# Patient Record
Sex: MALE | Race: WHITE | NOT HISPANIC OR LATINO | URBAN - METROPOLITAN AREA
[De-identification: names, ages, dates, MRNs, and addresses within clinical notes are randomized per-mention and may not be internally consistent; named-entity substitution may affect disease eponyms.]

---

## 2017-03-15 ENCOUNTER — OUTPATIENT (OUTPATIENT)
Dept: OUTPATIENT SERVICES | Facility: HOSPITAL | Age: 57
LOS: 1 days | End: 2017-03-15
Payer: COMMERCIAL

## 2017-03-15 VITALS
OXYGEN SATURATION: 99 % | WEIGHT: 210.1 LBS | SYSTOLIC BLOOD PRESSURE: 186 MMHG | DIASTOLIC BLOOD PRESSURE: 99 MMHG | HEART RATE: 98 BPM | RESPIRATION RATE: 16 BRPM | HEIGHT: 74 IN | TEMPERATURE: 98 F

## 2017-03-15 VITALS
DIASTOLIC BLOOD PRESSURE: 99 MMHG | WEIGHT: 209.44 LBS | RESPIRATION RATE: 18 BRPM | HEIGHT: 74 IN | SYSTOLIC BLOOD PRESSURE: 186 MMHG | TEMPERATURE: 97 F | OXYGEN SATURATION: 99 % | HEART RATE: 98 BPM

## 2017-03-15 DIAGNOSIS — I51.9 HEART DISEASE, UNSPECIFIED: Chronic | ICD-10-CM

## 2017-03-15 DIAGNOSIS — Z01.818 ENCOUNTER FOR OTHER PREPROCEDURAL EXAMINATION: ICD-10-CM

## 2017-03-15 DIAGNOSIS — Z98.890 OTHER SPECIFIED POSTPROCEDURAL STATES: Chronic | ICD-10-CM

## 2017-03-15 LAB
ANION GAP SERPL CALC-SCNC: 14 MMOL/L — SIGNIFICANT CHANGE UP (ref 5–17)
APTT BLD: 30.9 SEC — SIGNIFICANT CHANGE UP (ref 27.5–37.4)
BASOPHILS # BLD AUTO: 0 K/UL — SIGNIFICANT CHANGE UP (ref 0–0.2)
BASOPHILS NFR BLD AUTO: 0.2 % — SIGNIFICANT CHANGE UP (ref 0–2)
BLD GP AB SCN SERPL QL: SIGNIFICANT CHANGE UP
BUN SERPL-MCNC: 12 MG/DL — SIGNIFICANT CHANGE UP (ref 8–20)
CALCIUM SERPL-MCNC: 9.6 MG/DL — SIGNIFICANT CHANGE UP (ref 8.6–10.2)
CHLORIDE SERPL-SCNC: 97 MMOL/L — LOW (ref 98–107)
CHOLEST SERPL-MCNC: 185 MG/DL — SIGNIFICANT CHANGE UP (ref 110–199)
CO2 SERPL-SCNC: 25 MMOL/L — SIGNIFICANT CHANGE UP (ref 22–29)
CREAT SERPL-MCNC: 0.8 MG/DL — SIGNIFICANT CHANGE UP (ref 0.5–1.3)
EOSINOPHIL # BLD AUTO: 0 K/UL — SIGNIFICANT CHANGE UP (ref 0–0.5)
EOSINOPHIL NFR BLD AUTO: 0.6 % — SIGNIFICANT CHANGE UP (ref 0–5)
GLUCOSE SERPL-MCNC: 113 MG/DL — SIGNIFICANT CHANGE UP (ref 70–115)
HCT VFR BLD CALC: 39.7 % — LOW (ref 42–52)
HDLC SERPL-MCNC: 80 MG/DL — SIGNIFICANT CHANGE UP
HGB BLD-MCNC: 13.6 G/DL — LOW (ref 14–18)
INR BLD: 0.99 RATIO — SIGNIFICANT CHANGE UP (ref 0.88–1.16)
LIPID PNL WITH DIRECT LDL SERPL: 86 MG/DL — SIGNIFICANT CHANGE UP
LYMPHOCYTES # BLD AUTO: 1.9 K/UL — SIGNIFICANT CHANGE UP (ref 1–4.8)
LYMPHOCYTES # BLD AUTO: 31.1 % — SIGNIFICANT CHANGE UP (ref 20–55)
MAGNESIUM SERPL-MCNC: 1.8 MG/DL — SIGNIFICANT CHANGE UP (ref 1.8–2.5)
MCHC RBC-ENTMCNC: 33 PG — HIGH (ref 27–31)
MCHC RBC-ENTMCNC: 34.3 G/DL — SIGNIFICANT CHANGE UP (ref 32–36)
MCV RBC AUTO: 96.4 FL — HIGH (ref 80–94)
MONOCYTES # BLD AUTO: 0.4 K/UL — SIGNIFICANT CHANGE UP (ref 0–0.8)
MONOCYTES NFR BLD AUTO: 6.8 % — SIGNIFICANT CHANGE UP (ref 3–10)
NEUTROPHILS # BLD AUTO: 3.8 K/UL — SIGNIFICANT CHANGE UP (ref 1.8–8)
NEUTROPHILS NFR BLD AUTO: 61.1 % — SIGNIFICANT CHANGE UP (ref 37–73)
PLATELET # BLD AUTO: 195 K/UL — SIGNIFICANT CHANGE UP (ref 150–400)
POTASSIUM SERPL-MCNC: 4.2 MMOL/L — SIGNIFICANT CHANGE UP (ref 3.5–5.3)
POTASSIUM SERPL-SCNC: 4.2 MMOL/L — SIGNIFICANT CHANGE UP (ref 3.5–5.3)
PROTHROM AB SERPL-ACNC: 10.9 SEC — SIGNIFICANT CHANGE UP (ref 10–13.1)
RBC # BLD: 4.12 M/UL — LOW (ref 4.6–6.2)
RBC # FLD: 12.4 % — SIGNIFICANT CHANGE UP (ref 11–15.6)
SODIUM SERPL-SCNC: 136 MMOL/L — SIGNIFICANT CHANGE UP (ref 135–145)
TOTAL CHOLESTEROL/HDL RATIO MEASUREMENT: 2 RATIO — LOW (ref 3.4–9.6)
TRIGL SERPL-MCNC: 96 MG/DL — SIGNIFICANT CHANGE UP (ref 10–200)
TYPE + AB SCN PNL BLD: SIGNIFICANT CHANGE UP
WBC # BLD: 6.2 K/UL — SIGNIFICANT CHANGE UP (ref 4.8–10.8)
WBC # FLD AUTO: 6.2 K/UL — SIGNIFICANT CHANGE UP (ref 4.8–10.8)

## 2017-03-15 PROCEDURE — 93010 ELECTROCARDIOGRAM REPORT: CPT

## 2017-03-15 NOTE — H&P PST ADULT - HISTORY OF PRESENT ILLNESS
55 yo male with h/o CAD with 1 DEstent in proximal to mid LCX, HTN, HLD and c/o chest pain with activity. 55 yo male with h/o CAD with 1 DE stent in proximal to mid LCX, HTN, HLD and c/o chest pain with activity, equivalent to CCS 3. Patient had exercise stress test which was stopped secondary to chest pain.  Test results paperwork are still pending.  Patient presents today for PST for cardiac cath to r/o further CAD.

## 2017-03-15 NOTE — ASU PATIENT PROFILE, ADULT - PMH
CAD (coronary artery disease)    Chest pain    HLD (hyperlipidemia)    HTN (hypertension)    Mitral regurgitation

## 2017-03-16 DIAGNOSIS — Z01.818 ENCOUNTER FOR OTHER PREPROCEDURAL EXAMINATION: ICD-10-CM

## 2017-03-16 DIAGNOSIS — I25.10 ATHEROSCLEROTIC HEART DISEASE OF NATIVE CORONARY ARTERY WITHOUT ANGINA PECTORIS: ICD-10-CM

## 2017-03-17 ENCOUNTER — OUTPATIENT (OUTPATIENT)
Dept: OUTPATIENT SERVICES | Facility: HOSPITAL | Age: 57
LOS: 1 days | Discharge: HOME | End: 2017-03-17
Payer: COMMERCIAL

## 2017-03-17 ENCOUNTER — TRANSCRIPTION ENCOUNTER (OUTPATIENT)
Age: 57
End: 2017-03-17

## 2017-03-17 VITALS — TEMPERATURE: 98 F | OXYGEN SATURATION: 100 % | HEART RATE: 75 BPM | RESPIRATION RATE: 20 BRPM

## 2017-03-17 DIAGNOSIS — R07.89 OTHER CHEST PAIN: ICD-10-CM

## 2017-03-17 DIAGNOSIS — Z98.890 OTHER SPECIFIED POSTPROCEDURAL STATES: Chronic | ICD-10-CM

## 2017-03-17 DIAGNOSIS — I51.9 HEART DISEASE, UNSPECIFIED: Chronic | ICD-10-CM

## 2017-03-17 PROCEDURE — 93010 ELECTROCARDIOGRAM REPORT: CPT

## 2017-03-17 RX ORDER — ALPRAZOLAM 0.25 MG
0.5 TABLET ORAL EVERY 6 HOURS
Refills: 0 | Status: DISCONTINUED | OUTPATIENT
Start: 2017-03-17 | End: 2017-03-17

## 2017-03-17 RX ORDER — ASPIRIN/CALCIUM CARB/MAGNESIUM 324 MG
325 TABLET ORAL ONCE
Refills: 0 | Status: COMPLETED | OUTPATIENT
Start: 2017-03-17 | End: 2017-03-17

## 2017-03-17 RX ORDER — METOPROLOL TARTRATE 50 MG
100 TABLET ORAL DAILY
Refills: 0 | Status: DISCONTINUED | OUTPATIENT
Start: 2017-03-17 | End: 2017-04-01

## 2017-03-17 RX ORDER — ALPRAZOLAM 0.25 MG
0.25 TABLET ORAL EVERY 6 HOURS
Refills: 0 | Status: DISCONTINUED | OUTPATIENT
Start: 2017-03-17 | End: 2017-03-17

## 2017-03-17 RX ORDER — CLOPIDOGREL BISULFATE 75 MG/1
1 TABLET, FILM COATED ORAL
Qty: 30 | Refills: 11
Start: 2017-03-17 | End: 2018-03-11

## 2017-03-17 RX ORDER — ASPIRIN/CALCIUM CARB/MAGNESIUM 324 MG
81 TABLET ORAL DAILY
Refills: 0 | Status: DISCONTINUED | OUTPATIENT
Start: 2017-03-17 | End: 2017-04-01

## 2017-03-17 RX ORDER — AMLODIPINE BESYLATE 2.5 MG/1
5 TABLET ORAL DAILY
Refills: 0 | Status: DISCONTINUED | OUTPATIENT
Start: 2017-03-17 | End: 2017-04-01

## 2017-03-17 RX ORDER — SIMVASTATIN 20 MG/1
40 TABLET, FILM COATED ORAL AT BEDTIME
Refills: 0 | Status: DISCONTINUED | OUTPATIENT
Start: 2017-03-17 | End: 2017-04-01

## 2017-03-17 RX ORDER — FENTANYL CITRATE 50 UG/ML
25 INJECTION INTRAVENOUS ONCE
Refills: 0 | Status: DISCONTINUED | OUTPATIENT
Start: 2017-03-17 | End: 2017-03-17

## 2017-03-17 RX ORDER — VALSARTAN 80 MG/1
320 TABLET ORAL DAILY
Refills: 0 | Status: DISCONTINUED | OUTPATIENT
Start: 2017-03-17 | End: 2017-04-01

## 2017-03-17 RX ORDER — CLOPIDOGREL BISULFATE 75 MG/1
75 TABLET, FILM COATED ORAL DAILY
Refills: 0 | Status: DISCONTINUED | OUTPATIENT
Start: 2017-03-18 | End: 2017-04-01

## 2017-03-17 RX ORDER — AMLODIPINE BESYLATE 2.5 MG/1
1 TABLET ORAL
Qty: 30 | Refills: 5
Start: 2017-03-17 | End: 2017-09-12

## 2017-03-17 RX ADMIN — SIMVASTATIN 40 MILLIGRAM(S): 20 TABLET, FILM COATED ORAL at 21:12

## 2017-03-17 RX ADMIN — FENTANYL CITRATE 25 MICROGRAM(S): 50 INJECTION INTRAVENOUS at 13:01

## 2017-03-17 RX ADMIN — FENTANYL CITRATE 25 MICROGRAM(S): 50 INJECTION INTRAVENOUS at 13:15

## 2017-03-17 RX ADMIN — Medication 325 MILLIGRAM(S): at 10:59

## 2017-03-17 RX ADMIN — AMLODIPINE BESYLATE 5 MILLIGRAM(S): 2.5 TABLET ORAL at 13:25

## 2017-03-17 RX ADMIN — Medication 0.5 MILLIGRAM(S): at 15:11

## 2017-03-17 RX ADMIN — Medication 0.5 MILLIGRAM(S): at 21:12

## 2017-03-17 NOTE — DISCHARGE NOTE ADULT - CARE PROVIDER_API CALL
Leif Monzon), Cardiovascular Disease; Internal Medicine  [address] [place] [address]  Phone: [phone]  Fax: [phone]

## 2017-03-17 NOTE — DISCHARGE NOTE ADULT - PATIENT PORTAL LINK FT
“You can access the [facility] Patient Portal, offered by [facility], by registering with the following website: [email or web address]”

## 2017-03-17 NOTE — DISCHARGE NOTE ADULT - MEDICATION SUMMARY - MEDICATIONS TO STOP TAKING
I will STOP taking the medications listed below when I get home from the hospital:    valsartan 320 mg oral tablet  -- 1 tab(s) by mouth once a day

## 2017-03-17 NOTE — DISCHARGE NOTE ADULT - HOSPITAL COURSE
56 year old male with history of CAD and prior LCx stent.  Now s/p PCI with AQUILINO X4 LAD and prior stents patent.   Add:  -plavix  -norvasc  -saqib/hctz

## 2017-03-17 NOTE — DISCHARGE NOTE ADULT - CARE PLAN
Principal Discharge DX:	CAD (coronary artery disease)  Goal:	optimal cardiac function  Instructions for follow-up, activity and diet:	Restricted use with no heavy lifting of affected arm for 48 hours.  No submerging the arm in water for 48 hours.  You may start showering today.  Call your doctor for any bleeding, swelling, loss of sensation in the hand or fingers, or fingers turning blue.  If heavy bleeding or large lumps form, hold pressure at the spot and come to the Emergency Room. Principal Discharge DX:	CAD (coronary artery disease)  Goal:	optimal cardiac function  Instructions for follow-up, activity and diet:	Restricted use with no heavy lifting of affected arm for 48 hours.  No submerging the arm in water for 48 hours.  You may start showering today.  Call your doctor for any bleeding, swelling, loss of sensation in the hand or fingers, or fingers turning blue.  If heavy bleeding or large lumps form, hold pressure at the spot and come to the Emergency Room.    1. follow up with cardiologist 2 weeks .      Restricted use with no heavy lifting of affected arm for 48 hours.  No submerging the arm in water for 48 hours.  You may start showering today.  Call your doctor for any bleeding, swelling, loss of sensation in the hand or fingers, or fingers turning blue.  If heavy bleeding or large lumps form, hold pressure at the spot and come to the Emergency Room.    5. increase aspirin to 325mg daily ---stop taking the 81mg aspirin  till you follow up with Dr Joaquin.   take plavix for for stent to remain open ---do not discontinue unless your cardiologist says its OK!!   post intervention site precautions reviewed with patient.

## 2017-03-17 NOTE — DISCHARGE NOTE ADULT - PLAN OF CARE
optimal cardiac function Restricted use with no heavy lifting of affected arm for 48 hours.  No submerging the arm in water for 48 hours.  You may start showering today.  Call your doctor for any bleeding, swelling, loss of sensation in the hand or fingers, or fingers turning blue.  If heavy bleeding or large lumps form, hold pressure at the spot and come to the Emergency Room. Restricted use with no heavy lifting of affected arm for 48 hours.  No submerging the arm in water for 48 hours.  You may start showering today.  Call your doctor for any bleeding, swelling, loss of sensation in the hand or fingers, or fingers turning blue.  If heavy bleeding or large lumps form, hold pressure at the spot and come to the Emergency Room.    1. follow up with cardiologist 2 weeks .      Restricted use with no heavy lifting of affected arm for 48 hours.  No submerging the arm in water for 48 hours.  You may start showering today.  Call your doctor for any bleeding, swelling, loss of sensation in the hand or fingers, or fingers turning blue.  If heavy bleeding or large lumps form, hold pressure at the spot and come to the Emergency Room.    5. increase aspirin to 325mg daily ---stop taking the 81mg aspirin  till you follow up with Dr Joaquin.   take plavix for for stent to remain open ---do not discontinue unless your cardiologist says its OK!!   post intervention site precautions reviewed with patient.

## 2017-03-17 NOTE — DISCHARGE NOTE ADULT - MEDICATION SUMMARY - MEDICATIONS TO TAKE
I will START or STAY ON the medications listed below when I get home from the hospital:    aspirin 81 mg oral tablet  -- 1 tab(s) by mouth once a day  -- Indication: For stent     simvastatin 40 mg oral tablet  -- 1 tab(s) by mouth once a day (at bedtime)  -- Indication: For Cholesterol    Diovan HCT 25 mg-320 mg oral tablet  -- 1 tab(s) by mouth once a day  -- Avoid prolonged or excessive exposure to direct and/or artificial sunlight while taking this medication.  Do not take this drug if you are pregnant.  It is very important that you take or use this exactly as directed.  Do not skip doses or discontinue unless directed by your doctor.  Some non-prescription drugs may aggravate your condition.  Read all labels carefully.  If a warning appears, check with your doctor before taking.    -- Indication: For bp    Plavix 75 mg oral tablet  -- 1 tab(s) by mouth once a day  -- Do not take aspirin or aspirin containing products without knowledge and consent of your physician.    -- Indication: For stent     ALPRAZolam 0.5 mg oral tablet  -- 1 tab(s) by mouth every 6 hours, As needed, anxiety  -- Indication: For anxiet    metoprolol succinate 100 mg oral tablet, extended release  -- 1 tab(s) by mouth once a day  -- Indication: For bp    amLODIPine 5 mg oral tablet  -- 1 tab(s) by mouth once a day  -- It is very important that you take or use this exactly as directed.  Do not skip doses or discontinue unless directed by your doctor.  Some non-prescription drugs may aggravate your condition.  Read all labels carefully.  If a warning appears, check with your doctor before taking.    -- Indication: For bp    Omega-3 oral capsule  -- 1 cap(s) by mouth once a day  -- Indication: For Cholesterol    multivitamin  -- 1 cap(s) by mouth once a day  -- Indication: For vit    Vitamin D3 400 intl units oral capsule  -- 1 cap(s) by mouth once a day  -- Indication: For vit

## 2017-03-18 VITALS
OXYGEN SATURATION: 97 % | TEMPERATURE: 98 F | DIASTOLIC BLOOD PRESSURE: 97 MMHG | SYSTOLIC BLOOD PRESSURE: 144 MMHG | RESPIRATION RATE: 16 BRPM | HEART RATE: 78 BPM

## 2017-03-18 LAB
ANION GAP SERPL CALC-SCNC: 11 MMOL/L — SIGNIFICANT CHANGE UP (ref 5–17)
BUN SERPL-MCNC: 14 MG/DL — SIGNIFICANT CHANGE UP (ref 8–20)
CALCIUM SERPL-MCNC: 9.2 MG/DL — SIGNIFICANT CHANGE UP (ref 8.6–10.2)
CHLORIDE SERPL-SCNC: 101 MMOL/L — SIGNIFICANT CHANGE UP (ref 98–107)
CO2 SERPL-SCNC: 28 MMOL/L — SIGNIFICANT CHANGE UP (ref 22–29)
CREAT SERPL-MCNC: 0.83 MG/DL — SIGNIFICANT CHANGE UP (ref 0.5–1.3)
GLUCOSE SERPL-MCNC: 98 MG/DL — SIGNIFICANT CHANGE UP (ref 70–115)
HCT VFR BLD CALC: 39 % — LOW (ref 42–52)
HGB BLD-MCNC: 13.1 G/DL — LOW (ref 14–18)
MAGNESIUM SERPL-MCNC: 1.9 MG/DL — SIGNIFICANT CHANGE UP (ref 1.8–2.5)
MCHC RBC-ENTMCNC: 32.8 PG — HIGH (ref 27–31)
MCHC RBC-ENTMCNC: 33.6 G/DL — SIGNIFICANT CHANGE UP (ref 32–36)
MCV RBC AUTO: 97.5 FL — HIGH (ref 80–94)
PLATELET # BLD AUTO: 177 K/UL — SIGNIFICANT CHANGE UP (ref 150–400)
POTASSIUM SERPL-MCNC: 4.4 MMOL/L — SIGNIFICANT CHANGE UP (ref 3.5–5.3)
POTASSIUM SERPL-SCNC: 4.4 MMOL/L — SIGNIFICANT CHANGE UP (ref 3.5–5.3)
RBC # BLD: 4 M/UL — LOW (ref 4.6–6.2)
RBC # FLD: 12.4 % — SIGNIFICANT CHANGE UP (ref 11–15.6)
SODIUM SERPL-SCNC: 140 MMOL/L — SIGNIFICANT CHANGE UP (ref 135–145)
WBC # BLD: 5.1 K/UL — SIGNIFICANT CHANGE UP (ref 4.8–10.8)
WBC # FLD AUTO: 5.1 K/UL — SIGNIFICANT CHANGE UP (ref 4.8–10.8)

## 2017-03-18 PROCEDURE — 93010 ELECTROCARDIOGRAM REPORT: CPT

## 2017-03-18 RX ORDER — ALPRAZOLAM 0.25 MG
1 TABLET ORAL
Qty: 0 | Refills: 0 | DISCHARGE
Start: 2017-03-18

## 2017-03-18 RX ADMIN — Medication 100 MILLIGRAM(S): at 05:10

## 2017-03-18 RX ADMIN — AMLODIPINE BESYLATE 5 MILLIGRAM(S): 2.5 TABLET ORAL at 05:10

## 2017-03-18 RX ADMIN — CLOPIDOGREL BISULFATE 75 MILLIGRAM(S): 75 TABLET, FILM COATED ORAL at 09:23

## 2017-03-18 RX ADMIN — VALSARTAN 320 MILLIGRAM(S): 80 TABLET ORAL at 05:10

## 2017-03-18 RX ADMIN — Medication 81 MILLIGRAM(S): at 09:23

## 2017-03-18 NOTE — PROGRESS NOTE ADULT - SUBJECTIVE AND OBJECTIVE BOX
I have seen and examined this patient. Consent obtained. Agree with cardiac cath. Risks and benefits fully explained. All questions answered.    Tim Joaquin MD
Cardiology NP    No change in medical history since PST.
INTERVAL HISTORY:   55 yo male with prior PCI to the LCX.  recent abnormal NST.  Now Post PCI of the LAD.  Prior LCX stent patent.  No complaints this am of chest pain GARCIA or palpitations.  Ambulatiing  no dizziness or lightheadedness.     	  MEDICATIONS:  valsartan 320milliGRAM(s) Oral daily  metoprolol succinate ER 100milliGRAM(s) Oral daily  hydrochlorothiazide   Tablet 25milliGRAM(s) Oral daily  amLODIPine   Tablet 5milliGRAM(s) Oral daily  ALPRAZolam 0.5milliGRAM(s) Oral every 6 hours PRN  simvastatin 40milliGRAM(s) Oral at bedtime  aspirin enteric coated 81milliGRAM(s) Oral daily  clopidogrel Tablet 75milliGRAM(s) Oral daily  Nephro-giovanna 1Tablet(s) Oral daily        PHYSICAL EXAM:    T(C): 36.7, Max: 36.8 (03-17 @ 19:59)  HR: 85 (72 - 85)  BP: 128/70 (128/70 - 192/96)  RR: 17 (15 - 20)  SpO2: 99% (95% - 100%)  Wt(kg): --    I&O's Summary    I & Os for current day (as of 18 Mar 2017 08:19)  =============================================  IN: 120 ml / OUT: 0 ml / NET: 120 ml      Daily     Daily Weight in k.6 (18 Mar 2017 06:07)    Appearance: Normal	  HEENT:   Normal oral mucosa, PERRL, EOMI	  Lymphatic: No lymphadenopathy  Cardiovascular: Normal S1 S2, No JVD, No murmurs, No edema  Respiratory: Lungs clear to auscultation	  Psychiatry: A & O x 3, Mood & affect appropriate  Gastrointestinal:  Soft, Non-tender, + BS	  Skin: No rashes, No ecchymoses, No cyanosis  Neurologic: Non-focal  Extremities: Normal range of motion, No clubbing, cyanosis or edema  Vascular: Peripheral pulses palpable 2+ bilaterally  Procedure Site: right radial site benign, no hematoma no ecchymosis.       TELEMETRY: 	SR 60's    ECG:  	NSR 68 Q's in III V1 no change .                           13.1   5.1   )-----------( 177      ( 18 Mar 2017 05:30 )             39.0     18 Mar 2017 05:30    140    |  101    |  14.0   ----------------------------<  98     4.4     |  28.0   |  0.83     Ca    9.2        18 Mar 2017 05:30  Mg     1.9       18 Mar 2017 05:30        ASSESSMENT/PLAN: 	    1. follow up with cardiologist 2 weeks .   2  Restricted use with no heavy lifting of affected arm for 48 hours.  No submerging the arm in water for 48 hours.  You may start showering today.  Call your doctor for any bleeding, swelling, loss of sensation in the hand or fingers, or fingers turning blue.  If heavy bleeding or large lumps form, hold pressure at the spot and come to the Emergency Room.  3. increase aspirin to 325mg daily ---stop taking the 81mg aspirin  till you follow up with Dr Joaquin.  4. take plavix for for stent to remain open ---do not discontinue unless your cardiologist says its OK!!  5. post intervention site precautions reviewed with patient.  6 ok to dc home
Nurse Practitioner Progress note:     INTERVAL HISTORY: 56 year old male with history of prior LCX stent with c/o chest pain.    MEDICATIONS:  valsartan 320milliGRAM(s) Oral daily  metoprolol succinate ER 100milliGRAM(s) Oral daily  fentaNYL    Injectable 25MICROGram(s) IV Push once  simvastatin 40milliGRAM(s) Oral at bedtime  aspirin enteric coated 81milliGRAM(s) Oral daily  Nephrocaps 1Capsule(s) Oral daily      TELEMETRY: NSR    T(C): 36.4, Max: 36.4 (03-17 @ 10:37)  HR: 81 (75 - 81)  BP: 180/97 (180/97 - 180/97)  RR: 18 (18 - 20)  SpO2: 97% (97% - 100%)      PHYSICAL EXAM:  Appearance: Normal	  HEENT:   Normal oral mucosa, PERRL  Cardiovascular: Normal S1 S2, No JVD, No murmurs, No edema  Respiratory: Lungs clear to auscultation	  Neurologic: Non-focal, A&O X3.  No neuro deficits  Procedure Site: R radial band in place.  Site benign.  No bleeding/hematoma/ecchymosis.  + palp radial pusle    12 lead EKG:  NSR 75 bpm.  No acute changes	        PROCEDURE RESULTS: S/P LHC-->PCI with AQUILINO X 4 LAD and prior LCx stent patent.    ASSESSMENT/PLAN: 	  -Radial precautions  -d/c radial band in 1 hour  -Resume home meds  -Initiate plavix daily, norvasc, HCTZ, diovan  -B/P control   -Follow up with Dr. Monzon  -Check labs/EKG/site check in AM  -Probable discharge in AM
Nurse Practitioner Progress note:     INTERVAL HISTORY: 56 year old male with prior LCx stent with c/o chest pain.    MEDICATIONS:  valsartan 320milliGRAM(s) Oral daily  metoprolol succinate ER 100milliGRAM(s) Oral daily  hydrochlorothiazide   Tablet 25milliGRAM(s) Oral daily  amLODIPine   Tablet 5milliGRAM(s) Oral daily  simvastatin 40milliGRAM(s) Oral at bedtime  aspirin enteric coated 81milliGRAM(s) Oral daily  Nephrocaps 1Capsule(s) Oral daily      TELEMETRY: NSR    T(C): 36.4, Max: 36.4 (03-17 @ 10:37)  HR: 81 (75 - 81)  BP: 180/97 (180/97 - 180/97)  RR: 18 (18 - 20)  SpO2: 97% (97% - 100%)      PHYSICAL EXAM:  Appearance: Normal	  Cardiovascular: Normal S1 S2, No JVD, No murmurs, No edema  Respiratory: Lungs clear to auscultation	  Neurologic: Non-focal, A&O X3.  No neuro deficits  Procedure Site: R radial band in place.  Site benign.  No bleeding/hematoma/ecchymosis.  + palp radial pulse    12 lead EKG:  	NSR 75 bpm.  No acute changes        PROCEDURE RESULTS: S/P LHC-->PCI with AQUILINO X4 LAD prior LCx patent    ASSESSMENT/PLAN: 	  -Radial precautions  -D/C radial band in 1 hour  -Resume home meds  -B/P control  -Initiate plavix, norvasc and hctz  -Follow up with Dr. Monzon  -Check labs/EKG/site check in AM  -Probable discharge in AM

## 2017-05-24 ENCOUNTER — APPOINTMENT (OUTPATIENT)
Dept: INTERNAL MEDICINE | Facility: CLINIC | Age: 57
End: 2017-05-24

## 2017-05-24 VITALS
SYSTOLIC BLOOD PRESSURE: 140 MMHG | BODY MASS INDEX: 27.72 KG/M2 | RESPIRATION RATE: 10 BRPM | WEIGHT: 216 LBS | DIASTOLIC BLOOD PRESSURE: 86 MMHG | HEIGHT: 74 IN | HEART RATE: 68 BPM

## 2017-05-25 LAB
ALBUMIN SERPL ELPH-MCNC: 4.8 G/DL
ALP BLD-CCNC: 51 U/L
ALT SERPL-CCNC: 27 U/L
ANION GAP SERPL CALC-SCNC: 18 MMOL/L
APPEARANCE: CLEAR
AST SERPL-CCNC: 33 U/L
BACTERIA: NEGATIVE
BASOPHILS # BLD AUTO: 0.02 K/UL
BASOPHILS NFR BLD AUTO: 0.3 %
BILIRUB SERPL-MCNC: 0.7 MG/DL
BILIRUBIN URINE: NEGATIVE
BLOOD URINE: NEGATIVE
BUN SERPL-MCNC: 15 MG/DL
CALCIUM SERPL-MCNC: 9.9 MG/DL
CHLORIDE SERPL-SCNC: 98 MMOL/L
CHOLEST SERPL-MCNC: 172 MG/DL
CHOLEST/HDLC SERPL: 2.2 RATIO
CO2 SERPL-SCNC: 22 MMOL/L
COLOR: YELLOW
CREAT SERPL-MCNC: 0.97 MG/DL
EOSINOPHIL # BLD AUTO: 0.08 K/UL
EOSINOPHIL NFR BLD AUTO: 1.2 %
GLUCOSE QUALITATIVE U: NORMAL MG/DL
GLUCOSE SERPL-MCNC: 116 MG/DL
HCT VFR BLD CALC: 41 %
HDLC SERPL-MCNC: 79 MG/DL
HGB BLD-MCNC: 13.1 G/DL
HYALINE CASTS: 1 /LPF
IMM GRANULOCYTES NFR BLD AUTO: 0.2 %
KETONES URINE: ABNORMAL
LDLC SERPL CALC-MCNC: 77 MG/DL
LEUKOCYTE ESTERASE URINE: NEGATIVE
LYMPHOCYTES # BLD AUTO: 1.48 K/UL
LYMPHOCYTES NFR BLD AUTO: 22.8 %
MAGNESIUM SERPL-MCNC: 1.9 MG/DL
MAN DIFF?: NORMAL
MCHC RBC-ENTMCNC: 32 GM/DL
MCHC RBC-ENTMCNC: 32.1 PG
MCV RBC AUTO: 100.5 FL
MICROSCOPIC-UA: NORMAL
MONOCYTES # BLD AUTO: 0.45 K/UL
MONOCYTES NFR BLD AUTO: 6.9 %
NEUTROPHILS # BLD AUTO: 4.45 K/UL
NEUTROPHILS NFR BLD AUTO: 68.6 %
NITRITE URINE: NEGATIVE
PH URINE: 5.5
PLATELET # BLD AUTO: 248 K/UL
POTASSIUM SERPL-SCNC: 4.4 MMOL/L
PROT SERPL-MCNC: 7.4 G/DL
PROTEIN URINE: NEGATIVE MG/DL
PSA SERPL-MCNC: 1.14 NG/ML
RBC # BLD: 4.08 M/UL
RBC # FLD: 12.8 %
RED BLOOD CELLS URINE: 1 /HPF
SODIUM SERPL-SCNC: 138 MMOL/L
SPECIFIC GRAVITY URINE: 1.02
SQUAMOUS EPITHELIAL CELLS: 0 /HPF
TRIGL SERPL-MCNC: 78 MG/DL
UROBILINOGEN URINE: NORMAL MG/DL
WBC # FLD AUTO: 6.49 K/UL
WHITE BLOOD CELLS URINE: 0 /HPF

## 2017-05-26 ENCOUNTER — RESULT REVIEW (OUTPATIENT)
Age: 57
End: 2017-05-26

## 2017-11-14 ENCOUNTER — APPOINTMENT (OUTPATIENT)
Dept: INTERNAL MEDICINE | Facility: CLINIC | Age: 57
End: 2017-11-14

## 2018-04-24 ENCOUNTER — APPOINTMENT (OUTPATIENT)
Dept: VASCULAR SURGERY | Facility: CLINIC | Age: 58
End: 2018-04-24
Payer: COMMERCIAL

## 2018-04-24 VITALS
SYSTOLIC BLOOD PRESSURE: 144 MMHG | DIASTOLIC BLOOD PRESSURE: 88 MMHG | HEIGHT: 74 IN | WEIGHT: 216 LBS | RESPIRATION RATE: 16 BRPM | OXYGEN SATURATION: 100 % | HEART RATE: 84 BPM | BODY MASS INDEX: 27.72 KG/M2 | TEMPERATURE: 98 F

## 2018-04-24 PROCEDURE — 99204 OFFICE O/P NEW MOD 45 MIN: CPT

## 2018-07-20 ENCOUNTER — APPOINTMENT (OUTPATIENT)
Dept: INTERNAL MEDICINE | Facility: CLINIC | Age: 58
End: 2018-07-20
Payer: COMMERCIAL

## 2018-07-20 VITALS
WEIGHT: 213 LBS | SYSTOLIC BLOOD PRESSURE: 140 MMHG | OXYGEN SATURATION: 95 % | DIASTOLIC BLOOD PRESSURE: 80 MMHG | HEART RATE: 95 BPM | HEIGHT: 74 IN | BODY MASS INDEX: 27.34 KG/M2

## 2018-07-20 DIAGNOSIS — Z11.59 ENCOUNTER FOR SCREENING FOR OTHER VIRAL DISEASES: ICD-10-CM

## 2018-07-20 PROCEDURE — 99214 OFFICE O/P EST MOD 30 MIN: CPT

## 2018-07-20 RX ORDER — VALSARTAN AND HYDROCHLOROTHIAZIDE 320; 25 MG/1; MG/1
320-25 TABLET, FILM COATED ORAL DAILY
Qty: 90 | Refills: 1 | Status: DISCONTINUED | COMMUNITY
Start: 2017-05-24 | End: 2018-07-20

## 2018-07-20 RX ORDER — AMLODIPINE BESYLATE 5 MG/1
5 TABLET ORAL
Refills: 0 | Status: DISCONTINUED | COMMUNITY
End: 2018-07-20

## 2018-07-20 NOTE — PHYSICAL EXAM
[No Acute Distress] : no acute distress [No Respiratory Distress] : no respiratory distress  [Clear to Auscultation] : lungs were clear to auscultation bilaterally [Normal Rate] : normal rate  [Regular Rhythm] : with a regular rhythm [Normal Affect] : the affect was normal [Normal Mood] : the mood was normal

## 2018-07-20 NOTE — ASSESSMENT
[FreeTextEntry1] : RX given to have labs checked at lab per pt req. Patient advised to continue present medications with diet/exercise and specialists followup. Patient  will return to the office in 3-4 months for CPE\par \par **Diovan /25 changed to Benicar HCT 40/25 secondary to medication recall\par \par \par Carotid sonogram was 3/18 with MRA done 4/2018\par Colonoscopy was 7/12 with followup in 10 years\par Shingles vaccine discussed\par Specialists include\par 1. Cardiology-Dr. Monzon\par 2. Vascular-Dr. Stephenson\par Hepatitis C screening to be done today\par Abdominal sonogram regarding increased bilirubin "to do eventually"\par

## 2018-07-20 NOTE — HEALTH RISK ASSESSMENT
[Fully functional (bathing, dressing, toileting, transferring, walking, feeding)] : Fully functional (bathing, dressing, toileting, transferring, walking, feeding) [Fully functional (using the telephone, shopping, preparing meals, housekeeping, doing laundry, using] : Fully functional and needs no help or supervision to perform IADLs (using the telephone, shopping, preparing meals, housekeeping, doing laundry, using transportation, managing medications and managing finances) [No falls in past year] : Patient reported no falls in the past year

## 2018-07-20 NOTE — HISTORY OF PRESENT ILLNESS
[FreeTextEntry1] : On CAD/hypertension/hyperlipidemia [de-identified] : Patient presents for followup on CAD/hypertension/hyperlipidemia. Patient is NOT currently fasting,(wants rx to have labs done at lab)and offers no complaints. Patient is currently on aspirin/Plavix/Toprol for CAD, on Diovan HCT for hypertension and is on simvastatin for hyperlipidemia. \par -Patient denies chest pain/palpitations and dyspnea

## 2018-07-24 ENCOUNTER — APPOINTMENT (OUTPATIENT)
Dept: VASCULAR SURGERY | Facility: CLINIC | Age: 58
End: 2018-07-24
Payer: COMMERCIAL

## 2018-07-24 VITALS
TEMPERATURE: 98.6 F | OXYGEN SATURATION: 99 % | HEART RATE: 78 BPM | SYSTOLIC BLOOD PRESSURE: 152 MMHG | DIASTOLIC BLOOD PRESSURE: 84 MMHG | BODY MASS INDEX: 27.48 KG/M2 | WEIGHT: 214 LBS

## 2018-07-24 PROCEDURE — 99214 OFFICE O/P EST MOD 30 MIN: CPT

## 2018-09-18 ENCOUNTER — APPOINTMENT (OUTPATIENT)
Dept: VASCULAR SURGERY | Facility: CLINIC | Age: 58
End: 2018-09-18
Payer: COMMERCIAL

## 2018-09-18 VITALS
OXYGEN SATURATION: 100 % | HEART RATE: 96 BPM | DIASTOLIC BLOOD PRESSURE: 93 MMHG | BODY MASS INDEX: 27.48 KG/M2 | WEIGHT: 214 LBS | TEMPERATURE: 98.6 F | SYSTOLIC BLOOD PRESSURE: 180 MMHG

## 2018-09-18 DIAGNOSIS — Z82.49 FAMILY HISTORY OF ISCHEMIC HEART DISEASE AND OTHER DISEASES OF THE CIRCULATORY SYSTEM: ICD-10-CM

## 2018-09-18 DIAGNOSIS — Z83.42 FAMILY HISTORY OF FAMILIAL HYPERCHOLESTEROLEMIA: ICD-10-CM

## 2018-09-18 PROCEDURE — 99213 OFFICE O/P EST LOW 20 MIN: CPT

## 2018-10-01 ENCOUNTER — RESULT REVIEW (OUTPATIENT)
Age: 58
End: 2018-10-01

## 2019-01-30 NOTE — H&P PST ADULT - PMH
Other:/No pain .
CAD (coronary artery disease)    Chest pain    HLD (hyperlipidemia)    HTN (hypertension)    Mitral regurgitation

## 2019-02-05 ENCOUNTER — RX RENEWAL (OUTPATIENT)
Age: 59
End: 2019-02-05

## 2019-02-19 ENCOUNTER — APPOINTMENT (OUTPATIENT)
Dept: VASCULAR SURGERY | Facility: CLINIC | Age: 59
End: 2019-02-19
Payer: COMMERCIAL

## 2019-02-19 VITALS
WEIGHT: 218 LBS | HEART RATE: 76 BPM | DIASTOLIC BLOOD PRESSURE: 87 MMHG | HEIGHT: 74 IN | TEMPERATURE: 98.1 F | OXYGEN SATURATION: 99 % | SYSTOLIC BLOOD PRESSURE: 145 MMHG | BODY MASS INDEX: 27.98 KG/M2

## 2019-02-19 PROCEDURE — 99213 OFFICE O/P EST LOW 20 MIN: CPT

## 2019-02-19 NOTE — HISTORY OF PRESENT ILLNESS
[FreeTextEntry1] : 57 y/o male with moderate right carotid stenosis and left carotid occlusion. He denies lightheadedness, dizziness, headaches, visual, neurological or memory deficits. He had a U/S Carotid arteries completed prior to coming here today. The study demonstrates no change in stenosis. He takes ASA 81 mg, Clopidogrel 75 mg QD, Simvastatin 40 mg, Toprol XL 50 mg QD and Valsartan -HCTZ 320-25 mg QD. He reports white coat syndrome with BP at home in the 126/66 range. He has some anxiety for which he takes Xanax PRN.

## 2019-02-19 NOTE — PHYSICAL EXAM
[0] : left 0 [2+] : left 2+ [Alert] : alert [Oriented to Person] : oriented to person [Oriented to Place] : oriented to place [Oriented to Time] : oriented to time [Anxious] : anxious [Right Carotid Bruit] : no bruit heard over the right carotid [de-identified] : WD, WN, NAD. Awake, alert, interactive. Ambulates without difficulty [de-identified] : ADITHYA, PERRENZOL [de-identified] : supple, midline. No change with rotation of head. [de-identified] : no cyanosis or deformity. full ROM, MS 5/5\par  [de-identified] : TIFFANY

## 2019-02-19 NOTE — PROCEDURE
[FreeTextEntry1] : U/S carotid duplex of the Neck, August 3, 2018 from [facility] demonstrates moderate, right internal carotid artery and  occlusion of the left carotid artery. There is mild atherosclerosis of the right proximal internal carotid artery.\par \par U/S carotid duplex of the Neck, February 8, 2019 from [facility] demonstrates no change from previous study.

## 2019-02-19 NOTE — ASSESSMENT
[FreeTextEntry1] : 56 y/o male with moderate R PICA stenosis and Left sided occlusion. Discussed test results. he remains asymptomatic. Discussed that currently he is not a surgical candidate and that we will complete U/S at regular intervals to assess for further stenosis. Should he see any symptoms occur sooner he will contact us immediately. RTC in 6 months.

## 2019-02-19 NOTE — REASON FOR VISIT
[Follow-Up: _____] : a [unfilled] follow-up visit [FreeTextEntry1] : Moderate R ICA stenosis and L sided occlusion

## 2019-02-22 DIAGNOSIS — K42.9 UMBILICAL HERNIA W/OUT OBSTRUCTION OR GANGRENE: ICD-10-CM

## 2019-02-25 PROBLEM — K42.9 UMBILICAL HERNIA: Status: ACTIVE | Noted: 2019-02-25

## 2019-03-06 ENCOUNTER — APPOINTMENT (OUTPATIENT)
Dept: COLORECTAL SURGERY | Facility: CLINIC | Age: 59
End: 2019-03-06
Payer: COMMERCIAL

## 2019-03-06 VITALS
DIASTOLIC BLOOD PRESSURE: 80 MMHG | BODY MASS INDEX: 26.95 KG/M2 | SYSTOLIC BLOOD PRESSURE: 140 MMHG | WEIGHT: 210 LBS | HEART RATE: 68 BPM | HEIGHT: 74 IN | RESPIRATION RATE: 12 BRPM

## 2019-03-06 DIAGNOSIS — Z78.9 OTHER SPECIFIED HEALTH STATUS: ICD-10-CM

## 2019-03-06 PROCEDURE — 45300 PROCTOSIGMOIDOSCOPY DX: CPT

## 2019-03-06 PROCEDURE — 99244 OFF/OP CNSLTJ NEW/EST MOD 40: CPT | Mod: 25

## 2019-03-06 RX ORDER — OLMESARTAN MEDOXOMIL AND HYDROCHLOROTHIAZIDE 40; 25 MG/1; MG/1
40-25 TABLET ORAL DAILY
Qty: 90 | Refills: 1 | Status: DISCONTINUED | COMMUNITY
Start: 2018-07-20 | End: 2019-03-06

## 2019-03-11 ENCOUNTER — OUTPATIENT (OUTPATIENT)
Dept: OUTPATIENT SERVICES | Facility: HOSPITAL | Age: 59
LOS: 1 days | Discharge: HOME | End: 2019-03-11

## 2019-03-11 DIAGNOSIS — I51.9 HEART DISEASE, UNSPECIFIED: Chronic | ICD-10-CM

## 2019-03-11 DIAGNOSIS — C20 MALIGNANT NEOPLASM OF RECTUM: ICD-10-CM

## 2019-03-11 DIAGNOSIS — Z98.890 OTHER SPECIFIED POSTPROCEDURAL STATES: Chronic | ICD-10-CM

## 2019-03-11 NOTE — PHYSICAL EXAM
[Normal Breath Sounds] : Normal breath sounds [Normal Heart Sounds] : normal heart sounds [Normal Rate and Rhythm] : normal rate and rhythm [No Edema] : No edema [Alert] : alert [Oriented to Person] : oriented to person [Oriented to Place] : oriented to place [Oriented to Time] : oriented to time [Calm] : calm [de-identified] : round soft +BS NT/ND [de-identified] : NC/AT [de-identified] : +ROM [de-identified] : intact

## 2019-03-11 NOTE — REVIEW OF SYSTEMS
[As Noted in HPI] : as noted in HPI [Negative] : Endocrine [Chest Pain] : no chest pain [Shortness Of Breath] : no shortness of breath [Easy Bleeding] : no tendency for easy bleeding [Easy Bruising] : no tendency for easy bruising [FreeTextEntry5] : 2019 echo performed [FreeTextEntry7] : daily BM

## 2019-03-11 NOTE — HISTORY OF PRESENT ILLNESS
[FreeTextEntry1] : 57yo WM with BRBPR over the past six months, which increased in frequency over the past 2 months. Colonoscopy performed 2.20.19 (initial screening colonoscopy 8yrs ago revealed hemorrhoids, otherwise nl study).  Colonoscopy revealed a rectal lesion, which was biopsied.  Path consistent with adenoCA.  CT scan, pelvic MRI, TRUS performed.  Findings consistent with T2NO lesion; no metastatic disease noted.

## 2019-03-11 NOTE — ASSESSMENT
[FreeTextEntry1] : Very pleasant 58-year-old white gentleman presents today for a second opinion regarding management of his rectal carcinoma.\par \par Patient has been experiencing rectal bleeding and eventually was referred to gastroenterology. His last colonoscopy was 8 years ago. 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 This was initially reported as approximately 5 cm in diameter. He subsequently had a CAT scan of the chest, abdomen and pelvis revealing no gross evidence of metastatic disease. He had a transrectal ultrasound confirming the presence of a T2 N0 lesion in the distal rectum. He also had an MRI of the pelvis confirming a T2 N0 distal rectal lesion. The lesion is approximately 3 cm in diameter but the description is that of a "deep" T2. \par \par Patient's medical comorbidities include coronary artery disease as well as carotid artery disease. He's never had any prior abdominal surgery.\par \par Physical examination reveals a well-developed, well-nourished white male. On abdominal exam no mass is palpable but the abdomen is protuberant. There is no inguinal adenopathy. Inspection of the anorectal area is unremarkable. On digital exam a mass is palpable in the posterior midline. Rigid sigmoidoscopy confirms the presence of an obvious rather friable carcinoma in the posterior midline. The bottom edge of the lesion is approximately 6-7 cm from the anal verge and the bottom edge of the lesion seems to abut the top of the anal sphincter complex and may even extend into the upper anal canal.\par \par Thankfully, the patient has no evidence of distant disease. I do not believe this lesion is amenable to transanal excision since it is a deep T2. The anatomic location places this in the distal rectum and therefore resectional therapy from above is technically challenging and the distal margin is a potential problem. I would advocate neoadjuvant chemoradiotherapy with a complete six-month course of chemotherapy. If the patient has a complete response there is certainly data to suggest that a watch and wait approach could be undertaken. If the patient develops recurrence he would certainly need resectional therapy. If he does not have a complete response he would require surgery at the completion of the neoadjuvant therapy. Whether the patient would require an abdominoperineal resection or a coloanal with a temporary diverting loop ileostomy would be determined at the operative table.\par \par I had a long discussion with the patient and his wife regarding my impression. He is awaiting the final discussion from [facility] regarding their suggestion. I have given him the name of a medical oncologist on [place] to see for consultation.

## 2019-03-12 ENCOUNTER — OUTPATIENT (OUTPATIENT)
Dept: OUTPATIENT SERVICES | Facility: HOSPITAL | Age: 59
LOS: 1 days | Discharge: HOME | End: 2019-03-12
Payer: COMMERCIAL

## 2019-03-12 DIAGNOSIS — I51.9 HEART DISEASE, UNSPECIFIED: Chronic | ICD-10-CM

## 2019-03-12 DIAGNOSIS — Z98.890 OTHER SPECIFIED POSTPROCEDURAL STATES: Chronic | ICD-10-CM

## 2019-03-13 ENCOUNTER — APPOINTMENT (OUTPATIENT)
Dept: HEMATOLOGY ONCOLOGY | Facility: CLINIC | Age: 59
End: 2019-03-13
Payer: COMMERCIAL

## 2019-03-13 VITALS
HEART RATE: 99 BPM | DIASTOLIC BLOOD PRESSURE: 82 MMHG | BODY MASS INDEX: 27.59 KG/M2 | WEIGHT: 212.72 LBS | RESPIRATION RATE: 16 BRPM | SYSTOLIC BLOOD PRESSURE: 155 MMHG | OXYGEN SATURATION: 100 % | TEMPERATURE: 98 F | HEIGHT: 73.58 IN

## 2019-03-13 PROCEDURE — 99245 OFF/OP CONSLTJ NEW/EST HI 55: CPT

## 2019-03-14 ENCOUNTER — APPOINTMENT (OUTPATIENT)
Dept: HEMATOLOGY ONCOLOGY | Facility: CLINIC | Age: 59
End: 2019-03-14

## 2019-03-14 NOTE — REASON FOR VISIT
[Initial Consultation] : an initial consultation [Spouse] : spouse [FreeTextEntry2] : Rectal carcinoma

## 2019-03-14 NOTE — ASSESSMENT
[FreeTextEntry1] : A discussion took place with the patient and his wife regarding further management. The recommendation is for neoadjuvant chemotherapy  and radiation therapy with initial combination chemotherapy, FOLFOX which will require MediPort placement. He currently is on Plavix and aspirin. The patient will receive 8 cycles of FOLFOX treatment and be reevaluated for radiation along with Xeloda.If there is residual disease he will require low anterior resection if possible. Side effects of chemotherapy were discussed in detail including nausea, vomiting, janett blood counts in life-threatening infection, tearing of the eyes, memory changes, mouth sores, fatigue, diarrhea, change in taste, allergic reactions, neuropathy, cold intolerance and other side effects. [place] his chemotherapy and radiation therapy in the [facility]. Dr. Linda has already been contacted. [Curative] : Goals of care discussed with patient: Curative [Palliative Care Plan] : not applicable at this time

## 2019-03-14 NOTE — CONSULT LETTER
[Dear  ___] : Dear  [unfilled], [Consult Letter:] : I had the pleasure of evaluating your patient, [unfilled]. [Please see my note below.] : Please see my note below. [Consult Closing:] : Thank you very much for allowing me to participate in the care of this patient.  If you have any questions, please do not hesitate to contact me. [Sincerely,] : Sincerely, [FreeTextEntry2] : Dr. Adi Garcia [FreeTextEntry3] : Javed Mckeon M.D. \par [facility]\par [facility] \par Professor of Medicine\par [facility] [DrKenney  ___] : Dr. EMANUEL [DrKenney ___] : Dr. EMANUEL

## 2019-03-14 NOTE — REVIEW OF SYSTEMS
[Negative] : Allergic/Immunologic [FreeTextEntry9] : History of prior rib fractures and left shoulder pain.

## 2019-03-15 ENCOUNTER — RX RENEWAL (OUTPATIENT)
Age: 59
End: 2019-03-15

## 2019-03-15 ENCOUNTER — APPOINTMENT (OUTPATIENT)
Dept: RADIATION ONCOLOGY | Facility: CLINIC | Age: 59
End: 2019-03-15
Payer: COMMERCIAL

## 2019-03-15 VITALS
OXYGEN SATURATION: 100 % | BODY MASS INDEX: 28.36 KG/M2 | RESPIRATION RATE: 16 BRPM | HEART RATE: 73 BPM | TEMPERATURE: 98 F | HEIGHT: 73 IN | DIASTOLIC BLOOD PRESSURE: 82 MMHG | SYSTOLIC BLOOD PRESSURE: 136 MMHG | WEIGHT: 214 LBS

## 2019-03-15 PROCEDURE — 99205 OFFICE O/P NEW HI 60 MIN: CPT | Mod: 25

## 2019-03-15 RX ORDER — POLYETHYLENE GLYCOL-3350, SODIUM CHLORIDE, POTASSIUM CHLORIDE AND SODIUM BICARBONATE 420; 11.2; 5.72; 1.48 G/438.4G; G/438.4G; G/438.4G; G/438.4G
420 POWDER, FOR SOLUTION ORAL
Qty: 4000 | Refills: 0 | Status: COMPLETED | COMMUNITY
Start: 2019-01-16

## 2019-03-15 RX ORDER — AMOXICILLIN AND CLAVULANATE POTASSIUM 875; 125 MG/1; MG/1
875-125 TABLET, COATED ORAL
Qty: 14 | Refills: 0 | Status: COMPLETED | COMMUNITY
Start: 2019-03-09

## 2019-03-18 ENCOUNTER — OUTPATIENT (OUTPATIENT)
Dept: OUTPATIENT SERVICES | Facility: HOSPITAL | Age: 59
LOS: 1 days | Discharge: HOME | End: 2019-03-18
Payer: COMMERCIAL

## 2019-03-18 DIAGNOSIS — I51.9 HEART DISEASE, UNSPECIFIED: Chronic | ICD-10-CM

## 2019-03-18 DIAGNOSIS — C20 MALIGNANT NEOPLASM OF RECTUM: ICD-10-CM

## 2019-03-18 DIAGNOSIS — Z98.890 OTHER SPECIFIED POSTPROCEDURAL STATES: Chronic | ICD-10-CM

## 2019-03-19 ENCOUNTER — RESULT REVIEW (OUTPATIENT)
Age: 59
End: 2019-03-19

## 2019-03-19 ENCOUNTER — APPOINTMENT (OUTPATIENT)
Age: 59
End: 2019-03-19
Payer: COMMERCIAL

## 2019-03-19 VITALS
SYSTOLIC BLOOD PRESSURE: 146 MMHG | HEIGHT: 73 IN | WEIGHT: 212.01 LBS | HEART RATE: 71 BPM | BODY MASS INDEX: 28.1 KG/M2 | TEMPERATURE: 97.8 F | OXYGEN SATURATION: 98 % | DIASTOLIC BLOOD PRESSURE: 83 MMHG

## 2019-03-19 LAB
BASOPHILS # BLD AUTO: 0 K/UL — SIGNIFICANT CHANGE UP (ref 0–0.2)
BASOPHILS NFR BLD AUTO: 0.6 % — SIGNIFICANT CHANGE UP (ref 0–2)
EOSINOPHIL # BLD AUTO: 0.1 K/UL — SIGNIFICANT CHANGE UP (ref 0–0.5)
EOSINOPHIL NFR BLD AUTO: 0.9 % — SIGNIFICANT CHANGE UP (ref 0–6)
HCT VFR BLD CALC: 40.4 % — SIGNIFICANT CHANGE UP (ref 39–50)
HGB BLD-MCNC: 13.4 G/DL — SIGNIFICANT CHANGE UP (ref 13–17)
LYMPHOCYTES # BLD AUTO: 1.5 K/UL — SIGNIFICANT CHANGE UP (ref 1–3.3)
LYMPHOCYTES # BLD AUTO: 19.4 % — SIGNIFICANT CHANGE UP (ref 13–44)
MCHC RBC-ENTMCNC: 32 PG — SIGNIFICANT CHANGE UP (ref 27–34)
MCHC RBC-ENTMCNC: 33 G/DL — SIGNIFICANT CHANGE UP (ref 32–36)
MCV RBC AUTO: 96.7 FL — SIGNIFICANT CHANGE UP (ref 80–100)
MONOCYTES # BLD AUTO: 0.6 K/UL — SIGNIFICANT CHANGE UP (ref 0–0.9)
MONOCYTES NFR BLD AUTO: 7.5 % — SIGNIFICANT CHANGE UP (ref 2–14)
NEUTROPHILS # BLD AUTO: 5.6 K/UL — SIGNIFICANT CHANGE UP (ref 1.8–7.4)
NEUTROPHILS NFR BLD AUTO: 71.6 % — SIGNIFICANT CHANGE UP (ref 43–77)
PLATELET # BLD AUTO: 295 K/UL — SIGNIFICANT CHANGE UP (ref 150–400)
RBC # BLD: 4.18 M/UL — LOW (ref 4.2–5.8)
RBC # FLD: 11.4 % — SIGNIFICANT CHANGE UP (ref 10.3–14.5)
WBC # BLD: 7.8 K/UL — SIGNIFICANT CHANGE UP (ref 3.8–10.5)
WBC # FLD AUTO: 7.8 K/UL — SIGNIFICANT CHANGE UP (ref 3.8–10.5)

## 2019-03-19 PROCEDURE — 99205 OFFICE O/P NEW HI 60 MIN: CPT

## 2019-03-19 NOTE — RESULTS/DATA
[FreeTextEntry1] : 3/7/19 MRI rectum:\par 2.4 cm mass, with the inferior margin contacting the internal anal sphincter, 10:00 to 7:00. There were subcentimeter lymph nodes, too small to characterize. Overall impression was a polypoid low rectal tumor with possible punctate extramural extension, possible T2 or T3a Nx Mx, with inferior margin of tumor contacting the internal anal sphincter. 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 No significant lymphadenopathy in abdomen is identified.  \par \par 2/21/19 EUS:\par malignant appearing posterior wall mass in the rectum, 4 cm above the dentate line. 27.8 mm x 18.4 mm with subtle suggestion of muscularis propria involvement, suspect T2N0. \par \par 2/20/19 Pathology:\par Rectum polyp, colonoscopy:  superficially invasive moderately differentiated adenocarcinoma

## 2019-03-19 NOTE — CONSULT LETTER
[Consult Letter:] : I had the pleasure of evaluating your patient, [unfilled]. [Dear  ___] : Dear  [unfilled], [Consult Closing:] : Thank you very much for allowing me to participate in the care of this patient.  If you have any questions, please do not hesitate to contact me. [Please see my note below.] : Please see my note below. [Sincerely,] : Sincerely, [DrKenney  ___] : Dr. EMANUEL [DrKenney ___] : Dr. EMANUEL [FreeTextEntry3] : Dr. Disha Linda

## 2019-03-19 NOTE — HISTORY OF PRESENT ILLNESS
[de-identified] : The patient was diagnosed with adenocarcinoma of the rectum in February 2019 at the age of 58.  He began having BRBPR approximately 6 months ago, which he attributed to  hemorrhoids seen on his last colonoscopy about 8 years ago. Recently, he noted increased rectal bleeding and was referred for GI evaluation with Dr. Michele Reyes.  Colonoscopy revealed a mass 6-7 cm from the anal verge on rigid sigmoidoscopy. The biopsy was consistent with moderately differentiated adenocarcinoma. Endoscopic ultrasound confirmed a 2.8  x 1.8 cm mass, consistent with T2N0.  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 No significant lymphadenopathy in the abdomen.  He next saw colorectal surgery, Dr. Adi Garcia, who recommended a transanal resection preceded by neoadjuvant chemoradiation.  A CT C/A/P discovered no metastatic disease. He was seen by Dr. Javed Mckeon to discuss FOLFOX and Dr. Faustino Parson to discuss RT.  [place] has also been [place] [facility] [place]   [de-identified] : Patient presents for initial [facility] appointment. He continues to have intermittent hematochezia, but denies any other symptoms including rectal pain, diarrhea, constipation, changes in urination, fatigue or unintentional weight loss.  No BRBPR x 1 week.\par  [de-identified] : Smokes cigars weekly x 10 years.  \par ! glass wine per day; 3 glasses per night for 40 years - beer and wine.

## 2019-03-19 NOTE — ASSESSMENT
[Curative] : Goals of care discussed with patient: Curative [FreeTextEntry1] : I had a detailed discussion today with the patient regarding the natural history, staging, prognosis and treatment recommendations for rectal cancer.  I discussed the standard approach to locally-advanced rectal cancer involves the use of all modalities of care. This includes neoadjuvant chemotherapy and radiation therapy concurrently to shrink the tumor and improve local control. This is typically followed by surgery and adjuvant systemic chemotherapy.  I then explained to the patient that we often move the adjuvant chemotherapy to the up-front setting prior to concurrent chemoradiotherapy. I described the rationale for this.  Primary rectal tumors tend to be extremely chemosensitive to induction chemotherapy. Induction chemotherapy has the advantage of improving local disease before surgery, being more tolerable in a presurgical setting, and improve the pathologic complete response rate. I specifically described treatment with two months of chemotherapy or four cycles of systemic FOLFOX, to be followed by an assessment by colorectal surgery, Dr. Garcia, in order to ensure response to treatment.  FOLFOX has been shown to improve both disease-free survival and overall survival in the adjuvant setting. Given his MRI findings that include T2 / T3a Nx M0, 6-7cm from anal verge, extending to internal anal sphincter), I recommend upfront systemic chemotherapy to decrease the risk of distant metastatic disease in addition to local regional recurrence.  If the patient is responding after 4 cycles of systemic chemotherapy, I will continue systemic chemotherapy for a total of four months up-front. If he is not responding, I would go straight to concurrent chemoradiotherapy.\par  \par After a detailed discussion, the patient is electing to proceed with upfront FOLFOX.  I described to the patient the way that FOLFOX is administered, given over a 46-hour continuous infusion of 5- fluorouracil.  He will have a Mediport placed and wishes to begin with chemotherapy in one week following mediport placement.  I also discussed with the patient common side effects seen with this regimen including, but not limited to, fatigue, myelosuppression, nausea, vomiting, diarrhea, cold sensitivity, and peripheral neuropathy.  I have scheduled him to start FOLFOX chemotherapy x4 cycles in one week, followed by examination by Dr. Garcia.  Assuming no progression, we will continue with chemotherapy for four further cycles. A repeat rectal MRI will be ordered at the end of chemotherapy, preceding standard combined modality therapy with capecitabine or 5-FU concurrent with radiation. Following chemoradiation, the patient will receive a chest, abdomen and pelvis CT as well as a rectal MRI and followup with Dr. Garcia about four to six weeks after completion of radiation.  He has met with radiation oncology for initial consultation. labs drawn today include CBC, CMP, magnesium, PT, PTT, CEA, hepatitis profile and EKG.  The patient stated that all of his questions were answered to his satisfaction. He will return to initiate systemic chemotherapy in approximately one week. He is aware that he can call if he has any further concerns or questions.

## 2019-03-20 LAB
ALBUMIN SERPL ELPH-MCNC: 5 G/DL
ALP BLD-CCNC: 53 U/L
ALT SERPL-CCNC: 25 U/L
ANION GAP SERPL CALC-SCNC: 12 MMOL/L
AST SERPL-CCNC: 22 U/L
BILIRUB SERPL-MCNC: 1.1 MG/DL
BUN SERPL-MCNC: 19 MG/DL
CALCIUM SERPL-MCNC: 10.2 MG/DL
CEA SERPL-MCNC: 1.2 NG/ML
CHLORIDE SERPL-SCNC: 97 MMOL/L
CO2 SERPL-SCNC: 27 MMOL/L
CREAT SERPL-MCNC: 0.94 MG/DL
GLUCOSE SERPL-MCNC: 119 MG/DL
HBV CORE IGG+IGM SER QL: NONREACTIVE
HBV SURFACE AB SER QL: NONREACTIVE
HBV SURFACE AG SER QL: NONREACTIVE
HCV AB SER QL: NONREACTIVE
HCV S/CO RATIO: 0.06 S/CO
INR PPP: 0.91 RATIO
MAGNESIUM SERPL-MCNC: 2.1 MG/DL
POTASSIUM SERPL-SCNC: 4.8 MMOL/L
PROT SERPL-MCNC: 7.2 G/DL
PT BLD: 10.3 SEC
SODIUM SERPL-SCNC: 136 MMOL/L

## 2019-03-26 ENCOUNTER — FORM ENCOUNTER (OUTPATIENT)
Age: 59
End: 2019-03-26

## 2019-03-27 ENCOUNTER — APPOINTMENT (OUTPATIENT)
Dept: INTERVENTIONAL RADIOLOGY/VASCULAR | Facility: CLINIC | Age: 59
End: 2019-03-27
Payer: COMMERCIAL

## 2019-03-27 ENCOUNTER — OUTPATIENT (OUTPATIENT)
Dept: OUTPATIENT SERVICES | Facility: HOSPITAL | Age: 59
LOS: 1 days | End: 2019-03-27

## 2019-03-27 DIAGNOSIS — C20 MALIGNANT NEOPLASM OF RECTUM: ICD-10-CM

## 2019-03-27 DIAGNOSIS — Z98.890 OTHER SPECIFIED POSTPROCEDURAL STATES: Chronic | ICD-10-CM

## 2019-03-27 DIAGNOSIS — I51.9 HEART DISEASE, UNSPECIFIED: Chronic | ICD-10-CM

## 2019-03-27 PROCEDURE — 77001 FLUOROGUIDE FOR VEIN DEVICE: CPT | Mod: 26

## 2019-03-27 PROCEDURE — 76937 US GUIDE VASCULAR ACCESS: CPT | Mod: 26

## 2019-03-27 PROCEDURE — 36561 INSERT TUNNELED CV CATH: CPT

## 2019-04-02 ENCOUNTER — RESULT REVIEW (OUTPATIENT)
Age: 59
End: 2019-04-02

## 2019-04-02 PROCEDURE — 88321 CONSLTJ&REPRT SLD PREP ELSWR: CPT

## 2019-04-03 ENCOUNTER — APPOINTMENT (OUTPATIENT)
Age: 59
End: 2019-04-03

## 2019-04-03 LAB — SURGICAL PATHOLOGY STUDY: SIGNIFICANT CHANGE UP

## 2019-04-04 ENCOUNTER — APPOINTMENT (OUTPATIENT)
Age: 59
End: 2019-04-04

## 2019-04-05 ENCOUNTER — APPOINTMENT (OUTPATIENT)
Age: 59
End: 2019-04-05

## 2019-04-09 ENCOUNTER — LABORATORY RESULT (OUTPATIENT)
Age: 59
End: 2019-04-09

## 2019-04-09 ENCOUNTER — APPOINTMENT (OUTPATIENT)
Age: 59
End: 2019-04-09

## 2019-04-09 ENCOUNTER — RESULT REVIEW (OUTPATIENT)
Age: 59
End: 2019-04-09

## 2019-04-09 LAB
BASOPHILS # BLD AUTO: 0.1 K/UL — SIGNIFICANT CHANGE UP (ref 0–0.2)
BASOPHILS NFR BLD AUTO: 1.2 % — SIGNIFICANT CHANGE UP (ref 0–2)
EOSINOPHIL # BLD AUTO: 0.2 K/UL — SIGNIFICANT CHANGE UP (ref 0–0.5)
EOSINOPHIL NFR BLD AUTO: 2.9 % — SIGNIFICANT CHANGE UP (ref 0–6)
HCT VFR BLD CALC: 40.9 % — SIGNIFICANT CHANGE UP (ref 39–50)
HGB BLD-MCNC: 13.7 G/DL — SIGNIFICANT CHANGE UP (ref 13–17)
LYMPHOCYTES # BLD AUTO: 2 K/UL — SIGNIFICANT CHANGE UP (ref 1–3.3)
LYMPHOCYTES # BLD AUTO: 28 % — SIGNIFICANT CHANGE UP (ref 13–44)
MCHC RBC-ENTMCNC: 32.3 PG — SIGNIFICANT CHANGE UP (ref 27–34)
MCHC RBC-ENTMCNC: 33.5 G/DL — SIGNIFICANT CHANGE UP (ref 32–36)
MCV RBC AUTO: 96.4 FL — SIGNIFICANT CHANGE UP (ref 80–100)
MONOCYTES # BLD AUTO: 0.5 K/UL — SIGNIFICANT CHANGE UP (ref 0–0.9)
MONOCYTES NFR BLD AUTO: 7.6 % — SIGNIFICANT CHANGE UP (ref 2–14)
NEUTROPHILS # BLD AUTO: 4.2 K/UL — SIGNIFICANT CHANGE UP (ref 1.8–7.4)
NEUTROPHILS NFR BLD AUTO: 60.3 % — SIGNIFICANT CHANGE UP (ref 43–77)
PLATELET # BLD AUTO: 233 K/UL — SIGNIFICANT CHANGE UP (ref 150–400)
RBC # BLD: 4.24 M/UL — SIGNIFICANT CHANGE UP (ref 4.2–5.8)
RBC # FLD: 11.3 % — SIGNIFICANT CHANGE UP (ref 10.3–14.5)
WBC # BLD: 7 K/UL — SIGNIFICANT CHANGE UP (ref 3.8–10.5)
WBC # FLD AUTO: 7 K/UL — SIGNIFICANT CHANGE UP (ref 3.8–10.5)

## 2019-04-10 DIAGNOSIS — R11.2 NAUSEA WITH VOMITING, UNSPECIFIED: ICD-10-CM

## 2019-04-10 DIAGNOSIS — Z51.11 ENCOUNTER FOR ANTINEOPLASTIC CHEMOTHERAPY: ICD-10-CM

## 2019-04-11 ENCOUNTER — APPOINTMENT (OUTPATIENT)
Age: 59
End: 2019-04-11

## 2019-04-18 ENCOUNTER — OUTPATIENT (OUTPATIENT)
Dept: OUTPATIENT SERVICES | Facility: HOSPITAL | Age: 59
LOS: 1 days | Discharge: HOME | End: 2019-04-18

## 2019-04-18 DIAGNOSIS — I51.9 HEART DISEASE, UNSPECIFIED: Chronic | ICD-10-CM

## 2019-04-18 DIAGNOSIS — C20 MALIGNANT NEOPLASM OF RECTUM: ICD-10-CM

## 2019-04-18 DIAGNOSIS — Z98.890 OTHER SPECIFIED POSTPROCEDURAL STATES: Chronic | ICD-10-CM

## 2019-04-23 ENCOUNTER — RESULT REVIEW (OUTPATIENT)
Age: 59
End: 2019-04-23

## 2019-04-23 ENCOUNTER — LABORATORY RESULT (OUTPATIENT)
Age: 59
End: 2019-04-23

## 2019-04-23 ENCOUNTER — APPOINTMENT (OUTPATIENT)
Age: 59
End: 2019-04-23

## 2019-04-23 ENCOUNTER — APPOINTMENT (OUTPATIENT)
Age: 59
End: 2019-04-23
Payer: COMMERCIAL

## 2019-04-23 LAB
BASOPHILS # BLD AUTO: 0.1 K/UL — SIGNIFICANT CHANGE UP (ref 0–0.2)
BASOPHILS NFR BLD AUTO: 1.6 % — SIGNIFICANT CHANGE UP (ref 0–2)
EOSINOPHIL # BLD AUTO: 0.2 K/UL — SIGNIFICANT CHANGE UP (ref 0–0.5)
EOSINOPHIL NFR BLD AUTO: 4.1 % — SIGNIFICANT CHANGE UP (ref 0–6)
HCT VFR BLD CALC: 37.8 % — LOW (ref 39–50)
HGB BLD-MCNC: 13 G/DL — SIGNIFICANT CHANGE UP (ref 13–17)
LYMPHOCYTES # BLD AUTO: 1.4 K/UL — SIGNIFICANT CHANGE UP (ref 1–3.3)
LYMPHOCYTES # BLD AUTO: 34 % — SIGNIFICANT CHANGE UP (ref 13–44)
MCHC RBC-ENTMCNC: 32.6 PG — SIGNIFICANT CHANGE UP (ref 27–34)
MCHC RBC-ENTMCNC: 34.5 G/DL — SIGNIFICANT CHANGE UP (ref 32–36)
MCV RBC AUTO: 94.6 FL — SIGNIFICANT CHANGE UP (ref 80–100)
MONOCYTES # BLD AUTO: 0.4 K/UL — SIGNIFICANT CHANGE UP (ref 0–0.9)
MONOCYTES NFR BLD AUTO: 9.9 % — SIGNIFICANT CHANGE UP (ref 2–14)
NEUTROPHILS # BLD AUTO: 2.1 K/UL — SIGNIFICANT CHANGE UP (ref 1.8–7.4)
NEUTROPHILS NFR BLD AUTO: 50.5 % — SIGNIFICANT CHANGE UP (ref 43–77)
PLATELET # BLD AUTO: 280 K/UL — SIGNIFICANT CHANGE UP (ref 150–400)
RBC # BLD: 3.99 M/UL — LOW (ref 4.2–5.8)
RBC # FLD: 11.5 % — SIGNIFICANT CHANGE UP (ref 10.3–14.5)
WBC # BLD: 4.1 K/UL — SIGNIFICANT CHANGE UP (ref 3.8–10.5)
WBC # FLD AUTO: 4.1 K/UL — SIGNIFICANT CHANGE UP (ref 3.8–10.5)

## 2019-04-23 PROCEDURE — 99214 OFFICE O/P EST MOD 30 MIN: CPT

## 2019-04-23 NOTE — HISTORY OF PRESENT ILLNESS
[de-identified] : The patient was diagnosed with adenocarcinoma of the rectum in February 2019 at the age of 58.  He began having BRBPR approximately 6 months ago, which he attributed to  hemorrhoids seen on his last colonoscopy about 8 years ago. Recently, he noted increased rectal bleeding and was referred for GI evaluation with Dr. Michele Reyes.  Colonoscopy revealed a mass 6-7 cm from the anal verge on rigid sigmoidoscopy. The biopsy was consistent with moderately differentiated adenocarcinoma. Endoscopic ultrasound confirmed a 2.8  x 1.8 cm mass, consistent with T2N0.  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 No significant lymphadenopathy in the abdomen.  He next saw colorectal surgery, Dr. Adi Garcia, who recommended a transanal resection preceded by neoadjuvant chemoradiation.  A CT C/A/P discovered no metastatic disease. He was seen by Dr. Javed Mckeon to discuss FOLFOX and Dr. Faustino Parson to discuss RT.  He has also been seen at [facility] [place].   [de-identified] : Smokes cigars weekly x 10 years.  \par ! glass wine per day; 3 glasses per night for 40 years - beer and wine. [de-identified] : Patient presents for C2D1 FOLFOX for adenocarcinoma of the rectum.  + Fatigue/weakness.  + Cold intolerance x 2-3 days.  + Decreased appetite.  + Constipation x 1 week, now resolved with Miralax. + BRBPR on the toilet paper only x 1 episode about 1 week ago, none since.  + Mouth sores, lower lip x 2-3 days.  No rectal pain.  No abdominal pain. No N/V/D.  No fevers or chills.  No neuropathy.  \par

## 2019-04-23 NOTE — CONSULT LETTER
[Dear  ___] : Dear  [unfilled], [Please see my note below.] : Please see my note below. [Consult Letter:] : I had the pleasure of evaluating your patient, [unfilled]. [Consult Closing:] : Thank you very much for allowing me to participate in the care of this patient.  If you have any questions, please do not hesitate to contact me. [Sincerely,] : Sincerely, [DrKenney  ___] : Dr. EMANUEL [DrKenney ___] : Dr. EMANUEL [FreeTextEntry3] : Dr. Disha Linda

## 2019-04-24 DIAGNOSIS — Z51.11 ENCOUNTER FOR ANTINEOPLASTIC CHEMOTHERAPY: ICD-10-CM

## 2019-04-24 DIAGNOSIS — R11.2 NAUSEA WITH VOMITING, UNSPECIFIED: ICD-10-CM

## 2019-04-25 ENCOUNTER — APPOINTMENT (OUTPATIENT)
Age: 59
End: 2019-04-25

## 2019-05-07 ENCOUNTER — LABORATORY RESULT (OUTPATIENT)
Age: 59
End: 2019-05-07

## 2019-05-07 ENCOUNTER — APPOINTMENT (OUTPATIENT)
Age: 59
End: 2019-05-07
Payer: COMMERCIAL

## 2019-05-07 ENCOUNTER — APPOINTMENT (OUTPATIENT)
Age: 59
End: 2019-05-07

## 2019-05-07 ENCOUNTER — RESULT REVIEW (OUTPATIENT)
Age: 59
End: 2019-05-07

## 2019-05-07 VITALS
OXYGEN SATURATION: 98 % | TEMPERATURE: 98.1 F | SYSTOLIC BLOOD PRESSURE: 140 MMHG | BODY MASS INDEX: 27.61 KG/M2 | HEIGHT: 73 IN | DIASTOLIC BLOOD PRESSURE: 86 MMHG | WEIGHT: 208.31 LBS | HEART RATE: 76 BPM

## 2019-05-07 LAB
BASOPHILS # BLD AUTO: 0.1 K/UL — SIGNIFICANT CHANGE UP (ref 0–0.2)
BASOPHILS NFR BLD AUTO: 1.4 % — SIGNIFICANT CHANGE UP (ref 0–2)
EOSINOPHIL # BLD AUTO: 0.1 K/UL — SIGNIFICANT CHANGE UP (ref 0–0.5)
EOSINOPHIL NFR BLD AUTO: 1.3 % — SIGNIFICANT CHANGE UP (ref 0–6)
HCT VFR BLD CALC: 38.5 % — LOW (ref 39–50)
HGB BLD-MCNC: 13.1 G/DL — SIGNIFICANT CHANGE UP (ref 13–17)
LYMPHOCYTES # BLD AUTO: 1.7 K/UL — SIGNIFICANT CHANGE UP (ref 1–3.3)
LYMPHOCYTES # BLD AUTO: 26 % — SIGNIFICANT CHANGE UP (ref 13–44)
MCHC RBC-ENTMCNC: 32.4 PG — SIGNIFICANT CHANGE UP (ref 27–34)
MCHC RBC-ENTMCNC: 34.1 G/DL — SIGNIFICANT CHANGE UP (ref 32–36)
MCV RBC AUTO: 95 FL — SIGNIFICANT CHANGE UP (ref 80–100)
MONOCYTES # BLD AUTO: 0.5 K/UL — SIGNIFICANT CHANGE UP (ref 0–0.9)
MONOCYTES NFR BLD AUTO: 7.8 % — SIGNIFICANT CHANGE UP (ref 2–14)
NEUTROPHILS # BLD AUTO: 4.2 K/UL — SIGNIFICANT CHANGE UP (ref 1.8–7.4)
NEUTROPHILS NFR BLD AUTO: 63.5 % — SIGNIFICANT CHANGE UP (ref 43–77)
PLATELET # BLD AUTO: 209 K/UL — SIGNIFICANT CHANGE UP (ref 150–400)
RBC # BLD: 4.05 M/UL — LOW (ref 4.2–5.8)
RBC # FLD: 12.1 % — SIGNIFICANT CHANGE UP (ref 10.3–14.5)
WBC # BLD: 6.6 K/UL — SIGNIFICANT CHANGE UP (ref 3.8–10.5)
WBC # FLD AUTO: 6.6 K/UL — SIGNIFICANT CHANGE UP (ref 3.8–10.5)

## 2019-05-07 PROCEDURE — 99214 OFFICE O/P EST MOD 30 MIN: CPT

## 2019-05-09 ENCOUNTER — APPOINTMENT (OUTPATIENT)
Age: 59
End: 2019-05-09

## 2019-05-17 ENCOUNTER — OUTPATIENT (OUTPATIENT)
Dept: OUTPATIENT SERVICES | Facility: HOSPITAL | Age: 59
LOS: 1 days | Discharge: HOME | End: 2019-05-17

## 2019-05-17 DIAGNOSIS — Z98.890 OTHER SPECIFIED POSTPROCEDURAL STATES: Chronic | ICD-10-CM

## 2019-05-17 DIAGNOSIS — I51.9 HEART DISEASE, UNSPECIFIED: Chronic | ICD-10-CM

## 2019-05-17 DIAGNOSIS — C20 MALIGNANT NEOPLASM OF RECTUM: ICD-10-CM

## 2019-05-21 ENCOUNTER — RX RENEWAL (OUTPATIENT)
Age: 59
End: 2019-05-21

## 2019-05-21 ENCOUNTER — LABORATORY RESULT (OUTPATIENT)
Age: 59
End: 2019-05-21

## 2019-05-21 ENCOUNTER — RESULT REVIEW (OUTPATIENT)
Age: 59
End: 2019-05-21

## 2019-05-21 ENCOUNTER — APPOINTMENT (OUTPATIENT)
Age: 59
End: 2019-05-21

## 2019-05-21 LAB
BASOPHILS # BLD AUTO: 0.1 K/UL — SIGNIFICANT CHANGE UP (ref 0–0.2)
BASOPHILS NFR BLD AUTO: 1.3 % — SIGNIFICANT CHANGE UP (ref 0–2)
EOSINOPHIL # BLD AUTO: 0.1 K/UL — SIGNIFICANT CHANGE UP (ref 0–0.5)
EOSINOPHIL NFR BLD AUTO: 1.6 % — SIGNIFICANT CHANGE UP (ref 0–6)
HCT VFR BLD CALC: 34.4 % — LOW (ref 39–50)
HGB BLD-MCNC: 11.7 G/DL — LOW (ref 13–17)
LYMPHOCYTES # BLD AUTO: 1.2 K/UL — SIGNIFICANT CHANGE UP (ref 1–3.3)
LYMPHOCYTES # BLD AUTO: 27 % — SIGNIFICANT CHANGE UP (ref 13–44)
MCHC RBC-ENTMCNC: 32.8 PG — SIGNIFICANT CHANGE UP (ref 27–34)
MCHC RBC-ENTMCNC: 33.9 G/DL — SIGNIFICANT CHANGE UP (ref 32–36)
MCV RBC AUTO: 96.8 FL — SIGNIFICANT CHANGE UP (ref 80–100)
MONOCYTES # BLD AUTO: 0.6 K/UL — SIGNIFICANT CHANGE UP (ref 0–0.9)
MONOCYTES NFR BLD AUTO: 13.2 % — SIGNIFICANT CHANGE UP (ref 2–14)
NEUTROPHILS # BLD AUTO: 2.5 K/UL — SIGNIFICANT CHANGE UP (ref 1.8–7.4)
NEUTROPHILS NFR BLD AUTO: 56.9 % — SIGNIFICANT CHANGE UP (ref 43–77)
PLATELET # BLD AUTO: 156 K/UL — SIGNIFICANT CHANGE UP (ref 150–400)
RBC # BLD: 3.56 M/UL — LOW (ref 4.2–5.8)
RBC # FLD: 14 % — SIGNIFICANT CHANGE UP (ref 10.3–14.5)
WBC # BLD: 4.4 K/UL — SIGNIFICANT CHANGE UP (ref 3.8–10.5)
WBC # FLD AUTO: 4.4 K/UL — SIGNIFICANT CHANGE UP (ref 3.8–10.5)

## 2019-05-22 DIAGNOSIS — Z51.11 ENCOUNTER FOR ANTINEOPLASTIC CHEMOTHERAPY: ICD-10-CM

## 2019-05-22 DIAGNOSIS — R11.2 NAUSEA WITH VOMITING, UNSPECIFIED: ICD-10-CM

## 2019-05-23 ENCOUNTER — APPOINTMENT (OUTPATIENT)
Age: 59
End: 2019-05-23
Payer: COMMERCIAL

## 2019-05-23 ENCOUNTER — APPOINTMENT (OUTPATIENT)
Age: 59
End: 2019-05-23

## 2019-05-23 VITALS
SYSTOLIC BLOOD PRESSURE: 139 MMHG | TEMPERATURE: 97.9 F | HEART RATE: 63 BPM | DIASTOLIC BLOOD PRESSURE: 83 MMHG | OXYGEN SATURATION: 98 % | BODY MASS INDEX: 27.3 KG/M2 | WEIGHT: 206.03 LBS | HEIGHT: 73 IN

## 2019-05-23 PROCEDURE — 99215 OFFICE O/P EST HI 40 MIN: CPT

## 2019-05-23 NOTE — HISTORY OF PRESENT ILLNESS
[de-identified] : The patient was diagnosed with adenocarcinoma of the rectum in February 2019 at the age of 58.  He began having BRBPR approximately 6 months ago, which he attributed to  hemorrhoids seen on his last colonoscopy about 8 years ago. Recently, he noted increased rectal bleeding and was referred for GI evaluation with Dr. Michele Reyes.  Colonoscopy revealed a mass 6-7 cm from the anal verge on rigid sigmoidoscopy. The biopsy was consistent with moderately differentiated adenocarcinoma. Endoscopic ultrasound confirmed a 2.8  x 1.8 cm mass, consistent with T2N0.  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 No significant lymphadenopathy in the abdomen.  He next saw colorectal surgery, Dr. Adi Garcia, who recommended a transanal resection preceded by neoadjuvant chemoradiation.  A CT C/A/P discovered no metastatic disease. He was seen by Dr. Javed Mckeon to discuss FOLFOX and Dr. Faustino Parson to discuss RT.  [place] has also been seen at [facility] [place].   [de-identified] : Smokes cigars weekly x 10 years.  \par ! glass wine per day; 3 glasses per night for 40 years - beer and wine. [de-identified] : Patient presents for C3D1 FOLFOX for adenocarcinoma of the rectum.  + Epistaxis.  + Fatigue/weakness, unchanged, worse D3-5.  + Cold intolerance, very mild, only lasts one day.  + Decreased appetite, starting to lose weight. + Fecal urgency with occasional loose stools. No further BRBPR.  + Mouth sores, lasts only 2-3 days. + Mild neuropathy, feet only.  No rectal pain.  No abdominal pain. No N/V.  No fevers or chills. \par

## 2019-05-28 ENCOUNTER — APPOINTMENT (OUTPATIENT)
Age: 59
End: 2019-05-28

## 2019-05-30 ENCOUNTER — APPOINTMENT (OUTPATIENT)
Dept: HEMATOLOGY ONCOLOGY | Facility: CLINIC | Age: 59
End: 2019-05-30

## 2019-05-30 ENCOUNTER — APPOINTMENT (OUTPATIENT)
Age: 59
End: 2019-05-30

## 2019-05-31 ENCOUNTER — APPOINTMENT (OUTPATIENT)
Dept: COLORECTAL SURGERY | Facility: CLINIC | Age: 59
End: 2019-05-31
Payer: COMMERCIAL

## 2019-05-31 PROCEDURE — 45300 PROCTOSIGMOIDOSCOPY DX: CPT

## 2019-05-31 PROCEDURE — 99213 OFFICE O/P EST LOW 20 MIN: CPT | Mod: 25

## 2019-06-04 ENCOUNTER — RESULT REVIEW (OUTPATIENT)
Age: 59
End: 2019-06-04

## 2019-06-04 ENCOUNTER — LABORATORY RESULT (OUTPATIENT)
Age: 59
End: 2019-06-04

## 2019-06-04 ENCOUNTER — APPOINTMENT (OUTPATIENT)
Age: 59
End: 2019-06-04

## 2019-06-04 LAB
BASOPHILS # BLD AUTO: 0.1 K/UL — SIGNIFICANT CHANGE UP (ref 0–0.2)
BASOPHILS NFR BLD AUTO: 1.7 % — SIGNIFICANT CHANGE UP (ref 0–2)
EOSINOPHIL # BLD AUTO: 0.2 K/UL — SIGNIFICANT CHANGE UP (ref 0–0.5)
EOSINOPHIL NFR BLD AUTO: 5.6 % — SIGNIFICANT CHANGE UP (ref 0–6)
HCT VFR BLD CALC: 33.2 % — LOW (ref 39–50)
HGB BLD-MCNC: 12 G/DL — LOW (ref 13–17)
LYMPHOCYTES # BLD AUTO: 1.2 K/UL — SIGNIFICANT CHANGE UP (ref 1–3.3)
LYMPHOCYTES # BLD AUTO: 38.7 % — SIGNIFICANT CHANGE UP (ref 13–44)
MCHC RBC-ENTMCNC: 34.2 PG — HIGH (ref 27–34)
MCHC RBC-ENTMCNC: 36.1 G/DL — HIGH (ref 32–36)
MCV RBC AUTO: 94.6 FL — SIGNIFICANT CHANGE UP (ref 80–100)
MONOCYTES # BLD AUTO: 0.3 K/UL — SIGNIFICANT CHANGE UP (ref 0–0.9)
MONOCYTES NFR BLD AUTO: 10.9 % — SIGNIFICANT CHANGE UP (ref 2–14)
NEUTROPHILS # BLD AUTO: 1.3 K/UL — LOW (ref 1.8–7.4)
NEUTROPHILS NFR BLD AUTO: 43 % — SIGNIFICANT CHANGE UP (ref 43–77)
PLATELET # BLD AUTO: 167 K/UL — SIGNIFICANT CHANGE UP (ref 150–400)
RBC # BLD: 3.51 M/UL — LOW (ref 4.2–5.8)
RBC # FLD: 13.8 % — SIGNIFICANT CHANGE UP (ref 10.3–14.5)
WBC # BLD: 3 K/UL — LOW (ref 3.8–10.5)
WBC # FLD AUTO: 3 K/UL — LOW (ref 3.8–10.5)

## 2019-06-06 ENCOUNTER — APPOINTMENT (OUTPATIENT)
Age: 59
End: 2019-06-06

## 2019-06-10 ENCOUNTER — LABORATORY RESULT (OUTPATIENT)
Age: 59
End: 2019-06-10

## 2019-06-10 ENCOUNTER — RESULT REVIEW (OUTPATIENT)
Age: 59
End: 2019-06-10

## 2019-06-10 ENCOUNTER — APPOINTMENT (OUTPATIENT)
Age: 59
End: 2019-06-10

## 2019-06-10 LAB
BASOPHILS # BLD AUTO: 0 K/UL — SIGNIFICANT CHANGE UP (ref 0–0.2)
BASOPHILS NFR BLD AUTO: 0.9 % — SIGNIFICANT CHANGE UP (ref 0–2)
EOSINOPHIL # BLD AUTO: 0.1 K/UL — SIGNIFICANT CHANGE UP (ref 0–0.5)
EOSINOPHIL NFR BLD AUTO: 2.1 % — SIGNIFICANT CHANGE UP (ref 0–6)
HCT VFR BLD CALC: 34.1 % — LOW (ref 39–50)
HGB BLD-MCNC: 11.6 G/DL — LOW (ref 13–17)
LYMPHOCYTES # BLD AUTO: 1.5 K/UL — SIGNIFICANT CHANGE UP (ref 1–3.3)
LYMPHOCYTES # BLD AUTO: 38 % — SIGNIFICANT CHANGE UP (ref 13–44)
MCHC RBC-ENTMCNC: 33.7 PG — SIGNIFICANT CHANGE UP (ref 27–34)
MCHC RBC-ENTMCNC: 34 G/DL — SIGNIFICANT CHANGE UP (ref 32–36)
MCV RBC AUTO: 98.9 FL — SIGNIFICANT CHANGE UP (ref 80–100)
MONOCYTES # BLD AUTO: 0.5 K/UL — SIGNIFICANT CHANGE UP (ref 0–0.9)
MONOCYTES NFR BLD AUTO: 13.4 % — SIGNIFICANT CHANGE UP (ref 2–14)
NEUTROPHILS # BLD AUTO: 1.8 K/UL — SIGNIFICANT CHANGE UP (ref 1.8–7.4)
NEUTROPHILS NFR BLD AUTO: 45.6 % — SIGNIFICANT CHANGE UP (ref 43–77)
PLATELET # BLD AUTO: 252 K/UL — SIGNIFICANT CHANGE UP (ref 150–400)
RBC # BLD: 3.45 M/UL — LOW (ref 4.2–5.8)
RBC # FLD: 13.7 % — SIGNIFICANT CHANGE UP (ref 10.3–14.5)
WBC # BLD: 4 K/UL — SIGNIFICANT CHANGE UP (ref 3.8–10.5)
WBC # FLD AUTO: 4 K/UL — SIGNIFICANT CHANGE UP (ref 3.8–10.5)

## 2019-06-12 ENCOUNTER — APPOINTMENT (OUTPATIENT)
Age: 59
End: 2019-06-12

## 2019-06-19 ENCOUNTER — OUTPATIENT (OUTPATIENT)
Dept: OUTPATIENT SERVICES | Facility: HOSPITAL | Age: 59
LOS: 1 days | Discharge: HOME | End: 2019-06-19

## 2019-06-19 ENCOUNTER — RX RENEWAL (OUTPATIENT)
Age: 59
End: 2019-06-19

## 2019-06-19 DIAGNOSIS — Z98.890 OTHER SPECIFIED POSTPROCEDURAL STATES: Chronic | ICD-10-CM

## 2019-06-19 DIAGNOSIS — C20 MALIGNANT NEOPLASM OF RECTUM: ICD-10-CM

## 2019-06-19 DIAGNOSIS — I51.9 HEART DISEASE, UNSPECIFIED: Chronic | ICD-10-CM

## 2019-06-24 ENCOUNTER — LABORATORY RESULT (OUTPATIENT)
Age: 59
End: 2019-06-24

## 2019-06-24 ENCOUNTER — APPOINTMENT (OUTPATIENT)
Age: 59
End: 2019-06-24

## 2019-06-24 ENCOUNTER — APPOINTMENT (OUTPATIENT)
Dept: HEMATOLOGY ONCOLOGY | Facility: CLINIC | Age: 59
End: 2019-06-24
Payer: COMMERCIAL

## 2019-06-24 ENCOUNTER — RESULT REVIEW (OUTPATIENT)
Age: 59
End: 2019-06-24

## 2019-06-24 VITALS
SYSTOLIC BLOOD PRESSURE: 107 MMHG | BODY MASS INDEX: 26.58 KG/M2 | HEIGHT: 73 IN | WEIGHT: 200.56 LBS | TEMPERATURE: 97.7 F | OXYGEN SATURATION: 100 % | HEART RATE: 60 BPM | DIASTOLIC BLOOD PRESSURE: 63 MMHG

## 2019-06-24 LAB
BASOPHILS # BLD AUTO: 0.1 K/UL — SIGNIFICANT CHANGE UP (ref 0–0.2)
BASOPHILS NFR BLD AUTO: 1 % — SIGNIFICANT CHANGE UP (ref 0–2)
EOSINOPHIL # BLD AUTO: 0.1 K/UL — SIGNIFICANT CHANGE UP (ref 0–0.5)
EOSINOPHIL NFR BLD AUTO: 1 % — SIGNIFICANT CHANGE UP (ref 0–6)
HCT VFR BLD CALC: 35 % — LOW (ref 39–50)
HGB BLD-MCNC: 11.9 G/DL — LOW (ref 13–17)
LYMPHOCYTES # BLD AUTO: 1.6 K/UL — SIGNIFICANT CHANGE UP (ref 1–3.3)
LYMPHOCYTES # BLD AUTO: 21.6 % — SIGNIFICANT CHANGE UP (ref 13–44)
MCHC RBC-ENTMCNC: 33.8 G/DL — SIGNIFICANT CHANGE UP (ref 32–36)
MCHC RBC-ENTMCNC: 33.8 PG — SIGNIFICANT CHANGE UP (ref 27–34)
MCV RBC AUTO: 99.9 FL — SIGNIFICANT CHANGE UP (ref 80–100)
MONOCYTES # BLD AUTO: 0.6 K/UL — SIGNIFICANT CHANGE UP (ref 0–0.9)
MONOCYTES NFR BLD AUTO: 9 % — SIGNIFICANT CHANGE UP (ref 2–14)
NEUTROPHILS # BLD AUTO: 4.8 K/UL — SIGNIFICANT CHANGE UP (ref 1.8–7.4)
NEUTROPHILS NFR BLD AUTO: 67.4 % — SIGNIFICANT CHANGE UP (ref 43–77)
PLATELET # BLD AUTO: 230 K/UL — SIGNIFICANT CHANGE UP (ref 150–400)
RBC # BLD: 3.51 M/UL — LOW (ref 4.2–5.8)
RBC # FLD: 14.6 % — HIGH (ref 10.3–14.5)
WBC # BLD: 7.2 K/UL — SIGNIFICANT CHANGE UP (ref 3.8–10.5)
WBC # FLD AUTO: 7.2 K/UL — SIGNIFICANT CHANGE UP (ref 3.8–10.5)

## 2019-06-24 PROCEDURE — 99214 OFFICE O/P EST MOD 30 MIN: CPT

## 2019-06-25 DIAGNOSIS — R11.2 NAUSEA WITH VOMITING, UNSPECIFIED: ICD-10-CM

## 2019-06-25 DIAGNOSIS — Z51.11 ENCOUNTER FOR ANTINEOPLASTIC CHEMOTHERAPY: ICD-10-CM

## 2019-06-26 ENCOUNTER — APPOINTMENT (OUTPATIENT)
Age: 59
End: 2019-06-26

## 2019-07-01 ENCOUNTER — APPOINTMENT (OUTPATIENT)
Dept: HEMATOLOGY ONCOLOGY | Facility: CLINIC | Age: 59
End: 2019-07-01

## 2019-07-02 ENCOUNTER — APPOINTMENT (OUTPATIENT)
Dept: RADIATION ONCOLOGY | Facility: CLINIC | Age: 59
End: 2019-07-02
Payer: COMMERCIAL

## 2019-07-02 VITALS
HEART RATE: 65 BPM | SYSTOLIC BLOOD PRESSURE: 129 MMHG | HEIGHT: 73 IN | OXYGEN SATURATION: 97 % | WEIGHT: 205 LBS | TEMPERATURE: 98.2 F | DIASTOLIC BLOOD PRESSURE: 76 MMHG | RESPIRATION RATE: 16 BRPM | BODY MASS INDEX: 27.17 KG/M2

## 2019-07-02 PROCEDURE — 77263 THER RADIOLOGY TX PLNG CPLX: CPT

## 2019-07-02 PROCEDURE — 99215 OFFICE O/P EST HI 40 MIN: CPT | Mod: 25

## 2019-07-02 RX ORDER — DIPHENHYDRAMINE HYDROCHLORIDE AND LIDOCAINE HYDROCHLORIDE AND ALUMINUM HYDROXIDE AND MAGNESIUM HYDRO
KIT
Qty: 1 | Refills: 1 | Status: DISCONTINUED | COMMUNITY
Start: 2019-03-19 | End: 2019-07-02

## 2019-07-02 NOTE — PHYSICAL EXAM
[Cervical Lymph Nodes Enlarged Posterior Bilaterally] : posterior cervical [Cervical Lymph Nodes Enlarged Anterior Bilaterally] : anterior cervical [Supraclavicular Lymph Nodes Enlarged Bilaterally] : supraclavicular [Normal] : no focal deficits [Blood on examination glove] : no blood on examination glove [FreeTextEntry1] : induration noted above anal sphincter, nontender

## 2019-07-02 NOTE — PHYSICAL EXAM
[Normal] : normal skin color and pigmentation and no rash [de-identified] : mild xerostomia; no thrush

## 2019-07-02 NOTE — DISEASE MANAGEMENT
[Clinical] : TNM Stage: c [I] : I [FreeTextEntry4] : rectal adenocarcinoma [TTNM] : 2/3a [NTNM] : 0 [MTNM] : 0

## 2019-07-02 NOTE — HISTORY OF PRESENT ILLNESS
[FreeTextEntry1] : This 58 year-old man presents for re-evaluation to discuss radiation planning.  \par He is undergoing neoadjuvant FOLFOX with Dr. Linda, and anticipates cycle 8 to be on 7/22/2019.  \par Reports formed daily bowel movements without lakisha blood.  Denies pelvic pain, or dysuria.\par Reports poor appetite with dysgeusia from chemotherapy.  Moderate fatigue with treatments.\par Recent follow-up with Dr. Garcia, with rigid sigmoidoscopy showed bottom edge of lesion is ~7-8cm from anal verge, with interval decrease in size of lesion.

## 2019-07-02 NOTE — HISTORY OF PRESENT ILLNESS
[de-identified] : Smokes cigars weekly x 10 years.  \par ! glass wine per day; 3 glasses per night for 40 years - beer and wine. [de-identified] : The patient was diagnosed with adenocarcinoma of the rectum in February 2019 at the age of 58.  He began having BRBPR approximately 6 months ago, which he attributed to  hemorrhoids seen on his last colonoscopy about 8 years ago. Recently, he noted increased rectal bleeding and was referred for GI evaluation with Dr. Michele Reyes.  Colonoscopy revealed a mass 6-7 cm from the anal verge on rigid sigmoidoscopy. The biopsy was consistent with moderately differentiated adenocarcinoma. Endoscopic ultrasound confirmed a 2.8  x 1.8 cm mass, consistent with T2N0.  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 No significant lymphadenopathy in the abdomen.  He next saw colorectal surgery, Dr. Adi Garcia, who recommended a transanal resection preceded by neoadjuvant chemoradiation.  A CT C/A/P discovered no metastatic disease. He was seen by Dr. Javed Mckeon to discuss FOLFOX and Dr. Faustino Parson to discuss RT.  [place] [facility] [place]   [de-identified] : Patient presents for C3D1 FOLFOX for adenocarcinoma of the rectum.  + Epistaxis.  + Fatigue/weakness, unchanged, worse D3-5.  + Cold intolerance, very mild, only lasts one day.  + Decreased appetite, starting to lose weight. + Fecal urgency with occasional loose stools. No further BRBPR.  + Mouth sores, lasts only 2-3 days. + Mild neuropathy, feet only.  No rectal pain.  No abdominal pain. No N/V.  No fevers or chills. \par

## 2019-07-02 NOTE — DISEASE MANAGEMENT
[Clinical] : TNM Stage: c [I] : I [TTNM] : 2/3 [MTNM] : 0 [FreeTextEntry4] : rectal adenocarcinoma [NTNM] : 0

## 2019-07-02 NOTE — HISTORY OF PRESENT ILLNESS
[de-identified] : The patient was diagnosed with adenocarcinoma of the rectum in February 2019 at the age of 58.  He began having BRBPR approximately 6 months ago, which he attributed to  hemorrhoids seen on his last colonoscopy about 8 years ago. Recently, he noted increased rectal bleeding and was referred for GI evaluation with Dr. Michele Reyes.  Colonoscopy revealed a mass 6-7 cm from the anal verge on rigid sigmoidoscopy. The biopsy was consistent with moderately differentiated adenocarcinoma. Endoscopic ultrasound confirmed a 2.8  x 1.8 cm mass, consistent with T2N0.  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 No significant lymphadenopathy in the abdomen.  He next saw colorectal surgery, Dr. Adi Garcia, who recommended a transanal resection preceded by neoadjuvant chemoradiation.  A CT C/A/P discovered no metastatic disease. He was seen by Dr. Javed Mckeon to discuss FOLFOX and Dr. Faustino Parson to discuss RT.  [facility] has also [facility].   [de-identified] : Patient presents for C6D1 FOLFOX for adenocarcinoma of the rectum.  + Epistaxis, every morning.  + Fatigue/weakness, unchanged, worse recently.  + Cold intolerance, remains very mild, only lasts 1-2 day.  + Decreased appetite, starting to lose weight. + Fecal urgency with occasional loose stools, although sometimes solid. No further BRBPR.  + Xerostomia.  + Very occasional mouth sores. + Mild nausea.  + Decreased appetite, weight loss.  No neuropathy.  No rectal pain.  No abdominal pain.  No fevers or chills. \par  [de-identified] : Smokes cigars weekly x 10 years.  \par ! glass wine per day; 3 glasses per night for 40 years - beer and wine.

## 2019-07-02 NOTE — RESULTS/DATA
[FreeTextEntry1] : 3/7/19 MRI rectum:\par 2.4 cm mass, with the inferior margin contacting the internal anal sphincter, 10:00 to 7:00. There were subcentimeter lymph nodes, too small to characterize. Overall impression was a polypoid low rectal tumor with possible punctate extramural extension, possible T2 or T3a Nx Mx, with inferior margin of tumor contacting the internal anal sphincter. 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 No significant lymphadenopathy in abdomen is identified.  \par \par 2/21/19 EUS:\par malignant appearing posterior wall mass in the rectum, 4 cm above the dentate line. 27.8 mm x 18.4 mm with subtle suggestion of muscularis propria involvement, suspect T2N0. \par \par 2/20/19 Pathology:\par Rectum polyp, colonoscopy:  superficially invasive moderately differentiated adenocarcinoma Yes

## 2019-07-02 NOTE — REVIEW OF SYSTEMS
[Fatigue] : fatigue [Negative] : Endocrine [Vomiting] : no vomiting [Constipation] : no constipation [Abdominal Pain] : no abdominal pain [FreeTextEntry4] : dysgeusia [de-identified] : undergoing chemotherapy [Diarrhea] : no diarrhea

## 2019-07-02 NOTE — LETTER CLOSING
[Consult Closing:] : Thank you for allowing me to participate in the care of this patient.  If you have any questions, please do not hesitate to contact me. [Sincerely yours,] : Sincerely yours, [FreeTextEntry3] : Faustino Parson MD\par Attending Physician\par Department of Radiation Medicine\par [facility]\par \par \par Kwadwo Nava \par [employer] [facility]\par

## 2019-07-02 NOTE — CONSULT LETTER
[Dear  ___] : Dear  [unfilled], [Consult Letter:] : I had the pleasure of evaluating your patient, [unfilled]. [Please see my note below.] : Please see my note below. [Consult Closing:] : Thank you very much for allowing me to participate in the care of this patient.  If you have any questions, please do not hesitate to contact me. [Sincerely,] : Sincerely, [DrKenney  ___] : Dr. EMANUEL [DrKenney ___] : Dr. EMANUEL [FreeTextEntry3] : Dr. Disha Linda

## 2019-07-08 ENCOUNTER — LABORATORY RESULT (OUTPATIENT)
Age: 59
End: 2019-07-08

## 2019-07-08 ENCOUNTER — RESULT REVIEW (OUTPATIENT)
Age: 59
End: 2019-07-08

## 2019-07-08 ENCOUNTER — APPOINTMENT (OUTPATIENT)
Age: 59
End: 2019-07-08

## 2019-07-08 LAB
BASOPHILS # BLD AUTO: 0.1 K/UL — SIGNIFICANT CHANGE UP (ref 0–0.2)
BASOPHILS NFR BLD AUTO: 0.8 % — SIGNIFICANT CHANGE UP (ref 0–2)
EOSINOPHIL # BLD AUTO: 0.1 K/UL — SIGNIFICANT CHANGE UP (ref 0–0.5)
EOSINOPHIL NFR BLD AUTO: 1.6 % — SIGNIFICANT CHANGE UP (ref 0–6)
HCT VFR BLD CALC: 33.9 % — LOW (ref 39–50)
HGB BLD-MCNC: 11.5 G/DL — LOW (ref 13–17)
LYMPHOCYTES # BLD AUTO: 1.9 K/UL — SIGNIFICANT CHANGE UP (ref 1–3.3)
LYMPHOCYTES # BLD AUTO: 23.5 % — SIGNIFICANT CHANGE UP (ref 13–44)
MCHC RBC-ENTMCNC: 33.9 G/DL — SIGNIFICANT CHANGE UP (ref 32–36)
MCHC RBC-ENTMCNC: 34.4 PG — HIGH (ref 27–34)
MCV RBC AUTO: 101.3 FL — HIGH (ref 80–100)
MONOCYTES # BLD AUTO: 0.8 K/UL — SIGNIFICANT CHANGE UP (ref 0–0.9)
MONOCYTES NFR BLD AUTO: 10.2 % — SIGNIFICANT CHANGE UP (ref 2–14)
NEUTROPHILS # BLD AUTO: 5.1 K/UL — SIGNIFICANT CHANGE UP (ref 1.8–7.4)
NEUTROPHILS NFR BLD AUTO: 64 % — SIGNIFICANT CHANGE UP (ref 43–77)
PLATELET # BLD AUTO: 160 K/UL — SIGNIFICANT CHANGE UP (ref 150–400)
RBC # BLD: 3.34 M/UL — LOW (ref 4.2–5.8)
RBC # FLD: 15.9 % — HIGH (ref 10.3–14.5)
WBC # BLD: 7.9 K/UL — SIGNIFICANT CHANGE UP (ref 3.8–10.5)
WBC # FLD AUTO: 7.9 K/UL — SIGNIFICANT CHANGE UP (ref 3.8–10.5)

## 2019-07-10 ENCOUNTER — APPOINTMENT (OUTPATIENT)
Age: 59
End: 2019-07-10

## 2019-07-17 ENCOUNTER — RX RENEWAL (OUTPATIENT)
Age: 59
End: 2019-07-17

## 2019-07-18 ENCOUNTER — OUTPATIENT (OUTPATIENT)
Dept: OUTPATIENT SERVICES | Facility: HOSPITAL | Age: 59
LOS: 1 days | Discharge: HOME | End: 2019-07-18

## 2019-07-18 DIAGNOSIS — I51.9 HEART DISEASE, UNSPECIFIED: Chronic | ICD-10-CM

## 2019-07-18 DIAGNOSIS — C20 MALIGNANT NEOPLASM OF RECTUM: ICD-10-CM

## 2019-07-18 DIAGNOSIS — Z98.890 OTHER SPECIFIED POSTPROCEDURAL STATES: Chronic | ICD-10-CM

## 2019-07-22 ENCOUNTER — LABORATORY RESULT (OUTPATIENT)
Age: 59
End: 2019-07-22

## 2019-07-22 ENCOUNTER — RESULT REVIEW (OUTPATIENT)
Age: 59
End: 2019-07-22

## 2019-07-22 ENCOUNTER — APPOINTMENT (OUTPATIENT)
Age: 59
End: 2019-07-22

## 2019-07-22 ENCOUNTER — APPOINTMENT (OUTPATIENT)
Dept: HEMATOLOGY ONCOLOGY | Facility: CLINIC | Age: 59
End: 2019-07-22
Payer: COMMERCIAL

## 2019-07-22 LAB
BASOPHILS # BLD AUTO: 0.1 K/UL — SIGNIFICANT CHANGE UP (ref 0–0.2)
BASOPHILS NFR BLD AUTO: 1 % — SIGNIFICANT CHANGE UP (ref 0–2)
EOSINOPHIL # BLD AUTO: 0.1 K/UL — SIGNIFICANT CHANGE UP (ref 0–0.5)
EOSINOPHIL NFR BLD AUTO: 0.8 % — SIGNIFICANT CHANGE UP (ref 0–6)
HCT VFR BLD CALC: 34 % — LOW (ref 39–50)
HGB BLD-MCNC: 11.9 G/DL — LOW (ref 13–17)
LYMPHOCYTES # BLD AUTO: 1.2 K/UL — SIGNIFICANT CHANGE UP (ref 1–3.3)
LYMPHOCYTES # BLD AUTO: 16.9 % — SIGNIFICANT CHANGE UP (ref 13–44)
MCHC RBC-ENTMCNC: 34.9 G/DL — SIGNIFICANT CHANGE UP (ref 32–36)
MCHC RBC-ENTMCNC: 35.8 PG — HIGH (ref 27–34)
MCV RBC AUTO: 102.3 FL — HIGH (ref 80–100)
MONOCYTES # BLD AUTO: 0.6 K/UL — SIGNIFICANT CHANGE UP (ref 0–0.9)
MONOCYTES NFR BLD AUTO: 8.6 % — SIGNIFICANT CHANGE UP (ref 2–14)
NEUTROPHILS # BLD AUTO: 5.3 K/UL — SIGNIFICANT CHANGE UP (ref 1.8–7.4)
NEUTROPHILS NFR BLD AUTO: 72.8 % — SIGNIFICANT CHANGE UP (ref 43–77)
PLATELET # BLD AUTO: 175 K/UL — SIGNIFICANT CHANGE UP (ref 150–400)
RBC # BLD: 3.32 M/UL — LOW (ref 4.2–5.8)
RBC # FLD: 15.2 % — HIGH (ref 10.3–14.5)
WBC # BLD: 7.3 K/UL — SIGNIFICANT CHANGE UP (ref 3.8–10.5)
WBC # FLD AUTO: 7.3 K/UL — SIGNIFICANT CHANGE UP (ref 3.8–10.5)

## 2019-07-22 PROCEDURE — 99214 OFFICE O/P EST MOD 30 MIN: CPT

## 2019-07-22 NOTE — RESULTS/DATA
[FreeTextEntry1] : 3/9/19 CT: Small pleural-based calcification in the right apex. Coronary artery stents. Degenerative changes in the thoracic spine. Old healed fracture involving the right sixth rib. Edematous changes seen the mesenteric previously described. No evidence of metastatic disease. \par \par 3/7/19 MRI rectum:\par 2.4 cm mass, with the inferior margin contacting the internal anal sphincter, 10:00 to 7:00. There were subcentimeter lymph nodes, too small to characterize. Overall impression was a polypoid low rectal tumor with possible punctate extramural extension, possible T2 or T3a Nx Mx, with inferior margin of tumor contacting the internal anal sphincter. 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 No significant lymphadenopathy in abdomen is identified.  \par \par 2/21/19 EUS:\par malignant appearing posterior wall mass in the rectum, 4 cm above the dentate line. 27.8 mm x 18.4 mm with subtle suggestion of muscularis propria involvement, suspect T2N0. \par \par 2/20/19 Pathology:\par Rectum polyp, colonoscopy:  superficially invasive moderately differentiated adenocarcinoma

## 2019-07-22 NOTE — HISTORY OF PRESENT ILLNESS
[de-identified] : The patient was diagnosed with adenocarcinoma of the rectum in February 2019 at the age of 58.  He began having BRBPR approximately 6 months ago, which he attributed to  hemorrhoids seen on his last colonoscopy about 8 years ago. Recently, he noted increased rectal bleeding and was referred for GI evaluation with Dr. Michele Reyes.  Colonoscopy revealed a mass 6-7 cm from the anal verge on rigid sigmoidoscopy. The biopsy was consistent with moderately differentiated adenocarcinoma. Endoscopic ultrasound confirmed a 2.8  x 1.8 cm mass, consistent with T2N0.  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 No significant lymphadenopathy in the abdomen.  He next saw colorectal surgery, Dr. Adi Garcia, who recommended a transanal resection preceded by neoadjuvant chemoradiation.  A CT C/A/P discovered no metastatic disease. He was seen by Dr. Javed Mckeon to discuss FOLFOX and Dr. Faustino Parson to discuss RT.  [place] has also been [place] [facility] [place]   [de-identified] : Smokes cigars weekly x 10 years.  \par ! glass wine per day; 3 glasses per night for 40 years - beer and wine. [de-identified] : Patient presents for C8D1 FOLFOX for adenocarcinoma of the rectum.  + Epistaxis, every morning.  + Fatigue/weakness, unchanged, worse recently.  + Cold intolerance, remains very mild, only lasts 1-2 day.  + Decreased appetite, starting to lose weight. + Fecal urgency with occasional loose stools, although sometimes solid. No further BRBPR.  + Xerostomia.  + Very occasional mouth sores. + Mild nausea.  + Decreased appetite, weight loss.  No neuropathy.  No rectal pain.  No abdominal pain.  No fevers or chills. \par

## 2019-07-22 NOTE — ADDENDUM
[FreeTextEntry1] : I, Angus Yadav, acted solely as scribe for Dr. Disha Linda MD on this date 07/22/2019 10:20AM .\par \par All medical record entries made by the Scribe were at my, Dr. Disha Linda MD direction and personally dictated by me on 07/22/2019 10:20AM. I have reviewed the chart and agree that the record accurately reflects my personal performance of the history, physical exam, assessment and plan. I have also personally directed, reviewed and agreed with the chart.\par

## 2019-07-22 NOTE — ASSESSMENT
[Curative] : Goals of care discussed with patient: Curative [FreeTextEntry1] : Previously had a detailed discussion with the patient regarding the natural history, staging, prognosis and treatment recommendations for rectal cancer.  I discussed the standard approach to locally-advanced rectal cancer involves the use of all modalities of care. This includes neoadjuvant chemotherapy and radiation therapy concurrently to shrink the tumor and improve local control. This is typically followed by surgery and adjuvant systemic chemotherapy.  I then explained to the patient that we often move the adjuvant chemotherapy to the up-front setting prior to concurrent chemoradiotherapy. I described the rationale for this.  Primary rectal tumors tend to be extremely chemosensitive to induction chemotherapy. Induction chemotherapy has the advantage of improving local disease before surgery, being more tolerable in a presurgical setting, and improve the pathologic complete response rate. I specifically described treatment with two months of chemotherapy or four cycles of systemic FOLFOX, to be followed by an assessment by colorectal surgery, Dr. Garcia, in order to ensure response to treatment.  FOLFOX has been shown to improve both disease-free survival and overall survival in the adjuvant setting. Given his MRI findings that include T2 / T3a Nx M0, 6-7cm from anal verge, extending to internal anal sphincter), I recommend upfront systemic chemotherapy to decrease the risk of distant metastatic disease in addition to local regional recurrence.  If the patient is responding after 4 cycles of systemic chemotherapy, I will continue systemic chemotherapy for a total of four months up-front. If he is not responding, I would go straight to concurrent chemoradiotherapy.\par  \par After a detailed discussion, the patient is electing to proceed with upfront FOLFOX.  I described to the patient the way that FOLFOX is administered, given over a 46-hour continuous infusion of 5- fluorouracil.  He will have a Mediport placed and wishes to begin with chemotherapy in one week following mediport placement.  I also discussed with the patient common side effects seen with this regimen including, but not limited to, fatigue, myelosuppression, nausea, vomiting, diarrhea, cold sensitivity, and peripheral neuropathy.  I have scheduled him to start FOLFOX chemotherapy x4 cycles in one week, followed by examination by Dr. Garcia.  Assuming no progression, we will continue with chemotherapy for four further cycles. A repeat rectal MRI will be ordered at the end of chemotherapy, preceding standard combined modality therapy with capecitabine or 5-FU concurrent with radiation. Following chemoradiation, the patient will receive a chest, abdomen and pelvis CT as well as a rectal MRI and followup with Dr. Garcia about four to six weeks after completion of radiation.  He has met with radiation oncology for initial consultation. labs drawn today include CBC, CMP, magnesium, PT, PTT, CEA, hepatitis profile and EKG.  The patient stated that all of his questions were answered to his satisfaction. He will return to initiate systemic chemotherapy in approximately one week. He is aware that he can call if he has any further concerns or questions.

## 2019-07-24 ENCOUNTER — APPOINTMENT (OUTPATIENT)
Age: 59
End: 2019-07-24

## 2019-07-25 DIAGNOSIS — Z51.11 ENCOUNTER FOR ANTINEOPLASTIC CHEMOTHERAPY: ICD-10-CM

## 2019-07-25 DIAGNOSIS — R11.2 NAUSEA WITH VOMITING, UNSPECIFIED: ICD-10-CM

## 2019-07-29 ENCOUNTER — FORM ENCOUNTER (OUTPATIENT)
Age: 59
End: 2019-07-29

## 2019-07-29 ENCOUNTER — APPOINTMENT (OUTPATIENT)
Dept: HEMATOLOGY ONCOLOGY | Facility: CLINIC | Age: 59
End: 2019-07-29

## 2019-07-29 ENCOUNTER — APPOINTMENT (OUTPATIENT)
Age: 59
End: 2019-07-29

## 2019-07-29 PROCEDURE — 77290 THER RAD SIMULAJ FIELD CPLX: CPT | Mod: 26

## 2019-07-29 PROCEDURE — 77334 RADIATION TREATMENT AID(S): CPT | Mod: 26

## 2019-07-30 ENCOUNTER — OUTPATIENT (OUTPATIENT)
Dept: OUTPATIENT SERVICES | Facility: HOSPITAL | Age: 59
LOS: 1 days | End: 2019-07-30
Payer: COMMERCIAL

## 2019-07-30 ENCOUNTER — APPOINTMENT (OUTPATIENT)
Dept: MRI IMAGING | Facility: CLINIC | Age: 59
End: 2019-07-30
Payer: COMMERCIAL

## 2019-07-30 DIAGNOSIS — C20 MALIGNANT NEOPLASM OF RECTUM: ICD-10-CM

## 2019-07-30 DIAGNOSIS — I51.9 HEART DISEASE, UNSPECIFIED: Chronic | ICD-10-CM

## 2019-07-30 DIAGNOSIS — Z98.890 OTHER SPECIFIED POSTPROCEDURAL STATES: Chronic | ICD-10-CM

## 2019-07-30 PROCEDURE — 72197 MRI PELVIS W/O & W/DYE: CPT | Mod: 26

## 2019-07-30 PROCEDURE — 72197 MRI PELVIS W/O & W/DYE: CPT

## 2019-07-30 PROCEDURE — A9585: CPT

## 2019-07-31 ENCOUNTER — APPOINTMENT (OUTPATIENT)
Age: 59
End: 2019-07-31

## 2019-08-05 ENCOUNTER — APPOINTMENT (OUTPATIENT)
Age: 59
End: 2019-08-05

## 2019-08-07 ENCOUNTER — APPOINTMENT (OUTPATIENT)
Age: 59
End: 2019-08-07

## 2019-08-07 PROCEDURE — 77334 RADIATION TREATMENT AID(S): CPT | Mod: 26

## 2019-08-07 PROCEDURE — 77295 3-D RADIOTHERAPY PLAN: CPT | Mod: 26

## 2019-08-07 PROCEDURE — 77300 RADIATION THERAPY DOSE PLAN: CPT | Mod: 26

## 2019-08-12 ENCOUNTER — APPOINTMENT (OUTPATIENT)
Age: 59
End: 2019-08-12

## 2019-08-13 PROCEDURE — 77280 THER RAD SIMULAJ FIELD SMPL: CPT | Mod: 26

## 2019-08-14 ENCOUNTER — APPOINTMENT (OUTPATIENT)
Age: 59
End: 2019-08-14

## 2019-08-15 NOTE — DISEASE MANAGEMENT
[Clinical] : TNM Stage: c [FreeTextEntry4] : adenocarcinoma [TTNM] : 2 [NTNM] : 1 [MTNM] : 0 [IIIA] : IIIA [de-identified] : 567 [de-identified] : 8782 [de-identified] : pelvis

## 2019-08-15 NOTE — REVIEW OF SYSTEMS
[Constipation: Grade 0] : Constipation: Grade 0 [Diarrhea: Grade 0] : Diarrhea: Grade 0 [Proctitis: Grade 0] : Proctitis: Grade 0 [Nausea: Grade 0] : Nausea: Grade 0 [Vomiting: Grade 0] : Vomiting: Grade 0 [Edema Limbs: Grade 0] : Edema Limbs: Grade 0  [Fatigue: Grade 1 - Fatigue relieved by rest] : Fatigue: Grade 1 - Fatigue relieved by rest [Neck Edema: Grade 0] : Neck Edema: Grade 0 [Localized Edema: Grade 0] : Localized Edema: Grade 0  [Urinary Incontinence: Grade 0] : Urinary Incontinence: Grade 0  [Urinary Tract Pain: Grade 0] : Urinary Tract Pain: Grade 0 [Mucositis Oral: Grade 0] : Mucositis Oral: Grade 0  [Skin Hyperpigmentation: Grade 0] : Skin Hyperpigmentation: Grade 0

## 2019-08-19 ENCOUNTER — APPOINTMENT (OUTPATIENT)
Age: 59
End: 2019-08-19

## 2019-08-20 ENCOUNTER — RX CHANGE (OUTPATIENT)
Age: 59
End: 2019-08-20

## 2019-08-20 PROCEDURE — 77427 RADIATION TX MANAGEMENT X5: CPT

## 2019-08-21 ENCOUNTER — APPOINTMENT (OUTPATIENT)
Age: 59
End: 2019-08-21

## 2019-08-22 VITALS
BODY MASS INDEX: 26.77 KG/M2 | TEMPERATURE: 98.2 F | WEIGHT: 202 LBS | RESPIRATION RATE: 16 BRPM | OXYGEN SATURATION: 99 % | HEIGHT: 73 IN | HEART RATE: 64 BPM | SYSTOLIC BLOOD PRESSURE: 119 MMHG | DIASTOLIC BLOOD PRESSURE: 63 MMHG

## 2019-08-22 NOTE — REVIEW OF SYSTEMS
[Constipation: Grade 0] : Constipation: Grade 0 [Diarrhea: Grade 0] : Diarrhea: Grade 0 [Nausea: Grade 0] : Nausea: Grade 0 [Proctitis: Grade 0] : Proctitis: Grade 0 [Vomiting: Grade 0] : Vomiting: Grade 0 [Edema Limbs: Grade 0] : Edema Limbs: Grade 0  [Fatigue: Grade 1 - Fatigue relieved by rest] : Fatigue: Grade 1 - Fatigue relieved by rest [Localized Edema: Grade 0] : Localized Edema: Grade 0  [Neck Edema: Grade 0] : Neck Edema: Grade 0 [Urinary Incontinence: Grade 0] : Urinary Incontinence: Grade 0  [Urinary Tract Pain: Grade 0] : Urinary Tract Pain: Grade 0 [Mucositis Oral: Grade 0] : Mucositis Oral: Grade 0  [Skin Hyperpigmentation: Grade 0] : Skin Hyperpigmentation: Grade 0

## 2019-08-22 NOTE — DISEASE MANAGEMENT
[Clinical] : TNM Stage: c [IIIA] : IIIA [FreeTextEntry4] : adenocarcinoma [TTNM] : 2 [NTNM] : 1 [MTNM] : 0 [de-identified] : pelvis [de-identified] : 0332 [de-identified] : 0165

## 2019-08-27 VITALS
WEIGHT: 204 LBS | OXYGEN SATURATION: 98 % | BODY MASS INDEX: 27.04 KG/M2 | HEART RATE: 64 BPM | TEMPERATURE: 98.2 F | HEIGHT: 73 IN | RESPIRATION RATE: 16 BRPM | SYSTOLIC BLOOD PRESSURE: 147 MMHG | DIASTOLIC BLOOD PRESSURE: 75 MMHG

## 2019-08-27 PROCEDURE — 77427 RADIATION TX MANAGEMENT X5: CPT

## 2019-08-27 NOTE — REVIEW OF SYSTEMS
[Constipation: Grade 0] : Constipation: Grade 0 [Diarrhea: Grade 0] : Diarrhea: Grade 0 [Nausea: Grade 0] : Nausea: Grade 0 [Proctitis: Grade 0] : Proctitis: Grade 0 [Edema Limbs: Grade 0] : Edema Limbs: Grade 0  [Vomiting: Grade 0] : Vomiting: Grade 0 [Fatigue: Grade 1 - Fatigue relieved by rest] : Fatigue: Grade 1 - Fatigue relieved by rest [Localized Edema: Grade 0] : Localized Edema: Grade 0  [Neck Edema: Grade 0] : Neck Edema: Grade 0 [Urinary Incontinence: Grade 0] : Urinary Incontinence: Grade 0  [Urinary Tract Pain: Grade 0] : Urinary Tract Pain: Grade 0 [Mucositis Oral: Grade 0] : Mucositis Oral: Grade 0  [Skin Hyperpigmentation: Grade 1 - Hyperpigmentation covering <10% BSA; no psychosocial impact] : Skin Hyperpigmentation: Grade 1 - Hyperpigmentation covering <10% BSA; no psychosocial impact

## 2019-08-27 NOTE — PHYSICAL EXAM
[Normal] : normoactive bowel sounds, soft and nontender, no hepatosplenomegaly or masses appreciated [FreeTextEntry1] : anal irritation

## 2019-08-27 NOTE — VITALS
[80: Normal activity with effort; some signs or symptoms of disease.] : 80: Normal activity with effort; some signs or symptoms of disease.  [Maximal Pain Intensity: 2/10] : 2/10 [Pain Description/Quality: ___] : Pain description/quality: [unfilled] [Least Pain Intensity: 2/10] : 2/10 [Pain Duration: ___] : Pain duration: [unfilled] [Pain Interferes with ADLs] : Pain interferes with activities of daily living. [Pain Location: ___] : Pain Location: [unfilled] [OTC] : OTC

## 2019-08-27 NOTE — DISEASE MANAGEMENT
[Clinical] : TNM Stage: c [IIIA] : IIIA [FreeTextEntry4] : adenocarcinoma [TTNM] : 2 [NTNM] : 1 [MTNM] : 0 [de-identified] : 8327 [de-identified] : 7681 [de-identified] : pelvis

## 2019-08-29 ENCOUNTER — OUTPATIENT (OUTPATIENT)
Dept: OUTPATIENT SERVICES | Facility: HOSPITAL | Age: 59
LOS: 1 days | Discharge: HOME | End: 2019-08-29

## 2019-08-29 DIAGNOSIS — C20 MALIGNANT NEOPLASM OF RECTUM: ICD-10-CM

## 2019-08-29 DIAGNOSIS — Z98.890 OTHER SPECIFIED POSTPROCEDURAL STATES: Chronic | ICD-10-CM

## 2019-08-29 DIAGNOSIS — I51.9 HEART DISEASE, UNSPECIFIED: Chronic | ICD-10-CM

## 2019-09-03 ENCOUNTER — APPOINTMENT (OUTPATIENT)
Dept: VASCULAR SURGERY | Facility: CLINIC | Age: 59
End: 2019-09-03
Payer: COMMERCIAL

## 2019-09-03 VITALS
RESPIRATION RATE: 16 BRPM | HEIGHT: 73 IN | SYSTOLIC BLOOD PRESSURE: 107 MMHG | OXYGEN SATURATION: 99 % | DIASTOLIC BLOOD PRESSURE: 70 MMHG | HEART RATE: 83 BPM | TEMPERATURE: 98.7 F | BODY MASS INDEX: 27.04 KG/M2 | WEIGHT: 204 LBS

## 2019-09-03 PROCEDURE — 99213 OFFICE O/P EST LOW 20 MIN: CPT

## 2019-09-04 ENCOUNTER — APPOINTMENT (OUTPATIENT)
Age: 59
End: 2019-09-04

## 2019-09-05 ENCOUNTER — OTHER (OUTPATIENT)
Age: 59
End: 2019-09-05

## 2019-09-05 VITALS
HEART RATE: 72 BPM | OXYGEN SATURATION: 99 % | RESPIRATION RATE: 16 BRPM | HEIGHT: 73 IN | WEIGHT: 206 LBS | SYSTOLIC BLOOD PRESSURE: 124 MMHG | BODY MASS INDEX: 27.3 KG/M2 | TEMPERATURE: 98 F | DIASTOLIC BLOOD PRESSURE: 73 MMHG

## 2019-09-05 PROCEDURE — 77427 RADIATION TX MANAGEMENT X5: CPT

## 2019-09-05 NOTE — VITALS
[Maximal Pain Intensity: 2/10] : 2/10 [Least Pain Intensity: 2/10] : 2/10 [Pain Description/Quality: ___] : Pain description/quality: [unfilled] [Pain Location: ___] : Pain Location: [unfilled] [Pain Duration: ___] : Pain duration: [unfilled] [OTC] : OTC [Pain Interferes with ADLs] : Pain interferes with activities of daily living. [80: Normal activity with effort; some signs or symptoms of disease.] : 80: Normal activity with effort; some signs or symptoms of disease.

## 2019-09-05 NOTE — DISEASE MANAGEMENT
[Clinical] : TNM Stage: c [IIIA] : IIIA [FreeTextEntry4] : adenocarcinoma [NTNM] : 1 [TTNM] : 2/3 [MTNM] : 0 [de-identified] : 9752 [de-identified] : 1644 [de-identified] : pelvis

## 2019-09-05 NOTE — PHYSICAL EXAM
[Normal] : oriented to person, place and time, the affect was normal, the mood was normal and not anxious [FreeTextEntry1] : anal irritation

## 2019-09-05 NOTE — REVIEW OF SYSTEMS
[Constipation: Grade 0] : Constipation: Grade 0 [Nausea: Grade 0] : Nausea: Grade 0 [Vomiting: Grade 0] : Vomiting: Grade 0 [Edema Limbs: Grade 0] : Edema Limbs: Grade 0  [Fatigue: Grade 1 - Fatigue relieved by rest] : Fatigue: Grade 1 - Fatigue relieved by rest [Localized Edema: Grade 0] : Localized Edema: Grade 0  [Urinary Incontinence: Grade 0] : Urinary Incontinence: Grade 0  [Neck Edema: Grade 0] : Neck Edema: Grade 0 [Mucositis Oral: Grade 0] : Mucositis Oral: Grade 0  [Skin Hyperpigmentation: Grade 1 - Hyperpigmentation covering <10% BSA; no psychosocial impact] : Skin Hyperpigmentation: Grade 1 - Hyperpigmentation covering <10% BSA; no psychosocial impact [Fecal Incontinence: Grade 0] : Fecal Incontinence: Grade 0 [Proctitis: Grade 1 - Rectal discomfort, intervention not indicated] : Proctitis: Grade 1 - Rectal discomfort, intervention not indicated [Rectal Pain: Grade 1 - Mild pain] : Rectal Pain: Grade 1 - Mild pain [Urinary Tract Pain: Grade 1 - Mild pain] : Urinary Tract Pain: Grade 1 - Mild pain [Diarrhea: Grade 1 - Increase of <4 stools per day over baseline; mild increase in ostomy output compared to baseline] : Diarrhea: Grade 1 - Increase of <4 stools per day over baseline; mild increase in ostomy output compared to baseline [FreeTextEntry3] : increaed frequency

## 2019-09-12 PROCEDURE — 77427 RADIATION TX MANAGEMENT X5: CPT

## 2019-09-12 NOTE — DISEASE MANAGEMENT
[Clinical] : TNM Stage: c [TTNM] : 2/3 [FreeTextEntry4] : adenocarcinoma [NTNM] : 1 [IIIA] : IIIA [MTNM] : 0 [de-identified] : 5,700 [de-identified] : 7,989 [de-identified] : pelvis

## 2019-09-12 NOTE — VITALS
[Maximal Pain Intensity: 2/10] : 2/10 [Least Pain Intensity: 2/10] : 2/10 [80: Normal activity with effort; some signs or symptoms of disease.] : 80: Normal activity with effort; some signs or symptoms of disease.  [OTC] : OTC

## 2019-09-12 NOTE — REVIEW OF SYSTEMS
[Constipation: Grade 0] : Constipation: Grade 0 [Diarrhea: Grade 1 - Increase of <4 stools per day over baseline; mild increase in ostomy output compared to baseline] : Diarrhea: Grade 1 - Increase of <4 stools per day over baseline; mild increase in ostomy output compared to baseline [Nausea: Grade 0] : Nausea: Grade 0 [Fecal Incontinence: Grade 0] : Fecal Incontinence: Grade 0 [Proctitis: Grade 2 - Symptoms (e.g., rectal discomfort, passing blood or mucus); medical intervention indicated; limiting instrumental ADL] : Proctitis: Grade 2 - Symptoms (e.g., rectal discomfort, passing blood or mucus); medical intervention indicated; limiting instrumental ADL [Rectal Pain: Grade 1 - Mild pain] : Rectal Pain: Grade 1 - Mild pain [Vomiting: Grade 0] : Vomiting: Grade 0 [FreeTextEntry3] : stable [Edema Limbs: Grade 0] : Edema Limbs: Grade 0  [Localized Edema: Grade 0] : Localized Edema: Grade 0  [Fatigue: Grade 1 - Fatigue relieved by rest] : Fatigue: Grade 1 - Fatigue relieved by rest [Urinary Incontinence: Grade 0] : Urinary Incontinence: Grade 0  [Neck Edema: Grade 0] : Neck Edema: Grade 0 [Mucositis Oral: Grade 0] : Mucositis Oral: Grade 0  [Urinary Tract Pain: Grade 1 - Mild pain] : Urinary Tract Pain: Grade 1 - Mild pain [Skin Hyperpigmentation: Grade 1 - Hyperpigmentation covering <10% BSA; no psychosocial impact] : Skin Hyperpigmentation: Grade 1 - Hyperpigmentation covering <10% BSA; no psychosocial impact

## 2019-09-13 ENCOUNTER — RESULT REVIEW (OUTPATIENT)
Age: 59
End: 2019-09-13

## 2019-09-13 ENCOUNTER — APPOINTMENT (OUTPATIENT)
Dept: HEMATOLOGY ONCOLOGY | Facility: CLINIC | Age: 59
End: 2019-09-13
Payer: COMMERCIAL

## 2019-09-13 VITALS
WEIGHT: 205.56 LBS | TEMPERATURE: 97.9 F | SYSTOLIC BLOOD PRESSURE: 133 MMHG | HEART RATE: 64 BPM | DIASTOLIC BLOOD PRESSURE: 74 MMHG | HEIGHT: 73 IN | BODY MASS INDEX: 27.24 KG/M2

## 2019-09-13 DIAGNOSIS — R15.2 FULL INCONTINENCE OF FECES: ICD-10-CM

## 2019-09-13 DIAGNOSIS — K59.00 CONSTIPATION, UNSPECIFIED: ICD-10-CM

## 2019-09-13 DIAGNOSIS — R15.9 FULL INCONTINENCE OF FECES: ICD-10-CM

## 2019-09-13 LAB
BASOPHILS # BLD AUTO: 0 K/UL — SIGNIFICANT CHANGE UP (ref 0–0.2)
BASOPHILS NFR BLD AUTO: 0.6 % — SIGNIFICANT CHANGE UP (ref 0–2)
EOSINOPHIL # BLD AUTO: 0.3 K/UL — SIGNIFICANT CHANGE UP (ref 0–0.5)
EOSINOPHIL NFR BLD AUTO: 4.5 % — SIGNIFICANT CHANGE UP (ref 0–6)
HCT VFR BLD CALC: 36.6 % — LOW (ref 39–50)
HGB BLD-MCNC: 12.4 G/DL — LOW (ref 13–17)
LYMPHOCYTES # BLD AUTO: 0.8 K/UL — LOW (ref 1–3.3)
LYMPHOCYTES # BLD AUTO: 14.4 % — SIGNIFICANT CHANGE UP (ref 13–44)
MCHC RBC-ENTMCNC: 33.7 G/DL — SIGNIFICANT CHANGE UP (ref 32–36)
MCHC RBC-ENTMCNC: 35.5 PG — HIGH (ref 27–34)
MCV RBC AUTO: 105.2 FL — HIGH (ref 80–100)
MONOCYTES # BLD AUTO: 0.8 K/UL — SIGNIFICANT CHANGE UP (ref 0–0.9)
MONOCYTES NFR BLD AUTO: 13 % — SIGNIFICANT CHANGE UP (ref 2–14)
NEUTROPHILS # BLD AUTO: 3.9 K/UL — SIGNIFICANT CHANGE UP (ref 1.8–7.4)
NEUTROPHILS NFR BLD AUTO: 67.4 % — SIGNIFICANT CHANGE UP (ref 43–77)
PLATELET # BLD AUTO: 206 K/UL — SIGNIFICANT CHANGE UP (ref 150–400)
RBC # BLD: 3.48 M/UL — LOW (ref 4.2–5.8)
RBC # FLD: 13.9 % — SIGNIFICANT CHANGE UP (ref 10.3–14.5)
WBC # BLD: 5.8 K/UL — SIGNIFICANT CHANGE UP (ref 3.8–10.5)
WBC # FLD AUTO: 5.8 K/UL — SIGNIFICANT CHANGE UP (ref 3.8–10.5)

## 2019-09-13 PROCEDURE — 99215 OFFICE O/P EST HI 40 MIN: CPT

## 2019-09-13 NOTE — HISTORY OF PRESENT ILLNESS
[de-identified] : The patient was diagnosed with adenocarcinoma of the rectum in February 2019 at the age of 58.  He began having BRBPR approximately 6 months ago, which he attributed to  hemorrhoids seen on his last colonoscopy about 8 years ago. Recently, he noted increased rectal bleeding and was referred for GI evaluation with Dr. Michele Reyes.  Colonoscopy revealed a mass 6-7 cm from the anal verge on rigid sigmoidoscopy. The biopsy was consistent with moderately differentiated adenocarcinoma. Endoscopic ultrasound confirmed a 2.8  x 1.8 cm mass, consistent with T2N0.  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 No significant lymphadenopathy in the abdomen.  He next saw colorectal surgery, Dr. Adi Garcia, who recommended a transanal resection preceded by neoadjuvant chemoradiation.  A CT C/A/P discovered no metastatic disease. He was seen by Dr. Javed Mckeon to discuss FOLFOX and Dr. Faustino Parson to discuss RT.  He has also been seen at [facility] [place].   [de-identified] : Smokes cigars weekly x 10 years.  \par ! glass wine per day; 3 glasses per night for 40 years - beer and wine. [de-identified] : Patient completed s/p 8 cycles of FOLFOX and is currently on chemoRT with Xeloda for adenocarcinoma of the rectum.  + Epistaxis, every morning.  + Fatigue/weakness, unchanged, worse recently.  + Cold intolerance, remains very mild, only lasts 1-2 day.  + Decreased appetite, starting to lose weight. + Fecal urgency with occasional loose stools, although sometimes solid. No further BRBPR.  + Xerostomia.  + Very occasional mouth sores. + Mild nausea.  + Decreased appetite, weight loss.  No neuropathy.  No rectal pain.  No abdominal pain.  No fevers or chills. + Burning urination. \par

## 2019-09-16 LAB — MAGNESIUM SERPL-MCNC: 2.1 MG/DL

## 2019-09-17 PROCEDURE — 77280 THER RAD SIMULAJ FIELD SMPL: CPT | Mod: 26

## 2019-09-19 PROCEDURE — 77427 RADIATION TX MANAGEMENT X5: CPT

## 2019-09-19 NOTE — DISEASE MANAGEMENT
[Clinical] : TNM Stage: c [IIIA] : IIIA [FreeTextEntry4] : adenocarcinoma [TTNM] : 2/3 [NTNM] : 1 [de-identified] : 0088 [MTNM] : 0 [de-identified] : 3118 [de-identified] : pelvis

## 2019-09-19 NOTE — REVIEW OF SYSTEMS
[Constipation: Grade 0] : Constipation: Grade 0 [Fecal Incontinence: Grade 0] : Fecal Incontinence: Grade 0 [Nausea: Grade 0] : Nausea: Grade 0 [Proctitis: Grade 1 - Rectal discomfort, intervention not indicated] : Proctitis: Grade 1 - Rectal discomfort, intervention not indicated [Rectal Pain: Grade 1 - Mild pain] : Rectal Pain: Grade 1 - Mild pain [Vomiting: Grade 0] : Vomiting: Grade 0 [Edema Limbs: Grade 0] : Edema Limbs: Grade 0  [Fatigue: Grade 1 - Fatigue relieved by rest] : Fatigue: Grade 1 - Fatigue relieved by rest [Neck Edema: Grade 0] : Neck Edema: Grade 0 [Urinary Tract Pain: Grade 1 - Mild pain] : Urinary Tract Pain: Grade 1 - Mild pain [Urinary Incontinence: Grade 0] : Urinary Incontinence: Grade 0  [Mucositis Oral: Grade 0] : Mucositis Oral: Grade 0  [Localized Edema: Grade 1 - Localized to dependent areas, no disability or functional impairment] : Localized Edema: Grade 1 - Localized to dependent areas, no disability or functional impairment [Skin Hyperpigmentation: Grade 2 - Hyperpigmentation covering >10% BSA; associated psychosocial impact] : Skin Hyperpigmentation: Grade 2 - Hyperpigmentation covering >10% BSA; associated psychosocial impact [Diarrhea: Grade 2 - Increase of 4 - 6 stools per day over baseline; moderate increase in ostomy output compared to baseline] : Diarrhea: Grade 2 - Increase of 4 - 6 stools per day over baseline; moderate increase in ostomy output compared to baseline [FreeTextEntry3] : increased frequency [Pruritus: Grade 1 - Mild or localized; topical intervention indicated] : Pruritus: Grade 1 - Mild or localized; topical intervention indicated [Skin Atrophy: Grade 0] : Skin Atrophy: Grade 0 [Skin Induration: Grade 0] : Skin Induration: Grade 0 [Dermatitis Radiation: Grade 1 - Faint erythema or dry desquamation] : Dermatitis Radiation: Grade 1 - Faint erythema or dry desquamation

## 2019-09-19 NOTE — VITALS
[Pain Description/Quality: ___] : Pain description/quality: [unfilled] [Least Pain Intensity: 2/10] : 2/10 [Pain Duration: ___] : Pain duration: [unfilled] [Pain Interferes with ADLs] : Pain interferes with activities of daily living. [Pain Location: ___] : Pain Location: [unfilled] [80: Normal activity with effort; some signs or symptoms of disease.] : 80: Normal activity with effort; some signs or symptoms of disease.  [OTC] : OTC [Maximal Pain Intensity: 3/10] : 3/10

## 2019-09-20 ENCOUNTER — RESULT REVIEW (OUTPATIENT)
Age: 59
End: 2019-09-20

## 2019-09-20 LAB
ALBUMIN SERPL ELPH-MCNC: 5 G/DL
ALP BLD-CCNC: 63 U/L
ALT SERPL-CCNC: 26 U/L
ANION GAP SERPL CALC-SCNC: 12 MMOL/L
AST SERPL-CCNC: 30 U/L
BILIRUB SERPL-MCNC: 1.5 MG/DL
BUN SERPL-MCNC: 12 MG/DL
CALCIUM SERPL-MCNC: 9.9 MG/DL
CHLORIDE SERPL-SCNC: 101 MMOL/L
CO2 SERPL-SCNC: 25 MMOL/L
CREAT SERPL-MCNC: 0.86 MG/DL
GLUCOSE SERPL-MCNC: 85 MG/DL
POTASSIUM SERPL-SCNC: 4.1 MMOL/L
PROT SERPL-MCNC: 7 G/DL
SODIUM SERPL-SCNC: 138 MMOL/L

## 2019-09-26 ENCOUNTER — RESULT REVIEW (OUTPATIENT)
Age: 59
End: 2019-09-26

## 2019-09-26 ENCOUNTER — LABORATORY RESULT (OUTPATIENT)
Age: 59
End: 2019-09-26

## 2019-09-26 ENCOUNTER — APPOINTMENT (OUTPATIENT)
Dept: HEMATOLOGY ONCOLOGY | Facility: CLINIC | Age: 59
End: 2019-09-26
Payer: COMMERCIAL

## 2019-09-26 VITALS
DIASTOLIC BLOOD PRESSURE: 82 MMHG | HEART RATE: 68 BPM | BODY MASS INDEX: 27.09 KG/M2 | HEIGHT: 73 IN | TEMPERATURE: 97.9 F | SYSTOLIC BLOOD PRESSURE: 170 MMHG | OXYGEN SATURATION: 100 % | WEIGHT: 204.38 LBS

## 2019-09-26 PROCEDURE — 99215 OFFICE O/P EST HI 40 MIN: CPT

## 2019-09-26 NOTE — HISTORY OF PRESENT ILLNESS
[de-identified] : The patient was diagnosed with adenocarcinoma of the rectum in February 2019 at the age of 58.  He began having BRBPR approximately 6 months ago, which he attributed to  hemorrhoids seen on his last colonoscopy about 8 years ago. Recently, he noted increased rectal bleeding and was referred for GI evaluation with Dr. Michele Reyes.  Colonoscopy revealed a mass 6-7 cm from the anal verge on rigid sigmoidoscopy. The biopsy was consistent with moderately differentiated adenocarcinoma. Endoscopic ultrasound confirmed a 2.8  x 1.8 cm mass, consistent with T2N0.  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 No significant lymphadenopathy in the abdomen.  He next saw colorectal surgery, Dr. Adi Garcia, who recommended a transanal resection preceded by neoadjuvant chemoradiation.  A CT C/A/P discovered no metastatic disease. He was seen by Dr. Javed Mckeon to discuss FOLFOX and Dr. Faustino Parson to discuss RT.  He has also been seen at [facility] [place].   [de-identified] : Smokes cigars weekly x 10 years.  \par ! glass wine per day; 3 glasses per night for 40 years - beer and wine. [de-identified] : Patient completed s/p 8 cycles of FOLFOX and is currently on chemoRT with Xeloda for adenocarcinoma of the rectum 8/13/19 - 9/23/19.  + Epistaxis, every morning.  + Fatigue/weakness, unchanged, worse recently.  + Cold intolerance, remains very mild, only lasts 1-2 day.  + Decreased appetite, starting to lose weight. + Fecal urgency with occasional loose stools, although sometimes solid. No further BRBPR.  + Xerostomia.  + Very occasional mouth sores. + Mild nausea.  + Decreased appetite, weight loss.  No neuropathy.  No rectal pain.  No abdominal pain.  No fevers or chills. + Burning urination. \par

## 2019-10-01 ENCOUNTER — APPOINTMENT (OUTPATIENT)
Dept: VASCULAR SURGERY | Facility: CLINIC | Age: 59
End: 2019-10-01
Payer: COMMERCIAL

## 2019-10-01 ENCOUNTER — APPOINTMENT (OUTPATIENT)
Dept: INTERNAL MEDICINE | Facility: CLINIC | Age: 59
End: 2019-10-01
Payer: COMMERCIAL

## 2019-10-01 VITALS
WEIGHT: 204 LBS | SYSTOLIC BLOOD PRESSURE: 137 MMHG | TEMPERATURE: 98 F | HEIGHT: 73 IN | DIASTOLIC BLOOD PRESSURE: 84 MMHG | BODY MASS INDEX: 27.04 KG/M2 | HEART RATE: 78 BPM | OXYGEN SATURATION: 100 %

## 2019-10-01 VITALS
HEIGHT: 73 IN | DIASTOLIC BLOOD PRESSURE: 80 MMHG | BODY MASS INDEX: 26.9 KG/M2 | WEIGHT: 203 LBS | SYSTOLIC BLOOD PRESSURE: 130 MMHG | HEART RATE: 79 BPM | RESPIRATION RATE: 12 BRPM

## 2019-10-01 PROCEDURE — G0444 DEPRESSION SCREEN ANNUAL: CPT | Mod: NC

## 2019-10-01 PROCEDURE — 99396 PREV VISIT EST AGE 40-64: CPT | Mod: 25

## 2019-10-01 PROCEDURE — G0442 ANNUAL ALCOHOL SCREEN 15 MIN: CPT | Mod: NC

## 2019-10-01 PROCEDURE — 99213 OFFICE O/P EST LOW 20 MIN: CPT

## 2019-10-01 RX ORDER — SILVER SULFADIAZINE 10 MG/G
1 CREAM TOPICAL
Qty: 1 | Refills: 5 | Status: DISCONTINUED | COMMUNITY
Start: 2019-09-19 | End: 2019-10-01

## 2019-10-01 RX ORDER — ONDANSETRON 8 MG/1
8 TABLET, ORALLY DISINTEGRATING ORAL EVERY 8 HOURS
Qty: 90 | Refills: 3 | Status: DISCONTINUED | COMMUNITY
Start: 2019-03-19 | End: 2019-10-01

## 2019-10-01 RX ORDER — SERTRALINE HYDROCHLORIDE 50 MG/1
50 TABLET, FILM COATED ORAL DAILY
Qty: 30 | Refills: 5 | Status: DISCONTINUED | COMMUNITY
Start: 2019-05-23 | End: 2019-10-01

## 2019-10-01 RX ORDER — HYDROCHLOROTHIAZIDE 25 MG/1
25 TABLET ORAL
Qty: 30 | Refills: 0 | Status: DISCONTINUED | COMMUNITY
Start: 2019-04-11 | End: 2019-10-01

## 2019-10-01 RX ORDER — VALSARTAN 320 MG/1
320 TABLET, COATED ORAL
Refills: 0 | Status: DISCONTINUED | COMMUNITY
End: 2019-10-01

## 2019-10-01 RX ORDER — CAPECITABINE 500 MG/1
500 TABLET ORAL
Qty: 210 | Refills: 0 | Status: DISCONTINUED | COMMUNITY
Start: 2019-07-23 | End: 2019-10-01

## 2019-10-01 RX ORDER — PROCHLORPERAZINE MALEATE 10 MG/1
10 TABLET ORAL EVERY 6 HOURS
Qty: 120 | Refills: 5 | Status: DISCONTINUED | COMMUNITY
Start: 2019-03-19 | End: 2019-10-01

## 2019-10-07 ENCOUNTER — RX RENEWAL (OUTPATIENT)
Age: 59
End: 2019-10-07

## 2019-10-08 ENCOUNTER — OUTPATIENT (OUTPATIENT)
Dept: OUTPATIENT SERVICES | Facility: HOSPITAL | Age: 59
LOS: 1 days | End: 2019-10-08
Payer: COMMERCIAL

## 2019-10-08 VITALS
SYSTOLIC BLOOD PRESSURE: 167 MMHG | WEIGHT: 203.27 LBS | DIASTOLIC BLOOD PRESSURE: 83 MMHG | HEART RATE: 62 BPM | HEIGHT: 74 IN | TEMPERATURE: 98 F

## 2019-10-08 DIAGNOSIS — Z29.9 ENCOUNTER FOR PROPHYLACTIC MEASURES, UNSPECIFIED: ICD-10-CM

## 2019-10-08 DIAGNOSIS — I51.9 HEART DISEASE, UNSPECIFIED: Chronic | ICD-10-CM

## 2019-10-08 DIAGNOSIS — Z01.818 ENCOUNTER FOR OTHER PREPROCEDURAL EXAMINATION: ICD-10-CM

## 2019-10-08 DIAGNOSIS — Z98.890 OTHER SPECIFIED POSTPROCEDURAL STATES: Chronic | ICD-10-CM

## 2019-10-08 DIAGNOSIS — I25.10 ATHEROSCLEROTIC HEART DISEASE OF NATIVE CORONARY ARTERY WITHOUT ANGINA PECTORIS: ICD-10-CM

## 2019-10-08 DIAGNOSIS — Z13.89 ENCOUNTER FOR SCREENING FOR OTHER DISORDER: ICD-10-CM

## 2019-10-08 DIAGNOSIS — I10 ESSENTIAL (PRIMARY) HYPERTENSION: ICD-10-CM

## 2019-10-08 DIAGNOSIS — I65.29 OCCLUSION AND STENOSIS OF UNSPECIFIED CAROTID ARTERY: ICD-10-CM

## 2019-10-08 LAB
ALBUMIN SERPL ELPH-MCNC: 4.7 G/DL
ALBUMIN SERPL ELPH-MCNC: 5 G/DL — SIGNIFICANT CHANGE UP (ref 3.3–5.2)
ALP BLD-CCNC: 55 U/L
ALP SERPL-CCNC: 61 U/L — SIGNIFICANT CHANGE UP (ref 40–120)
ALT FLD-CCNC: 29 U/L — SIGNIFICANT CHANGE UP
ALT SERPL-CCNC: 16 U/L
ANION GAP SERPL CALC-SCNC: 13 MMOL/L — SIGNIFICANT CHANGE UP (ref 5–17)
ANION GAP SERPL CALC-SCNC: 17 MMOL/L
APTT BLD: 30.5 SEC — SIGNIFICANT CHANGE UP (ref 27.5–36.3)
AST SERPL-CCNC: 31 U/L
AST SERPL-CCNC: 40 U/L — HIGH
BASOPHILS # BLD AUTO: 0.03 K/UL — SIGNIFICANT CHANGE UP (ref 0–0.2)
BASOPHILS NFR BLD AUTO: 0.7 % — SIGNIFICANT CHANGE UP (ref 0–2)
BILIRUB SERPL-MCNC: 1.2 MG/DL — SIGNIFICANT CHANGE UP (ref 0.4–2)
BILIRUB SERPL-MCNC: 1.5 MG/DL
BLD GP AB SCN SERPL QL: SIGNIFICANT CHANGE UP
BUN SERPL-MCNC: 18 MG/DL
BUN SERPL-MCNC: 19 MG/DL — SIGNIFICANT CHANGE UP (ref 8–20)
CALCIUM SERPL-MCNC: 9.6 MG/DL
CALCIUM SERPL-MCNC: 9.9 MG/DL — SIGNIFICANT CHANGE UP (ref 8.6–10.2)
CHLORIDE SERPL-SCNC: 103 MMOL/L — SIGNIFICANT CHANGE UP (ref 98–107)
CHLORIDE SERPL-SCNC: 104 MMOL/L
CO2 SERPL-SCNC: 22 MMOL/L
CO2 SERPL-SCNC: 24 MMOL/L — SIGNIFICANT CHANGE UP (ref 22–29)
CREAT SERPL-MCNC: 0.73 MG/DL — SIGNIFICANT CHANGE UP (ref 0.5–1.3)
CREAT SERPL-MCNC: 0.84 MG/DL
EOSINOPHIL # BLD AUTO: 0.14 K/UL — SIGNIFICANT CHANGE UP (ref 0–0.5)
EOSINOPHIL NFR BLD AUTO: 3.2 % — SIGNIFICANT CHANGE UP (ref 0–6)
GLUCOSE SERPL-MCNC: 83 MG/DL
GLUCOSE SERPL-MCNC: 98 MG/DL — SIGNIFICANT CHANGE UP (ref 70–115)
HBA1C BLD-MCNC: 5.2 % — SIGNIFICANT CHANGE UP (ref 4–5.6)
HCT VFR BLD CALC: 37.8 % — LOW (ref 39–50)
HGB BLD-MCNC: 12.5 G/DL — LOW (ref 13–17)
IMM GRANULOCYTES NFR BLD AUTO: 0.2 % — SIGNIFICANT CHANGE UP (ref 0–1.5)
INR BLD: 0.87 RATIO — LOW (ref 0.88–1.16)
LYMPHOCYTES # BLD AUTO: 0.57 K/UL — LOW (ref 1–3.3)
LYMPHOCYTES # BLD AUTO: 13 % — SIGNIFICANT CHANGE UP (ref 13–44)
MACROCYTES BLD QL: SIGNIFICANT CHANGE UP
MAGNESIUM SERPL-MCNC: 1.9 MG/DL
MANUAL SMEAR VERIFICATION: SIGNIFICANT CHANGE UP
MCHC RBC-ENTMCNC: 33.1 GM/DL — SIGNIFICANT CHANGE UP (ref 32–36)
MCHC RBC-ENTMCNC: 35.9 PG — HIGH (ref 27–34)
MCV RBC AUTO: 108.6 FL — HIGH (ref 80–100)
MONOCYTES # BLD AUTO: 0.48 K/UL — SIGNIFICANT CHANGE UP (ref 0–0.9)
MONOCYTES NFR BLD AUTO: 10.9 % — SIGNIFICANT CHANGE UP (ref 2–14)
NEUTROPHILS # BLD AUTO: 3.17 K/UL — SIGNIFICANT CHANGE UP (ref 1.8–7.4)
NEUTROPHILS NFR BLD AUTO: 72 % — SIGNIFICANT CHANGE UP (ref 43–77)
PLAT MORPH BLD: NORMAL — SIGNIFICANT CHANGE UP
PLATELET # BLD AUTO: 201 K/UL — SIGNIFICANT CHANGE UP (ref 150–400)
POLYCHROMASIA BLD QL SMEAR: SLIGHT — SIGNIFICANT CHANGE UP
POTASSIUM SERPL-MCNC: 4.2 MMOL/L — SIGNIFICANT CHANGE UP (ref 3.5–5.3)
POTASSIUM SERPL-SCNC: 4 MMOL/L
POTASSIUM SERPL-SCNC: 4.2 MMOL/L — SIGNIFICANT CHANGE UP (ref 3.5–5.3)
PROT SERPL-MCNC: 6.5 G/DL
PROT SERPL-MCNC: 7.8 G/DL — SIGNIFICANT CHANGE UP (ref 6.6–8.7)
PROTHROM AB SERPL-ACNC: 10 SEC — SIGNIFICANT CHANGE UP (ref 10–12.9)
RBC # BLD: 3.48 M/UL — LOW (ref 4.2–5.8)
RBC # FLD: 13.8 % — SIGNIFICANT CHANGE UP (ref 10.3–14.5)
RBC BLD AUTO: ABNORMAL
SODIUM SERPL-SCNC: 140 MMOL/L — SIGNIFICANT CHANGE UP (ref 135–145)
SODIUM SERPL-SCNC: 143 MMOL/L
WBC # BLD: 4.4 K/UL — SIGNIFICANT CHANGE UP (ref 3.8–10.5)
WBC # FLD AUTO: 4.4 K/UL — SIGNIFICANT CHANGE UP (ref 3.8–10.5)

## 2019-10-08 PROCEDURE — G0463: CPT

## 2019-10-08 RX ORDER — CHOLECALCIFEROL (VITAMIN D3) 125 MCG
1 CAPSULE ORAL
Qty: 0 | Refills: 0 | DISCHARGE

## 2019-10-08 RX ORDER — OMEGA-3 ACID ETHYL ESTERS 1 G
1 CAPSULE ORAL
Qty: 0 | Refills: 0 | DISCHARGE

## 2019-10-08 RX ORDER — ASPIRIN/CALCIUM CARB/MAGNESIUM 324 MG
1 TABLET ORAL
Qty: 0 | Refills: 0 | DISCHARGE

## 2019-10-08 NOTE — H&P PST ADULT - HISTORY OF PRESENT ILLNESS
59 yr old male presents to [facility] with wife at side. Pt has right carotid blockage , has followed with cardiology has sono  every 6 months and now pt states surgery was recommended. Denies dizziness . Pt was diagnosed with rectal cancer 2/2019 and was treated with radiation  ( finished 9/23/19 ) and chemo  ( July 2019 )  oncologist is Dr. Linda . right chemo port in place and flushed evry 6 weeks last sept 2019.

## 2019-10-08 NOTE — H&P PST ADULT - NSICDXFAMILYHX_GEN_ALL_CORE_FT
FAMILY HISTORY:  No pertinent family history in first degree relatives FAMILY HISTORY:  FH: heart disease, father

## 2019-10-08 NOTE — PATIENT PROFILE ADULT - VISION (WITH CORRECTIVE LENSES IF THE PATIENT USUALLY WEARS THEM):
Partially impaired: cannot see medication labels or newsprint, but can see obstacles in path, and the surrounding layout; can count fingers at arm's length/tv glasses

## 2019-10-08 NOTE — H&P PST ADULT - NSICDXPASTSURGICALHX_GEN_ALL_CORE_FT
PAST SURGICAL HISTORY:  Cardiac disorder coronary stent placement november 2005    H/O cardiac catheterization 2017  4 STENTS

## 2019-10-08 NOTE — H&P PST ADULT - NSICDXPASTMEDICALHX_GEN_ALL_CORE_FT
PAST MEDICAL HISTORY:  CAD (coronary artery disease)     Chest pain     HLD (hyperlipidemia)     HTN (hypertension)     Mitral regurgitation     Rectal cancer PAST MEDICAL HISTORY:  CAD (coronary artery disease)     Chest pain     Depression with rectal cancer    HLD (hyperlipidemia)     HTN (hypertension)     Mitral regurgitation     Neuropathy feet s/p chemo    Rectal cancer 2/2019

## 2019-10-08 NOTE — H&P PST ADULT - NSANTHOSAYNRD_GEN_A_CORE
No. NAMRATA screening performed.  STOP BANG Legend: 0-2 = LOW Risk; 3-4 = INTERMEDIATE Risk; 5-8 = HIGH Risk

## 2019-10-08 NOTE — H&P PST ADULT - ASSESSMENT
59 yr old male with history of rectal cancer and recent chemo and radiation presents with right carotid stenosis . Pt scheduled for right carotid endarterectomy  with DR. Fabiano huston.  OPIOID RISK TOOL    CÉSAR EACH BOX THAT APPLIES AND ADD TOTALS AT THE END    FAMILY HISTORY OF SUBSTANCE ABUSE                 FEMALE         MALE                                                Alcohol                             [  ]1 pt          [  ]3pts                                               Illegal Durgs                     [  ]2 pts        [  ]3pts                                               Rx Drugs                           [  ]4 pts        [  ]4 pts    PERSONAL HISTORY OF SUBSTANCE ABUSE                                                                                          Alcohol                             [  ]3 pts       [ X ]3 pts                                               Illegal Durgs                     [  ]4 pts        [  ]4 pts                                               Rx Drugs                           [  ]5 pts        [  ]5 pts    AGE BETWEEN 16-45 YEARS                                      [  ]1 pt         [  ]1 pt    HISTORY OF PREADOLESCENT   SEXUAL ABUSE                                                             [  ]3 pts        [  ]0pts    PSYCHOLOGICAL DISEASE                     ADD, OCD, Bipolar, Schizophrenia        [  ]2 pts         [  ]2 pts                      Depression                                               [  ]1 pt           [  X]1 pt           SCORING TOTAL   (add numbers and type here)              (**4*)                                     A score of 3 or lower indicated LOW risk for future opiod abuse  A score of 4 to 7 indicated moderate risk for future opiod abuse  OPIOID RISK TOOL  SYDNIEI VTE 2.0 SCORE [CLOT updated 2019]    AGE RELATED RISK FACTORS                                                       MOBILITY RELATED FACTORS  [ ] Age 41-60 years                                            (1 Point)                    [ ] Bed rest                                                        (1 Point)  [X ] Age: 61-74 years                                           (2 Points)                  [ ] Plaster cast                                                   (2 Points)  [ ] Age= 75 years                                              (3 Points)                    [ ] Bed bound for more than 72 hours                 (2 Points)    DISEASE RELATED RISK FACTORS                                               GENDER SPECIFIC FACTORS  X[ ] Edema in the lower extremities                       (1 Point)              [ ] Pregnancy                                                     (1 Point)  [ ] Varicose veins                                               (1 Point)                     [ ] Post-partum < 6 weeks                                   (1 Point)             [ ] BMI > 25 Kg/m2                                            (1 Point)                     [ ] Hormonal therapy  or oral contraception          (1 Point)                 [ ] Sepsis (in the previous month)                        (1 Point)               [ ] History of pregnancy complications                 (1 point)  [ ] Pneumonia or serious lung disease                                               [ ] Unexplained or recurrent                     (1 Point)           (in the previous month)                               (1 Point)  [ ] Abnormal pulmonary function test                     (1 Point)                 SURGERY RELATED RISK FACTORS  [ ] Acute myocardial infarction                              (1 Point)               [ ]  Section                                             (1 Point)  [ ] Congestive heart failure (in the previous month)  (1 Point)      [ ] Minor surgery                                                  (1 Point)   [ ] Inflammatory bowel disease                             (1 Point)               [ ] Arthroscopic surgery                                        (2 Points)  [ ] Central venous access                                      (2 Points)                [ X] General surgery lasting more than 45 minutes (2 points)  X ]X Malignancy- Present or previous                   (2 Points)                [ ] Elective arthroplasty                                         (5 points)    [ ] Stroke (in the previous month)                          (5 Points)                                                                                                                                                           HEMATOLOGY RELATED FACTORS                                                 TRAUMA RELATED RISK FACTORS  [ ] Prior episodes of VTE                                     (3 Points)                [ ] Fracture of the hip, pelvis, or leg                       (5 Points)  [ ] Positive family history for VTE                         (3 Points)             [ ] Acute spinal cord injury (in the previous month)  (5 Points)  [ ] Prothrombin 68748 A                                     (3 Points)               [ ] Paralysis  (less than 1 month)                             (5 Points)  [ ] Factor V Leiden                                             (3 Points)                  [ ] Multiple Trauma within 1 month                        (5 Points)  [ ] Lupus anticoagulants                                     (3 Points)                                                           [ ] Anticardiolipin antibodies                               (3 Points)                                                       [ ] High homocysteine in the blood                      (3 Points)                                             [ ] Other congenital or acquired thrombophilia      (3 Points)                                                [ ] Heparin induced thrombocytopenia                  (3 Points)                                     Total Score [7          ]  CÉSAR EACH BOX THAT APPLIES AND ADD TOTALS AT THE END    FAMILY HISTORY OF SUBSTANCE ABUSE                 FEMALE         MALE                                                Alcohol                             [  ]1 pt          [  ]3pts                                               Illegal Durgs                     [  ]2 pts        [  ]3pts                                               Rx Drugs                           [  ]4 pts        [  ]4 pts    PERSONAL HISTORY OF SUBSTANCE ABUSE                                                                                          Alcohol                             [  ]3 pts       [  ]3 pts                                               Illegal Durgs                     [  ]4 pts        [  ]4 pts                                               Rx Drugs                           [  ]5 pts        [  ]5 pts    AGE BETWEEN 16-45 YEARS                                      [  ]1 pt         [  ]1 pt    HISTORY OF PREADOLESCENT   SEXUAL ABUSE                                                             [  ]3 pts        [  ]0pts    PSYCHOLOGICAL DISEASE                     ADD, OCD, Bipolar, Schizophrenia        [  ]2 pts         [  ]2 pts                      Depression                                               [  ]1 pt           [  ]1 pt           SCORING TOTAL   (add numbers and type here)              (***)                                     A score of 3 or lower indicated LOW risk for future opiod abuse  A score of 4 to 7 indicated moderate risk for future opiod abuse  A score of 8 or higher indicates a high risk for opiod abuse  A score of 8 or higher indicates a high risk for opiod abuse

## 2019-10-08 NOTE — H&P PST ADULT - NSICDXPROBLEM_GEN_ALL_CORE_FT
PROBLEM DIAGNOSES  Problem: Carotid stenosis  Assessment and Plan: possible right  carotid endarterectomy  , transcarotid artery  revascularization     Problem: Hypertension  Assessment and Plan: medical clearance     Problem: CAD (coronary artery disease)  Assessment and Plan: cardiac clearance    Problem: Screening for substance abuse  Assessment and Plan: ort 4     Problem: Need for prophylactic measure  Assessment and Plan: caprini score 7

## 2019-10-08 NOTE — PATIENT PROFILE ADULT - NSPROEDALEARNPREF_GEN_A_NUR
verbal instruction/individual instruction/Pre op teaching surgical scrub pain management instructions given/written material

## 2019-10-09 PROBLEM — I25.10 ATHEROSCLEROTIC HEART DISEASE OF NATIVE CORONARY ARTERY WITHOUT ANGINA PECTORIS: Chronic | Status: ACTIVE | Noted: 2017-03-15

## 2019-10-09 PROBLEM — I10 ESSENTIAL (PRIMARY) HYPERTENSION: Chronic | Status: ACTIVE | Noted: 2017-03-15

## 2019-10-09 PROBLEM — I34.0 NONRHEUMATIC MITRAL (VALVE) INSUFFICIENCY: Chronic | Status: ACTIVE | Noted: 2017-03-15

## 2019-10-09 PROBLEM — E78.5 HYPERLIPIDEMIA, UNSPECIFIED: Chronic | Status: ACTIVE | Noted: 2017-03-15

## 2019-10-09 NOTE — ADDENDUM
[FreeTextEntry1] : October 9, 2019:\par Preop blood testing shows CBC stable with hemoglobin improved to 12.5\par CMP, PT, PTT = WNL.\par \par Patient was to get preoperative cardiac evaluation.\par He has followed up with oncology.\par \par Therefore patient currently medically stable with no contraindication to carotid endarterectomy as long as cleared by cardiology and oncology

## 2019-10-09 NOTE — PHYSICAL EXAM
[General Appearance - Alert] : alert [General Appearance - In No Acute Distress] : in no acute distress [Sclera] : the sclera and conjunctiva were normal [PERRL With Normal Accommodation] : pupils were equal in size, round, and reactive to light [Extraocular Movements] : extraocular movements were intact [Outer Ear] : the ears and nose were normal in appearance [Oropharynx] : the oropharynx was normal [Neck Appearance] : the appearance of the neck was normal [Neck Cervical Mass (___cm)] : no neck mass was observed [Thyroid Diffuse Enlargement] : the thyroid was not enlarged [Jugular Venous Distention Increased] : there was no jugular-venous distention [Thyroid Nodule] : there were no palpable thyroid nodules [Heart Rate And Rhythm] : heart rate was normal and rhythm regular [Auscultation Breath Sounds / Voice Sounds] : lungs were clear to auscultation bilaterally [Heart Sounds] : normal S1 and S2 [Heart Sounds Gallop] : no gallops [Murmurs] : no murmurs [Heart Sounds Pericardial Friction Rub] : no pericardial rub [Full Pulse] : the pedal pulses are present [Edema] : there was no peripheral edema [Bowel Sounds] : normal bowel sounds [Abdomen Soft] : soft [Abdomen Tenderness] : non-tender [Abdomen Mass (___ Cm)] : no abdominal mass palpated [Prostate Enlargement] : the prostate was not enlarged [Prostate Tenderness] : the prostate was not tender [Cervical Lymph Nodes Enlarged Posterior Bilaterally] : posterior cervical [Cervical Lymph Nodes Enlarged Anterior Bilaterally] : anterior cervical [Supraclavicular Lymph Nodes Enlarged Bilaterally] : supraclavicular [Axillary Lymph Nodes Enlarged Bilaterally] : axillary [No Spinal Tenderness] : no spinal tenderness [Abnormal Walk] : normal gait [Nail Clubbing] : no clubbing  or cyanosis of the fingernails [Musculoskeletal - Swelling] : no joint swelling seen [Skin Color & Pigmentation] : normal skin color and pigmentation [Motor Tone] : muscle strength and tone were normal [] : no rash [Skin Turgor] : normal skin turgor [Cranial Nerves] : cranial nerves 2-12 were intact [Oriented To Time, Place, And Person] : oriented to person, place, and time [No Focal Deficits] : no focal deficits [Affect] : the affect was normal [Impaired Insight] : insight and judgment were intact [External Hemorrhoid] : no external hemorrhoids [FreeTextEntry1] : no nodules [Over the Past 2 Weeks, Have You Felt Down, Depressed, or Hopeless?] : 1.) Over the past 2 weeks, have you felt down, depressed, or hopeless? No [Over the Past 2 Weeks, Have You Felt Little Interest or Pleasure Doing Things?] : 2.) Over the past 2 weeks, have you felt little interest or pleasure doing things? No

## 2019-10-09 NOTE — ASSESSMENT
[FreeTextEntry1] : 59-year-old male with CAD with stenting to circumflex in 2005 and LAD February 2017.\par Hypertension controlled on present medications.\par \par \par Significant history now with diagnosis of rectal adenocarcinomaFebruary 2019 now having completed radiation and chemotherapy..\par \par Patient also now with totally occluded left carotid artery which has been known but now with greater than 90% stenosis of the right carotid artery.\par Surgery is scheduled on October 17, 2019.\par Preoperative blood work will be checked\par Cardiac preop evaluation will be done\par \par Followup yearly and as needed

## 2019-10-09 NOTE — HISTORY OF PRESENT ILLNESS
[FreeTextEntry1] : 59-year-old male with history of coronary artery disease who has not been here for 2 years presents for his yearly physical.\par \par Patient's history includes having had a circumflex stent in 2005. 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 He has completed chemotherapy and just completed radiation therapy.\par He follows with oncology and radiation oncology often. He has followup with rectal surgery in the near future.\par Feeling better after dealing with the side effects of his chemotherapy and radiation therapy.\par Still somewhat weakened.\par \par Patient also has significant carotid disease with a known totally occluded left carotid with CTA done 2 weeks ago now showing greater than 90% stenosis of the right carotid artery.\par He is scheduled for surgery in about 2 weeks.\par \par History also includes family history of prostate cancer in his father.\par \par Patient is generally feeling well and currently without complaints including no chest pain, palpitations, shortness of breath or edema.

## 2019-10-09 NOTE — HEALTH RISK ASSESSMENT
[Fair] :  ~his/her~ mood as fair [Yes] : Yes [2 - 3 times a week (3 pts)] : 2 - 3  times a week (3 points) [1 or 2 (0 pts)] : 1 or 2 (0 points) [Never (0 pts)] : Never (0 points) [No falls in past year] : Patient reported no falls in the past year [No] : In the past 12 months have you used drugs other than those required for medical reasons? No [None] : None [With Significant Other] : lives with significant other [Employed] : employed [Carbon Monoxide Detector] : carbon monoxide detector [Smoke Detector] : smoke detector [Sunscreen] : uses sunscreen [Seat Belt] :  uses seat belt [Reviewed no changes] : Reviewed no changes [Designated Healthcare Proxy] : Designated healthcare proxy [Name: ___] : Health Care Proxy's Name: [unfilled]  [] : No [Audit-CScore] : 3 [de-identified] : good [Change in mental status noted] : No change in mental status noted [Reports changes in hearing] : Reports no changes in hearing [Reports changes in vision] : Reports no changes in vision [Reports changes in dental health] : Reports no changes in dental health [AdvancecareDate] : 09/19

## 2019-10-12 ENCOUNTER — OUTPATIENT (OUTPATIENT)
Dept: OUTPATIENT SERVICES | Facility: HOSPITAL | Age: 59
LOS: 1 days | Discharge: HOME | End: 2019-10-12

## 2019-10-12 DIAGNOSIS — C20 MALIGNANT NEOPLASM OF RECTUM: ICD-10-CM

## 2019-10-12 DIAGNOSIS — I51.9 HEART DISEASE, UNSPECIFIED: Chronic | ICD-10-CM

## 2019-10-12 DIAGNOSIS — Z98.890 OTHER SPECIFIED POSTPROCEDURAL STATES: Chronic | ICD-10-CM

## 2019-10-12 PROBLEM — G62.9 POLYNEUROPATHY, UNSPECIFIED: Chronic | Status: ACTIVE | Noted: 2019-10-08

## 2019-10-12 PROBLEM — F32.9 MAJOR DEPRESSIVE DISORDER, SINGLE EPISODE, UNSPECIFIED: Chronic | Status: ACTIVE | Noted: 2019-10-08

## 2019-10-14 ENCOUNTER — APPOINTMENT (OUTPATIENT)
Age: 59
End: 2019-10-14

## 2019-10-16 ENCOUNTER — TRANSCRIPTION ENCOUNTER (OUTPATIENT)
Age: 59
End: 2019-10-16

## 2019-10-16 LAB
BASOPHILS # BLD AUTO: 0 K/UL — SIGNIFICANT CHANGE UP (ref 0–0.2)
BASOPHILS NFR BLD AUTO: 0.9 % — SIGNIFICANT CHANGE UP (ref 0–2)
EOSINOPHIL # BLD AUTO: 0.1 K/UL — SIGNIFICANT CHANGE UP (ref 0–0.5)
EOSINOPHIL NFR BLD AUTO: 1.9 % — SIGNIFICANT CHANGE UP (ref 0–6)
HCT VFR BLD CALC: 33.1 % — LOW (ref 39–50)
HGB BLD-MCNC: 11.5 G/DL — LOW (ref 13–17)
LYMPHOCYTES # BLD AUTO: 0.5 K/UL — LOW (ref 1–3.3)
LYMPHOCYTES # BLD AUTO: 11.8 % — LOW (ref 13–44)
MCHC RBC-ENTMCNC: 34.8 G/DL — SIGNIFICANT CHANGE UP (ref 32–36)
MCHC RBC-ENTMCNC: 36.4 PG — HIGH (ref 27–34)
MCV RBC AUTO: 104.8 FL — HIGH (ref 80–100)
MONOCYTES # BLD AUTO: 0.5 K/UL — SIGNIFICANT CHANGE UP (ref 0–0.9)
MONOCYTES NFR BLD AUTO: 10.5 % — SIGNIFICANT CHANGE UP (ref 2–14)
NEUTROPHILS # BLD AUTO: 3.4 K/UL — SIGNIFICANT CHANGE UP (ref 1.8–7.4)
NEUTROPHILS NFR BLD AUTO: 74.9 % — SIGNIFICANT CHANGE UP (ref 43–77)
PLATELET # BLD AUTO: 172 K/UL — SIGNIFICANT CHANGE UP (ref 150–400)
RBC # BLD: 3.16 M/UL — LOW (ref 4.2–5.8)
RBC # FLD: 14.2 % — SIGNIFICANT CHANGE UP (ref 10.3–14.5)
WBC # BLD: 4.6 K/UL — SIGNIFICANT CHANGE UP (ref 3.8–10.5)
WBC # FLD AUTO: 4.6 K/UL — SIGNIFICANT CHANGE UP (ref 3.8–10.5)

## 2019-10-17 ENCOUNTER — INPATIENT (INPATIENT)
Facility: HOSPITAL | Age: 59
LOS: 0 days | Discharge: HOME | DRG: 35 | End: 2019-10-18
Attending: SURGERY | Admitting: SURGERY
Payer: COMMERCIAL

## 2019-10-17 VITALS
OXYGEN SATURATION: 99 % | SYSTOLIC BLOOD PRESSURE: 101 MMHG | HEART RATE: 62 BPM | DIASTOLIC BLOOD PRESSURE: 56 MMHG | HEIGHT: 74 IN | RESPIRATION RATE: 16 BRPM | TEMPERATURE: 98 F | WEIGHT: 203.27 LBS

## 2019-10-17 DIAGNOSIS — I65.29 OCCLUSION AND STENOSIS OF UNSPECIFIED CAROTID ARTERY: ICD-10-CM

## 2019-10-17 DIAGNOSIS — I51.9 HEART DISEASE, UNSPECIFIED: Chronic | ICD-10-CM

## 2019-10-17 DIAGNOSIS — Z98.890 OTHER SPECIFIED POSTPROCEDURAL STATES: Chronic | ICD-10-CM

## 2019-10-17 LAB — ABO RH CONFIRMATION: SIGNIFICANT CHANGE UP

## 2019-10-17 PROCEDURE — 99232 SBSQ HOSP IP/OBS MODERATE 35: CPT

## 2019-10-17 PROCEDURE — 37215 TRANSCATH STENT CCA W/EPS: CPT

## 2019-10-17 PROCEDURE — XXXXX: CPT

## 2019-10-17 RX ORDER — HEPARIN SODIUM 5000 [USP'U]/ML
5000 INJECTION INTRAVENOUS; SUBCUTANEOUS EVERY 8 HOURS
Refills: 0 | Status: DISCONTINUED | OUTPATIENT
Start: 2019-10-17 | End: 2019-10-18

## 2019-10-17 RX ORDER — SODIUM CHLORIDE 9 MG/ML
500 INJECTION, SOLUTION INTRAVENOUS ONCE
Refills: 0 | Status: COMPLETED | OUTPATIENT
Start: 2019-10-17 | End: 2019-10-18

## 2019-10-17 RX ORDER — CLOPIDOGREL BISULFATE 75 MG/1
300 TABLET, FILM COATED ORAL ONCE
Refills: 0 | Status: COMPLETED | OUTPATIENT
Start: 2019-10-17 | End: 2019-10-17

## 2019-10-17 RX ORDER — HEPARIN SODIUM 5000 [USP'U]/ML
5000 INJECTION INTRAVENOUS; SUBCUTANEOUS EVERY 12 HOURS
Refills: 0 | Status: DISCONTINUED | OUTPATIENT
Start: 2019-10-17 | End: 2019-10-17

## 2019-10-17 RX ORDER — SODIUM CHLORIDE 9 MG/ML
1000 INJECTION, SOLUTION INTRAVENOUS
Refills: 0 | Status: DISCONTINUED | OUTPATIENT
Start: 2019-10-17 | End: 2019-10-17

## 2019-10-17 RX ORDER — FENTANYL CITRATE 50 UG/ML
50 INJECTION INTRAVENOUS
Refills: 0 | Status: DISCONTINUED | OUTPATIENT
Start: 2019-10-17 | End: 2019-10-17

## 2019-10-17 RX ORDER — SIMVASTATIN 20 MG/1
40 TABLET, FILM COATED ORAL AT BEDTIME
Refills: 0 | Status: DISCONTINUED | OUTPATIENT
Start: 2019-10-17 | End: 2019-10-18

## 2019-10-17 RX ORDER — CLOPIDOGREL BISULFATE 75 MG/1
75 TABLET, FILM COATED ORAL DAILY
Refills: 0 | Status: DISCONTINUED | OUTPATIENT
Start: 2019-10-18 | End: 2019-10-18

## 2019-10-17 RX ORDER — CHLORHEXIDINE GLUCONATE 213 G/1000ML
1 SOLUTION TOPICAL DAILY
Refills: 0 | Status: DISCONTINUED | OUTPATIENT
Start: 2019-10-17 | End: 2019-10-18

## 2019-10-17 RX ORDER — CEFAZOLIN SODIUM 1 G
2000 VIAL (EA) INJECTION ONCE
Refills: 0 | Status: COMPLETED | OUTPATIENT
Start: 2019-10-17 | End: 2019-10-17

## 2019-10-17 RX ORDER — PHENYLEPHRINE HYDROCHLORIDE 10 MG/ML
0.1 INJECTION INTRAVENOUS
Qty: 40 | Refills: 0 | Status: DISCONTINUED | OUTPATIENT
Start: 2019-10-17 | End: 2019-10-18

## 2019-10-17 RX ORDER — ONDANSETRON 8 MG/1
4 TABLET, FILM COATED ORAL ONCE
Refills: 0 | Status: DISCONTINUED | OUTPATIENT
Start: 2019-10-17 | End: 2019-10-17

## 2019-10-17 RX ORDER — AMLODIPINE BESYLATE 2.5 MG/1
5 TABLET ORAL DAILY
Refills: 0 | Status: DISCONTINUED | OUTPATIENT
Start: 2019-10-17 | End: 2019-10-17

## 2019-10-17 RX ORDER — LANOLIN ALCOHOL/MO/W.PET/CERES
3 CREAM (GRAM) TOPICAL AT BEDTIME
Refills: 0 | Status: DISCONTINUED | OUTPATIENT
Start: 2019-10-17 | End: 2019-10-18

## 2019-10-17 RX ORDER — ACETAMINOPHEN 500 MG
650 TABLET ORAL EVERY 6 HOURS
Refills: 0 | Status: DISCONTINUED | OUTPATIENT
Start: 2019-10-17 | End: 2019-10-18

## 2019-10-17 RX ORDER — SODIUM CHLORIDE 9 MG/ML
3 INJECTION INTRAMUSCULAR; INTRAVENOUS; SUBCUTANEOUS EVERY 8 HOURS
Refills: 0 | Status: DISCONTINUED | OUTPATIENT
Start: 2019-10-17 | End: 2019-10-17

## 2019-10-17 RX ORDER — ALPRAZOLAM 0.25 MG
0.5 TABLET ORAL EVERY 6 HOURS
Refills: 0 | Status: DISCONTINUED | OUTPATIENT
Start: 2019-10-17 | End: 2019-10-18

## 2019-10-17 RX ORDER — PHENYLEPHRINE HYDROCHLORIDE 10 MG/ML
0.1 INJECTION INTRAVENOUS
Qty: 40 | Refills: 0 | Status: DISCONTINUED | OUTPATIENT
Start: 2019-10-17 | End: 2019-10-17

## 2019-10-17 RX ORDER — METOPROLOL TARTRATE 50 MG
100 TABLET ORAL DAILY
Refills: 0 | Status: DISCONTINUED | OUTPATIENT
Start: 2019-10-17 | End: 2019-10-17

## 2019-10-17 RX ADMIN — Medication 650 MILLIGRAM(S): at 19:40

## 2019-10-17 RX ADMIN — Medication 100 MILLIGRAM(S): at 08:30

## 2019-10-17 RX ADMIN — HEPARIN SODIUM 5000 UNIT(S): 5000 INJECTION INTRAVENOUS; SUBCUTANEOUS at 18:45

## 2019-10-17 RX ADMIN — CLOPIDOGREL BISULFATE 300 MILLIGRAM(S): 75 TABLET, FILM COATED ORAL at 11:12

## 2019-10-17 RX ADMIN — PHENYLEPHRINE HYDROCHLORIDE 3.46 MICROGRAM(S)/KG/MIN: 10 INJECTION INTRAVENOUS at 11:14

## 2019-10-17 RX ADMIN — Medication 650 MILLIGRAM(S): at 18:45

## 2019-10-17 NOTE — CONSULT NOTE ADULT - ASSESSMENT
Mr. RAFIA Bay is a 59 YOM w/ a PMHx s/f CAD (s/p PCI x5 - 2005 & 2017 on Plavix, never MI, recent echocardiogram w/ mild mitral regurgitation, Cardiologist Jovita Amos) including Carotid stenosis (L 100%, R 70-90%), HTN, HLD, MDD, and recently diagnosed rectal cancer s/p pre-op RT (9/23/2019) & neoadjuvant chemotherapy (6/23/2019) who underwent R TCAR today in preparation for definitive surgery for rectal cancer. Patient tolerated the procedure well and was transferred to the PACU requiring a olga gtt; attempts to wean were unsuccessful and during interview patient was requiring 0.5 olga w/ /60 (MAP 75).     Vascular surgery consulted SICU given gtt requirement.    Subjectively, patient without complaints - normal mentation; denies lightheadedness, HA, dizziness CP, SOB, post-op N/V, and is requesting to go home. Denies weakness and any concerning neurological sx. On exam he is neurologically intact.     Patient to be transferred to the SICU from PACU for weaning of olga gtt and maintenance of SBP goals.    -Q1H neuro checks  -DVT ppx as per Vascular Surgery team - will restart home Plavix, ASA  -Wean olga gtt as able   -Will maintain appropriate peripheral access  -Q1H vital signs   -Will restart home medications excepting anti-hypertensives   -Heart-healthy diet, d/c IVF  -Strict Is & Os  -Post op lab studies   -Bowel regimen       Patient meets criteria and attending surgeon Dr. Roland aware of patient and plan.

## 2019-10-17 NOTE — PATIENT PROFILE ADULT - VISION (WITH CORRECTIVE LENSES IF THE PATIENT USUALLY WEARS THEM):
tv glasses/Partially impaired: cannot see medication labels or newsprint, but can see obstacles in path, and the surrounding layout; can count fingers at arm's length

## 2019-10-17 NOTE — CONSULT NOTE ADULT - ATTENDING COMMENTS
admit to sicu for hemodynamic monitoring, while needing Tay drip for post op sbp goal maintenance.   q1 neuro checks  vascular following .

## 2019-10-17 NOTE — PROGRESS NOTE ADULT - ASSESSMENT
s/p TCAR , POD0, seen in PACU with attending. Hypotensive requiring olga in pacu. To be transferred to ICU for management   -transfer to SICU  -review am labs   -pain control prn  -continue plavix  -hep sq dvt ppx  -goal systolic bp 120-160  -wean olga per SICU management  -reg diet  -nv checks  -d/w attending

## 2019-10-17 NOTE — BRIEF OPERATIVE NOTE - OPERATION/FINDINGS
Right Carotid artery stenosis that was ballooned with adequate perfusion post angioplasty. Patient remained neurologically intact during the case. No complications.

## 2019-10-17 NOTE — CONSULT NOTE ADULT - SUBJECTIVE AND OBJECTIVE BOX
SICU Consultation Note    HPI: Mr. RAFIA Bay is a 59 YOM w/ a PMHx s/f CAD (s/p PCI x5 - 2005 & 2017 on Plavix, never MI, recent echocardiogram w/ mild mitral regurgitation, Cardiologist Jovita Amos) including Carotid stenosis (L 100%, R 70-90%), HTN, HLD, MDD, and recently diagnosed rectal cancer s/p pre-op RT (9/23/2019) & neoadjuvant chemotherapy (6/23/2019) who underwent R TCAR today in preparation for definitive surgery for rectal cancer. Patient tolerated the procedure well and was transferred to the PACU requiring a olga gtt; attempts to wean were unsuccessful and during interview patient was requiring 0.5 olga w/ /60 (MAP 75).   Vascular surgery consulted SICU given gtt requirement. Patient meets criteria and attending surgeon Dr. Roland aware of patient and plan.     Subjectively, patient without complaints - normal mentation; denies lightheadedness, HA, dizziness CP, SOB, post-op N/V, and is requesting to go home. Denies weakness and any concerning neurological sx.     On exam he is neurologically intact.      PMH:   Neuropathy [Active]: feet s/p chemo  Depression [Active]: with rectal cancer  Rectal cancer [Active]: 2/2019  Mitral regurgitation [Active]  CAD (coronary artery disease) [Active]  HLD (hyperlipidemia) [Active]  HTN (hypertension) [Active]      PSH:   Carotid stenosis, right (I65.21) [Active]  Transcarotid artery revascularization (31717) [Active]  H/O cardiac catheterization (Z98.890) [Active]: 2017  4 STENTS    Home Medications:  ALPRAZolam 0.5 mg oral tablet: 1 tab(s) orally every 6 hours, As needed, anxiety  amLODIPine 5 mg oral tablet: 1 tab(s) orally once a day  metoprolol succinate 100 mg oral tablet, extended release: 1 tab(s) orally once a day  Plavix 75 mg oral tablet: 1 tab(s) orally once a day  simvastatin 40 mg oral tablet: 1 tab(s) orally once a day (at bedtime)    Family Hx  Father - CAD & carotid stenosis    Soc Hx   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   Independent in all ADLs  Denies tobacco use, drinks 2 glasses of wine nightly, denies illegal drugs      ICU Vital Signs Last 24 Hrs  T(C): 37.2 (17 Oct 2019 10:11), Max: 37.2 (17 Oct 2019 10:11)  T(F): 99 (17 Oct 2019 10:11), Max: 99 (17 Oct 2019 10:11)  HR: 51 (17 Oct 2019 14:30) (51 - 70)  BP: 122/60 MAP 75  RR: 12 (17 Oct 2019 14:30) (11 - 16)  SpO2: 100% (17 Oct 2019 14:30) (97% - 100%)      I&O's Detail    17 Oct 2019 07:01  -  17 Oct 2019 15:10  --------------------------------------------------------  IN:    lactated ringers.: 625 mL    Oral Fluid: 600 mL    phenylephrine   Infusion: 46.7 mL  Total IN: 1271.7 mL    OUT:  Total OUT: 0 mL    Total NET: 1271.7 mL    MEDICATIONS  (STANDING):  amLODIPine   Tablet 5 milliGRAM(s) Oral daily  heparin  Injectable 5000 Unit(s) SubCutaneous every 12 hours  lactated ringers. 1000 milliLiter(s) (125 mL/Hr) IV Continuous <Continuous>  metoprolol succinate  milliGRAM(s) Oral daily  phenylephrine    Infusion 0.1 MICROgram(s)/kG/Min (3.458 mL/Hr) IV Continuous <Continuous>  simvastatin 40 milliGRAM(s) Oral at bedtime    MEDICATIONS  (PRN):  acetaminophen   Tablet .. 650 milliGRAM(s) Oral every 6 hours PRN Mild Pain (1 - 3)  ALPRAZolam 0.5 milliGRAM(s) Oral every 6 hours PRN anxiety  fentaNYL    Injectable 50 MICROGram(s) IV Push every 5 minutes PRN Severe Pain (7 - 10)  ondansetron Injectable 4 milliGRAM(s) IV Push once PRN Nausea and/or Vomiting      PHYSICAL EXAM:    Gen: WN AA White male in NAD lying on gurney w/ HOB elevated, a/b daughter (employee of [employer]) and his wife    Eyes: EOMI, PERRL, sclera anicteric     Neurological: symmetrical smile and strong cheek puff, voice neither garbled nor hoarse, no facial droop, no deviation of tongue w/ protrusion, normal swallowing ability, able to raise b/l arms and hold for 5 seconds without deviation    Neck: Supple; right operative incision c/d/i, without hematoma     Pulmonary: No IWOB on RA; Lungs CTAB    Cardiovascular: Regular rate and rhythm     Gastrointestinal: Obese, soft, NTND, no hepatosplenomegaly appreciated     Genitourinary: Voiding freely; grossly normal-appearing male external genitalia    Extremities/Vascular: strength in b/l UEs & LEs 5/5; moves all 4 extremities spontaneously; 1+ b/l DP pulses, femoral, & radial pulses; no cyanosis, pallor nor poikilothermia of extremities     Skin: Intact        LABS:  Post -op labs pending

## 2019-10-17 NOTE — PATIENT PROFILE ADULT - NSPROEDALEARNPREF_GEN_A_NUR
verbal instruction/Pre op teaching surgical scrub pain management instructions given/individual instruction/written material

## 2019-10-18 ENCOUNTER — TRANSCRIPTION ENCOUNTER (OUTPATIENT)
Age: 59
End: 2019-10-18

## 2019-10-18 VITALS — HEART RATE: 62 BPM | OXYGEN SATURATION: 98 % | RESPIRATION RATE: 23 BRPM

## 2019-10-18 LAB
ANION GAP SERPL CALC-SCNC: 12 MMOL/L — SIGNIFICANT CHANGE UP (ref 5–17)
BASOPHILS # BLD AUTO: 0.02 K/UL — SIGNIFICANT CHANGE UP (ref 0–0.2)
BASOPHILS NFR BLD AUTO: 0.5 % — SIGNIFICANT CHANGE UP (ref 0–2)
BUN SERPL-MCNC: 11 MG/DL — SIGNIFICANT CHANGE UP (ref 8–20)
CALCIUM SERPL-MCNC: 8.8 MG/DL — SIGNIFICANT CHANGE UP (ref 8.6–10.2)
CHLORIDE SERPL-SCNC: 102 MMOL/L — SIGNIFICANT CHANGE UP (ref 98–107)
CO2 SERPL-SCNC: 22 MMOL/L — SIGNIFICANT CHANGE UP (ref 22–29)
CREAT SERPL-MCNC: 0.64 MG/DL — SIGNIFICANT CHANGE UP (ref 0.5–1.3)
EOSINOPHIL # BLD AUTO: 0.09 K/UL — SIGNIFICANT CHANGE UP (ref 0–0.5)
EOSINOPHIL NFR BLD AUTO: 2.1 % — SIGNIFICANT CHANGE UP (ref 0–6)
GLUCOSE SERPL-MCNC: 91 MG/DL — SIGNIFICANT CHANGE UP (ref 70–115)
HCT VFR BLD CALC: 30.5 % — LOW (ref 39–50)
HGB BLD-MCNC: 10.3 G/DL — LOW (ref 13–17)
IMM GRANULOCYTES NFR BLD AUTO: 0.5 % — SIGNIFICANT CHANGE UP (ref 0–1.5)
LYMPHOCYTES # BLD AUTO: 0.38 K/UL — LOW (ref 1–3.3)
LYMPHOCYTES # BLD AUTO: 8.8 % — LOW (ref 13–44)
MAGNESIUM SERPL-MCNC: 1.8 MG/DL — SIGNIFICANT CHANGE UP (ref 1.6–2.6)
MCHC RBC-ENTMCNC: 33.8 GM/DL — SIGNIFICANT CHANGE UP (ref 32–36)
MCHC RBC-ENTMCNC: 35 PG — HIGH (ref 27–34)
MCV RBC AUTO: 103.7 FL — HIGH (ref 80–100)
MONOCYTES # BLD AUTO: 0.47 K/UL — SIGNIFICANT CHANGE UP (ref 0–0.9)
MONOCYTES NFR BLD AUTO: 10.9 % — SIGNIFICANT CHANGE UP (ref 2–14)
NEUTROPHILS # BLD AUTO: 3.35 K/UL — SIGNIFICANT CHANGE UP (ref 1.8–7.4)
NEUTROPHILS NFR BLD AUTO: 77.2 % — HIGH (ref 43–77)
PHOSPHATE SERPL-MCNC: 4.2 MG/DL — SIGNIFICANT CHANGE UP (ref 2.4–4.7)
PLATELET # BLD AUTO: 172 K/UL — SIGNIFICANT CHANGE UP (ref 150–400)
POTASSIUM SERPL-MCNC: 3.9 MMOL/L — SIGNIFICANT CHANGE UP (ref 3.5–5.3)
POTASSIUM SERPL-SCNC: 3.9 MMOL/L — SIGNIFICANT CHANGE UP (ref 3.5–5.3)
RBC # BLD: 2.94 M/UL — LOW (ref 4.2–5.8)
RBC # FLD: 12.8 % — SIGNIFICANT CHANGE UP (ref 10.3–14.5)
SODIUM SERPL-SCNC: 136 MMOL/L — SIGNIFICANT CHANGE UP (ref 135–145)
WBC # BLD: 4.33 K/UL — SIGNIFICANT CHANGE UP (ref 3.8–10.5)
WBC # FLD AUTO: 4.33 K/UL — SIGNIFICANT CHANGE UP (ref 3.8–10.5)

## 2019-10-18 PROCEDURE — C1889: CPT

## 2019-10-18 PROCEDURE — C1894: CPT

## 2019-10-18 PROCEDURE — 99233 SBSQ HOSP IP/OBS HIGH 50: CPT

## 2019-10-18 PROCEDURE — 84100 ASSAY OF PHOSPHORUS: CPT

## 2019-10-18 PROCEDURE — C1769: CPT

## 2019-10-18 PROCEDURE — 85027 COMPLETE CBC AUTOMATED: CPT

## 2019-10-18 PROCEDURE — 36415 COLL VENOUS BLD VENIPUNCTURE: CPT

## 2019-10-18 PROCEDURE — C1725: CPT

## 2019-10-18 PROCEDURE — 83735 ASSAY OF MAGNESIUM: CPT

## 2019-10-18 PROCEDURE — C1884: CPT

## 2019-10-18 PROCEDURE — C1876: CPT

## 2019-10-18 PROCEDURE — 80048 BASIC METABOLIC PNL TOTAL CA: CPT

## 2019-10-18 PROCEDURE — 76000 FLUOROSCOPY <1 HR PHYS/QHP: CPT

## 2019-10-18 RX ORDER — ACETAMINOPHEN 500 MG
2 TABLET ORAL
Qty: 0 | Refills: 0 | DISCHARGE
Start: 2019-10-18

## 2019-10-18 RX ORDER — ACETAMINOPHEN 500 MG
650 TABLET ORAL EVERY 6 HOURS
Refills: 0 | Status: DISCONTINUED | OUTPATIENT
Start: 2019-10-18 | End: 2019-10-18

## 2019-10-18 RX ORDER — MAGNESIUM SULFATE 500 MG/ML
1 VIAL (ML) INJECTION ONCE
Refills: 0 | Status: COMPLETED | OUTPATIENT
Start: 2019-10-18 | End: 2019-10-18

## 2019-10-18 RX ORDER — PHENYLEPHRINE HYDROCHLORIDE 10 MG/ML
0.01 INJECTION INTRAVENOUS
Qty: 40 | Refills: 0 | Status: DISCONTINUED | OUTPATIENT
Start: 2019-10-18 | End: 2019-10-18

## 2019-10-18 RX ADMIN — SIMVASTATIN 40 MILLIGRAM(S): 20 TABLET, FILM COATED ORAL at 00:31

## 2019-10-18 RX ADMIN — Medication 100 GRAM(S): at 06:10

## 2019-10-18 RX ADMIN — HEPARIN SODIUM 5000 UNIT(S): 5000 INJECTION INTRAVENOUS; SUBCUTANEOUS at 06:10

## 2019-10-18 RX ADMIN — SODIUM CHLORIDE 500 MILLILITER(S): 9 INJECTION, SOLUTION INTRAVENOUS at 01:28

## 2019-10-18 RX ADMIN — CLOPIDOGREL BISULFATE 75 MILLIGRAM(S): 75 TABLET, FILM COATED ORAL at 11:57

## 2019-10-18 RX ADMIN — Medication 3 MILLIGRAM(S): at 00:31

## 2019-10-18 NOTE — DISCHARGE NOTE NURSING/CASE MANAGEMENT/SOCIAL WORK - PATIENT PORTAL LINK FT
You can access the FollowMyHealth Patient Portal offered by [facility] by registering at the following website: [email or web address]. By joining [employer]’s FollowMyHealth portal, you will also be able to view your health information using other applications (apps) compatible with our system.

## 2019-10-18 NOTE — PROGRESS NOTE ADULT - SUBJECTIVE AND OBJECTIVE BOX
58yo M examined at bedside and found in no acute distress. No overnight events. Patient was weaned from his levo drip but pressures went down to systolics of 100 and was restarted on the drip. No complaints. Denies fever, chills, nausea, vomiting, chest pain, and sob.     Vital Signs Last 24 Hrs  T(C): 36.9 (18 Oct 2019 04:36), Max: 37.2 (17 Oct 2019 10:11)  T(F): 98.4 (18 Oct 2019 04:36), Max: 99 (17 Oct 2019 10:11)  HR: 57 (18 Oct 2019 07:00) (48 - 70)  BP: 115/54 (18 Oct 2019 07:00) (84/51 - 148/72)  BP(mean): 78 (18 Oct 2019 07:00) (63 - 95)  RR: 19 (18 Oct 2019 07:00) (11 - 50)  SpO2: 98% (18 Oct 2019 07:00) (72% - 100%)    I&O's Detail    17 Oct 2019 07:01  -  18 Oct 2019 07:00  --------------------------------------------------------  IN:    lactated ringers.: 875 mL    multiple electrolytes Injection Type 1 Bolus: 500 mL    Oral Fluid: 600 mL    phenylephrine   Infusion: 114.7 mL    phenylephrine   Infusion: 127.4 mL    Solution: 100 mL  Total IN: 2317.1 mL    OUT:    Voided: 1750 mL  Total OUT: 1750 mL    Total NET: 567.1 mL      Constitutional: patient resting comfortably in bed, in no acute distress  HEENT: EOMI, PERRLA, MMM. Right neck incision is c/d/i covered with sterile strips    Respiratory: Non labored breathing  Cardiovascular: RRR  Gastrointestinal: Abdomen soft, non-tender, non-distended, no rebound tenderness / guarding. Left groin in c/d/i with no signs of hematoma.   Msk: B/L LE palpable DP and PT pulses      LABS:                        10.3   4.33  )-----------( 172      ( 18 Oct 2019 04:53 )             30.5     10-18    136  |  102  |  11.0  ----------------------------<  91  3.9   |  22.0  |  0.64    Ca    8.8      18 Oct 2019 04:53  Phos  4.2     10-18  Mg     1.8     10-18            MEDICATIONS  (STANDING):  chlorhexidine 2% Cloths 1 Application(s) Topical daily  clopidogrel Tablet 75 milliGRAM(s) Oral daily  heparin  Injectable 5000 Unit(s) SubCutaneous every 8 hours  melatonin 3 milliGRAM(s) Oral at bedtime  phenylephrine    Infusion 0.1 MICROgram(s)/kG/Min (3.458 mL/Hr) IV Continuous <Continuous>  simvastatin 40 milliGRAM(s) Oral at bedtime    MEDICATIONS  (PRN):  acetaminophen   Tablet .. 650 milliGRAM(s) Oral every 6 hours PRN Mild Pain (1 - 3), Moderate Pain (4 - 6), Severe Pain (7 - 10)  ALPRAZolam 0.5 milliGRAM(s) Oral every 6 hours PRN anxiety 60yo M examined at bedside and found in no acute distress. No overnight events. Patient was weaned from his olga drip but pressures went down to systolics of 100 and was restarted on the drip. No complaints. Denies fever, chills, nausea, vomiting, chest pain, and sob.     Vital Signs Last 24 Hrs  T(C): 36.9 (18 Oct 2019 04:36), Max: 37.2 (17 Oct 2019 10:11)  T(F): 98.4 (18 Oct 2019 04:36), Max: 99 (17 Oct 2019 10:11)  HR: 57 (18 Oct 2019 07:00) (48 - 70)  BP: 115/54 (18 Oct 2019 07:00) (84/51 - 148/72)  BP(mean): 78 (18 Oct 2019 07:00) (63 - 95)  RR: 19 (18 Oct 2019 07:00) (11 - 50)  SpO2: 98% (18 Oct 2019 07:00) (72% - 100%)    I&O's Detail    17 Oct 2019 07:01  -  18 Oct 2019 07:00  --------------------------------------------------------  IN:    lactated ringers.: 875 mL    multiple electrolytes Injection Type 1 Bolus: 500 mL    Oral Fluid: 600 mL    phenylephrine   Infusion: 114.7 mL    phenylephrine   Infusion: 127.4 mL    Solution: 100 mL  Total IN: 2317.1 mL    OUT:    Voided: 1750 mL  Total OUT: 1750 mL    Total NET: 567.1 mL      Constitutional: patient resting comfortably in bed, in no acute distress  Neuro: CN II-XII intact. Upper and lower extremity equal on strength, no weakness, sensation intact, neurovascularly intact.   HEENT: EOMI, PERRLA, MMM. Right neck incision is c/d/i covered with sterile strips. Tongue protrudes midline.   Respiratory: Non labored breathing  Cardiovascular: RRR  Gastrointestinal: Abdomen soft, non-tender, non-distended, no rebound tenderness / guarding. Left groin in c/d/i with no signs of hematoma.   Msk: B/L LE palpable DP and PT pulses.         LABS:                        10.3   4.33  )-----------( 172      ( 18 Oct 2019 04:53 )             30.5     10-18    136  |  102  |  11.0  ----------------------------<  91  3.9   |  22.0  |  0.64    Ca    8.8      18 Oct 2019 04:53  Phos  4.2     10-18  Mg     1.8     10-18            MEDICATIONS  (STANDING):  chlorhexidine 2% Cloths 1 Application(s) Topical daily  clopidogrel Tablet 75 milliGRAM(s) Oral daily  heparin  Injectable 5000 Unit(s) SubCutaneous every 8 hours  melatonin 3 milliGRAM(s) Oral at bedtime  phenylephrine    Infusion 0.1 MICROgram(s)/kG/Min (3.458 mL/Hr) IV Continuous <Continuous>  simvastatin 40 milliGRAM(s) Oral at bedtime    MEDICATIONS  (PRN):  acetaminophen   Tablet .. 650 milliGRAM(s) Oral every 6 hours PRN Mild Pain (1 - 3), Moderate Pain (4 - 6), Severe Pain (7 - 10)  ALPRAZolam 0.5 milliGRAM(s) Oral every 6 hours PRN anxiety

## 2019-10-18 NOTE — PROGRESS NOTE ADULT - ASSESSMENT
60 yo M w/ a PMHx s/f CAD (s/p PCI x5 - 2005 & 2017 on Plavix), recent echocardiogram w/ mild mitral regurgitation, Carotid stenosis (L 100%, R 70-90%), HTN, HLD, MDD, and recently diagnosed rectal cancer s/p pre-op RT (9/23/2019) & neoadjuvant chemotherapy (6/23/2019) who underwent R TCAR today in preparation for definitive surgery for rectal cancer.  Pt transferred to the PACU for pressor requirement.      Neuro: GCS 15.  Monitor for delirium.  Continue to optimize pain control. Serial Neurologic assessments in setting of R TCAR.      HEENT: no issues    CV: Remains on small dose of Tay.  MAP goals 70-80 and systolic goals of 120-160.  Continue to wean off pressors.  Continue hemodynamic monitoring    Pulm: Pulmonary toilet.  Continue incentive spirometer.  Chest PT.  Encourage OOB to chair and ambulation     GI/Nutrition: Bowel Regimen    /Renal: monitor UOP. Monitor BMP.  Replete Lytes as needed      HEME- DVT prophylaxis, SCDs    ID: Ancef Opal-op     Lines/Tubes: PIV    Endo: No issue    Skin: R TCAR incision    Dispo: Floor transfer once of pressors.

## 2019-10-18 NOTE — DISCHARGE NOTE PROVIDER - NSDCFUADDINST_GEN_ALL_CORE_FT
WOUND CARE: steri strips will fall off on their own, do not peel off. may remove groin dressing in 24 hrs.   BATHING: Please do not submerge wound underwater. You may shower and/or sponge bathe.  ACTIVITY: No heavy lifting or straining. Do not drive or operate machinery until cleared after your follow-up appointment.  DIET: Return to your usual diet.  NOTIFY YOUR SURGEON IF: You have any bleeding that does not stop, any pus draining from your wound(s), any fever (over 100.4 F) or chills, persistent nausea/vomiting, weakness, slurred speech, headache, worsening symptoms, uncontrolled pain, diziness.  FOLLOW-UP: Please follow up with Dr. Kevin Moss in 1 week. Call for appointment upon discharge.   Please do not take your blood pressure medications until after cleared at your follow-up appointment.   You may take tylenol or motrin for pain.

## 2019-10-18 NOTE — PROGRESS NOTE ADULT - ASSESSMENT
58yo M POD 1 from R TCAR on the SICU for post operative hypotension. Started on levo drip, weaned overnight but patient got hypotensive, requiring restart of drip today. Neurovascularly intact. Adequate post operative course.    -Possible downgrade to floor today/discharge home  -New goal systolic BP is 120-150 but systolics of 110 are permissible.   -Neurovascular checks.   -Ambulate as tolerated  -DVT ppx 58yo M POD 1 from R TCAR on the SICU for post operative hypotension. Started on olga drip, weaned overnight but patient got hypotensive, requiring restart of drip today. Neurovascularly intact. Adequate post operative course.    -Possible downgrade to floor today/discharge home  -New goal systolic BP is 120-150 but systolics of 110 are permissible.   -Neurovascular checks.   -Ambulate as tolerated  -DVT ppx

## 2019-10-18 NOTE — PROGRESS NOTE ADULT - SUBJECTIVE AND OBJECTIVE BOX
INTERVAL HPI/OVERNIGHT EVENTS:    Patient to be transferred to the SICU from PACU for weaning of olga gtt and maintenance of SBP goals.  Pt remains on low dose olga overnight but unable to wean off and meet MAP goals at this time.  Pt remains without focal neurologic deficit.  Pt with no significant medical c/o and with only c/o of soreness to surgical site.  Denies chest pain, SOB, N/V/D.      MEDICATIONS  (STANDING):  chlorhexidine 2% Cloths 1 Application(s) Topical daily  clopidogrel Tablet 75 milliGRAM(s) Oral daily  heparin  Injectable 5000 Unit(s) SubCutaneous every 8 hours  melatonin 3 milliGRAM(s) Oral at bedtime  phenylephrine    Infusion 0.1 MICROgram(s)/kG/Min (3.458 mL/Hr) IV Continuous <Continuous>  simvastatin 40 milliGRAM(s) Oral at bedtime    MEDICATIONS  (PRN):  acetaminophen   Tablet .. 650 milliGRAM(s) Oral every 6 hours PRN Mild Pain (1 - 3), Moderate Pain (4 - 6), Severe Pain (7 - 10)  ALPRAZolam 0.5 milliGRAM(s) Oral every 6 hours PRN anxiety      Drug Dosing Weight  Height (cm): 187.96 (17 Oct 2019 06:35)  Weight (kg): 92.2 (17 Oct 2019 06:35)  BMI (kg/m2): 26.1 (17 Oct 2019 06:35)  BSA (m2): 2.19 (17 Oct 2019 06:35)      PAST MEDICAL & SURGICAL HISTORY:  Neuropathy: feet s/p chemo  Depression: with rectal cancer  Rectal cancer: 2/2019  Mitral regurgitation  CAD (coronary artery disease)  HLD (hyperlipidemia)  HTN (hypertension)  Chest pain  Cardiac disorder: coronary stent placement november 2005  H/O cardiac catheterization: 2017  4 STENTS      ICU Vital Signs Last 24 Hrs  T(C): 36.6 (17 Oct 2019 23:58), Max: 37.2 (17 Oct 2019 10:11)  T(F): 97.9 (17 Oct 2019 23:58), Max: 99 (17 Oct 2019 10:11)  HR: 56 (18 Oct 2019 03:00) (48 - 70)  BP: 116/56 (18 Oct 2019 03:00) (84/51 - 148/72)  BP(mean): 81 (18 Oct 2019 03:00) (63 - 95)  ABP: 109/51 (18 Oct 2019 03:00) (99/42 - 149/68)  ABP(mean): 73 (18 Oct 2019 03:00) (59 - 93)  RR: 21 (18 Oct 2019 03:00) (11 - 50)  SpO2: 94% (18 Oct 2019 03:00) (74% - 100%)          I&O's Detail    17 Oct 2019 07:01  -  18 Oct 2019 03:19  --------------------------------------------------------  IN:    lactated ringers.: 875 mL    multiple electrolytes Injection Type 1 Bolus: 500 mL    Oral Fluid: 600 mL    phenylephrine   Infusion: 127.4 mL    phenylephrine   Infusion: 93 mL  Total IN: 2195.4 mL    OUT:    Voided: 750 mL  Total OUT: 750 mL    Total NET: 1445.4 mL              Physical Exam:    Neurological:  GCS 15.  CN intact.  No sensory/motor deficits    HEENT: PERRLA, EOMI, no drainage or redness    Neck: Surgical incision to No bruits; no thyromegaly or nodules,  No JVD    Back: Normal spine flexure, No CVA tenderness, No deformity or limitation of movement    Respiratory: Breath Sounds equal & clear to auscultation, no accessory muscle use    Cardiovascular: Regular rate & rhythm, normal S1, S2; no murmurs, gallops or rubs    Gastrointestinal: Soft, non-tender, normal bowel sounds    Extremities: No peripheral edema, No cyanosis, clubbing     Vascular: Equal and normal pulses: 2+ peripheral pulses throughout    Musculoskeletal: No joint pain, swelling or deformity; no limitation of movement    Skin: No rashes

## 2019-10-18 NOTE — DISCHARGE NOTE PROVIDER - CARE PROVIDER_API CALL
Kevin Cho)  Surgery; Vascular Surgery  [address], [address]st [place] [address]  Phone: [phone]  Fax: [phone]  Follow Up Time:

## 2019-10-18 NOTE — DISCHARGE NOTE PROVIDER - NSDCFUSCHEDAPPT_GEN_ALL_CORE_FT
[facility] ; 10/25/2019 ; NPP Rad Med [address] [facility] ; 10/29/2019 ; NPP Rad Cat [address] [facility] ; 10/29/2019 ; [facility] ; NPP Surg [place] [address] [facility] ; 11/07/2019 ; NPP [facility]

## 2019-10-18 NOTE — DISCHARGE NOTE PROVIDER - HOSPITAL COURSE
Patient presented to [facility] on 10/17 for elective TCAR procedure. Remained HD stable, tolerated procedure well. was transferred from pacu then SICU. In pacu, patient became hypotensive requiring pressor support. Patient unable to be weaned from olga-synephrine and was transferred to SICU for olga gtt.     POD 1 patient weaned from olga-synephrine and able to maintain adequate bp.     Per attending, patient is safe for d/c home with outpatient follow-up.     Appropriate meds sent to pharmacy.     Upon discharge, patient is tolerating diet without nausea/vomiting, neurovascularly intact, ambulating and voiding independently, denies any pain.

## 2019-10-18 NOTE — PROGRESS NOTE ADULT - ATTENDING COMMENTS
I have seen and examined the patient.  neuro intact  pressors have been wean off.  Ok to transfer to floor.

## 2019-10-23 ENCOUNTER — APPOINTMENT (OUTPATIENT)
Dept: VASCULAR SURGERY | Facility: CLINIC | Age: 59
End: 2019-10-23
Payer: COMMERCIAL

## 2019-10-23 VITALS
HEART RATE: 67 BPM | OXYGEN SATURATION: 100 % | BODY MASS INDEX: 26.9 KG/M2 | SYSTOLIC BLOOD PRESSURE: 127 MMHG | RESPIRATION RATE: 16 BRPM | DIASTOLIC BLOOD PRESSURE: 63 MMHG | WEIGHT: 203 LBS | TEMPERATURE: 98.5 F | HEIGHT: 73 IN

## 2019-10-23 PROCEDURE — 99024 POSTOP FOLLOW-UP VISIT: CPT

## 2019-10-24 ENCOUNTER — RESULT REVIEW (OUTPATIENT)
Age: 59
End: 2019-10-24

## 2019-10-25 ENCOUNTER — APPOINTMENT (OUTPATIENT)
Dept: RADIATION ONCOLOGY | Facility: CLINIC | Age: 59
End: 2019-10-25
Payer: COMMERCIAL

## 2019-10-25 VITALS
RESPIRATION RATE: 16 BRPM | HEART RATE: 63 BPM | WEIGHT: 203 LBS | BODY MASS INDEX: 26.78 KG/M2 | OXYGEN SATURATION: 98 % | SYSTOLIC BLOOD PRESSURE: 177 MMHG | DIASTOLIC BLOOD PRESSURE: 92 MMHG | TEMPERATURE: 98 F

## 2019-10-25 PROCEDURE — 99024 POSTOP FOLLOW-UP VISIT: CPT

## 2019-10-25 NOTE — PHYSICAL EXAM
[Normal] : oriented to person, place and time, the affect was normal, the mood was normal and not anxious [Extraocular Movements] : extraocular movements were intact [de-identified] : mild residual hyperpigmentation of buttock; no desquamation

## 2019-10-25 NOTE — DISEASE MANAGEMENT
[Clinical] : TNM Stage: c [FreeTextEntry4] : adenocarcinoma [TTNM] : 2/3 [MTNM] : 0 [NTNM] : 1 [IIIA] : IIIA

## 2019-10-25 NOTE — REVIEW OF SYSTEMS
[Negative] : Psychiatric [Anal Pain: Grade 0] : Anal Pain: Grade 0 [Constipation: Grade 0] : Constipation: Grade 0 [Diarrhea: Grade 0] : Diarrhea: Grade 0 [Rectal Pain: Grade 0] : Rectal Pain: Grade 0 [Vomiting: Grade 0] : Vomiting: Grade 0 [Fatigue: Grade 0] : Fatigue: Grade 0 [Pruritus: Grade 0] : Pruritus: Grade 0 [Skin Hyperpigmentation: Grade 1 - Hyperpigmentation covering <10% BSA; no psychosocial impact] : Skin Hyperpigmentation: Grade 1 - Hyperpigmentation covering <10% BSA; no psychosocial impact [Skin Atrophy: Grade 0] : Skin Atrophy: Grade 0 [Skin Induration: Grade 0] : Skin Induration: Grade 0 [Dermatitis Radiation: Grade 0] : Dermatitis Radiation: Grade 0

## 2019-10-25 NOTE — HISTORY OF PRESENT ILLNESS
[FreeTextEntry1] : Mr. Bay is a 59 years old male with yT2yN+M0 rectal adenocarcinoma . He was treated with chemotherapy with Folfox x 8 followed by chemoradiation with concurrent oral capecitabine. He received radiation therapy between 8/14/19 - 9/23/19 to a dose of 5040 cGy. He tolerated radiation treatment well with anticipated proctitis and dermatitis. He did not develop any grade 3 or higher acute toxicities as a result of his treatment.\par \par Patient presents here today for post treatment follow up. He denies rectal or abdominal pain, bloating, diarrhea or constipation. Endorsed bowel urgency. He had a carotid stent placement on 10/17/19 done by Dr. Valentino. He tolerated procedure well. His blood pressure is high because he was instructed not to take his BP medication until he follow up with his cardiologist .

## 2019-10-25 NOTE — DISCUSSION/SUMMARY
[Cancer Type / Location / Histology Subtype: ________] : Cancer Type / Location / Histology Subtype: [unfilled] [Diagnosis Date (year): ____] : Diagnosis Date (year): [unfilled] [Surgery] : Surgery: Yes [Surgery Date(s) (year): ____] : Surgery Date(s) (year): [unfilled] [Surgical Procedure / Location / Findings: _________] : Surgical Procedure / Location / Findings: [unfilled] [Radiation] : Radiation: Yes [Body Area Treated: _________] : Body Area Treated: [unfilled] [End Date (year): ____] : End Date (year): [unfilled] [Follow up with Radiation MD in _____] : Follow up with Radiation MD in [unfilled] [III] : III [Systemic Therapy (chemotherapy, hormonal therapy, other)] : Systemic Therapy (chemotherapy, hormonal therapy, other): Yes [Need for onging (adjuvant) treatment for cancer?] : Need for onging (adjuvant) treatment for cancer? Yes [Follow up with Oncologist in _____] : Follow up with Oncologist in [unfilled] [Follow up with Surgeon in _____] : Follow up with Surgeon in [unfilled] [Scans: ______] : Scans: [unfilled] [Primary care physician] : primary care physician [FreeTextEntry8] : Rebecca Rain [FreeTextEntry9] : Rebecca Rain

## 2019-10-28 ENCOUNTER — APPOINTMENT (OUTPATIENT)
Dept: HEMATOLOGY ONCOLOGY | Facility: CLINIC | Age: 59
End: 2019-10-28

## 2019-10-28 ENCOUNTER — FORM ENCOUNTER (OUTPATIENT)
Age: 59
End: 2019-10-28

## 2019-10-29 ENCOUNTER — APPOINTMENT (OUTPATIENT)
Dept: MRI IMAGING | Facility: CLINIC | Age: 59
End: 2019-10-29
Payer: COMMERCIAL

## 2019-10-29 ENCOUNTER — OUTPATIENT (OUTPATIENT)
Dept: OUTPATIENT SERVICES | Facility: HOSPITAL | Age: 59
LOS: 1 days | End: 2019-10-29
Payer: COMMERCIAL

## 2019-10-29 ENCOUNTER — APPOINTMENT (OUTPATIENT)
Dept: CT IMAGING | Facility: CLINIC | Age: 59
End: 2019-10-29
Payer: COMMERCIAL

## 2019-10-29 ENCOUNTER — APPOINTMENT (OUTPATIENT)
Dept: CT IMAGING | Facility: CLINIC | Age: 59
End: 2019-10-29

## 2019-10-29 DIAGNOSIS — I51.9 HEART DISEASE, UNSPECIFIED: Chronic | ICD-10-CM

## 2019-10-29 DIAGNOSIS — Z00.00 ENCOUNTER FOR GENERAL ADULT MEDICAL EXAMINATION WITHOUT ABNORMAL FINDINGS: ICD-10-CM

## 2019-10-29 DIAGNOSIS — Z98.890 OTHER SPECIFIED POSTPROCEDURAL STATES: Chronic | ICD-10-CM

## 2019-10-29 DIAGNOSIS — C20 MALIGNANT NEOPLASM OF RECTUM: ICD-10-CM

## 2019-10-29 PROCEDURE — 72197 MRI PELVIS W/O & W/DYE: CPT | Mod: 26

## 2019-10-29 PROCEDURE — A9585: CPT

## 2019-10-29 PROCEDURE — 72197 MRI PELVIS W/O & W/DYE: CPT

## 2019-10-29 PROCEDURE — 71260 CT THORAX DX C+: CPT | Mod: 26

## 2019-10-29 PROCEDURE — 71260 CT THORAX DX C+: CPT

## 2019-10-29 PROCEDURE — 74177 CT ABD & PELVIS W/CONTRAST: CPT

## 2019-10-29 PROCEDURE — 74177 CT ABD & PELVIS W/CONTRAST: CPT | Mod: 26

## 2019-11-04 ENCOUNTER — RX RENEWAL (OUTPATIENT)
Age: 59
End: 2019-11-04

## 2019-11-06 ENCOUNTER — APPOINTMENT (OUTPATIENT)
Dept: COLORECTAL SURGERY | Facility: CLINIC | Age: 59
End: 2019-11-06
Payer: COMMERCIAL

## 2019-11-06 PROCEDURE — 99214 OFFICE O/P EST MOD 30 MIN: CPT | Mod: 25

## 2019-11-06 PROCEDURE — 45300 PROCTOSIGMOIDOSCOPY DX: CPT

## 2019-11-12 NOTE — HISTORY OF PRESENT ILLNESS
[FreeTextEntry1] : Very pleasant 59-year-old white gentleman who presents for a follow-up visit. He was diagnosed with rectal cancer in approximately April of 2019. This was found to be a locally advanced very distal lesion and therefore was treated with JASON by oncology and radiation oncology. Patient completed his therapy over one month ago.\par \par He was re-imaged with a CAT scan which revealed no evidence of metastatic disease. He had a repeat MRI performed revealing residual tumor despite tumor regression. The lesion is extremely low and close to the sphincter.\par \par The patient's symptoms have significantly improved. Physical examination reveals no inguinal adenopathy. External anal examination is unremarkable. On digital exam the patient has a small lesion palpable in the posterior midline. Sigmoidoscopy confirms the presence of residual tumor in the posterior midline which is approximately 1.5 cm in diameter. The lesion is extremely low and almost down to the sphincter complex. I would place the tumor 1.5 cms above the anorectal ring.\par \par Regretfully, he has not had a complete response despite JASON. He is a young healthy individual and I believe that he requires resectional therapy. The lesion is extremely low and due to his physique I doubt that I can perform a coloanal.  He will likely require an abdominoperineal resection with permanent colostomy. I asked him to seek an additional opinion with Dr. Joshua Staley at [facility].\par \par If the doctor agrees that he requires an abdominoperineal resection, I would perform it. If he thinks he wants to attempt a coloanal and diverting ileostomy I would certainly advocate that he try.\par \par The patient will  let me know the result of this consultation.

## 2019-11-13 ENCOUNTER — OUTPATIENT (OUTPATIENT)
Dept: OUTPATIENT SERVICES | Facility: HOSPITAL | Age: 59
LOS: 1 days | Discharge: HOME | End: 2019-11-13

## 2019-11-13 DIAGNOSIS — Z98.890 OTHER SPECIFIED POSTPROCEDURAL STATES: Chronic | ICD-10-CM

## 2019-11-13 DIAGNOSIS — C20 MALIGNANT NEOPLASM OF RECTUM: ICD-10-CM

## 2019-11-13 DIAGNOSIS — I51.9 HEART DISEASE, UNSPECIFIED: Chronic | ICD-10-CM

## 2019-11-20 ENCOUNTER — RESULT REVIEW (OUTPATIENT)
Age: 59
End: 2019-11-20

## 2019-11-20 ENCOUNTER — APPOINTMENT (OUTPATIENT)
Dept: HEMATOLOGY ONCOLOGY | Facility: CLINIC | Age: 59
End: 2019-11-20
Payer: COMMERCIAL

## 2019-11-20 VITALS
HEART RATE: 70 BPM | WEIGHT: 203 LBS | BODY MASS INDEX: 26.9 KG/M2 | HEIGHT: 73 IN | DIASTOLIC BLOOD PRESSURE: 85 MMHG | OXYGEN SATURATION: 100 % | SYSTOLIC BLOOD PRESSURE: 174 MMHG | TEMPERATURE: 98.5 F

## 2019-11-20 LAB
BASOPHILS # BLD AUTO: 0 K/UL — SIGNIFICANT CHANGE UP (ref 0–0.2)
BASOPHILS NFR BLD AUTO: 0.8 % — SIGNIFICANT CHANGE UP (ref 0–2)
EOSINOPHIL # BLD AUTO: 0 K/UL — SIGNIFICANT CHANGE UP (ref 0–0.5)
EOSINOPHIL NFR BLD AUTO: 1 % — SIGNIFICANT CHANGE UP (ref 0–6)
HCT VFR BLD CALC: 39.9 % — SIGNIFICANT CHANGE UP (ref 39–50)
HGB BLD-MCNC: 13 G/DL — SIGNIFICANT CHANGE UP (ref 13–17)
LYMPHOCYTES # BLD AUTO: 0.8 K/UL — LOW (ref 1–3.3)
LYMPHOCYTES # BLD AUTO: 16.5 % — SIGNIFICANT CHANGE UP (ref 13–44)
MCHC RBC-ENTMCNC: 32.6 G/DL — SIGNIFICANT CHANGE UP (ref 32–36)
MCHC RBC-ENTMCNC: 33.5 PG — SIGNIFICANT CHANGE UP (ref 27–34)
MCV RBC AUTO: 102.7 FL — HIGH (ref 80–100)
MONOCYTES # BLD AUTO: 0.3 K/UL — SIGNIFICANT CHANGE UP (ref 0–0.9)
MONOCYTES NFR BLD AUTO: 5.6 % — SIGNIFICANT CHANGE UP (ref 2–14)
NEUTROPHILS # BLD AUTO: 3.7 K/UL — SIGNIFICANT CHANGE UP (ref 1.8–7.4)
NEUTROPHILS NFR BLD AUTO: 76.1 % — SIGNIFICANT CHANGE UP (ref 43–77)
PLATELET # BLD AUTO: 226 K/UL — SIGNIFICANT CHANGE UP (ref 150–400)
RBC # BLD: 3.88 M/UL — LOW (ref 4.2–5.8)
RBC # FLD: 11.7 % — SIGNIFICANT CHANGE UP (ref 10.3–14.5)
WBC # BLD: 4.8 K/UL — SIGNIFICANT CHANGE UP (ref 3.8–10.5)
WBC # FLD AUTO: 4.8 K/UL — SIGNIFICANT CHANGE UP (ref 3.8–10.5)

## 2019-11-20 PROCEDURE — 99215 OFFICE O/P EST HI 40 MIN: CPT

## 2019-11-20 NOTE — RESULTS/DATA
[FreeTextEntry1] : 10/29/19 CT C/A/P: Interval development of patchy opacities in both lungs compatible with infectious or inflammatory etiology. Rectal wall thickening may be related to posttreatment changes or the known rectal neoplasm. Diffuse bladder wall thickening. No evidence of metastatic disease in the thorax, abdomen or pelvis.\par \par 10/29/19 MRI Pelvis: Since the prior examination 7/30/2019, the primary tumor shows: partial response. Continued decrease in size of primary tumor with evidence of residual. \par Post Treatment Stage: yT2 / N0 \par CRM: Clear \par Sphincter involvement (low rectal tumors): Absent \par Suspicious extra mesorectal nodes: None\par \par 9/23/19 CT Neck ([facility]): Complete chronic occlusion of the left internal carotid artery. There is atherosclerotic disease of the right common carotid artery bifurcation extending into the proximal right internal carotid artery. There is near complete focal occlusion of the origin of the right internal carotid artery greater than 90% with mild poststenotic dilation just distal to the stenosis. Approximately 1.5 cm distal to the origin of the right ICA, there is focal approximately 50% stenosis of the right internal carotid artery. \par \par 3/9/19 CT: Small pleural-based calcification in the right apex. Coronary artery stents. Degenerative changes in the thoracic spine. Old healed fracture involving the right sixth rib. Edematous changes seen the mesenteric previously described. No evidence of metastatic disease. \par \par 3/7/19 MRI rectum:\par 2.4 cm mass, with the inferior margin contacting the internal anal sphincter, 10:00 to 7:00. There were subcentimeter lymph nodes, too small to characterize. Overall impression was a polypoid low rectal tumor with possible punctate extramural extension, possible T2 or T3a Nx Mx, with inferior margin of tumor contacting the internal anal sphincter. 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 No significant lymphadenopathy in abdomen is identified.  \par \par 2/21/19 EUS:\par malignant appearing posterior wall mass in the rectum, 4 cm above the dentate line. 27.8 mm x 18.4 mm with subtle suggestion of muscularis propria involvement, suspect T2N0. \par \par 2/20/19 Pathology:\par Rectum polyp, colonoscopy:  superficially invasive moderately differentiated adenocarcinoma

## 2019-11-20 NOTE — HISTORY OF PRESENT ILLNESS
[de-identified] : The patient was diagnosed with adenocarcinoma of the rectum in February 2019 at the age of 58.  He began having BRBPR approximately 6 months ago, which he attributed to  hemorrhoids seen on his last colonoscopy about 8 years ago. Recently, he noted increased rectal bleeding and was referred for GI evaluation with Dr. Michele Reyes.  Colonoscopy revealed a mass 6-7 cm from the anal verge on rigid sigmoidoscopy. The biopsy was consistent with moderately differentiated adenocarcinoma. Endoscopic ultrasound confirmed a 2.8  x 1.8 cm mass, consistent with T2N0.  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 No significant lymphadenopathy in the abdomen.  He next saw colorectal surgery, Dr. Adi Garcia, who recommended a transanal resection preceded by neoadjuvant chemoradiation.  A CT C/A/P discovered no metastatic disease. He was seen by Dr. Javed Mckeon to discuss FOLFOX and Dr. Faustino Parson to discuss RT.  He has also been seen at [facility].   [de-identified] : Patient completed 8 cycles of FOLFOX and chemoRT with Xeloda for adenocarcinoma of the rectum 8/13/19 - 9/23/19.  + Epistaxis, every morning.  + Fatigue/weakness, unchanged, worse recently.  + Cold intolerance, remains very mild, only lasts 1-2 day.  + Fecal urgency with occasional loose stools, although sometimes solid. No further BRBPR.  + Xerostomia.  + Very occasional mouth sores. + Mild nausea.  + Decreased appetite.  No neuropathy.  No rectal pain.  No abdominal pain.  No fevers or chills. + Burning urination. No other complaints today. \par  [de-identified] : SH: Smokes cigars weekly x 10 years. Glass wine per day; 3 glasses per night for 40 years - beer and wine.

## 2019-11-21 LAB
ALBUMIN SERPL ELPH-MCNC: 4.8 G/DL
ALP BLD-CCNC: 54 U/L
ALT SERPL-CCNC: 22 U/L
ANION GAP SERPL CALC-SCNC: 12 MMOL/L
AST SERPL-CCNC: 27 U/L
BILIRUB SERPL-MCNC: 0.9 MG/DL
BUN SERPL-MCNC: 23 MG/DL
CALCIUM SERPL-MCNC: 10.1 MG/DL
CEA SERPL-MCNC: 1 NG/ML
CHLORIDE SERPL-SCNC: 104 MMOL/L
CO2 SERPL-SCNC: 24 MMOL/L
CREAT SERPL-MCNC: 1.07 MG/DL
GLUCOSE SERPL-MCNC: 109 MG/DL
MAGNESIUM SERPL-MCNC: 2 MG/DL
POTASSIUM SERPL-SCNC: 5 MMOL/L
PROT SERPL-MCNC: 6.9 G/DL
SODIUM SERPL-SCNC: 140 MMOL/L

## 2019-11-26 ENCOUNTER — APPOINTMENT (OUTPATIENT)
Age: 59
End: 2019-11-26

## 2019-12-11 ENCOUNTER — RX RENEWAL (OUTPATIENT)
Age: 59
End: 2019-12-11

## 2019-12-18 ENCOUNTER — APPOINTMENT (OUTPATIENT)
Dept: VASCULAR SURGERY | Facility: CLINIC | Age: 59
End: 2019-12-18
Payer: COMMERCIAL

## 2019-12-18 VITALS
RESPIRATION RATE: 16 BRPM | HEART RATE: 77 BPM | BODY MASS INDEX: 26.9 KG/M2 | WEIGHT: 203 LBS | DIASTOLIC BLOOD PRESSURE: 91 MMHG | HEIGHT: 73 IN | TEMPERATURE: 98.5 F | SYSTOLIC BLOOD PRESSURE: 154 MMHG | OXYGEN SATURATION: 100 %

## 2019-12-18 PROCEDURE — 99024 POSTOP FOLLOW-UP VISIT: CPT

## 2019-12-24 ENCOUNTER — APPOINTMENT (OUTPATIENT)
Dept: RADIATION ONCOLOGY | Facility: CLINIC | Age: 59
End: 2019-12-24
Payer: COMMERCIAL

## 2019-12-24 VITALS
BODY MASS INDEX: 28.49 KG/M2 | OXYGEN SATURATION: 99 % | HEART RATE: 94 BPM | RESPIRATION RATE: 16 BRPM | SYSTOLIC BLOOD PRESSURE: 136 MMHG | WEIGHT: 215 LBS | HEIGHT: 73 IN | TEMPERATURE: 98.2 F | DIASTOLIC BLOOD PRESSURE: 85 MMHG

## 2019-12-24 PROCEDURE — 99214 OFFICE O/P EST MOD 30 MIN: CPT

## 2019-12-24 NOTE — PHYSICAL EXAM
[Extraocular Movements] : extraocular movements were intact [Normal] : normal spine exam without palpable tenderness, no kyphosis or scoliosis [Motor Tone] : muscle strength and tone were normal [No Spine Tenderness] : no tenderness to palpation of the vertebral spine [Motor Exam] : the motor exam was normal [de-identified] : neuropathy distal extremities

## 2019-12-24 NOTE — DISEASE MANAGEMENT
[Clinical] : TNM Stage: c [IIA] : IIA [FreeTextEntry4] : adenocarcinoma rectum [NTNM] : 0 [TTNM] : 3 [MTNM] : 0

## 2019-12-24 NOTE — REVIEW OF SYSTEMS
[Negative] : Allergic/Immunologic [Diarrhea: Grade 0] : Diarrhea: Grade 0 [Constipation: Grade 0] : Constipation: Grade 0 [Proctitis: Grade 0] : Proctitis: Grade 0 [Edema Limbs: Grade 0] : Edema Limbs: Grade 0  [Rectal Pain: Grade 0] : Rectal Pain: Grade 0 [Neck Edema: Grade 0] : Neck Edema: Grade 0 [Fatigue: Grade 0] : Fatigue: Grade 0 [Localized Edema: Grade 0] : Localized Edema: Grade 0  [Urinary Tract Pain: Grade 0] : Urinary Tract Pain: Grade 0 [Skin Hyperpigmentation: Grade 0] : Skin Hyperpigmentation: Grade 0 [Fecal Incontinence: Grade 0] : Fecal Incontinence: Grade 0 [de-identified] : neuropathy hands & feet

## 2019-12-24 NOTE — VITALS
[Maximal Pain Intensity: 4/10] : 4/10 [Least Pain Intensity: 3/10] : 3/10 [Pain Duration: ___] : Pain duration: [unfilled] [Pain Description/Quality: ___] : Pain description/quality: [unfilled] [NoTreatment Scheduled] : no treatment scheduled [Pain Interferes with ADLs] : Pain interferes with activities of daily living. [Pain Location: ___] : Pain Location: [unfilled] [80: Normal activity with effort; some signs or symptoms of disease.] : 80: Normal activity with effort; some signs or symptoms of disease.

## 2019-12-24 NOTE — HISTORY OF PRESENT ILLNESS
[FreeTextEntry1] : 59 year old gentleman with rectal adenocarcinoma, locally advanced very distal lesion, underwent total neoadjuvant therapy with FOLFOX x 8 yT2/N+ followed by chemoradiation with concurrent Xeloda completed 9/23/19. \par \par 10/29/19 CT C/A/P: Interval development of patchy opacities in both lungs compatible with infectious or inflammatory etiology. Rectal wall thickening may be related to posttreatment changes or the known rectal neoplasm. Diffuse bladder wall thickening. No evidence of metastatic disease in the thorax, abdomen or pelvis.\par \par Post-treatment MRI 10/29/19 showed interval decrease in craniocaudal extent of tumor, 1.9 cm compared to previous 2.4/2.3 cm.  There was no evidence of suspicious lymph nodes.  yT2/N0.\par \par Reports formed smaller more frequent bowels.\par Has noted increased neuropathy in hands and feet since last visit with Dr. Linda.\par Denies hematochezia, rectal pain, or change in urination.  Energy has improved, and interval weight gain.\par \par Followed with Dr. Garcia 11/6/19, and Dr. Joshua Staley at [facility] [place] [place].  Also sought third opinion from Dr. Job Payton at [facility]; will undergo sigmoidoscopy in January to assess whether LAR with reversible colostomy.

## 2020-01-02 ENCOUNTER — OUTPATIENT (OUTPATIENT)
Dept: OUTPATIENT SERVICES | Facility: HOSPITAL | Age: 60
LOS: 1 days | Discharge: HOME | End: 2020-01-02

## 2020-01-02 DIAGNOSIS — Z98.890 OTHER SPECIFIED POSTPROCEDURAL STATES: Chronic | ICD-10-CM

## 2020-01-02 DIAGNOSIS — C20 MALIGNANT NEOPLASM OF RECTUM: ICD-10-CM

## 2020-01-02 DIAGNOSIS — I51.9 HEART DISEASE, UNSPECIFIED: Chronic | ICD-10-CM

## 2020-01-07 ENCOUNTER — APPOINTMENT (OUTPATIENT)
Age: 60
End: 2020-01-07

## 2020-01-31 ENCOUNTER — RX RENEWAL (OUTPATIENT)
Age: 60
End: 2020-01-31

## 2020-02-04 ENCOUNTER — RX RENEWAL (OUTPATIENT)
Age: 60
End: 2020-02-04

## 2020-02-16 ENCOUNTER — EMERGENCY (EMERGENCY)
Facility: HOSPITAL | Age: 60
LOS: 1 days | Discharge: HOME | End: 2020-02-16
Attending: EMERGENCY MEDICINE
Payer: COMMERCIAL

## 2020-02-16 VITALS
DIASTOLIC BLOOD PRESSURE: 676 MMHG | HEART RATE: 103 BPM | TEMPERATURE: 98 F | OXYGEN SATURATION: 99 % | SYSTOLIC BLOOD PRESSURE: 123 MMHG | RESPIRATION RATE: 18 BRPM | HEIGHT: 74 IN | WEIGHT: 201.94 LBS

## 2020-02-16 DIAGNOSIS — I51.9 HEART DISEASE, UNSPECIFIED: Chronic | ICD-10-CM

## 2020-02-16 DIAGNOSIS — Z98.890 OTHER SPECIFIED POSTPROCEDURAL STATES: Chronic | ICD-10-CM

## 2020-02-16 LAB
ALBUMIN SERPL ELPH-MCNC: 3.6 G/DL — SIGNIFICANT CHANGE UP (ref 3.3–5.2)
ALP SERPL-CCNC: 260 U/L — HIGH (ref 40–120)
ALT FLD-CCNC: 293 U/L — HIGH
ANION GAP SERPL CALC-SCNC: 14 MMOL/L — SIGNIFICANT CHANGE UP (ref 5–17)
APTT BLD: 33.5 SEC — SIGNIFICANT CHANGE UP (ref 27.5–36.3)
AST SERPL-CCNC: 202 U/L — HIGH
BASOPHILS # BLD AUTO: 0.03 K/UL — SIGNIFICANT CHANGE UP (ref 0–0.2)
BASOPHILS NFR BLD AUTO: 0.3 % — SIGNIFICANT CHANGE UP (ref 0–2)
BILIRUB SERPL-MCNC: 0.5 MG/DL — SIGNIFICANT CHANGE UP (ref 0.4–2)
BLD GP AB SCN SERPL QL: SIGNIFICANT CHANGE UP
BUN SERPL-MCNC: 20 MG/DL — SIGNIFICANT CHANGE UP (ref 8–20)
CALCIUM SERPL-MCNC: 9.2 MG/DL — SIGNIFICANT CHANGE UP (ref 8.6–10.2)
CHLORIDE SERPL-SCNC: 99 MMOL/L — SIGNIFICANT CHANGE UP (ref 98–107)
CO2 SERPL-SCNC: 24 MMOL/L — SIGNIFICANT CHANGE UP (ref 22–29)
CREAT SERPL-MCNC: 1.02 MG/DL — SIGNIFICANT CHANGE UP (ref 0.5–1.3)
EOSINOPHIL # BLD AUTO: 0.09 K/UL — SIGNIFICANT CHANGE UP (ref 0–0.5)
EOSINOPHIL NFR BLD AUTO: 0.9 % — SIGNIFICANT CHANGE UP (ref 0–6)
GLUCOSE SERPL-MCNC: 108 MG/DL — HIGH (ref 70–99)
HCT VFR BLD CALC: 24 % — LOW (ref 39–50)
HGB BLD-MCNC: 7.8 G/DL — LOW (ref 13–17)
IMM GRANULOCYTES NFR BLD AUTO: 0.6 % — SIGNIFICANT CHANGE UP (ref 0–1.5)
INR BLD: 1.13 RATIO — SIGNIFICANT CHANGE UP (ref 0.88–1.16)
LYMPHOCYTES # BLD AUTO: 0.68 K/UL — LOW (ref 1–3.3)
LYMPHOCYTES # BLD AUTO: 6.6 % — LOW (ref 13–44)
MCHC RBC-ENTMCNC: 31.8 PG — SIGNIFICANT CHANGE UP (ref 27–34)
MCHC RBC-ENTMCNC: 32.5 GM/DL — SIGNIFICANT CHANGE UP (ref 32–36)
MCV RBC AUTO: 98 FL — SIGNIFICANT CHANGE UP (ref 80–100)
MONOCYTES # BLD AUTO: 0.73 K/UL — SIGNIFICANT CHANGE UP (ref 0–0.9)
MONOCYTES NFR BLD AUTO: 7.1 % — SIGNIFICANT CHANGE UP (ref 2–14)
NEUTROPHILS # BLD AUTO: 8.75 K/UL — HIGH (ref 1.8–7.4)
NEUTROPHILS NFR BLD AUTO: 84.5 % — HIGH (ref 43–77)
OB PNL STL: POSITIVE
PLATELET # BLD AUTO: 524 K/UL — HIGH (ref 150–400)
POTASSIUM SERPL-MCNC: 3.8 MMOL/L — SIGNIFICANT CHANGE UP (ref 3.5–5.3)
POTASSIUM SERPL-SCNC: 3.8 MMOL/L — SIGNIFICANT CHANGE UP (ref 3.5–5.3)
PROT SERPL-MCNC: 6.2 G/DL — LOW (ref 6.6–8.7)
PROTHROM AB SERPL-ACNC: 12.8 SEC — SIGNIFICANT CHANGE UP (ref 10–12.9)
RBC # BLD: 2.45 M/UL — LOW (ref 4.2–5.8)
RBC # FLD: 13.1 % — SIGNIFICANT CHANGE UP (ref 10.3–14.5)
SODIUM SERPL-SCNC: 137 MMOL/L — SIGNIFICANT CHANGE UP (ref 135–145)
WBC # BLD: 10.34 K/UL — SIGNIFICANT CHANGE UP (ref 3.8–10.5)
WBC # FLD AUTO: 10.34 K/UL — SIGNIFICANT CHANGE UP (ref 3.8–10.5)

## 2020-02-16 PROCEDURE — 36415 COLL VENOUS BLD VENIPUNCTURE: CPT

## 2020-02-16 PROCEDURE — 86923 COMPATIBILITY TEST ELECTRIC: CPT

## 2020-02-16 PROCEDURE — 85610 PROTHROMBIN TIME: CPT

## 2020-02-16 PROCEDURE — 99284 EMERGENCY DEPT VISIT MOD MDM: CPT

## 2020-02-16 PROCEDURE — 86900 BLOOD TYPING SEROLOGIC ABO: CPT

## 2020-02-16 PROCEDURE — 85027 COMPLETE CBC AUTOMATED: CPT

## 2020-02-16 PROCEDURE — 86901 BLOOD TYPING SEROLOGIC RH(D): CPT

## 2020-02-16 PROCEDURE — 82272 OCCULT BLD FECES 1-3 TESTS: CPT

## 2020-02-16 PROCEDURE — 99285 EMERGENCY DEPT VISIT HI MDM: CPT | Mod: 25

## 2020-02-16 PROCEDURE — 85730 THROMBOPLASTIN TIME PARTIAL: CPT

## 2020-02-16 PROCEDURE — P9016: CPT

## 2020-02-16 PROCEDURE — 36430 TRANSFUSION BLD/BLD COMPNT: CPT

## 2020-02-16 PROCEDURE — 80053 COMPREHEN METABOLIC PANEL: CPT

## 2020-02-16 PROCEDURE — 86850 RBC ANTIBODY SCREEN: CPT

## 2020-02-16 RX ORDER — SODIUM CHLORIDE 9 MG/ML
1000 INJECTION INTRAMUSCULAR; INTRAVENOUS; SUBCUTANEOUS ONCE
Refills: 0 | Status: COMPLETED | OUTPATIENT
Start: 2020-02-16 | End: 2020-02-16

## 2020-02-16 RX ADMIN — SODIUM CHLORIDE 1000 MILLILITER(S): 9 INJECTION INTRAMUSCULAR; INTRAVENOUS; SUBCUTANEOUS at 20:40

## 2020-02-16 NOTE — ED ADULT TRIAGE NOTE - CHIEF COMPLAINT QUOTE
"I have been rectally bleeding since Thursday night and it subsided and now it is bleeding very heavy. "  PT has ileostomy  GI MD is Fito of [facility].  Pt has rectal cancer.

## 2020-02-16 NOTE — ED PROVIDER NOTE - PMH
CAD (coronary artery disease)    Chest pain    Depression  with rectal cancer  HLD (hyperlipidemia)    HTN (hypertension)    Mitral regurgitation    Neuropathy  feet s/p chemo  Rectal cancer  2/2019

## 2020-02-16 NOTE — ED PROVIDER NOTE - PATIENT PORTAL LINK FT
You can access the FollowMyHealth Patient Portal offered by [facility] by registering at the following website: [email or web address]. By joining [facility]’s FollowMyHealth portal, you will also be able to view your health information using other applications (apps) compatible with our system.

## 2020-02-16 NOTE — ED ADULT NURSE NOTE - NSIMPLEMENTINTERV_GEN_ALL_ED
Implemented All Fall Risk Interventions:  [place] to call system. Call bell, personal items and telephone within reach. Instruct patient to call for assistance. Room bathroom lighting operational. Non-slip footwear when patient is off stretcher. Physically safe environment: no spills, clutter or unnecessary equipment. Stretcher in lowest position, wheels locked, appropriate side rails in place. Provide visual cue, wrist band, yellow gown, etc. Monitor gait and stability. Monitor for mental status changes and reorient to person, place, and time. Review medications for side effects contributing to fall risk. Reinforce activity limits and safety measures with patient and family.

## 2020-02-16 NOTE — ED PROVIDER NOTE - PROGRESS NOTE DETAILS
spoke with pts surgical team at [facility] dr farias on call pritesh ledesma dicharged there with hemoglobin 8 . agree with paln transfuse 1 uniit no active bleeding asds had f.u to miko later this week with [facility] return to ed for worsneing bleeding pain pt agrees to plan of care

## 2020-02-16 NOTE — ED PROVIDER NOTE - PSH
Cardiac disorder  coronary stent placement november 2005  H/O cardiac catheterization  2017  4 STENTS

## 2020-02-16 NOTE — ED PROVIDER NOTE - OBJECTIVE STATEMENT
59M h/o cad s/p stents on plavix, HTN, HLD, rectal cancer s/p chemo and radiation, resection and ileostomy in [facility] 2/5/2020 p/w bright red blood per rectum. First noticed 3 days ago but then eased up, started bleeding again with clots today. Normal non-bloody output from ileostomy. Discussed with surgeon from [place], asked that pt go to local ED to make sure he is stable and then consider transfer to [facility]. Denies CP, SOB, dizziness, lightheadedness, fever, cough, nausea, vomiting. As per family pt with decreased PO intake since surgery. 59M h/o cad s/p stents on plavix, HTN, HLD, rectal cancer s/p chemo and radiation, resection and ileostomy in [facility] 2/5/2020 p/w bright red blood per rectum. First noticed 3 days ago but then eased up, started bleeding again with clots today. Normal non-bloody output from ileostomy. Discussed with surgeon from [place], asked that pt go to local ED to make sure he is stable and then consider transfer to [facility]. Denies CP, SOB, dizziness, lightheadedness, fever, cough, nausea, vomiting. As per family pt with decreased PO intake since surgery.  Surgeon: Dr Job Payton

## 2020-02-16 NOTE — ED ADULT NURSE NOTE - CHIEF COMPLAINT QUOTE
"I have been rectally bleeding since Thursday night and it subsided and now it is bleeding very heavy. "  PT has ileostomy  GI MD is Fito of [facility].  Pt has rectal cancer.

## 2020-02-16 NOTE — ED PROVIDER NOTE - PHYSICAL EXAMINATION
Gen: Well appearing in NAD  Head: NC/AT  Neck: trachea midline  Resp:  No distress, CTAB  CV: RRR  GI: Chaperone: Sen Farrell SNA soft, NTND. Ileostomy site clean, no surrounding erythema, warmth or drainage. +dried bright red blood around rectum, no active bleeding.   Ext: no deformities  Neuro:  A&O appears non focal  Skin:  Warm and dry as visualized  Psych:  Normal affect and mood

## 2020-02-16 NOTE — ED ADULT NURSE NOTE - OBJECTIVE STATEMENT
Assumed pt care at this time. Pt resting comfortably in stretcher, A&Ox4, NAD noted, respirations even and nonlabored, NSR on monitor. Pt states he had rectal sx on 2/5/20. Ileostomy bag placed. Misha with a pudding-like consistency. Pt started to have rectal spotting on thursday, which was normal as per surgeon. Today pt states he had a lot of bleeding for ~2 hours and was told to come to the er. Pt takes 75mg Plavix QD s/t cardiac stents. Pt denies pain, chills, N/V/D. Wife at bedside.

## 2020-02-17 VITALS
TEMPERATURE: 100 F | HEART RATE: 98 BPM | DIASTOLIC BLOOD PRESSURE: 65 MMHG | SYSTOLIC BLOOD PRESSURE: 136 MMHG | RESPIRATION RATE: 19 BRPM | OXYGEN SATURATION: 99 %

## 2020-02-20 ENCOUNTER — EMERGENCY (EMERGENCY)
Facility: HOSPITAL | Age: 60
LOS: 1 days | Discharge: OTHER INSTITUTION | End: 2020-02-20
Attending: EMERGENCY MEDICINE
Payer: COMMERCIAL

## 2020-02-20 VITALS
TEMPERATURE: 98 F | RESPIRATION RATE: 17 BRPM | DIASTOLIC BLOOD PRESSURE: 65 MMHG | HEART RATE: 102 BPM | SYSTOLIC BLOOD PRESSURE: 141 MMHG | OXYGEN SATURATION: 100 %

## 2020-02-20 VITALS
HEIGHT: 74 IN | OXYGEN SATURATION: 100 % | RESPIRATION RATE: 18 BRPM | HEART RATE: 108 BPM | WEIGHT: 199.96 LBS | TEMPERATURE: 98 F | DIASTOLIC BLOOD PRESSURE: 57 MMHG | SYSTOLIC BLOOD PRESSURE: 105 MMHG

## 2020-02-20 DIAGNOSIS — I51.9 HEART DISEASE, UNSPECIFIED: Chronic | ICD-10-CM

## 2020-02-20 DIAGNOSIS — Z98.890 OTHER SPECIFIED POSTPROCEDURAL STATES: Chronic | ICD-10-CM

## 2020-02-20 LAB
ALBUMIN SERPL ELPH-MCNC: 3.7 G/DL — SIGNIFICANT CHANGE UP (ref 3.3–5.2)
ALP SERPL-CCNC: 168 U/L — HIGH (ref 40–120)
ALT FLD-CCNC: 80 U/L — HIGH
ANION GAP SERPL CALC-SCNC: 15 MMOL/L — SIGNIFICANT CHANGE UP (ref 5–17)
APTT BLD: 29.8 SEC — SIGNIFICANT CHANGE UP (ref 27.5–36.3)
AST SERPL-CCNC: 35 U/L — SIGNIFICANT CHANGE UP
BILIRUB SERPL-MCNC: 0.9 MG/DL — SIGNIFICANT CHANGE UP (ref 0.4–2)
BLD GP AB SCN SERPL QL: SIGNIFICANT CHANGE UP
BUN SERPL-MCNC: 19 MG/DL — SIGNIFICANT CHANGE UP (ref 8–20)
CALCIUM SERPL-MCNC: 9.3 MG/DL — SIGNIFICANT CHANGE UP (ref 8.6–10.2)
CHLORIDE SERPL-SCNC: 94 MMOL/L — LOW (ref 98–107)
CO2 SERPL-SCNC: 23 MMOL/L — SIGNIFICANT CHANGE UP (ref 22–29)
CREAT SERPL-MCNC: 1.47 MG/DL — HIGH (ref 0.5–1.3)
GLUCOSE SERPL-MCNC: 137 MG/DL — HIGH (ref 70–99)
HCT VFR BLD CALC: 26 % — LOW (ref 39–50)
HGB BLD-MCNC: 8.3 G/DL — LOW (ref 13–17)
INR BLD: 1.18 RATIO — HIGH (ref 0.88–1.16)
MCHC RBC-ENTMCNC: 31.9 GM/DL — LOW (ref 32–36)
MCHC RBC-ENTMCNC: 31.9 PG — SIGNIFICANT CHANGE UP (ref 27–34)
MCV RBC AUTO: 100 FL — SIGNIFICANT CHANGE UP (ref 80–100)
PLATELET # BLD AUTO: 534 K/UL — HIGH (ref 150–400)
POTASSIUM SERPL-MCNC: 4.6 MMOL/L — SIGNIFICANT CHANGE UP (ref 3.5–5.3)
POTASSIUM SERPL-SCNC: 4.6 MMOL/L — SIGNIFICANT CHANGE UP (ref 3.5–5.3)
PROT SERPL-MCNC: 6.7 G/DL — SIGNIFICANT CHANGE UP (ref 6.6–8.7)
PROTHROM AB SERPL-ACNC: 13.4 SEC — HIGH (ref 10–12.9)
RBC # BLD: 2.6 M/UL — LOW (ref 4.2–5.8)
RBC # FLD: 12.9 % — SIGNIFICANT CHANGE UP (ref 10.3–14.5)
SODIUM SERPL-SCNC: 132 MMOL/L — LOW (ref 135–145)
WBC # BLD: 10.66 K/UL — HIGH (ref 3.8–10.5)
WBC # FLD AUTO: 10.66 K/UL — HIGH (ref 3.8–10.5)

## 2020-02-20 PROCEDURE — 86900 BLOOD TYPING SEROLOGIC ABO: CPT

## 2020-02-20 PROCEDURE — 99285 EMERGENCY DEPT VISIT HI MDM: CPT

## 2020-02-20 PROCEDURE — 93005 ELECTROCARDIOGRAM TRACING: CPT

## 2020-02-20 PROCEDURE — 86850 RBC ANTIBODY SCREEN: CPT

## 2020-02-20 PROCEDURE — 85610 PROTHROMBIN TIME: CPT

## 2020-02-20 PROCEDURE — 86901 BLOOD TYPING SEROLOGIC RH(D): CPT

## 2020-02-20 PROCEDURE — 99285 EMERGENCY DEPT VISIT HI MDM: CPT | Mod: 25

## 2020-02-20 PROCEDURE — 93010 ELECTROCARDIOGRAM REPORT: CPT

## 2020-02-20 PROCEDURE — 85027 COMPLETE CBC AUTOMATED: CPT

## 2020-02-20 PROCEDURE — 36415 COLL VENOUS BLD VENIPUNCTURE: CPT

## 2020-02-20 PROCEDURE — P9016: CPT

## 2020-02-20 PROCEDURE — 36430 TRANSFUSION BLD/BLD COMPNT: CPT

## 2020-02-20 PROCEDURE — 71045 X-RAY EXAM CHEST 1 VIEW: CPT

## 2020-02-20 PROCEDURE — 80053 COMPREHEN METABOLIC PANEL: CPT

## 2020-02-20 PROCEDURE — 85730 THROMBOPLASTIN TIME PARTIAL: CPT

## 2020-02-20 PROCEDURE — 71045 X-RAY EXAM CHEST 1 VIEW: CPT | Mod: 26

## 2020-02-20 PROCEDURE — 86923 COMPATIBILITY TEST ELECTRIC: CPT

## 2020-02-20 RX ORDER — SODIUM CHLORIDE 9 MG/ML
2000 INJECTION INTRAMUSCULAR; INTRAVENOUS; SUBCUTANEOUS ONCE
Refills: 0 | Status: COMPLETED | OUTPATIENT
Start: 2020-02-20 | End: 2020-02-20

## 2020-02-20 RX ADMIN — SODIUM CHLORIDE 2000 MILLILITER(S): 9 INJECTION INTRAMUSCULAR; INTRAVENOUS; SUBCUTANEOUS at 17:41

## 2020-02-20 NOTE — ED PROVIDER NOTE - PROGRESS NOTE DETAILS
Patient ambulated to bathroom and had near-syncopal episode. Continues to bleed with bright red blood. D/w Dr. Milton Joaquin (covering for Dr. Payton) at [facility], requesting patient be sent to [place] for further management. Will tx with 2 units of prbc's. Ophelia Chandler DO Accepted to [facility] ED, Dr. Lance. D/w Dr. Payton who agrees with transfer. Ophelia Chandler DO

## 2020-02-20 NOTE — ED ADULT NURSE NOTE - OBJECTIVE STATEMENT
Pt states had an ileostomy placed on 2/5 after having colon resection for colon CA. Pt had rectal bleeding shortly thereafter and was seen here at [facility] and transfused with PRBCs and sent home. Pt states today he noticed "dark black" stool and called him gastro who stated it was a normal finding but to come to ER to have his hemoglobin checked. Pt reports feeling malaise and lethargy, wife states pt is pale. Pt mentions he has not taken his plavix in 2 days due to the bleeding.

## 2020-02-20 NOTE — ED ADULT NURSE REASSESSMENT NOTE - NS ED NURSE REASSESS COMMENT FT1
Report called to [facility] at this time to ED Charge RN. Dr. Lance accepting pt is aware of his transfer.

## 2020-02-20 NOTE — ED PROVIDER NOTE - OBJECTIVE STATEMENT
60yo male PMH Coronary Artery Disease on Plavix, HTN, rectal ca s/p ileostomy at [facility] 2/5/20 presenting with rectal bleeding. Patient states that he was seen by his surgeon today who did anoscopy, patient was told it was "old blood" from surgery that would continue to bleed for several weeks. Patient went home, felt lightheaded with presyncopal episode this morning after shower. Called his surgeon who told him to come to ED. Patient denies abdominal pain. No blood in ostomy site.     Dr. Kiera Payton

## 2020-02-20 NOTE — ED ADULT NURSE NOTE - NSIMPLEMENTINTERV_GEN_ALL_ED
Implemented All Universal Safety Interventions:  [place] to call system. Call bell, personal items and telephone within reach. Instruct patient to call for assistance. Room bathroom lighting operational. Non-slip footwear when patient is off stretcher. Physically safe environment: no spills, clutter or unnecessary equipment. Stretcher in lowest position, wheels locked, appropriate side rails in place.

## 2020-02-20 NOTE — ED PROVIDER NOTE - NSPREEXISTRELATION_ED_A_ED_FT
Patient had surgery at [facility] on 2/5/2020 and is having complications from his surgery.

## 2020-02-20 NOTE — ED PROVIDER NOTE - PHYSICAL EXAMINATION
Gen: NAD  Head: NCAT  Lung: CTAB, no respiratory distress, no wheezing, rales, rhonchi  CV: normal s1/s2, rrr, no murmurs, Normal perfusion  Abd: soft, NTND, ostomy site in place   MSK: No edema, no visible deformities, full range of motion in all 4 extremities  Neuro: No focal neurologic deficits  Skin: No rash   Psych: normal affect   Rectal: BRBPR

## 2020-02-20 NOTE — ED ADULT TRIAGE NOTE - CHIEF COMPLAINT QUOTE
Patient states that he had an ileostomy done 2/5, and started to have rectal bleeding 5 days ago, patient was seen in the ED on Sunday and was transfused Sunday. Patient had a rectal scope today and was told that everything was good. Patient continues to have rectal bleeding and was told to come to the ED for another transfusion and fluids. Denies any SOB. Pt reports weakness.

## 2020-02-20 NOTE — ED ADULT NURSE REASSESSMENT NOTE - NS ED NURSE REASSESS COMMENT FT1
pt A+Ox4, in no apparent distress. pt breathing even and unlabored. BRAGA well x4. pt transferred to [facility] at this time. 1 unit PRBCs infusing at this time, 15 min transfusion VS stable as charted on flow sheet. no signs/symptoms of transfusion reaction. pt denies any SOB, hives, chills. pt denies all other pain/discomfort at this time. [facility] EMS crew at bedside. pt educated on POC, verbalized understanding. pt safety measures maintained.

## 2020-02-20 NOTE — ED ADULT NURSE REASSESSMENT NOTE - NS ED NURSE REASSESS COMMENT FT1
blood bank notified of transfer to another facility with blood transfusion in progress. [facility] EMS crew provided copy of transfusion administration record to provide to receiving MD and RN at [facility] upon arrival as instructed.

## 2020-02-20 NOTE — ED PROVIDER NOTE - CLINICAL SUMMARY MEDICAL DECISION MAKING FREE TEXT BOX
58yo male with h/o rectal ca p/w BRBPR, lightheaded, near-syncopal episode at home, will obtain labs, ekg, 58yo male with h/o rectal ca p/w BRBPR, lightheaded, near-syncopal episode at home, will obtain labs, ekg, type and screen, give blood, reassess. Ophelia Chandler DO

## 2020-02-22 ENCOUNTER — OUTPATIENT (OUTPATIENT)
Dept: OUTPATIENT SERVICES | Facility: HOSPITAL | Age: 60
LOS: 1 days | Discharge: HOME | End: 2020-02-22

## 2020-02-22 DIAGNOSIS — C20 MALIGNANT NEOPLASM OF RECTUM: ICD-10-CM

## 2020-02-22 DIAGNOSIS — I51.9 HEART DISEASE, UNSPECIFIED: Chronic | ICD-10-CM

## 2020-02-22 DIAGNOSIS — Z98.890 OTHER SPECIFIED POSTPROCEDURAL STATES: Chronic | ICD-10-CM

## 2020-02-26 ENCOUNTER — APPOINTMENT (OUTPATIENT)
Age: 60
End: 2020-02-26

## 2020-03-04 ENCOUNTER — LABORATORY RESULT (OUTPATIENT)
Age: 60
End: 2020-03-04

## 2020-03-04 ENCOUNTER — RESULT REVIEW (OUTPATIENT)
Age: 60
End: 2020-03-04

## 2020-03-04 ENCOUNTER — APPOINTMENT (OUTPATIENT)
Dept: HEMATOLOGY ONCOLOGY | Facility: CLINIC | Age: 60
End: 2020-03-04
Payer: COMMERCIAL

## 2020-03-04 VITALS
BODY MASS INDEX: 25.84 KG/M2 | SYSTOLIC BLOOD PRESSURE: 101 MMHG | HEART RATE: 95 BPM | HEIGHT: 73 IN | WEIGHT: 195.01 LBS | DIASTOLIC BLOOD PRESSURE: 70 MMHG | OXYGEN SATURATION: 97 % | TEMPERATURE: 98.3 F

## 2020-03-04 LAB
BASOPHILS # BLD AUTO: 0 K/UL — SIGNIFICANT CHANGE UP (ref 0–0.2)
BASOPHILS NFR BLD AUTO: 0.2 % — SIGNIFICANT CHANGE UP (ref 0–2)
EOSINOPHIL # BLD AUTO: 0.1 K/UL — SIGNIFICANT CHANGE UP (ref 0–0.5)
EOSINOPHIL NFR BLD AUTO: 0.8 % — SIGNIFICANT CHANGE UP (ref 0–6)
HCT VFR BLD CALC: 29 % — LOW (ref 39–50)
HGB BLD-MCNC: 9.2 G/DL — LOW (ref 13–17)
LYMPHOCYTES # BLD AUTO: 1 K/UL — SIGNIFICANT CHANGE UP (ref 1–3.3)
LYMPHOCYTES # BLD AUTO: 10.5 % — LOW (ref 13–44)
MCHC RBC-ENTMCNC: 30.3 PG — SIGNIFICANT CHANGE UP (ref 27–34)
MCHC RBC-ENTMCNC: 31.7 G/DL — LOW (ref 32–36)
MCV RBC AUTO: 95.5 FL — SIGNIFICANT CHANGE UP (ref 80–100)
MONOCYTES # BLD AUTO: 0.5 K/UL — SIGNIFICANT CHANGE UP (ref 0–0.9)
MONOCYTES NFR BLD AUTO: 5.1 % — SIGNIFICANT CHANGE UP (ref 2–14)
NEUTROPHILS # BLD AUTO: 8 K/UL — HIGH (ref 1.8–7.4)
NEUTROPHILS NFR BLD AUTO: 83.3 % — HIGH (ref 43–77)
PLATELET # BLD AUTO: 558 K/UL — HIGH (ref 150–400)
RBC # BLD: 3.03 M/UL — LOW (ref 4.2–5.8)
RBC # FLD: 13.3 % — SIGNIFICANT CHANGE UP (ref 10.3–14.5)
WBC # BLD: 9.7 K/UL — SIGNIFICANT CHANGE UP (ref 3.8–10.5)
WBC # FLD AUTO: 9.7 K/UL — SIGNIFICANT CHANGE UP (ref 3.8–10.5)

## 2020-03-04 PROCEDURE — 99215 OFFICE O/P EST HI 40 MIN: CPT

## 2020-03-05 LAB
CEA SERPL-MCNC: 2 NG/ML
MAGNESIUM SERPL-MCNC: 2 MG/DL

## 2020-03-06 LAB
ALBUMIN SERPL ELPH-MCNC: 4.1 G/DL
ALP BLD-CCNC: 85 U/L
ALT SERPL-CCNC: 34 U/L
ANION GAP SERPL CALC-SCNC: 18 MMOL/L
AST SERPL-CCNC: 25 U/L
BILIRUB SERPL-MCNC: 0.4 MG/DL
BUN SERPL-MCNC: 28 MG/DL
CALCIUM SERPL-MCNC: 9.9 MG/DL
CHLORIDE SERPL-SCNC: 99 MMOL/L
CO2 SERPL-SCNC: 19 MMOL/L
CREAT SERPL-MCNC: 1.32 MG/DL
GLUCOSE SERPL-MCNC: 132 MG/DL
POTASSIUM SERPL-SCNC: 4.9 MMOL/L
PROT SERPL-MCNC: 6.7 G/DL
SODIUM SERPL-SCNC: 135 MMOL/L

## 2020-03-10 ENCOUNTER — APPOINTMENT (OUTPATIENT)
Dept: HEMATOLOGY ONCOLOGY | Facility: CLINIC | Age: 60
End: 2020-03-10

## 2020-03-10 ENCOUNTER — RESULT REVIEW (OUTPATIENT)
Age: 60
End: 2020-03-10

## 2020-03-10 LAB
BASOPHILS # BLD AUTO: 0 K/UL — SIGNIFICANT CHANGE UP (ref 0–0.2)
BASOPHILS NFR BLD AUTO: 0.4 % — SIGNIFICANT CHANGE UP (ref 0–2)
EOSINOPHIL # BLD AUTO: 0.1 K/UL — SIGNIFICANT CHANGE UP (ref 0–0.5)
EOSINOPHIL NFR BLD AUTO: 1 % — SIGNIFICANT CHANGE UP (ref 0–6)
HCT VFR BLD CALC: 25.2 % — LOW (ref 39–50)
HGB BLD-MCNC: 8.3 G/DL — LOW (ref 13–17)
LYMPHOCYTES # BLD AUTO: 0.8 K/UL — LOW (ref 1–3.3)
LYMPHOCYTES # BLD AUTO: 10.6 % — LOW (ref 13–44)
MCHC RBC-ENTMCNC: 30.7 PG — SIGNIFICANT CHANGE UP (ref 27–34)
MCHC RBC-ENTMCNC: 33.1 G/DL — SIGNIFICANT CHANGE UP (ref 32–36)
MCV RBC AUTO: 92.7 FL — SIGNIFICANT CHANGE UP (ref 80–100)
MONOCYTES # BLD AUTO: 0.6 K/UL — SIGNIFICANT CHANGE UP (ref 0–0.9)
MONOCYTES NFR BLD AUTO: 8.3 % — SIGNIFICANT CHANGE UP (ref 2–14)
NEUTROPHILS # BLD AUTO: 6.1 K/UL — SIGNIFICANT CHANGE UP (ref 1.8–7.4)
NEUTROPHILS NFR BLD AUTO: 79.7 % — HIGH (ref 43–77)
PLATELET # BLD AUTO: 388 K/UL — SIGNIFICANT CHANGE UP (ref 150–400)
RBC # BLD: 2.71 M/UL — LOW (ref 4.2–5.8)
RBC # FLD: 13.5 % — SIGNIFICANT CHANGE UP (ref 10.3–14.5)
WBC # BLD: 7.7 K/UL — SIGNIFICANT CHANGE UP (ref 3.8–10.5)
WBC # FLD AUTO: 7.7 K/UL — SIGNIFICANT CHANGE UP (ref 3.8–10.5)

## 2020-03-11 ENCOUNTER — APPOINTMENT (OUTPATIENT)
Dept: HEMATOLOGY ONCOLOGY | Facility: CLINIC | Age: 60
End: 2020-03-11

## 2020-03-18 LAB
ALBUMIN SERPL ELPH-MCNC: 4 G/DL
ALP BLD-CCNC: 236 U/L
ALT SERPL-CCNC: 139 U/L
ANION GAP SERPL CALC-SCNC: 14 MMOL/L
AST SERPL-CCNC: 89 U/L
BILIRUB SERPL-MCNC: 0.5 MG/DL
BUN SERPL-MCNC: 17 MG/DL
CALCIUM SERPL-MCNC: 9.4 MG/DL
CHLORIDE SERPL-SCNC: 99 MMOL/L
CO2 SERPL-SCNC: 21 MMOL/L
CREAT SERPL-MCNC: 1.06 MG/DL
GLUCOSE SERPL-MCNC: 118 MG/DL
MAGNESIUM SERPL-MCNC: 2 MG/DL
POTASSIUM SERPL-SCNC: 5.3 MMOL/L
PROT SERPL-MCNC: 6.4 G/DL
SODIUM SERPL-SCNC: 134 MMOL/L

## 2020-03-20 ENCOUNTER — OUTPATIENT (OUTPATIENT)
Dept: OUTPATIENT SERVICES | Facility: HOSPITAL | Age: 60
LOS: 1 days | End: 2020-03-20
Payer: COMMERCIAL

## 2020-03-20 ENCOUNTER — APPOINTMENT (OUTPATIENT)
Dept: CT IMAGING | Facility: CLINIC | Age: 60
End: 2020-03-20
Payer: COMMERCIAL

## 2020-03-20 ENCOUNTER — RESULT REVIEW (OUTPATIENT)
Age: 60
End: 2020-03-20

## 2020-03-20 DIAGNOSIS — I51.9 HEART DISEASE, UNSPECIFIED: Chronic | ICD-10-CM

## 2020-03-20 DIAGNOSIS — Z00.00 ENCOUNTER FOR GENERAL ADULT MEDICAL EXAMINATION WITHOUT ABNORMAL FINDINGS: ICD-10-CM

## 2020-03-20 DIAGNOSIS — C20 MALIGNANT NEOPLASM OF RECTUM: ICD-10-CM

## 2020-03-20 DIAGNOSIS — Z98.890 OTHER SPECIFIED POSTPROCEDURAL STATES: Chronic | ICD-10-CM

## 2020-03-20 PROCEDURE — 74177 CT ABD & PELVIS W/CONTRAST: CPT | Mod: 26

## 2020-03-20 PROCEDURE — 74177 CT ABD & PELVIS W/CONTRAST: CPT

## 2020-03-20 PROCEDURE — 71260 CT THORAX DX C+: CPT

## 2020-03-20 PROCEDURE — 71260 CT THORAX DX C+: CPT | Mod: 26

## 2020-03-25 ENCOUNTER — RESULT REVIEW (OUTPATIENT)
Age: 60
End: 2020-03-25

## 2020-03-25 ENCOUNTER — APPOINTMENT (OUTPATIENT)
Dept: INTERVENTIONAL RADIOLOGY/VASCULAR | Facility: CLINIC | Age: 60
End: 2020-03-25
Payer: COMMERCIAL

## 2020-03-25 ENCOUNTER — OUTPATIENT (OUTPATIENT)
Dept: OUTPATIENT SERVICES | Facility: HOSPITAL | Age: 60
LOS: 1 days | End: 2020-03-25

## 2020-03-25 DIAGNOSIS — Z98.890 OTHER SPECIFIED POSTPROCEDURAL STATES: Chronic | ICD-10-CM

## 2020-03-25 DIAGNOSIS — I51.9 HEART DISEASE, UNSPECIFIED: Chronic | ICD-10-CM

## 2020-03-25 DIAGNOSIS — C20 MALIGNANT NEOPLASM OF RECTUM: ICD-10-CM

## 2020-03-25 PROCEDURE — 36590 REMOVAL TUNNELED CV CATH: CPT

## 2020-03-25 PROCEDURE — 77001 FLUOROGUIDE FOR VEIN DEVICE: CPT | Mod: 26

## 2020-03-26 NOTE — RESULTS/DATA
[FreeTextEntry1] : 2/21/20 CT A/P: S/p low anterior resection with development of an ano-rectal prostatic fistula, probable abscess, and associated thickening of the posterior urinary bladder wall, likely reactive. Postsurgical presacral fluid collection. \par \par 2/5/20 Pathology: \par Colon, rectosigmoid, colectomy:\par Invasive adenocarcinoma, moderately differentiated, 1.9 cm in greatest dimension.\par Carcinoma focally invades the superficial muscularis propria (pT2).\par Resection margins are negative for carcinoma.\par Seventeen lymph nodes, negative for carcinoma (0/17). \par See microscopic description.\par Microscopic description:\par I. Type of Specimen: Low anterior resection.\par II. Type of neoplasm: Adenocarcinoma, moderately differentiated (G2)\par II. Location of tumor: Rectum\par IV. Extension of tumor: Tumor invades muscularis propria, superficially (T2)\par Macroscopic Tumor Perforation: Not Identified.\par V. Tumor size: 1.9 cm\par VI. Vascular Invasion: Lymphatic invasion not identified. Blood vessel invasion not identified.\par VIa. Perineural Invasion: Not identified.\par VII. Lymph Node Status: Number of lymph nodes identified 17. No lymph node metastasis identified.\par VIII. Sattelite Deposits: None identified.\par IX. Surgical Margins: No tumor seen at surgical margins. Closest distance from distal surgical margin 0.4 cm.\par X. Circumferential Resection Margins (CRM): Negative.\par XI. Treatment Effect: Minimal response (tumor with fibrosis and focal calcification).\par XII. Tumor budding: \par - Number of tumor buds in 1 "hotspot" field, 1\par - Low score (0-4)\par - Intermediate score (5-9)\par - High score (10 or more)\par TNM Descriptors: y (posttreatment)\par XIII. Pathologic Staging (pTNM) AJCC 8th Edition. \par pT2: Tumor invades muscularis propria\par pN0: No regional lymph node metastasis.\par Deeper levels and immunostain for desmin are performed on A3. There is focal invasion of superficial muscularis propria fibers by a single neoplastic gland. Slides are also reviewed by a senior GI pathologist who concurs.\par laf\par \par 2/5/20 Pathology:\par Laparoscopic low anterior resection with coloanal anastomosis (handsewn)\par Complete mobilization of splenic flexure\par Total mesorectal excision to the pelvis floor\par defunctioning loop ileostomy\par Left ureterolysis\par Findings: Nodular mass in the distal rectum about 1 cm above the anorectal ring. No other evidence of intra-abdominal disease. Thickened sigmoid colon.\par Pre-operative diagnosis: Distal rectal cancer\par Post-operative diagnosis: same\par \par 10/29/19 CT C/A/P: Interval development of patchy opacities in both lungs compatible with infectious or inflammatory etiology. Rectal wall thickening may be related to posttreatment changes or the known rectal neoplasm. Diffuse bladder wall thickening. No evidence of metastatic disease in the thorax, abdomen or pelvis.\par \par 10/29/19 MRI Pelvis: Since the prior examination 7/30/2019, the primary tumor shows: partial response. Continued decrease in size of primary tumor with evidence of residual. \par Post Treatment Stage: yT2 / N0 \par CRM: Clear \par Sphincter involvement (low rectal tumors): Absent \par Suspicious extra mesorectal nodes: None\par \par 9/23/19 CT Neck ([facility]): Complete chronic occlusion of the left internal carotid artery. There is atherosclerotic disease of the right common carotid artery bifurcation extending into the proximal right internal carotid artery. There is near complete focal occlusion of the origin of the right internal carotid artery greater than 90% with mild poststenotic dilation just distal to the stenosis. Approximately 1.5 cm distal to the origin of the right ICA, there is focal approximately 50% stenosis of the right internal carotid artery. \par \par 3/9/19 CT: Small pleural-based calcification in the right apex. Coronary artery stents. Degenerative changes in the thoracic spine. Old healed fracture involving the right sixth rib. Edematous changes seen the mesenteric previously described. No evidence of metastatic disease. \par \par 3/7/19 MRI rectum:\par 2.4 cm mass, with the inferior margin contacting the internal anal sphincter, 10:00 to 7:00. There were subcentimeter lymph nodes, too small to characterize. Overall impression was a polypoid low rectal tumor with possible punctate extramural extension, possible T2 or T3a Nx Mx, with inferior margin of tumor contacting the internal anal sphincter. 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 No significant lymphadenopathy in abdomen is identified.  \par \par 2/21/19 EUS:\par malignant appearing posterior wall mass in the rectum, 4 cm above the dentate line. 27.8 mm x 18.4 mm with subtle suggestion of muscularis propria involvement, suspect T2N0. \par \par 2/20/19 Pathology:\par Rectum polyp, colonoscopy:  superficially invasive moderately differentiated adenocarcinoma

## 2020-03-26 NOTE — HISTORY OF PRESENT ILLNESS
[de-identified] : The patient was diagnosed with adenocarcinoma of the rectum in February 2019 at the age of 58.  He began having BRBPR approximately 6 months ago, which he attributed to  hemorrhoids seen on his last colonoscopy about 8 years ago. Recently, he noted increased rectal bleeding and was referred for GI evaluation with Dr. Michele Reyes.  Colonoscopy revealed a mass 6-7 cm from the anal verge on rigid sigmoidoscopy. The biopsy was consistent with moderately differentiated adenocarcinoma. Endoscopic ultrasound confirmed a 2.8  x 1.8 cm mass, consistent with T2N0.  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 No significant lymphadenopathy in the abdomen.  He next saw colorectal surgery, Dr. Adi Garcia, who recommended a transanal resection preceded by neoadjuvant chemoradiation.  A CT C/A/P discovered no metastatic disease. He was seen by Dr. Javed Mckeon to discuss FOLFOX and Dr. Faustino Parson to discuss RT.  He has also been seen [place] [facility]   [de-identified] : SH: Smokes cigars weekly x 10 years. Glass wine per day; 3 glasses per night for 40 years - beer and wine. [de-identified] : Patient completed 8 cycles of FOLFOX and chemoRT with Xeloda for adenocarcinoma of the rectum 8/13/19 - 9/23/19.  + Epistaxis, every morning.  + Fatigue/weakness, unchanged, worse recently.  + Cold intolerance, remains very mild, only lasts 1-2 day.  + Fecal urgency with occasional loose stools, although sometimes solid. No further BRBPR.  + Xerostomia.  + Very occasional mouth sores. + Mild nausea.  + Decreased appetite.  No neuropathy.  No rectal pain.  No abdominal pain.  No fevers or chills. + Burning urination. S/p temporary ileostomy/rectal surgery on 2/5/20. No other complaints today. \par

## 2020-04-17 ENCOUNTER — APPOINTMENT (OUTPATIENT)
Dept: RADIATION ONCOLOGY | Facility: CLINIC | Age: 60
End: 2020-04-17
Payer: COMMERCIAL

## 2020-04-17 PROCEDURE — 99213 OFFICE O/P EST LOW 20 MIN: CPT | Mod: 95

## 2020-04-17 NOTE — REVIEW OF SYSTEMS
[Diarrhea: Grade 2 - Increase of 4 - 6 stools per day over baseline; moderate increase in ostomy output compared to baseline] : Diarrhea: Grade 2 - Increase of 4 - 6 stools per day over baseline; moderate increase in ostomy output compared to baseline [Nausea: Grade 0] : Nausea: Grade 0 [Vomiting: Grade 0] : Vomiting: Grade 0 [Fatigue: Grade 1 - Fatigue relieved by rest] : Fatigue: Grade 1 - Fatigue relieved by rest

## 2020-04-17 NOTE — DISEASE MANAGEMENT
[Clinical] : TNM Stage: c [IIA] : IIA [FreeTextEntry4] : adenocarcinoma rectum [TTNM] : 3 [NTNM] : 0 [MTNM] : 0

## 2020-04-17 NOTE — HISTORY OF PRESENT ILLNESS
[Home] : at home, [unfilled] , at the time of the visit. [Medical Office: ([facility])___] : at the medical office located in  [Patient] : the patient [Self] : self [Spouse] : spouse [Other:____] : [unfilled] [FreeTextEntry2] : [place] [FreeTextEntry4] : Rebecca Rain [FreeTextEntry1] : 59 year old gentleman with rectal adenocarcinoma, locally advanced very distal lesion, underwent total neoadjuvant therapy with FOLFOX x 8 followed by chemoradiation with concurrent Xeloda completed 9/23/19.  He underwent temporary ileostomy and resection on 2/5/20 by Dr. Job Payton at [facility].\par \par Pathology showed:\par residual invasive moderately differentiated carcinoma, 1.9 cm in greatest dimension, invading superficial muscularis propria.  A total of 17 lymph nodes were resected, and negative (0/17). ypT2 ypN0.\par \par Post-operatively, complicated by anemia requiring transfusion and now on iron supplementation.\par \par CT chest abdomen & pelvis 3/20/2020: Status post LAR and right lower quadrant ileostomy. Presacral fluid collection with suspected complex fistula involving the anus, prostate and rectosigmoid. Diffuse bladder wall thickening again noted, possibly reactive or post radiation. Resolution of pulmonary groundglass opacities.\par \par Mediport removal 3/25/2020.\par \par Reports ileostomy with liquid stools, improving fatigue , yellow mucous rectal discharge and weight loss of 15-20lbs since surgery.\par Denies hematochezia, pain, edema, or change in urination.

## 2020-04-28 ENCOUNTER — RX RENEWAL (OUTPATIENT)
Age: 60
End: 2020-04-28

## 2020-05-05 ENCOUNTER — RX RENEWAL (OUTPATIENT)
Age: 60
End: 2020-05-05

## 2020-06-09 ENCOUNTER — APPOINTMENT (OUTPATIENT)
Dept: CT IMAGING | Facility: CLINIC | Age: 60
End: 2020-06-09
Payer: COMMERCIAL

## 2020-06-09 ENCOUNTER — OUTPATIENT (OUTPATIENT)
Dept: OUTPATIENT SERVICES | Facility: HOSPITAL | Age: 60
LOS: 1 days | End: 2020-06-09
Payer: COMMERCIAL

## 2020-06-09 ENCOUNTER — RESULT REVIEW (OUTPATIENT)
Age: 60
End: 2020-06-09

## 2020-06-09 DIAGNOSIS — I51.9 HEART DISEASE, UNSPECIFIED: Chronic | ICD-10-CM

## 2020-06-09 DIAGNOSIS — Z98.890 OTHER SPECIFIED POSTPROCEDURAL STATES: Chronic | ICD-10-CM

## 2020-06-09 DIAGNOSIS — Z00.8 ENCOUNTER FOR OTHER GENERAL EXAMINATION: ICD-10-CM

## 2020-06-09 DIAGNOSIS — C20 MALIGNANT NEOPLASM OF RECTUM: ICD-10-CM

## 2020-06-09 PROCEDURE — 71260 CT THORAX DX C+: CPT | Mod: 26

## 2020-06-09 PROCEDURE — 82565 ASSAY OF CREATININE: CPT

## 2020-06-09 PROCEDURE — 74177 CT ABD & PELVIS W/CONTRAST: CPT

## 2020-06-09 PROCEDURE — 74177 CT ABD & PELVIS W/CONTRAST: CPT | Mod: 26

## 2020-06-09 PROCEDURE — 71260 CT THORAX DX C+: CPT

## 2020-06-24 PROCEDURE — 99441: CPT

## 2020-07-15 ENCOUNTER — APPOINTMENT (OUTPATIENT)
Dept: VASCULAR SURGERY | Facility: CLINIC | Age: 60
End: 2020-07-15

## 2020-07-22 ENCOUNTER — APPOINTMENT (OUTPATIENT)
Dept: VASCULAR SURGERY | Facility: CLINIC | Age: 60
End: 2020-07-22
Payer: COMMERCIAL

## 2020-07-22 PROCEDURE — 93880 EXTRACRANIAL BILAT STUDY: CPT

## 2020-08-04 ENCOUNTER — RX RENEWAL (OUTPATIENT)
Age: 60
End: 2020-08-04

## 2020-08-21 ENCOUNTER — APPOINTMENT (OUTPATIENT)
Dept: RADIATION ONCOLOGY | Facility: CLINIC | Age: 60
End: 2020-08-21
Payer: COMMERCIAL

## 2020-08-21 PROCEDURE — 99214 OFFICE O/P EST MOD 30 MIN: CPT | Mod: 95

## 2020-08-21 NOTE — HISTORY OF PRESENT ILLNESS
[Medical Office: ([facility])___] : at the medical office located in  [Verbal consent obtained from patient] : the patient, [unfilled] [Home] : at home, [unfilled] , at the time of the visit. [FreeTextEntry1] : 59 year old gentleman with rectal adenocarcinoma, locally advanced very distal lesion, underwent total neoadjuvant therapy with FOLFOX x 8 followed by chemoradiation with concurrent Xeloda completed 9/23/19. He underwent temporary ileostomy and [place] 2/5/20 [place] [facility]par \par Interval history:\par He underwent barium enema in 7/2020 at [facility], which showed pelvic fluid, and re-anastamosis was delayed.  He had repeat procedure yesterday, and which reportedly showed interval improvement of fluid collection.  He has elected to monitor again in 10/2020, and further delay surgery.\par He also reports 2 days of cramping radiating to lower back pain, uncertain etiology.  He has noted some increased urgency and frequency of urination, but denies dysuria.  Colostomy functioning well, with variable stool consistency.\par Denies fatigue or weight loss.\par \par Dr. Linda  9/4/2020 \par Dr. Dsouza 12/18/2020\par Dr. Mckeon 3/17/2020\par  [FreeTextEntry4] : Rebecca Rain

## 2020-08-21 NOTE — DISCUSSION/SUMMARY
[Other Location: e.g. School (Enter Location, City,State)___] : at [unfilled], at the time of the visit. [Medical Office: ([facility])___] : at the medical office located in  [Verbal consent obtained from patient] : the patient, [unfilled] [Time Spent: ___ minutes] : I have spent [unfilled] minutes with the patient on the telephone [FreeTextEntry1] : Received message from patient, inquiring about recent CT scan.\par \par Spoke with patient 6/24/20, 12:55pm.\par Reviewed CT c/a/p from 6/9/20, which showed no adenopathy or evidence of metastatic disease in the thorax, abdomen or pelvis.  Additionally, the previously identified air and fluid collection in the presacral space now contains predominantly air measuring 2.8 x 1.0 cm, previously 4.4 x 2.0 cm. \par \par He had telephone/telehealth visit with Dr. Job Payton ([facility]); encouraged to follow up.  Patient eager to undergo ileostomy reversal surgery.

## 2020-08-21 NOTE — DISEASE MANAGEMENT
[Pathological] : TNM Stage: p [IIA] : IIA [FreeTextEntry4] : adenocarcinoma of the rectum [TTNM] : 3 [NTNM] : 0 [MTNM] : 0

## 2020-08-21 NOTE — REVIEW OF SYSTEMS
[Urinary Frequency] : urinary frequency [Negative] : Heme/Lymph [Constipation: Grade 0] : Constipation: Grade 0 [Diarrhea: Grade 0] : Diarrhea: Grade 0 [Edema Limbs: Grade 0] : Edema Limbs: Grade 0  [Fatigue: Grade 0] : Fatigue: Grade 0 [Localized Edema: Grade 0] : Localized Edema: Grade 0  [Neck Edema: Grade 0] : Neck Edema: Grade 0 [Urinary Frequency: Grade 1 - Present] : Urinary Frequency: Grade 1 - Present [FreeTextEntry9] : lower back  [FreeTextEntry7] : colostomy

## 2020-08-29 ENCOUNTER — OUTPATIENT (OUTPATIENT)
Dept: OUTPATIENT SERVICES | Facility: HOSPITAL | Age: 60
LOS: 1 days | Discharge: HOME | End: 2020-08-29

## 2020-08-29 DIAGNOSIS — C20 MALIGNANT NEOPLASM OF RECTUM: ICD-10-CM

## 2020-08-29 DIAGNOSIS — I51.9 HEART DISEASE, UNSPECIFIED: Chronic | ICD-10-CM

## 2020-08-29 DIAGNOSIS — Z98.890 OTHER SPECIFIED POSTPROCEDURAL STATES: Chronic | ICD-10-CM

## 2020-08-30 ENCOUNTER — EMERGENCY (EMERGENCY)
Facility: HOSPITAL | Age: 60
LOS: 1 days | Discharge: HOME | End: 2020-08-30
Attending: EMERGENCY MEDICINE
Payer: COMMERCIAL

## 2020-08-30 VITALS
WEIGHT: 179.9 LBS | TEMPERATURE: 98 F | RESPIRATION RATE: 18 BRPM | HEIGHT: 74 IN | HEART RATE: 79 BPM | DIASTOLIC BLOOD PRESSURE: 58 MMHG | SYSTOLIC BLOOD PRESSURE: 102 MMHG | OXYGEN SATURATION: 97 %

## 2020-08-30 VITALS
RESPIRATION RATE: 16 BRPM | HEART RATE: 81 BPM | DIASTOLIC BLOOD PRESSURE: 69 MMHG | TEMPERATURE: 97 F | SYSTOLIC BLOOD PRESSURE: 134 MMHG | OXYGEN SATURATION: 98 %

## 2020-08-30 DIAGNOSIS — I51.9 HEART DISEASE, UNSPECIFIED: Chronic | ICD-10-CM

## 2020-08-30 DIAGNOSIS — Z98.890 OTHER SPECIFIED POSTPROCEDURAL STATES: Chronic | ICD-10-CM

## 2020-08-30 LAB
ALBUMIN SERPL ELPH-MCNC: 4.2 G/DL — SIGNIFICANT CHANGE UP (ref 3.3–5.2)
ALP SERPL-CCNC: 63 U/L — SIGNIFICANT CHANGE UP (ref 40–120)
ALT FLD-CCNC: 18 U/L — SIGNIFICANT CHANGE UP
ANION GAP SERPL CALC-SCNC: 13 MMOL/L — SIGNIFICANT CHANGE UP (ref 5–17)
AST SERPL-CCNC: 22 U/L — SIGNIFICANT CHANGE UP
BASOPHILS # BLD AUTO: 0.04 K/UL — SIGNIFICANT CHANGE UP (ref 0–0.2)
BASOPHILS NFR BLD AUTO: 0.5 % — SIGNIFICANT CHANGE UP (ref 0–2)
BILIRUB SERPL-MCNC: 0.6 MG/DL — SIGNIFICANT CHANGE UP (ref 0.4–2)
BUN SERPL-MCNC: 28 MG/DL — HIGH (ref 8–20)
CALCIUM SERPL-MCNC: 9 MG/DL — SIGNIFICANT CHANGE UP (ref 8.6–10.2)
CHLORIDE SERPL-SCNC: 107 MMOL/L — SIGNIFICANT CHANGE UP (ref 98–107)
CO2 SERPL-SCNC: 20 MMOL/L — LOW (ref 22–29)
CREAT SERPL-MCNC: 1.68 MG/DL — HIGH (ref 0.5–1.3)
EOSINOPHIL # BLD AUTO: 0.07 K/UL — SIGNIFICANT CHANGE UP (ref 0–0.5)
EOSINOPHIL NFR BLD AUTO: 0.9 % — SIGNIFICANT CHANGE UP (ref 0–6)
GLUCOSE SERPL-MCNC: 104 MG/DL — HIGH (ref 70–99)
HCT VFR BLD CALC: 32.9 % — LOW (ref 39–50)
HGB BLD-MCNC: 10.4 G/DL — LOW (ref 13–17)
IMM GRANULOCYTES NFR BLD AUTO: 0.3 % — SIGNIFICANT CHANGE UP (ref 0–1.5)
LYMPHOCYTES # BLD AUTO: 0.81 K/UL — LOW (ref 1–3.3)
LYMPHOCYTES # BLD AUTO: 10.6 % — LOW (ref 13–44)
MAGNESIUM SERPL-MCNC: 2 MG/DL — SIGNIFICANT CHANGE UP (ref 1.8–2.6)
MCHC RBC-ENTMCNC: 31.6 GM/DL — LOW (ref 32–36)
MCHC RBC-ENTMCNC: 31.9 PG — SIGNIFICANT CHANGE UP (ref 27–34)
MCV RBC AUTO: 100.9 FL — HIGH (ref 80–100)
MONOCYTES # BLD AUTO: 0.43 K/UL — SIGNIFICANT CHANGE UP (ref 0–0.9)
MONOCYTES NFR BLD AUTO: 5.6 % — SIGNIFICANT CHANGE UP (ref 2–14)
NEUTROPHILS # BLD AUTO: 6.25 K/UL — SIGNIFICANT CHANGE UP (ref 1.8–7.4)
NEUTROPHILS NFR BLD AUTO: 82.1 % — HIGH (ref 43–77)
PLATELET # BLD AUTO: 250 K/UL — SIGNIFICANT CHANGE UP (ref 150–400)
POTASSIUM SERPL-MCNC: 5.2 MMOL/L — SIGNIFICANT CHANGE UP (ref 3.5–5.3)
POTASSIUM SERPL-SCNC: 5.2 MMOL/L — SIGNIFICANT CHANGE UP (ref 3.5–5.3)
PROT SERPL-MCNC: 6.7 G/DL — SIGNIFICANT CHANGE UP (ref 6.6–8.7)
RBC # BLD: 3.26 M/UL — LOW (ref 4.2–5.8)
RBC # FLD: 13.5 % — SIGNIFICANT CHANGE UP (ref 10.3–14.5)
SODIUM SERPL-SCNC: 139 MMOL/L — SIGNIFICANT CHANGE UP (ref 135–145)
WBC # BLD: 7.62 K/UL — SIGNIFICANT CHANGE UP (ref 3.8–10.5)
WBC # FLD AUTO: 7.62 K/UL — SIGNIFICANT CHANGE UP (ref 3.8–10.5)

## 2020-08-30 PROCEDURE — 93005 ELECTROCARDIOGRAM TRACING: CPT

## 2020-08-30 PROCEDURE — 36415 COLL VENOUS BLD VENIPUNCTURE: CPT

## 2020-08-30 PROCEDURE — 80053 COMPREHEN METABOLIC PANEL: CPT

## 2020-08-30 PROCEDURE — 99284 EMERGENCY DEPT VISIT MOD MDM: CPT

## 2020-08-30 PROCEDURE — 85027 COMPLETE CBC AUTOMATED: CPT

## 2020-08-30 PROCEDURE — 82962 GLUCOSE BLOOD TEST: CPT

## 2020-08-30 PROCEDURE — 83735 ASSAY OF MAGNESIUM: CPT

## 2020-08-30 PROCEDURE — 93010 ELECTROCARDIOGRAM REPORT: CPT

## 2020-08-30 PROCEDURE — 99284 EMERGENCY DEPT VISIT MOD MDM: CPT | Mod: 25

## 2020-08-30 RX ORDER — SODIUM CHLORIDE 9 MG/ML
1000 INJECTION INTRAMUSCULAR; INTRAVENOUS; SUBCUTANEOUS ONCE
Refills: 0 | Status: COMPLETED | OUTPATIENT
Start: 2020-08-30 | End: 2020-08-30

## 2020-08-30 RX ADMIN — SODIUM CHLORIDE 1000 MILLILITER(S): 9 INJECTION INTRAMUSCULAR; INTRAVENOUS; SUBCUTANEOUS at 17:11

## 2020-08-30 NOTE — ED PROVIDER NOTE - PHYSICAL EXAMINATION
Gen: No acute distress, non toxic  HEENT: Mucous membranes dry, pink conjunctivae, EOMI  CV: RRR, nl s1/s2.  Resp: CTAB, normal rate and effort  GI: abd soft, ileostomy with air and small fluid in in bag.   : No CVAT  Neuro: A&O x 3, moving all 4 extremities. cn 2-12 wnl. nl motor/sensation. nl finger to nose.   MSK: No spine or joint tenderness to palpation  Skin: No rashes. intact and perfused.

## 2020-08-30 NOTE — ED ADULT NURSE NOTE - NSFALLRSKASSESASSIST_ED_ALL_ED
[FreeTextEntry1] : DOING  WELL NORMAL EXAM  MOST  LIKELY  COLD  NO  NEED FOR  MED CALL ME  I F ANY  CHANGES no

## 2020-08-30 NOTE — ED PROVIDER NOTE - CLINICAL SUMMARY MEDICAL DECISION MAKING FREE TEXT BOX
syncopal episode likely dehydration, no symptoms currently, no high output recently from ostomy. ekg wihtout significnat abnormality. will hydrate, check labs, reassess.

## 2020-08-30 NOTE — ED PROVIDER NOTE - PROGRESS NOTE DETAILS
Aryan: pt feels very well. cr noted and explained to pt, was >1.4 in february. given and explained all results. bp improved. return precautions. close f/u. stable for d/c.

## 2020-08-30 NOTE — ED ADULT TRIAGE NOTE - CHIEF COMPLAINT QUOTE
pt a+ox3, BIBA s/p syncopal episode. pt reports sudden onset of dizziness while at BBQ. denies hitting head or LOC. per EMS, pt rec'd 300mL NS en route to ED. ileostomy in place.

## 2020-08-30 NOTE — ED PROVIDER NOTE - OBJECTIVE STATEMENT
58 y/o male hx rectal cancer in remission with ileostomy, htn, hld, cad present s/p syncopal episode. Per pt and daughter (who is RN), pt walked 5 miles today, didn't drink/eat much and had a glass of wine. Was at bbq, sitting slowly started to "fade". Woke up shortly after, no confusion, no shaking or incontinence. Denies any cp/sob/abd discomfort, headache, neuro symptoms. Received 300ml NS via ems. States he now feels back to baseline.     ROS: No fever/chills. No eye pain/changes in vision, No ear pain/sore throat/dysphagia, No chest pain/palpitations. No SOB/cough/. No abdominal pain, N/V/D, no black/bloody bm. No dysuria/frequency/discharge, No headache. No Dizziness.    No rashes or breaks in skin. No numbness/tingling/weakness.

## 2020-08-30 NOTE — ED PROVIDER NOTE - PATIENT PORTAL LINK FT
You can access the FollowMyHealth Patient Portal offered by [facility] by registering at the following website: [email or web address]. By joining [employer]’s FollowMyHealth portal, you will also be able to view your health information using other applications (apps) compatible with our system.

## 2020-08-30 NOTE — ED ADULT NURSE NOTE - BREATH SOUNDS, RIGHT
Patient: Sinan Sanchez    COMBINED EXAM UNDER ANESTHESIA, RESTORATIONS, EXTRACTION(S) DENTAL COMPLEX (N/A Mouth)  Additional InformationProcedure(s):  Dental Exam Under Anesthesia, X-Rays, Perio - Dontal Therapy and Fluoride Treatment - Wound Class: II-Clean Contaminated    Diagnosis:Dental Caries and Periodontal Disease, Fragile X-Syndrome  Diagnosis Additional Information: No value filed.    Anesthesia Type:  General    Note:  Anesthesia Post Evaluation    Patient location during evaluation: PACU  Patient participation: Able to fully participate in evaluation  Level of consciousness: awake and alert  Pain management: adequate  Airway patency: patent  Cardiovascular status: acceptable  Respiratory status: acceptable  Hydration status: acceptable  PONV: none     Anesthetic complications: None          Last vitals:  Filed Vitals:    01/19/17 1013   BP: 128/88   Temp: 37.2  C (99  F)   Resp: 24   SpO2: 97%       Electronically Signed By: Reese Donovan MD  January 19, 2017  10:35 AM   clear

## 2020-09-04 ENCOUNTER — APPOINTMENT (OUTPATIENT)
Dept: HEMATOLOGY ONCOLOGY | Facility: CLINIC | Age: 60
End: 2020-09-04

## 2020-09-04 NOTE — RESULTS/DATA
[FreeTextEntry1] : 6/9/20 CT:  Decrease in size of the now predominantly air containing collection in the presacral region extending inferiorly between the prostate gland anorectal region.  No adenopathy or evidence of metastatic disease in the thorax, abdomen or pelvis.  Diffuse bladder wall thickening again noted.\par \par 2/21/20 CT A/P: S/p low anterior resection with development of an ano-rectal prostatic fistula, probable abscess, and associated thickening of the posterior urinary bladder wall, likely reactive. Postsurgical presacral fluid collection. \par \par 2/5/20 Pathology: \par Colon, rectosigmoid, colectomy:\par Invasive adenocarcinoma, moderately differentiated, 1.9 cm in greatest dimension.\par Carcinoma focally invades the superficial muscularis propria (pT2).\par Resection margins are negative for carcinoma.\par Seventeen lymph nodes, negative for carcinoma (0/17). \par See microscopic description.\par Microscopic description:\par I. Type of Specimen: Low anterior resection.\par II. Type of neoplasm: Adenocarcinoma, moderately differentiated (G2)\par II. Location of tumor: Rectum\par IV. Extension of tumor: Tumor invades muscularis propria, superficially (T2)\par Macroscopic Tumor Perforation: Not Identified.\par V. Tumor size: 1.9 cm\par VI. Vascular Invasion: Lymphatic invasion not identified. Blood vessel invasion not identified.\par VIa. Perineural Invasion: Not identified.\par VII. Lymph Node Status: Number of lymph nodes identified 17. No lymph node metastasis identified.\par VIII. Sattelite Deposits: None identified.\par IX. Surgical Margins: No tumor seen at surgical margins. Closest distance from distal surgical margin 0.4 cm.\par X. Circumferential Resection Margins (CRM): Negative.\par XI. Treatment Effect: Minimal response (tumor with fibrosis and focal calcification).\par XII. Tumor budding: \par - Number of tumor buds in 1 "hotspot" field, 1\par - Low score (0-4)\par - Intermediate score (5-9)\par - High score (10 or more)\par TNM Descriptors: y (posttreatment)\par XIII. Pathologic Staging (pTNM) AJCC 8th Edition. \par pT2: Tumor invades muscularis propria\par pN0: No regional lymph node metastasis.\par Deeper levels and immunostain for desmin are performed on A3. There is focal invasion of superficial muscularis propria fibers by a single neoplastic gland. Slides are also reviewed by a senior GI pathologist who concurs.\par laf\par \par 2/5/20 Pathology:\par Laparoscopic low anterior resection with coloanal anastomosis (handsewn)\par Complete mobilization of splenic flexure\par Total mesorectal excision to the pelvis floor\par defunctioning loop ileostomy\par Left ureterolysis\par Findings: Nodular mass in the distal rectum about 1 cm above the anorectal ring. No other evidence of intra-abdominal disease. Thickened sigmoid colon.\par Pre-operative diagnosis: Distal rectal cancer\par Post-operative diagnosis: same\par \par 10/29/19 CT C/A/P: Interval development of patchy opacities in both lungs compatible with infectious or inflammatory etiology. Rectal wall thickening may be related to posttreatment changes or the known rectal neoplasm. Diffuse bladder wall thickening. No evidence of metastatic disease in the thorax, abdomen or pelvis.\par \par 10/29/19 MRI Pelvis: Since the prior examination 7/30/2019, the primary tumor shows: partial response. Continued decrease in size of primary tumor with evidence of residual. \par Post Treatment Stage: yT2 / N0 \par CRM: Clear \par Sphincter involvement (low rectal tumors): Absent \par Suspicious extra mesorectal nodes: None\par \par 9/23/19 CT Neck ([facility]): Complete chronic occlusion of the left internal carotid artery. There is atherosclerotic disease of the right common carotid artery bifurcation extending into the proximal right internal carotid artery. There is near complete focal occlusion of the origin of the right internal carotid artery greater than 90% with mild poststenotic dilation just distal to the stenosis. Approximately 1.5 cm distal to the origin of the right ICA, there is focal approximately 50% stenosis of the right internal carotid artery. \par \par 3/9/19 CT: Small pleural-based calcification in the right apex. Coronary artery stents. Degenerative changes in the thoracic spine. Old healed fracture involving the right sixth rib. Edematous changes seen the mesenteric previously described. No evidence of metastatic disease. \par \par 3/7/19 MRI rectum:\par 2.4 cm mass, with the inferior margin contacting the internal anal sphincter, 10:00 to 7:00. There were subcentimeter lymph nodes, too small to characterize. Overall impression was a polypoid low rectal tumor with possible punctate extramural extension, possible T2 or T3a Nx Mx, with inferior margin of tumor contacting the internal anal sphincter. 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 No significant lymphadenopathy in abdomen is identified.  \par \par 2/21/19 EUS:\par malignant appearing posterior wall mass in the rectum, 4 cm above the dentate line. 27.8 mm x 18.4 mm with subtle suggestion of muscularis propria involvement, suspect T2N0. \par \par 2/20/19 Pathology:\par Rectum polyp, colonoscopy:  superficially invasive moderately differentiated adenocarcinoma

## 2020-09-04 NOTE — HISTORY OF PRESENT ILLNESS
[de-identified] : The patient was diagnosed with adenocarcinoma of the rectum in February 2019 at the age of 58.  He began having BRBPR approximately 6 months ago, which he attributed to  hemorrhoids seen on his last colonoscopy about 8 years ago. Recently, he noted increased rectal bleeding and was referred for GI evaluation with Dr. Michele Reyes.  Colonoscopy revealed a mass 6-7 cm from the anal verge on rigid sigmoidoscopy. The biopsy was consistent with moderately differentiated adenocarcinoma. Endoscopic ultrasound confirmed a 2.8  x 1.8 cm mass, consistent with T2N0.  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 No significant lymphadenopathy in the abdomen.  He next saw colorectal surgery, Dr. Adi Garcia, who recommended a transanal resection preceded by neoadjuvant chemoradiation.  A CT C/A/P discovered no metastatic disease. He was seen by Dr. Javed cMkeon to discuss FOLFOX and Dr. Faustino Parson to discuss RT.  He has also been seen at [facility] [place].   [de-identified] : SH: Smokes cigars weekly x 10 years. Glass wine per day; 3 glasses per night for 40 years - beer and wine. [de-identified] : Patient completed 8 cycles of FOLFOX and chemoRT with Xeloda for adenocarcinoma of the rectum 8/13/19 - 9/23/19.  + Epistaxis, every morning.  + Fatigue/weakness, unchanged, worse recently.  + Cold intolerance, remains very mild, only lasts 1-2 day.  + Fecal urgency with occasional loose stools, although sometimes solid. No further BRBPR.  + Xerostomia.  + Very occasional mouth sores. + Mild nausea.  + Decreased appetite.  No neuropathy.  No rectal pain.  No abdominal pain.  No fevers or chills. + Burning urination. S/p temporary ileostomy/rectal surgery on 2/5/20. No other complaints today. \par

## 2020-09-04 NOTE — ASSESSMENT
[FreeTextEntry1] : Previously had a detailed discussion with the patient regarding the natural history, staging, prognosis and treatment recommendations for rectal cancer.  I discussed the standard approach to locally-advanced rectal cancer involves the use of all modalities of care. This includes neoadjuvant chemotherapy and radiation therapy concurrently to shrink the tumor and improve local control. This is typically followed by surgery and adjuvant systemic chemotherapy.  I then explained to the patient that we often move the adjuvant chemotherapy to the up-front setting prior to concurrent chemoradiotherapy. I described the rationale for this.  Primary rectal tumors tend to be extremely chemosensitive to induction chemotherapy. Induction chemotherapy has the advantage of improving local disease before surgery, being more tolerable in a presurgical setting, and improve the pathologic complete response rate. I specifically described treatment with two months of chemotherapy or four cycles of systemic FOLFOX, to be followed by an assessment by colorectal surgery, Dr. Garcia, in order to ensure response to treatment.  FOLFOX has been shown to improve both disease-free survival and overall survival in the adjuvant setting. Given his MRI findings that include T2 / T3a Nx M0, 6-7cm from anal verge, extending to internal anal sphincter), I recommend upfront systemic chemotherapy to decrease the risk of distant metastatic disease in addition to local regional recurrence.  If the patient is responding after 4 cycles of systemic chemotherapy, I will continue systemic chemotherapy for a total of four months up-front. If he is not responding, I would go straight to concurrent chemoradiotherapy.\par  \par After a detailed discussion, the patient is electing to proceed with upfront FOLFOX.  I described to the patient the way that FOLFOX is administered, given over a 46-hour continuous infusion of 5- fluorouracil.  He will have a Mediport placed and wishes to begin with chemotherapy in one week following mediport placement.  I also discussed with the patient common side effects seen with this regimen including, but not limited to, fatigue, myelosuppression, nausea, vomiting, diarrhea, cold sensitivity, and peripheral neuropathy.  I have scheduled him to start FOLFOX chemotherapy x4 cycles in one week, followed by examination by Dr. Garcia.  Assuming no progression, we will continue with chemotherapy for four further cycles. A repeat rectal MRI will be ordered at the end of chemotherapy, preceding standard combined modality therapy with capecitabine or 5-FU concurrent with radiation. Following chemoradiation, the patient will receive a chest, abdomen and pelvis CT as well as a rectal MRI and followup with Dr. Garcia about four to six weeks after completion of radiation.  He has met with radiation oncology for initial consultation. labs drawn today include CBC, CMP, magnesium, PT, PTT, CEA, hepatitis profile and EKG.  The patient stated that all of his questions were answered to his satisfaction. He will return to initiate systemic chemotherapy in approximately one week. He is aware that he can call if he has any further concerns or questions. [Curative] : Goals of care discussed with patient: Curative

## 2020-11-24 ENCOUNTER — APPOINTMENT (OUTPATIENT)
Dept: RADIATION ONCOLOGY | Facility: CLINIC | Age: 60
End: 2020-11-24
Payer: COMMERCIAL

## 2020-11-24 PROCEDURE — 99214 OFFICE O/P EST MOD 30 MIN: CPT | Mod: 95

## 2020-11-24 NOTE — REVIEW OF SYSTEMS
[Rectal Pain: Grade 0] : Rectal Pain: Grade 0 [FreeTextEntry8] : ostomy [Fatigue: Grade 0] : Fatigue: Grade 0

## 2020-11-24 NOTE — HISTORY OF PRESENT ILLNESS
[Home] : at home, [unfilled] , at the time of the visit. [Medical Office: ([facility])___] : at the medical office located in  [Verbal consent obtained from patient] : the patient, [unfilled] [FreeTextEntry4] : Rebecca Rain [FreeTextEntry1] : 60 year old gentleman with very distal rectal adenocarcinoma, s/p total neoadjuvant therapy with FOLFOX x 8 followed by Xeloda/RT completed 9/2019.  Resection and ileostomy 2/5/20 by Dr. oJb Payton ([facility]).\par \par Interval history:\par Close follow up at [facility].\par Underwent sigmoidoscopy by Dr. Goodwin on 11/16/2020.  Per patient, a polyp was noted in the region of prior malignancy, and biopsied.  Will undergo follow-up MRI pelvis and CT scans.  Also, rectal discharge noted, and clamp placed.\par He will touch base with Dr. Payton regarding future plans for ileostomy reversal.\par

## 2020-11-24 NOTE — DISEASE MANAGEMENT
[IIA] : IIA [Clinical] : TNM Stage: c [FreeTextEntry4] : adenocarcinoma of the rectum [TTNM] : 2/3a [NTNM] : 0 [MTNM] : 0

## 2020-11-25 DIAGNOSIS — Z11.59 ENCOUNTER FOR SCREENING FOR OTHER VIRAL DISEASES: ICD-10-CM

## 2020-11-29 LAB — SARS-COV-2 N GENE NPH QL NAA+PROBE: NOT DETECTED

## 2020-11-30 ENCOUNTER — NON-APPOINTMENT (OUTPATIENT)
Age: 60
End: 2020-11-30

## 2020-12-10 ENCOUNTER — APPOINTMENT (OUTPATIENT)
Dept: MRI IMAGING | Facility: CLINIC | Age: 60
End: 2020-12-10

## 2020-12-18 ENCOUNTER — RESULT REVIEW (OUTPATIENT)
Age: 60
End: 2020-12-18

## 2020-12-18 ENCOUNTER — APPOINTMENT (OUTPATIENT)
Dept: DERMATOLOGY | Facility: CLINIC | Age: 60
End: 2020-12-18
Payer: COMMERCIAL

## 2020-12-18 PROCEDURE — 99072 ADDL SUPL MATRL&STAF TM PHE: CPT

## 2020-12-18 PROCEDURE — 99203 OFFICE O/P NEW LOW 30 MIN: CPT | Mod: 25

## 2020-12-18 PROCEDURE — 11102 TANGNTL BX SKIN SINGLE LES: CPT

## 2021-01-05 ENCOUNTER — APPOINTMENT (OUTPATIENT)
Dept: VASCULAR SURGERY | Facility: CLINIC | Age: 61
End: 2021-01-05
Payer: COMMERCIAL

## 2021-01-05 VITALS
DIASTOLIC BLOOD PRESSURE: 85 MMHG | WEIGHT: 200 LBS | HEIGHT: 73 IN | TEMPERATURE: 97.2 F | HEART RATE: 89 BPM | OXYGEN SATURATION: 100 % | BODY MASS INDEX: 26.51 KG/M2 | SYSTOLIC BLOOD PRESSURE: 147 MMHG

## 2021-01-05 DIAGNOSIS — I65.22 OCCLUSION AND STENOSIS OF LEFT CAROTID ARTERY: ICD-10-CM

## 2021-01-05 PROCEDURE — 99072 ADDL SUPL MATRL&STAF TM PHE: CPT

## 2021-01-05 PROCEDURE — 93880 EXTRACRANIAL BILAT STUDY: CPT

## 2021-01-05 PROCEDURE — 99213 OFFICE O/P EST LOW 20 MIN: CPT

## 2021-01-22 ENCOUNTER — APPOINTMENT (OUTPATIENT)
Dept: INTERNAL MEDICINE | Facility: CLINIC | Age: 61
End: 2021-01-22
Payer: COMMERCIAL

## 2021-01-22 ENCOUNTER — NON-APPOINTMENT (OUTPATIENT)
Age: 61
End: 2021-01-22

## 2021-01-22 VITALS
HEART RATE: 88 BPM | HEIGHT: 72 IN | DIASTOLIC BLOOD PRESSURE: 68 MMHG | TEMPERATURE: 97.3 F | WEIGHT: 200 LBS | RESPIRATION RATE: 20 BRPM | SYSTOLIC BLOOD PRESSURE: 112 MMHG | OXYGEN SATURATION: 97 % | BODY MASS INDEX: 27.09 KG/M2

## 2021-01-22 PROCEDURE — 99072 ADDL SUPL MATRL&STAF TM PHE: CPT

## 2021-01-22 PROCEDURE — 93000 ELECTROCARDIOGRAM COMPLETE: CPT

## 2021-01-22 PROCEDURE — 99214 OFFICE O/P EST MOD 30 MIN: CPT | Mod: 25

## 2021-01-28 NOTE — HISTORY OF PRESENT ILLNESS
[Coronary Artery Disease] : coronary artery disease [No Pertinent Pulmonary History] : no history of asthma, COPD, sleep apnea, or smoking [No Adverse Anesthesia Reaction] : no adverse anesthesia reaction in self or family member [(Patient denies any chest pain, claudication, dyspnea on exertion, orthopnea, palpitations or syncope)] : Patient denies any chest pain, claudication, dyspnea on exertion, orthopnea, palpitations or syncope [Good (7-10 METs)] : Good (7-10 METs) [Aortic Stenosis] : no aortic stenosis [Atrial Fibrillation] : no atrial fibrillation [Recent Myocardial Infarction] : no recent myocardial infarction [Implantable Device/Pacemaker] : no implantable device/pacemaker [Chronic Anticoagulation] : no chronic anticoagulation [Chronic Kidney Disease] : no chronic kidney disease [Diabetes] : no diabetes [FreeTextEntry1] : Sigmoidoscopy [FreeTextEntry2] : February 8, 2021 [FreeTextEntry3] : Dr Payton [FreeTextEntry4] : 60-year-old male diagnosed with rectal adenocarcinoma in 2019 status post chemotherapy and radiation therapy with resection and ileostomy February 2020 schedule for sigmoidoscopy. Patient presents for medical evaluation prior to procedure.\par \par Other significant history is CAD and carotid disease.\par The patient had a circumflex stent in 2005 and LAD stent in 2017 and has been stable since.\par Nuclear stress test October 2019 = negative.\par \par Also carotid disease with totally occluded left internal carotid artery and status post right carotid endarterectomy October 2019 with most recent vascular evaluation January 2021 showing right moderate stenosis without change.\par \par No pulmonary, hepatorenal, hematological or diabetes history.\par \par Overall the patient feeling well without chest pain, palpitations, shortness of breath or edema [FreeTextEntry7] : Nuclear stress test October 2019 = negative

## 2021-01-28 NOTE — ASSESSMENT
[Patient Optimized for Surgery] : Patient optimized for surgery [No Further Testing Recommended] : no further testing recommended [Continue anti-platelet treatment as is] : Continue current anti-platelet treatment [Modify medications prior to procedure] : Modify medications prior to procedure [As per surgery] : as per surgery [FreeTextEntry4] : 60-year-old male with cardiac history status post stenting most recently 2017 with negative nuclear stress test October 2019.\par \par Patient now showing renal issue with creatinine increased to 2.36 with previous being 0.9.\par I discussed this with the patient and he is made an appointment to see nephrology tomorrow.\par \par She nephrology evaluation patient currently medically stable for scheduled low risk procedure [FreeTextEntry6] : Hold 5 days prior to procedure

## 2021-01-28 NOTE — RESULTS/DATA
[] : results reviewed [de-identified] : WNL with hemoglobin stable at 11.4 [de-identified] : WNL [de-identified] : WNL other than creatinine increased to 2.36 with previous being 0.9 [de-identified] : NSR, CORAL

## 2021-01-29 ENCOUNTER — APPOINTMENT (OUTPATIENT)
Dept: NEPHROLOGY | Facility: CLINIC | Age: 61
End: 2021-01-29
Payer: COMMERCIAL

## 2021-01-29 VITALS
WEIGHT: 204 LBS | HEIGHT: 72 IN | HEART RATE: 118 BPM | BODY MASS INDEX: 27.63 KG/M2 | SYSTOLIC BLOOD PRESSURE: 182 MMHG | TEMPERATURE: 97.6 F | DIASTOLIC BLOOD PRESSURE: 92 MMHG

## 2021-01-29 VITALS — SYSTOLIC BLOOD PRESSURE: 174 MMHG | DIASTOLIC BLOOD PRESSURE: 94 MMHG | TEMPERATURE: 97.8 F | HEART RATE: 120 BPM

## 2021-01-29 VITALS — SYSTOLIC BLOOD PRESSURE: 180 MMHG | HEART RATE: 120 BPM | DIASTOLIC BLOOD PRESSURE: 90 MMHG

## 2021-01-29 DIAGNOSIS — Z95.828 PRESENCE OF OTHER VASCULAR IMPLANTS AND GRAFTS: ICD-10-CM

## 2021-01-29 DIAGNOSIS — R19.7 DIARRHEA, UNSPECIFIED: ICD-10-CM

## 2021-01-29 PROCEDURE — 99072 ADDL SUPL MATRL&STAF TM PHE: CPT

## 2021-01-29 PROCEDURE — 36415 COLL VENOUS BLD VENIPUNCTURE: CPT

## 2021-01-29 PROCEDURE — 99205 OFFICE O/P NEW HI 60 MIN: CPT | Mod: 25

## 2021-01-29 RX ORDER — IRON/IRON ASP GLY/FA/MV-MIN 38 125-25-1MG
TABLET ORAL
Refills: 0 | Status: ACTIVE | COMMUNITY

## 2021-01-29 RX ORDER — OLMESARTAN MEDOXOMIL 40 MG/1
40 TABLET, FILM COATED ORAL
Qty: 90 | Refills: 1 | Status: DISCONTINUED | COMMUNITY
Start: 2019-04-11 | End: 2021-01-29

## 2021-01-29 NOTE — ASSESSMENT
[FreeTextEntry1] : Rectal Carcinoma, S/P RT & Chemo therapy,\par \par + High - Output Ileostomy,\par \par Now w. JAS,\par \par PH : HTN, R - Carotid  Coronary Stents ( # 3 ) \par \par MR : No Recurrence, + Leak\par \par CT : No Obstructive uropathy, \par \par Plans for sigmoidoscopy, Clamping of Fistula  ( 2-8 - 21 )\par \par Future Ostomy Reversal,\par \par Rec : D.C ARB, Increase Metoprolol, \par \par Hydration,

## 2021-01-29 NOTE — PHYSICAL EXAM
[General Appearance - Alert] : alert [General Appearance - In No Acute Distress] : in no acute distress [Sclera] : the sclera and conjunctiva were normal [PERRL With Normal Accommodation] : pupils were equal in size, round, and reactive to light [Extraocular Movements] : extraocular movements were intact [Outer Ear] : the ears and nose were normal in appearance [Oropharynx] : the oropharynx was normal [Neck Appearance] : the appearance of the neck was normal [Neck Cervical Mass (___cm)] : no neck mass was observed [Jugular Venous Distention Increased] : there was no jugular-venous distention [Thyroid Diffuse Enlargement] : the thyroid was not enlarged [Thyroid Nodule] : there were no palpable thyroid nodules [Auscultation Breath Sounds / Voice Sounds] : lungs were clear to auscultation bilaterally [Heart Rate And Rhythm] : heart rate was normal and rhythm regular [Heart Sounds] : normal S1 and S2 [Heart Sounds Gallop] : no gallops [Murmurs] : no murmurs [Heart Sounds Pericardial Friction Rub] : no pericardial rub [Bowel Sounds] : normal bowel sounds [Abdomen Soft] : soft [Abdomen Tenderness] : non-tender [Abdomen Mass (___ Cm)] : no abdominal mass palpated [Cervical Lymph Nodes Enlarged Posterior Bilaterally] : posterior cervical [Cervical Lymph Nodes Enlarged Anterior Bilaterally] : anterior cervical [Supraclavicular Lymph Nodes Enlarged Bilaterally] : supraclavicular [Axillary Lymph Nodes Enlarged Bilaterally] : axillary [Femoral Lymph Nodes Enlarged Bilaterally] : femoral [Inguinal Lymph Nodes Enlarged Bilaterally] : inguinal [No CVA Tenderness] : no ~M costovertebral angle tenderness [No Spinal Tenderness] : no spinal tenderness [Abnormal Walk] : normal gait [Nail Clubbing] : no clubbing  or cyanosis of the fingernails [Musculoskeletal - Swelling] : no joint swelling seen [Motor Tone] : muscle strength and tone were normal [Skin Color & Pigmentation] : normal skin color and pigmentation [Skin Turgor] : normal skin turgor [] : no rash [Deep Tendon Reflexes (DTR)] : deep tendon reflexes were 2+ and symmetric [Sensation] : the sensory exam was normal to light touch and pinprick [No Focal Deficits] : no focal deficits [Oriented To Time, Place, And Person] : oriented to person, place, and time [Impaired Insight] : insight and judgment were intact [Affect] : the affect was normal [FreeTextEntry1] : + Ileostomy,

## 2021-01-29 NOTE — HISTORY OF PRESENT ILLNESS
[FreeTextEntry1] : JAS ( Scr., 2.4 mg., ) Anemic, Baseline Scr., 1.1 mg., \par \par + Ostomy,\par \par HTN on ARBs,

## 2021-02-02 ENCOUNTER — NON-APPOINTMENT (OUTPATIENT)
Age: 61
End: 2021-02-02

## 2021-02-02 LAB
25(OH)D3 SERPL-MCNC: 24.7 NG/ML
ALBUMIN SERPL ELPH-MCNC: 4.4 G/DL
ANION GAP SERPL CALC-SCNC: 15 MMOL/L
APPEARANCE: ABNORMAL
BACTERIA: NEGATIVE
BASOPHILS # BLD AUTO: 0.04 K/UL
BASOPHILS NFR BLD AUTO: 0.5 %
BILIRUBIN URINE: NEGATIVE
BLOOD URINE: NEGATIVE
BUN SERPL-MCNC: 36 MG/DL
CALCIUM SERPL-MCNC: 9.4 MG/DL
CALCIUM SERPL-MCNC: 9.4 MG/DL
CHLORIDE SERPL-SCNC: 102 MMOL/L
CO2 SERPL-SCNC: 17 MMOL/L
COLOR: NORMAL
CREAT SERPL-MCNC: 2.17 MG/DL
CREAT SPEC-SCNC: 157 MG/DL
CREAT/PROT UR: 0.1 RATIO
EOSINOPHIL # BLD AUTO: 0.12 K/UL
EOSINOPHIL NFR BLD AUTO: 1.6 %
GLUCOSE QUALITATIVE U: NEGATIVE
GLUCOSE SERPL-MCNC: 90 MG/DL
HCT VFR BLD CALC: 33.5 %
HGB BLD-MCNC: 10.8 G/DL
HYALINE CASTS: 0 /LPF
IMM GRANULOCYTES NFR BLD AUTO: 0.3 %
KETONES URINE: NORMAL
LEUKOCYTE ESTERASE URINE: NEGATIVE
LYMPHOCYTES # BLD AUTO: 0.87 K/UL
LYMPHOCYTES NFR BLD AUTO: 11.8 %
MAN DIFF?: NORMAL
MCHC RBC-ENTMCNC: 32.2 GM/DL
MCHC RBC-ENTMCNC: 32.9 PG
MCV RBC AUTO: 102.1 FL
MICROSCOPIC-UA: NORMAL
MONOCYTES # BLD AUTO: 0.54 K/UL
MONOCYTES NFR BLD AUTO: 7.3 %
NEUTROPHILS # BLD AUTO: 5.77 K/UL
NEUTROPHILS NFR BLD AUTO: 78.5 %
NITRITE URINE: NEGATIVE
PARATHYROID HORMONE INTACT: 50 PG/ML
PH URINE: 5.5
PHOSPHATE SERPL-MCNC: 3.5 MG/DL
PLATELET # BLD AUTO: 297 K/UL
POTASSIUM SERPL-SCNC: 4.8 MMOL/L
PROT UR-MCNC: 10 MG/DL
PROTEIN URINE: NORMAL
RBC # BLD: 3.28 M/UL
RBC # FLD: 12.5 %
RED BLOOD CELLS URINE: 2 /HPF
SODIUM SERPL-SCNC: 134 MMOL/L
SPECIFIC GRAVITY URINE: 1.02
SQUAMOUS EPITHELIAL CELLS: 1 /HPF
UROBILINOGEN URINE: NORMAL
WBC # FLD AUTO: 7.36 K/UL
WHITE BLOOD CELLS URINE: 1 /HPF

## 2021-02-24 ENCOUNTER — NON-APPOINTMENT (OUTPATIENT)
Age: 61
End: 2021-02-24

## 2021-02-24 ENCOUNTER — APPOINTMENT (OUTPATIENT)
Dept: RADIATION ONCOLOGY | Facility: CLINIC | Age: 61
End: 2021-02-24
Payer: COMMERCIAL

## 2021-02-24 VITALS
BODY MASS INDEX: 28.17 KG/M2 | TEMPERATURE: 98.1 F | SYSTOLIC BLOOD PRESSURE: 198 MMHG | HEART RATE: 105 BPM | DIASTOLIC BLOOD PRESSURE: 97 MMHG | OXYGEN SATURATION: 98 % | WEIGHT: 208 LBS | HEIGHT: 72 IN | RESPIRATION RATE: 16 BRPM

## 2021-02-24 PROCEDURE — 99072 ADDL SUPL MATRL&STAF TM PHE: CPT

## 2021-02-24 PROCEDURE — 99214 OFFICE O/P EST MOD 30 MIN: CPT

## 2021-02-24 NOTE — REVIEW OF SYSTEMS
[Abdominal Pain] : no abdominal pain [Vomiting] : no vomiting [Constipation] : no constipation [Diarrhea] : no diarrhea [FreeTextEntry7] : ileostomy  [FreeTextEntry2] : ileostomy

## 2021-02-24 NOTE — HISTORY OF PRESENT ILLNESS
[FreeTextEntry1] : 60 year old gentleman with very distal rectal adenocarcinoma, s/p total neoadjuvant therapy with FOLFOX x 8 followed by Xeloda/RT completed 9/2019. Resection and ileostomy 2/5/20 by Dr. Job Payton ([facility]).\par \par Reports intermittent mucous discharge from rectum.\par Denies abdominal or pelvic pain, pelvic edema, changes in urination, or recent weight loss.\par \par CT chest abdomen & pelvis 12/10/2020: small presacral collection with small focus of air; no evidence of lymphadenopathy or metastasis.\par \par MRI pelvis 12/15/2020: stable post operative changes; no evidence of pelvic recurrence, lymphadenopathy, or metastasis.\par \par 2/8/21 sigmoidoscopy by Dr. Payton; rectal polyp, biopsy with pathology showing fragments of adenocarcinoma, at least intramucosal.  Will follow up with Dr. Payton on 4/1/21; plan for in-office scope, and further discussion of possible reversal.\par \par 2/16/21 CEA 2\par \par

## 2021-02-24 NOTE — PHYSICAL EXAM
[Sclera] : the sclera and conjunctiva were normal [Extraocular Movements] : extraocular movements were intact [Outer Ear] : the ears and nose were normal in appearance [Normal] : no focal deficits [de-identified] : ileostomy

## 2021-03-03 ENCOUNTER — APPOINTMENT (OUTPATIENT)
Dept: NEPHROLOGY | Facility: CLINIC | Age: 61
End: 2021-03-03
Payer: COMMERCIAL

## 2021-03-03 VITALS
TEMPERATURE: 97.2 F | HEART RATE: 78 BPM | HEIGHT: 72 IN | SYSTOLIC BLOOD PRESSURE: 173 MMHG | BODY MASS INDEX: 27.9 KG/M2 | WEIGHT: 206 LBS | DIASTOLIC BLOOD PRESSURE: 94 MMHG

## 2021-03-03 PROCEDURE — 99215 OFFICE O/P EST HI 40 MIN: CPT

## 2021-03-03 PROCEDURE — 99072 ADDL SUPL MATRL&STAF TM PHE: CPT

## 2021-03-03 NOTE — ASSESSMENT
[FreeTextEntry1] : Rectal Carcinoma, S/P RT & Chemo therapy\par \par + High - Output Ileostomy,  plan for Future Ostomy Reversal,\par \par \par Now w. JAS\par CT : No Obstructive uropathy\par Likely dehydration\par ARB stopped\par Scr improved to 1.8 on po hydration\par Repeat labs\par UA, Tp/cr ratio (previously bland on ARB)\par Renal panel\par will decide whether we need to resume ARB or start diuretics

## 2021-03-03 NOTE — HISTORY OF PRESENT ILLNESS
[FreeTextEntry1] : 60 year old gentleman with very distal rectal adenocarcinoma, s/p total neoadjuvant therapy with FOLFOX x 8 followed by Xeloda/RT completed 9/2019. Resection and ileostomy 2/5/20 by Dr. Job Payton ([facility]).\par CT chest abdomen & pelvis 12/10/2020: small presacral collection with small focus of air; no evidence of lymphadenopathy or metastasis.\par MRI pelvis 12/15/2020: stable post operative changes; no evidence of pelvic recurrence, lymphadenopathy, or metastasis.\par 2/8/21 sigmoidoscopy by Dr. Payton; rectal polyp, biopsy with pathology showing fragments of adenocarcinoma, at least intramucosal. Will follow up with Dr. Payton on 4/1/21; plan for in-office scope, and further discussion of possible reversal.\par \par Pt seen by Dr. Rodriguez for Frank attributed to high ostomy out put.\par ARB was held, Metoprolol increased to 100 mg bid.\par \par Pt seen and examined\par feels well\par c/o ankle swelling- worse by the end of the day\par \par \par Checked Bp this morning- 152/82\par usually in 130/70's\par \par \par Works in [employer] real [employer]\par \par \par \par

## 2021-03-12 NOTE — HISTORY OF PRESENT ILLNESS
[FreeTextEntry1] : 58 year old male presents today for evaluation of his adenocarcinoma of the rectum.\par \par He began blood per rectum intermittently over the last few months, which was initially attributed to hemorrhoids that were seen on his last colonoscopy 8 years ago.  He was evaluated by Dr. Reyes.  Colonoscopy performed 2/20/19 showed a mass 6-7 cm from the anal verge on rigid sigmoidoscopy.  Biopsy showed invasive moderately differentiated adenocarcinoma of the rectum.  Endoscopic ultrasound confirmed a 2.7 x 1.8 cm mass, consistent with T2 N0 disesae.\par \par CT scan abdomen & pelvis 2/21/19 showed no evidence of hepatic lesion or lymphadenopathy.\par \par He was evaluated by Dr. Staley at [facility] on 2/26/19, followed by Dr. Garcia on 3/6/19, and Dr. Mckeon on 3/13/19.\par \par [place] [facility] [place] and showed no lymphadenopathy no metastatic disease.\par \par MRI pelvis 3/1/19 showed a 2.4 cm mass, with the inferior margin contacting the internal anal sphincter, 10:00 to 7:00.  There were subcentimeter lymph nodes, too small to characterize.  Overall impression was a polypoid low rectal tumor with possible punctate extramural extension, possible T2 or T3a Nx Mx, with inferior margin of tumor contacting the internal anal sphincter.  The mesorectal fascia was clear.\par \par He continues to have intermittent hematochezia, but denies any other symptoms and specifically denies rectal pain, diarrhea, constipation, changes in urination, fatigue or unintentional weight loss.\par  no

## 2021-04-08 NOTE — ED ADULT TRIAGE NOTE - NS ED NURSE BANDS TYPE
[address] [facility], [place]    Physical Therapy Treatment Note/ Progress Report:     Date:  2021    Patient Name:  Gabriela Hameed    :  1944  MRN: [id number]  Medical/Treatment Diagnosis Information:  · Diagnosis: S42.14 Fracture of glenoid cavity of scapula; M25.512 L shoulder pain  · Treatment Diagnosis: S42.14 Fracture of glenoid cavity of scapula; M25.512 L shoulder pain  Insurance/Certification information:  PT Insurance Information: [employer] (Medicare) - BMN  Physician Information:  Referring Practitioner: Lu Pascal of [facility] signed (Y/N):     Date of Patient follow up with Physician:      Progress Report: []  Yes  [x]  No     Functional Scale: 5% disability - Quick Dash; 6% disability - UEFI (75/80)   Date: 3/29/21    Date Range for reporting period:  Beginning:  3/29/21  Ending:  NA    Progress report due (10 Rx/or 30 days whichever is less):      Recertification due (POC duration/ or 90 days whichever is less): 21     Visit # Insurance Allowable Auth Needed   3 [employer] (Medicare) - BMN [x]Yes    []No     Pain level:  0/10    SUBJECTIVE:  No L shoulder pain. Patient is using her L arm with most of her normal daily activities now. OBJECTIVE:    Observation:    Test measurements:         RESTRICTIONS/PRECAUTIONS: High BP. 1 pack-a-day smoker. L closed mildly displaced glenoid fracture from mid-February.     Exercises/Interventions:   Therapeutic Exercise  Min:  20 Wt / Resistance Sets/sec Reps Notes   Pulleys flexion/scaption   4 min    Supine cane ER - towel roll under distal upper arm  10s 10    Supine cane flexion  10s 10    Wall slides flexion End range focus 10s 10    Cross body stretch  20s 5    Doorway stretch  20s 5    IR towel stretch behind back   nv    TB rows green 2 10    TB pulldowns green 2 10    UK deltoid  1#  3 min    Supine SA punch 3# 2 10    Sidelying ER to neutral 1# 2 10    Sidelying SA punch 1# 2 10    Cable column pulldowns lvl 2 + weight 2 10    Cable column rows lvl 2 + weight  2 10           Patient education. 5 min Updated HEP. Therapeutic Activities  Min:  0                                                                      Manual Intervention  Min:  15       L shoulder PROM       L GH joint glides - inferior, posterior    Gr. II   Scapular mobs   nv                                NMR Re-education  Min:  0                                                                   Therapeutic Exercise and NMR EXR  [x] (66485) Provided verbal/tactile cueing for activities related to strengthening, flexibility, endurance, ROM  for improvements in scapular, scapulothoracic and UE control with self care, reaching, carrying, lifting, house/yardwork, driving/computer work. [x] (51178) Provided verbal/tactile cueing for activities related to improving balance, coordination, kinesthetic sense, posture, motor skill, proprioception  to assist with  scapular, scapulothoracic and UE control with self care, reaching, carrying, lifting, house/yardwork, driving/computer work. Therapeutic Activities:    [x] (35945 or 90030) Provided verbal/tactile cueing for activities related to improving balance, coordination, kinesthetic sense, posture, motor skill, proprioception and motor activation to allow for proper function of scapular, scapulothoracic and UE control with self care, carrying, lifting, driving/computer work.      Home Exercise Program:    [x] (31464) Reviewed/Progressed HEP activities related to strengthening, flexibility, endurance, ROM of scapular, scapulothoracic and UE control with self care, reaching, carrying, lifting, house/yardwork, driving/computer work  [x] (90724) Reviewed/Progressed HEP activities related to improving balance, coordination, kinesthetic sense, posture, motor skill, proprioception of scapular, scapulothoracic and UE control with self care, reaching, carrying, lifting, house/yardwork, driving/computer work      Manual Treatments:  PROM / STM / Oscillations-Mobs:  G-I, II, III, IV (PA's, Inf., Post.)  [x] (64158) Provided manual therapy to mobilize soft tissue/joints of cervical/CT, scapular GHJ and UE for the purpose of modulating pain, promoting relaxation,  increasing ROM, reducing/eliminating soft tissue swelling/inflammation/restriction, improving soft tissue extensibility and allowing for proper ROM for normal function with self care, reaching, carrying, lifting, house/yardwork, driving/computer work    Modalities:      Charges:  Timed Code Treatment Minutes: 45   Total Treatment Minutes: 45       [] EVAL (LOW) 59529 (typically 20 minutes face-to-face)  [] EVAL (MOD) 96100 (typically 30 minutes face-to-face)  [] EVAL (HIGH) 77651 (typically 45 minutes face-to-face)  [] RE-EVAL     [x] CJ(97176) x 2    [] IONTO (20266)  [] NMR (19851) x     [] VASO (29879)  [x] Manual ([id number]) x 1    [] Other:  [] TA (89897)x     [] Mech Traction (34497)  [] ES(attended) (33926)     [] ES (un) (43671): If BW Please Indicate Time In/Out  CPT Code Time in Time out                                     GOALS:  Patient stated goal: Be able to lift my left arm over my head. [x] Progressing: [] Met: [] Not Met: [] Adjusted    Therapist goals for Patient:   Short Term Goals: To be achieved in: 2 weeks  1. Independent in HEP and progression per patient tolerance, in order to prevent re-injury. [x] Progressing: [] Met: [] Not Met: [] Adjusted  2. Patient will have a decrease in pain to facilitate improvement in movement, function, and ADLs as indicated by Functional Deficits. [x] Progressing: [] Met: [] Not Met: [] Adjusted    Long Term Goals: To be achieved in: 6 weeks  1. Patient will demonstrate increased pain free AROM to within 5 degrees of uninvolved dominant R UE to allow for proper joint functioning as indicated by patients Functional Deficits. [x] Progressing: [] Met: [] Not Met: [] Adjusted  2. Name band; Patient will demonstrate an increase in L UE strength to within 5# HHD compared to uninvolved dominant R UE to allow for proper functional mobility as indicated by patients Functional Deficits. [x] Progressing: [] Met: [] Not Met: [] Adjusted  3. Patient will be able to perform all ADLs, self care tasks, and household chores without increased symptoms or restriction. [x] Progressing: [] Met: [] Not Met: [] Adjusted  4. Patient will be able to put dishes away in a high cabinet without increased symptoms or restriction. [x] Progressing: [] Met: [] Not Met: [] Adjusted    Progression Towards Functional goals:  [x] Patient is progressing as expected towards functional goals listed. [] Progression is slowed due to complexities listed. [] Progression has been slowed due to co-morbidities. [] Plan just implemented, too soon to assess goals progression  [] Other:     ASSESSMENT:  Initiated cross body stretch and doorway stretch to address L posterior capsule mobility restrictions affecting end ranges of motion particularly with OH flexion/abduction and IR behind back. Provided as part of HEP to improve carryover in mobility between sessions. Plan to add IR stretch behind back at nv as amy. Progressed L shoulder strength tasks today by adding resistance.       Return to Play: (if applicable)   []  Stage 1: Intro to Strength   []  Stage 2: Dynamic Strength and Intro to Plyometrics   []  Stage 3: Advanced Plyometrics and Intro to Throwing   []  Stage 4: Sport specific Training/Return to Sport     []  Ready to Return to Play, [employer]   []  Not Ready for Return to Sports   Comments:      Treatment/Activity Tolerance:  [x] Patient tolerated treatment well [] Patient limited by fatique  [] Patient limited by pain  [] Patient limited by other medical complications  [] Other:     Overall Progression Towards Functional goals/ Treatment Progress Update:  [x] Patient is progressing as expected towards

## 2021-04-21 ENCOUNTER — APPOINTMENT (OUTPATIENT)
Dept: INTERNAL MEDICINE | Facility: CLINIC | Age: 61
End: 2021-04-21
Payer: COMMERCIAL

## 2021-04-21 VITALS
WEIGHT: 204 LBS | BODY MASS INDEX: 27.63 KG/M2 | TEMPERATURE: 97.7 F | HEART RATE: 74 BPM | HEIGHT: 72 IN | SYSTOLIC BLOOD PRESSURE: 130 MMHG | RESPIRATION RATE: 14 BRPM | DIASTOLIC BLOOD PRESSURE: 80 MMHG

## 2021-04-21 DIAGNOSIS — Z12.5 ENCOUNTER FOR SCREENING FOR MALIGNANT NEOPLASM OF PROSTATE: ICD-10-CM

## 2021-04-21 PROCEDURE — 99396 PREV VISIT EST AGE 40-64: CPT | Mod: 25

## 2021-04-21 PROCEDURE — G0442 ANNUAL ALCOHOL SCREEN 15 MIN: CPT | Mod: NC

## 2021-04-21 PROCEDURE — G0444 DEPRESSION SCREEN ANNUAL: CPT | Mod: NC,59

## 2021-04-21 PROCEDURE — 99072 ADDL SUPL MATRL&STAF TM PHE: CPT

## 2021-04-21 NOTE — HEALTH RISK ASSESSMENT
[Fair] : ~his/her~ current health as fair  [Yes] : Yes [1 or 2 (0 pts)] : 1 or 2 (0 points) [Never (0 pts)] : Never (0 points) [No] : In the past 12 months have you used drugs other than those required for medical reasons? No [No falls in past year] : Patient reported no falls in the past year [None] : None [With Significant Other] : lives with significant other [Employed] : employed [Smoke Detector] : smoke detector [Carbon Monoxide Detector] : carbon monoxide detector [Seat Belt] :  uses seat belt [Sunscreen] : uses sunscreen [Reviewed no changes] : Reviewed no changes [Designated Healthcare Proxy] : Designated healthcare proxy [Name: ___] : Health Care Proxy's Name: [unfilled]  [Good] : ~his/her~  mood as  good [4 or more  times a week (4 pts)] : 4 or more  times a week (4 points) [0] : 2) Feeling down, depressed, or hopeless: Not at all (0) [] : No [Audit-CScore] : 4 [de-identified] : walk [de-identified] : good [[id number]] : 0 [Change in mental status noted] : No change in mental status noted [Reports changes in hearing] : Reports no changes in hearing [Reports changes in vision] : Reports no changes in vision [Reports changes in dental health] : Reports no changes in dental health [AdvancecareDate] : 04/21

## 2021-04-21 NOTE — HISTORY OF PRESENT ILLNESS
[FreeTextEntry1] : 60-year-old male with history of coronary artery disease who has not been here for 2 years presents for his yearly physical.\par \par Patient's history includes having had a circumflex stent in 2005. 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 He has completed chemotherapy and just completed radiation therapy.\par He follows with oncology and radiation oncology often. He ultimately had surgery and now has an ostomy\par \par He was seen in January 2021 for preop evaluation prior to a sigmoidoscopy and was found to have JAS creatinine increased to 2.2.\par The patient was seen by nephrology and felt JAS secondary to high ostomy output therefore ARB was discontinued with increased fluids and sodium bicarbonate. They have been gradual improvement in his creatinine with the most recent on April 16 at 1.16.\par Metoprolol was increased to help blood pressure.\par \par Sigmoidoscopy was done with rectal polyp found with precancerous changes according to the patient.\par He is to have a followup sigmoidoscopy.\par \par Patient also has significant carotid disease with a known totally occluded left carotid with CTA done 201 showing greater than 90% stenosis of the right carotid artery.He had a right carotid endarterectomy October 2019\par \par History also includes family history of prostate cancer in his father.\par \par Patient is generally feeling well and currently without complaints including no chest pain, palpitations, shortness of breath or edema.

## 2021-04-21 NOTE — PHYSICAL EXAM
[General Appearance - Alert] : alert [General Appearance - In No Acute Distress] : in no acute distress [Sclera] : the sclera and conjunctiva were normal [PERRL With Normal Accommodation] : pupils were equal in size, round, and reactive to light [Extraocular Movements] : extraocular movements were intact [Outer Ear] : the ears and nose were normal in appearance [Oropharynx] : the oropharynx was normal [Neck Appearance] : the appearance of the neck was normal [Neck Cervical Mass (___cm)] : no neck mass was observed [Jugular Venous Distention Increased] : there was no jugular-venous distention [Thyroid Diffuse Enlargement] : the thyroid was not enlarged [Thyroid Nodule] : there were no palpable thyroid nodules [Auscultation Breath Sounds / Voice Sounds] : lungs were clear to auscultation bilaterally [Heart Rate And Rhythm] : heart rate was normal and rhythm regular [Heart Sounds] : normal S1 and S2 [Heart Sounds Gallop] : no gallops [Murmurs] : no murmurs [Heart Sounds Pericardial Friction Rub] : no pericardial rub [Full Pulse] : the pedal pulses are present [Edema] : there was no peripheral edema [Bowel Sounds] : normal bowel sounds [Abdomen Soft] : soft [Abdomen Tenderness] : non-tender [Abdomen Mass (___ Cm)] : no abdominal mass palpated [Cervical Lymph Nodes Enlarged Posterior Bilaterally] : posterior cervical [Cervical Lymph Nodes Enlarged Anterior Bilaterally] : anterior cervical [Supraclavicular Lymph Nodes Enlarged Bilaterally] : supraclavicular [Axillary Lymph Nodes Enlarged Bilaterally] : axillary [No Spinal Tenderness] : no spinal tenderness [Abnormal Walk] : normal gait [Nail Clubbing] : no clubbing  or cyanosis of the fingernails [Musculoskeletal - Swelling] : no joint swelling seen [Motor Tone] : muscle strength and tone were normal [Skin Color & Pigmentation] : normal skin color and pigmentation [Skin Turgor] : normal skin turgor [] : no rash [Cranial Nerves] : cranial nerves 2-12 were intact [No Focal Deficits] : no focal deficits [Oriented To Time, Place, And Person] : oriented to person, place, and time [Impaired Insight] : insight and judgment were intact [Affect] : the affect was normal [External Hemorrhoid] : no external hemorrhoids [FreeTextEntry1] : rectum is painful therefore BRIDGETTE not done [Over the Past 2 Weeks, Have You Felt Down, Depressed, or Hopeless?] : 1.) Over the past 2 weeks, have you felt down, depressed, or hopeless? No [Over the Past 2 Weeks, Have You Felt Little Interest or Pleasure Doing Things?] : 2.) Over the past 2 weeks, have you felt little interest or pleasure doing things? No

## 2021-04-21 NOTE — ASSESSMENT
[FreeTextEntry1] : 60-year-old male with CAD with stenting to circumflex in 2005 and LAD February 2017.\par \par Patient also with significant carotid disease with occluded left carotid artery and status post right carotid endarterectomy October 2019.\par Hypertension controlled on present medications.\par Hyperlipidemia on simvastatin\par \par \par Significant history with diagnosis of rectal adenocarcinoma February 2019 having completed radiation and chemotherapy..Status post colon surgery with ostomy.\par Most recent sigmoidoscopy showing rectal polyp with precancerous changes.\par Followup sigmoidoscopy is scheduled.\par \par Totally occluded left carotid artery which has been known with greater than 90% stenosis of the right carotid artery.therefore status post right carotid endarterectomy October 2019.\par \par Influenza and vaccines already received\par \par Rx given for blood work\par \par Followup in 6 months

## 2021-04-30 ENCOUNTER — APPOINTMENT (OUTPATIENT)
Dept: NEPHROLOGY | Facility: CLINIC | Age: 61
End: 2021-04-30
Payer: COMMERCIAL

## 2021-04-30 VITALS
WEIGHT: 209 LBS | DIASTOLIC BLOOD PRESSURE: 90 MMHG | BODY MASS INDEX: 28.31 KG/M2 | HEIGHT: 72 IN | TEMPERATURE: 97.8 F | SYSTOLIC BLOOD PRESSURE: 166 MMHG | HEART RATE: 72 BPM

## 2021-04-30 PROCEDURE — 99072 ADDL SUPL MATRL&STAF TM PHE: CPT

## 2021-04-30 PROCEDURE — 99214 OFFICE O/P EST MOD 30 MIN: CPT

## 2021-04-30 NOTE — ASSESSMENT
[FreeTextEntry1] : Rectal Carcinoma, S/P RT & Chemo therapy,\par \par + High - Output Ileostomy,\par \par Now w. JAS,\par \par PH : HTN, R - Carotid  Coronary Stents ( # 3 ) \par \par MR : No Recurrence, + Leak\par \par CT : No Obstructive uropathy, \par \par Plans for sigmoidoscopy, Clamping of Fistula  ( 2-8 - 21 )\par \par Future Ostomy Reversal,\par \par Rec : D.C ARB, Increase Metoprolol, \par \par Hydration, \par \par Home 133/77, \par \par Office BP Elevated,  ( ? Anxiety ) \par \par Colonoscopy - P ( F.U Polyp ) Eventual closure of colostomy,\par \par RTO X 4 mo.,

## 2021-04-30 NOTE — PHYSICAL EXAM
[General Appearance - Alert] : alert [General Appearance - In No Acute Distress] : in no acute distress [Sclera] : the sclera and conjunctiva were normal [PERRL With Normal Accommodation] : pupils were equal in size, round, and reactive to light [Extraocular Movements] : extraocular movements were intact [Outer Ear] : the ears and nose were normal in appearance [Oropharynx] : the oropharynx was normal [Neck Appearance] : the appearance of the neck was normal [Neck Cervical Mass (___cm)] : no neck mass was observed [Jugular Venous Distention Increased] : there was no jugular-venous distention [Thyroid Diffuse Enlargement] : the thyroid was not enlarged [Thyroid Nodule] : there were no palpable thyroid nodules [Auscultation Breath Sounds / Voice Sounds] : lungs were clear to auscultation bilaterally [Heart Rate And Rhythm] : heart rate was normal and rhythm regular [Heart Sounds] : normal S1 and S2 [Heart Sounds Gallop] : no gallops [Murmurs] : no murmurs [Heart Sounds Pericardial Friction Rub] : no pericardial rub [Bowel Sounds] : normal bowel sounds [Abdomen Soft] : soft [Abdomen Tenderness] : non-tender [Abdomen Mass (___ Cm)] : no abdominal mass palpated [Cervical Lymph Nodes Enlarged Posterior Bilaterally] : posterior cervical [Cervical Lymph Nodes Enlarged Anterior Bilaterally] : anterior cervical [Supraclavicular Lymph Nodes Enlarged Bilaterally] : supraclavicular [Axillary Lymph Nodes Enlarged Bilaterally] : axillary [Femoral Lymph Nodes Enlarged Bilaterally] : femoral [Inguinal Lymph Nodes Enlarged Bilaterally] : inguinal [No CVA Tenderness] : no ~M costovertebral angle tenderness [No Spinal Tenderness] : no spinal tenderness [Abnormal Walk] : normal gait [Nail Clubbing] : no clubbing  or cyanosis of the fingernails [Musculoskeletal - Swelling] : no joint swelling seen [Motor Tone] : muscle strength and tone were normal [Skin Color & Pigmentation] : normal skin color and pigmentation [Skin Turgor] : normal skin turgor [] : no rash [Deep Tendon Reflexes (DTR)] : deep tendon reflexes were 2+ and symmetric [Sensation] : the sensory exam was normal to light touch and pinprick [No Focal Deficits] : no focal deficits [Oriented To Time, Place, And Person] : oriented to person, place, and time [Impaired Insight] : insight and judgment were intact [Affect] : the affect was normal [Pitting Edema] : pitting edema present [___ +] : bilateral [unfilled]+ pitting edema to the ankles [FreeTextEntry1] : + Ileostomy,

## 2021-05-04 ENCOUNTER — RX RENEWAL (OUTPATIENT)
Age: 61
End: 2021-05-04

## 2021-05-07 ENCOUNTER — OUTPATIENT (OUTPATIENT)
Dept: OUTPATIENT SERVICES | Facility: HOSPITAL | Age: 61
LOS: 1 days | Discharge: HOME | End: 2021-05-07

## 2021-05-07 DIAGNOSIS — I51.9 HEART DISEASE, UNSPECIFIED: Chronic | ICD-10-CM

## 2021-05-07 DIAGNOSIS — Z98.890 OTHER SPECIFIED POSTPROCEDURAL STATES: Chronic | ICD-10-CM

## 2021-05-07 DIAGNOSIS — C20 MALIGNANT NEOPLASM OF RECTUM: ICD-10-CM

## 2021-05-10 ENCOUNTER — RESULT REVIEW (OUTPATIENT)
Age: 61
End: 2021-05-10

## 2021-05-10 ENCOUNTER — APPOINTMENT (OUTPATIENT)
Dept: HEMATOLOGY ONCOLOGY | Facility: CLINIC | Age: 61
End: 2021-05-10
Payer: COMMERCIAL

## 2021-05-10 VITALS
HEIGHT: 72 IN | BODY MASS INDEX: 28.44 KG/M2 | WEIGHT: 210 LBS | OXYGEN SATURATION: 100 % | HEART RATE: 94 BPM | SYSTOLIC BLOOD PRESSURE: 186 MMHG | DIASTOLIC BLOOD PRESSURE: 98 MMHG

## 2021-05-10 LAB
BASOPHILS # BLD AUTO: 0.1 K/UL — SIGNIFICANT CHANGE UP (ref 0–0.2)
BASOPHILS NFR BLD AUTO: 0.9 % — SIGNIFICANT CHANGE UP (ref 0–2)
EOSINOPHIL # BLD AUTO: 0.1 K/UL — SIGNIFICANT CHANGE UP (ref 0–0.5)
EOSINOPHIL NFR BLD AUTO: 1 % — SIGNIFICANT CHANGE UP (ref 0–6)
HCT VFR BLD CALC: 41.7 % — SIGNIFICANT CHANGE UP (ref 39–50)
HGB BLD-MCNC: 13.3 G/DL — SIGNIFICANT CHANGE UP (ref 13–17)
LYMPHOCYTES # BLD AUTO: 1.2 K/UL — SIGNIFICANT CHANGE UP (ref 1–3.3)
LYMPHOCYTES # BLD AUTO: 16.4 % — SIGNIFICANT CHANGE UP (ref 13–44)
MCHC RBC-ENTMCNC: 31.9 G/DL — LOW (ref 32–36)
MCHC RBC-ENTMCNC: 33.2 PG — SIGNIFICANT CHANGE UP (ref 27–34)
MCV RBC AUTO: 103.9 FL — HIGH (ref 80–100)
MONOCYTES # BLD AUTO: 0.6 K/UL — SIGNIFICANT CHANGE UP (ref 0–0.9)
MONOCYTES NFR BLD AUTO: 7.6 % — SIGNIFICANT CHANGE UP (ref 2–14)
NEUTROPHILS # BLD AUTO: 5.4 K/UL — SIGNIFICANT CHANGE UP (ref 1.8–7.4)
NEUTROPHILS NFR BLD AUTO: 74.1 % — SIGNIFICANT CHANGE UP (ref 43–77)
PLATELET # BLD AUTO: 243 K/UL — SIGNIFICANT CHANGE UP (ref 150–400)
RBC # BLD: 4.01 M/UL — LOW (ref 4.2–5.8)
RBC # FLD: 11.9 % — SIGNIFICANT CHANGE UP (ref 10.3–14.5)
WBC # BLD: 7.2 K/UL — SIGNIFICANT CHANGE UP (ref 3.8–10.5)
WBC # FLD AUTO: 7.2 K/UL — SIGNIFICANT CHANGE UP (ref 3.8–10.5)

## 2021-05-10 PROCEDURE — 99072 ADDL SUPL MATRL&STAF TM PHE: CPT

## 2021-05-10 PROCEDURE — 99214 OFFICE O/P EST MOD 30 MIN: CPT

## 2021-05-10 NOTE — HISTORY OF PRESENT ILLNESS
[de-identified] : \par The patient was diagnosed with adenocarcinoma of the rectum in February 2019 at the age of 58. He began having BRBPR approximately 6 months ago, which he attributed to hemorrhoids seen on his last colonoscopy about 8 years ago. Recently, he noted increased rectal bleeding and was referred for GI evaluation with Dr. Michele Reyes. Colonoscopy revealed a mass 6-7 cm from the anal verge on rigid sigmoidoscopy. The biopsy was consistent with moderately differentiated adenocarcinoma. Endoscopic ultrasound confirmed a 2.8 x 1.8 cm mass, consistent with T2N0. 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 No significant lymphadenopathy in the abdomen. He next saw colorectal surgery, Dr. Adi Garcia, who recommended a transanal resection preceded by neoadjuvant chemoradiation. A CT C/A/P discovered no metastatic disease. \par Patient completed 8 cycles of FOLFOX and chemoRT with Xeloda for adenocarcinoma of the rectum 8/13/19 - 9/23/19. \par . Resection and ileostomy 2/5/20 by Dr. Job Payton ([facility]par \par \par  [de-identified] : 2/8/21 sigmoidoscopy by Dr. Payton; rectal polyp, biopsy with pathology showing fragments of adenocarcinoma, at least intramucosal. \par 4/8/21 - identical procedure and results\par \par Patient is strongly desirous of ileostomy reversal, but recognizes that persistence of dysplasia and intramucosal adenocarcinoma on rectal biopsy  the way of that procedure.

## 2021-05-10 NOTE — ASSESSMENT
[FreeTextEntry1] : \par 60 year old gentleman with rectal adenocarcinoma (init T2/T3a, 6-7cm from anal verge extending into internal anal sphincter), status post total neoadjuvant therapy with FOLFOX x 8 (yT2/N+) followed by chemoradiation with concurrent capecitabine, completing 9/23/19. Underwent resection and ileostomy on 2/5/20, ypT2 ypN0. Most recently, noted to have rectal polyp, biopsy showed fragments of adenocarcinoma, most recently on sigmoidoscopy 4/8/21. Imaging negative.\par Repeat MRI scheduled.\par Today's CEA and CMP normal.\par Will speak with Dr. Payton re; next steps.\par

## 2021-05-10 NOTE — PHYSICAL EXAM
[Fully active, able to carry on all pre-disease performance without restriction] : Status 0 - Fully active, able to carry on all pre-disease performance without restriction [Normal] : affect appropriate [de-identified] : Ileostomy

## 2021-05-11 LAB
ALBUMIN SERPL ELPH-MCNC: 4.8 G/DL
ALP BLD-CCNC: 72 U/L
ALT SERPL-CCNC: 27 U/L
ANION GAP SERPL CALC-SCNC: 14 MMOL/L
AST SERPL-CCNC: 30 U/L
BILIRUB SERPL-MCNC: 1.1 MG/DL
BUN SERPL-MCNC: 19 MG/DL
CALCIUM SERPL-MCNC: 9.8 MG/DL
CEA SERPL-MCNC: 2.7 NG/ML
CHLORIDE SERPL-SCNC: 103 MMOL/L
CO2 SERPL-SCNC: 24 MMOL/L
CREAT SERPL-MCNC: 0.99 MG/DL
GLUCOSE SERPL-MCNC: 91 MG/DL
POTASSIUM SERPL-SCNC: 4 MMOL/L
PROT SERPL-MCNC: 7.3 G/DL
SODIUM SERPL-SCNC: 141 MMOL/L

## 2021-05-25 ENCOUNTER — APPOINTMENT (OUTPATIENT)
Dept: RADIATION ONCOLOGY | Facility: CLINIC | Age: 61
End: 2021-05-25

## 2021-05-25 ENCOUNTER — NON-APPOINTMENT (OUTPATIENT)
Age: 61
End: 2021-05-25

## 2021-05-26 ENCOUNTER — APPOINTMENT (OUTPATIENT)
Dept: NEPHROLOGY | Facility: CLINIC | Age: 61
End: 2021-05-26

## 2021-06-03 ENCOUNTER — NON-APPOINTMENT (OUTPATIENT)
Age: 61
End: 2021-06-03

## 2021-07-06 ENCOUNTER — APPOINTMENT (OUTPATIENT)
Dept: VASCULAR SURGERY | Facility: CLINIC | Age: 61
End: 2021-07-06
Payer: COMMERCIAL

## 2021-07-06 ENCOUNTER — RX RENEWAL (OUTPATIENT)
Age: 61
End: 2021-07-06

## 2021-07-06 VITALS
WEIGHT: 205 LBS | SYSTOLIC BLOOD PRESSURE: 174 MMHG | HEART RATE: 81 BPM | BODY MASS INDEX: 27.77 KG/M2 | TEMPERATURE: 97.8 F | DIASTOLIC BLOOD PRESSURE: 91 MMHG | OXYGEN SATURATION: 97 % | HEIGHT: 72 IN

## 2021-07-06 VITALS — SYSTOLIC BLOOD PRESSURE: 177 MMHG | DIASTOLIC BLOOD PRESSURE: 82 MMHG

## 2021-07-06 DIAGNOSIS — Z95.828 OTHER SPECIFIED POSTPROCEDURAL STATES: ICD-10-CM

## 2021-07-06 DIAGNOSIS — Z98.890 OTHER SPECIFIED POSTPROCEDURAL STATES: ICD-10-CM

## 2021-07-06 PROCEDURE — 99072 ADDL SUPL MATRL&STAF TM PHE: CPT

## 2021-07-06 PROCEDURE — 93880 EXTRACRANIAL BILAT STUDY: CPT

## 2021-07-06 PROCEDURE — 99213 OFFICE O/P EST LOW 20 MIN: CPT

## 2021-07-06 NOTE — H&P PST ADULT - PSYCHIATRIC
Chio Lewis lets hold the dose to give it some time to build up in his system  Will see him on 7/12   Any cahnges to call us details… detailed exam

## 2021-07-06 NOTE — PHYSICAL EXAM
[0] : left 0 [2+] : left 2+ [Alert] : alert [Oriented to Person] : oriented to person [Oriented to Place] : oriented to place [Oriented to Time] : oriented to time [Anxious] : anxious [Right Carotid Bruit] : no bruit heard over the right carotid [de-identified] : WD, WN, NAD. Awake, alert, interactive. Ambulates without difficulty [de-identified] : ADITHYA, PERRENZOL [de-identified] : supple. [de-identified] : no cyanosis or deformity. full ROM, MS 5/5\par  [de-identified] : TIFFANY

## 2021-07-06 NOTE — DATA REVIEWED
[FreeTextEntry1] : U/S Carotid duplex 1/5/21 demonstrates 50-69 % right internal carotid artery stenosis and L ICA occlusion. Vertebral flow antegrade.\par U/S Carotid duplex 7/6/21 demonstrates <50 % right internal carotid artery stenosis and L ICA occlusion. Vertebral flow antegrade.

## 2021-07-06 NOTE — ASSESSMENT
[FreeTextEntry1] : 61 y/o male s/p R TCAR 10/17/19 and L ICA occlusion. U/S carotid Duplex demonstrates moderate R ICA stenosis, unchanged. Continue to walk daily for exercise and take Plavix, simvastatin, and aspirin daily.\par \par \par Plan\par Continue ASA, Plavix and Simvastatin.\par Continue with medical management of HTN.\par Continue to walk for exercise.\par Maintain adequate hydration.\par U/S completed in office today. I discussed results fully with the patient. All questions and concerns have been discussed to patient satisfaction.\par RTC in 6 months.\par \par \par Patient seen Dr Kevin Moss. I personally discussed this patient with Dominga Villavicencio, Nurse Practitioner who was a scribe for the visit. I agree with the assessment and plan as written by the Nurse Practitioner and agree with the findings and plan as documented in the Nurse Practitioner's note, unless noted below.

## 2021-07-06 NOTE — HISTORY OF PRESENT ILLNESS
[FreeTextEntry1] : 61 y/o male s/p R TCAR 10/17/19 and left carotid occlusion. He is feeling well. No lightheadedness, dizziness, headaches, abnormal sensation to face, lips or tongue, neurological or memory deficit. BP is a little elevated, however, he has anxiety which is treated with Xanax by his PCP. He continues taking Amlodipine, Olmesartan or Metoprolol,  ASA 81 mg, Clopidogrel 75 mg QD and Simvastatin 40 mg.

## 2021-09-02 ENCOUNTER — RX RENEWAL (OUTPATIENT)
Age: 61
End: 2021-09-02

## 2021-09-10 ENCOUNTER — APPOINTMENT (OUTPATIENT)
Dept: NEPHROLOGY | Facility: CLINIC | Age: 61
End: 2021-09-10
Payer: COMMERCIAL

## 2021-09-10 VITALS
TEMPERATURE: 97 F | WEIGHT: 212 LBS | SYSTOLIC BLOOD PRESSURE: 172 MMHG | HEIGHT: 72 IN | DIASTOLIC BLOOD PRESSURE: 92 MMHG | HEART RATE: 66 BPM | BODY MASS INDEX: 28.71 KG/M2

## 2021-09-10 VITALS — SYSTOLIC BLOOD PRESSURE: 164 MMHG | DIASTOLIC BLOOD PRESSURE: 92 MMHG

## 2021-09-10 PROCEDURE — 99214 OFFICE O/P EST MOD 30 MIN: CPT

## 2021-09-10 NOTE — PHYSICAL EXAM
[General Appearance - Alert] : alert [General Appearance - In No Acute Distress] : in no acute distress [PERRL With Normal Accommodation] : pupils were equal in size, round, and reactive to light [Sclera] : the sclera and conjunctiva were normal [Extraocular Movements] : extraocular movements were intact [Outer Ear] : the ears and nose were normal in appearance [Oropharynx] : the oropharynx was normal [Neck Appearance] : the appearance of the neck was normal [Neck Cervical Mass (___cm)] : no neck mass was observed [Jugular Venous Distention Increased] : there was no jugular-venous distention [Thyroid Diffuse Enlargement] : the thyroid was not enlarged [Thyroid Nodule] : there were no palpable thyroid nodules [Auscultation Breath Sounds / Voice Sounds] : lungs were clear to auscultation bilaterally [Heart Rate And Rhythm] : heart rate was normal and rhythm regular [Heart Sounds] : normal S1 and S2 [Heart Sounds Gallop] : no gallops [Murmurs] : no murmurs [Heart Sounds Pericardial Friction Rub] : no pericardial rub [Pitting Edema] : pitting edema present [___ +] : bilateral [unfilled]+ pitting edema to the ankles [Bowel Sounds] : normal bowel sounds [Abdomen Soft] : soft [Abdomen Tenderness] : non-tender [Abdomen Mass (___ Cm)] : no abdominal mass palpated [Cervical Lymph Nodes Enlarged Posterior Bilaterally] : posterior cervical [Cervical Lymph Nodes Enlarged Anterior Bilaterally] : anterior cervical [Supraclavicular Lymph Nodes Enlarged Bilaterally] : supraclavicular [Axillary Lymph Nodes Enlarged Bilaterally] : axillary [Femoral Lymph Nodes Enlarged Bilaterally] : femoral [Inguinal Lymph Nodes Enlarged Bilaterally] : inguinal [No CVA Tenderness] : no ~M costovertebral angle tenderness [No Spinal Tenderness] : no spinal tenderness [Abnormal Walk] : normal gait [Nail Clubbing] : no clubbing  or cyanosis of the fingernails [Musculoskeletal - Swelling] : no joint swelling seen [Motor Tone] : muscle strength and tone were normal [Skin Color & Pigmentation] : normal skin color and pigmentation [Skin Turgor] : normal skin turgor [] : no rash [Deep Tendon Reflexes (DTR)] : deep tendon reflexes were 2+ and symmetric [Sensation] : the sensory exam was normal to light touch and pinprick [No Focal Deficits] : no focal deficits [Oriented To Time, Place, And Person] : oriented to person, place, and time [Impaired Insight] : insight and judgment were intact [Affect] : the affect was normal [FreeTextEntry1] : + Ileostomy,

## 2021-09-10 NOTE — ASSESSMENT
[FreeTextEntry1] : Rectal Carcinoma, S/P RT & Chemo therapy,\par \par + High - Output Ileostomy,\par \par Now w. JAS,\par \par PH : HTN, R - Carotid  Coronary Stents ( # 3 ) \par \par MR : No Recurrence, + Leak\par \par CT : No Obstructive uropathy, \par \par Plans for sigmoidoscopy, Clamping of Fistula  ( 2-8 - 21 )\par \par Future Ostomy Reversal,\par \par Rec : D.C ARB, Increase Metoprolol, \par \par Hydration, \par \par Colonoscopy -  ( F.U Polyp ) & Eventual closure of colostomy,\par \par Carotid stenosis, asymptomatic, right (433.10) (I65.21)\par History of right common carotid artery stent placement (V45.89) (Z98.890,Z95.828)\par \par 61 y/o male s/p R TCAR 10/17/19 and L ICA occlusion. U/S carotid Duplex demonstrates moderate R ICA stenosis, unchanged. Continue to walk daily for exercise and take Plavix, simvastatin, and aspirin daily.\par \par Plan:\par \par Continue ASA, Plavix and Simvastatin.\par Continue with medical management of HTN. Legs Edematous - Related To amlodipine, \par Continue to walk for exercise.\par Maintain adequate hydration.\par U/S  Carotids Reviewed,  US Kidneys & Bladder\par \par Recently Passed 2 Small stones, - Sent for analysis,  Monitor BP > PCP, in 1 week, \par \par RTO X 4 mo.,

## 2021-09-15 ENCOUNTER — NON-APPOINTMENT (OUTPATIENT)
Age: 61
End: 2021-09-15

## 2021-09-15 LAB — NIDUS STONE QN: NORMAL

## 2021-11-03 ENCOUNTER — RX RENEWAL (OUTPATIENT)
Age: 61
End: 2021-11-03

## 2021-11-03 RX ORDER — ERGOCALCIFEROL 1.25 MG/1
1.25 MG CAPSULE, LIQUID FILLED ORAL
Qty: 12 | Refills: 0 | Status: ACTIVE | COMMUNITY
Start: 2021-02-02 | End: 1900-01-01

## 2021-11-08 ENCOUNTER — APPOINTMENT (OUTPATIENT)
Dept: HEMATOLOGY ONCOLOGY | Facility: CLINIC | Age: 61
End: 2021-11-08

## 2021-11-18 ENCOUNTER — OUTPATIENT (OUTPATIENT)
Dept: OUTPATIENT SERVICES | Facility: HOSPITAL | Age: 61
LOS: 1 days | Discharge: HOME | End: 2021-11-18

## 2021-11-18 DIAGNOSIS — C20 MALIGNANT NEOPLASM OF RECTUM: ICD-10-CM

## 2021-11-18 DIAGNOSIS — Z98.890 OTHER SPECIFIED POSTPROCEDURAL STATES: Chronic | ICD-10-CM

## 2021-11-18 DIAGNOSIS — I51.9 HEART DISEASE, UNSPECIFIED: Chronic | ICD-10-CM

## 2021-12-02 NOTE — REVIEW OF SYSTEMS
[Negative] : Heme/Lymph Simple: Patient demonstrates quick and easy understanding/Verbalized Understanding

## 2021-12-03 ENCOUNTER — APPOINTMENT (OUTPATIENT)
Dept: INTERNAL MEDICINE | Facility: CLINIC | Age: 61
End: 2021-12-03
Payer: COMMERCIAL

## 2021-12-03 VITALS
HEIGHT: 72 IN | WEIGHT: 210 LBS | TEMPERATURE: 98.2 F | SYSTOLIC BLOOD PRESSURE: 142 MMHG | HEART RATE: 60 BPM | BODY MASS INDEX: 28.44 KG/M2 | RESPIRATION RATE: 12 BRPM | OXYGEN SATURATION: 98 % | DIASTOLIC BLOOD PRESSURE: 80 MMHG

## 2021-12-03 DIAGNOSIS — Z01.818 ENCOUNTER FOR OTHER PREPROCEDURAL EXAMINATION: ICD-10-CM

## 2021-12-03 DIAGNOSIS — K13.79 OTHER LESIONS OF ORAL MUCOSA: ICD-10-CM

## 2021-12-03 PROCEDURE — 99213 OFFICE O/P EST LOW 20 MIN: CPT

## 2021-12-03 NOTE — RESULTS/DATA
[] : results reviewed [de-identified] : WNL with hemoglobin stable at 12.7 [de-identified] : WNL [de-identified] : WNL

## 2021-12-03 NOTE — ASSESSMENT
[Patient Optimized for Surgery] : Patient optimized for surgery [No Further Testing Recommended] : no further testing recommended [Continue anti-platelet treatment as is] : Continue current anti-platelet treatment [Modify medications prior to procedure] : Modify medications prior to procedure [As per surgery] : as per surgery [FreeTextEntry4] : 61-year-old male with cardiac history status post stenting most recently 2017 with negative nuclear stress test October 2019.\par Currently medically stable for planned low risk procedure. [FreeTextEntry6] : Hold 5 days prior to procedure

## 2021-12-03 NOTE — HISTORY OF PRESENT ILLNESS
[Coronary Artery Disease] : coronary artery disease [No Pertinent Pulmonary History] : no history of asthma, COPD, sleep apnea, or smoking [No Adverse Anesthesia Reaction] : no adverse anesthesia reaction in self or family member [(Patient denies any chest pain, claudication, dyspnea on exertion, orthopnea, palpitations or syncope)] : Patient denies any chest pain, claudication, dyspnea on exertion, orthopnea, palpitations or syncope [Good (7-10 METs)] : Good (7-10 METs) [Aortic Stenosis] : no aortic stenosis [Atrial Fibrillation] : no atrial fibrillation [Recent Myocardial Infarction] : no recent myocardial infarction [Implantable Device/Pacemaker] : no implantable device/pacemaker [Chronic Anticoagulation] : no chronic anticoagulation [Chronic Kidney Disease] : no chronic kidney disease [Diabetes] : no diabetes [FreeTextEntry1] : Excision of rectal polyp [FreeTextEntry2] : December 13, 2021 [FreeTextEntry3] : Dr Payton [FreeTextEntry4] : 61-year-old male diagnosed with rectal adenocarcinoma in 2019 status post chemotherapy and radiation therapy with resection and ileostomy February 2020. he is ow scheduled for an excision of a rectal polyp under anesthesia\par \par Other significant history is CAD and carotid disease.\par The patient had a circumflex stent in 2005 and LAD stent in 2017 and has been stable since.\par Nuclear stress test October 2019 = negative.\par \par Also carotid disease with totally occluded left internal carotid artery and status post right carotid endarterectomy October 2019 with most recent vascular evaluation January 2021 showing right moderate stenosis without change.\par \par No pulmonary, hepatorenal, hematological or diabetes history.\par \par Overall the patient feeling well without chest pain, palpitations, shortness of breath or edema [FreeTextEntry7] : Nuclear stress test October 2019 = negative

## 2021-12-06 ENCOUNTER — RX RENEWAL (OUTPATIENT)
Age: 61
End: 2021-12-06

## 2021-12-09 ENCOUNTER — NON-APPOINTMENT (OUTPATIENT)
Age: 61
End: 2021-12-09

## 2021-12-16 ENCOUNTER — APPOINTMENT (OUTPATIENT)
Dept: DERMATOLOGY | Facility: CLINIC | Age: 61
End: 2021-12-16

## 2021-12-21 ENCOUNTER — APPOINTMENT (OUTPATIENT)
Dept: INTERNAL MEDICINE | Facility: CLINIC | Age: 61
End: 2021-12-21

## 2021-12-22 ENCOUNTER — APPOINTMENT (OUTPATIENT)
Dept: HEMATOLOGY ONCOLOGY | Facility: CLINIC | Age: 61
End: 2021-12-22

## 2022-01-05 ENCOUNTER — OUTPATIENT (OUTPATIENT)
Dept: OUTPATIENT SERVICES | Facility: HOSPITAL | Age: 62
LOS: 1 days | Discharge: HOME | End: 2022-01-05

## 2022-01-05 DIAGNOSIS — I51.9 HEART DISEASE, UNSPECIFIED: Chronic | ICD-10-CM

## 2022-01-05 DIAGNOSIS — C20 MALIGNANT NEOPLASM OF RECTUM: ICD-10-CM

## 2022-01-05 DIAGNOSIS — Z98.890 OTHER SPECIFIED POSTPROCEDURAL STATES: Chronic | ICD-10-CM

## 2022-01-07 ENCOUNTER — APPOINTMENT (OUTPATIENT)
Dept: HEMATOLOGY ONCOLOGY | Facility: CLINIC | Age: 62
End: 2022-01-07

## 2022-01-08 ENCOUNTER — NON-APPOINTMENT (OUTPATIENT)
Age: 62
End: 2022-01-08

## 2022-01-19 ENCOUNTER — RESULT REVIEW (OUTPATIENT)
Age: 62
End: 2022-01-19

## 2022-01-19 ENCOUNTER — APPOINTMENT (OUTPATIENT)
Dept: HEMATOLOGY ONCOLOGY | Facility: CLINIC | Age: 62
End: 2022-01-19
Payer: COMMERCIAL

## 2022-01-19 VITALS
SYSTOLIC BLOOD PRESSURE: 170 MMHG | OXYGEN SATURATION: 99 % | HEART RATE: 116 BPM | BODY MASS INDEX: 28.99 KG/M2 | HEIGHT: 72 IN | WEIGHT: 214 LBS | DIASTOLIC BLOOD PRESSURE: 95 MMHG

## 2022-01-19 LAB
BASOPHILS # BLD AUTO: 0.1 K/UL — SIGNIFICANT CHANGE UP (ref 0–0.2)
BASOPHILS NFR BLD AUTO: 0.8 % — SIGNIFICANT CHANGE UP (ref 0–2)
EOSINOPHIL # BLD AUTO: 0.1 K/UL — SIGNIFICANT CHANGE UP (ref 0–0.5)
EOSINOPHIL NFR BLD AUTO: 0.8 % — SIGNIFICANT CHANGE UP (ref 0–6)
HCT VFR BLD CALC: 42.2 % — SIGNIFICANT CHANGE UP (ref 39–50)
HGB BLD-MCNC: 13.8 G/DL — SIGNIFICANT CHANGE UP (ref 13–17)
LYMPHOCYTES # BLD AUTO: 1.2 K/UL — SIGNIFICANT CHANGE UP (ref 1–3.3)
LYMPHOCYTES # BLD AUTO: 14.8 % — SIGNIFICANT CHANGE UP (ref 13–44)
MCHC RBC-ENTMCNC: 32.8 G/DL — SIGNIFICANT CHANGE UP (ref 32–36)
MCHC RBC-ENTMCNC: 33.1 PG — SIGNIFICANT CHANGE UP (ref 27–34)
MCV RBC AUTO: 101 FL — HIGH (ref 80–100)
MONOCYTES # BLD AUTO: 0.6 K/UL — SIGNIFICANT CHANGE UP (ref 0–0.9)
MONOCYTES NFR BLD AUTO: 7.6 % — SIGNIFICANT CHANGE UP (ref 2–14)
NEUTROPHILS # BLD AUTO: 6.4 K/UL — SIGNIFICANT CHANGE UP (ref 1.8–7.4)
NEUTROPHILS NFR BLD AUTO: 76 % — SIGNIFICANT CHANGE UP (ref 43–77)
PLATELET # BLD AUTO: 280 K/UL — SIGNIFICANT CHANGE UP (ref 150–400)
RBC # BLD: 4.18 M/UL — LOW (ref 4.2–5.8)
RBC # FLD: 12.6 % — SIGNIFICANT CHANGE UP (ref 10.3–14.5)
WBC # BLD: 8.4 K/UL — SIGNIFICANT CHANGE UP (ref 3.8–10.5)
WBC # FLD AUTO: 8.4 K/UL — SIGNIFICANT CHANGE UP (ref 3.8–10.5)

## 2022-01-19 PROCEDURE — 99214 OFFICE O/P EST MOD 30 MIN: CPT

## 2022-01-19 NOTE — ASSESSMENT
[FreeTextEntry1] : \par 60 year old gentleman with rectal adenocarcinoma (init T2/T3a, 6-7cm from anal verge extending into internal anal sphincter), status post total neoadjuvant therapy with FOLFOX x 8 (yT2/N+) followed by chemoradiation with concurrent capecitabine, completed 9/23/19. Underwent resection and ileostomy on 2/5/20, ypT2 ypN0. \par \par Followup sigmoidoscopy x 3 by Dr. Payton ([facility])noted rectal polyp with biopsy showed fragments of adenocarcinoma, most recently on sigmoidoscopy 12/13/21 with path showing at least HG dysplasia/ intramucosal carcinoma. \par -11/30/21 MRI pelvis reviewed showed 0.7x1.3 cm focal enhancement suspicious for recurrence \par -Dr. Payton recommended APR with permanent colostomy but pt is hesitant and would like a 2nd opinion as he desires reversal of ileostomy.\par -discussed with pt that given high suspicion of recurrence based on path and imaging, reversal of ileostomy without resection would be high risk..\par -will refer to Dr. Kerns for 2nd opinion\par -check CEA and CMP normal.\par \par

## 2022-01-19 NOTE — PHYSICAL EXAM
[Fully active, able to carry on all pre-disease performance without restriction] : Status 0 - Fully active, able to carry on all pre-disease performance without restriction [Normal] : affect appropriate [de-identified] : Ileostomy

## 2022-01-19 NOTE — HISTORY OF PRESENT ILLNESS
[de-identified] : \par The patient was diagnosed with adenocarcinoma of the rectum in February 2019 at the age of 58. He began having BRBPR approximately 6 months ago, which he attributed to hemorrhoids seen on his last colonoscopy about 8 years ago. Recently, he noted increased rectal bleeding and was referred for GI evaluation with Dr. Michele Reyes. Colonoscopy revealed a mass 6-7 cm from the anal verge on rigid sigmoidoscopy. The biopsy was consistent with moderately differentiated adenocarcinoma. Endoscopic ultrasound confirmed a 2.8 x 1.8 cm mass, consistent with T2N0. 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 No significant lymphadenopathy in the abdomen. He next saw colorectal surgery, Dr. Adi Garcia, who recommended a transanal resection preceded by neoadjuvant chemoradiation. A CT C/A/P discovered no metastatic disease. \par Patient completed 8 cycles of FOLFOX and chemoRT with Xeloda for adenocarcinoma of the rectum 8/13/19 - 9/23/19. \par . Resection and ileostomy 2/5/20 by Dr. Job Payton ([facility]par \par \par  [de-identified] : 2/8/21 sigmoidoscopy by Dr. Payton; rectal polyp, biopsy with pathology showing fragments of adenocarcinoma, at least intramucosal. \par 4/8/21 - identical procedure and results\par \par Patient is strongly desirous of ileostomy reversal, but recognizes that persistence of dysplasia and intramucosal adenocarcinoma on rectal biopsy  the way of that procedure.\par \par \par 12/13/21 sigmoidoscopy by Dr. Payton: rectal tissue showed portions of rectum containing dysplastic/neoplastic epithelium 9HG dysplasia/intramucosal carcinoma at least)\par \par 1/19/22: Thomas is here for follow up. He feels well. Ileostomy is functioning well. He reports that Dr. Payton ([facility]) is recommending APR with permanent colostomy given findings on Dec 2021 sigmoidoscopy findings. but he is hesitant. He would like a 2nd opinion.

## 2022-01-25 LAB
ALBUMIN SERPL ELPH-MCNC: 4.9 G/DL
ALP BLD-CCNC: 74 U/L
ALT SERPL-CCNC: 25 U/L
ANION GAP SERPL CALC-SCNC: 14 MMOL/L
AST SERPL-CCNC: 29 U/L
BILIRUB SERPL-MCNC: 0.7 MG/DL
BUN SERPL-MCNC: 20 MG/DL
CALCIUM SERPL-MCNC: 9.8 MG/DL
CEA SERPL-MCNC: 1.6 NG/ML
CHLORIDE SERPL-SCNC: 103 MMOL/L
CO2 SERPL-SCNC: 22 MMOL/L
CREAT SERPL-MCNC: 1.16 MG/DL
GLUCOSE SERPL-MCNC: 109 MG/DL
MAGNESIUM SERPL-MCNC: 1.9 MG/DL
POTASSIUM SERPL-SCNC: 4.3 MMOL/L
PROT SERPL-MCNC: 7.3 G/DL
SODIUM SERPL-SCNC: 139 MMOL/L

## 2022-02-04 ENCOUNTER — APPOINTMENT (OUTPATIENT)
Dept: COLORECTAL SURGERY | Facility: CLINIC | Age: 62
End: 2022-02-04
Payer: COMMERCIAL

## 2022-02-04 VITALS
HEART RATE: 116 BPM | RESPIRATION RATE: 14 BRPM | SYSTOLIC BLOOD PRESSURE: 199 MMHG | DIASTOLIC BLOOD PRESSURE: 93 MMHG | HEIGHT: 72 IN | BODY MASS INDEX: 28.99 KG/M2 | TEMPERATURE: 97.6 F | WEIGHT: 214 LBS

## 2022-02-04 PROCEDURE — 99204 OFFICE O/P NEW MOD 45 MIN: CPT

## 2022-02-04 NOTE — REVIEW OF SYSTEMS
[As Noted in HPI] : as noted in HPI [Abdominal Pain] : abdominal pain [Negative] : Heme/Lymph [FreeTextEntry7] : the ostomy in place

## 2022-02-04 NOTE — PHYSICAL EXAM
[Abdomen Masses] : No abdominal masses [Abdomen Tenderness] : ~T No ~M abdominal tenderness [Tender] : nontender [Normal rectal exam] : exam was normal [Excoriation] : excoriations [Tender, Swollen] : tender, swollen [Tight] : was tight [None] : there was no rectal abscess [___ Anterior] : a [unfilled] ~Ucm rectal mass was present anteriorly [de-identified] : Has a coloanal stricture, dehiscence of anastomosis with posterior cavity

## 2022-02-04 NOTE — ASSESSMENT
[FreeTextEntry1] : 61-year-old male with recurrent rectal cancer.The location of the cancer recurrence is in the low pelvis in around the anastomosis coloanal anastomosis area At this time he does not have any available distal anal area to anastomose and therefore will only require abdominal perineal resection there is no possibility of sphincter salvage at this time.Explained to patient he should have reversal of ileostomy and a end colostomy performed as this will usually have less complications also discussed with the patient and his wife he is a very high risk of recurrence both local and distal and may require further chemotherapy and radiation. He wishes to proceed with surgery at [facility] with his initial surgeon

## 2022-02-04 NOTE — CONSULT LETTER
[Dear  ___] : Dear  [unfilled], [Consult Letter:] : I had the pleasure of evaluating your patient, [unfilled]. [Please see my note below.] : Please see my note below. [Consult Closing:] : Thank you very much for allowing me to participate in the care of this patient.  If you have any questions, please do not hesitate to contact me. [Sincerely,] : Sincerely, [FreeTextEntry3] : Huan Rodriguez M.D. FACS, FASCRS

## 2022-02-04 NOTE — HISTORY OF PRESENT ILLNESS
[FreeTextEntry1] : Consultation requested by Dr. Longo for recurrent rectal cancer. 61-year-old male diagnosed 2 years ago with rectal cancer underwent chemotherapy and radiation treatment and subsequent coloanal anastomosis at [facility] complicated postoperatively by a wound leak anastomotic leak and dehiscence. Patient has had a diverting loop ileostomy for 2 years. Recent biopsies of the anastomotic area/cavity demonstrated recurrence of cancer.Patient has been advised to have an APR by his surgeon at [place]

## 2022-02-09 ENCOUNTER — APPOINTMENT (OUTPATIENT)
Dept: VASCULAR SURGERY | Facility: CLINIC | Age: 62
End: 2022-02-09
Payer: COMMERCIAL

## 2022-02-09 VITALS
SYSTOLIC BLOOD PRESSURE: 136 MMHG | BODY MASS INDEX: 28.99 KG/M2 | HEART RATE: 80 BPM | RESPIRATION RATE: 14 BRPM | WEIGHT: 214 LBS | OXYGEN SATURATION: 98 % | TEMPERATURE: 97.3 F | DIASTOLIC BLOOD PRESSURE: 84 MMHG | HEIGHT: 72 IN

## 2022-02-09 PROCEDURE — 99213 OFFICE O/P EST LOW 20 MIN: CPT

## 2022-02-09 PROCEDURE — 93880 EXTRACRANIAL BILAT STUDY: CPT

## 2022-02-09 NOTE — PROCEDURE
[FreeTextEntry1] : 2/9/22 carotid artery duplex: \par right: ICA stent widely patent. \par Left: ICA occluded

## 2022-02-09 NOTE — HISTORY OF PRESENT ILLNESS
[FreeTextEntry1] : 1/5/21: 59 y/o male s/p R TCAR 10/17/19 and left carotid occlusion. He is feeling well. No lightheadedness, dizziness, headaches, abnormal sensation to face, lips or tongue, neurological or memory deficit. BP is a little elevated, however, he has anxiety, treated with Xanax by his PCP. He continues taking Amlodipine, Olmesartan or Metoprolol, ASA 81 mg, Clopidogrel 75 mg QD and Simvastatin 40 mg. \par \par 2/9/22: Pt doing well since last visit. He denies any lightheadedness, dizziness, headaches, abnormal sensation to face, lips or tongue, neurological or memory deficit. He has been complaint with medications. \par \par PSHx:\par R TCAR 10/17/19

## 2022-02-09 NOTE — ASSESSMENT
[FreeTextEntry1] : 62 yo male s/p right TCAR. Stent patent on duplex today, no stenosis \par \par Plan \par Pt counseled on results of duplex \par Plan for repeat carotid artery duplex in 1 year [Arterial/Venous Disease] : arterial/venous disease

## 2022-02-14 NOTE — PHYSICAL EXAM
[Normal] : well developed, well nourished, in no acute distress [FreeTextEntry1] : anal irritation  [de-identified] : hyperpigmentation of the gluteal fold/perianal skin No

## 2022-03-11 ENCOUNTER — APPOINTMENT (OUTPATIENT)
Dept: INTERNAL MEDICINE | Facility: CLINIC | Age: 62
End: 2022-03-11

## 2022-03-11 ENCOUNTER — APPOINTMENT (OUTPATIENT)
Dept: INTERNAL MEDICINE | Facility: CLINIC | Age: 62
End: 2022-03-11
Payer: COMMERCIAL

## 2022-03-11 VITALS
WEIGHT: 222 LBS | DIASTOLIC BLOOD PRESSURE: 80 MMHG | SYSTOLIC BLOOD PRESSURE: 136 MMHG | RESPIRATION RATE: 12 BRPM | OXYGEN SATURATION: 98 % | HEART RATE: 97 BPM | TEMPERATURE: 97.4 F | BODY MASS INDEX: 30.07 KG/M2 | HEIGHT: 72 IN

## 2022-03-11 DIAGNOSIS — Z86.39 PERSONAL HISTORY OF OTHER ENDOCRINE, NUTRITIONAL AND METABOLIC DISEASE: ICD-10-CM

## 2022-03-11 DIAGNOSIS — I72.8 ANEURYSM OF OTHER SPECIFIED ARTERIES: ICD-10-CM

## 2022-03-11 DIAGNOSIS — R74.8 ABNORMAL LEVELS OF OTHER SERUM ENZYMES: ICD-10-CM

## 2022-03-11 PROCEDURE — 99214 OFFICE O/P EST MOD 30 MIN: CPT

## 2022-03-16 ENCOUNTER — RESULT REVIEW (OUTPATIENT)
Age: 62
End: 2022-03-16

## 2022-03-16 ENCOUNTER — OUTPATIENT (OUTPATIENT)
Dept: OUTPATIENT SERVICES | Facility: HOSPITAL | Age: 62
LOS: 1 days | End: 2022-03-16
Payer: COMMERCIAL

## 2022-03-16 VITALS
WEIGHT: 215.61 LBS | TEMPERATURE: 98 F | OXYGEN SATURATION: 100 % | DIASTOLIC BLOOD PRESSURE: 88 MMHG | HEIGHT: 74 IN | HEART RATE: 106 BPM | RESPIRATION RATE: 16 BRPM | SYSTOLIC BLOOD PRESSURE: 162 MMHG

## 2022-03-16 DIAGNOSIS — Z98.890 OTHER SPECIFIED POSTPROCEDURAL STATES: Chronic | ICD-10-CM

## 2022-03-16 DIAGNOSIS — Z01.818 ENCOUNTER FOR OTHER PREPROCEDURAL EXAMINATION: ICD-10-CM

## 2022-03-16 DIAGNOSIS — I51.9 HEART DISEASE, UNSPECIFIED: Chronic | ICD-10-CM

## 2022-03-16 DIAGNOSIS — C20 MALIGNANT NEOPLASM OF RECTUM: ICD-10-CM

## 2022-03-16 LAB
A1C WITH ESTIMATED AVERAGE GLUCOSE RESULT: 5.6 % — SIGNIFICANT CHANGE UP (ref 4–5.6)
ALBUMIN SERPL ELPH-MCNC: 4.1 G/DL — SIGNIFICANT CHANGE UP (ref 3.3–5)
ALP SERPL-CCNC: 65 U/L — SIGNIFICANT CHANGE UP (ref 40–120)
ALT FLD-CCNC: 51 U/L — SIGNIFICANT CHANGE UP (ref 12–78)
ANION GAP SERPL CALC-SCNC: 4 MMOL/L — LOW (ref 5–17)
APTT BLD: 29.9 SEC — SIGNIFICANT CHANGE UP (ref 27.5–35.5)
AST SERPL-CCNC: 40 U/L — HIGH (ref 15–37)
BASOPHILS # BLD AUTO: 0.04 K/UL — SIGNIFICANT CHANGE UP (ref 0–0.2)
BASOPHILS NFR BLD AUTO: 0.6 % — SIGNIFICANT CHANGE UP (ref 0–2)
BILIRUB DIRECT SERPL-MCNC: 0.3 MG/DL — SIGNIFICANT CHANGE UP (ref 0–0.3)
BILIRUB SERPL-MCNC: 1.5 MG/DL — HIGH (ref 0.2–1.2)
BUN SERPL-MCNC: 15 MG/DL — SIGNIFICANT CHANGE UP (ref 7–23)
CALCIUM SERPL-MCNC: 9.6 MG/DL — SIGNIFICANT CHANGE UP (ref 8.5–10.1)
CEA SERPL-MCNC: 1.8 NG/ML — SIGNIFICANT CHANGE UP (ref 0–3.8)
CHLORIDE SERPL-SCNC: 106 MMOL/L — SIGNIFICANT CHANGE UP (ref 96–108)
CO2 SERPL-SCNC: 27 MMOL/L — SIGNIFICANT CHANGE UP (ref 22–31)
CREAT SERPL-MCNC: 1.02 MG/DL — SIGNIFICANT CHANGE UP (ref 0.5–1.3)
EGFR: 83 ML/MIN/1.73M2 — SIGNIFICANT CHANGE UP
EOSINOPHIL # BLD AUTO: 0.1 K/UL — SIGNIFICANT CHANGE UP (ref 0–0.5)
EOSINOPHIL NFR BLD AUTO: 1.5 % — SIGNIFICANT CHANGE UP (ref 0–6)
ESTIMATED AVERAGE GLUCOSE: 114 MG/DL — SIGNIFICANT CHANGE UP (ref 68–114)
GLUCOSE SERPL-MCNC: 102 MG/DL — HIGH (ref 70–99)
HCT VFR BLD CALC: 42.5 % — SIGNIFICANT CHANGE UP (ref 39–50)
HGB BLD-MCNC: 14.3 G/DL — SIGNIFICANT CHANGE UP (ref 13–17)
IMM GRANULOCYTES NFR BLD AUTO: 0.3 % — SIGNIFICANT CHANGE UP (ref 0–1.5)
INR BLD: 0.93 RATIO — SIGNIFICANT CHANGE UP (ref 0.88–1.16)
LYMPHOCYTES # BLD AUTO: 1.09 K/UL — SIGNIFICANT CHANGE UP (ref 1–3.3)
LYMPHOCYTES # BLD AUTO: 16.5 % — SIGNIFICANT CHANGE UP (ref 13–44)
MCHC RBC-ENTMCNC: 33.2 PG — SIGNIFICANT CHANGE UP (ref 27–34)
MCHC RBC-ENTMCNC: 33.6 GM/DL — SIGNIFICANT CHANGE UP (ref 32–36)
MCV RBC AUTO: 98.6 FL — SIGNIFICANT CHANGE UP (ref 80–100)
MONOCYTES # BLD AUTO: 0.44 K/UL — SIGNIFICANT CHANGE UP (ref 0–0.9)
MONOCYTES NFR BLD AUTO: 6.7 % — SIGNIFICANT CHANGE UP (ref 2–14)
NEUTROPHILS # BLD AUTO: 4.92 K/UL — SIGNIFICANT CHANGE UP (ref 1.8–7.4)
NEUTROPHILS NFR BLD AUTO: 74.4 % — SIGNIFICANT CHANGE UP (ref 43–77)
PLATELET # BLD AUTO: 239 K/UL — SIGNIFICANT CHANGE UP (ref 150–400)
POTASSIUM SERPL-MCNC: 3.6 MMOL/L — SIGNIFICANT CHANGE UP (ref 3.5–5.3)
POTASSIUM SERPL-SCNC: 3.6 MMOL/L — SIGNIFICANT CHANGE UP (ref 3.5–5.3)
PROT SERPL-MCNC: 7.9 GM/DL — SIGNIFICANT CHANGE UP (ref 6–8.3)
PROTHROM AB SERPL-ACNC: 10.8 SEC — SIGNIFICANT CHANGE UP (ref 10.5–13.4)
RBC # BLD: 4.31 M/UL — SIGNIFICANT CHANGE UP (ref 4.2–5.8)
RBC # FLD: 12.8 % — SIGNIFICANT CHANGE UP (ref 10.3–14.5)
SODIUM SERPL-SCNC: 137 MMOL/L — SIGNIFICANT CHANGE UP (ref 135–145)
WBC # BLD: 6.61 K/UL — SIGNIFICANT CHANGE UP (ref 3.8–10.5)
WBC # FLD AUTO: 6.61 K/UL — SIGNIFICANT CHANGE UP (ref 3.8–10.5)

## 2022-03-16 PROCEDURE — 93005 ELECTROCARDIOGRAM TRACING: CPT

## 2022-03-16 PROCEDURE — 71046 X-RAY EXAM CHEST 2 VIEWS: CPT | Mod: 26

## 2022-03-16 PROCEDURE — 36415 COLL VENOUS BLD VENIPUNCTURE: CPT

## 2022-03-16 PROCEDURE — G0463: CPT | Mod: 25

## 2022-03-16 PROCEDURE — 85730 THROMBOPLASTIN TIME PARTIAL: CPT

## 2022-03-16 PROCEDURE — 86901 BLOOD TYPING SEROLOGIC RH(D): CPT

## 2022-03-16 PROCEDURE — 93010 ELECTROCARDIOGRAM REPORT: CPT

## 2022-03-16 PROCEDURE — 82248 BILIRUBIN DIRECT: CPT

## 2022-03-16 PROCEDURE — 85025 COMPLETE CBC W/AUTO DIFF WBC: CPT

## 2022-03-16 PROCEDURE — 86900 BLOOD TYPING SEROLOGIC ABO: CPT

## 2022-03-16 PROCEDURE — 80053 COMPREHEN METABOLIC PANEL: CPT

## 2022-03-16 PROCEDURE — 86850 RBC ANTIBODY SCREEN: CPT

## 2022-03-16 PROCEDURE — 83036 HEMOGLOBIN GLYCOSYLATED A1C: CPT

## 2022-03-16 PROCEDURE — 85610 PROTHROMBIN TIME: CPT

## 2022-03-16 PROCEDURE — 86923 COMPATIBILITY TEST ELECTRIC: CPT

## 2022-03-16 PROCEDURE — 82378 CARCINOEMBRYONIC ANTIGEN: CPT

## 2022-03-16 PROCEDURE — 71046 X-RAY EXAM CHEST 2 VIEWS: CPT

## 2022-03-16 NOTE — H&P PST ADULT - HISTORY OF PRESENT ILLNESS
61 year old male diagnosed with rectal adenocarcinoma 2019 s/p resection with creation of ileostomy 2/2020   s/p chemo and XRT; Found to have local recurrence during sigmoidoscopy in preparation for reversal of ileostomy ; presents to PST for planned open abdominal perianal resection colostomy reversal of ileostomy with small bowel resection and anastomosis

## 2022-03-16 NOTE — H&P PST ADULT - ASSESSMENT
Plan:  1. PST instructions given ; NPO status/  instructions to be given by ASU   2. Pt instructed to take following meds on day of surgery:   3. Pt instructed to take routine evening medications unless indicated   4. Stop NSAIDS ( Aspirin Alev Motrin Mobic Diclofenac), herbal supplements , MVI , Vitamin fish oil 7 days prior to surgery  unless   directed by surgeon or cardiologist;   5. Medical Optimization  with    6. EZ wash instructions given & mupirocin instructions given  7. Labs EKG CXR as per surgeon request   8. Pt instructed to self quarantine after Covid test   9. Covid Testing scheduled Pt notified and aware  10. Pt denies covid symptoms shortness of breath fever cough       CAPRINI SCORE [CLOT]    AGE RELATED RISK FACTORS                                                       MOBILITY RELATED FACTORS  [ ] Age 41-60 years                                            (1 Point)                  [ ] Bed rest                                                        (1 Point)  [ ] Age: 61-74 years                                           (2 Points)                 [ ] Plaster cast                                                   (2 Points)  [ ] Age= 75 years                                              (3 Points)                 [ ] Bed bound for more than 72 hours                 (2 Points)    DISEASE RELATED RISK FACTORS                                               GENDER SPECIFIC FACTORS  [ ] Edema in the lower extremities                       (1 Point)                  [ ] Pregnancy                                                     (1 Point)  [ ] Varicose veins                                               (1 Point)                  [ ] Post-partum < 6 weeks                                   (1 Point)             [ ] BMI > 25 Kg/m2                                            (1 Point)                  [ ] Hormonal therapy  or oral contraception          (1 Point)                 [ ] Sepsis (in the previous month)                        (1 Point)                  [ ] History of pregnancy complications                 (1 point)  [ ] Pneumonia or serious lung disease                                               [ ] Unexplained or recurrent                     (1 Point)           (in the previous month)                               (1 Point)  [ ] Abnormal pulmonary function test                     (1 Point)                 SURGERY RELATED RISK FACTORS  [ ] Acute myocardial infarction                              (1 Point)                 [ ]  Section                                             (1 Point)  [ ] Congestive heart failure (in the previous month)  (1 Point)               [ ] Minor surgery                                                  (1 Point)   [ ] Inflammatory bowel disease                             (1 Point)                 [ ] Arthroscopic surgery                                        (2 Points)  [ ] Central venous access                                      (2 Points)                [ ] General surgery lasting more than 45 minutes   (2 Points)       [ ] Stroke (in the previous month)                          (5 Points)               [ ] Elective arthroplasty                                         (5 Points)            ( ) past and present malignancy                             ( 2 points)                                                                                                                                    HEMATOLOGY RELATED FACTORS                                                 TRAUMA RELATED RISK FACTORS  [ ] Prior episodes of VTE                                     (3 Points)                 [ ] Fracture of the hip, pelvis, or leg                       (5 Points)  [ ] Positive family history for VTE                         (3 Points)                 [ ] Acute spinal cord injury (in the previous month)  (5 Points)  [ ] Prothrombin 81874 A                                     (3 Points)                 [ ] Paralysis  (less than 1 month)                             (5 Points)  [ ] Factor V Leiden                                             (3 Points)                  [ ] Multiple Trauma within 1 month                        (5 Points)  [ ] Lupus anticoagulants                                     (3 Points)                                                           [ ] Anticardiolipin antibodies                               (3 Points)                                                       [ ] High homocysteine in the blood                      (3 Points)                                             [ ] Other congenital or acquired thrombophilia      (3 Points)                                                [ ] Heparin induced thrombocytopenia                  (3 Points)                                          Total Score [          ]     61 year old male diagnosed with rectal adenocarcinoma 2019 s/p resection with creation of ileostomy 2020   s/p chemo and XRT; Found to have local recurrence during sigmoidoscopy in preparation for reversal of ileostomy ; presents to PST for planned open abdominal perianal resection colostomy reversal of ileostomy with small bowel resection and anastomosis          Plan:  1. PST instructions given ; NPO status/  instructions to be given by ASU   2. Pt instructed to take following meds on day of surgery: metoprolol amlodipine   3. Pt instructed to take routine evening medications unless indicated   4. Stop NSAIDS ( Aspirin Alev Motrin Mobic Diclofenac), herbal supplements , MVI , Vitamin fish oil 7 days prior to surgery  unless   directed by surgeon or cardiologist;   5. Medical Optimization  with Dr Mckeon   6. EZ wash instructions given   7. Labs EKG CXR as per surgeon request   8. Pt instructed to self quarantine after Covid test   9. Covid Testing scheduled Pt notified and aware  10. Pt denies covid symptoms shortness of breath fever cough       CAPRINI SCORE [CLOT]    AGE RELATED RISK FACTORS                                                       MOBILITY RELATED FACTORS  [ ] Age 41-60 years                                            (1 Point)                  [ ] Bed rest                                                        (1 Point)  [x ] Age: 61-74 years                                           (2 Points)                 [ ] Plaster cast                                                   (2 Points)  [ ] Age= 75 years                                              (3 Points)                 [ ] Bed bound for more than 72 hours                 (2 Points)    DISEASE RELATED RISK FACTORS                                               GENDER SPECIFIC FACTORS  [ ] Edema in the lower extremities                       (1 Point)                  [ ] Pregnancy                                                     (1 Point)  [ ] Varicose veins                                               (1 Point)                  [ ] Post-partum < 6 weeks                                   (1 Point)             [x ] BMI > 25 Kg/m2                                            (1 Point)                  [ ] Hormonal therapy  or oral contraception          (1 Point)                 [ ] Sepsis (in the previous month)                        (1 Point)                  [ ] History of pregnancy complications                 (1 point)  [ ] Pneumonia or serious lung disease                                               [ ] Unexplained or recurrent                     (1 Point)           (in the previous month)                               (1 Point)  [ ] Abnormal pulmonary function test                     (1 Point)                 SURGERY RELATED RISK FACTORS  [ ] Acute myocardial infarction                              (1 Point)                 [ ]  Section                                             (1 Point)  [ ] Congestive heart failure (in the previous month)  (1 Point)               [ ] Minor surgery                                                  (1 Point)   [ ] Inflammatory bowel disease                             (1 Point)                 [ ] Arthroscopic surgery                                        (2 Points)  [ ] Central venous access                                      (2 Points)                [ x] General surgery lasting more than 45 minutes   (2 Points)       [ ] Stroke (in the previous month)                          (5 Points)               [ ] Elective arthroplasty                                         (5 Points)            ( x) past and present malignancy                             ( 2 points)                                                                                                                                    HEMATOLOGY RELATED FACTORS                                                 TRAUMA RELATED RISK FACTORS  [ ] Prior episodes of VTE                                     (3 Points)                 [ ] Fracture of the hip, pelvis, or leg                       (5 Points)  [ ] Positive family history for VTE                         (3 Points)                 [ ] Acute spinal cord injury (in the previous month)  (5 Points)  [ ] Prothrombin 62740 A                                     (3 Points)                 [ ] Paralysis  (less than 1 month)                             (5 Points)  [ ] Factor V Leiden                                             (3 Points)                  [ ] Multiple Trauma within 1 month                        (5 Points)  [ ] Lupus anticoagulants                                     (3 Points)                                                           [ ] Anticardiolipin antibodies                               (3 Points)                                                       [ ] High homocysteine in the blood                      (3 Points)                                             [ ] Other congenital or acquired thrombophilia      (3 Points)                                                [ ] Heparin induced thrombocytopenia                  (3 Points)                                          Total Score [   7       ]      The Caprini score indicates that this patient is at high risk for a VTE event (score 6 or greater). Surgical patients in this group will benefit from both pharmacologic prophylaxis and intermittent compression devices.  The surgical team will determine the balance between VTE risk and bleeding risk, and other clinical considerations

## 2022-03-16 NOTE — H&P PST ADULT - NSICDXPASTSURGICALHX_GEN_ALL_CORE_FT
PAST SURGICAL HISTORY:  Cardiac disorder coronary stent placement november 2005    H/O cardiac catheterization 2017  4 STENTS     PAST SURGICAL HISTORY:  H/O cardiac catheterization 2005 2017  4 STENTS    H/O sigmoidoscopy x2    History of CEA (carotid endarterectomy) right 2019    History of rectal surgery with ileostomy 2/2020    History of removal of Port-a-Cath

## 2022-03-16 NOTE — H&P PST ADULT - NSICDXPASTMEDICALHX_GEN_ALL_CORE_FT
PAST MEDICAL HISTORY:  CAD (coronary artery disease)     Chest pain     Depression with rectal cancer    HLD (hyperlipidemia)     HTN (hypertension)     Mitral regurgitation     Neuropathy feet s/p chemo    Rectal cancer 2/2019     PAST MEDICAL HISTORY:  CAD (coronary artery disease)     Chest pain     Depression with rectal cancer    H/O carotid stenosis     H/O renal calculi     HLD (hyperlipidemia)     HTN (hypertension)     DANO (iron deficiency anemia)     Mitral regurgitation     Neuropathy feet s/p chemo    Rectal cancer 2/2019 s/p chemo and radiation; recurrence of cancer 2022    Stented coronary artery      PAST MEDICAL HISTORY:  CAD (coronary artery disease)     Chest pain     Depression with rectal cancer    H/O carotid stenosis     H/O renal calculi     HLD (hyperlipidemia)     HTN (hypertension)     DANO (iron deficiency anemia)     Mitral regurgitation     Neuropathy feet s/p chemo    Rectal cancer 2/2019 s/p chemo and radiation; recurrence of cancer 2022    Splenic artery aneurysm     Stented coronary artery circumflex 2005 LAD 2017 x4    Umbilical hernia

## 2022-03-16 NOTE — H&P PST ADULT - HEALTH CARE MAINTENANCE
Pt denies travel out of [facility] for the past 14 days Pt denies  travel internationally for the past 21 days

## 2022-03-17 ENCOUNTER — TRANSCRIPTION ENCOUNTER (OUTPATIENT)
Age: 62
End: 2022-03-17

## 2022-03-17 DIAGNOSIS — C20 MALIGNANT NEOPLASM OF RECTUM: ICD-10-CM

## 2022-03-17 DIAGNOSIS — Z01.818 ENCOUNTER FOR OTHER PREPROCEDURAL EXAMINATION: ICD-10-CM

## 2022-03-17 RX ORDER — SODIUM CHLORIDE 9 MG/ML
3 INJECTION INTRAMUSCULAR; INTRAVENOUS; SUBCUTANEOUS EVERY 8 HOURS
Refills: 0 | Status: DISCONTINUED | OUTPATIENT
Start: 2022-03-23 | End: 2022-03-27

## 2022-03-17 NOTE — HISTORY OF PRESENT ILLNESS
[Coronary Artery Disease] : coronary artery disease [No Pertinent Pulmonary History] : no history of asthma, COPD, sleep apnea, or smoking [No Adverse Anesthesia Reaction] : no adverse anesthesia reaction in self or family member [(Patient denies any chest pain, claudication, dyspnea on exertion, orthopnea, palpitations or syncope)] : Patient denies any chest pain, claudication, dyspnea on exertion, orthopnea, palpitations or syncope [Good (7-10 METs)] : Good (7-10 METs) [Aortic Stenosis] : no aortic stenosis [Atrial Fibrillation] : no atrial fibrillation [Recent Myocardial Infarction] : no recent myocardial infarction [Implantable Device/Pacemaker] : no implantable device/pacemaker [Chronic Anticoagulation] : no chronic anticoagulation [Chronic Kidney Disease] : no chronic kidney disease [Diabetes] : no diabetes [FreeTextEntry3] : Dr Payton [FreeTextEntry4] : 61-year-old male diagnosed with rectal adenocarcinoma in 2019 status post chemotherapy and radiation therapy with resection and ileostomy February 2020. He has local recurrence therefore is scheduled for resection with end colostomy\par \par Other significant history is CAD and carotid disease.\par The patient had a circumflex stent in 2005 and LAD stent in 2017 and has been stable since.\par Nuclear stress test October 2019 = negative.\par \par Also carotid disease with totally occluded left internal carotid artery and status post right carotid endarterectomy October 2019 with most recent vascular evaluation January 2022 showing right moderate stenosis without change.\par \par No pulmonary, hepatorenal, hematological or diabetes history.\par \par Overall the patient feeling well without chest pain, palpitations, shortness of breath or edema [FreeTextEntry7] : Nuclear stress test October 2019 = negative

## 2022-03-17 NOTE — ASSESSMENT
[Patient Optimized for Surgery] : Patient optimized for surgery [No Further Testing Recommended] : no further testing recommended [Continue anti-platelet treatment as is] : Continue current anti-platelet treatment [Modify medications prior to procedure] : Modify medications prior to procedure [As per surgery] : as per surgery [FreeTextEntry4] : 61-year-old male with cardiac history status post stenting most recently 2017 with negative nuclear stress test October 2019.\par Currently medically stable for planned procedure.\par \par patient had a preoperative cardiac evaluation December 2021 with clearance. [FreeTextEntry6] : Hold Plavix 5 days prior to procedure

## 2022-03-17 NOTE — RESULTS/DATA
[] : results reviewed [de-identified] : WNL  [de-identified] : WNL [de-identified] : WNL  [de-identified] : NACPD [de-identified] : NSR, CORAL

## 2022-03-23 ENCOUNTER — RESULT REVIEW (OUTPATIENT)
Age: 62
End: 2022-03-23

## 2022-03-23 ENCOUNTER — APPOINTMENT (OUTPATIENT)
Dept: COLORECTAL SURGERY | Facility: HOSPITAL | Age: 62
End: 2022-03-23
Payer: COMMERCIAL

## 2022-03-23 ENCOUNTER — INPATIENT (INPATIENT)
Facility: HOSPITAL | Age: 62
LOS: 3 days | Discharge: HOME | DRG: 330 | End: 2022-03-27
Attending: COLON & RECTAL SURGERY | Admitting: COLON & RECTAL SURGERY
Payer: COMMERCIAL

## 2022-03-23 ENCOUNTER — TRANSCRIPTION ENCOUNTER (OUTPATIENT)
Age: 62
End: 2022-03-23

## 2022-03-23 VITALS
TEMPERATURE: 98 F | RESPIRATION RATE: 16 BRPM | WEIGHT: 215.61 LBS | HEIGHT: 74 IN | HEART RATE: 68 BPM | OXYGEN SATURATION: 99 % | DIASTOLIC BLOOD PRESSURE: 78 MMHG | SYSTOLIC BLOOD PRESSURE: 140 MMHG

## 2022-03-23 DIAGNOSIS — Z93.2 ILEOSTOMY STATUS: ICD-10-CM

## 2022-03-23 DIAGNOSIS — C20 MALIGNANT NEOPLASM OF RECTUM: ICD-10-CM

## 2022-03-23 DIAGNOSIS — Z98.890 OTHER SPECIFIED POSTPROCEDURAL STATES: Chronic | ICD-10-CM

## 2022-03-23 PROCEDURE — 88342 IMHCHEM/IMCYTCHM 1ST ANTB: CPT

## 2022-03-23 PROCEDURE — 45110 REMOVAL OF RECTUM: CPT

## 2022-03-23 PROCEDURE — 97116 GAIT TRAINING THERAPY: CPT | Mod: GP

## 2022-03-23 PROCEDURE — 44625 REPAIR BOWEL OPENING: CPT | Mod: 80,59

## 2022-03-23 PROCEDURE — 83735 ASSAY OF MAGNESIUM: CPT

## 2022-03-23 PROCEDURE — C1889: CPT

## 2022-03-23 PROCEDURE — 74018 RADEX ABDOMEN 1 VIEW: CPT

## 2022-03-23 PROCEDURE — 88341 IMHCHEM/IMCYTCHM EA ADD ANTB: CPT

## 2022-03-23 PROCEDURE — 85027 COMPLETE CBC AUTOMATED: CPT

## 2022-03-23 PROCEDURE — 80048 BASIC METABOLIC PNL TOTAL CA: CPT

## 2022-03-23 PROCEDURE — 88341 IMHCHEM/IMCYTCHM EA ADD ANTB: CPT | Mod: 26

## 2022-03-23 PROCEDURE — 97530 THERAPEUTIC ACTIVITIES: CPT | Mod: GP

## 2022-03-23 PROCEDURE — 82962 GLUCOSE BLOOD TEST: CPT

## 2022-03-23 PROCEDURE — 88304 TISSUE EXAM BY PATHOLOGIST: CPT | Mod: 26

## 2022-03-23 PROCEDURE — 88304 TISSUE EXAM BY PATHOLOGIST: CPT

## 2022-03-23 PROCEDURE — 36415 COLL VENOUS BLD VENIPUNCTURE: CPT

## 2022-03-23 PROCEDURE — 85025 COMPLETE CBC W/AUTO DIFF WBC: CPT

## 2022-03-23 PROCEDURE — 88309 TISSUE EXAM BY PATHOLOGIST: CPT | Mod: 26

## 2022-03-23 PROCEDURE — 84100 ASSAY OF PHOSPHORUS: CPT

## 2022-03-23 PROCEDURE — 45110 REMOVAL OF RECTUM: CPT | Mod: 80

## 2022-03-23 PROCEDURE — 44625 REPAIR BOWEL OPENING: CPT | Mod: 59

## 2022-03-23 PROCEDURE — 88342 IMHCHEM/IMCYTCHM 1ST ANTB: CPT | Mod: 26

## 2022-03-23 PROCEDURE — 88309 TISSUE EXAM BY PATHOLOGIST: CPT

## 2022-03-23 PROCEDURE — 97162 PT EVAL MOD COMPLEX 30 MIN: CPT | Mod: GP

## 2022-03-23 RX ORDER — KETOROLAC TROMETHAMINE 30 MG/ML
15 SYRINGE (ML) INJECTION EVERY 6 HOURS
Refills: 0 | Status: DISCONTINUED | OUTPATIENT
Start: 2022-03-24 | End: 2022-03-24

## 2022-03-23 RX ORDER — HYDROMORPHONE HYDROCHLORIDE 2 MG/ML
30 INJECTION INTRAMUSCULAR; INTRAVENOUS; SUBCUTANEOUS
Refills: 0 | Status: DISCONTINUED | OUTPATIENT
Start: 2022-03-23 | End: 2022-03-26

## 2022-03-23 RX ORDER — SODIUM CHLORIDE 9 MG/ML
1000 INJECTION, SOLUTION INTRAVENOUS
Refills: 0 | Status: DISCONTINUED | OUTPATIENT
Start: 2022-03-23 | End: 2022-03-23

## 2022-03-23 RX ORDER — CEFOTETAN DISODIUM 1 G
2 VIAL (EA) INJECTION EVERY 12 HOURS
Refills: 0 | Status: COMPLETED | OUTPATIENT
Start: 2022-03-23 | End: 2022-03-24

## 2022-03-23 RX ORDER — ALVIMOPAN 12 MG/1
12 CAPSULE ORAL EVERY 12 HOURS
Refills: 0 | Status: DISCONTINUED | OUTPATIENT
Start: 2022-03-24 | End: 2022-03-26

## 2022-03-23 RX ORDER — ONDANSETRON 8 MG/1
4 TABLET, FILM COATED ORAL EVERY 6 HOURS
Refills: 0 | Status: DISCONTINUED | OUTPATIENT
Start: 2022-03-23 | End: 2022-03-27

## 2022-03-23 RX ORDER — ONDANSETRON 8 MG/1
4 TABLET, FILM COATED ORAL ONCE
Refills: 0 | Status: DISCONTINUED | OUTPATIENT
Start: 2022-03-23 | End: 2022-03-23

## 2022-03-23 RX ORDER — ACETAMINOPHEN 500 MG
1000 TABLET ORAL EVERY 6 HOURS
Refills: 0 | Status: COMPLETED | OUTPATIENT
Start: 2022-03-24 | End: 2022-03-24

## 2022-03-23 RX ORDER — NALOXONE HYDROCHLORIDE 4 MG/.1ML
0.1 SPRAY NASAL
Refills: 0 | Status: DISCONTINUED | OUTPATIENT
Start: 2022-03-23 | End: 2022-03-27

## 2022-03-23 RX ORDER — AMLODIPINE BESYLATE 2.5 MG/1
5 TABLET ORAL DAILY
Refills: 0 | Status: DISCONTINUED | OUTPATIENT
Start: 2022-03-23 | End: 2022-03-25

## 2022-03-23 RX ORDER — HEPARIN SODIUM 5000 [USP'U]/ML
5000 INJECTION INTRAVENOUS; SUBCUTANEOUS ONCE
Refills: 0 | Status: COMPLETED | OUTPATIENT
Start: 2022-03-23 | End: 2022-03-23

## 2022-03-23 RX ORDER — SIMVASTATIN 20 MG/1
40 TABLET, FILM COATED ORAL AT BEDTIME
Refills: 0 | Status: DISCONTINUED | OUTPATIENT
Start: 2022-03-23 | End: 2022-03-27

## 2022-03-23 RX ORDER — SODIUM CHLORIDE 9 MG/ML
1000 INJECTION, SOLUTION INTRAVENOUS
Refills: 0 | Status: DISCONTINUED | OUTPATIENT
Start: 2022-03-23 | End: 2022-03-25

## 2022-03-23 RX ORDER — ALPRAZOLAM 0.25 MG
0.5 TABLET ORAL EVERY 6 HOURS
Refills: 0 | Status: DISCONTINUED | OUTPATIENT
Start: 2022-03-23 | End: 2022-03-27

## 2022-03-23 RX ORDER — METOPROLOL TARTRATE 50 MG
100 TABLET ORAL DAILY
Refills: 0 | Status: DISCONTINUED | OUTPATIENT
Start: 2022-03-23 | End: 2022-03-25

## 2022-03-23 RX ORDER — FENTANYL CITRATE 50 UG/ML
50 INJECTION INTRAVENOUS
Refills: 0 | Status: DISCONTINUED | OUTPATIENT
Start: 2022-03-23 | End: 2022-03-23

## 2022-03-23 RX ORDER — ALVIMOPAN 12 MG/1
12 CAPSULE ORAL ONCE
Refills: 0 | Status: COMPLETED | OUTPATIENT
Start: 2022-03-23 | End: 2022-03-23

## 2022-03-23 RX ORDER — OXYCODONE HYDROCHLORIDE 5 MG/1
5 TABLET ORAL ONCE
Refills: 0 | Status: DISCONTINUED | OUTPATIENT
Start: 2022-03-23 | End: 2022-03-23

## 2022-03-23 RX ORDER — HYDROMORPHONE HYDROCHLORIDE 2 MG/ML
0.5 INJECTION INTRAMUSCULAR; INTRAVENOUS; SUBCUTANEOUS
Refills: 0 | Status: DISCONTINUED | OUTPATIENT
Start: 2022-03-23 | End: 2022-03-26

## 2022-03-23 RX ADMIN — SODIUM CHLORIDE 3 MILLILITER(S): 9 INJECTION INTRAMUSCULAR; INTRAVENOUS; SUBCUTANEOUS at 21:13

## 2022-03-23 RX ADMIN — ALVIMOPAN 12 MILLIGRAM(S): 12 CAPSULE ORAL at 10:32

## 2022-03-23 RX ADMIN — HYDROMORPHONE HYDROCHLORIDE 30 MILLILITER(S): 2 INJECTION INTRAMUSCULAR; INTRAVENOUS; SUBCUTANEOUS at 18:19

## 2022-03-23 RX ADMIN — Medication 400 MILLIGRAM(S): at 23:08

## 2022-03-23 RX ADMIN — SIMVASTATIN 40 MILLIGRAM(S): 20 TABLET, FILM COATED ORAL at 21:10

## 2022-03-23 RX ADMIN — HEPARIN SODIUM 5000 UNIT(S): 5000 INJECTION INTRAVENOUS; SUBCUTANEOUS at 10:32

## 2022-03-23 RX ADMIN — Medication 100 GRAM(S): at 23:08

## 2022-03-23 NOTE — BRIEF OPERATIVE NOTE - NSICDXBRIEFPROCEDURE_GEN_ALL_CORE_FT
PROCEDURES:  Reversal of colostomy or ileostomy 23-Mar-2022 18:08:21  Natalya Aldrich  Abdominoperineal resection of rectum and sigmoid colon 23-Mar-2022 18:09:24  Natalya Aldrich  Lysis of peritoneal adhesions 23-Mar-2022 18:09:33  Natalya Aldrich

## 2022-03-23 NOTE — PATIENT PROFILE ADULT - LIVING ENVIRONMENT
Department of Anesthesiology  Postprocedure Note    Patient: Michelle Childers  MRN: [id number]  YOB: 1959  Date of evaluation: 3/23/2022  Time:  11:06 AM     Procedure Summary     Date: 03/23/22 Room / Location: [address] [place] [address] 01 / [facility]    Anesthesia Start: 4845 Anesthesia Stop: 2435    Procedure: CHOLECYSTECTOMY LAPAROSCOPIC (N/A Abdomen) Diagnosis:       Biliary dyskinesia      (Biliary dyskinesia [K82.8])    Surgeons: Felipe Eddy MD Responsible Provider: ROSCOE Chester CRNA    Anesthesia Type: general ASA Status: 2          Anesthesia Type: general    Elsy Phase I: Elsy Score: 10    Elsy Phase II:      Last vitals: Reviewed and per EMR flowsheets.        Anesthesia Post Evaluation    Patient location during evaluation: PACU  Patient participation: complete - patient participated  Level of consciousness: awake and alert  Pain score: 2  Airway patency: patent  Nausea & Vomiting: no nausea and no vomiting  Complications: no  Cardiovascular status: blood pressure returned to baseline  Respiratory status: acceptable  Hydration status: euvolemic
no

## 2022-03-23 NOTE — PATIENT PROFILE ADULT - FALL HARM RISK - UNIVERSAL INTERVENTIONS
Bed in lowest position, wheels locked, appropriate side rails in place/Call bell, personal items and telephone in reach/Instruct patient to call for assistance before getting out of bed or chair/Non-slip footwear when patient is out of bed/[place] to call system/Physically safe environment - no spills, clutter or unnecessary equipment/Purposeful Proactive Rounding/Room/bathroom lighting operational, light cord in reach

## 2022-03-23 NOTE — PATIENT PROFILE ADULT - NSTOBACCOCESSATIONEDU2_GEN_A_NUR
"DAILY PROGRESS NOTE  [facility]    Patient Identification:  Name: Nicolasa Rojas  Age: 67 y.o.  Sex: female  :  1951  MRN: [id number]         Primary Care Physician: Henry Muniz MD    Subjective:  Interval History:She complains of chest pain.    Objective:    Scheduled Meds:    aspirin 81 mg Oral Daily   bumetanide 1 mg Oral Daily   carvedilol 3.125 mg Oral BID With Meals   clopidogrel 75 mg Oral Daily   epoetin kenyetta 6,000 Units Subcutaneous Once   ferrous sulfate 324 mg Oral BID With Meals   heparin (porcine) 5,000 Units Subcutaneous Q12H   hydrALAZINE 50 mg Oral BID   isosorbide mononitrate 60 mg Oral BID   pantoprazole 40 mg Oral Q AM   polyethylene glycol 17 g Oral Daily   ranolazine 500 mg Oral Q12H   rosuvastatin 20 mg Oral Daily   sertraline 50 mg Oral Daily   sodium chloride 10 mL Intravenous Q12H   spironolactone 25 mg Oral Daily     Continuous Infusions:    insulin        Vital signs in last 24 hours:  Temp:  [96.9 °F (36.1 °C)-98.3 °F (36.8 °C)] 96.9 °F (36.1 °C)  Heart Rate:  [58-86] 65  Resp:  [18] 18  BP: (130-159)/(52-74) 159/74    Intake/Output:    Intake/Output Summary (Last 24 hours) at 2019 1104  Last data filed at 2019 0900  Gross per 24 hour   Intake 840 ml   Output 2000 ml   Net -1160 ml       Exam:  /74 (BP Location: Left arm, Patient Position: Lying)   Pulse 65   Temp 96.9 °F (36.1 °C) (Temporal)   Resp 18   Ht 160 cm (63\")   Wt 99.4 kg (219 lb 1.6 oz)   SpO2 96%   BMI 38.81 kg/m²     General Appearance:    Alert, cooperative, no distress   Head:    Normocephalic, without obvious abnormality, atraumatic   Eyes:       Throat:   Lips, tongue, gums normal   Neck:   Supple, symmetrical, trachea midline, no JVD   Lungs:     Clear to auscultation bilaterally, respirations unlabored   Chest Wall:    No tenderness or deformity    Heart:    Regular rate and rhythm, S1 and S2 normal, no murmur,no  Rub or gallop   Abdomen:     Soft, non-tender, " bowel sounds active, no masses, no organomegaly    Extremities:   Extremities normal, atraumatic, no cyanosis or edema   Pulses:      Skin:   Skin is warm and dry,  no rashes or palpable lesions   Neurologic:   no focal deficits noted      [unfilled]  Data Review:  Results from last 7 days   Lab Units 02/11/19  0512 02/10/19  0708 02/09/19  0737   SODIUM mmol/L 134* 134* 129*   POTASSIUM mmol/L 3.7 4.1 4.2   CHLORIDE mmol/L 94* 91* 89*   CO2 mmol/L 33.0* 32.0* 32.0*   BUN mg/dL 80* 91* 84*   CREATININE mg/dL 1.50* 1.99* 1.98*   GLUCOSE mg/dL 62 91 127*   CALCIUM mg/dL 9.0 9.5 9.3     Results from last 7 days   Lab Units 02/11/19  0512 02/10/19  0708 02/09/19  0737   WBC 10*3/mm3 6.29 6.31 7.57   HEMOGLOBIN g/dL 8.2* 9.2* 8.6*   HEMATOCRIT % 24.7* 26.8* 23.7*   PLATELETS 10*3/mm3 210 201 175             No results found for: TROPONINT            Invalid input(s): PROT, LABALBU          Glucose   Date/Time Value Ref Range Status   02/11/2019 0628 63 (L) 70 - 130 mg/dL Final     Comment:     : [id number] ROSA ELENA SHAWNAMeter ID: [id number]   02/10/2019 2014 66 (L) 70 - 130 mg/dL Final     Comment:     : [id number] [place] SHAW[place]eter ID: [id number]   02/10/2019 1631 169 (H) 70 - 130 mg/dL Final     Comment:     RN NotifiedOperator: [id number] IDANIA BRANDYMeter ID: [id number]   02/10/2019 1025 270 (H) 70 - 130 mg/dL Final     Comment:     RN NotifiedOperator: [id number] GRACY NOAHMeter ID: [id number]   02/10/2019 0610 100 70 - 130 mg/dL Final     Comment:     : [id number] LEXII ELRITAMeter ID: [id number]   02/09/2019 2054 133 (H) 70 - 130 mg/dL Final     Comment:     RN NotifiedOperator: [id number] LEXII RICOTAMeter ID: [id number]   02/09/2019 1623 117 70 - 130 mg/dL Final     Comment:     RN NotifiedOperator: [id number] GRACY HERRONEquities.comMet ID: [id number]   02/09/2019 1042 249 (H) 70 - 130 mg/dL Final     Comment:     RN NotifiedOperator: [id number] GRACY VaxxasMet ID: [id number]            Patient Active Problem List   Diagnosis Code   • Hyponatremia E87.1   • Acute on chronic diastolic CHF (congestive heart failure) (CMS/HCC) I50.33   • Hypokalemia E87.6   • Acute on chronic heart failure (CMS/HCC) I50.9   • Dyspnea R06.00   • Chronic renal impairment, stage 3 (moderate) (CMS/HCC) N18.3   • CAD (coronary artery disease) I25.10   • Class 2 severe obesity due to excess calories with serious comorbidity and body mass index (BMI) of 39.0 to 39.9 in adult (CMS/HCC) E66.01, Z68.39   • NSTEMI (non-ST elevated myocardial infarction) (CMS/HCC) I21.4   • COPD (chronic obstructive pulmonary disease) (CMS/HCC) J44.9   • CIERRA (obstructive sleep apnea) G47.33   • Hyperlipidemia E78.5   • Essential hypertension I10   • Type 2 diabetes mellitus (CMS/HCC) E11.9       Assessment:  Active Hospital Problems    Diagnosis Date Noted   • **NSTEMI (non-ST elevated myocardial infarction) (CMS/HCC) [I21.4] 02/06/2019   • COPD (chronic obstructive pulmonary disease) (CMS/HCC) [J44.9] 02/09/2019   • CIERRA (obstructive sleep apnea) [G47.33] 02/09/2019   • Hyperlipidemia [E78.5] 02/09/2019   • Essential hypertension [I10] 02/09/2019   • Type 2 diabetes mellitus (CMS/HCC) [E11.9] 02/09/2019   • Class 2 severe obesity due to excess calories with serious comorbidity and body mass index (BMI) of 39.0 to 39.9 in adult (CMS/HCC) [E66.01, Z68.39] 01/21/2019   • Chronic renal impairment, stage 3 (moderate) (CMS/HCC) [N18.3] 01/07/2019   • CAD (coronary artery disease) [I25.10] 01/07/2019   • Hyponatremia [E87.1] 01/04/2019   • Acute on chronic diastolic CHF (congestive heart failure) (CMS/HCC) [I50.33] 01/04/2019      Resolved Hospital Problems   No resolved problems to display.       Plan:  As per cardiology and nephrology. Continue medical RX. Molilize with PT and OT. Follow up lab.  Check troponin and EKG.    Evangelista E. Lee, MD  2/11/2019  11:04 AM     Develop coping skills.  For example, strategies and lifestyle changes that reduce negative moods, stress, and exposure to smoking cues.

## 2022-03-23 NOTE — BRIEF OPERATIVE NOTE - NSICDXBRIEFPREOP_GEN_ALL_CORE_FT
PRE-OP DIAGNOSIS:  Local recurrence of rectal cancer 23-Mar-2022 18:07:33  Natalya Aldrich  Ileostomy status 23-Mar-2022 18:07:41  Natalya Aldrich

## 2022-03-24 LAB
ANION GAP SERPL CALC-SCNC: 9 MMOL/L — SIGNIFICANT CHANGE UP (ref 5–17)
BUN SERPL-MCNC: 20 MG/DL — SIGNIFICANT CHANGE UP (ref 7–23)
CALCIUM SERPL-MCNC: 8.2 MG/DL — LOW (ref 8.5–10.1)
CHLORIDE SERPL-SCNC: 104 MMOL/L — SIGNIFICANT CHANGE UP (ref 96–108)
CO2 SERPL-SCNC: 22 MMOL/L — SIGNIFICANT CHANGE UP (ref 22–31)
CREAT SERPL-MCNC: 1.54 MG/DL — HIGH (ref 0.5–1.3)
EGFR: 50 ML/MIN/1.73M2 — LOW
GLUCOSE SERPL-MCNC: 146 MG/DL — HIGH (ref 70–99)
HCT VFR BLD CALC: 33.5 % — LOW (ref 39–50)
HGB BLD-MCNC: 11.3 G/DL — LOW (ref 13–17)
MAGNESIUM SERPL-MCNC: 1.8 MG/DL — SIGNIFICANT CHANGE UP (ref 1.6–2.6)
MCHC RBC-ENTMCNC: 33.2 PG — SIGNIFICANT CHANGE UP (ref 27–34)
MCHC RBC-ENTMCNC: 33.7 GM/DL — SIGNIFICANT CHANGE UP (ref 32–36)
MCV RBC AUTO: 98.5 FL — SIGNIFICANT CHANGE UP (ref 80–100)
PHOSPHATE SERPL-MCNC: 3 MG/DL — SIGNIFICANT CHANGE UP (ref 2.5–4.5)
PLATELET # BLD AUTO: 225 K/UL — SIGNIFICANT CHANGE UP (ref 150–400)
POTASSIUM SERPL-MCNC: 4.4 MMOL/L — SIGNIFICANT CHANGE UP (ref 3.5–5.3)
POTASSIUM SERPL-SCNC: 4.4 MMOL/L — SIGNIFICANT CHANGE UP (ref 3.5–5.3)
RBC # BLD: 3.4 M/UL — LOW (ref 4.2–5.8)
RBC # FLD: 12.9 % — SIGNIFICANT CHANGE UP (ref 10.3–14.5)
SODIUM SERPL-SCNC: 135 MMOL/L — SIGNIFICANT CHANGE UP (ref 135–145)
WBC # BLD: 16.54 K/UL — HIGH (ref 3.8–10.5)
WBC # FLD AUTO: 16.54 K/UL — HIGH (ref 3.8–10.5)

## 2022-03-24 PROCEDURE — 99222 1ST HOSP IP/OBS MODERATE 55: CPT

## 2022-03-24 RX ORDER — HEPARIN SODIUM 5000 [USP'U]/ML
5000 INJECTION INTRAVENOUS; SUBCUTANEOUS EVERY 8 HOURS
Refills: 0 | Status: DISCONTINUED | OUTPATIENT
Start: 2022-03-24 | End: 2022-03-27

## 2022-03-24 RX ADMIN — Medication 400 MILLIGRAM(S): at 18:25

## 2022-03-24 RX ADMIN — Medication 100 GRAM(S): at 13:23

## 2022-03-24 RX ADMIN — ALVIMOPAN 12 MILLIGRAM(S): 12 CAPSULE ORAL at 05:11

## 2022-03-24 RX ADMIN — Medication 0.5 MILLIGRAM(S): at 22:16

## 2022-03-24 RX ADMIN — SIMVASTATIN 40 MILLIGRAM(S): 20 TABLET, FILM COATED ORAL at 22:11

## 2022-03-24 RX ADMIN — SODIUM CHLORIDE 3 MILLILITER(S): 9 INJECTION INTRAMUSCULAR; INTRAVENOUS; SUBCUTANEOUS at 06:04

## 2022-03-24 RX ADMIN — SODIUM CHLORIDE 3 MILLILITER(S): 9 INJECTION INTRAMUSCULAR; INTRAVENOUS; SUBCUTANEOUS at 21:58

## 2022-03-24 RX ADMIN — Medication 1000 MILLIGRAM(S): at 06:04

## 2022-03-24 RX ADMIN — ALVIMOPAN 12 MILLIGRAM(S): 12 CAPSULE ORAL at 18:22

## 2022-03-24 RX ADMIN — Medication 400 MILLIGRAM(S): at 11:39

## 2022-03-24 RX ADMIN — Medication 1000 MILLIGRAM(S): at 12:00

## 2022-03-24 RX ADMIN — AMLODIPINE BESYLATE 5 MILLIGRAM(S): 2.5 TABLET ORAL at 11:38

## 2022-03-24 RX ADMIN — SODIUM CHLORIDE 3 MILLILITER(S): 9 INJECTION INTRAMUSCULAR; INTRAVENOUS; SUBCUTANEOUS at 13:07

## 2022-03-24 RX ADMIN — Medication 1000 MILLIGRAM(S): at 01:00

## 2022-03-24 RX ADMIN — Medication 400 MILLIGRAM(S): at 05:11

## 2022-03-24 RX ADMIN — SODIUM CHLORIDE 100 MILLILITER(S): 9 INJECTION, SOLUTION INTRAVENOUS at 15:35

## 2022-03-24 RX ADMIN — Medication 100 MILLIGRAM(S): at 11:38

## 2022-03-24 RX ADMIN — HEPARIN SODIUM 5000 UNIT(S): 5000 INJECTION INTRAVENOUS; SUBCUTANEOUS at 22:11

## 2022-03-24 RX ADMIN — Medication 1000 MILLIGRAM(S): at 19:36

## 2022-03-24 RX ADMIN — HEPARIN SODIUM 5000 UNIT(S): 5000 INJECTION INTRAVENOUS; SUBCUTANEOUS at 13:24

## 2022-03-24 NOTE — PHYSICAL THERAPY INITIAL EVALUATION ADULT - PERTINENT HX OF CURRENT PROBLEM, REHAB EVAL
60yo male diagnosed with rectal adenocarcinoma 2019 s/p resection with creation of ileostomy 2/2020, s/p chemo and XRT; Found to have local recurrence during sigmoidoscopy presents to  for planned open abdominal perianal resection colostomy reversal of ileostomy with small bowel resection and anastomosis.

## 2022-03-24 NOTE — PHYSICAL THERAPY INITIAL EVALUATION ADULT - DID THE PATIENT HAVE SURGERY?
Reversal of colostomy or ileostomy; Abdominoperineal resection of rectum and sigmoid colon; Lysis of peritoneal adhesions/yes

## 2022-03-24 NOTE — PROGRESS NOTE ADULT - SUBJECTIVE AND OBJECTIVE BOX
Patient was seen and evaluated at bedside this morning. No acute events overnight. Pt tolerated sips of water. Pain well controlled. Denies f/c/cp/sob/n/v. VS reviewed.    Vital Signs Last 24 Hrs  T(C): 36.8 (24 Mar 2022 06:28), Max: 36.8 (23 Mar 2022 19:45)  T(F): 98.2 (24 Mar 2022 06:28), Max: 98.2 (23 Mar 2022 19:45)  HR: 88 (24 Mar 2022 06:00) (68 - 106)  BP: 120/68 (24 Mar 2022 06:00) (113/84 - 161/70)  BP(mean): 85 (24 Mar 2022 06:00) (83 - 99)  RR: 19 (24 Mar 2022 06:00) (12 - 19)  SpO2: 96% (24 Mar 2022 06:00) (93% - 100%)    Physical Exam:  General Appearance: Appears well, NAD  Neck: Supple  Chest: Equal expansion bilaterally, equal breath sounds  CV: S1S2, RRR  Abdomen: Soft, nontense, appropriate incisional tenderness, dressings clean and dry and intact, ostomy without output, stoma pink, prevena and NP seal in place, perineal wound c/d/i  Extremities: Grossly symmetric, SCD's in place   : Thalia in place    I&O's Summary    23 Mar 2022 07:01  -  24 Mar 2022 07:00  --------------------------------------------------------  IN: 4188 mL / OUT: 890 mL / NET: 3298 mL      I&O's Detail    23 Mar 2022 07:01  -  24 Mar 2022 07:00  --------------------------------------------------------  IN:    Lactated Ringers: 988 mL    Other (mL): 3200 mL  Total IN: 4188 mL    OUT:    Drain (mL): 140 mL    Drain (mL): 200 mL    Indwelling Catheter - Urethral (mL): 450 mL    Other (mL): 100 mL  Total OUT: 890 mL    Total NET: 3298 mL          MEDICATIONS  (STANDING):  acetaminophen   IVPB .. 1000 milliGRAM(s) IV Intermittent every 6 hours  alvimopan 12 milliGRAM(s) Oral every 12 hours  amLODIPine   Tablet 5 milliGRAM(s) Oral daily  cefoTEtan  IVPB 2 Gram(s) IV Intermittent every 12 hours  heparin   Injectable 5000 Unit(s) SubCutaneous every 8 hours  HYDROmorphone PCA (1 mG/mL) 30 milliLiter(s) PCA Continuous PCA Continuous  lactated ringers. 1000 milliLiter(s) (100 mL/Hr) IV Continuous <Continuous>  metoprolol succinate  milliGRAM(s) Oral daily  simvastatin 40 milliGRAM(s) Oral at bedtime  sodium chloride 0.9% lock flush 3 milliLiter(s) IV Push every 8 hours    MEDICATIONS  (PRN):  ALPRAZolam 0.5 milliGRAM(s) Oral every 6 hours PRN anxiety  HYDROmorphone PCA (1 mG/mL) Rescue Clinician Bolus 0.5 milliGRAM(s) IV Push every 15 minutes PRN for Pain Scale GREATER THAN 6  naloxone Injectable 0.1 milliGRAM(s) IV Push every 3 minutes PRN For ANY of the following changes in patient status:  A. RR LESS THAN 10 breaths per minute, B. Oxygen saturation LESS THAN 90%, C. Sedation score of 6  ondansetron Injectable 4 milliGRAM(s) IV Push every 6 hours PRN Nausea      LABS:                        11.3   16.54 )-----------( 225      ( 24 Mar 2022 06:14 )             33.5     03-24    135  |  104  |  20  ----------------------------<  146<H>  4.4   |  22  |  1.54<H>    Ca    8.2<L>      24 Mar 2022 06:14  Phos  3.0     03-24  Mg     1.8     03-24            RADIOLOGY & ADDITIONAL STUDIES:

## 2022-03-24 NOTE — PROGRESS NOTE ADULT - ASSESSMENT
62 yo M with recurrent rectal cancer is POD1 s/p APR with ileostomy reversal.    Plan:  -monitor VS  -pain and nausea control prn  -continue CLD, will advance to FLD in the pm  -continue IVF  -continue dilaudid PCA  -monitor ostomy output  -monitor DIDIER drain x2   -Vásquez in place, keep until Saturday, monitor UOP  -encourage IS use  -encourage OOB/ambulation  -PT for mobilization  -DVT ppx    Plan discussed with Dr. Gonzalez

## 2022-03-24 NOTE — PHYSICAL THERAPY INITIAL EVALUATION ADULT - PLANNED THERAPY INTERVENTIONS, PT EVAL
GOAL: Pt will perform 12 stairs with or without U HR as needed within 4weeks./balance training/bed mobility training/gait training/transfer training

## 2022-03-24 NOTE — CONSULT NOTE ADULT - ASSESSMENT
61 year old male with rectal adenocarcinoma since  2019 s/p reversal of ileostomy and colostomy placement   - pain control, diet, PT, JPs management Vásquez as per surgery    CAD/HTN/HLD/OLGA/Anemia - resume home meds, monitor labs    JAS due to all above - IVF hydration, monitor renal fx    VTE proph - UFH    Thank you for courtesy of consult.  Will follow along.

## 2022-03-24 NOTE — PHYSICAL THERAPY INITIAL EVALUATION ADULT - DIAGNOSIS, PT EVAL
Decreased Functional mobility s/p Reversal of colostomy or ileostomy; Abdominoperineal resection of rectum and sigmoid colon; Lysis of peritoneal adhesions

## 2022-03-24 NOTE — PHYSICAL THERAPY INITIAL EVALUATION ADULT - GENERAL OBSERVATIONS, REHAB EVAL
Pt seen for 45min PT Eval. Pt s/p reverse iliostomy POD#1. Pt rec'd semi supine in bed in NAD in SICU, +PCA, +Vásquez, +small VAC for incision. Pt trans supine>sit via log roll with verbal cues and S. Pt trans sit<>stand with S no AD. pt amb 200ft with use of IV pole and S. Pt with slight lateral sway 2/2 wide QUINTEN, cautious gait. Pt left sitting in chair in AND all needs met, RN Kiersten aware, all lines intact.

## 2022-03-24 NOTE — REASON FOR VISIT
Initial Anesthesia Post-op Note    Patient: Roseanna Rivas  Procedure(s) Performed: RECTAL EXAM UNDER ANESTHESIA, HEMORRHOIDECTOMY AND COLONOSCOPYXX  Anesthesia type: General    Vitals Value Taken Time   Temp 97.2 03/24/22 1335   Pulse 119 03/24/22 1333   Resp 16 03/24/22 1335   SpO2 100 % 03/24/22 1333   /61 03/24/22 1332   Vitals shown include unvalidated device data.      Patient Location: PACU Phase 1  Post-op Vital Signs:stable  Level of Consciousness: sedated and participates in exam  Respiratory Status: spontaneous ventilation and face mask  Cardiovascular stable  Hydration: euvolemic  Pain Management: well controlled  Handoff: Handoff to receiving clinician was performed and questions were answered  Vomiting: none  Nausea: None  Airway Patency:patent  Post-op Assessment: no complications, patient tolerated procedure well with no complications, no evidence of recall, dentition within defined limits and No Corneal Abrasion      No complications documented.  
[Annual Wellness Visit] : an annual wellness visit

## 2022-03-25 DIAGNOSIS — I25.10 ATHEROSCLEROTIC HEART DISEASE OF NATIVE CORONARY ARTERY WITHOUT ANGINA PECTORIS: ICD-10-CM

## 2022-03-25 DIAGNOSIS — I47.2 VENTRICULAR TACHYCARDIA: ICD-10-CM

## 2022-03-25 PROBLEM — D50.9 IRON DEFICIENCY ANEMIA, UNSPECIFIED: Chronic | Status: ACTIVE | Noted: 2022-03-16

## 2022-03-25 PROBLEM — Z86.79 PERSONAL HISTORY OF OTHER DISEASES OF THE CIRCULATORY SYSTEM: Chronic | Status: ACTIVE | Noted: 2022-03-16

## 2022-03-25 PROBLEM — K42.9 UMBILICAL HERNIA WITHOUT OBSTRUCTION OR GANGRENE: Chronic | Status: ACTIVE | Noted: 2022-03-16

## 2022-03-25 PROBLEM — Z87.442 PERSONAL HISTORY OF URINARY CALCULI: Chronic | Status: ACTIVE | Noted: 2022-03-16

## 2022-03-25 PROBLEM — I72.8 ANEURYSM OF OTHER SPECIFIED ARTERIES: Chronic | Status: ACTIVE | Noted: 2022-03-16

## 2022-03-25 PROBLEM — C20 MALIGNANT NEOPLASM OF RECTUM: Chronic | Status: ACTIVE | Noted: 2019-10-08

## 2022-03-25 PROBLEM — Z95.5 PRESENCE OF CORONARY ANGIOPLASTY IMPLANT AND GRAFT: Chronic | Status: ACTIVE | Noted: 2022-03-16

## 2022-03-25 LAB
ANION GAP SERPL CALC-SCNC: 5 MMOL/L — SIGNIFICANT CHANGE UP (ref 5–17)
BUN SERPL-MCNC: 15 MG/DL — SIGNIFICANT CHANGE UP (ref 7–23)
CALCIUM SERPL-MCNC: 8.3 MG/DL — LOW (ref 8.5–10.1)
CHLORIDE SERPL-SCNC: 103 MMOL/L — SIGNIFICANT CHANGE UP (ref 96–108)
CO2 SERPL-SCNC: 25 MMOL/L — SIGNIFICANT CHANGE UP (ref 22–31)
CREAT SERPL-MCNC: 1.07 MG/DL — SIGNIFICANT CHANGE UP (ref 0.5–1.3)
EGFR: 78 ML/MIN/1.73M2 — SIGNIFICANT CHANGE UP
GLUCOSE SERPL-MCNC: 148 MG/DL — HIGH (ref 70–99)
HCT VFR BLD CALC: 29.3 % — LOW (ref 39–50)
HGB BLD-MCNC: 9.7 G/DL — LOW (ref 13–17)
MAGNESIUM SERPL-MCNC: 2.4 MG/DL — SIGNIFICANT CHANGE UP (ref 1.6–2.6)
MCHC RBC-ENTMCNC: 33 PG — SIGNIFICANT CHANGE UP (ref 27–34)
MCHC RBC-ENTMCNC: 33.1 GM/DL — SIGNIFICANT CHANGE UP (ref 32–36)
MCV RBC AUTO: 99.7 FL — SIGNIFICANT CHANGE UP (ref 80–100)
PHOSPHATE SERPL-MCNC: 2 MG/DL — LOW (ref 2.5–4.5)
PLATELET # BLD AUTO: 178 K/UL — SIGNIFICANT CHANGE UP (ref 150–400)
POTASSIUM SERPL-MCNC: 4 MMOL/L — SIGNIFICANT CHANGE UP (ref 3.5–5.3)
POTASSIUM SERPL-SCNC: 4 MMOL/L — SIGNIFICANT CHANGE UP (ref 3.5–5.3)
RBC # BLD: 2.94 M/UL — LOW (ref 4.2–5.8)
RBC # FLD: 13.2 % — SIGNIFICANT CHANGE UP (ref 10.3–14.5)
SODIUM SERPL-SCNC: 133 MMOL/L — LOW (ref 135–145)
WBC # BLD: 11.64 K/UL — HIGH (ref 3.8–10.5)
WBC # FLD AUTO: 11.64 K/UL — HIGH (ref 3.8–10.5)

## 2022-03-25 PROCEDURE — 99222 1ST HOSP IP/OBS MODERATE 55: CPT

## 2022-03-25 PROCEDURE — 74018 RADEX ABDOMEN 1 VIEW: CPT | Mod: 26

## 2022-03-25 PROCEDURE — 99232 SBSQ HOSP IP/OBS MODERATE 35: CPT

## 2022-03-25 RX ORDER — MAGNESIUM SULFATE 500 MG/ML
2 VIAL (ML) INJECTION ONCE
Refills: 0 | Status: COMPLETED | OUTPATIENT
Start: 2022-03-25 | End: 2022-03-25

## 2022-03-25 RX ORDER — METOPROLOL TARTRATE 50 MG
100 TABLET ORAL EVERY 12 HOURS
Refills: 0 | Status: DISCONTINUED | OUTPATIENT
Start: 2022-03-25 | End: 2022-03-27

## 2022-03-25 RX ORDER — CLOPIDOGREL BISULFATE 75 MG/1
75 TABLET, FILM COATED ORAL DAILY
Refills: 0 | Status: DISCONTINUED | OUTPATIENT
Start: 2022-03-26 | End: 2022-03-27

## 2022-03-25 RX ORDER — SODIUM CHLORIDE 9 MG/ML
1000 INJECTION INTRAMUSCULAR; INTRAVENOUS; SUBCUTANEOUS
Refills: 0 | Status: DISCONTINUED | OUTPATIENT
Start: 2022-03-25 | End: 2022-03-26

## 2022-03-25 RX ADMIN — HEPARIN SODIUM 5000 UNIT(S): 5000 INJECTION INTRAVENOUS; SUBCUTANEOUS at 05:51

## 2022-03-25 RX ADMIN — HEPARIN SODIUM 5000 UNIT(S): 5000 INJECTION INTRAVENOUS; SUBCUTANEOUS at 21:36

## 2022-03-25 RX ADMIN — SODIUM CHLORIDE 3 MILLILITER(S): 9 INJECTION INTRAMUSCULAR; INTRAVENOUS; SUBCUTANEOUS at 21:40

## 2022-03-25 RX ADMIN — AMLODIPINE BESYLATE 5 MILLIGRAM(S): 2.5 TABLET ORAL at 09:10

## 2022-03-25 RX ADMIN — SODIUM CHLORIDE 100 MILLILITER(S): 9 INJECTION INTRAMUSCULAR; INTRAVENOUS; SUBCUTANEOUS at 14:23

## 2022-03-25 RX ADMIN — HEPARIN SODIUM 5000 UNIT(S): 5000 INJECTION INTRAVENOUS; SUBCUTANEOUS at 16:16

## 2022-03-25 RX ADMIN — Medication 100 MILLIGRAM(S): at 09:10

## 2022-03-25 RX ADMIN — Medication 100 MILLIGRAM(S): at 21:36

## 2022-03-25 RX ADMIN — SODIUM CHLORIDE 3 MILLILITER(S): 9 INJECTION INTRAMUSCULAR; INTRAVENOUS; SUBCUTANEOUS at 05:49

## 2022-03-25 RX ADMIN — ALVIMOPAN 12 MILLIGRAM(S): 12 CAPSULE ORAL at 05:51

## 2022-03-25 RX ADMIN — SODIUM CHLORIDE 3 MILLILITER(S): 9 INJECTION INTRAMUSCULAR; INTRAVENOUS; SUBCUTANEOUS at 13:41

## 2022-03-25 RX ADMIN — SIMVASTATIN 40 MILLIGRAM(S): 20 TABLET, FILM COATED ORAL at 21:36

## 2022-03-25 RX ADMIN — ALVIMOPAN 12 MILLIGRAM(S): 12 CAPSULE ORAL at 18:14

## 2022-03-25 RX ADMIN — Medication 0.5 MILLIGRAM(S): at 18:14

## 2022-03-25 RX ADMIN — Medication 50 GRAM(S): at 03:41

## 2022-03-25 RX ADMIN — Medication 62.5 MILLIMOLE(S): at 14:16

## 2022-03-25 NOTE — PROVIDER CONTACT NOTE (OTHER) - SITUATION
Doctor aware of consult.
Office is aware that patient is in [facility].  Please fax discharge papers to [phone]
Spoke with Mariam at service to inform  of consult.

## 2022-03-25 NOTE — CONSULT NOTE ADULT - PROBLEM SELECTOR RECOMMENDATION 2
Chronic, stable disease;  continue metoprolol (I will change to usual outpatient dose); resume Plavix when feasible from surgical standpoint.

## 2022-03-25 NOTE — PROVIDER CONTACT NOTE (CHANGE IN STATUS NOTIFICATION) - ACTION/TREATMENT ORDERED:
Magnesium IV x 1 to be given. labs to be drawn in am, including BMP with magnesium. will continue to monitor closely.

## 2022-03-25 NOTE — ADVANCED PRACTICE NURSE CONSULT - ASSESSMENT
This is a 61 year old male admitted to the hospital on 3/23/2022 for scheduled colostomy. On 3/23/2022 patient underwent an APR with End Colostomy and ileostomy reversal. PMHx: Splenic artery aneurysm umbilical hernia, anemia renal calculi, carotid stenosis, neuropathy, depression, rectal cancer, HLD, HTN, CAD, Mitral regurgitation, ileostomy.   Received patient sitting up in chair , awake, with recognition of WOCN from previous visits, made aware of purpose of WOCN, agreeable to pouch change & ostomy teaching. End Colostomy  w/ Richard drainable pouching system applied  in place, barrier intact, no leakage.   Type of ostomy: End Colostomy   Location: LLQ  Taiwo: n/a  Size: round  Mucosa: red, moist, remains edematous & slightly translucent   Peristomal skin: intact   Mucocutaneous junction: intact  Abdominal contours: round  Output: ~25cc serosanguinous effluent   Midline/lap sites/ drains: scattered abdominal lap sites -c/d/i; DIDIER x 2 to in place Midline Perveena wound vac     Impression:  End Colostomy to LLQ given pt's stomal characteristics and abdominal topography, pt will require flat pouching system     Patient more ready & willing to learn ostomy care this visit, observed entire pouch change, looking directly at stoma, asking  appropriate questions & able to verbalize key ostomy points. Patient again observed pouch opening/closing, again reviewed w/ patient that her stool consistency & frequency of End Colostomy will require her to empty pouch several times each day & again strongly encouraged patient start practicing pouch opening/closing & emptying as this skill is required prior to d/c home, patient agreeable to start practicing pouch emptying. Much emotional support & encouragement provided to patient.     Reviewed with patient dietary restrictions, s/s & prevention of food obstruction & constipation. Also reviewed w/ patient lifestyle modifications (clothing, bathing/showering, physical activity). Written information regarding all above topics provided to patient.     Patient has a history of an Ileostomy and is familiar with wafer and bag changes.  What we did go over is the difference in frequency of stooling.          ·

## 2022-03-25 NOTE — CONSULT NOTE ADULT - SUBJECTIVE AND OBJECTIVE BOX
CHIEF COMPLAINT: Incisional pain    HPI: 61 year old man with a history of CAD, LCx and LAD stents (2017), HTN, HLD, mitral regurgitation, and rectal cancer now admitted on 3/23/22 for elective colon surgery.  I was called for consultation s/p 19 beat run of NSVT at around 3am today.  He reports chronic, stable heart disease with no angina and excellent baseline exercise capacity.  He has no cardiac complaints -- specifically, no chest pain or dyspnea.  He only complains of incisional soreness and bloating sensation.    PAST MEDICAL & SURGICAL HISTORY:  HTN (hypertension)  HLD (hyperlipidemia)  CAD (coronary artery disease)  Mitral regurgitation  Rectal cancer 2/2019 s/p chemo and radiation; recurrence of cancer 2022  Depression  Neuropathy feet s/p chemo  Stented coronary artery circumflex 2005 LAD 2017 x4  H/O carotid stenosis  H/O renal calculi  DANO (iron deficiency anemia)  Umbilical hernia  Splenic artery aneurysm  H/O cardiac catheterization 2005 2017  4 STENTS  History of CEA (carotid endarterectomy) right 2019  History of rectal surgery with ileostomy 2/2020  H/O sigmoidoscopy x2  History of removal of Port-a-Cath    SOCIAL HISTORY:   Alcohol: Daily wine  Smoking: Occasional cigar    FAMILY HISTORY:  heart disease, father    MEDICATIONS  (STANDING):  alvimopan 12 milliGRAM(s) Oral every 12 hours  amLODIPine   Tablet 5 milliGRAM(s) Oral daily  heparin   Injectable 5000 Unit(s) SubCutaneous every 8 hours  HYDROmorphone PCA (1 mG/mL) 30 milliLiter(s) PCA Continuous PCA Continuous  lactated ringers. 1000 milliLiter(s) (100 mL/Hr) IV Continuous <Continuous>  metoprolol succinate  milliGRAM(s) Oral daily  simvastatin 40 milliGRAM(s) Oral at bedtime  sodium chloride 0.9% lock flush 3 milliLiter(s) IV Push every 8 hours    MEDICATIONS  (PRN):  ALPRAZolam 0.5 milliGRAM(s) Oral every 6 hours PRN anxiety  HYDROmorphone PCA (1 mG/mL) Rescue Clinician Bolus 0.5 milliGRAM(s) IV Push every 15 minutes PRN for Pain Scale GREATER THAN 6  naloxone Injectable 0.1 milliGRAM(s) IV Push every 3 minutes PRN For ANY of the following changes in patient status:  A. RR LESS THAN 10 breaths per minute, B. Oxygen saturation LESS THAN 90%, C. Sedation score of 6  ondansetron Injectable 4 milliGRAM(s) IV Push every 6 hours PRN Nausea    Allergies:  No Known Allergies    REVIEW OF SYSTEMS:  CONSTITUTIONAL: No weakness, fevers or chills  Eyes: No visual changes  NECK: No pain or stiffness  RESPIRATORY: No cough, wheezing, hemoptysis; No shortness of breath  CARDIOVASCULAR: No chest pain or palpitations  GASTROINTESTINAL: + abdominal pain.   GENITOURINARY: No dysuria, frequency or hematuria  NEUROLOGICAL: No numbness.  SKIN: No itching or rash  All other review of systems is negative unless indicated above    VITAL SIGNS:   Vital Signs Last 24 Hrs  T(C): 37.2 (25 Mar 2022 08:50), Max: 37.2 (25 Mar 2022 08:50)  T(F): 99 (25 Mar 2022 08:50), Max: 99 (25 Mar 2022 08:50)  HR: 103 (25 Mar 2022 10:00) (89 - 113)  BP: 130/77 (25 Mar 2022 10:00) (100/57 - 158/91)  BP(mean): 94 (25 Mar 2022 10:00) (66 - 112)  RR: 14 (25 Mar 2022 10:00) (13 - 19)  SpO2: 96% (25 Mar 2022 10:00) (90% - 99%)    PHYSICAL EXAM:  Constitutional: NAD, awake and alert  HEENT:  EOMI,  Pupils round, No oral cyanosis.  Pulmonary: Non-labored, breath sounds are clear bilaterally, No wheezing, rales or rhonchi  Cardiovascular: S1 and S2, regular rate and rhythm, no Murmurs, gallops or rubs  Gastrointestinal: Bowel Sounds present, soft, nontender.   Lymph: No peripheral edema. No cervical lymphadenopathy.  Neurological: Alert, no focal deficits  Skin: No rashes.  Psych:  Mood & affect appropriate    LABS:                      9.7    11.64 )-----------( 178      ( 25 Mar 2022 06:33 )             29.3             133    |  103    |  15     ----------------------------<  148    4.0     |  25     |  1.07     12 Lead ECG (03.16.22 @ 14:19):  Normal sinus rhythm. Normal ECG    Tele:  SR, 1 18 beat run NSVT            
61 year old male diagnosed with rectal adenocarcinoma 2019 s/p resection with creation of ileostomy 2/2020   s/p chemo and XRT; Found to have local recurrence during sigmoidoscopy in preparation for reversal of ileostomy ; presents to [facility] for planned open abdominal perianal resection colostomy reversal of ileostomy with small bowel resection and anastomosis     PAST MEDICAL HISTORY:  CAD (coronary artery disease)   Depression with rectal cancer  H/O carotid stenosis   H/O renal calculi   HLD (hyperlipidemia)   HTN (hypertension)   DANO (iron deficiency anemia)   Mitral regurgitation   Neuropathy feet s/p chemo  Rectal cancer 2/2019 s/p chemo and radiation; recurrence of cancer 2022  Splenic artery aneurysm   Stented coronary artery circumflex 2005 LAD 2017 x4  Umbilical hernia.  PAST SURGICAL HISTORY:  H/O cardiac catheterization 2005 2017  4 STENTS  H/O sigmoidoscopy x2  History of CEA (carotid endarterectomy) right 2019  History of rectal surgery with ileostomy 2/2020  History of removal of Port-a-Cath.     FAMILY HISTORY:  heart disease, father.  ALS - mother    SHx: ETOH wine daily, no IVDA, no tobacco    ALL: NKDA    MEDS: see MR    INTERVAL HPI/OVERNIGHT EVENTS: pt c/o pain in postop area, tolerates diet, weak, + belching  Other ROS reviewed and neg     Vital Signs Last 24 Hrs  T(C): 36.7 (24 Mar 2022 08:55), Max: 36.8 (23 Mar 2022 19:45)  T(F): 98 (24 Mar 2022 08:55), Max: 98.2 (23 Mar 2022 19:45)  HR: 94 (24 Mar 2022 14:00) (82 - 106)  BP: 146/82 (24 Mar 2022 14:00) (113/84 - 161/70)  BP(mean): 101 (24 Mar 2022 14:00) (83 - 112)  RR: 19 (24 Mar 2022 13:00) (12 - 20)  SpO2: 95% (24 Mar 2022 14:00) (93% - 100%)  I&O's Detail    23 Mar 2022 07:01  -  24 Mar 2022 07:00  --------------------------------------------------------  IN:    Lactated Ringers: 988 mL    Other (mL): 3200 mL  Total IN: 4188 mL    OUT:    Drain (mL): 200 mL    Drain (mL): 140 mL    Indwelling Catheter - Urethral (mL): 450 mL    Other (mL): 100 mL  Total OUT: 890 mL    Total NET: 3298 mL      24 Mar 2022 07:01  -  24 Mar 2022 14:38  --------------------------------------------------------  IN:    IV PiggyBack: 150 mL  Total IN: 150 mL    OUT:    Drain (mL): 30 mL    Drain (mL): 60 mL  Total OUT: 90 mL    Total NET: 60 mL                        11.3   16.54 )-----------( 225      ( 24 Mar 2022 06:14 )             33.5     24 Mar 2022 06:14    135    |  104    |  20     ----------------------------<  146    4.4     |  22     |  1.54     Ca    8.2        24 Mar 2022 06:14  Phos  3.0       24 Mar 2022 06:14  Mg     1.8       24 Mar 2022 06:14    CAPILLARY BLOOD GLUCOSE    POCT Blood Glucose.: 150 mg/dL (24 Mar 2022 12:47)  POCT Blood Glucose.: 152 mg/dL (24 Mar 2022 05:06)  POCT Blood Glucose.: 203 mg/dL (24 Mar 2022 01:04)  POCT Blood Glucose.: 163 mg/dL (23 Mar 2022 18:02)          MEDICATIONS  (STANDING):  acetaminophen   IVPB .. 1000 milliGRAM(s) IV Intermittent every 6 hours  alvimopan 12 milliGRAM(s) Oral every 12 hours  amLODIPine   Tablet 5 milliGRAM(s) Oral daily  heparin   Injectable 5000 Unit(s) SubCutaneous every 8 hours  HYDROmorphone PCA (1 mG/mL) 30 milliLiter(s) PCA Continuous PCA Continuous  lactated ringers. 1000 milliLiter(s) (100 mL/Hr) IV Continuous <Continuous>  metoprolol succinate  milliGRAM(s) Oral daily  simvastatin 40 milliGRAM(s) Oral at bedtime  sodium chloride 0.9% lock flush 3 milliLiter(s) IV Push every 8 hours    MEDICATIONS  (PRN):  ALPRAZolam 0.5 milliGRAM(s) Oral every 6 hours PRN anxiety  HYDROmorphone PCA (1 mG/mL) Rescue Clinician Bolus 0.5 milliGRAM(s) IV Push every 15 minutes PRN for Pain Scale GREATER THAN 6  naloxone Injectable 0.1 milliGRAM(s) IV Push every 3 minutes PRN For ANY of the following changes in patient status:  A. RR LESS THAN 10 breaths per minute, B. Oxygen saturation LESS THAN 90%, C. Sedation score of 6  ondansetron Injectable 4 milliGRAM(s) IV Push every 6 hours PRN Nausea    RADIOLOGY & ADDITIONAL TESTS: personally visualized    All available medical records personally reviewed    PHYSICAL EXAM:    General: male in no acute distress  Eyes: PERRLA, EOMI; conjunctiva and sclera clear  Head: Normocephalic; atraumatic  ENMT: No nasal discharge; airway clear  Neck: Supple; non tender; no masses  Respiratory: No wheezes, rales or rhonchi  Cardiovascular: S1, S2 reg  Gastrointestinal: Soft distended abdomen with diminished bowel sounds, tender in postop area, colostomy in place, DIDIER with serosanguinous drainage  Genitourinary: No costovertebral angle tenderness, FQ to gravity  Extremities: No clubbing, cyanosis, edema  Vascular: Peripheral pulses palpable 2+ bilaterally  Neurological: Alert and oriented x4  Skin: Warm and dry.   Musculoskeletal: Normal tone  Psychiatric: Cooperative and appropriate

## 2022-03-25 NOTE — CONSULT NOTE ADULT - PROBLEM SELECTOR RECOMMENDATION 9
1 episode of NSVT in the post-operative setting and while not receiving metoprolol; I recommend continued telemetry monitoring; change metoprolol to usual outpatient dose; daily electrolytes (need to keep potassium > 4).

## 2022-03-25 NOTE — PROGRESS NOTE ADULT - SUBJECTIVE AND OBJECTIVE BOX
SURGERY DAILY PROGRESS NOTE:     Subjective:  Patient seen and examined at bedside during am rounds doing well tolerating FLD. Pt was OOBC yesterday. Pt reports has some fluids in his ostomy. AVSS. Denies any fevers, chills, n/v/d, chest pain or shortness of breath    Objective:    MEDICATIONS  (STANDING):  alvimopan 12 milliGRAM(s) Oral every 12 hours  amLODIPine   Tablet 5 milliGRAM(s) Oral daily  heparin   Injectable 5000 Unit(s) SubCutaneous every 8 hours  HYDROmorphone PCA (1 mG/mL) 30 milliLiter(s) PCA Continuous PCA Continuous  lactated ringers. 1000 milliLiter(s) (100 mL/Hr) IV Continuous <Continuous>  metoprolol succinate  milliGRAM(s) Oral daily  simvastatin 40 milliGRAM(s) Oral at bedtime  sodium chloride 0.9% lock flush 3 milliLiter(s) IV Push every 8 hours    MEDICATIONS  (PRN):  ALPRAZolam 0.5 milliGRAM(s) Oral every 6 hours PRN anxiety  HYDROmorphone PCA (1 mG/mL) Rescue Clinician Bolus 0.5 milliGRAM(s) IV Push every 15 minutes PRN for Pain Scale GREATER THAN 6  naloxone Injectable 0.1 milliGRAM(s) IV Push every 3 minutes PRN For ANY of the following changes in patient status:  A. RR LESS THAN 10 breaths per minute, B. Oxygen saturation LESS THAN 90%, C. Sedation score of 6  ondansetron Injectable 4 milliGRAM(s) IV Push every 6 hours PRN Nausea      Vital Signs Last 24 Hrs  T(C): 36.7 (24 Mar 2022 16:26), Max: 36.8 (24 Mar 2022 06:28)  T(F): 98.1 (24 Mar 2022 16:26), Max: 98.2 (24 Mar 2022 06:28)  HR: 111 (25 Mar 2022 04:00) (82 - 111)  BP: 140/91 (25 Mar 2022 04:00) (100/57 - 158/91)  BP(mean): 105 (25 Mar 2022 04:00) (66 - 112)  RR: 15 (25 Mar 2022 04:00) (13 - 20)  SpO2: 95% (25 Mar 2022 04:00) (92% - 99%)      PHYSICAL EXAM   Gen: well-appearing, in no acute distress  Chest: Equal expansion bilaterally, equal breath sounds  CV: S1S2, RRR  Abdomen: Soft, minimal incisional tenderness, dressings clean and dry and intact, ostomy minimal output, stoma pink, prevena and NP seal in place, perineal wound c/d/i, no sign of infection, dry gauze applied  Extremities: Grossly symmetric, SCD's in place   : Vásquez in place      I&O's Detail    23 Mar 2022 07:01  -  24 Mar 2022 07:00  --------------------------------------------------------  IN:    Lactated Ringers: 988 mL    Other (mL): 3200 mL  Total IN: 4188 mL    OUT:    Drain (mL): 140 mL    Drain (mL): 200 mL    Indwelling Catheter - Urethral (mL): 450 mL    Other (mL): 100 mL  Total OUT: 890 mL    Total NET: 3298 mL      24 Mar 2022 07:01  -  25 Mar 2022 05:21  --------------------------------------------------------  IN:    IV PiggyBack: 250 mL    Lactated Ringers: 1273 mL  Total IN: 1523 mL    OUT:    Drain (mL): 125 mL    Drain (mL): 235 mL    Indwelling Catheter - Urethral (mL): 225 mL  Total OUT: 585 mL    Total NET: 938 mL          Daily     Daily     LABS:                        11.3   16.54 )-----------( 225      ( 24 Mar 2022 06:14 )             33.5     03-24    135  |  104  |  20  ----------------------------<  146<H>  4.4   |  22  |  1.54<H>    Ca    8.2<L>      24 Mar 2022 06:14  Phos  3.0     03-24  Mg     1.8     03-24         SURGERY DAILY PROGRESS NOTE:     Subjective:  Patient seen and examined at bedside during am rounds doing well tolerating FLD. Pt was OOBC yesterday. Pt reports has some fluids in his ostomy. AVSS. Denies any fevers, chills, n/v/d, chest pain or shortness of breath    Objective:    MEDICATIONS  (STANDING):  alvimopan 12 milliGRAM(s) Oral every 12 hours  amLODIPine   Tablet 5 milliGRAM(s) Oral daily  heparin   Injectable 5000 Unit(s) SubCutaneous every 8 hours  HYDROmorphone PCA (1 mG/mL) 30 milliLiter(s) PCA Continuous PCA Continuous  lactated ringers. 1000 milliLiter(s) (100 mL/Hr) IV Continuous <Continuous>  metoprolol succinate  milliGRAM(s) Oral daily  simvastatin 40 milliGRAM(s) Oral at bedtime  sodium chloride 0.9% lock flush 3 milliLiter(s) IV Push every 8 hours    MEDICATIONS  (PRN):  ALPRAZolam 0.5 milliGRAM(s) Oral every 6 hours PRN anxiety  HYDROmorphone PCA (1 mG/mL) Rescue Clinician Bolus 0.5 milliGRAM(s) IV Push every 15 minutes PRN for Pain Scale GREATER THAN 6  naloxone Injectable 0.1 milliGRAM(s) IV Push every 3 minutes PRN For ANY of the following changes in patient status:  A. RR LESS THAN 10 breaths per minute, B. Oxygen saturation LESS THAN 90%, C. Sedation score of 6  ondansetron Injectable 4 milliGRAM(s) IV Push every 6 hours PRN Nausea      Vital Signs Last 24 Hrs  T(C): 36.7 (24 Mar 2022 16:26), Max: 36.8 (24 Mar 2022 06:28)  T(F): 98.1 (24 Mar 2022 16:26), Max: 98.2 (24 Mar 2022 06:28)  HR: 111 (25 Mar 2022 04:00) (82 - 111)  BP: 140/91 (25 Mar 2022 04:00) (100/57 - 158/91)  BP(mean): 105 (25 Mar 2022 04:00) (66 - 112)  RR: 15 (25 Mar 2022 04:00) (13 - 20)  SpO2: 95% (25 Mar 2022 04:00) (92% - 99%)      PHYSICAL EXAM   Gen: well-appearing, in no acute distress  Chest: Equal expansion bilaterally, equal breath sounds  CV: S1S2, RRR  Abdomen: Soft, minimal incisional tenderness, distended, dressings clean and dry and intact, ostomy minimal output, stoma pink, prevena and NP seal in place, perineal wound c/d/i, no sign of infection, dry gauze applied  Extremities: Grossly symmetric, SCD's in place   : Vásquez in place      I&O's Detail    23 Mar 2022 07:01  -  24 Mar 2022 07:00  --------------------------------------------------------  IN:    Lactated Ringers: 988 mL    Other (mL): 3200 mL  Total IN: 4188 mL    OUT:    Drain (mL): 140 mL    Drain (mL): 200 mL    Indwelling Catheter - Urethral (mL): 450 mL    Other (mL): 100 mL  Total OUT: 890 mL    Total NET: 3298 mL      24 Mar 2022 07:01  -  25 Mar 2022 05:21  --------------------------------------------------------  IN:    IV PiggyBack: 250 mL    Lactated Ringers: 1273 mL  Total IN: 1523 mL    OUT:    Drain (mL): 125 mL    Drain (mL): 235 mL    Indwelling Catheter - Urethral (mL): 225 mL  Total OUT: 585 mL    Total NET: 938 mL          Daily     Daily     LABS:                        11.3   16.54 )-----------( 225      ( 24 Mar 2022 06:14 )             33.5     03-24    135  |  104  |  20  ----------------------------<  146<H>  4.4   |  22  |  1.54<H>    Ca    8.2<L>      24 Mar 2022 06:14  Phos  3.0     03-24  Mg     1.8     03-24

## 2022-03-25 NOTE — PROGRESS NOTE ADULT - ASSESSMENT
62 yo M with recurrent rectal cancer is s/p APR with ileostomy reversal POD 2    Plan:  -monitor VS  -pain and nausea control prn  -continue FLD, ADAT  -continue IVF  -continue dilaudid PCA  -monitor ostomy output  -monitor DIDIER drain x2   -Vásquez in place, keep until Saturday 3/26, monitor UOP  -encourage IS use  -encourage OOB/ambulation  -PT for mobilization  -DVT ppx  hospitalist reccs appreciated  -dispo: pending     Plan discussed with attending  62 yo M with recurrent rectal cancer is s/p APR with ileostomy reversal POD 2    Plan:  -monitor VS  -pain and nausea control prn  -continue FLD, ADAT  -continue IVF  -continue dilaudid PCA  -monitor ostomy output  -monitor DIDIER drain x2   -Vásquez in place, keep until Saturday 3/26, monitor UOP  -encourage IS use  -encourage OOB/ambulation  -PT for mobilization  -DVT ppx  -AXR  hospitalist reccs appreciated  -dispo: pending     Plan discussed with attending

## 2022-03-25 NOTE — PROGRESS NOTE ADULT - ATTENDING COMMENTS
Patient seen and examined at bedside. Patient reports mild nausea overnight which has subsided. Patient is tolerating clear liquids, no ostomy function at this time, pain is controlled. Abdomen is soft, non-distended, appropriately tender, drains with serosanguineous output, dressings clean, perineal wound non-erythematous.     Please ensure adequate pain control  Advance to full liquids  Continue IV fluids, strict I's and O's  Continue to monitor ostomy function  Maintain Vásquez - Low pelvic dissection, high risk of urinary retention  PT, OOB with ambulation  IS use 10/hr  While in bed turn every 2 hours to reduce pressure on perineum   Continue supportive care during recovery
Patient had asymptomatic episode of VTach overnight.  He notes that he had not taken his usual metoprolol dose.  Denies chest pain, dizziness, lightheadedness, dyspnea.  Pain is controlled.  Feels a little distended but denies N/V.  Ostomy with minimal gas output thus far.  On exam abdomen is soft, mildly distended, appropriately tender near incision, no rebound or guarding.  Wounds are C/D/I.  Labs with improving leukocytosis (likely reactive), hypophosphatemia (replacement ordered).    -- Cardiology consult appreciated - continue home metoprolol  -- Full liquid diet - can advance once bowel function and distention improving  -- Multimodal pain control  -- DVT prophylaxis  -- Encourage ambulation, IS  -- Continue monitoring in SICU through today, could consider possible downgrade tomorrow if stable.

## 2022-03-26 DIAGNOSIS — I10 ESSENTIAL (PRIMARY) HYPERTENSION: ICD-10-CM

## 2022-03-26 LAB
ANION GAP SERPL CALC-SCNC: 5 MMOL/L — SIGNIFICANT CHANGE UP (ref 5–17)
BASOPHILS # BLD AUTO: 0.02 K/UL — SIGNIFICANT CHANGE UP (ref 0–0.2)
BASOPHILS NFR BLD AUTO: 0.2 % — SIGNIFICANT CHANGE UP (ref 0–2)
BUN SERPL-MCNC: 8 MG/DL — SIGNIFICANT CHANGE UP (ref 7–23)
CALCIUM SERPL-MCNC: 8.5 MG/DL — SIGNIFICANT CHANGE UP (ref 8.5–10.1)
CHLORIDE SERPL-SCNC: 106 MMOL/L — SIGNIFICANT CHANGE UP (ref 96–108)
CO2 SERPL-SCNC: 26 MMOL/L — SIGNIFICANT CHANGE UP (ref 22–31)
CREAT SERPL-MCNC: 0.72 MG/DL — SIGNIFICANT CHANGE UP (ref 0.5–1.3)
EGFR: 103 ML/MIN/1.73M2 — SIGNIFICANT CHANGE UP
EOSINOPHIL # BLD AUTO: 0.04 K/UL — SIGNIFICANT CHANGE UP (ref 0–0.5)
EOSINOPHIL NFR BLD AUTO: 0.4 % — SIGNIFICANT CHANGE UP (ref 0–6)
GLUCOSE SERPL-MCNC: 114 MG/DL — HIGH (ref 70–99)
HCT VFR BLD CALC: 27.2 % — LOW (ref 39–50)
HGB BLD-MCNC: 8.8 G/DL — LOW (ref 13–17)
IMM GRANULOCYTES NFR BLD AUTO: 0.4 % — SIGNIFICANT CHANGE UP (ref 0–1.5)
LYMPHOCYTES # BLD AUTO: 0.76 K/UL — LOW (ref 1–3.3)
LYMPHOCYTES # BLD AUTO: 8.4 % — LOW (ref 13–44)
MAGNESIUM SERPL-MCNC: 2.4 MG/DL — SIGNIFICANT CHANGE UP (ref 1.6–2.6)
MCHC RBC-ENTMCNC: 32.4 GM/DL — SIGNIFICANT CHANGE UP (ref 32–36)
MCHC RBC-ENTMCNC: 32.7 PG — SIGNIFICANT CHANGE UP (ref 27–34)
MCV RBC AUTO: 101.1 FL — HIGH (ref 80–100)
MONOCYTES # BLD AUTO: 0.55 K/UL — SIGNIFICANT CHANGE UP (ref 0–0.9)
MONOCYTES NFR BLD AUTO: 6 % — SIGNIFICANT CHANGE UP (ref 2–14)
NEUTROPHILS # BLD AUTO: 7.69 K/UL — HIGH (ref 1.8–7.4)
NEUTROPHILS NFR BLD AUTO: 84.6 % — HIGH (ref 43–77)
PHOSPHATE SERPL-MCNC: 2.1 MG/DL — LOW (ref 2.5–4.5)
PLATELET # BLD AUTO: 175 K/UL — SIGNIFICANT CHANGE UP (ref 150–400)
POTASSIUM SERPL-MCNC: 3.5 MMOL/L — SIGNIFICANT CHANGE UP (ref 3.5–5.3)
POTASSIUM SERPL-SCNC: 3.5 MMOL/L — SIGNIFICANT CHANGE UP (ref 3.5–5.3)
RBC # BLD: 2.69 M/UL — LOW (ref 4.2–5.8)
RBC # FLD: 13.2 % — SIGNIFICANT CHANGE UP (ref 10.3–14.5)
SODIUM SERPL-SCNC: 137 MMOL/L — SIGNIFICANT CHANGE UP (ref 135–145)
WBC # BLD: 9.1 K/UL — SIGNIFICANT CHANGE UP (ref 3.8–10.5)
WBC # FLD AUTO: 9.1 K/UL — SIGNIFICANT CHANGE UP (ref 3.8–10.5)

## 2022-03-26 PROCEDURE — 99232 SBSQ HOSP IP/OBS MODERATE 35: CPT

## 2022-03-26 RX ORDER — AMLODIPINE BESYLATE 2.5 MG/1
5 TABLET ORAL DAILY
Refills: 0 | Status: DISCONTINUED | OUTPATIENT
Start: 2022-03-26 | End: 2022-03-27

## 2022-03-26 RX ORDER — POTASSIUM PHOSPHATE, MONOBASIC POTASSIUM PHOSPHATE, DIBASIC 236; 224 MG/ML; MG/ML
15 INJECTION, SOLUTION INTRAVENOUS ONCE
Refills: 0 | Status: COMPLETED | OUTPATIENT
Start: 2022-03-26 | End: 2022-03-26

## 2022-03-26 RX ORDER — OXYCODONE HYDROCHLORIDE 5 MG/1
10 TABLET ORAL EVERY 6 HOURS
Refills: 0 | Status: DISCONTINUED | OUTPATIENT
Start: 2022-03-26 | End: 2022-03-27

## 2022-03-26 RX ORDER — OXYCODONE HYDROCHLORIDE 5 MG/1
5 TABLET ORAL EVERY 6 HOURS
Refills: 0 | Status: DISCONTINUED | OUTPATIENT
Start: 2022-03-26 | End: 2022-03-27

## 2022-03-26 RX ORDER — OXYCODONE HYDROCHLORIDE 5 MG/1
5 TABLET ORAL EVERY 6 HOURS
Refills: 0 | Status: DISCONTINUED | OUTPATIENT
Start: 2022-03-26 | End: 2022-03-26

## 2022-03-26 RX ORDER — POTASSIUM CHLORIDE 20 MEQ
20 PACKET (EA) ORAL
Refills: 0 | Status: COMPLETED | OUTPATIENT
Start: 2022-03-26 | End: 2022-03-26

## 2022-03-26 RX ORDER — ACETAMINOPHEN 500 MG
650 TABLET ORAL EVERY 6 HOURS
Refills: 0 | Status: DISCONTINUED | OUTPATIENT
Start: 2022-03-26 | End: 2022-03-27

## 2022-03-26 RX ADMIN — SODIUM CHLORIDE 3 MILLILITER(S): 9 INJECTION INTRAMUSCULAR; INTRAVENOUS; SUBCUTANEOUS at 21:01

## 2022-03-26 RX ADMIN — HEPARIN SODIUM 5000 UNIT(S): 5000 INJECTION INTRAVENOUS; SUBCUTANEOUS at 05:20

## 2022-03-26 RX ADMIN — POTASSIUM PHOSPHATE, MONOBASIC POTASSIUM PHOSPHATE, DIBASIC 62.5 MILLIMOLE(S): 236; 224 INJECTION, SOLUTION INTRAVENOUS at 15:12

## 2022-03-26 RX ADMIN — Medication 0.5 MILLIGRAM(S): at 21:05

## 2022-03-26 RX ADMIN — AMLODIPINE BESYLATE 5 MILLIGRAM(S): 2.5 TABLET ORAL at 09:39

## 2022-03-26 RX ADMIN — SODIUM CHLORIDE 3 MILLILITER(S): 9 INJECTION INTRAMUSCULAR; INTRAVENOUS; SUBCUTANEOUS at 18:51

## 2022-03-26 RX ADMIN — Medication 100 MILLIGRAM(S): at 21:05

## 2022-03-26 RX ADMIN — ALVIMOPAN 12 MILLIGRAM(S): 12 CAPSULE ORAL at 05:20

## 2022-03-26 RX ADMIN — HEPARIN SODIUM 5000 UNIT(S): 5000 INJECTION INTRAVENOUS; SUBCUTANEOUS at 15:12

## 2022-03-26 RX ADMIN — HEPARIN SODIUM 5000 UNIT(S): 5000 INJECTION INTRAVENOUS; SUBCUTANEOUS at 21:04

## 2022-03-26 RX ADMIN — CLOPIDOGREL BISULFATE 75 MILLIGRAM(S): 75 TABLET, FILM COATED ORAL at 09:40

## 2022-03-26 RX ADMIN — SIMVASTATIN 40 MILLIGRAM(S): 20 TABLET, FILM COATED ORAL at 21:04

## 2022-03-26 RX ADMIN — Medication 20 MILLIEQUIVALENT(S): at 15:12

## 2022-03-26 RX ADMIN — SODIUM CHLORIDE 3 MILLILITER(S): 9 INJECTION INTRAMUSCULAR; INTRAVENOUS; SUBCUTANEOUS at 05:24

## 2022-03-26 RX ADMIN — OXYCODONE HYDROCHLORIDE 10 MILLIGRAM(S): 5 TABLET ORAL at 18:55

## 2022-03-26 RX ADMIN — Medication 20 MILLIEQUIVALENT(S): at 17:45

## 2022-03-26 RX ADMIN — Medication 100 MILLIGRAM(S): at 09:39

## 2022-03-26 NOTE — PROGRESS NOTE ADULT - ASSESSMENT
60 yo M with recurrent rectal cancer is s/p APR with ileostomy reversal POD 3. AFVSS. Leukocytosis trending down, hh down slightly. Hypophosphatemia to be repleted. AXR appreciated.     Plan:  -monitor VS  -Pain and nausea control prn  -LRD  - DC entereg  -DC fluids  -Monitor ostomy output  -Monitor DIDIER drain x2   -DC castillo, TOV  -Encourage IS use  -Encourage OOB/ambulation  -PT for mobilization  -DVT ppx  -Hospitalist reccs appreciated  -Possible home tomorrow with ostomy nurse    Plan discussed with attending.

## 2022-03-26 NOTE — PROGRESS NOTE ADULT - SUBJECTIVE AND OBJECTIVE BOX
Pt. seen and examined at bedside this morning. Patient's pain well-controlled. he is tolerating diet, ostomy functional, he is ambulating. He denies fever, chest pain, SOB, nausea, vomiting. No acute events overnight.    Vitals:  T(C): 36.7 ( @ 10:09), Max: 37.3 ( @ 20:28)  HR: 93 ( @ 06:00) (93 - 125)  BP: 143/74 ( @ 06:00) (127/72 - 151/72)  RR: 17 ( @ 06:00) (15 - 23)  SpO2: 93% ( @ 06:00) (92% - 100%)     @ 07:01  -   @ 07:00  --------------------------------------------------------  IN:    IV PiggyBack: 250 mL    sodium chloride 0.9%: 2158 mL  Total IN: 2408 mL    OUT:    Colostomy (mL): 440 mL    Drain (mL): 150 mL    Drain (mL): 135 mL    Indwelling Catheter - Urethral (mL): 2525 mL  Total OUT: 3250 mL    Total NET: -842 mL       @ 07:01  -   @ 10:50  --------------------------------------------------------  IN:  Total IN: 0 mL    OUT:    Colostomy (mL): 300 mL    Drain (mL): 30 mL  Total OUT: 330 mL    Total NET: -330 mL      PHYSICAL EXAM   Gen: well-appearing, in no acute distress  Chest: Equal expansion bilaterally, equal breath sounds  CV: S1S2, RRR  Abdomen: Soft, minimal incisional tenderness, distended, dressings clean and dry and intact, ostomy with liquid stool, stoma pink, prevena and NP seal in place, perineal wound c/d/i, no sign of infection, dry gauze applied  Extremities: Grossly symmetric, SCD's in place   : Thalia in place     @ 06:56                    8.8  CBC: 9.10>)-------(<175                     27.2                 137 | 106 | 8    CMP:  ----------------------< 114               3.5 | 26 | 0.72                      Ca:8.5  Phos:2.1  M.4               -|      |-        LFTs:  ------|-|-----             -|      |-   @ 06:33                    9.7  CBC: 11.64>)-------(<178                     29.3                 133 | 103 | 15    CMP:  ----------------------< 148               4.0 | 25 | 1.07                      Ca:8.3  Phos:2.0  M.4               -|      |-        LFTs:  ------|-|-----             -|      |-      Current Inpatient Medications:  ALPRAZolam 0.5 milliGRAM(s) Oral every 6 hours PRN  amLODIPine   Tablet 5 milliGRAM(s) Oral daily  clopidogrel Tablet 75 milliGRAM(s) Oral daily  heparin   Injectable 5000 Unit(s) SubCutaneous every 8 hours  HYDROmorphone PCA (1 mG/mL) 30 milliLiter(s) PCA Continuous PCA Continuous  HYDROmorphone PCA (1 mG/mL) Rescue Clinician Bolus 0.5 milliGRAM(s) IV Push every 15 minutes PRN  metoprolol succinate  milliGRAM(s) Oral every 12 hours  naloxone Injectable 0.1 milliGRAM(s) IV Push every 3 minutes PRN  ondansetron Injectable 4 milliGRAM(s) IV Push every 6 hours PRN  simvastatin 40 milliGRAM(s) Oral at bedtime  sodium chloride 0.9% lock flush 3 milliLiter(s) IV Push every 8 hours

## 2022-03-27 ENCOUNTER — TRANSCRIPTION ENCOUNTER (OUTPATIENT)
Age: 62
End: 2022-03-27

## 2022-03-27 VITALS — TEMPERATURE: 99 F

## 2022-03-27 LAB
ANION GAP SERPL CALC-SCNC: 5 MMOL/L — SIGNIFICANT CHANGE UP (ref 5–17)
BUN SERPL-MCNC: 7 MG/DL — SIGNIFICANT CHANGE UP (ref 7–23)
CALCIUM SERPL-MCNC: 8.8 MG/DL — SIGNIFICANT CHANGE UP (ref 8.5–10.1)
CHLORIDE SERPL-SCNC: 104 MMOL/L — SIGNIFICANT CHANGE UP (ref 96–108)
CO2 SERPL-SCNC: 27 MMOL/L — SIGNIFICANT CHANGE UP (ref 22–31)
CREAT SERPL-MCNC: 0.78 MG/DL — SIGNIFICANT CHANGE UP (ref 0.5–1.3)
EGFR: 100 ML/MIN/1.73M2 — SIGNIFICANT CHANGE UP
GLUCOSE SERPL-MCNC: 109 MG/DL — HIGH (ref 70–99)
HCT VFR BLD CALC: 24.2 % — LOW (ref 39–50)
HGB BLD-MCNC: 8 G/DL — LOW (ref 13–17)
MAGNESIUM SERPL-MCNC: 2.2 MG/DL — SIGNIFICANT CHANGE UP (ref 1.6–2.6)
MCHC RBC-ENTMCNC: 33.1 GM/DL — SIGNIFICANT CHANGE UP (ref 32–36)
MCHC RBC-ENTMCNC: 33.2 PG — SIGNIFICANT CHANGE UP (ref 27–34)
MCV RBC AUTO: 100.4 FL — HIGH (ref 80–100)
PHOSPHATE SERPL-MCNC: 2.7 MG/DL — SIGNIFICANT CHANGE UP (ref 2.5–4.5)
PLATELET # BLD AUTO: 189 K/UL — SIGNIFICANT CHANGE UP (ref 150–400)
POTASSIUM SERPL-MCNC: 3.7 MMOL/L — SIGNIFICANT CHANGE UP (ref 3.5–5.3)
POTASSIUM SERPL-SCNC: 3.7 MMOL/L — SIGNIFICANT CHANGE UP (ref 3.5–5.3)
RBC # BLD: 2.41 M/UL — LOW (ref 4.2–5.8)
RBC # FLD: 13 % — SIGNIFICANT CHANGE UP (ref 10.3–14.5)
SODIUM SERPL-SCNC: 136 MMOL/L — SIGNIFICANT CHANGE UP (ref 135–145)
WBC # BLD: 7 K/UL — SIGNIFICANT CHANGE UP (ref 3.8–10.5)
WBC # FLD AUTO: 7 K/UL — SIGNIFICANT CHANGE UP (ref 3.8–10.5)

## 2022-03-27 RX ORDER — IBUPROFEN 200 MG
1 TABLET ORAL
Qty: 20 | Refills: 0
Start: 2022-03-27 | End: 2022-03-31

## 2022-03-27 RX ORDER — ACETAMINOPHEN 500 MG
2 TABLET ORAL
Qty: 56 | Refills: 0
Start: 2022-03-27 | End: 2022-04-02

## 2022-03-27 RX ADMIN — OXYCODONE HYDROCHLORIDE 10 MILLIGRAM(S): 5 TABLET ORAL at 16:09

## 2022-03-27 RX ADMIN — Medication 100 MILLIGRAM(S): at 09:04

## 2022-03-27 RX ADMIN — CLOPIDOGREL BISULFATE 75 MILLIGRAM(S): 75 TABLET, FILM COATED ORAL at 09:04

## 2022-03-27 RX ADMIN — AMLODIPINE BESYLATE 5 MILLIGRAM(S): 2.5 TABLET ORAL at 09:04

## 2022-03-27 RX ADMIN — SODIUM CHLORIDE 3 MILLILITER(S): 9 INJECTION INTRAMUSCULAR; INTRAVENOUS; SUBCUTANEOUS at 13:56

## 2022-03-27 RX ADMIN — HEPARIN SODIUM 5000 UNIT(S): 5000 INJECTION INTRAVENOUS; SUBCUTANEOUS at 05:55

## 2022-03-27 RX ADMIN — SODIUM CHLORIDE 3 MILLILITER(S): 9 INJECTION INTRAMUSCULAR; INTRAVENOUS; SUBCUTANEOUS at 05:52

## 2022-03-27 RX ADMIN — Medication 650 MILLIGRAM(S): at 11:35

## 2022-03-27 RX ADMIN — OXYCODONE HYDROCHLORIDE 5 MILLIGRAM(S): 5 TABLET ORAL at 09:02

## 2022-03-27 RX ADMIN — HEPARIN SODIUM 5000 UNIT(S): 5000 INJECTION INTRAVENOUS; SUBCUTANEOUS at 14:15

## 2022-03-27 RX ADMIN — OXYCODONE HYDROCHLORIDE 5 MILLIGRAM(S): 5 TABLET ORAL at 10:00

## 2022-03-27 NOTE — DISCHARGE NOTE NURSING/CASE MANAGEMENT/SOCIAL WORK - PATIENT PORTAL LINK FT
You can access the FollowMyHealth Patient Portal offered by [facility] by registering at the following website: [email or web address]. By joining [employer]’s FollowMyHealth portal, you will also be able to view your health information using other applications (apps) compatible with our system.

## 2022-03-27 NOTE — DISCHARGE NOTE NURSING/CASE MANAGEMENT/SOCIAL WORK - NSDCPEEMAIL_GEN_ALL_CORE
[facility] for Tobacco Control email [email or web address]

## 2022-03-27 NOTE — DISCHARGE NOTE PROVIDER - NSDCMRMEDTOKEN_GEN_ALL_CORE_FT
ALPRAZolam 0.5 mg oral tablet: 1 tab(s) orally every 6 hours, As needed, anxiety  amLODIPine 5 mg oral tablet: 1 tab(s) orally once a day  ferrous sulfate 325 mg (65 mg elemental iron) oral delayed release tablet: 1 tab(s) orally once a day  ibuprofen 600 mg oral tablet: 1 tab(s) orally every 6 hours   Metoprolol Succinate  mg oral tablet, extended release: 1 tab(s) orally 2x a day  Plavix 75 mg oral tablet: 1 tab(s) orally once a day-stop 5 days prior to surgery as per PMD  simvastatin 40 mg oral tablet: 1 tab(s) orally once a day (at bedtime)  sodium bicarbonate 650 mg oral tablet: 1 tab(s) orally 2 times a day  Tylenol 325 mg oral tablet: 2 tab(s) orally every 6 hours   Vitamin D3 25 mcg (1000 intl units) oral tablet: 1 tab(s) orally once a day

## 2022-03-27 NOTE — PROGRESS NOTE ADULT - ASSESSMENT
60 yo M with recurrent rectal cancer is s/p APR with ileostomy reversal POD 4. AFVSS. Leukocytosis resolved hh down slightly.  Plan:  -monitor VS  -Pain and nausea control prn  -LRD  -Monitor ostomy output  Will remove both drains  -Encourage IS use  -Encourage OOB/ambulation  -PT for mobilization  -DVT ppx  -Hospitalist reccs appreciated  -Plan for discharge today with ostomy nurse    Plan discussed with attending.

## 2022-03-27 NOTE — DISCHARGE NOTE PROVIDER - NSDCFUSCHEDAPPT_GEN_ALL_CORE_FT
[facility] ; 04/01/2022 ; NPP Nephro [address] [place] [address] ; 04/12/2022 ; NPP Colosurg [address] [facility] ; 04/13/2022 ; NPLISANDRA Enriquez [facility]

## 2022-03-27 NOTE — DISCHARGE NOTE NURSING/CASE MANAGEMENT/SOCIAL WORK - NSDCPEWEB_GEN_ALL_CORE
[facility] for Tobacco Control website --- [email or web address] website --- [email or web address]

## 2022-03-27 NOTE — DISCHARGE NOTE NURSING/CASE MANAGEMENT/SOCIAL WORK - NSDCPEFALRISK_GEN_ALL_CORE
For information on Fall & Injury Prevention, visit: [email or web address] OR  [email or web address] OR  https://www.cdc.gov/steadi/patient.html

## 2022-03-27 NOTE — DISCHARGE NOTE PROVIDER - NSDCCPCAREPLAN_GEN_ALL_CORE_FT
PRINCIPAL DISCHARGE DIAGNOSIS  Diagnosis: Local recurrence of rectal cancer  Assessment and Plan of Treatment:

## 2022-03-27 NOTE — DISCHARGE NOTE PROVIDER - CARE PROVIDER_API CALL
Huan Rodriguez)  ColonRectal Surgery; Surgery  [address] [address]  Phone: [phone]  Fax: [phone]  Follow Up Time: 2 weeks

## 2022-03-27 NOTE — DISCHARGE NOTE PROVIDER - HOSPITAL COURSE
Patient presented for abdominoperineal resection for recurrence of rectal cancer. He underwent successful surgery. He is tolerating a low fiber diet and clear for discharge. Both DIDIER drains and pervena dressing were removed.

## 2022-03-27 NOTE — PROGRESS NOTE ADULT - SUBJECTIVE AND OBJECTIVE BOX
Pt. seen and examined at bedside this morning. Patient's pain well-controlled. he is tolerating diet, ostomy functional, he is ambulating. He denies fever, chest pain, SOB, nausea, vomiting. No acute events overnight.    Vitals:  T(C): 36.6 ( @ 10:10), Max: 36.6 ( @ 10:10)  HR: 79 ( @ 10:00) (71 - 98)  BP: 129/64 ( @ 10:00) (116/64 - 161/81)  RR: 21 ( @ 10:00) (12 - 21)  SpO2: 91% ( @ 10:00) (90% - 99%)     @ 07:01  -   @ 07:00  --------------------------------------------------------  IN:  Total IN: 0 mL    OUT:    Colostomy (mL): 450 mL    Drain (mL): 160 mL    Drain (mL): 60 mL    Indwelling Catheter - Urethral (mL): 300 mL    Voided (mL): 1500 mL  Total OUT: 2470 mL    Total NET: -2470 mL       @ 07:01  -   @ 11:03  --------------------------------------------------------  IN:  Total IN: 0 mL    OUT:    Voided (mL): 650 mL  Total OUT: 650 mL    Total NET: -650 mL      PHYSICAL EXAM   Gen: well-appearing, in no acute distress  Chest: Equal expansion bilaterally, equal breath sounds  CV: S1S2, RRR  Abdomen: Soft, minimal incisional tenderness, distended, dressings clean and dry and intact, ostomy with liquid stool, stoma pink, prevena and NP seal in place, perineal wound c/d/i, no sign of infection, dry gauze applied  Extremities: Grossly symmetric, SCD's in place   : Vásquez in place    03-27 @ 06:27                    8.0  CBC: 7.00>)-------(<189                     24.2                 136 | 104 | 7    CMP:  ----------------------< 109               3.7 | 27 | 0.78                      Ca:8.8  Phos:2.7  M.2               -|      |-        LFTs:  ------|-|-----             -|      |-  03-26 @ 06:56                    8.8  CBC: 9.10>)-------(<175                     27.2                 137 | 106 | 8    CMP:  ----------------------< 114               3.5 | 26 | 0.72                      Ca:8.5  Phos:2.1  M.4               -|      |-        LFTs:  ------|-|-----             -|      |-      Current Inpatient Medications:  acetaminophen     Tablet .. 650 milliGRAM(s) Oral every 6 hours PRN  ALPRAZolam 0.5 milliGRAM(s) Oral every 6 hours PRN  amLODIPine   Tablet 5 milliGRAM(s) Oral daily  clopidogrel Tablet 75 milliGRAM(s) Oral daily  heparin   Injectable 5000 Unit(s) SubCutaneous every 8 hours  metoprolol succinate  milliGRAM(s) Oral every 12 hours  naloxone Injectable 0.1 milliGRAM(s) IV Push every 3 minutes PRN  ondansetron Injectable 4 milliGRAM(s) IV Push every 6 hours PRN  oxyCODONE    IR 5 milliGRAM(s) Oral every 6 hours PRN  oxyCODONE    IR 10 milliGRAM(s) Oral every 6 hours PRN  simvastatin 40 milliGRAM(s) Oral at bedtime  sodium chloride 0.9% lock flush 3 milliLiter(s) IV Push every 8 hours

## 2022-03-28 ENCOUNTER — NON-APPOINTMENT (OUTPATIENT)
Age: 62
End: 2022-03-28

## 2022-03-29 ENCOUNTER — NON-APPOINTMENT (OUTPATIENT)
Age: 62
End: 2022-03-29

## 2022-04-01 ENCOUNTER — NON-APPOINTMENT (OUTPATIENT)
Age: 62
End: 2022-04-01

## 2022-04-04 ENCOUNTER — APPOINTMENT (OUTPATIENT)
Dept: COLORECTAL SURGERY | Facility: CLINIC | Age: 62
End: 2022-04-04
Payer: COMMERCIAL

## 2022-04-04 VITALS
TEMPERATURE: 98.8 F | RESPIRATION RATE: 14 BRPM | HEART RATE: 51 BPM | SYSTOLIC BLOOD PRESSURE: 151 MMHG | HEIGHT: 72 IN | BODY MASS INDEX: 30.07 KG/M2 | DIASTOLIC BLOOD PRESSURE: 80 MMHG | WEIGHT: 222 LBS

## 2022-04-04 DIAGNOSIS — Z90.49 ACQUIRED ABSENCE OF OTHER SPECIFIED PARTS OF DIGESTIVE TRACT: ICD-10-CM

## 2022-04-04 DIAGNOSIS — I47.2 VENTRICULAR TACHYCARDIA: ICD-10-CM

## 2022-04-04 DIAGNOSIS — F32.A DEPRESSION, UNSPECIFIED: ICD-10-CM

## 2022-04-04 DIAGNOSIS — I25.10 ATHEROSCLEROTIC HEART DISEASE OF NATIVE CORONARY ARTERY WITHOUT ANGINA PECTORIS: ICD-10-CM

## 2022-04-04 DIAGNOSIS — K66.0 PERITONEAL ADHESIONS (POSTPROCEDURAL) (POSTINFECTION): ICD-10-CM

## 2022-04-04 DIAGNOSIS — E83.39 OTHER DISORDERS OF PHOSPHORUS METABOLISM: ICD-10-CM

## 2022-04-04 DIAGNOSIS — I10 ESSENTIAL (PRIMARY) HYPERTENSION: ICD-10-CM

## 2022-04-04 DIAGNOSIS — Z92.21 PERSONAL HISTORY OF ANTINEOPLASTIC CHEMOTHERAPY: ICD-10-CM

## 2022-04-04 DIAGNOSIS — I34.0 NONRHEUMATIC MITRAL (VALVE) INSUFFICIENCY: ICD-10-CM

## 2022-04-04 DIAGNOSIS — Z95.5 PRESENCE OF CORONARY ANGIOPLASTY IMPLANT AND GRAFT: ICD-10-CM

## 2022-04-04 DIAGNOSIS — C20 MALIGNANT NEOPLASM OF RECTUM: ICD-10-CM

## 2022-04-04 DIAGNOSIS — E78.5 HYPERLIPIDEMIA, UNSPECIFIED: ICD-10-CM

## 2022-04-04 DIAGNOSIS — Z92.3 PERSONAL HISTORY OF IRRADIATION: ICD-10-CM

## 2022-04-04 DIAGNOSIS — D72.829 ELEVATED WHITE BLOOD CELL COUNT, UNSPECIFIED: ICD-10-CM

## 2022-04-04 DIAGNOSIS — F17.290 NICOTINE DEPENDENCE, OTHER TOBACCO PRODUCT, UNCOMPLICATED: ICD-10-CM

## 2022-04-04 DIAGNOSIS — N17.9 ACUTE KIDNEY FAILURE, UNSPECIFIED: ICD-10-CM

## 2022-04-04 DIAGNOSIS — Z43.2 ENCOUNTER FOR ATTENTION TO ILEOSTOMY: ICD-10-CM

## 2022-04-04 DIAGNOSIS — Z93.2 ILEOSTOMY STATUS: ICD-10-CM

## 2022-04-04 DIAGNOSIS — G62.9 POLYNEUROPATHY, UNSPECIFIED: ICD-10-CM

## 2022-04-04 PROCEDURE — 99024 POSTOP FOLLOW-UP VISIT: CPT

## 2022-04-04 NOTE — DATA REVIEWED
[FreeTextEntry1] : surgical pathology reviewed - recurrent adenocarcinoma, - LN, - margins\par \par \par  Pathology             Final\par \par No Documents Attached\par \par \par \par \par   José Accession Number : [id number]\par Patient:   PASTORA CHURCHILL\par \par \par Accession:                             [id number]\par \par Collected Date/Time:                   3/23/2022 11:56 EDT\par Received Date/Time:                    3/23/2022 15:43 EDT\par \par Surgical Pathology Report - Auth (Verified)\par \par Specimen(s) Submitted\par 1  Terminal ileum ostomy site\par 2  Sigmoid colon, rectum, pelvic floor muscle and anus\par \par Final Diagnosis\par \par 1. Tissue (terminal ileum ostomy site):\par -Squamous and glandular tissue with inflammation, reactive changes\par \par and adhesions.\par \par 2. Colon and rectum (sigmoid, rectum, pelvic floor muscle and anus):\par -RECURRENT ADENOCARCINOMA, moderately to poorly differentiated\par with necrosis, microscopically measuring 6.0 mm.\par -Tumor invades through muscularis propria into pericolorectal\par tissues.\par -Tumor measures within 1.0 mm of the inked and cauterized radial\par margin.\par -13 lymph nodes negative for carcinoma (0/13).\par -No definitive lymphovascular or perineural invasion.\par -No involvement of proximal margin, distal margin and striated\par muscle.\par -Adhesions present.\par Verified by: LILIANE GRIGGS M.D.\par (Electronic Signature)\par Reported on: 03/31/22 14:03 EDT, [facility]\par Phone: [phone]   Fax: [phone]\par _________________________________________________________________\par \par \par Comment\par 2.  MMR results will be reported separately.\par \par 2. All or portions of this case have undergone intradepartmental review 1,\par 2.\par \par Previous noted.\par \par This case represents a current or recent malignant diagnosis and as such\par we will have the case reported to Tumor Registry.     Please be reminded\par that all tumor registry cases must be accurately staged and tracked.\par \par - For inpatients, please complete the tumor staging form on the patient's\par chart based on the clinical data which you have compiled.\par \par - Please remind your office to respond promptly to the    Tumor\par Registrar's  request for patient follow-up.\par \par Clinical Information\par Recurrent rectal cancer, ileostomy, adhesions\par \par \par Gross Description\par 1.  Received: In formalin labeled  "terminal ileum ostomy site"\par Small bowel:  8.8 cm length    2 cm to 4 cm circumference\par Serosa:  Tan, shaggy with adhesions\par Mucosa:  Tan and smooth, focally hemorrhagic ostomy site\par Skin:  2 x 0.1 cm, tan and granular\par Submitted:  Representative sections in one cassette: 1A\par \par 2.  Received fresh labeled  "sigmoid colon, rectum, pelvic floor muscle\par and anus; Hx of recurrent rectal cancer"    is a 36 cm long by 1 cm to 5\par cm circumference segment of colon/rectum, and anus. The proximal margin\par is stapled (2.8 cm circumference) and the distal margin is open (5 cm\par circumference). At the distal margin there is attached 5.5 x 5 cm portion\par of tan skin.  There is a 10.5 cm length of intestine that is strictured\par (0.7 cm diameter). The strictured area is 4.7 cm from the distal margin\par and 18 cm from the proximal margin. The attached fibroadipose tissue is\par up to 4.5 cm in width. Three areas of defect are identified ranging from\par 1 cm to 3 cm in greatest dimension. The mesorectum is incomplete. The\par serosa is tan-pink and shaggy with adhesions. The radial margin is inked\par black, the proximal margin is inked blue, and the distal margin is inked\par green. The wall thickness ranges from 0.1 cm to 0.4 cm. The mucosa is\par tan and smooth to congested and hemorrhagic surrounding the defect areas.\par Within the anus there is a 1.5 x 1.5 cm circular area of hemorrhage that\par is 1.7 cm from the distal/closest skin margin. A discrete mass is not\par grossly identified. Multiple lymph nodes are identified ranging from\par 0.2 cm to 0.4 cm in greatest dimension and have tan-white rubbery cut\par surfaces. Received separate in the same container are two fragments of\par yellow-tan fibrofatty and fibromuscular tissue measuring 3 cm and 4 cm in\par greatest dimension. Specimen photographs are taken.\par Representative sections in 23 cassettes:\par 2A: Proximal and distal margins, perpendicular sections\par 2B-2D: Intestine proximal to stricture\par 2E: Stricture area\par 2F-2I: Intestine distal to stricture with radial margin\par 2J-2N: Intestine and anus distal to stricture with hemorrhagic area and\par radial margin\par 2O: Representative radial margin\par 2P: Four lymph nodes\par 2Q: Three lymph nodes\par 2R-2S: Two lymph nodes bisected in each, differential inked blue and\par black\par 2T-2W: Additional fibroadipose tissue for possible lymph nodes\par \par Birdie Farahon 03/24/2022 01:02 PM\par \par \par Disclaimer\par In addition to other data that may appear on the specimen containers, all\par labels have been inspected to confirm the presence of the patient's name\par and date of birth.\par [email or web address]\par YES\par [phone]\par \par  \par \par  Ordered by: YOEL CARRASCO       Collected/Examined: 23Mar2022 11:56AM       \par Verified by: JHONNY MCKEON 01Apr2022 12:13AM       \par  Result Communication: No patient communication needed at this time;\par Stage: Final       \par  Performed at: [facility]       Resulted: 31Mar2022 02:03PM       Last Updated: 01Apr2022 12:13AM       Accession: [id number]

## 2022-04-04 NOTE — ASSESSMENT
[FreeTextEntry1] : 61 year old male who is s/p APR with permanent colostomy for recurrent rectal cancer. Now with draining abdominal and perineal incisions.  Abdominal wound was probed without any obvious openings. Perineal wound was also probed without any obvious deep openings. The area in question in the perineal incision near scrotum was very shallow but bleeding was noted after it was probed. Bleeding stopped with pressure. The areas were re-dressed. Penrose drain was removed from ileostomy closure site. Surgical pathology reviewed with patient/wife. All questions/concerns addressed to patient/wife satisfaction.\par \par s/pr APR\par - low residue diet\par - keep incisions clean and dry\par - daily showers with antibacterial soap\par - ostomy care\par - no heavy lifting for 5 more weeks\par - will order home care as he needs support in the home for his new colostomy\par - skilled nursing care needed for wound assessment/ostomy education/teaching.\par \par Rectal cancer\par - followed by oncology \par \par He has a follow up appointment next week to have staples removed. \par

## 2022-04-04 NOTE — HISTORY OF PRESENT ILLNESS
[FreeTextEntry1] : 61 year old male who is s/p APR with permanent colostomy for recurrent rectal cancer on 3/23. Surgical pathology returned as recurrent adenocarcinoma, - LN, - margins. He presents to the office with complaints of drainage from abdominal incision as well as his perineal incision.  They called the service over the weekend and was advised to come to the office today for a wound check.  Otherwise, he's doing okay: tolerating PO, ambulating. Denies fever/chills, n/v.

## 2022-04-04 NOTE — PHYSICAL EXAM
[Abdomen Masses] : No abdominal masses [Abdomen Tenderness] : ~T No ~M abdominal tenderness [de-identified] : soft, NDNT, ileostomy closure site, with Penrose drain, no erythema, drainage or odor noted. staples intact. midline incision with some serosanguineous drainage noted. staples in place. LUQ colostomy, slightly protruding, mucocutaneous separation, 1-3 o'clock noted. stoma pink/viable/patent, parastomal skin intact.  [de-identified] : perineal incision with superficial area opened area noted. minimal bleeding noted. no erythema or odor noted.

## 2022-04-07 ENCOUNTER — RX RENEWAL (OUTPATIENT)
Age: 62
End: 2022-04-07

## 2022-04-11 PROBLEM — Z11.59 SCREENING FOR VIRAL DISEASE: Status: ACTIVE | Noted: 2020-11-25

## 2022-04-12 ENCOUNTER — APPOINTMENT (OUTPATIENT)
Dept: COLORECTAL SURGERY | Facility: CLINIC | Age: 62
End: 2022-04-12
Payer: COMMERCIAL

## 2022-04-12 ENCOUNTER — OUTPATIENT (OUTPATIENT)
Dept: OUTPATIENT SERVICES | Facility: HOSPITAL | Age: 62
LOS: 1 days | Discharge: HOME | End: 2022-04-12

## 2022-04-12 VITALS
RESPIRATION RATE: 15 BRPM | SYSTOLIC BLOOD PRESSURE: 168 MMHG | WEIGHT: 207 LBS | TEMPERATURE: 98 F | BODY MASS INDEX: 25.21 KG/M2 | DIASTOLIC BLOOD PRESSURE: 99 MMHG | HEIGHT: 76 IN | HEART RATE: 80 BPM

## 2022-04-12 DIAGNOSIS — C20 MALIGNANT NEOPLASM OF RECTUM: ICD-10-CM

## 2022-04-12 DIAGNOSIS — Z98.890 OTHER SPECIFIED POSTPROCEDURAL STATES: Chronic | ICD-10-CM

## 2022-04-12 PROCEDURE — 99024 POSTOP FOLLOW-UP VISIT: CPT

## 2022-04-12 NOTE — PHYSICAL EXAM
[Respiratory Effort] : normal respiratory effort [Femoral Arteries] : Normal femoral arteries [Alert] : alert [Oriented to Person] : oriented to person [Oriented to Place] : oriented to place [Oriented to Time] : oriented to time [Calm] : calm [JVD] : no jugular venous distention  [de-identified] : Soft, nondistended, nontender.  Staples to midline.  Central aspect of midline incision has punctate area draining serous fluid focally - probed with Q-tip and serous fluid released; fascia palpated, feels intact.  Staples removed except for the central ~5cm surrounding this aspect.  No reboudn tenderness or guarding.  Ostomy pink, perfused, stool in appliance. [de-identified] : No acute distress [de-identified] : Normocephalic [de-identified] : Moves all extremities [de-identified] : Abdominal wound as above.  Perineal wound - no erythema, anterior aspect with serosanguinous drainage, no sign of infection.

## 2022-04-12 NOTE — HISTORY OF PRESENT ILLNESS
[FreeTextEntry1] : 61yoM s/p APR for recurrent rectal CA 3/23/2022.  Seen in clinic by IBRAHIMA Hernandez last week due to complaints of drainage from wound - advised regarding dressing changes.  Today he describes that his appetite still is not back but he does not have nausea/vomiting.  Colostomy functioning.  Midline wound with serous drainage and still with serosanguinous drainage from perineum, but much decreased.  Denies significant pain.\par \par Pathology reviewed: recurrent moderate to poorly differentiated adenocarcinoma, invading through muscularis propria into pericolorectal tissues, <1mm from inked margin, no definitive lymphovascular or perineral invasion, 0/13 nodes.\par \par

## 2022-04-13 ENCOUNTER — RESULT REVIEW (OUTPATIENT)
Age: 62
End: 2022-04-13

## 2022-04-13 ENCOUNTER — LABORATORY RESULT (OUTPATIENT)
Age: 62
End: 2022-04-13

## 2022-04-13 ENCOUNTER — APPOINTMENT (OUTPATIENT)
Dept: HEMATOLOGY ONCOLOGY | Facility: CLINIC | Age: 62
End: 2022-04-13
Payer: COMMERCIAL

## 2022-04-13 VITALS
SYSTOLIC BLOOD PRESSURE: 156 MMHG | HEART RATE: 91 BPM | WEIGHT: 210 LBS | DIASTOLIC BLOOD PRESSURE: 87 MMHG | HEIGHT: 76 IN | BODY MASS INDEX: 25.57 KG/M2 | OXYGEN SATURATION: 100 %

## 2022-04-13 LAB
BASOPHILS # BLD AUTO: 0 K/UL — SIGNIFICANT CHANGE UP (ref 0–0.2)
BASOPHILS NFR BLD AUTO: 0.6 % — SIGNIFICANT CHANGE UP (ref 0–2)
EOSINOPHIL # BLD AUTO: 0.2 K/UL — SIGNIFICANT CHANGE UP (ref 0–0.5)
EOSINOPHIL NFR BLD AUTO: 2.8 % — SIGNIFICANT CHANGE UP (ref 0–6)
HCT VFR BLD CALC: 36.2 % — LOW (ref 39–50)
HGB BLD-MCNC: 11.1 G/DL — LOW (ref 13–17)
LYMPHOCYTES # BLD AUTO: 0.7 K/UL — LOW (ref 1–3.3)
LYMPHOCYTES # BLD AUTO: 9.3 % — LOW (ref 13–44)
MCHC RBC-ENTMCNC: 30.7 G/DL — LOW (ref 32–36)
MCHC RBC-ENTMCNC: 31.6 PG — SIGNIFICANT CHANGE UP (ref 27–34)
MCV RBC AUTO: 102.9 FL — HIGH (ref 80–100)
MONOCYTES # BLD AUTO: 0.5 K/UL — SIGNIFICANT CHANGE UP (ref 0–0.9)
MONOCYTES NFR BLD AUTO: 7.1 % — SIGNIFICANT CHANGE UP (ref 2–14)
NEUTROPHILS # BLD AUTO: 6.1 K/UL — SIGNIFICANT CHANGE UP (ref 1.8–7.4)
NEUTROPHILS NFR BLD AUTO: 80.1 % — HIGH (ref 43–77)
PLATELET # BLD AUTO: 465 K/UL — HIGH (ref 150–400)
RBC # BLD: 3.52 M/UL — LOW (ref 4.2–5.8)
RBC # FLD: 13.5 % — SIGNIFICANT CHANGE UP (ref 10.3–14.5)
WBC # BLD: 7.6 K/UL — SIGNIFICANT CHANGE UP (ref 3.8–10.5)
WBC # FLD AUTO: 7.6 K/UL — SIGNIFICANT CHANGE UP (ref 3.8–10.5)

## 2022-04-13 PROCEDURE — 99214 OFFICE O/P EST MOD 30 MIN: CPT

## 2022-04-13 NOTE — PHYSICAL EXAM
[Fully active, able to carry on all pre-disease performance without restriction] : Status 0 - Fully active, able to carry on all pre-disease performance without restriction [Normal] : affect appropriate [de-identified] : Ileostomy

## 2022-04-13 NOTE — ASSESSMENT
[FreeTextEntry1] : 60 year old gentleman with rectal adenocarcinoma (init T2/T3a, 6-7cm from anal verge extending into internal anal sphincter), status post total neoadjuvant therapy with FOLFOX x 8 (yT2/N+) followed by chemoradiation with concurrent capecitabine, completed 9/23/19. Underwent resection and ileostomy on 2/5/20, ypT2 ypN0. \par \par Followup sigmoidoscopy x 3 by Dr. Payton ([facility])noted rectal polyp with biopsy showed fragments of adenocarcinoma, most recently on sigmoidoscopy 12/13/21 with path showing at least HG dysplasia/ intramucosal carcinoma. \par -11/30/21 MRI pelvis reviewed showed 0.7x1.3 cm focal enhancement suspicious for recurrence \par -Dr. Payton recommended APR with permanent colostomy but pt is hesitant. Pt saw Dr. Rodriguez for 2nd opinion and eventually underwent APR on 3/23/22.\par -Pathology showed recurrent moderate to poorly differentiated adenocarcinoma, invading through muscularis propria into pericolorectal tissues, <1mm from inked margin, no definitive lymphovascular or perineral invasion, 0/13 nodes.\par -discussed with pt and wife regarding Xeloda+RT if he is eligible for more RT given close margin and risk of recurrence. Pt expressed he does not want to receive any more CRT. He would rather do active surveillance.\par \par # Surveillance plan:\par -H&P every 3 months for 2y then every 6 months for a total of 5 yr\par -CEA, ctDNA every 3 months for 2y then every 6 months for total of 5 yr\par -CT c/ap every 6 months for total of 5 yrs\par -Colonoscopy 1y after sx except if no preoperative colonoscopy due to obstructing lesion, colonoscopy in 3-6 mo\par \par \par \par \par \par

## 2022-04-13 NOTE — HISTORY OF PRESENT ILLNESS
[de-identified] : \par The patient was diagnosed with adenocarcinoma of the rectum in February 2019 at the age of 58. He began having BRBPR approximately 6 months ago, which he attributed to hemorrhoids seen on his last colonoscopy about 8 years ago. Recently, he noted increased rectal bleeding and was referred for GI evaluation with Dr. Michele Reyes. Colonoscopy revealed a mass 6-7 cm from the anal verge on rigid sigmoidoscopy. The biopsy was consistent with moderately differentiated adenocarcinoma. Endoscopic ultrasound confirmed a 2.8 x 1.8 cm mass, consistent with T2N0. 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 No significant lymphadenopathy in the abdomen. He next saw colorectal surgery, Dr. Adi Garcia, who recommended a transanal resection preceded by neoadjuvant chemoradiation. A CT C/A/P discovered no metastatic disease. \par Patient completed 8 cycles of FOLFOX and chemoRT with Xeloda for adenocarcinoma of the rectum 8/13/19 - 9/23/19. \par . Resection and ileostomy 2/5/20 by Dr. Job Payton ([facility]).\par \par \par  [de-identified] : 2/8/21 sigmoidoscopy by Dr. Payton; rectal polyp, biopsy with pathology showing fragments of adenocarcinoma, at least intramucosal. \par 4/8/21 - identical procedure and results\par \par Patient is strongly desirous of ileostomy reversal, but recognizes that persistence of dysplasia and intramucosal adenocarcinoma on rectal biopsy  the way of that procedure.\par \par \par 12/13/21 sigmoidoscopy by Dr. Payton: rectal tissue showed portions of rectum containing dysplastic/neoplastic epithelium 9HG dysplasia/intramucosal carcinoma at least)\par \par 1/19/22: Thomas is here for follow up. He feels well. Ileostomy is functioning well. He reports that Dr. Payton ([facility]) is recommending APR with permanent colostomy given findings on Dec 2021 sigmoidoscopy findings. but he is hesitant. He would like a 2nd opinion. \par \par 4/12/22: pt underwent APR for recurrent rectal CA 3/23/22 with Dr. Rodriguez. Pathology showed recurrent moderate to poorly differentiated adenocarcinoma, invading through muscularis propria into pericolorectal tissues, <1mm from inked margin, no definitive lymphovascular or perineral invasion, 0/13 nodes.\par Pt is POD 21, still has some abd pain. appetite is poor. ostomy output is normal. He is eger to start playing golf. Denies HA. CP, SOB,  constipation, diarrhea, melena, hematuria, dysuria.

## 2022-04-18 ENCOUNTER — NON-APPOINTMENT (OUTPATIENT)
Age: 62
End: 2022-04-18

## 2022-04-18 ENCOUNTER — APPOINTMENT (OUTPATIENT)
Dept: COLORECTAL SURGERY | Facility: CLINIC | Age: 62
End: 2022-04-18
Payer: COMMERCIAL

## 2022-04-18 VITALS
HEART RATE: 80 BPM | SYSTOLIC BLOOD PRESSURE: 155 MMHG | DIASTOLIC BLOOD PRESSURE: 88 MMHG | WEIGHT: 210 LBS | RESPIRATION RATE: 14 BRPM | TEMPERATURE: 97.8 F | HEIGHT: 76 IN | BODY MASS INDEX: 25.57 KG/M2

## 2022-04-18 DIAGNOSIS — Z09 ENCOUNTER FOR FOLLOW-UP EXAMINATION AFTER COMPLETED TREATMENT FOR CONDITIONS OTHER THAN MALIGNANT NEOPLASM: ICD-10-CM

## 2022-04-18 PROCEDURE — 99024 POSTOP FOLLOW-UP VISIT: CPT

## 2022-04-18 NOTE — ASSESSMENT
[FreeTextEntry1] : s/p APR\par \par No restrictions in terms of diet.\par Increase protein intake.\par local wound care, gauze to drainage areas. \par Keep areas clean\par may resume activities slowly, golfing. \par follow up in 3-4 weeks\par Follow up with Oncology\par May need a urology eval if urine stream not improved. \par

## 2022-04-18 NOTE — HISTORY OF PRESENT ILLNESS
[FreeTextEntry1] : s/p APR, here for removal of remaining staples. No n/v, no fever. He has some residual drainage from perineum and abdominal incisions. Decrease appetite. Has noticed a difference in urine stream, not as strong of a stream as before surgery.

## 2022-04-18 NOTE — PHYSICAL EXAM
[Abdomen Masses] : No abdominal masses [Abdomen Tenderness] : ~T No ~M abdominal tenderness [de-identified] : soft, incisions clean, staples removed, small amount of drainage from midline incision, no erythema.  [de-identified] : Perineum, clean, some scant drainage on gauze.

## 2022-04-19 LAB
ALBUMIN SERPL ELPH-MCNC: 4.2 G/DL
ALP BLD-CCNC: 92 U/L
ALT SERPL-CCNC: 22 U/L
ANION GAP SERPL CALC-SCNC: 16 MMOL/L
AST SERPL-CCNC: 20 U/L
BILIRUB SERPL-MCNC: 0.6 MG/DL
BUN SERPL-MCNC: 13 MG/DL
CALCIUM SERPL-MCNC: 9.7 MG/DL
CEA SERPL-MCNC: 1 NG/ML
CHLORIDE SERPL-SCNC: 102 MMOL/L
CO2 SERPL-SCNC: 24 MMOL/L
CREAT SERPL-MCNC: 1.16 MG/DL
EGFR: 71 ML/MIN/1.73M2
GLUCOSE SERPL-MCNC: 184 MG/DL
MAGNESIUM SERPL-MCNC: 2 MG/DL
POTASSIUM SERPL-SCNC: 4.4 MMOL/L
PROT SERPL-MCNC: 6.8 G/DL
RBC # BLD: 3.37 M/UL
RETICS # AUTO: 3.1 %
RETICS AGGREG/RBC NFR: 104.8 K/UL
SODIUM SERPL-SCNC: 141 MMOL/L

## 2022-05-06 ENCOUNTER — APPOINTMENT (OUTPATIENT)
Dept: COLORECTAL SURGERY | Facility: CLINIC | Age: 62
End: 2022-05-06
Payer: COMMERCIAL

## 2022-05-06 VITALS
HEIGHT: 76 IN | DIASTOLIC BLOOD PRESSURE: 95 MMHG | HEART RATE: 71 BPM | TEMPERATURE: 97.7 F | RESPIRATION RATE: 14 BRPM | SYSTOLIC BLOOD PRESSURE: 195 MMHG | BODY MASS INDEX: 25.57 KG/M2 | WEIGHT: 210 LBS

## 2022-05-06 DIAGNOSIS — R39.198 OTHER DIFFICULTIES WITH MICTURITION: ICD-10-CM

## 2022-05-06 PROCEDURE — 99024 POSTOP FOLLOW-UP VISIT: CPT

## 2022-05-07 NOTE — ASSESSMENT
[FreeTextEntry1] : 61-year-old male postop abdominoperineal resection for recurrent rectal cancer. Pathology demonstrates clear margins and lymph nodes. Patient is doing well

## 2022-05-07 NOTE — HISTORY OF PRESENT ILLNESS
[FreeTextEntry1] : 61-year-old malepostop abdominal perineal resection for recurrent rectal cancer. Doing well overall doing well with his colostomy

## 2022-05-07 NOTE — PHYSICAL EXAM
[Abdomen Masses] : No abdominal masses [Abdomen Tenderness] : ~T No ~M abdominal tenderness [Tender] : nontender [de-identified] : Looks well in no distress, of stated age.

## 2022-05-12 ENCOUNTER — APPOINTMENT (OUTPATIENT)
Dept: INTERNAL MEDICINE | Facility: CLINIC | Age: 62
End: 2022-05-12
Payer: COMMERCIAL

## 2022-05-12 VITALS
HEIGHT: 76 IN | HEART RATE: 69 BPM | BODY MASS INDEX: 25.57 KG/M2 | WEIGHT: 210 LBS | DIASTOLIC BLOOD PRESSURE: 80 MMHG | SYSTOLIC BLOOD PRESSURE: 142 MMHG | OXYGEN SATURATION: 98 % | RESPIRATION RATE: 13 BRPM

## 2022-05-12 PROCEDURE — 99213 OFFICE O/P EST LOW 20 MIN: CPT

## 2022-05-12 NOTE — PHYSICAL EXAM
[No Acute Distress] : no acute distress [Well-Appearing] : well-appearing [No Respiratory Distress] : no respiratory distress  [No Accessory Muscle Use] : no accessory muscle use [Clear to Auscultation] : lungs were clear to auscultation bilaterally [Normal Rate] : normal rate  [Regular Rhythm] : with a regular rhythm [Soft] : abdomen soft [Non Tender] : non-tender [Non-distended] : non-distended [No HSM] : no HSM [No Focal Deficits] : no focal deficits [Normal Affect] : the affect was normal [de-identified] : colostomy RUL

## 2022-05-12 NOTE — HISTORY OF PRESENT ILLNESS
[Post-hospitalization from ___ Hospital] : Post-hospitalization from [unfilled] Hospital [Admitted on: ___] : The patient was admitted on [unfilled] [Discharged on ___] : discharged on [unfilled] [Discharge Summary] : discharge summary [Pertinent Labs] : pertinent labs [Radiology Findings] : radiology findings [Discharge Med List] : discharge medication list [Med Reconciliation] : medication reconciliation has been completed [Patient Contacted By: ____] : and contacted by [unfilled] [FreeTextEntry2] : The patient presents after being admitted to [facility] where he was admitted electively on March 23, 2022 with history of rectal cancer admitted and had an abdominal perineal resection of recurrence of rectal cancer.\par He had no perioperative or postoperative complications.\par Stable for discharge on March 27, 2022.\par Surgical followup.\par Positive postop anemia therefore discharged on iron. \par \par Since discharge the patient had been doing fairly well feeling quite weak when he was discharged but better.\par His only complaint is that he still has some rectal leaking and is following with colorectal surgery who feels this is a normal occurrence after his surgery.\par \par thankfully pathology was negative.\par \par on discharge patient's hemoglobin was 8 with followup April 23 showed it to be 11.1

## 2022-05-12 NOTE — ASSESSMENT
[FreeTextEntry1] : Patient with history of recurrent rectal cancer now status post AP resection with colostomy and doing well.\par Followup with colorectal surgery as scheduled.\par postop anemia improved with latest blood work.\par Continue present treatment\par Followup with yearly physical

## 2022-05-18 ENCOUNTER — RX RENEWAL (OUTPATIENT)
Age: 62
End: 2022-05-18

## 2022-06-03 ENCOUNTER — APPOINTMENT (OUTPATIENT)
Dept: NEPHROLOGY | Facility: CLINIC | Age: 62
End: 2022-06-03
Payer: COMMERCIAL

## 2022-06-03 VITALS
HEART RATE: 108 BPM | WEIGHT: 214 LBS | DIASTOLIC BLOOD PRESSURE: 102 MMHG | BODY MASS INDEX: 26.06 KG/M2 | SYSTOLIC BLOOD PRESSURE: 164 MMHG | HEIGHT: 76 IN | OXYGEN SATURATION: 98 %

## 2022-06-03 DIAGNOSIS — R09.89 OTHER SPECIFIED SYMPTOMS AND SIGNS INVOLVING THE CIRCULATORY AND RESPIRATORY SYSTEMS: ICD-10-CM

## 2022-06-03 DIAGNOSIS — Z00.00 ENCOUNTER FOR GENERAL ADULT MEDICAL EXAMINATION W/OUT ABNORMAL FINDINGS: ICD-10-CM

## 2022-06-03 DIAGNOSIS — F41.9 ANXIETY DISORDER, UNSPECIFIED: ICD-10-CM

## 2022-06-03 DIAGNOSIS — Z20.822 CONTACT WITH AND (SUSPECTED) EXPOSURE TO COVID-19: ICD-10-CM

## 2022-06-03 PROCEDURE — 99214 OFFICE O/P EST MOD 30 MIN: CPT

## 2022-06-03 NOTE — PHYSICAL EXAM
[General Appearance - Alert] : alert [General Appearance - In No Acute Distress] : in no acute distress [Sclera] : the sclera and conjunctiva were normal [PERRL With Normal Accommodation] : pupils were equal in size, round, and reactive to light [Extraocular Movements] : extraocular movements were intact [Outer Ear] : the ears and nose were normal in appearance [Oropharynx] : the oropharynx was normal [Neck Appearance] : the appearance of the neck was normal [Neck Cervical Mass (___cm)] : no neck mass was observed [Jugular Venous Distention Increased] : there was no jugular-venous distention [Thyroid Diffuse Enlargement] : the thyroid was not enlarged [Thyroid Nodule] : there were no palpable thyroid nodules [Auscultation Breath Sounds / Voice Sounds] : lungs were clear to auscultation bilaterally [Heart Rate And Rhythm] : heart rate was normal and rhythm regular [Heart Sounds] : normal S1 and S2 [Heart Sounds Gallop] : no gallops [Murmurs] : no murmurs [Heart Sounds Pericardial Friction Rub] : no pericardial rub [Pitting Edema] : pitting edema present [___ +] : bilateral [unfilled]+ pitting edema to the ankles [Breast Appearance] : normal in appearance [Breast Palpation Mass] : no palpable masses [Bowel Sounds] : normal bowel sounds [Abdomen Soft] : soft [Abdomen Tenderness] : non-tender [Abdomen Mass (___ Cm)] : no abdominal mass palpated [Cervical Lymph Nodes Enlarged Posterior Bilaterally] : posterior cervical [Cervical Lymph Nodes Enlarged Anterior Bilaterally] : anterior cervical [Supraclavicular Lymph Nodes Enlarged Bilaterally] : supraclavicular [Axillary Lymph Nodes Enlarged Bilaterally] : axillary [Femoral Lymph Nodes Enlarged Bilaterally] : femoral [Inguinal Lymph Nodes Enlarged Bilaterally] : inguinal [No CVA Tenderness] : no ~M costovertebral angle tenderness [No Spinal Tenderness] : no spinal tenderness [Abnormal Walk] : normal gait [Nail Clubbing] : no clubbing  or cyanosis of the fingernails [Musculoskeletal - Swelling] : no joint swelling seen [Motor Tone] : muscle strength and tone were normal [Skin Color & Pigmentation] : normal skin color and pigmentation [Skin Turgor] : normal skin turgor [] : no rash [Deep Tendon Reflexes (DTR)] : deep tendon reflexes were 2+ and symmetric [Sensation] : the sensory exam was normal to light touch and pinprick [No Focal Deficits] : no focal deficits [Oriented To Time, Place, And Person] : oriented to person, place, and time [Impaired Insight] : insight and judgment were intact [Affect] : the affect was normal [FreeTextEntry1] : + Ileostomy,

## 2022-06-03 NOTE — HISTORY OF PRESENT ILLNESS
[FreeTextEntry1] : JAS ( Scr., 2.4 mg., ) Anemic, Baseline Scr., 1.1 mg., \par \par + Ostomy,\par \par HTN on ARBs, \par \par Scr., 1.2 mg.,  eGFR 71 Ml.,  ( Baseline ) \par \par Recurrent Cancer - Recently Hospitalized, \par \par

## 2022-06-03 NOTE — ASSESSMENT
[FreeTextEntry1] : Rectal Carcinoma, S/P RT & Chemo therapy,\par \par + High - Output Ileostomy,\par \par JAS Resolved, \par \par PH : HTN, R - Carotid  & Coronary Stents ( # 3 ) \par \par MR : No Recurrence + Leak\par \par CT : No Obstructive uropathy, \par \par S/P  sigmoidoscopy, Clamping of Fistula  ( 2-8 - 21 )\par \par Future Ostomy Reversal,\par \par Rec : D.C ARB, Increase Metoprolol, \par \par Hydration, \par \par Colonoscopy -  ( F.U Polyp ) & Eventual closure of colostomy,\par \par Carotid stenosis, asymptomatic, right (433.10) (I65.21)\par History of right common carotid artery stent placement (V45.89) (Z98.890,Z95.828)\par \par 59 y/o male s/p R TCAR 10/17/19 and L ICA occlusion. U/S carotid Duplex demonstrates moderate R ICA stenosis, unchanged. Continue to walk daily for exercise and take Plavix, simvastatin, and aspirin daily.\par \par Hospital discharge follow-up (V67.59) (Z09)\par Colostomy in place (V44.3) (Z93.3)\par CAD (coronary artery disease) (414.00) (I25.10)\par Hypertension (401.9) (I10)\par Rectal cancer (154.1) (C20)\par \par Patient with history of recurrent rectal cancer now status post AP resection with colostomy and doing well.\par Followup with colorectal surgery as scheduled.\par postop anemia improved with latest blood work.\par Continue present treatment\par \par Plan:\par \par Continue ASA, Plavix and Simvastatin.\par Continue with medical management of HTN. Legs Edematous - Related To amlodipine, \par Continue to walk for exercise.\par Maintain adequate hydration.\par U/S  Carotids Reviewed,  US Kidneys & Bladder Reviewed, \par \par PH : 2 Small stones, Spontaneously Passed, \par \par Maintain F.U , HTN ( Home Screen ) \par \par Meds reviewed, \par \par

## 2022-06-10 ENCOUNTER — APPOINTMENT (OUTPATIENT)
Dept: HEMATOLOGY ONCOLOGY | Facility: CLINIC | Age: 62
End: 2022-06-10

## 2022-06-27 ENCOUNTER — NON-APPOINTMENT (OUTPATIENT)
Age: 62
End: 2022-06-27

## 2022-06-29 ENCOUNTER — RX RENEWAL (OUTPATIENT)
Age: 62
End: 2022-06-29

## 2022-07-01 ENCOUNTER — APPOINTMENT (OUTPATIENT)
Dept: COLORECTAL SURGERY | Facility: CLINIC | Age: 62
End: 2022-07-01

## 2022-07-01 VITALS
WEIGHT: 212 LBS | BODY MASS INDEX: 39.01 KG/M2 | RESPIRATION RATE: 14 BRPM | SYSTOLIC BLOOD PRESSURE: 146 MMHG | HEART RATE: 93 BPM | HEIGHT: 62 IN | TEMPERATURE: 97.2 F | DIASTOLIC BLOOD PRESSURE: 85 MMHG | OXYGEN SATURATION: 100 %

## 2022-07-01 PROCEDURE — 99212 OFFICE O/P EST SF 10 MIN: CPT

## 2022-07-01 NOTE — PHYSICAL EXAM
[de-identified] :  healed incisions colostomy in place [de-identified] : Looks well in no distress, of stated age.

## 2022-07-04 ENCOUNTER — INPATIENT (INPATIENT)
Facility: HOSPITAL | Age: 62
LOS: 3 days | Discharge: HOME | DRG: 392 | End: 2022-07-08
Attending: INTERNAL MEDICINE | Admitting: FAMILY MEDICINE
Payer: COMMERCIAL

## 2022-07-04 VITALS
RESPIRATION RATE: 16 BRPM | OXYGEN SATURATION: 100 % | TEMPERATURE: 98 F | HEIGHT: 74 IN | DIASTOLIC BLOOD PRESSURE: 103 MMHG | WEIGHT: 210.1 LBS | SYSTOLIC BLOOD PRESSURE: 192 MMHG | HEART RATE: 89 BPM

## 2022-07-04 DIAGNOSIS — Z98.890 OTHER SPECIFIED POSTPROCEDURAL STATES: Chronic | ICD-10-CM

## 2022-07-04 DIAGNOSIS — R10.9 UNSPECIFIED ABDOMINAL PAIN: ICD-10-CM

## 2022-07-04 LAB
ALBUMIN SERPL ELPH-MCNC: 4.6 G/DL — SIGNIFICANT CHANGE UP (ref 3.3–5.2)
ALP SERPL-CCNC: 74 U/L — SIGNIFICANT CHANGE UP (ref 40–120)
ALT FLD-CCNC: 17 U/L — SIGNIFICANT CHANGE UP
ANION GAP SERPL CALC-SCNC: 20 MMOL/L — HIGH (ref 5–17)
APPEARANCE UR: CLEAR — SIGNIFICANT CHANGE UP
AST SERPL-CCNC: 24 U/L — SIGNIFICANT CHANGE UP
BACTERIA # UR AUTO: ABNORMAL
BASOPHILS # BLD AUTO: 0.02 K/UL — SIGNIFICANT CHANGE UP (ref 0–0.2)
BASOPHILS NFR BLD AUTO: 0.2 % — SIGNIFICANT CHANGE UP (ref 0–2)
BILIRUB SERPL-MCNC: 0.6 MG/DL — SIGNIFICANT CHANGE UP (ref 0.4–2)
BILIRUB UR-MCNC: NEGATIVE — SIGNIFICANT CHANGE UP
BUN SERPL-MCNC: 17.8 MG/DL — SIGNIFICANT CHANGE UP (ref 8–20)
CALCIUM SERPL-MCNC: 9.8 MG/DL — SIGNIFICANT CHANGE UP (ref 8.6–10.2)
CHLORIDE SERPL-SCNC: 93 MMOL/L — LOW (ref 98–107)
CO2 SERPL-SCNC: 23 MMOL/L — SIGNIFICANT CHANGE UP (ref 22–29)
COLOR SPEC: YELLOW — SIGNIFICANT CHANGE UP
CREAT SERPL-MCNC: 1.12 MG/DL — SIGNIFICANT CHANGE UP (ref 0.5–1.3)
DIFF PNL FLD: ABNORMAL
EGFR: 74 ML/MIN/1.73M2 — SIGNIFICANT CHANGE UP
EOSINOPHIL # BLD AUTO: 0.05 K/UL — SIGNIFICANT CHANGE UP (ref 0–0.5)
EOSINOPHIL NFR BLD AUTO: 0.5 % — SIGNIFICANT CHANGE UP (ref 0–6)
EPI CELLS # UR: SIGNIFICANT CHANGE UP
GLUCOSE SERPL-MCNC: 155 MG/DL — HIGH (ref 70–99)
GLUCOSE UR QL: NEGATIVE MG/DL — SIGNIFICANT CHANGE UP
HCT VFR BLD CALC: 38.6 % — LOW (ref 39–50)
HGB BLD-MCNC: 13.2 G/DL — SIGNIFICANT CHANGE UP (ref 13–17)
IMM GRANULOCYTES NFR BLD AUTO: 0.4 % — SIGNIFICANT CHANGE UP (ref 0–1.5)
KETONES UR-MCNC: ABNORMAL
LACTATE BLDV-MCNC: 2.1 MMOL/L — HIGH (ref 0.5–2)
LACTATE SERPL-SCNC: 2.6 MMOL/L — HIGH (ref 0.5–2)
LACTATE SERPL-SCNC: 3.1 MMOL/L — HIGH (ref 0.5–2)
LEUKOCYTE ESTERASE UR-ACNC: ABNORMAL
LIDOCAIN IGE QN: 18 U/L — LOW (ref 22–51)
LYMPHOCYTES # BLD AUTO: 0.8 K/UL — LOW (ref 1–3.3)
LYMPHOCYTES # BLD AUTO: 7.6 % — LOW (ref 13–44)
MCHC RBC-ENTMCNC: 32.2 PG — SIGNIFICANT CHANGE UP (ref 27–34)
MCHC RBC-ENTMCNC: 34.2 GM/DL — SIGNIFICANT CHANGE UP (ref 32–36)
MCV RBC AUTO: 94.1 FL — SIGNIFICANT CHANGE UP (ref 80–100)
MONOCYTES # BLD AUTO: 0.69 K/UL — SIGNIFICANT CHANGE UP (ref 0–0.9)
MONOCYTES NFR BLD AUTO: 6.6 % — SIGNIFICANT CHANGE UP (ref 2–14)
NEUTROPHILS # BLD AUTO: 8.93 K/UL — HIGH (ref 1.8–7.4)
NEUTROPHILS NFR BLD AUTO: 84.7 % — HIGH (ref 43–77)
NITRITE UR-MCNC: NEGATIVE — SIGNIFICANT CHANGE UP
PH UR: 5 — SIGNIFICANT CHANGE UP (ref 5–8)
PLATELET # BLD AUTO: 240 K/UL — SIGNIFICANT CHANGE UP (ref 150–400)
POTASSIUM SERPL-MCNC: 3.6 MMOL/L — SIGNIFICANT CHANGE UP (ref 3.5–5.3)
POTASSIUM SERPL-SCNC: 3.6 MMOL/L — SIGNIFICANT CHANGE UP (ref 3.5–5.3)
PROT SERPL-MCNC: 8 G/DL — SIGNIFICANT CHANGE UP (ref 6.6–8.7)
PROT UR-MCNC: 30 MG/DL
RAPID RVP RESULT: SIGNIFICANT CHANGE UP
RBC # BLD: 4.1 M/UL — LOW (ref 4.2–5.8)
RBC # FLD: 13.9 % — SIGNIFICANT CHANGE UP (ref 10.3–14.5)
RBC CASTS # UR COMP ASSIST: SIGNIFICANT CHANGE UP /HPF (ref 0–4)
SARS-COV-2 RNA SPEC QL NAA+PROBE: SIGNIFICANT CHANGE UP
SODIUM SERPL-SCNC: 135 MMOL/L — SIGNIFICANT CHANGE UP (ref 135–145)
SP GR SPEC: 1.02 — SIGNIFICANT CHANGE UP (ref 1.01–1.02)
TROPONIN T SERPL-MCNC: <0.01 NG/ML — SIGNIFICANT CHANGE UP (ref 0–0.06)
TROPONIN T SERPL-MCNC: <0.01 NG/ML — SIGNIFICANT CHANGE UP (ref 0–0.06)
UROBILINOGEN FLD QL: NEGATIVE MG/DL — SIGNIFICANT CHANGE UP
WBC # BLD: 10.53 K/UL — HIGH (ref 3.8–10.5)
WBC # FLD AUTO: 10.53 K/UL — HIGH (ref 3.8–10.5)
WBC UR QL: ABNORMAL /HPF (ref 0–5)

## 2022-07-04 PROCEDURE — 99221 1ST HOSP IP/OBS SF/LOW 40: CPT

## 2022-07-04 PROCEDURE — 99284 EMERGENCY DEPT VISIT MOD MDM: CPT

## 2022-07-04 PROCEDURE — 74177 CT ABD & PELVIS W/CONTRAST: CPT | Mod: 26,MA

## 2022-07-04 PROCEDURE — 99223 1ST HOSP IP/OBS HIGH 75: CPT

## 2022-07-04 PROCEDURE — 93010 ELECTROCARDIOGRAM REPORT: CPT

## 2022-07-04 RX ORDER — SODIUM CHLORIDE 9 MG/ML
500 INJECTION INTRAMUSCULAR; INTRAVENOUS; SUBCUTANEOUS ONCE
Refills: 0 | Status: COMPLETED | OUTPATIENT
Start: 2022-07-04 | End: 2022-07-04

## 2022-07-04 RX ORDER — AMLODIPINE BESYLATE 2.5 MG/1
5 TABLET ORAL ONCE
Refills: 0 | Status: COMPLETED | OUTPATIENT
Start: 2022-07-04 | End: 2022-07-04

## 2022-07-04 RX ORDER — SODIUM CHLORIDE 9 MG/ML
1000 INJECTION, SOLUTION INTRAVENOUS
Refills: 0 | Status: DISCONTINUED | OUTPATIENT
Start: 2022-07-04 | End: 2022-07-05

## 2022-07-04 RX ORDER — SENNA PLUS 8.6 MG/1
2 TABLET ORAL AT BEDTIME
Refills: 0 | Status: DISCONTINUED | OUTPATIENT
Start: 2022-07-04 | End: 2022-07-08

## 2022-07-04 RX ORDER — METOPROLOL TARTRATE 50 MG
100 TABLET ORAL DAILY
Refills: 0 | Status: DISCONTINUED | OUTPATIENT
Start: 2022-07-04 | End: 2022-07-04

## 2022-07-04 RX ORDER — SIMVASTATIN 20 MG/1
40 TABLET, FILM COATED ORAL AT BEDTIME
Refills: 0 | Status: DISCONTINUED | OUTPATIENT
Start: 2022-07-04 | End: 2022-07-05

## 2022-07-04 RX ORDER — ONDANSETRON 8 MG/1
4 TABLET, FILM COATED ORAL EVERY 6 HOURS
Refills: 0 | Status: DISCONTINUED | OUTPATIENT
Start: 2022-07-04 | End: 2022-07-08

## 2022-07-04 RX ORDER — NITROGLYCERIN 6.5 MG
0.25 CAPSULE, EXTENDED RELEASE ORAL ONCE
Refills: 0 | Status: COMPLETED | OUTPATIENT
Start: 2022-07-04 | End: 2022-07-04

## 2022-07-04 RX ORDER — AMLODIPINE BESYLATE 2.5 MG/1
10 TABLET ORAL DAILY
Refills: 0 | Status: DISCONTINUED | OUTPATIENT
Start: 2022-07-05 | End: 2022-07-08

## 2022-07-04 RX ORDER — PANTOPRAZOLE SODIUM 20 MG/1
40 TABLET, DELAYED RELEASE ORAL DAILY
Refills: 0 | Status: DISCONTINUED | OUTPATIENT
Start: 2022-07-04 | End: 2022-07-08

## 2022-07-04 RX ORDER — FERROUS SULFATE 325(65) MG
325 TABLET ORAL
Refills: 0 | Status: DISCONTINUED | OUTPATIENT
Start: 2022-07-04 | End: 2022-07-05

## 2022-07-04 RX ORDER — SODIUM BICARBONATE 1 MEQ/ML
1300 SYRINGE (ML) INTRAVENOUS DAILY
Refills: 0 | Status: DISCONTINUED | OUTPATIENT
Start: 2022-07-04 | End: 2022-07-05

## 2022-07-04 RX ORDER — HYDROMORPHONE HYDROCHLORIDE 2 MG/ML
0.5 INJECTION INTRAMUSCULAR; INTRAVENOUS; SUBCUTANEOUS ONCE
Refills: 0 | Status: DISCONTINUED | OUTPATIENT
Start: 2022-07-04 | End: 2022-07-04

## 2022-07-04 RX ORDER — PSYLLIUM SEED (WITH DEXTROSE)
1 POWDER (GRAM) ORAL DAILY
Refills: 0 | Status: DISCONTINUED | OUTPATIENT
Start: 2022-07-04 | End: 2022-07-05

## 2022-07-04 RX ORDER — LABETALOL HCL 100 MG
5 TABLET ORAL ONCE
Refills: 0 | Status: COMPLETED | OUTPATIENT
Start: 2022-07-04 | End: 2022-07-04

## 2022-07-04 RX ORDER — CLOPIDOGREL BISULFATE 75 MG/1
75 TABLET, FILM COATED ORAL ONCE
Refills: 0 | Status: COMPLETED | OUTPATIENT
Start: 2022-07-04 | End: 2022-07-04

## 2022-07-04 RX ORDER — LACTULOSE 10 G/15ML
20 SOLUTION ORAL ONCE
Refills: 0 | Status: COMPLETED | OUTPATIENT
Start: 2022-07-04 | End: 2022-07-04

## 2022-07-04 RX ORDER — HYDROMORPHONE HYDROCHLORIDE 2 MG/ML
1 INJECTION INTRAMUSCULAR; INTRAVENOUS; SUBCUTANEOUS ONCE
Refills: 0 | Status: DISCONTINUED | OUTPATIENT
Start: 2022-07-04 | End: 2022-07-04

## 2022-07-04 RX ORDER — KETOROLAC TROMETHAMINE 30 MG/ML
15 SYRINGE (ML) INJECTION EVERY 6 HOURS
Refills: 0 | Status: DISCONTINUED | OUTPATIENT
Start: 2022-07-04 | End: 2022-07-08

## 2022-07-04 RX ORDER — ACETAMINOPHEN 500 MG
650 TABLET ORAL EVERY 6 HOURS
Refills: 0 | Status: DISCONTINUED | OUTPATIENT
Start: 2022-07-04 | End: 2022-07-05

## 2022-07-04 RX ORDER — ONDANSETRON 8 MG/1
4 TABLET, FILM COATED ORAL ONCE
Refills: 0 | Status: COMPLETED | OUTPATIENT
Start: 2022-07-04 | End: 2022-07-04

## 2022-07-04 RX ORDER — AMLODIPINE BESYLATE 2.5 MG/1
5 TABLET ORAL DAILY
Refills: 0 | Status: DISCONTINUED | OUTPATIENT
Start: 2022-07-04 | End: 2022-07-04

## 2022-07-04 RX ORDER — HYDRALAZINE HCL 50 MG
10 TABLET ORAL ONCE
Refills: 0 | Status: COMPLETED | OUTPATIENT
Start: 2022-07-04 | End: 2022-07-04

## 2022-07-04 RX ORDER — LABETALOL HCL 100 MG
10 TABLET ORAL ONCE
Refills: 0 | Status: COMPLETED | OUTPATIENT
Start: 2022-07-04 | End: 2022-07-04

## 2022-07-04 RX ORDER — HYDROMORPHONE HYDROCHLORIDE 2 MG/ML
2 INJECTION INTRAMUSCULAR; INTRAVENOUS; SUBCUTANEOUS ONCE
Refills: 0 | Status: DISCONTINUED | OUTPATIENT
Start: 2022-07-04 | End: 2022-07-04

## 2022-07-04 RX ORDER — HYDRALAZINE HCL 50 MG
10 TABLET ORAL EVERY 4 HOURS
Refills: 0 | Status: DISCONTINUED | OUTPATIENT
Start: 2022-07-04 | End: 2022-07-08

## 2022-07-04 RX ORDER — POLYETHYLENE GLYCOL 3350 17 G/17G
17 POWDER, FOR SOLUTION ORAL EVERY 12 HOURS
Refills: 0 | Status: DISCONTINUED | OUTPATIENT
Start: 2022-07-04 | End: 2022-07-08

## 2022-07-04 RX ORDER — POLYETHYLENE GLYCOL 3350 17 G/17G
17 POWDER, FOR SOLUTION ORAL ONCE
Refills: 0 | Status: COMPLETED | OUTPATIENT
Start: 2022-07-04 | End: 2022-07-04

## 2022-07-04 RX ORDER — SODIUM CHLORIDE 9 MG/ML
1000 INJECTION INTRAMUSCULAR; INTRAVENOUS; SUBCUTANEOUS ONCE
Refills: 0 | Status: COMPLETED | OUTPATIENT
Start: 2022-07-04 | End: 2022-07-04

## 2022-07-04 RX ORDER — METOPROLOL TARTRATE 50 MG
100 TABLET ORAL
Refills: 0 | Status: DISCONTINUED | OUTPATIENT
Start: 2022-07-04 | End: 2022-07-08

## 2022-07-04 RX ORDER — KETOROLAC TROMETHAMINE 30 MG/ML
30 SYRINGE (ML) INJECTION EVERY 6 HOURS
Refills: 0 | Status: DISCONTINUED | OUTPATIENT
Start: 2022-07-04 | End: 2022-07-08

## 2022-07-04 RX ORDER — FAMOTIDINE 10 MG/ML
20 INJECTION INTRAVENOUS ONCE
Refills: 0 | Status: COMPLETED | OUTPATIENT
Start: 2022-07-04 | End: 2022-07-04

## 2022-07-04 RX ORDER — ACETAMINOPHEN 500 MG
1000 TABLET ORAL ONCE
Refills: 0 | Status: COMPLETED | OUTPATIENT
Start: 2022-07-04 | End: 2022-07-04

## 2022-07-04 RX ORDER — MORPHINE SULFATE 50 MG/1
4 CAPSULE, EXTENDED RELEASE ORAL ONCE
Refills: 0 | Status: DISCONTINUED | OUTPATIENT
Start: 2022-07-04 | End: 2022-07-04

## 2022-07-04 RX ORDER — CLOPIDOGREL BISULFATE 75 MG/1
75 TABLET, FILM COATED ORAL DAILY
Refills: 0 | Status: DISCONTINUED | OUTPATIENT
Start: 2022-07-05 | End: 2022-07-08

## 2022-07-04 RX ORDER — KETOROLAC TROMETHAMINE 30 MG/ML
15 SYRINGE (ML) INJECTION ONCE
Refills: 0 | Status: DISCONTINUED | OUTPATIENT
Start: 2022-07-04 | End: 2022-07-04

## 2022-07-04 RX ORDER — ENOXAPARIN SODIUM 100 MG/ML
40 INJECTION SUBCUTANEOUS EVERY 24 HOURS
Refills: 0 | Status: DISCONTINUED | OUTPATIENT
Start: 2022-07-04 | End: 2022-07-08

## 2022-07-04 RX ORDER — RADIOPAQUE PVC MARKERS/BARIUM 24MARKERS
400 CAPSULE ORAL ONCE
Refills: 0 | Status: COMPLETED | OUTPATIENT
Start: 2022-07-04 | End: 2022-07-04

## 2022-07-04 RX ORDER — CHOLECALCIFEROL (VITAMIN D3) 125 MCG
1000 CAPSULE ORAL DAILY
Refills: 0 | Status: DISCONTINUED | OUTPATIENT
Start: 2022-07-04 | End: 2022-07-08

## 2022-07-04 RX ADMIN — SENNA PLUS 2 TABLET(S): 8.6 TABLET ORAL at 21:02

## 2022-07-04 RX ADMIN — AMLODIPINE BESYLATE 5 MILLIGRAM(S): 2.5 TABLET ORAL at 09:13

## 2022-07-04 RX ADMIN — LACTULOSE 20 GRAM(S): 10 SOLUTION ORAL at 15:44

## 2022-07-04 RX ADMIN — Medication 400 MILLIGRAM(S): at 12:35

## 2022-07-04 RX ADMIN — Medication 10 MILLIGRAM(S): at 14:08

## 2022-07-04 RX ADMIN — Medication 100 MILLIGRAM(S): at 18:02

## 2022-07-04 RX ADMIN — Medication 15 MILLIGRAM(S): at 08:44

## 2022-07-04 RX ADMIN — SODIUM CHLORIDE 500 MILLILITER(S): 9 INJECTION INTRAMUSCULAR; INTRAVENOUS; SUBCUTANEOUS at 07:25

## 2022-07-04 RX ADMIN — HYDROMORPHONE HYDROCHLORIDE 2 MILLIGRAM(S): 2 INJECTION INTRAMUSCULAR; INTRAVENOUS; SUBCUTANEOUS at 17:40

## 2022-07-04 RX ADMIN — HYDROMORPHONE HYDROCHLORIDE 0.5 MILLIGRAM(S): 2 INJECTION INTRAMUSCULAR; INTRAVENOUS; SUBCUTANEOUS at 05:51

## 2022-07-04 RX ADMIN — Medication 100 MILLIGRAM(S): at 09:12

## 2022-07-04 RX ADMIN — HYDROMORPHONE HYDROCHLORIDE 1 MILLIGRAM(S): 2 INJECTION INTRAMUSCULAR; INTRAVENOUS; SUBCUTANEOUS at 07:45

## 2022-07-04 RX ADMIN — SIMVASTATIN 40 MILLIGRAM(S): 20 TABLET, FILM COATED ORAL at 21:02

## 2022-07-04 RX ADMIN — Medication 10 MILLIGRAM(S): at 10:50

## 2022-07-04 RX ADMIN — HYDROMORPHONE HYDROCHLORIDE 0.5 MILLIGRAM(S): 2 INJECTION INTRAMUSCULAR; INTRAVENOUS; SUBCUTANEOUS at 10:49

## 2022-07-04 RX ADMIN — ONDANSETRON 4 MILLIGRAM(S): 8 TABLET, FILM COATED ORAL at 04:17

## 2022-07-04 RX ADMIN — SODIUM CHLORIDE 500 MILLILITER(S): 9 INJECTION INTRAMUSCULAR; INTRAVENOUS; SUBCUTANEOUS at 05:51

## 2022-07-04 RX ADMIN — MORPHINE SULFATE 4 MILLIGRAM(S): 50 CAPSULE, EXTENDED RELEASE ORAL at 05:46

## 2022-07-04 RX ADMIN — Medication 5 MILLIGRAM(S): at 23:52

## 2022-07-04 RX ADMIN — Medication 10 MILLIGRAM(S): at 20:26

## 2022-07-04 RX ADMIN — SODIUM CHLORIDE 500 MILLILITER(S): 9 INJECTION INTRAMUSCULAR; INTRAVENOUS; SUBCUTANEOUS at 04:16

## 2022-07-04 RX ADMIN — POLYETHYLENE GLYCOL 3350 17 GRAM(S): 17 POWDER, FOR SOLUTION ORAL at 09:48

## 2022-07-04 RX ADMIN — ONDANSETRON 4 MILLIGRAM(S): 8 TABLET, FILM COATED ORAL at 07:38

## 2022-07-04 RX ADMIN — AMLODIPINE BESYLATE 5 MILLIGRAM(S): 2.5 TABLET ORAL at 18:01

## 2022-07-04 RX ADMIN — SODIUM CHLORIDE 125 MILLILITER(S): 9 INJECTION, SOLUTION INTRAVENOUS at 17:25

## 2022-07-04 RX ADMIN — HYDROMORPHONE HYDROCHLORIDE 1 MILLIGRAM(S): 2 INJECTION INTRAMUSCULAR; INTRAVENOUS; SUBCUTANEOUS at 07:38

## 2022-07-04 RX ADMIN — Medication 10 MILLIGRAM(S): at 21:25

## 2022-07-04 RX ADMIN — CLOPIDOGREL BISULFATE 75 MILLIGRAM(S): 75 TABLET, FILM COATED ORAL at 09:13

## 2022-07-04 RX ADMIN — SODIUM CHLORIDE 500 MILLILITER(S): 9 INJECTION INTRAMUSCULAR; INTRAVENOUS; SUBCUTANEOUS at 05:46

## 2022-07-04 RX ADMIN — MORPHINE SULFATE 4 MILLIGRAM(S): 50 CAPSULE, EXTENDED RELEASE ORAL at 04:17

## 2022-07-04 RX ADMIN — Medication 400 MILLILITER(S): at 04:16

## 2022-07-04 RX ADMIN — POLYETHYLENE GLYCOL 3350 17 GRAM(S): 17 POWDER, FOR SOLUTION ORAL at 17:25

## 2022-07-04 RX ADMIN — FAMOTIDINE 20 MILLIGRAM(S): 10 INJECTION INTRAVENOUS at 04:16

## 2022-07-04 RX ADMIN — HYDROMORPHONE HYDROCHLORIDE 0.5 MILLIGRAM(S): 2 INJECTION INTRAMUSCULAR; INTRAVENOUS; SUBCUTANEOUS at 11:10

## 2022-07-04 RX ADMIN — Medication 15 MILLIGRAM(S): at 09:00

## 2022-07-04 RX ADMIN — HYDROMORPHONE HYDROCHLORIDE 2 MILLIGRAM(S): 2 INJECTION INTRAMUSCULAR; INTRAVENOUS; SUBCUTANEOUS at 17:25

## 2022-07-04 RX ADMIN — SODIUM CHLORIDE 1000 MILLILITER(S): 9 INJECTION INTRAMUSCULAR; INTRAVENOUS; SUBCUTANEOUS at 08:44

## 2022-07-04 RX ADMIN — Medication 1000 MILLIGRAM(S): at 13:05

## 2022-07-04 RX ADMIN — Medication 325 MILLIGRAM(S): at 17:25

## 2022-07-04 RX ADMIN — SODIUM CHLORIDE 1000 MILLILITER(S): 9 INJECTION INTRAMUSCULAR; INTRAVENOUS; SUBCUTANEOUS at 09:40

## 2022-07-04 RX ADMIN — Medication 10 MILLIGRAM(S): at 17:26

## 2022-07-04 RX ADMIN — SODIUM CHLORIDE 125 MILLILITER(S): 9 INJECTION, SOLUTION INTRAVENOUS at 14:08

## 2022-07-04 RX ADMIN — Medication 10 MILLIGRAM(S): at 15:44

## 2022-07-04 RX ADMIN — Medication 30 MILLIGRAM(S): at 16:34

## 2022-07-04 NOTE — ED ADULT NURSE REASSESSMENT NOTE - NS ED NURSE REASSESS COMMENT FT1
Pt remains hypertensive s/p multiple IV medications and tachycardic, Dr Jean aware, will re-assess medications at 1800 if pt remains hypertensive as per MD, pt continues to c/o diffuse abd pain, will medicate as per orders, otherwise comfortable in stretcher. Pt remains hypertensive s/p multiple IV medications and tachycardic, Dr Jean aware, will re-assess medications at 1800 if pt remains hypertensive as per MD, pt continues to c/o diffuse abd pain, will medicate as per orders, otherwise comfortable in stretcher, report given to receiving CHAPITO John on [address], awaiting transport to floor.

## 2022-07-04 NOTE — PATIENT PROFILE ADULT - IS THERE A SUSPICION OF ABUSE/NEGLIGENCE?
Quality 110: Preventive Care And Screening: Influenza Immunization: Influenza immunization was not ordered or administered, reason not given
Quality 111:Pneumonia Vaccination Status For Older Adults: Pneumococcal Vaccination not Administered or Previously Received, Reason not Otherwise Specified
Detail Level: Detailed
no

## 2022-07-04 NOTE — H&P ADULT - ASSESSMENT
INCOMPLETE 61 year old male with history of Rectal Adenocarcinoma s/p Chemo + RT + Resection with Diverting Ileostomy had local recurrence underwent Abdominoperitoneal Resection in March 2022, CAD, HLD, HTN and CKD 3 comes with abdominal pain. As per patient, it started 2 days ago, mostly in midabdomen and associated with nausea and vomiting. There is no output in ostomy bag since yesterday so he came to the hospital.   In ER, CT showed increased soft tissue thickening of the rectal pouch may reflect pouchitis in the appropriate clinical setting. No bowel obstruction or free air. Seen by Colorectal Surgery and was recommended bowel regimen and fluid resuscitation. Also noted to have Hypertensive Urgency, Metoprolol and Amlodipine were resumed and he was given a dose of Hydralazine 10 mg IVP. He has been given multiple rounds of pain medications, pain is slightly better now.      1) Abdominal Pain  - History of recurrent rectal adenocarcinoma   - Avoid opioids due to constipation  - Tylenol and Toradol PRN  - Lactulose 20 gm x 1  - Miralax BID  - Senna at bedtime   - CLD, advance as tolerated   - Colorectal Surgery Consult noted     2) Lactic Acidosis   - Likely due to dehydration  - Improving with IVF    3) Hypertensive Urgency  - Continue Metoprolol  mg BID  - Increase Amlodipine to 10 mg daily   - Will add Hydralazine 25 mg TID if BP remains elevated   - Hydralazine 10 mg IVP q 4 hours PRN for SBP > 160    4) CAD / HLD  - Continue Plavix, Simvastatin and Metoprolol    5) CKD 3  - Stable   - Monitor renal function while on Toradol  - Continue Sodium Bicarb    DVT Prophylaxis -- Lovenox SQ    Dispo: Home in 2-3 days.    Updated patient's wife at bedside.

## 2022-07-04 NOTE — ED PROVIDER NOTE - CLINICAL SUMMARY MEDICAL DECISION MAKING FREE TEXT BOX
61 year old male hx of rectal adenocarcinoma 2019 s/p resection with creation of ileostomy 2/2020   s/p chemo and XRT; Found to have local recurrence during sigmoidoscopy in preparation for reversal of ileostomy  presents to ED and c.o 1 day of not emptying the fecal on her colostomy bag  and on epigastric/ parumbilical since 8 pm last night   R.o Sbo  pain control, cbc , cmp , lipase and lactate , ns 500cc , pepcid and Zofran and morphine

## 2022-07-04 NOTE — H&P ADULT - NSHPPHYSICALEXAM_GEN_ALL_CORE
Vital Signs   T(C): 37.2 (04 Jul 2022 12:35), Max: 37.2 (04 Jul 2022 12:35)  T(F): 98.9 (04 Jul 2022 12:35), Max: 98.9 (04 Jul 2022 12:35)  HR: 121 (04 Jul 2022 12:35) (87 - 121)  BP: 218/99 (04 Jul 2022 12:35) (183/82 - 234/111)  RR: 20 (04 Jul 2022 12:35) (16 - 20)  SpO2: 96% (04 Jul 2022 12:35) (96% - 100%)  General: Elderly male sitting in bed comfortably. No acute distress  HEENT: PERRLA. EOMI. Clear conjunctivae. Moist mucus membrane  Neck: Supple.   Chest: CTA bilaterally - no wheezing, rales or rhonchi. No chest wall tenderness.  Heart: Normal S1 & S2 with RRR.   Abdomen: Distended. Soft. Non-tender. BS sluggish. Old healed scars. Ostomy in LLQ.   Ext: Trace pedal edema (L>R). No calf tenderness   Neuro: AAO x 3. No focal deficit. No speech disorder.  Skin: Warm and Dry  Psychiatry: Normal mood and affect Vital Signs   T(C): 37.2 (04 Jul 2022 12:35), Max: 37.2 (04 Jul 2022 12:35)  T(F): 98.9 (04 Jul 2022 12:35), Max: 98.9 (04 Jul 2022 12:35)  HR: 121 (04 Jul 2022 12:35) (87 - 121)  BP: 218/99 (04 Jul 2022 12:35) (183/82 - 234/111)  RR: 20 (04 Jul 2022 12:35) (16 - 20)  SpO2: 96% (04 Jul 2022 12:35) (96% - 100%)  General: Elderly male sitting in bed comfortably. No acute distress  HEENT: PERRLA. EOMI. Clear conjunctivae. Moist mucus membrane  Neck: Supple.   Chest: CTA bilaterally - no wheezing, rales or rhonchi. No chest wall tenderness.  Heart: Normal S1 & S2 with RRR.   Abdomen: Distended. Soft. Non tender but discomfort on deep palpation. BS sluggish. Old healed scars. Ostomy in LLQ.   Ext: Trace pedal edema (L>R). No calf tenderness   Neuro: AAO x 3. No focal deficit. No speech disorder.  Skin: Warm and Dry  Psychiatry: Normal mood and affect

## 2022-07-04 NOTE — ED PROVIDER NOTE - NS ED ATTENDING STATEMENT MOD
This was a shared visit with the BELA. I reviewed and verified the documentation and independently performed the documented:

## 2022-07-04 NOTE — ED PROVIDER NOTE - ATTENDING APP SHARED VISIT CONTRIBUTION OF CARE
61 year old male hx of rectal adenocarcinoma 2019 s/p resection with creation of ileostomy 2/2020   s/p chemo and XRT presents with no output in colostomy and abd pain   labs, ctap eval for obstruction

## 2022-07-04 NOTE — ED ADULT NURSE REASSESSMENT NOTE - NS ED NURSE REASSESS COMMENT FT1
Assuming care from previous RN Yaya, pt AOx4, denies SOB, resp even and unlabored, skin warm and dry, color good, c/o pain to abdomen, medicated as per orders, patent 20G IV in right AC, repeat blood work sent to lab, ACS @ bedside for evaluation, wife at bedside, pt aware of plan of care, will continue to monitor.

## 2022-07-04 NOTE — CONSULT NOTE ADULT - ATTENDING COMMENTS
61yoM who presents with a chief complaint of abdominal pain particularly after eating for the last 2-3 days.  Pain is alleviated when his ostomy is productive but he has not had output for the last day.  He presented to the ED this morning.  Tolerating PO poorly; endorses N/V.  His past history is significant for rectal cancer s/p prior LAR with diverting loop ileostomy; however he was found to have a local recurrence and in 3/2022 he underwent an APR with Dr. Rodriguez.    Workup thus far showed lactic acidosis (has improved with fluid resuscitation).  CT showed no obstruction, abscess, or free intraperitoneal air.  The CT report does describe "Increased soft tissue thickening of the rectal pouch may reflect pouchitis in the appropriate clinical setting. No bowel obstruction or free air" - however note that the patient has had an abdominoperineal resection so has no rectum or anal sphincter complex.  Likely these changes are all postoperative.    On exam his abdomen is soft but distended, no output in appliance.  Mildly tender throughout to deep palpation but no rebound or guarding.  Negative Michel's sign.  His perineal wound was examined and is clean, dry, and intact - no erythema or drainage.    -- No acute surgical process.  Please note that the patient does not have rectum or anal sphincter complex following his APR, and so the "pouchitis" noted on CT is more likely representative of postoperative changes.  -- No free air, obstruction, or abscess  -- Fluid resuscitation  -- Serial abdominal exams  -- OK for diet from CRS perspective - advance as tolerated  -- Bowel regimen - Miralax BID for now  -- Can give dulcolax suppository via stoma  -- Will continue to follow

## 2022-07-04 NOTE — ED ADULT NURSE NOTE - OBJECTIVE STATEMENT
Assumed care of pt at 0410 in . Pt A&Ox4 c/o abdominal pain possible caused by a blockage, pt states that he has a PMH of rectal cancer, pt states that he is not on chemo/radiation with resection and colostomy, pt states that he hasn't been getting drainage in his colostomy bag and is now in pain, pt states that he took nothing for the pain and vomited one time today, pt denies N/D/CP/SOB, pt resting comfortably showing no signs of respiratory distress, abdomen is firm/tender

## 2022-07-04 NOTE — H&P ADULT - NSHPLABSRESULTS_GEN_ALL_CORE
LABS:                        13.2   10.53 )-----------( 240      ( 04 Jul 2022 04:24 )             38.6     07-04    135  |  93<L>  |  17.8  ----------------------------<  155<H>  3.6   |  23.0  |  1.12    Ca    9.8      04 Jul 2022 04:24    TPro  8.0  /  Alb  4.6  /  TBili  0.6  /  DBili  x   /  AST  24  /  ALT  17  /  AlkPhos  74  07-04    CARDIAC MARKERS ( 04 Jul 2022 08:22 )  x     / <0.01 ng/mL / x     / x     / x        CT Abdomen and Pelvis w/ Oral Cont and w/ IV Cont (07.04.22 @ 06:34)   Increased soft tissue thickening of the rectal pouch may reflect pouchitis in the appropriate clinical setting. No bowel obstruction or free air.

## 2022-07-04 NOTE — ED PROVIDER NOTE - NSICDXPASTSURGICALHX_GEN_ALL_CORE_FT
PAST SURGICAL HISTORY:  H/O cardiac catheterization 2005 2017  4 STENTS    H/O sigmoidoscopy x2    History of CEA (carotid endarterectomy) right 2019    History of rectal surgery with ileostomy 2/2020    History of removal of Port-a-Cath

## 2022-07-04 NOTE — ED ADULT TRIAGE NOTE - CHIEF COMPLAINT QUOTE
Ambulatory reporting possible SBO. PMH rectal CA not on chemo/radiation with resection and colostomy. Has never had an SBO before however his daughter is an RN and states that she is concerned for that, no stool in his collection bag since yesterday and he vomited once this morning. Hypertensive in triage.

## 2022-07-04 NOTE — ED ADULT NURSE NOTE - PRIMARY CARE PROVIDER
History and Physical -  [facility]  Rudie Jeans 62 y o  female MRN: [id number]      HPI: Rudie Jeans is a 62y o  year old female who presents for the evaluation of gastroesophageal reflux disease      REVIEW OF SYSTEMS: Per the HPI, and otherwise unremarkable  Historical Information   Past Medical History:   Diagnosis Date    Arthritis     Asthma     Avascular necrosis of bone of hip, right (HCC)     COPD (chronic obstructive pulmonary disease) (HCC)     GERD (gastroesophageal reflux disease)     Hypoglycemia     Infectious viral hepatitis     Lumbar radicular pain     Lyme disease     Rheumatoid osteoperiostitis (HCC)      Past Surgical History:   Procedure Laterality Date    APPENDECTOMY      CARPAL TUNNEL RELEASE      CHOLECYSTECTOMY      SINUS SURGERY       Social History   Social History     Substance and Sexual Activity   Alcohol Use No     Social History     Substance and Sexual Activity   Drug Use Never     Social History     Tobacco Use   Smoking Status Former Smoker   Smokeless Tobacco Never Used     Family History   Problem Relation Age of Onset    No Known Problems Mother        Meds/Allergies       (Not in a hospital admission)    Allergies   Allergen Reactions    Aspirin Anaphylaxis    Iodine Anaphylaxis    Penicillins Anaphylaxis       Objective     Temperature (!) 96 9 °F (36 1 °C), temperature source Temporal       PHYSICAL EXAM    Gen: NAD  CV: RRR  CHEST: Clear  ABD: soft, NT/ND  EXT: no edema      ASSESSMENT/PLAN:  This is a 62y o  year old female here for esophagogastroduodenoscopy, and she is stable and optimized for her procedure  unknown

## 2022-07-04 NOTE — H&P ADULT - HISTORY OF PRESENT ILLNESS
INCOMPLETE 61 year old male with history of Rectal Adenocarcinoma s/p Chemo + RT + Resection with Diverting Ileostomy had local recurrence underwent Abdominoperitoneal Resection in March 2022, CAD, HLD, HTN and CKD 3 comes with abdominal pain. As per patient, it started 2 days ago, mostly in midabdomen and associated with nausea and vomiting. There is no output in ostomy bag since yesterday so he came to the hospital.   In ER, CT showed increased soft tissue thickening of the rectal pouch may reflect pouchitis in the appropriate clinical setting. No bowel obstruction or free air. Seen by Colorectal Surgery and was recommended bowel regimen and fluid resuscitation. Also noted to have Hypertensive Urgency, Metoprolol and Amlodipine were resumed and he was given a dose of Hydralazine 10 mg IVP. He has been given multiple rounds of pain medications, pain is slightly better now.

## 2022-07-04 NOTE — H&P ADULT - NSHPSOCIALHISTORY_GEN_ALL_CORE
Works in a [employer].  Smokes cigars occasionally during summer.  Drinks 1-2 glasses of wine with dinner daily. Denies dependance.   No illicit drug use.

## 2022-07-04 NOTE — ED PROVIDER NOTE - PROGRESS NOTE DETAILS
Ct W.o any bowel obstructions   consult surgery Ct W.o any bowel obstructions I have consult surgery  pt still c.o sever abd pain despite taking morphine and 0.5Mg hydromorphone  , despite he denies any chest pain , added EKG and trop pt continues to have abd pain untouched by pain medications. discussed with Dr. Tuan gallegos, will admits after BP control (likely 2/2 to pain). Jose Maria flores tylenol ordered. pt admitted to medicine service - terry flores surgery stating no surgical recommendations. BP elevated oral daily meds given and iv hydralazine - terry flores

## 2022-07-04 NOTE — ED PROVIDER NOTE - NSICDXPASTMEDICALHX_GEN_ALL_CORE_FT
PAST MEDICAL HISTORY:  CAD (coronary artery disease)     Chest pain     Depression with rectal cancer    H/O carotid stenosis     H/O renal calculi     HLD (hyperlipidemia)     HTN (hypertension)     DANO (iron deficiency anemia)     Mitral regurgitation     Neuropathy feet s/p chemo    Rectal cancer 2/2019 s/p chemo and radiation; recurrence of cancer 2022    Splenic artery aneurysm     Stented coronary artery circumflex 2005 LAD 2017 x4    Umbilical hernia

## 2022-07-04 NOTE — ED PROVIDER NOTE - PHYSICAL EXAMINATION
Const: AOX3 nontoxic appearing, no apparent respiratory or physical distress. Stable gait   HEENT: NC/AT. Moist mucous membranes.  Eyes: SUMAN. EOMI  Neck: Soft and supple. Full ROM without pain.  Cardiac: Regular rate and regular rhythm. +S1/S2.  No LE edema.  Resp: Speaking in full sentences. No evidence of respiratory distress.   Abd: Soft ,epigastric parumbilical TTP . mild distended abdomin with scan below umbilical noted. llq with colostomy bag noted.  BS x 4   Back: Spine midline and non-tender. No CVAT.  Skin: No rashes, abrasions or lacerations.  Neuro: Awake, alert & oriented x 3. Moves all extremities symmetrically.

## 2022-07-04 NOTE — CONSULT NOTE ADULT - ASSESSMENT
61y Male with PMHx of rectal adenocarcinoma 2019 (s/P resection and ileostomy creation 2020 followed by chemo and radiotherapy), then local recurrence of adenocarcinoma in sigmoid and rectum went for APR of rectum and sigmoid colon with ostomy creation (3/22) presented to ED of abdominal pain , regular ostomy output, tolerating PO, denies N/V. CT : Increased soft tissue thickening of the rectal pouch may reflect pouchitis in the appropriate clinical setting. No bowel obstruction or free air.    Plan:   Pain control   IV fluids   bowel regimen : Miralax TID   no surgical intervention required at this time   Follow up outpatient         61y Male with PMHx of rectal adenocarcinoma 2019 (s/P resection and ileostomy creation 2020 followed by chemo and radiotherapy), then local recurrence of adenocarcinoma in sigmoid and rectum went for APR of rectum and sigmoid colon with ostomy creation (3/22) presented to ED of abdominal pain , regular ostomy output, tolerating PO, denies N/V. CT : Increased soft tissue thickening of the rectal pouch may reflect pouchitis in the appropriate clinical setting. No bowel obstruction or free air.    Plan:   Pain control   IV fluids  Stool sampling for PCR    bowel regimen : Miralax TID   no surgical intervention required at this time   Follow up outpatient         61y Male with PMHx of rectal adenocarcinoma 2019 (s/P resection and ileostomy creation 2020 followed by chemo and radiotherapy), then local recurrence of adenocarcinoma in sigmoid and rectum went for APR of rectum and sigmoid colon with ostomy creation (3/22) presented to ED of abdominal pain , regular ostomy output, tolerating PO, denies N/V. CT : Increased soft tissue thickening of the rectal pouch may reflect pouchitis in the appropriate clinical setting. No bowel obstruction or free air.    Plan:   Pain control   IV fluids  Stool sampling for PCR    bowel regimen : Miralax TID   no surgical intervention required at this time         61y Male with PMHx of rectal adenocarcinoma 2019 (s/P resection and ileostomy creation 2020 followed by chemo and radiotherapy), then local recurrence of adenocarcinoma in sigmoid and rectum went for APR of rectum and sigmoid colon with ostomy creation (3/22) presented to ED of abdominal pain , regular ostomy output, tolerating PO, denies N/V. CT : Increased soft tissue thickening of the rectal pouch may reflect pouchitis in the appropriate clinical setting. No bowel obstruction or free air.    Plan:   Pain control   IV fluids  Advance diet as tolerated   Stool sampling for PCR    bowel regimen : Miralax BID   dulcolax suppository via stoma  monitor BF   SKYE  no surgical intervention required at this time

## 2022-07-04 NOTE — ED ADULT NURSE REASSESSMENT NOTE - NS ED NURSE REASSESS COMMENT FT1
Pt remains HTN and c/o abdominal pain, tolerated PO fluids well, medicated as per PA orders, will re-assess, pt and wife aware of plan of care and possible admission.

## 2022-07-05 DIAGNOSIS — R74.01 ELEVATION OF LEVELS OF LIVER TRANSAMINASE LEVELS: ICD-10-CM

## 2022-07-05 LAB
ALBUMIN SERPL ELPH-MCNC: 3.9 G/DL — SIGNIFICANT CHANGE UP (ref 3.3–5.2)
ALBUMIN SERPL ELPH-MCNC: 3.9 G/DL — SIGNIFICANT CHANGE UP (ref 3.3–5.2)
ALP SERPL-CCNC: 346 U/L — HIGH (ref 40–120)
ALP SERPL-CCNC: 352 U/L — HIGH (ref 40–120)
ALT FLD-CCNC: 523 U/L — HIGH
ALT FLD-CCNC: 532 U/L — HIGH
ANION GAP SERPL CALC-SCNC: 15 MMOL/L — SIGNIFICANT CHANGE UP (ref 5–17)
ANION GAP SERPL CALC-SCNC: 16 MMOL/L — SIGNIFICANT CHANGE UP (ref 5–17)
AST SERPL-CCNC: 585 U/L — HIGH
AST SERPL-CCNC: 641 U/L — HIGH
BASOPHILS # BLD AUTO: 0.02 K/UL — SIGNIFICANT CHANGE UP (ref 0–0.2)
BASOPHILS NFR BLD AUTO: 0.2 % — SIGNIFICANT CHANGE UP (ref 0–2)
BILIRUB SERPL-MCNC: 3.5 MG/DL — HIGH (ref 0.4–2)
BILIRUB SERPL-MCNC: 4 MG/DL — HIGH (ref 0.4–2)
BUN SERPL-MCNC: 10.3 MG/DL — SIGNIFICANT CHANGE UP (ref 8–20)
BUN SERPL-MCNC: 9.9 MG/DL — SIGNIFICANT CHANGE UP (ref 8–20)
CALCIUM SERPL-MCNC: 8.6 MG/DL — SIGNIFICANT CHANGE UP (ref 8.6–10.2)
CALCIUM SERPL-MCNC: 9 MG/DL — SIGNIFICANT CHANGE UP (ref 8.6–10.2)
CHLORIDE SERPL-SCNC: 87 MMOL/L — LOW (ref 98–107)
CHLORIDE SERPL-SCNC: 91 MMOL/L — LOW (ref 98–107)
CO2 SERPL-SCNC: 25 MMOL/L — SIGNIFICANT CHANGE UP (ref 22–29)
CO2 SERPL-SCNC: 26 MMOL/L — SIGNIFICANT CHANGE UP (ref 22–29)
CREAT SERPL-MCNC: 0.51 MG/DL — SIGNIFICANT CHANGE UP (ref 0.5–1.3)
CREAT SERPL-MCNC: 0.63 MG/DL — SIGNIFICANT CHANGE UP (ref 0.5–1.3)
CULTURE RESULTS: NO GROWTH — SIGNIFICANT CHANGE UP
EGFR: 107 ML/MIN/1.73M2 — SIGNIFICANT CHANGE UP
EGFR: 114 ML/MIN/1.73M2 — SIGNIFICANT CHANGE UP
EOSINOPHIL # BLD AUTO: 0.01 K/UL — SIGNIFICANT CHANGE UP (ref 0–0.5)
EOSINOPHIL NFR BLD AUTO: 0.1 % — SIGNIFICANT CHANGE UP (ref 0–6)
GLUCOSE SERPL-MCNC: 153 MG/DL — HIGH (ref 70–99)
GLUCOSE SERPL-MCNC: 154 MG/DL — HIGH (ref 70–99)
HCT VFR BLD CALC: 36.3 % — LOW (ref 39–50)
HCV AB S/CO SERPL IA: 0.09 S/CO — SIGNIFICANT CHANGE UP (ref 0–0.99)
HCV AB SERPL-IMP: SIGNIFICANT CHANGE UP
HGB BLD-MCNC: 12.6 G/DL — LOW (ref 13–17)
IMM GRANULOCYTES NFR BLD AUTO: 0.9 % — SIGNIFICANT CHANGE UP (ref 0–1.5)
INR BLD: 1.25 RATIO — HIGH (ref 0.88–1.16)
LACTATE BLDV-MCNC: 1.9 MMOL/L — SIGNIFICANT CHANGE UP (ref 0.5–2)
LYMPHOCYTES # BLD AUTO: 0.55 K/UL — LOW (ref 1–3.3)
LYMPHOCYTES # BLD AUTO: 4.2 % — LOW (ref 13–44)
MAGNESIUM SERPL-MCNC: 0.8 MG/DL — CRITICAL LOW (ref 1.6–2.6)
MCHC RBC-ENTMCNC: 32.6 PG — SIGNIFICANT CHANGE UP (ref 27–34)
MCHC RBC-ENTMCNC: 34.7 GM/DL — SIGNIFICANT CHANGE UP (ref 32–36)
MCV RBC AUTO: 93.8 FL — SIGNIFICANT CHANGE UP (ref 80–100)
MONOCYTES # BLD AUTO: 0.86 K/UL — SIGNIFICANT CHANGE UP (ref 0–0.9)
MONOCYTES NFR BLD AUTO: 6.6 % — SIGNIFICANT CHANGE UP (ref 2–14)
NEUTROPHILS # BLD AUTO: 11.5 K/UL — HIGH (ref 1.8–7.4)
NEUTROPHILS NFR BLD AUTO: 88 % — HIGH (ref 43–77)
PLATELET # BLD AUTO: 221 K/UL — SIGNIFICANT CHANGE UP (ref 150–400)
POTASSIUM SERPL-MCNC: 3.2 MMOL/L — LOW (ref 3.5–5.3)
POTASSIUM SERPL-MCNC: 3.3 MMOL/L — LOW (ref 3.5–5.3)
POTASSIUM SERPL-SCNC: 3.2 MMOL/L — LOW (ref 3.5–5.3)
POTASSIUM SERPL-SCNC: 3.3 MMOL/L — LOW (ref 3.5–5.3)
PROT SERPL-MCNC: 7.2 G/DL — SIGNIFICANT CHANGE UP (ref 6.6–8.7)
PROT SERPL-MCNC: 7.4 G/DL — SIGNIFICANT CHANGE UP (ref 6.6–8.7)
PROTHROM AB SERPL-ACNC: 14.5 SEC — HIGH (ref 10.5–13.4)
RBC # BLD: 3.87 M/UL — LOW (ref 4.2–5.8)
RBC # FLD: 14.4 % — SIGNIFICANT CHANGE UP (ref 10.3–14.5)
SODIUM SERPL-SCNC: 129 MMOL/L — LOW (ref 135–145)
SODIUM SERPL-SCNC: 131 MMOL/L — LOW (ref 135–145)
SPECIMEN SOURCE: SIGNIFICANT CHANGE UP
WBC # BLD: 13.06 K/UL — HIGH (ref 3.8–10.5)
WBC # FLD AUTO: 13.06 K/UL — HIGH (ref 3.8–10.5)

## 2022-07-05 PROCEDURE — 99233 SBSQ HOSP IP/OBS HIGH 50: CPT

## 2022-07-05 PROCEDURE — 99222 1ST HOSP IP/OBS MODERATE 55: CPT

## 2022-07-05 PROCEDURE — 76705 ECHO EXAM OF ABDOMEN: CPT | Mod: 26

## 2022-07-05 PROCEDURE — 74019 RADEX ABDOMEN 2 VIEWS: CPT | Mod: 26

## 2022-07-05 RX ORDER — DEXTROSE MONOHYDRATE, SODIUM CHLORIDE, AND POTASSIUM CHLORIDE 50; .745; 4.5 G/1000ML; G/1000ML; G/1000ML
1000 INJECTION, SOLUTION INTRAVENOUS
Refills: 0 | Status: DISCONTINUED | OUTPATIENT
Start: 2022-07-05 | End: 2022-07-07

## 2022-07-05 RX ORDER — SIMETHICONE 80 MG/1
80 TABLET, CHEWABLE ORAL THREE TIMES A DAY
Refills: 0 | Status: DISCONTINUED | OUTPATIENT
Start: 2022-07-05 | End: 2022-07-08

## 2022-07-05 RX ORDER — LABETALOL HCL 100 MG
5 TABLET ORAL ONCE
Refills: 0 | Status: COMPLETED | OUTPATIENT
Start: 2022-07-05 | End: 2022-07-05

## 2022-07-05 RX ORDER — POTASSIUM CHLORIDE 20 MEQ
40 PACKET (EA) ORAL EVERY 4 HOURS
Refills: 0 | Status: COMPLETED | OUTPATIENT
Start: 2022-07-05 | End: 2022-07-05

## 2022-07-05 RX ORDER — MAGNESIUM SULFATE 500 MG/ML
2 VIAL (ML) INJECTION EVERY 4 HOURS
Refills: 0 | Status: COMPLETED | OUTPATIENT
Start: 2022-07-05 | End: 2022-07-05

## 2022-07-05 RX ADMIN — Medication 40 MILLIEQUIVALENT(S): at 12:19

## 2022-07-05 RX ADMIN — Medication 40 MILLIEQUIVALENT(S): at 09:11

## 2022-07-05 RX ADMIN — SODIUM CHLORIDE 125 MILLILITER(S): 9 INJECTION, SOLUTION INTRAVENOUS at 09:11

## 2022-07-05 RX ADMIN — PANTOPRAZOLE SODIUM 40 MILLIGRAM(S): 20 TABLET, DELAYED RELEASE ORAL at 12:20

## 2022-07-05 RX ADMIN — ENOXAPARIN SODIUM 40 MILLIGRAM(S): 100 INJECTION SUBCUTANEOUS at 05:09

## 2022-07-05 RX ADMIN — SENNA PLUS 2 TABLET(S): 8.6 TABLET ORAL at 21:11

## 2022-07-05 RX ADMIN — Medication 25 GRAM(S): at 09:11

## 2022-07-05 RX ADMIN — CLOPIDOGREL BISULFATE 75 MILLIGRAM(S): 75 TABLET, FILM COATED ORAL at 12:19

## 2022-07-05 RX ADMIN — Medication 25 GRAM(S): at 12:19

## 2022-07-05 RX ADMIN — SIMETHICONE 80 MILLIGRAM(S): 80 TABLET, CHEWABLE ORAL at 14:58

## 2022-07-05 RX ADMIN — DEXTROSE MONOHYDRATE, SODIUM CHLORIDE, AND POTASSIUM CHLORIDE 83 MILLILITER(S): 50; .745; 4.5 INJECTION, SOLUTION INTRAVENOUS at 14:58

## 2022-07-05 RX ADMIN — POLYETHYLENE GLYCOL 3350 17 GRAM(S): 17 POWDER, FOR SOLUTION ORAL at 05:09

## 2022-07-05 RX ADMIN — POLYETHYLENE GLYCOL 3350 17 GRAM(S): 17 POWDER, FOR SOLUTION ORAL at 18:17

## 2022-07-05 RX ADMIN — Medication 10 MILLIGRAM(S): at 14:58

## 2022-07-05 RX ADMIN — AMLODIPINE BESYLATE 10 MILLIGRAM(S): 2.5 TABLET ORAL at 05:08

## 2022-07-05 RX ADMIN — DEXTROSE MONOHYDRATE, SODIUM CHLORIDE, AND POTASSIUM CHLORIDE 83 MILLILITER(S): 50; .745; 4.5 INJECTION, SOLUTION INTRAVENOUS at 21:10

## 2022-07-05 RX ADMIN — Medication 1000 UNIT(S): at 12:19

## 2022-07-05 RX ADMIN — Medication 30 MILLIGRAM(S): at 09:27

## 2022-07-05 RX ADMIN — Medication 30 MILLIGRAM(S): at 01:45

## 2022-07-05 RX ADMIN — Medication 30 MILLIGRAM(S): at 09:12

## 2022-07-05 RX ADMIN — Medication 10 MILLIGRAM(S): at 06:23

## 2022-07-05 RX ADMIN — Medication 10 MILLIGRAM(S): at 02:12

## 2022-07-05 RX ADMIN — Medication 100 MILLIGRAM(S): at 05:08

## 2022-07-05 RX ADMIN — SIMETHICONE 80 MILLIGRAM(S): 80 TABLET, CHEWABLE ORAL at 21:11

## 2022-07-05 RX ADMIN — Medication 5 MILLIGRAM(S): at 01:07

## 2022-07-05 RX ADMIN — Medication 325 MILLIGRAM(S): at 05:08

## 2022-07-05 RX ADMIN — Medication 100 MILLIGRAM(S): at 18:17

## 2022-07-05 RX ADMIN — Medication 30 MILLIGRAM(S): at 01:05

## 2022-07-05 NOTE — CONSULT NOTE ADULT - SUBJECTIVE AND OBJECTIVE BOX
Colorectal CONSULT     HPI: 61y Male with PMHx of rectal adenocarcinoma 2019 (s/P resection and ileostomy creation  followed by chemo and radiotherapy), then local recurrence of adenocarcinoma in sigmoid and rectum went for APR of rectum and sigmoid colon with ostomy creation (3/22) presented to ED of abdominal pain , patient states he had recurrent abdominal pain last 4 days however yesterday his pain was severe, patient states regular ostomy output, denies N/V. CT : No bowel obstruction        ROS: 10-system review is otherwise negative except HPI above.      PAST MEDICAL & SURGICAL HISTORY:  Chest pain      HTN (hypertension)      HLD (hyperlipidemia)      CAD (coronary artery disease)      Mitral regurgitation      Rectal cancer  2019 s/p chemo and radiation; recurrence of cancer       Depression  with rectal cancer      Neuropathy  feet s/p chemo      Stented coronary artery  circumflex  LAD 2017 x4      H/O carotid stenosis      H/O renal calculi      DANO (iron deficiency anemia)      Umbilical hernia      Splenic artery aneurysm      H/O cardiac catheterization   2017  4 STENTS      History of CEA (carotid endarterectomy)  right 2019      History of rectal surgery  with ileostomy 2020      H/O sigmoidoscopy  x2      History of removal of Port-a-Cath        FAMILY HISTORY:  FH: heart disease  father      Family history not pertinent as reviewed with the patient.    SOCIAL HISTORY:  Denies any toxic habits    ALLERGIES: NKA No Known Allergies      Home Medications:  ALPRAZolam 0.5 mg oral tablet: 1 tab(s) orally every 6 hours, As needed, anxiety (23 Mar 2022 10:27)  amLODIPine 5 mg oral tablet: 1 tab(s) orally once a day (23 Mar 2022 10:27)  ferrous sulfate 325 mg (65 mg elemental iron) oral delayed release tablet: 1 tab(s) orally once a day (23 Mar 2022 10:27)  Metoprolol Succinate  mg oral tablet, extended release: 1 tab(s) orally 2x a day (23 Mar 2022 10:27)  simvastatin 40 mg oral tablet: 1 tab(s) orally once a day (at bedtime) (23 Mar 2022 10:27)  sodium bicarbonate 650 mg oral tablet: 1 tab(s) orally 2 times a day (23 Mar 2022 10:27)  Vitamin D3 25 mcg (1000 intl units) oral tablet: 1 tab(s) orally once a day (23 Mar 2022 10:27)      --------------------------------------------------------------------------------------------    PHYSICAL EXAM:   General: NAD, Lying in bed comfortably  Neuro: A+Ox3  HEENT: EOMI, PERRLA, MMM  Cardio: RRR  Resp: Non labored breathing on RA  GI/Abd: Soft, NT, mildly distended, no rebound/guarding, no masses palpated  Musculoskeletal: All 4 extremities moving spontaneously, no limitations, no spinal tenderness.  --------------------------------------------------------------------------------------------    LABS                 13.2   10.53  )----------(  240       ( 2022 04:24 )               38.6      135    |  93     |  17.8   ----------------------------<  155        ( 2022 04:24 )  3.6     |  23.0   |  1.12     Ca    9.8        ( 2022 04:24 )    TPro  8.0    /  Alb  4.6    /  TBili  0.6    /  DBili  x      /  AST  24     /  ALT  17     /  AlkPhos  74     ( 2022 04:24 )    LIVER FUNCTIONS - ( 2022 04:24 )  Alb: 4.6 g/dL / Pro: 8.0 g/dL / ALK PHOS: 74 U/L / ALT: 17 U/L / AST: 24 U/L / GGT: x               CAPILLARY BLOOD GLUCOSE    CARDIAC MARKERS ( 2022 08:22 )  x     / <0.01 ng/mL / x     / x     / x          Urinalysis Basic - ( 2022 04:24 )    Color: Yellow / Appearance: Clear / S.025 / pH: x  Gluc: x / Ketone: Small  / Bili: Negative / Urobili: Negative mg/dL   Blood: x / Protein: 30 mg/dL / Nitrite: Negative   Leuk Esterase: Small / RBC: 0-2 /HPF / WBC 6-10 /HPF   Sq Epi: x / Non Sq Epi: Occasional / Bacteria: Occasional          --------------------------------------------------------------------------------------------  IMAGING    < from: CT Abdomen and Pelvis w/ Oral Cont and w/ IV Cont (22 @ 06:34) >    Increased soft tissue thickening of the rectal pouch may reflect   pouchitis in the appropriate clinical setting. No bowel obstruction or   free air.    < end of copied text >  
    Patient is a 61y old  Male who presents with a chief complaint of Abdominal pain (2022 02:15)      HPI: This is a 61 year old man with a significant past medical history of Rectal Adenocarcinoma s/p Chemo + RT + Resection with Diverting Ileostomy had local recurrence underwent Abdominoperitoneal Resection in 2022, CAD, HLD, HTN and CKD 3, who arrived to [facility] complaining of diffuse abdominal pain, nausea, vomiting and no ostomy output for past 2 days. In ER, CT showed increased soft tissue thickening of the rectal pouch may reflect pouchitis in the appropriate clinical setting. Patient was also noted to have Hypertensive Urgency and was medicated with Metoprolol, Amlodipine one dose of Hydralazine 10 mg IVP. Today patient consulted by GI for elevated Liver enzymes,   ALPhos 346, Bili 3.5, patient admits to drinking 2 glasses of wine everyday with dinner, no history of hepatitis, Denies nausea, vomiting, abdominal pain, chest pain, shortness of breath, hematemesis, hematochezia, melena.        PAST MEDICAL & SURGICAL HISTORY:  Chest pain      HTN (hypertension)      HLD (hyperlipidemia)      CAD (coronary artery disease)      Mitral regurgitation      Rectal cancer  2019 s/p chemo and radiation; recurrence of cancer       Depression  with rectal cancer      Neuropathy  feet s/p chemo      Stented coronary artery  circumflex  LAD 2017 x4      H/O carotid stenosis      H/O renal calculi      DANO (iron deficiency anemia)      Umbilical hernia      Splenic artery aneurysm      H/O cardiac catheterization   2017  4 STENTS      History of CEA (carotid endarterectomy)  right 2019      History of rectal surgery  with ileostomy 2020      H/O sigmoidoscopy  x2      History of removal of Port-a-Cath          Allergies    No Known Allergies    Intolerances        MEDICATIONS  (STANDING):  amLODIPine   Tablet 10 milliGRAM(s) Oral daily  cholecalciferol 1000 Unit(s) Oral daily  clopidogrel Tablet 75 milliGRAM(s) Oral daily  enoxaparin Injectable 40 milliGRAM(s) SubCutaneous every 24 hours  ferrous    sulfate 325 milliGRAM(s) Oral two times a day  lactated ringers. 1000 milliLiter(s) (125 mL/Hr) IV Continuous <Continuous>  magnesium sulfate  IVPB 2 Gram(s) IV Intermittent every 4 hours  metoprolol succinate  milliGRAM(s) Oral two times a day  pantoprazole    Tablet 40 milliGRAM(s) Oral daily  polyethylene glycol 3350 17 Gram(s) Oral every 12 hours  potassium chloride    Tablet ER 40 milliEquivalent(s) Oral every 4 hours  psyllium Powder 1 Packet(s) Oral daily  senna 2 Tablet(s) Oral at bedtime  sodium bicarbonate 1300 milliGRAM(s) Oral daily    MEDICATIONS  (PRN):  acetaminophen     Tablet .. 650 milliGRAM(s) Oral every 6 hours PRN Temp greater or equal to 38C (100.4F), Mild Pain (1 - 3)  hydrALAZINE Injectable 10 milliGRAM(s) IV Push every 4 hours PRN If SBP > 160  ketorolac   Injectable 15 milliGRAM(s) IV Push every 6 hours PRN Moderate Pain (4 - 6)  ketorolac   Injectable 30 milliGRAM(s) IV Push every 6 hours PRN Severe Pain (7 - 10)  ondansetron Injectable 4 milliGRAM(s) IV Push every 6 hours PRN Nausea and/or Vomiting      Social History:    Marital Status:  (   )    (   ) Single    (   )    (  )   Occupation:   Lives with: (  ) alone  (  ) children   (  ) spouse   (  ) parents  (  ) other    Substance Use (street drugs): (  ) never used  (  ) other:  Tobacco Usage:  (   ) never smoked   (   ) former smoker   (   ) current smoker  (     ) pack year  (        ) last cigarette date  Alcohol Usage:  Sexual History:     Family History   IBD (  ) Yes   (  ) No  GI Malignancy (  )  Yes    (  ) No    Health Management     Last Colonoscopy -      (     ) Advanced Directives: (     ) None    (      ) DNR    (     ) DNI    (     ) Health Care Proxy:     Review of Systems:  · ENMT: negative  · Respiratory and Thorax: negative  · Cardiovascular: negative  · Gastrointestinal: see above.  · Genitourinary:	negative  · Musculoskeletal: negative  · Neurological: negative  · Psychiatric: negative  · Hematology/Lymphatics: negative  · Endocrine: negative        Vital Signs Last 24 Hrs  T(C): 37.4 (2022 09:18), Max: 37.4 (2022 09:18)  T(F): 99.4 (2022 09:18), Max: 99.4 (2022 09:18)  HR: 99 (2022 09:18) (95 - 121)  BP: 176/88 (2022 09:18) (163/87 - 218/106)  BP(mean): --  RR: 18 (2022 09:18) (18 - 28)  SpO2: 93% (2022 09:18) (93% - 96%)    PHYSICAL EXAM:  · Constitutional: Sitting comfortably,  woman, in no acute distress.   · Eyes: EOMI; PERRL; no drainage or redness  · ENMT: No oral lesions; no gross abnormalities  · Neck:	No bruits; no thyromegaly or nodules  · Back:	No deformity or limitation of movement  · Respiratory: Breath Sounds equal & clear to percussion & auscultation, no accessory muscle use  · Cardiovascular: Regular rate & rhythm, normal S1, S2; no murmurs, gallops or rubs; no S3, S4  · Gastrointestinal: Soft, non-tender, no hepatosplenomegaly, diminished bowel sounds. Ostomy in placed.           LABS:                        12.6   13.06 )-----------( 221      ( 2022 07:06 )             36.3     07-05    131<L>  |  91<L>  |  10.3  ----------------------------<  154<H>  3.2<L>   |  25.0  |  0.63    Ca    8.6      2022 07:06  Mg     0.8     07-05    TPro  7.2  /  Alb  3.9  /  TBili  3.5<H>  /  DBili  x   /  AST  641<H>  /  ALT  532<H>  /  AlkPhos  346<H>  07-05    PT/INR - ( 2022 09:23 )   PT: 14.5 sec;   INR: 1.25 ratio           Urinalysis Basic - ( 2022 04:24 )    Color: Yellow / Appearance: Clear / S.025 / pH: x  Gluc: x / Ketone: Small  / Bili: Negative / Urobili: Negative mg/dL   Blood: x / Protein: 30 mg/dL / Nitrite: Negative   Leuk Esterase: Small / RBC: 0-2 /HPF / WBC 6-10 /HPF   Sq Epi: x / Non Sq Epi: Occasional / Bacteria: Occasional      LIVER FUNCTIONS - ( 2022 07:06 )  Alb: 3.9 g/dL / Pro: 7.2 g/dL / ALK PHOS: 346 U/L / ALT: 532 U/L / AST: 641 U/L / GGT: x             RADIOLOGY & ADDITIONAL TESTS:  < from: CT Abdomen and Pelvis w/ Oral Cont and w/ IV Cont (22 @ 06:34) >  ACC: [id number] EXAM:  CT ABDOMEN AND PELVIS OC IC                          PROCEDURE DATE:  2022          INTERPRETATION:  CLINICAL INDICATION: Abdominal pain. Evaluate for small   bowel obstruction. History of rectal cancer.    COMPARISON: CTchest abdomen pelvis 2020    TECHNIQUE: Axial CT of the abdomen and pelvis was performed after the   administration of oral and intravenous contrast. Coronal and sagittal   reformatted images were submitted.    Total of 84 cc of Omnipaque 350 wasinjected intravenously without   immediate complication.    FINDINGS:    LOWER CHEST: Bibasilar dependent changes. No pleural effusion.    LIVER/GALLBLADDER: Normal size with homogenous enhancement. No biliary   ductal dilatation. Unremarkable gallbladder.    PANCREAS: No pancreatic ductal dilatation or peripancreatic fluid   collection.    SPLEEN: Normal in size and enhancement.    KIDNEYS: No hydronephrosis. Nonspecific bilateral perinephric fat   stranding.    ADRENAL GLANDS: Unremarkable.    BOWEL: Left lower quadrant colostomy noted. Surgical anastomosis   involving loops of small bowel in the pelvis. Increased soft tissue   thickening involving the rectal pouch. No bowel obstruction or free   intraperitoneal air.    URINARY BLADDER: Mild wall thickening.    PELVIC ORGANS: Unremarkable.    LYMPH NODES: No mesenteric or para-aortic lymphadenopathy.    BONES/SOFT TISSUES: Multilevel thoracolumbar spondylosis.    AORTA/MAJOR BRANCHES: Scattered calcified plaque.    IMPRESSION:    Increased soft tissue thickening of the rectal pouch may reflect   pouchitis in the appropriate clinical setting. No bowel obstruction or   free air.

## 2022-07-05 NOTE — CONSULT NOTE ADULT - PROBLEM SELECTOR RECOMMENDATION 9
Of unclear etiology, possibly idiosyncratic drug reaction, vs alcoholic liver disease, vs autoimmune hepatitis vs malignancy.  x-ray of abdomen   Labs: Hep A IgG, IgM, Hep B core ab, Hep B BsAg, Bs Ab, Bc Ag, Be ab, Hep C ab, AMA, HARPER, AFP, ceruloplasmin, smooth muscle abd, anti liver, kidney, mitochondria, Ferritin, TIBC, iron

## 2022-07-05 NOTE — CONSULT NOTE ADULT - NS ATTEND AMEND GEN_ALL_CORE FT
Patient seen and examined.  Patient admitted with decreased colostomy output.  Now with elevated LFT.  Seen in presence of his wife.  Questionable alcohol abuse.  However LFTs were normal on admission.  CT abdomen unimpressive.  Still not passing adequate output from the colostomy.  Patient with recent diagnosis of leg edema and was on furosemide.  Low magnesium level.  Differential include drug-induced liver disease, obstruction, alcoholic hepatitis.  Will recommend to perform abdominal x-ray and ultrasound abdominal.  Liver disease work-up sent.  Will monitor LFTs closely.  Will recommend to obtain MRCP if he is not improving.  Surgical follow-up.

## 2022-07-06 LAB
AFP-TM SERPL-MCNC: <1.8 NG/ML — SIGNIFICANT CHANGE UP
ALBUMIN SERPL ELPH-MCNC: 3.7 G/DL — SIGNIFICANT CHANGE UP (ref 3.3–5.2)
ALP SERPL-CCNC: 432 U/L — HIGH (ref 40–120)
ALT FLD-CCNC: 551 U/L — HIGH
ANION GAP SERPL CALC-SCNC: 16 MMOL/L — SIGNIFICANT CHANGE UP (ref 5–17)
AST SERPL-CCNC: 641 U/L — HIGH
BILIRUB SERPL-MCNC: 4.8 MG/DL — HIGH (ref 0.4–2)
BUN SERPL-MCNC: 13 MG/DL — SIGNIFICANT CHANGE UP (ref 8–20)
CALCIUM SERPL-MCNC: 9 MG/DL — SIGNIFICANT CHANGE UP (ref 8.6–10.2)
CERULOPLASMIN SERPL-MCNC: 29 MG/DL — SIGNIFICANT CHANGE UP (ref 15–30)
CHLORIDE SERPL-SCNC: 93 MMOL/L — LOW (ref 98–107)
CO2 SERPL-SCNC: 25 MMOL/L — SIGNIFICANT CHANGE UP (ref 22–29)
CREAT SERPL-MCNC: 0.86 MG/DL — SIGNIFICANT CHANGE UP (ref 0.5–1.3)
CULTURE RESULTS: SIGNIFICANT CHANGE UP
EGFR: 97 ML/MIN/1.73M2 — SIGNIFICANT CHANGE UP
GLUCOSE SERPL-MCNC: 107 MG/DL — HIGH (ref 70–99)
HAV IGM SER-ACNC: SIGNIFICANT CHANGE UP
HBV CORE IGM SER-ACNC: SIGNIFICANT CHANGE UP
HBV SURFACE AG SER-ACNC: SIGNIFICANT CHANGE UP
HCT VFR BLD CALC: 39 % — SIGNIFICANT CHANGE UP (ref 39–50)
HCV AB S/CO SERPL IA: 0.09 S/CO — SIGNIFICANT CHANGE UP (ref 0–0.99)
HCV AB SERPL-IMP: SIGNIFICANT CHANGE UP
HGB BLD-MCNC: 12.8 G/DL — LOW (ref 13–17)
MAGNESIUM SERPL-MCNC: 1.7 MG/DL — LOW (ref 1.8–2.6)
MCHC RBC-ENTMCNC: 31.7 PG — SIGNIFICANT CHANGE UP (ref 27–34)
MCHC RBC-ENTMCNC: 32.8 GM/DL — SIGNIFICANT CHANGE UP (ref 32–36)
MCV RBC AUTO: 96.5 FL — SIGNIFICANT CHANGE UP (ref 80–100)
PHOSPHATE SERPL-MCNC: 2.1 MG/DL — LOW (ref 2.4–4.7)
PLATELET # BLD AUTO: 259 K/UL — SIGNIFICANT CHANGE UP (ref 150–400)
POTASSIUM SERPL-MCNC: 4.1 MMOL/L — SIGNIFICANT CHANGE UP (ref 3.5–5.3)
POTASSIUM SERPL-SCNC: 4.1 MMOL/L — SIGNIFICANT CHANGE UP (ref 3.5–5.3)
PROT SERPL-MCNC: 7.3 G/DL — SIGNIFICANT CHANGE UP (ref 6.6–8.7)
RBC # BLD: 4.04 M/UL — LOW (ref 4.2–5.8)
RBC # FLD: 14.9 % — HIGH (ref 10.3–14.5)
SODIUM SERPL-SCNC: 134 MMOL/L — LOW (ref 135–145)
SPECIMEN SOURCE: SIGNIFICANT CHANGE UP
WBC # BLD: 10.23 K/UL — SIGNIFICANT CHANGE UP (ref 3.8–10.5)
WBC # FLD AUTO: 10.23 K/UL — SIGNIFICANT CHANGE UP (ref 3.8–10.5)

## 2022-07-06 PROCEDURE — 99233 SBSQ HOSP IP/OBS HIGH 50: CPT

## 2022-07-06 PROCEDURE — 99231 SBSQ HOSP IP/OBS SF/LOW 25: CPT | Mod: GC

## 2022-07-06 RX ORDER — FOLIC ACID 0.8 MG
1 TABLET ORAL DAILY
Refills: 0 | Status: DISCONTINUED | OUTPATIENT
Start: 2022-07-06 | End: 2022-07-08

## 2022-07-06 RX ORDER — MAGNESIUM SULFATE 500 MG/ML
2 VIAL (ML) INJECTION ONCE
Refills: 0 | Status: COMPLETED | OUTPATIENT
Start: 2022-07-06 | End: 2022-07-06

## 2022-07-06 RX ORDER — DIAZEPAM 5 MG
5 TABLET ORAL EVERY 4 HOURS
Refills: 0 | Status: DISCONTINUED | OUTPATIENT
Start: 2022-07-06 | End: 2022-07-08

## 2022-07-06 RX ORDER — ALPRAZOLAM 0.25 MG
0.5 TABLET ORAL
Refills: 0 | Status: DISCONTINUED | OUTPATIENT
Start: 2022-07-06 | End: 2022-07-08

## 2022-07-06 RX ORDER — SODIUM,POTASSIUM PHOSPHATES 278-250MG
1 POWDER IN PACKET (EA) ORAL ONCE
Refills: 0 | Status: COMPLETED | OUTPATIENT
Start: 2022-07-06 | End: 2022-07-06

## 2022-07-06 RX ORDER — THIAMINE MONONITRATE (VIT B1) 100 MG
100 TABLET ORAL DAILY
Refills: 0 | Status: DISCONTINUED | OUTPATIENT
Start: 2022-07-06 | End: 2022-07-08

## 2022-07-06 RX ADMIN — CLOPIDOGREL BISULFATE 75 MILLIGRAM(S): 75 TABLET, FILM COATED ORAL at 11:21

## 2022-07-06 RX ADMIN — DEXTROSE MONOHYDRATE, SODIUM CHLORIDE, AND POTASSIUM CHLORIDE 83 MILLILITER(S): 50; .745; 4.5 INJECTION, SOLUTION INTRAVENOUS at 05:03

## 2022-07-06 RX ADMIN — SENNA PLUS 2 TABLET(S): 8.6 TABLET ORAL at 22:10

## 2022-07-06 RX ADMIN — Medication 0.5 MILLIGRAM(S): at 13:27

## 2022-07-06 RX ADMIN — Medication 100 MILLIGRAM(S): at 05:03

## 2022-07-06 RX ADMIN — Medication 1 PACKET(S): at 11:21

## 2022-07-06 RX ADMIN — Medication 30 MILLIGRAM(S): at 22:25

## 2022-07-06 RX ADMIN — Medication 10 MILLIGRAM(S): at 18:32

## 2022-07-06 RX ADMIN — SIMETHICONE 80 MILLIGRAM(S): 80 TABLET, CHEWABLE ORAL at 13:27

## 2022-07-06 RX ADMIN — Medication 0.5 MILLIGRAM(S): at 21:16

## 2022-07-06 RX ADMIN — DEXTROSE MONOHYDRATE, SODIUM CHLORIDE, AND POTASSIUM CHLORIDE 83 MILLILITER(S): 50; .745; 4.5 INJECTION, SOLUTION INTRAVENOUS at 18:50

## 2022-07-06 RX ADMIN — Medication 100 MILLIGRAM(S): at 17:19

## 2022-07-06 RX ADMIN — Medication 30 MILLIGRAM(S): at 22:10

## 2022-07-06 RX ADMIN — POLYETHYLENE GLYCOL 3350 17 GRAM(S): 17 POWDER, FOR SOLUTION ORAL at 17:19

## 2022-07-06 RX ADMIN — POLYETHYLENE GLYCOL 3350 17 GRAM(S): 17 POWDER, FOR SOLUTION ORAL at 05:04

## 2022-07-06 RX ADMIN — AMLODIPINE BESYLATE 10 MILLIGRAM(S): 2.5 TABLET ORAL at 05:04

## 2022-07-06 RX ADMIN — Medication 62.5 MILLIMOLE(S): at 13:27

## 2022-07-06 RX ADMIN — Medication 25 GRAM(S): at 11:21

## 2022-07-06 RX ADMIN — Medication 1000 UNIT(S): at 11:21

## 2022-07-06 RX ADMIN — ENOXAPARIN SODIUM 40 MILLIGRAM(S): 100 INJECTION SUBCUTANEOUS at 05:04

## 2022-07-06 RX ADMIN — PANTOPRAZOLE SODIUM 40 MILLIGRAM(S): 20 TABLET, DELAYED RELEASE ORAL at 11:21

## 2022-07-06 RX ADMIN — SIMETHICONE 80 MILLIGRAM(S): 80 TABLET, CHEWABLE ORAL at 05:03

## 2022-07-06 NOTE — PROGRESS NOTE ADULT - ATTENDING COMMENTS
Patient seen and examined. Doing well and now having bowel function. Stool with Yersinia but he has resolution of his abdominal pain and no diarrhea. Stoma with stool and abdomen soft, and non tender. Recommended advancing to solid diet and discharging. He has follow up with oncologist next week. Needs on going follow up and to ensure soft tissue changes in the pelvis represent post op changes and not recurrent disease.
Patient was seen and examined this morning.  He reports significant improvement in his abdominal pain since admission.  Feels symptoms have resolved.  He has not had any ostomy output for over 24 hours.  Denies fevers or chills.  CT scan reviewed evidence of bowel obstruction.  No colitis noted.  I do see fullness in the pelvis and soft tissue changes in the area which could be postoperative in nature however there is a chance of local recurrent disease.  He does not have pouch in the area as he had an abdominal perineal resection for recurrent rectal cancer and therefore does not have pouchitis.  May continue bowel regimen.  Advance diet as tolerated.  Colorectal surgery will continue to follow the patient until he has return of bowel function.  His transaminitis is already being evaluated by GI.  He will need follow-up with his oncologist as an outpatient.

## 2022-07-06 NOTE — CHART NOTE - NSCHARTNOTEFT_GEN_A_CORE
60 yo male with PMH of rectal adenocarcinoma 2019, PSxH in 2/2020 of cancer resection and ileostomy creation and in 3/2022 of abdominoperitoneal resection. Ostomy output now improving. No intervention needed from a surgery standpoint; ok to discharge. Green surgery will sign off.     Nydia Cheek MD PGY-1

## 2022-07-07 LAB
ALBUMIN SERPL ELPH-MCNC: 3 G/DL — LOW (ref 3.3–5.2)
ALP SERPL-CCNC: 441 U/L — HIGH (ref 40–120)
ALT FLD-CCNC: 501 U/L — HIGH
ANION GAP SERPL CALC-SCNC: 17 MMOL/L — SIGNIFICANT CHANGE UP (ref 5–17)
AST SERPL-CCNC: 539 U/L — HIGH
BILIRUB SERPL-MCNC: 3.6 MG/DL — HIGH (ref 0.4–2)
BUN SERPL-MCNC: 10 MG/DL — SIGNIFICANT CHANGE UP (ref 8–20)
CALCIUM SERPL-MCNC: 8.7 MG/DL — SIGNIFICANT CHANGE UP (ref 8.6–10.2)
CHLORIDE SERPL-SCNC: 96 MMOL/L — LOW (ref 98–107)
CMV IGM FLD-ACNC: <8 AU/ML — SIGNIFICANT CHANGE UP
CMV IGM SERPL QL: NEGATIVE — SIGNIFICANT CHANGE UP
CO2 SERPL-SCNC: 21 MMOL/L — LOW (ref 22–29)
CREAT SERPL-MCNC: 0.74 MG/DL — SIGNIFICANT CHANGE UP (ref 0.5–1.3)
EBV EA AB SER IA-ACNC: <5 U/ML — SIGNIFICANT CHANGE UP
EBV EA AB TITR SER IF: POSITIVE
EBV EA IGG SER-ACNC: NEGATIVE — SIGNIFICANT CHANGE UP
EBV NA IGG SER IA-ACNC: 377 U/ML — HIGH
EBV PATRN SPEC IB-IMP: SIGNIFICANT CHANGE UP
EBV VCA IGG AVIDITY SER QL IA: POSITIVE
EBV VCA IGM SER IA-ACNC: 14.7 U/ML — SIGNIFICANT CHANGE UP
EBV VCA IGM SER IA-ACNC: >750 U/ML — HIGH
EBV VCA IGM TITR FLD: NEGATIVE — SIGNIFICANT CHANGE UP
EGFR: 102 ML/MIN/1.73M2 — SIGNIFICANT CHANGE UP
GLUCOSE SERPL-MCNC: 105 MG/DL — HIGH (ref 70–99)
HSV DNA1: SIGNIFICANT CHANGE UP
HSV DNA2: SIGNIFICANT CHANGE UP
HSV1 DNA BLD QL NAA+PROBE: SIGNIFICANT CHANGE UP
HSV2 DNA BLD QL NAA+PROBE: SIGNIFICANT CHANGE UP
INR BLD: 1.03 RATIO — SIGNIFICANT CHANGE UP (ref 0.88–1.16)
MAGNESIUM SERPL-MCNC: 1.7 MG/DL — SIGNIFICANT CHANGE UP (ref 1.6–2.6)
MITOCHONDRIA AB SER-ACNC: SIGNIFICANT CHANGE UP
PHOSPHATE SERPL-MCNC: 2.9 MG/DL — SIGNIFICANT CHANGE UP (ref 2.4–4.7)
POTASSIUM SERPL-MCNC: 3.8 MMOL/L — SIGNIFICANT CHANGE UP (ref 3.5–5.3)
POTASSIUM SERPL-SCNC: 3.8 MMOL/L — SIGNIFICANT CHANGE UP (ref 3.5–5.3)
PROT SERPL-MCNC: 6.4 G/DL — LOW (ref 6.6–8.7)
PROTHROM AB SERPL-ACNC: 11.9 SEC — SIGNIFICANT CHANGE UP (ref 10.5–13.4)
SMOOTH MUSCLE AB SER-ACNC: SIGNIFICANT CHANGE UP
SODIUM SERPL-SCNC: 134 MMOL/L — LOW (ref 135–145)

## 2022-07-07 PROCEDURE — 99232 SBSQ HOSP IP/OBS MODERATE 35: CPT

## 2022-07-07 PROCEDURE — 99233 SBSQ HOSP IP/OBS HIGH 50: CPT

## 2022-07-07 RX ORDER — MAGNESIUM SULFATE 500 MG/ML
2 VIAL (ML) INJECTION ONCE
Refills: 0 | Status: COMPLETED | OUTPATIENT
Start: 2022-07-07 | End: 2022-07-07

## 2022-07-07 RX ADMIN — POLYETHYLENE GLYCOL 3350 17 GRAM(S): 17 POWDER, FOR SOLUTION ORAL at 07:05

## 2022-07-07 RX ADMIN — Medication 30 MILLIGRAM(S): at 17:04

## 2022-07-07 RX ADMIN — ENOXAPARIN SODIUM 40 MILLIGRAM(S): 100 INJECTION SUBCUTANEOUS at 07:04

## 2022-07-07 RX ADMIN — Medication 1 MILLIGRAM(S): at 11:02

## 2022-07-07 RX ADMIN — POLYETHYLENE GLYCOL 3350 17 GRAM(S): 17 POWDER, FOR SOLUTION ORAL at 17:00

## 2022-07-07 RX ADMIN — AMLODIPINE BESYLATE 10 MILLIGRAM(S): 2.5 TABLET ORAL at 07:04

## 2022-07-07 RX ADMIN — Medication 100 MILLIGRAM(S): at 07:04

## 2022-07-07 RX ADMIN — Medication 100 MILLIGRAM(S): at 11:02

## 2022-07-07 RX ADMIN — Medication 0.5 MILLIGRAM(S): at 03:38

## 2022-07-07 RX ADMIN — SIMETHICONE 80 MILLIGRAM(S): 80 TABLET, CHEWABLE ORAL at 17:00

## 2022-07-07 RX ADMIN — Medication 15 MILLIGRAM(S): at 08:17

## 2022-07-07 RX ADMIN — Medication 1 TABLET(S): at 11:02

## 2022-07-07 RX ADMIN — Medication 25 GRAM(S): at 09:26

## 2022-07-07 RX ADMIN — Medication 30 MILLIGRAM(S): at 16:21

## 2022-07-07 RX ADMIN — Medication 100 MILLIGRAM(S): at 17:00

## 2022-07-07 RX ADMIN — Medication 0.5 MILLIGRAM(S): at 14:02

## 2022-07-07 RX ADMIN — Medication 1000 UNIT(S): at 11:02

## 2022-07-07 RX ADMIN — PANTOPRAZOLE SODIUM 40 MILLIGRAM(S): 20 TABLET, DELAYED RELEASE ORAL at 11:02

## 2022-07-07 RX ADMIN — SIMETHICONE 80 MILLIGRAM(S): 80 TABLET, CHEWABLE ORAL at 03:38

## 2022-07-07 RX ADMIN — Medication 15 MILLIGRAM(S): at 09:15

## 2022-07-07 RX ADMIN — SENNA PLUS 2 TABLET(S): 8.6 TABLET ORAL at 21:08

## 2022-07-07 RX ADMIN — CLOPIDOGREL BISULFATE 75 MILLIGRAM(S): 75 TABLET, FILM COATED ORAL at 11:02

## 2022-07-07 RX ADMIN — Medication 0.5 MILLIGRAM(S): at 21:08

## 2022-07-07 RX ADMIN — SIMETHICONE 80 MILLIGRAM(S): 80 TABLET, CHEWABLE ORAL at 11:02

## 2022-07-07 NOTE — PROGRESS NOTE ADULT - SUBJECTIVE AND OBJECTIVE BOX
no events/complaints  wants to go home  tolerating diet  +stoma     MEDICATIONS  (STANDING):  amLODIPine   Tablet 10 milliGRAM(s) Oral daily  cholecalciferol 1000 Unit(s) Oral daily  clopidogrel Tablet 75 milliGRAM(s) Oral daily  enoxaparin Injectable 40 milliGRAM(s) SubCutaneous every 24 hours  folic acid 1 milliGRAM(s) Oral daily  metoprolol succinate  milliGRAM(s) Oral two times a day  multivitamin 1 Tablet(s) Oral daily  pantoprazole    Tablet 40 milliGRAM(s) Oral daily  polyethylene glycol 3350 17 Gram(s) Oral every 12 hours  senna 2 Tablet(s) Oral at bedtime  simethicone 80 milliGRAM(s) Chew three times a day  thiamine 100 milliGRAM(s) Oral daily    MEDICATIONS  (PRN):  ALPRAZolam 0.5 milliGRAM(s) Oral four times a day PRN agitation/anxiety  diazepam  Injectable 5 milliGRAM(s) IV Push every 4 hours PRN ciwa>8  hydrALAZINE Injectable 10 milliGRAM(s) IV Push every 4 hours PRN If SBP > 160  ketorolac   Injectable 15 milliGRAM(s) IV Push every 6 hours PRN Moderate Pain (4 - 6)  ketorolac   Injectable 30 milliGRAM(s) IV Push every 6 hours PRN Severe Pain (7 - 10)  ondansetron Injectable 4 milliGRAM(s) IV Push every 6 hours PRN Nausea and/or Vomiting      Allergies    No Known Allergies    Vital Signs Last 24 Hrs  T(C): 36.9 (07 Jul 2022 11:06), Max: 37 (07 Jul 2022 07:44)  T(F): 98.5 (07 Jul 2022 11:06), Max: 98.6 (07 Jul 2022 07:44)  HR: 84 (07 Jul 2022 11:06) (70 - 89)  BP: 133/78 (07 Jul 2022 11:06) (133/78 - 175/74)  BP(mean): --  RR: 19 (07 Jul 2022 11:06) (18 - 20)  SpO2: 96% (07 Jul 2022 11:06) (94% - 96%)    PHYSICAL EXAM:    GENERAL: NAD  CHEST/LUNG: Clear to ausculation bilaterally  HEART: Regular rate and rhythm; S1 S2  ABDOMEN: Soft, Nontender,  Bowel sounds present  EXTREMITIES: no edema   NERVOUS SYSTEM:  Alert & Oriented X3, Motor Strength 5/5 B/L upper and lower extremities  PSYCH: normal mood, appropriate response.    LABS:                        12.8   10.23 )-----------( 259      ( 06 Jul 2022 06:37 )             39.0     07-07    134<L>  |  96<L>  |  10.0  ----------------------------<  105<H>  3.8   |  21.0<L>  |  0.74    Ca    8.7      07 Jul 2022 07:25  Phos  2.9     07-07  Mg     1.7     07-07    TPro  6.4<L>  /  Alb  3.0<L>  /  TBili  3.6<H>  /  DBili  x   /  AST  539<H>  /  ALT  501<H>  /  AlkPhos  441<H>  07-07    PT/INR - ( 07 Jul 2022 07:25 )   PT: 11.9 sec;   INR: 1.03 ratio               CAPILLARY BLOOD GLUCOSE            RADIOLOGY & ADDITIONAL TESTS:  
  wants to go home.  feels well  +ostomy output  ros negative     MEDICATIONS  (STANDING):  amLODIPine   Tablet 10 milliGRAM(s) Oral daily  cholecalciferol 1000 Unit(s) Oral daily  clopidogrel Tablet 75 milliGRAM(s) Oral daily  enoxaparin Injectable 40 milliGRAM(s) SubCutaneous every 24 hours  folic acid 1 milliGRAM(s) Oral daily  metoprolol succinate  milliGRAM(s) Oral two times a day  multivitamin 1 Tablet(s) Oral daily  pantoprazole    Tablet 40 milliGRAM(s) Oral daily  polyethylene glycol 3350 17 Gram(s) Oral every 12 hours  senna 2 Tablet(s) Oral at bedtime  simethicone 80 milliGRAM(s) Chew three times a day  sodium chloride 0.9% with potassium chloride 20 mEq/L 1000 milliLiter(s) (83 mL/Hr) IV Continuous <Continuous>  sodium phosphate IVPB 15 milliMole(s) IV Intermittent once  thiamine 100 milliGRAM(s) Oral daily    MEDICATIONS  (PRN):  hydrALAZINE Injectable 10 milliGRAM(s) IV Push every 4 hours PRN If SBP > 160  ketorolac   Injectable 15 milliGRAM(s) IV Push every 6 hours PRN Moderate Pain (4 - 6)  ketorolac   Injectable 30 milliGRAM(s) IV Push every 6 hours PRN Severe Pain (7 - 10)  LORazepam   Injectable 2 milliGRAM(s) IV Push every 1 hour PRN CIWA-Ar score 8 or greater  ondansetron Injectable 4 milliGRAM(s) IV Push every 6 hours PRN Nausea and/or Vomiting      Allergies    No Known Allergies        Vital Signs Last 24 Hrs  T(C): 36.6 (06 Jul 2022 09:29), Max: 37.2 (05 Jul 2022 18:08)  T(F): 97.8 (06 Jul 2022 09:29), Max: 99 (05 Jul 2022 22:30)  HR: 58 (06 Jul 2022 09:29) (58 - 99)  BP: 146/88 (06 Jul 2022 09:29) (126/70 - 161/82)  BP(mean): --  RR: 18 (06 Jul 2022 09:29) (18 - 18)  SpO2: 92% (06 Jul 2022 09:29) (92% - 96%)    PHYSICAL EXAM:    GENERAL: NAD  CHEST/LUNG: Clear to ausculation bilaterally  HEART: Regular rate and rhythm; S1 S2;  ABDOMEN: Soft, Nontender, Nondistended; Bowel sounds present  EXTREMITIES:  no edema   NERVOUS SYSTEM:  Alert & Oriented X3, motor Strength 5/5 B/L upper and lower extremities  PSYCH: normal mood, appropriate response.    LABS:                        12.8   10.23 )-----------( 259      ( 06 Jul 2022 06:37 )             39.0     07-06    134<L>  |  93<L>  |  13.0  ----------------------------<  107<H>  4.1   |  25.0  |  0.86    Ca    9.0      06 Jul 2022 06:37  Phos  2.1     07-06  Mg     1.7     07-06    TPro  7.3  /  Alb  3.7  /  TBili  4.8<H>  /  DBili  x   /  AST  641<H>  /  ALT  551<H>  /  AlkPhos  432<H>  07-06    PT/INR - ( 05 Jul 2022 09:23 )   PT: 14.5 sec;   INR: 1.25 ratio               CAPILLARY BLOOD GLUCOSE            RADIOLOGY & ADDITIONAL TESTS:  
HPI/OVERNIGHT EVENTS: Patient seen and examined at bedside this AM. no BM yet, had bowel regimen, states his pain is well controlled.      Vital Signs Last 24 Hrs  T(C): 36.7 (2022 22:00), Max: 37.2 (2022 12:35)  T(F): 98.1 (2022 22:00), Max: 98.9 (2022 12:35)  HR: 112 (2022 01:01) (87 - 121)  BP: 180/80 (2022 01:01) (167/91 - 234/111)  BP(mean): --  RR: 18 (2022 22:00) (16 - 28)  SpO2: 95% (2022 22:00) (95% - 100%)    I&O's Detail    2022 07:01  -  2022 02:15  --------------------------------------------------------  IN:    Lactated Ringers: 500 mL  Total IN: 500 mL    OUT:    Colostomy (mL): 0 mL  Total OUT: 0 mL    Total NET: 500 mL          Constitutional: patient resting comfortably in bed, in no acute distress  HEENT: EOMI, PERRLA, MMM.  Respiratory: Non labored breathing on RA  Cardiovascular: RRR  Gastrointestinal: Abdomen soft, non-tender, mildly distended, no rebound tenderness / guarding  Musculoskeletal: No joint pain, swelling or deformity; no limitation of movement  Vascular: Extremities warm and well perfused.     LABS:                        13.2   10.53 )-----------( 240      ( 2022 04:24 )             38.6     07-04    135  |  93<L>  |  17.8  ----------------------------<  155<H>  3.6   |  23.0  |  1.12    Ca    9.8      2022 04:24    TPro  8.0  /  Alb  4.6  /  TBili  0.6  /  DBili  x   /  AST  24  /  ALT  17  /  AlkPhos  74  07-04      Urinalysis Basic - ( 2022 04:24 )    Color: Yellow / Appearance: Clear / S.025 / pH: x  Gluc: x / Ketone: Small  / Bili: Negative / Urobili: Negative mg/dL   Blood: x / Protein: 30 mg/dL / Nitrite: Negative   Leuk Esterase: Small / RBC: 0-2 /HPF / WBC 6-10 /HPF   Sq Epi: x / Non Sq Epi: Occasional / Bacteria: Occasional        MEDICATIONS  (STANDING):  amLODIPine   Tablet 10 milliGRAM(s) Oral daily  cholecalciferol 1000 Unit(s) Oral daily  clopidogrel Tablet 75 milliGRAM(s) Oral daily  enoxaparin Injectable 40 milliGRAM(s) SubCutaneous every 24 hours  ferrous    sulfate 325 milliGRAM(s) Oral two times a day  lactated ringers. 1000 milliLiter(s) (125 mL/Hr) IV Continuous <Continuous>  metoprolol succinate  milliGRAM(s) Oral two times a day  pantoprazole    Tablet 40 milliGRAM(s) Oral daily  polyethylene glycol 3350 17 Gram(s) Oral every 12 hours  psyllium Powder 1 Packet(s) Oral daily  senna 2 Tablet(s) Oral at bedtime  simvastatin 40 milliGRAM(s) Oral at bedtime  sodium bicarbonate 1300 milliGRAM(s) Oral daily    MEDICATIONS  (PRN):  acetaminophen     Tablet .. 650 milliGRAM(s) Oral every 6 hours PRN Temp greater or equal to 38C (100.4F), Mild Pain (1 - 3)  hydrALAZINE Injectable 10 milliGRAM(s) IV Push every 4 hours PRN If SBP > 160  ketorolac   Injectable 15 milliGRAM(s) IV Push every 6 hours PRN Moderate Pain (4 - 6)  ketorolac   Injectable 30 milliGRAM(s) IV Push every 6 hours PRN Severe Pain (7 - 10)  ondansetron Injectable 4 milliGRAM(s) IV Push every 6 hours PRN Nausea and/or Vomiting      MICRO:   Cultures     STUDIES:   < from: CT Abdomen and Pelvis w/ Oral Cont and w/ IV Cont (22 @ 06:34) >  LIVER/GALLBLADDER: Normal size with homogenous enhancement. No biliary   ductal dilatation. Unremarkable gallbladder.    PANCREAS: No pancreatic ductal dilatation or peripancreatic fluid   collection.    SPLEEN: Normal in size and enhancement.    KIDNEYS: No hydronephrosis. Nonspecific bilateral perinephric fat   stranding.    ADRENAL GLANDS: Unremarkable.    BOWEL: Left lower quadrant colostomy noted. Surgical anastomosis   involving loops of small bowel in the pelvis. Increased soft tissue   thickening involving the rectal pouch. No bowel obstruction or free   intraperitoneal air.    URINARY BLADDER: Mild wall thickening.    PELVIC ORGANS: Unremarkable.    LYMPH NODES: No mesenteric or para-aortic lymphadenopathy.    BONES/SOFT TISSUES: Multilevel thoracolumbar spondylosis.    AORTA/MAJOR BRANCHES: Scattered calcified plaque.    IMPRESSION:    Increased soft tissue thickening of the rectal pouch may reflect   pouchitis in the appropriate clinical setting. No bowel obstruction or   free air.    < end of copied text >  
  no BM yet thru stoma  no nausea  tolerating diet  abd discomfort, bloated  ros negative     MEDICATIONS  (STANDING):  amLODIPine   Tablet 10 milliGRAM(s) Oral daily  bisacodyl Suppository 10 milliGRAM(s) Rectal daily  cholecalciferol 1000 Unit(s) Oral daily  clopidogrel Tablet 75 milliGRAM(s) Oral daily  enoxaparin Injectable 40 milliGRAM(s) SubCutaneous every 24 hours  magnesium sulfate  IVPB 2 Gram(s) IV Intermittent every 4 hours  metoprolol succinate  milliGRAM(s) Oral two times a day  pantoprazole    Tablet 40 milliGRAM(s) Oral daily  polyethylene glycol 3350 17 Gram(s) Oral every 12 hours  potassium chloride    Tablet ER 40 milliEquivalent(s) Oral every 4 hours  senna 2 Tablet(s) Oral at bedtime  simethicone 80 milliGRAM(s) Chew three times a day  sodium bicarbonate 1300 milliGRAM(s) Oral daily  sodium chloride 0.9% with potassium chloride 20 mEq/L 1000 milliLiter(s) (83 mL/Hr) IV Continuous <Continuous>    MEDICATIONS  (PRN):  hydrALAZINE Injectable 10 milliGRAM(s) IV Push every 4 hours PRN If SBP > 160  ketorolac   Injectable 15 milliGRAM(s) IV Push every 6 hours PRN Moderate Pain (4 - 6)  ketorolac   Injectable 30 milliGRAM(s) IV Push every 6 hours PRN Severe Pain (7 - 10)  ondansetron Injectable 4 milliGRAM(s) IV Push every 6 hours PRN Nausea and/or Vomiting      Allergies    No Known Allergies    Vital Signs Last 24 Hrs  T(C): 37.4 (2022 09:18), Max: 37.4 (2022 09:18)  T(F): 99.4 (2022 09:18), Max: 99.4 (2022 09:18)  HR: 99 (2022 09:18) (95 - 121)  BP: 176/88 (2022 09:18) (163/87 - 218/106)  BP(mean): --  RR: 18 (2022 09:18) (18 - 28)  SpO2: 93% (2022 09:18) (93% - 96%)    PHYSICAL EXAM:    GENERAL: NAD  CHEST/LUNG: Clear to ausculation bilaterally  HEART: Regular rate and rhythm; S1 S2  ABDOMEN: Soft, Nontender,  Bowel sounds present  +stoma  EXTREMITIES: no edema   NERVOUS SYSTEM:  Alert & Oriented X3, Motor Strength 5/5 B/L upper and lower extremities  PSYCH: normal mood, appropriate responses    LABS:                        12.6   13.06 )-----------( 221      ( 2022 07:06 )             36.3     07-05    129<L>  |  87<L>  |  9.9  ----------------------------<  153<H>  3.3<L>   |  26.0  |  0.51    Ca    9.0      2022 09:47  Mg     0.8     07-05    TPro  7.4  /  Alb  3.9  /  TBili  4.0<H>  /  DBili  x   /  AST  585<H>  /  ALT  523<H>  /  AlkPhos  352<H>  07-05    PT/INR - ( 2022 09:23 )   PT: 14.5 sec;   INR: 1.25 ratio           Urinalysis Basic - ( 2022 04:24 )    Color: Yellow / Appearance: Clear / S.025 / pH: x  Gluc: x / Ketone: Small  / Bili: Negative / Urobili: Negative mg/dL   Blood: x / Protein: 30 mg/dL / Nitrite: Negative   Leuk Esterase: Small / RBC: 0-2 /HPF / WBC 6-10 /HPF   Sq Epi: x / Non Sq Epi: Occasional / Bacteria: Occasional        CAPILLARY BLOOD GLUCOSE            RADIOLOGY & ADDITIONAL TESTS:  
INTERVAL HPI: No acute events overnight. Ostomy has 100c output; dark brown liquid with small bits of stool, smells like fecal matter. Pt comfortable in bed.     MEDICATIONS  (STANDING):  amLODIPine   Tablet 10 milliGRAM(s) Oral daily  bisacodyl Suppository 10 milliGRAM(s) Rectal daily  cholecalciferol 1000 Unit(s) Oral daily  clopidogrel Tablet 75 milliGRAM(s) Oral daily  enoxaparin Injectable 40 milliGRAM(s) SubCutaneous every 24 hours  metoprolol succinate  milliGRAM(s) Oral two times a day  pantoprazole    Tablet 40 milliGRAM(s) Oral daily  polyethylene glycol 3350 17 Gram(s) Oral every 12 hours  senna 2 Tablet(s) Oral at bedtime  simethicone 80 milliGRAM(s) Chew three times a day  sodium chloride 0.9% with potassium chloride 20 mEq/L 1000 milliLiter(s) (83 mL/Hr) IV Continuous <Continuous>      MEDICATIONS  (PRN):  hydrALAZINE Injectable 10 milliGRAM(s) IV Push every 4 hours PRN If SBP > 160  ketorolac   Injectable 15 milliGRAM(s) IV Push every 6 hours PRN Moderate Pain (4 - 6)  ketorolac   Injectable 30 milliGRAM(s) IV Push every 6 hours PRN Severe Pain (7 - 10)  ondansetron Injectable 4 milliGRAM(s) IV Push every 6 hours PRN Nausea and/or Vomiting      Allergies:  No Known Allergies      PAST MEDICAL & SURGICAL HISTORY:  Chest pain      HTN (hypertension)      HLD (hyperlipidemia)      CAD (coronary artery disease)      Mitral regurgitation      Rectal cancer  2/2019 s/p chemo and radiation; recurrence of cancer 2022      Depression  with rectal cancer      Neuropathy  feet s/p chemo      Stented coronary artery  circumflex 2005 LAD 2017 x4      H/O carotid stenosis      H/O renal calculi      DANO (iron deficiency anemia)      Umbilical hernia      Splenic artery aneurysm      H/O cardiac catheterization  2005 2017  4 STENTS      History of CEA (carotid endarterectomy)  right 2019      History of rectal surgery  with ileostomy 2/2020      H/O sigmoidoscopy  x2      History of removal of Port-a-Cath          FAMILY HISTORY:  FH: heart disease  father        Vital Signs Last 24 Hrs  T(C): 36.9 (06 Jul 2022 04:42), Max: 37.4 (05 Jul 2022 09:18)  T(F): 98.4 (06 Jul 2022 04:42), Max: 99.4 (05 Jul 2022 09:18)  HR: 99 (06 Jul 2022 04:42) (90 - 104)  BP: 149/72 (06 Jul 2022 04:42) (126/70 - 176/88)  BP(mean): --  RR: 18 (06 Jul 2022 04:42) (18 - 18)  SpO2: 93% (06 Jul 2022 04:42) (93% - 96%)    I&O's Summary    05 Jul 2022 07:01  -  06 Jul 2022 07:00  --------------------------------------------------------  IN: 400 mL / OUT: 220 mL / NET: 180 mL        LABS:                        12.8   10.23 )-----------( 259      ( 06 Jul 2022 06:37 )             39.0       07-05    129<L>  |  87<L>  |  9.9  ----------------------------<  153<H>  3.3<L>   |  26.0  |  0.51    Ca    9.0      05 Jul 2022 09:47  Mg     0.8     07-05    TPro  7.4  /  Alb  3.9  /  TBili  4.0<H>  /  DBili  x   /  AST  585<H>  /  ALT  523<H>  /  AlkPhos  352<H>  07-05      CARDIAC MARKERS ( 04 Jul 2022 18:00 )  x     / <0.01 ng/mL / x     / x     / x      CARDIAC MARKERS ( 04 Jul 2022 08:22 )  x     / <0.01 ng/mL / x     / x     / x              GI PCR Panel, Stool (collected 06 Jul 2022 01:57)  Source: .Stool Feces  Final Report (06 Jul 2022 04:29):    Yersinia enterocolitica    GI PCR Results: INDETERMINATE    *******Please Note:*******    An indeterminate result may be due to a false    positive PCR test, the presence    of amplification inhibitors in the sample or levels of    target DNA present below thelimit of detection.    This result does not preclude the possibility of infection.    Please submit an additional sample for repeat testing.    GI panel PCR evaluates for:    Campylobacter, Plesiomonas shigelloides, Salmonella,    Vibrio, Yersinia enterocolitica, Enteroaggregative    Escherichia coli (EAEC), Enteropathogenic E.coli (EPEC),    Enterotoxigenic E. coli (ETEC) lt/st, Shiga-like    toxin-producing E. coli (STEC) stx1/stx2,    Shigella/ Enteroinvasive E. coli (EIEC), Cryptosporidium,    Cyclospora cayetanensis, Entamoeba histolytica,    Giardia lamblia, Adenovirus F 40/41, Astrovirus,    Norovirus GI/GII, Rotavirus A, Sapovirus    Test results are to be confirmed by an alternate method.    Culture - Urine (collected 04 Jul 2022 17:00)  Source: Clean Catch Clean Catch (Midstream)  Final Report (05 Jul 2022 12:31):    No growth        PHYSICAL EXAM:  GENERAL: NAD, lying in bed comfortably  ENT: Moist mucous membranes  CHEST/LUNG:Unlabored respirations  ABDOMEN: Soft, Nontender, Nondistended.   NERVOUS SYSTEM:  Alert & Oriented X3, speech clear.  SKIN: No rashes or lesions                
Pt seen and examined at bedside.    PERTINENT REVIEW OF SYSTEMS:  CONSTITUTIONAL: No weakness, fevers or chills  HEENT: No visual changes; No vertigo or throat pain   GASTROINTESTINAL:  No nausea, vomiting, or hematemesis  NEUROLOGICAL: No numbness or weakness  SKIN: No itching, burning, rashes, or lesions     Allergies    No Known Allergies    Intolerances      MEDICATIONS:  MEDICATIONS  (STANDING):  amLODIPine   Tablet 10 milliGRAM(s) Oral daily  cholecalciferol 1000 Unit(s) Oral daily  clopidogrel Tablet 75 milliGRAM(s) Oral daily  enoxaparin Injectable 40 milliGRAM(s) SubCutaneous every 24 hours  folic acid 1 milliGRAM(s) Oral daily  metoprolol succinate  milliGRAM(s) Oral two times a day  multivitamin 1 Tablet(s) Oral daily  pantoprazole    Tablet 40 milliGRAM(s) Oral daily  polyethylene glycol 3350 17 Gram(s) Oral every 12 hours  senna 2 Tablet(s) Oral at bedtime  simethicone 80 milliGRAM(s) Chew three times a day  sodium chloride 0.9% with potassium chloride 20 mEq/L 1000 milliLiter(s) (83 mL/Hr) IV Continuous <Continuous>  thiamine 100 milliGRAM(s) Oral daily    MEDICATIONS  (PRN):  ALPRAZolam 0.5 milliGRAM(s) Oral four times a day PRN agitation/anxiety  diazepam  Injectable 5 milliGRAM(s) IV Push every 4 hours PRN ciwa>8  hydrALAZINE Injectable 10 milliGRAM(s) IV Push every 4 hours PRN If SBP > 160  ketorolac   Injectable 15 milliGRAM(s) IV Push every 6 hours PRN Moderate Pain (4 - 6)  ketorolac   Injectable 30 milliGRAM(s) IV Push every 6 hours PRN Severe Pain (7 - 10)  ondansetron Injectable 4 milliGRAM(s) IV Push every 6 hours PRN Nausea and/or Vomiting    Vital Signs Last 24 Hrs  T(C): 36.6 (06 Jul 2022 09:29), Max: 37.2 (05 Jul 2022 18:08)  T(F): 97.8 (06 Jul 2022 09:29), Max: 99 (05 Jul 2022 22:30)  HR: 58 (06 Jul 2022 09:29) (58 - 99)  BP: 146/88 (06 Jul 2022 09:29) (126/70 - 161/82)  BP(mean): --  RR: 18 (06 Jul 2022 09:29) (18 - 18)  SpO2: 92% (06 Jul 2022 09:29) (92% - 96%)    07-05 @ 07:01  -  07-06 @ 07:00  --------------------------------------------------------  IN: 400 mL / OUT: 220 mL / NET: 180 mL      PHYSICAL EXAM:    General:  no acute distress  HEENT: MMM, conjunctiva and sclera clear  Gastrointestinal: Soft non-tender non-distended  Skin: Warm and dry. No obvious rash    LABS:                        12.8   10.23 )-----------( 259      ( 06 Jul 2022 06:37 )             39.0     07-06    134<L>  |  93<L>  |  13.0  ----------------------------<  107<H>  4.1   |  25.0  |  0.86    Ca    9.0      06 Jul 2022 06:37  Phos  2.1     07-06  Mg     1.7     07-06    TPro  7.3  /  Alb  3.7  /  TBili  4.8<H>  /  DBili  x   /  AST  641<H>  /  ALT  551<H>  /  AlkPhos  432<H>  07-06    PT/INR - ( 05 Jul 2022 09:23 )   PT: 14.5 sec;   INR: 1.25 ratio                           GI PCR Panel, Stool (collected 06 Jul 2022 01:57)  Source: .Stool Feces  Final Report (06 Jul 2022 04:29):    Yersinia enterocolitica    GI PCR Results: INDETERMINATE    *******Please Note:*******    An indeterminate result may be due to a false    positive PCR test, the presence    of amplification inhibitors in the sample or levels of    target DNA present below thelimit of detection.    This result does not preclude the possibility of infection.    Please submit an additional sample for repeat testing.    GI panel PCR evaluates for:    Campylobacter, Plesiomonas shigelloides, Salmonella,    Vibrio, Yersinia enterocolitica, Enteroaggregative    Escherichia coli (EAEC), Enteropathogenic E.coli (EPEC),    Enterotoxigenic E. coli (ETEC) lt/st, Shiga-like    toxin-producing E. coli (STEC) stx1/stx2,    Shigella/ Enteroinvasive E. coli (EIEC), Cryptosporidium,    Cyclospora cayetanensis, Entamoeba histolytica,    Giardia lamblia, Adenovirus F 40/41, Astrovirus,    Norovirus GI/GII, Rotavirus A, Sapovirus    Test results are to be confirmed by an alternate method.    Culture - Urine (collected 04 Jul 2022 17:00)  Source: Clean Catch Clean Catch (Midstream)  Final Report (05 Jul 2022 12:31):    No growth      RADIOLOGY & ADDITIONAL STUDIES:
    Chief Complaint:  Patient is a 61y old  Male who presents with a chief complaint of Abdominal pain (07 Jul 2022 11:16)      Interval Events / Subjective: Patient seen and evaluated at bedside, reporting no complaints, no overnight events. Tolerating diet. Ostomy now functioning. Abdominal pain improving. Denies nausea, vomiting, abdominal pain, chest pain, shortness of breath, hematemesis, hematochezia, melena. LFTs slowly improving.       REVIEW OF SYSTEMS:   General: Negative  HEENT: Negative  CV: Negative  Respiratory: Negative  GI: See HPI  : Negative  MSK: Negative  Hematologic: Negative  Skin: Negative    MEDICATIONS:   MEDICATIONS  (STANDING):  amLODIPine   Tablet 10 milliGRAM(s) Oral daily  cholecalciferol 1000 Unit(s) Oral daily  clopidogrel Tablet 75 milliGRAM(s) Oral daily  enoxaparin Injectable 40 milliGRAM(s) SubCutaneous every 24 hours  folic acid 1 milliGRAM(s) Oral daily  metoprolol succinate  milliGRAM(s) Oral two times a day  multivitamin 1 Tablet(s) Oral daily  pantoprazole    Tablet 40 milliGRAM(s) Oral daily  polyethylene glycol 3350 17 Gram(s) Oral every 12 hours  senna 2 Tablet(s) Oral at bedtime  simethicone 80 milliGRAM(s) Chew three times a day  thiamine 100 milliGRAM(s) Oral daily    MEDICATIONS  (PRN):  ALPRAZolam 0.5 milliGRAM(s) Oral four times a day PRN agitation/anxiety  diazepam  Injectable 5 milliGRAM(s) IV Push every 4 hours PRN ciwa>8  hydrALAZINE Injectable 10 milliGRAM(s) IV Push every 4 hours PRN If SBP > 160  ketorolac   Injectable 15 milliGRAM(s) IV Push every 6 hours PRN Moderate Pain (4 - 6)  ketorolac   Injectable 30 milliGRAM(s) IV Push every 6 hours PRN Severe Pain (7 - 10)  ondansetron Injectable 4 milliGRAM(s) IV Push every 6 hours PRN Nausea and/or Vomiting      ALLERGIES:   Allergies    No Known Allergies    Intolerances        VITAL SIGNS:   Vital Signs Last 24 Hrs  T(C): 36.9 (07 Jul 2022 11:06), Max: 37 (07 Jul 2022 07:44)  T(F): 98.5 (07 Jul 2022 11:06), Max: 98.6 (07 Jul 2022 07:44)  HR: 84 (07 Jul 2022 11:06) (70 - 89)  BP: 133/78 (07 Jul 2022 11:06) (133/78 - 175/74)  BP(mean): --  RR: 19 (07 Jul 2022 11:06) (18 - 20)  SpO2: 96% (07 Jul 2022 11:06) (94% - 96%)  I&O's Summary    07 Jul 2022 07:01  -  07 Jul 2022 12:23  --------------------------------------------------------  IN: 0 mL / OUT: 800 mL / NET: -800 mL        PHYSICAL EXAM:   GENERAL: Obese, No acute distress  HEENT:  NC/AT,  conjunctivae clear, sclera -anicteric  CHEST:  Full & symmetric excursion, no increased effort, breath sounds clear  HEART:  Regular rhythm, S1, S2, no murmur/rub/S3/S4,  no edema  ABDOMEN:  Ostomy with output noted, Soft, non-tender, non-distended, normoactive bowel sounds  EXTREMITIES: No cyanosis, clubbing or edema  SKIN:  No rash/erythema/ecchymoses/petechiae/wounds/abscess/warm/dry  NEURO:  Alert, oriented    LABS:  CBC Full  -  ( 06 Jul 2022 06:37 )  WBC Count : 10.23 K/uL  RBC Count : 4.04 M/uL  Hemoglobin : 12.8 g/dL  Hematocrit : 39.0 %  Platelet Count - Automated : 259 K/uL  Mean Cell Volume : 96.5 fl  Mean Cell Hemoglobin : 31.7 pg  Mean Cell Hemoglobin Concentration : 32.8 gm/dL  Auto Neutrophil # : x  Auto Lymphocyte # : x  Auto Monocyte # : x  Auto Eosinophil # : x  Auto Basophil # : x  Auto Neutrophil % : x  Auto Lymphocyte % : x  Auto Monocyte % : x  Auto Eosinophil % : x  Auto Basophil % : x    07-07    134<L>  |  96<L>  |  10.0  ----------------------------<  105<H>  3.8   |  21.0<L>  |  0.74    Ca    8.7      07 Jul 2022 07:25  Phos  2.9     07-07  Mg     1.7     07-07    TPro  6.4<L>  /  Alb  3.0<L>  /  TBili  3.6<H>  /  DBili  x   /  AST  539<H>  /  ALT  501<H>  /  AlkPhos  441<H>  07-07    LIVER FUNCTIONS - ( 07 Jul 2022 07:25 )  Alb: 3.0 g/dL / Pro: 6.4 g/dL / ALK PHOS: 441 U/L / ALT: 501 U/L / AST: 539 U/L / GGT: x           PT/INR - ( 07 Jul 2022 07:25 )   PT: 11.9 sec;   INR: 1.03 ratio               GI PCR Panel, Stool (collected 06 Jul 2022 01:57)  Source: .Stool Feces  Final Report (06 Jul 2022 04:29):    Yersinia enterocolitica    GI PCR Results: INDETERMINATE    *******Please Note:*******    An indeterminate result may be due to a false    positive PCR test, the presence    of amplification inhibitors in the sample or levels of    target DNA present below thelimit of detection.    This result does not preclude the possibility of infection.    Please submit an additional sample for repeat testing.    GI panel PCR evaluates for:    Campylobacter, Plesiomonas shigelloides, Salmonella,    Vibrio, Yersinia enterocolitica, Enteroaggregative    Escherichia coli (EAEC), Enteropathogenic E.coli (EPEC),    Enterotoxigenic E. coli (ETEC) lt/st, Shiga-like    toxin-producing E. coli (STEC) stx1/stx2,    Shigella/ Enteroinvasive E. coli (EIEC), Cryptosporidium,    Cyclospora cayetanensis, Entamoeba histolytica,    Giardia lamblia, Adenovirus F 40/41, Astrovirus,    Norovirus GI/GII, Rotavirus A, Sapovirus    Test results are to be confirmed by an alternate method.    Culture - Urine (collected 04 Jul 2022 17:00)  Source: Clean Catch Clean Catch (Midstream)  Final Report (05 Jul 2022 12:31):    No growth        RADIOLOGY & ADDITIONAL STUDIES (The following images were personally reviewed):    < from: US Abdomen Upper Quadrant Right (07.05.22 @ 16:57) >    ACC: [id number] EXAM:  US ABDOMEN RT UPR QUADRANT                          PROCEDURE DATE:  07/05/2022          INTERPRETATION:  CLINICAL INFORMATION: Elevated liver function tests    COMPARISON: None available.    TECHNIQUE: Sonography of the right upper quadrant.    FINDINGS:  Liver: 16.2 cm.  Hepatic steatosis.  Bile ducts: The extrahepatic bile duct is not visualized.  Gallbladder: Internal echoes compatible with sludge and/or debris.  No   shadowing gallstones.  No gallbladder wall thickening.  Sonographic   Michel sign was negative.  Pancreas: Obscured by bowel gas.  Right kidney: 10.8 cm. No hydronephrosis.  Ascites: None.  IVC: Limited visualization due to bowel gas.    IMPRESSION:  Hepatic steatosis.  Internal echoes in the gallbladder lumen compatible sludge and/or debris.  No sonographic evidence of cholelithiasis or acute cholecystitis.  The extrahepatic bile duct is not visualized.  Follow-up nuclear medicine HIDA scan or MRI/MRCP may be obtained as   clinically warranted.      --- End of Report ---            YNES FREIRE MD; Attending Radiologist  This document has been electronically signed. Jul 5 2022  6:30PM    < end of copied text >        < from: CT Abdomen and Pelvis w/ Oral Cont and w/ IV Cont (07.04.22 @ 06:34) >    ACC: [id number] EXAM:  CT ABDOMEN AND PELVIS OC IC                          PROCEDURE DATE:  07/04/2022          INTERPRETATION:  CLINICAL INDICATION: Abdominal pain. Evaluate for small   bowel obstruction. History of rectal cancer.    COMPARISON: CTchest abdomen pelvis 6/9/2020    TECHNIQUE: Axial CT of the abdomen and pelvis was performed after the   administration of oral and intravenous contrast. Coronal and sagittal   reformatted images were submitted.    Total of 84 cc of Omnipaque 350 wasinjected intravenously without   immediate complication.    FINDINGS:    LOWER CHEST: Bibasilar dependent changes. No pleural effusion.    LIVER/GALLBLADDER: Normal size with homogenous enhancement. No biliary   ductal dilatation. Unremarkable gallbladder.    PANCREAS: No pancreatic ductal dilatation or peripancreatic fluid   collection.    SPLEEN: Normal in size and enhancement.    KIDNEYS: No hydronephrosis. Nonspecific bilateral perinephric fat   stranding.    ADRENAL GLANDS: Unremarkable.    BOWEL: Left lower quadrant colostomy noted. Surgical anastomosis   involving loops of small bowel in the pelvis. Increased soft tissue   thickening involving the rectal pouch. No bowel obstruction or free   intraperitoneal air.    URINARY BLADDER: Mild wall thickening.    PELVIC ORGANS: Unremarkable.    LYMPH NODES: No mesenteric or para-aortic lymphadenopathy.    BONES/SOFT TISSUES: Multilevel thoracolumbar spondylosis.    AORTA/MAJOR BRANCHES: Scattered calcified plaque.    IMPRESSION:    Increased soft tissue thickening of the rectal pouch may reflect   pouchitis in the appropriate clinical setting. No bowel obstruction or   free air.    --- End of Report ---            PA CRAIN MD; Attending Radiologist  This document has been electronically signed. Jul 4 2022  7:00AM    < end of copied text >

## 2022-07-07 NOTE — PROGRESS NOTE ADULT - NS ATTEND AMEND GEN_ALL_CORE FT
I evaluated this pt. with my NP and agree with he above assessment and management plan. Pt's abdominal pain has resoved and he is now having stool through his colostomy. Awaiting MRCP to r/o choledocholithiasis. Continue to trend LFT's while here.

## 2022-07-07 NOTE — PATIENT PROFILE ADULT - TOBACCO/CIGAR DURATION (MO/YR)
15 Cimzia Pregnancy And Lactation Text: This medication crosses the placenta but can be considered safe in certain situations. Cimzia may be excreted in breast milk.

## 2022-07-07 NOTE — PROGRESS NOTE ADULT - ASSESSMENT
61 year old male with history of Rectal Adenocarcinoma s/p Chemo + RT + Resection with Diverting Ileostomy had local recurrence underwent Abdominoperitoneal Resection in March 2022, CAD, HLD, HTN and CKD 3 comes with abdominal pain.  As per patient, it started 2 days ago, mostly in midabdomen and associated with nausea and vomiting. There was originally no output in ostomy bag but now ostomy functioning. In ER, CT showed increased soft tissue thickening of the rectal pouch may reflect pouchitis in the appropriate clinical setting. No bowel obstruction or free air. Seen by Colorectal Surgery and was recommended bowel regimen and fluid resuscitation. Negative Liver serology.  This morning, Patient doing well. Denies N/V, abd pain. LFTs Slowly downtrending.     Plan:  Pending MRCP for further evaluation   Continue CBC, CMP  Continue Regular diet  Bowel regimen (Miralax, Senna)  Pantoprazole 40mg daily  MVI, folic acid, thiamine  
61 year old male with history of Rectal Adenocarcinoma s/p Chemo + RT + Resection with Diverting Ileostomy had local recurrence underwent Abdominoperitoneal Resection in March 2022, CAD, HLD, HTN and CKD 3 comes with abdominal pain. As per patient, it started 2 days ago, mostly in midabdomen and associated with nausea and vomiting. There is no output in ostomy bag since yesterday so he came to the hospital.   In ER, CT showed increased soft tissue thickening of the rectal pouch may reflect pouchitis in the appropriate clinical setting. No bowel obstruction or free air. Seen by Colorectal Surgery and was recommended bowel regimen and fluid resuscitation. Also noted to have Hypertensive Urgency, Metoprolol and Amlodipine were resumed and he was given a dose of Hydralazine 10 mg IVP. He has been given multiple rounds of pain medication.  Patient does not have rectum or anal sphincter complex following his APR.     1) Abdominal Pain:  ? constipation    senna/miralax and dulcolax via stoma    consider lactulose/mag citrate if no BM    surgery following  - History of recurrent rectal adenocarcinoma   - Avoid opioids due to constipation    2) Lactic Acidosis   - Likely due to dehydration  - resolved     3) Hypertensive Urgency: likely related to abd pain   - Continue Metoprolol  mg BID  - Increase Amlodipine to 10 mg daily   - Hydralazine 10 mg IVP q 4 hours PRN for SBP > 160    4) CAD / HLD  - Continue Plavix, Simvastatin and Metoprolol    transaminitis:  drinks 2 glasses wine daily    no gallstones on CT    GI consulted    trend, avoid tylenol     DVT Prophylaxis -- Lovenox SQ    
61 year old male with history of Rectal Adenocarcinoma s/p Chemo + RT + Resection with Diverting Ileostomy had local recurrence underwent Abdominoperitoneal Resection in March 2022, CAD, HLD, HTN and CKD 3 comes with abdominal pain. Initially had no output from ostomy but now has output, abd soft, pt denies any pain. Also has elevated LFTs, US shows possible gb sludge, biliary ducts not visulaized  full liver workup ordered  MRI pending  tolerating po
62 yo male with PMH of rectal adenocarcinoma 2019, PSxH in 2/2020 of cancer resection and ileostomy creation and in 3/2022 of abdominoperitoneal resection, osotmy output finally improving.    Plan:   - since ostomy has output, consider signing off  - f/u stool sample  - f/u oncologist outpatient   - continue antibiotics 
61 year old male with history of Rectal Adenocarcinoma s/p Chemo + RT + Resection with Diverting Ileostomy had local recurrence underwent Abdominoperitoneal Resection in March 2022, CAD, HLD, HTN and CKD 3 comes with abdominal pain. As per patient, it started 2 days ago, mostly in midabdomen and associated with nausea and vomiting. There is no output in ostomy bag since yesterday so he came to the hospital.   In ER, CT showed increased soft tissue thickening of the rectal pouch may reflect pouchitis in the appropriate clinical setting. No bowel obstruction or free air. Seen by Colorectal Surgery and was recommended bowel regimen and fluid resuscitation. Also noted to have Hypertensive Urgency, Metoprolol and Amlodipine were resumed and he was given a dose of Hydralazine 10 mg IVP. He has been given multiple rounds of pain medication.  Patient does not have rectum or anal sphincter complex following his APR.       transaminitis:  drinks 2 glasses wine daily per patient;  per daughter patient drinks at least 1 bottle of wine daily     bili peaked at 4.8,    no gallstones on CT    GI following    CIWA prn ativan; thiamine for suspected deficiency, multivitamin, folate    trend, avoid tylenol     MRCP pending       1) Abdominal Pain:  ? constipation---resolved     senna/miralax    surgery signed off   - History of recurrent rectal adenocarcinoma   - Avoid opioids due to constipation    2) Lactic Acidosis   - Likely due to dehydration  - resolved     3) Hypertensive Urgency: likely related to abd pain   - Continue Metoprolol  mg BID  - Increase Amlodipine to 10 mg daily   - Hydralazine 10 mg IVP q 4 hours PRN for SBP > 160    4) CAD / HLD  - Continue Plavix, Simvastatin and Metoprolol      DVT Prophylaxis -- Lovenox SQ    updated daughter over the phone who is RN at [employer]  dc in am if mrcp w/o acute pathology and LFT's continues to improve    
61 year old male with history of Rectal Adenocarcinoma s/p Chemo + RT + Resection with Diverting Ileostomy had local recurrence underwent Abdominoperitoneal Resection in March 2022, CAD, HLD, HTN and CKD 3 comes with abdominal pain. As per patient, it started 2 days ago, mostly in midabdomen and associated with nausea and vomiting. There is no output in ostomy bag since yesterday so he came to the hospital.   In ER, CT showed increased soft tissue thickening of the rectal pouch may reflect pouchitis in the appropriate clinical setting. No bowel obstruction or free air. Seen by Colorectal Surgery and was recommended bowel regimen and fluid resuscitation. Also noted to have Hypertensive Urgency, Metoprolol and Amlodipine were resumed and he was given a dose of Hydralazine 10 mg IVP. He has been given multiple rounds of pain medication.  Patient does not have rectum or anal sphincter complex following his APR.     1) Abdominal Pain:  ? constipation---resolved     senna/miralax and dulcolax via stoma    surgery signed off   - History of recurrent rectal adenocarcinoma   - Avoid opioids due to constipation    2) Lactic Acidosis   - Likely due to dehydration  - resolved     3) Hypertensive Urgency: likely related to abd pain   - Continue Metoprolol  mg BID  - Increase Amlodipine to 10 mg daily   - Hydralazine 10 mg IVP q 4 hours PRN for SBP > 160    4) CAD / HLD  - Continue Plavix, Simvastatin and Metoprolol    transaminitis:  drinks 2 glasses wine daily per patient;  per daughter patient drinks at least 1 bottle of wine daily     no gallstones on CT    GI following    CIWA prn ativan; thiamine for suspected deficiency, multivitamin, folate    trend, avoid tylenol     MRCP    DVT Prophylaxis -- Lovenox SQ    updated daughter over the phone who is RN at [employer]    
61y Male with PMHx of rectal adenocarcinoma 2019 (s/P resection and ileostomy creation 2020 followed by chemo and radiotherapy), then local recurrence of adenocarcinoma in sigmoid and rectum went for APR of rectum and sigmoid colon with ostomy creation (3/22) presented to ED of abdominal pain , regular ostomy output, tolerating PO, denies N/V. CT : Increased soft tissue thickening of the rectal pouch may reflect pouchitis in the appropriate clinical setting. No bowel obstruction or free air.    Plan:   Pain control   IV fluids  Advance diet as tolerated   obtain Stool sampling for PCR    bowel regimen : Miralax BID   dulcolax suppository via stoma  monitor BF   SKYE  no surgical intervention required at this time

## 2022-07-07 NOTE — PROGRESS NOTE ADULT - PROVIDER SPECIALTY LIST ADULT
Colorectal Surgery
Colorectal Surgery
Gastroenterology
Internal Medicine
Gastroenterology

## 2022-07-08 ENCOUNTER — TRANSCRIPTION ENCOUNTER (OUTPATIENT)
Age: 62
End: 2022-07-08

## 2022-07-08 VITALS
SYSTOLIC BLOOD PRESSURE: 154 MMHG | RESPIRATION RATE: 18 BRPM | HEART RATE: 80 BPM | DIASTOLIC BLOOD PRESSURE: 93 MMHG | TEMPERATURE: 99 F | OXYGEN SATURATION: 98 %

## 2022-07-08 LAB
ALBUMIN SERPL ELPH-MCNC: 3.2 G/DL — LOW (ref 3.3–5.2)
ALP SERPL-CCNC: 448 U/L — HIGH (ref 40–120)
ALT FLD-CCNC: 453 U/L — HIGH
ANA PAT FLD IF-IMP: ABNORMAL
ANA TITR SER: ABNORMAL
ANION GAP SERPL CALC-SCNC: 13 MMOL/L — SIGNIFICANT CHANGE UP (ref 5–17)
AST SERPL-CCNC: 293 U/L — HIGH
BILIRUB SERPL-MCNC: 2.5 MG/DL — HIGH (ref 0.4–2)
BUN SERPL-MCNC: 11.1 MG/DL — SIGNIFICANT CHANGE UP (ref 8–20)
CALCIUM SERPL-MCNC: 8.5 MG/DL — LOW (ref 8.6–10.2)
CHLORIDE SERPL-SCNC: 96 MMOL/L — LOW (ref 98–107)
CO2 SERPL-SCNC: 20 MMOL/L — LOW (ref 22–29)
CREAT SERPL-MCNC: 0.88 MG/DL — SIGNIFICANT CHANGE UP (ref 0.5–1.3)
CULTURE RESULTS: SIGNIFICANT CHANGE UP
EGFR: 97 ML/MIN/1.73M2 — SIGNIFICANT CHANGE UP
GLUCOSE SERPL-MCNC: 110 MG/DL — HIGH (ref 70–99)
LKM AB SER-ACNC: <20.1 UNITS — SIGNIFICANT CHANGE UP (ref 0–20)
MAGNESIUM SERPL-MCNC: 1.5 MG/DL — LOW (ref 1.6–2.6)
POTASSIUM SERPL-MCNC: 3.5 MMOL/L — SIGNIFICANT CHANGE UP (ref 3.5–5.3)
POTASSIUM SERPL-SCNC: 3.5 MMOL/L — SIGNIFICANT CHANGE UP (ref 3.5–5.3)
PROT SERPL-MCNC: 6.6 G/DL — SIGNIFICANT CHANGE UP (ref 6.6–8.7)
SODIUM SERPL-SCNC: 129 MMOL/L — LOW (ref 135–145)

## 2022-07-08 PROCEDURE — 86255 FLUORESCENT ANTIBODY SCREEN: CPT

## 2022-07-08 PROCEDURE — 87086 URINE CULTURE/COLONY COUNT: CPT

## 2022-07-08 PROCEDURE — 96374 THER/PROPH/DIAG INJ IV PUSH: CPT

## 2022-07-08 PROCEDURE — 0225U NFCT DS DNA&RNA 21 SARSCOV2: CPT

## 2022-07-08 PROCEDURE — 36415 COLL VENOUS BLD VENIPUNCTURE: CPT

## 2022-07-08 PROCEDURE — 83735 ASSAY OF MAGNESIUM: CPT

## 2022-07-08 PROCEDURE — 99239 HOSP IP/OBS DSCHRG MGMT >30: CPT

## 2022-07-08 PROCEDURE — 86376 MICROSOMAL ANTIBODY EACH: CPT

## 2022-07-08 PROCEDURE — 96375 TX/PRO/DX INJ NEW DRUG ADDON: CPT

## 2022-07-08 PROCEDURE — 85027 COMPLETE CBC AUTOMATED: CPT

## 2022-07-08 PROCEDURE — 99285 EMERGENCY DEPT VISIT HI MDM: CPT | Mod: 25

## 2022-07-08 PROCEDURE — 74019 RADEX ABDOMEN 2 VIEWS: CPT

## 2022-07-08 PROCEDURE — 86803 HEPATITIS C AB TEST: CPT

## 2022-07-08 PROCEDURE — 86381 MITOCHONDRIAL ANTIBODY EACH: CPT

## 2022-07-08 PROCEDURE — 81001 URINALYSIS AUTO W/SCOPE: CPT

## 2022-07-08 PROCEDURE — 74177 CT ABD & PELVIS W/CONTRAST: CPT | Mod: MA

## 2022-07-08 PROCEDURE — 87077 CULTURE AEROBIC IDENTIFY: CPT

## 2022-07-08 PROCEDURE — 80053 COMPREHEN METABOLIC PANEL: CPT

## 2022-07-08 PROCEDURE — 86663 EPSTEIN-BARR ANTIBODY: CPT

## 2022-07-08 PROCEDURE — 87529 HSV DNA AMP PROBE: CPT

## 2022-07-08 PROCEDURE — 76705 ECHO EXAM OF ABDOMEN: CPT

## 2022-07-08 PROCEDURE — 83605 ASSAY OF LACTIC ACID: CPT

## 2022-07-08 PROCEDURE — 85610 PROTHROMBIN TIME: CPT

## 2022-07-08 PROCEDURE — 86644 CMV ANTIBODY: CPT

## 2022-07-08 PROCEDURE — 82390 ASSAY OF CERULOPLASMIN: CPT

## 2022-07-08 PROCEDURE — 96376 TX/PRO/DX INJ SAME DRUG ADON: CPT

## 2022-07-08 PROCEDURE — 93005 ELECTROCARDIOGRAM TRACING: CPT

## 2022-07-08 PROCEDURE — 86038 ANTINUCLEAR ANTIBODIES: CPT

## 2022-07-08 PROCEDURE — 82105 ALPHA-FETOPROTEIN SERUM: CPT

## 2022-07-08 PROCEDURE — 86645 CMV ANTIBODY IGM: CPT

## 2022-07-08 PROCEDURE — 82787 IGG 1 2 3 OR 4 EACH: CPT

## 2022-07-08 PROCEDURE — 80074 ACUTE HEPATITIS PANEL: CPT

## 2022-07-08 PROCEDURE — 85025 COMPLETE CBC W/AUTO DIFF WBC: CPT

## 2022-07-08 PROCEDURE — 86665 EPSTEIN-BARR CAPSID VCA: CPT

## 2022-07-08 PROCEDURE — 83690 ASSAY OF LIPASE: CPT

## 2022-07-08 PROCEDURE — 86664 EPSTEIN-BARR NUCLEAR ANTIGEN: CPT

## 2022-07-08 PROCEDURE — 84100 ASSAY OF PHOSPHORUS: CPT

## 2022-07-08 PROCEDURE — 96361 HYDRATE IV INFUSION ADD-ON: CPT

## 2022-07-08 PROCEDURE — 84484 ASSAY OF TROPONIN QUANT: CPT

## 2022-07-08 PROCEDURE — 87507 IADNA-DNA/RNA PROBE TQ 12-25: CPT

## 2022-07-08 RX ORDER — POLYETHYLENE GLYCOL 3350 17 G/17G
17 POWDER, FOR SOLUTION ORAL
Qty: 0 | Refills: 0 | DISCHARGE
Start: 2022-07-08

## 2022-07-08 RX ORDER — CLOPIDOGREL BISULFATE 75 MG/1
1 TABLET, FILM COATED ORAL
Qty: 0 | Refills: 0 | DISCHARGE
Start: 2022-07-08

## 2022-07-08 RX ORDER — MAGNESIUM OXIDE 400 MG ORAL TABLET 241.3 MG
1 TABLET ORAL
Qty: 60 | Refills: 0
Start: 2022-07-08 | End: 2022-08-06

## 2022-07-08 RX ADMIN — PANTOPRAZOLE SODIUM 40 MILLIGRAM(S): 20 TABLET, DELAYED RELEASE ORAL at 11:37

## 2022-07-08 RX ADMIN — Medication 1 TABLET(S): at 11:37

## 2022-07-08 RX ADMIN — AMLODIPINE BESYLATE 10 MILLIGRAM(S): 2.5 TABLET ORAL at 05:49

## 2022-07-08 RX ADMIN — ENOXAPARIN SODIUM 40 MILLIGRAM(S): 100 INJECTION SUBCUTANEOUS at 05:50

## 2022-07-08 RX ADMIN — Medication 100 MILLIGRAM(S): at 05:50

## 2022-07-08 RX ADMIN — POLYETHYLENE GLYCOL 3350 17 GRAM(S): 17 POWDER, FOR SOLUTION ORAL at 05:50

## 2022-07-08 RX ADMIN — CLOPIDOGREL BISULFATE 75 MILLIGRAM(S): 75 TABLET, FILM COATED ORAL at 11:36

## 2022-07-08 RX ADMIN — SIMETHICONE 80 MILLIGRAM(S): 80 TABLET, CHEWABLE ORAL at 05:49

## 2022-07-08 RX ADMIN — Medication 100 MILLIGRAM(S): at 11:36

## 2022-07-08 RX ADMIN — Medication 1000 UNIT(S): at 11:36

## 2022-07-08 RX ADMIN — Medication 1 MILLIGRAM(S): at 11:37

## 2022-07-08 NOTE — DISCHARGE NOTE NURSING/CASE MANAGEMENT/SOCIAL WORK - NSDCPEFALRISK_GEN_ALL_CORE
For information on Fall & Injury Prevention, visit: [email or web address] OR  [email or web address] OR  https://www.cdc.gov/steadi/patient.html

## 2022-07-08 NOTE — DISCHARGE NOTE PROVIDER - NSDCFUSCHEDAPPT_GEN_ALL_CORE_FT
Liu Longo  [facility] Physician [facility]  Mina Alegria  Scheduled Appointment: 07/22/2022    Prakash Mckeon  [facility] Physician [facility]  [facility] [address]  Scheduled Appointment: 09/29/2022

## 2022-07-08 NOTE — DISCHARGE NOTE NURSING/CASE MANAGEMENT/SOCIAL WORK - PATIENT PORTAL LINK FT
You can access the FollowMyHealth Patient Portal offered by [facility] by registering at the following website: [email or web address]. By joining [employer]’s FollowMyHealth portal, you will also be able to view your health information using other applications (apps) compatible with our system.

## 2022-07-08 NOTE — DISCHARGE NOTE PROVIDER - NSDCCPCAREPLAN_GEN_ALL_CORE_FT
PRINCIPAL DISCHARGE DIAGNOSIS  Diagnosis: Abdominal pain  Assessment and Plan of Treatment: likely related to constipation  take miralax as needed over the counter        SECONDARY DISCHARGE DIAGNOSES  Diagnosis: Transaminitis  Assessment and Plan of Treatment: unclear etiology, possibly related to alcohol and simvastatin  HOLD simvastatin until liver enzymes rechecked next week by pmd  limit alcohol use  follow up with GI for further management.     PRINCIPAL DISCHARGE DIAGNOSIS  Diagnosis: Abdominal pain  Assessment and Plan of Treatment: likely related to constipation  take miralax as needed over the counter        SECONDARY DISCHARGE DIAGNOSES  Diagnosis: Transaminitis  Assessment and Plan of Treatment: unclear etiology, possibly related to alcohol and simvastatin or passed gallstone  HOLD simvastatin until liver enzymes rechecked next week by pmd  limit alcohol use  follow up with GI for further management.

## 2022-07-08 NOTE — DISCHARGE NOTE PROVIDER - HOSPITAL COURSE
61 year old male with history of Rectal Adenocarcinoma s/p Chemo + RT + Resection with Diverting Ileostomy had local recurrence underwent Abdominoperitoneal Resection in March 2022, CAD, HLD, HTN and CKD 3 comes with abdominal pain. As per patient, it started 2 days ago, mostly in midabdomen and associated with nausea and vomiting. There is no output in ostomy bag since yesterday so he came to the hospital.   In ER, CT showed increased soft tissue thickening of the rectal pouch may reflect pouchitis in the appropriate clinical setting. No bowel obstruction or free air. Seen by Colorectal Surgery and was recommended bowel regimen and fluid resuscitation. Also noted to have Hypertensive Urgency, Metoprolol and Amlodipine were resumed and he was given a dose of Hydralazine 10 mg IVP. He has been given multiple rounds of pain medication.  Patient does not have rectum or anal sphincter complex following his APR.     Patient treated for constipation with improvement in stoma function. Developed transaminitis, lipitor held and CT w/o gallstones.  notes etoh intake 2 glasses wine daily but daughter states he drinks 1 bottle of wine daily.   LFT's peaked and downtrending. MRCP not yet done but can be done as outpatient.    stable for dc home.

## 2022-07-08 NOTE — DISCHARGE NOTE PROVIDER - CARE PROVIDER_API CALL
Jose Yadav)  Gastroenterology; Internal Medicine  [address] [place] [address], [facility] [address]  [place] [address]  Phone: [phone]  Fax: [phone]  Follow Up Time:

## 2022-07-08 NOTE — DISCHARGE NOTE PROVIDER - NSDCMRMEDTOKEN_GEN_ALL_CORE_FT
amLODIPine 5 mg oral tablet: 1 tab(s) orally once a day  clopidogrel 75 mg oral tablet: 1 tab(s) orally once a day  ferrous sulfate 325 mg (65 mg elemental iron) oral delayed release tablet: 1 tab(s) orally 2 times a day  Metoprolol Succinate  mg oral tablet, extended release: 1 tab(s) orally 2x a day  polyethylene glycol 3350 oral powder for reconstitution: 17 gram(s) orally every 12 hours, As Needed  sodium bicarbonate 650 mg oral tablet: 2 tab(s) orally once a day  Vitamin D3 25 mcg (1000 intl units) oral tablet: 1 tab(s) orally once a day   amLODIPine 5 mg oral tablet: 1 tab(s) orally once a day  clopidogrel 75 mg oral tablet: 1 tab(s) orally once a day  ferrous sulfate 325 mg (65 mg elemental iron) oral delayed release tablet: 1 tab(s) orally 2 times a day  MCRP: dx transaminitis    Metoprolol Succinate  mg oral tablet, extended release: 1 tab(s) orally 2x a day  polyethylene glycol 3350 oral powder for reconstitution: 17 gram(s) orally every 12 hours, As Needed  sodium bicarbonate 650 mg oral tablet: 2 tab(s) orally once a day  Vitamin D3 25 mcg (1000 intl units) oral tablet: 1 tab(s) orally once a day

## 2022-07-08 NOTE — DISCHARGE NOTE PROVIDER - ATTENDING DISCHARGE PHYSICAL EXAM:
TRANSFER - IN REPORT:    Verbal report received from [address] (name) on Travon Commander  being received from ED (unit) for routine progression of care      Report consisted of patients Situation, Background, Assessment and   Recommendations(SBAR). Information from the following report(s) SBAR and Kardex was reviewed with the receiving nurse. Opportunity for questions and clarification was provided. Assessment completed upon patients arrival to unit and care assumed. Attending Discharge Physical Examination:

## 2022-07-09 ENCOUNTER — NON-APPOINTMENT (OUTPATIENT)
Age: 62
End: 2022-07-09

## 2022-07-10 LAB
IGG SERPL-MCNC: 834 MG/DL — SIGNIFICANT CHANGE UP (ref 603–1613)
IGG1 SER-MCNC: 348 MG/DL — SIGNIFICANT CHANGE UP (ref 248–810)
IGG2 SER-MCNC: 270 MG/DL — SIGNIFICANT CHANGE UP (ref 130–555)
IGG3 SER-MCNC: 27 MG/DL — SIGNIFICANT CHANGE UP (ref 15–102)
IGG4 SER-MCNC: 34 MG/DL — SIGNIFICANT CHANGE UP (ref 2–96)

## 2022-07-11 ENCOUNTER — NON-APPOINTMENT (OUTPATIENT)
Age: 62
End: 2022-07-11

## 2022-07-11 ENCOUNTER — OUTPATIENT (OUTPATIENT)
Dept: OUTPATIENT SERVICES | Facility: HOSPITAL | Age: 62
LOS: 1 days | Discharge: HOME | End: 2022-07-11

## 2022-07-11 DIAGNOSIS — Z98.890 OTHER SPECIFIED POSTPROCEDURAL STATES: Chronic | ICD-10-CM

## 2022-07-11 DIAGNOSIS — C20 MALIGNANT NEOPLASM OF RECTUM: ICD-10-CM

## 2022-07-13 LAB — CMV IGG AVIDITY SERPL IA-RTO: SIGNIFICANT CHANGE UP

## 2022-07-22 ENCOUNTER — APPOINTMENT (OUTPATIENT)
Dept: HEMATOLOGY ONCOLOGY | Facility: CLINIC | Age: 62
End: 2022-07-22

## 2022-07-22 ENCOUNTER — RESULT REVIEW (OUTPATIENT)
Age: 62
End: 2022-07-22

## 2022-07-22 VITALS
BODY MASS INDEX: 25.8 KG/M2 | HEART RATE: 85 BPM | DIASTOLIC BLOOD PRESSURE: 83 MMHG | TEMPERATURE: 97.7 F | SYSTOLIC BLOOD PRESSURE: 147 MMHG | OXYGEN SATURATION: 99 % | HEIGHT: 74 IN | WEIGHT: 201 LBS

## 2022-07-22 LAB
BASOPHILS # BLD AUTO: 0 K/UL — SIGNIFICANT CHANGE UP (ref 0–0.2)
BASOPHILS NFR BLD AUTO: 0.6 % — SIGNIFICANT CHANGE UP (ref 0–2)
EOSINOPHIL # BLD AUTO: 0.2 K/UL — SIGNIFICANT CHANGE UP (ref 0–0.5)
EOSINOPHIL NFR BLD AUTO: 2.7 % — SIGNIFICANT CHANGE UP (ref 0–6)
HCT VFR BLD CALC: 38.4 % — LOW (ref 39–50)
HGB BLD-MCNC: 12.1 G/DL — LOW (ref 13–17)
LYMPHOCYTES # BLD AUTO: 1.3 K/UL — SIGNIFICANT CHANGE UP (ref 1–3.3)
LYMPHOCYTES # BLD AUTO: 19.8 % — SIGNIFICANT CHANGE UP (ref 13–44)
MCHC RBC-ENTMCNC: 31.5 G/DL — LOW (ref 32–36)
MCHC RBC-ENTMCNC: 31.7 PG — SIGNIFICANT CHANGE UP (ref 27–34)
MCV RBC AUTO: 100.8 FL — HIGH (ref 80–100)
MONOCYTES # BLD AUTO: 0.6 K/UL — SIGNIFICANT CHANGE UP (ref 0–0.9)
MONOCYTES NFR BLD AUTO: 8.6 % — SIGNIFICANT CHANGE UP (ref 2–14)
NEUTROPHILS # BLD AUTO: 4.6 K/UL — SIGNIFICANT CHANGE UP (ref 1.8–7.4)
NEUTROPHILS NFR BLD AUTO: 68.3 % — SIGNIFICANT CHANGE UP (ref 43–77)
PLATELET # BLD AUTO: 369 K/UL — SIGNIFICANT CHANGE UP (ref 150–400)
RBC # BLD: 3.81 M/UL — LOW (ref 4.2–5.8)
RBC # FLD: 13.8 % — SIGNIFICANT CHANGE UP (ref 10.3–14.5)
WBC # BLD: 6.7 K/UL — SIGNIFICANT CHANGE UP (ref 3.8–10.5)
WBC # FLD AUTO: 6.7 K/UL — SIGNIFICANT CHANGE UP (ref 3.8–10.5)

## 2022-07-22 PROCEDURE — 99214 OFFICE O/P EST MOD 30 MIN: CPT

## 2022-07-22 NOTE — HISTORY OF PRESENT ILLNESS
[de-identified] : \par The patient was diagnosed with adenocarcinoma of the rectum in February 2019 at the age of 58. He began having BRBPR approximately 6 months ago, which he attributed to hemorrhoids seen on his last colonoscopy about 8 years ago. Recently, he noted increased rectal bleeding and was referred for GI evaluation with Dr. Michele Reyes. Colonoscopy revealed a mass 6-7 cm from the anal verge on rigid sigmoidoscopy. The biopsy was consistent with moderately differentiated adenocarcinoma. Endoscopic ultrasound confirmed a 2.8 x 1.8 cm mass, consistent with T2N0. 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 No significant lymphadenopathy in the abdomen. He next saw colorectal surgery, Dr. Adi Garcia, who recommended a transanal resection preceded by neoadjuvant chemoradiation. A CT C/A/P discovered no metastatic disease. \par Patient completed 8 cycles of FOLFOX and chemoRT with Xeloda for adenocarcinoma of the rectum 8/13/19 - 9/23/19. \par . Resection and ileostomy 2/5/20 by Dr. Job Payton ([facility]).\par \par \par  [de-identified] : 2/8/21 sigmoidoscopy by Dr. Payton; rectal polyp, biopsy with pathology showing fragments of adenocarcinoma, at least intramucosal. \par 4/8/21 - identical procedure and results\par \par Patient is strongly desirous of ileostomy reversal, but recognizes that persistence of dysplasia and intramucosal adenocarcinoma on rectal biopsy  the way of that procedure.\par \par \par 12/13/21 sigmoidoscopy by Dr. Payton: rectal tissue showed portions of rectum containing dysplastic/neoplastic epithelium 9HG dysplasia/intramucosal carcinoma at least)\par \par 1/19/22: Thomas is here for follow up. He feels well. Ileostomy is functioning well. He reports that Dr. Payton ([facility]) is recommending APR with permanent colostomy given findings on Dec 2021 sigmoidoscopy findings. but he is hesitant. He would like a 2nd opinion. \par \par 4/12/22: pt underwent APR for recurrent rectal CA 3/23/22 with Dr. Rodriguez. Pathology showed recurrent moderate to poorly differentiated adenocarcinoma, invading through muscularis propria into pericolorectal tissues, <1mm from inked margin, no definitive lymphovascular or perineral invasion, 0/13 nodes.\par Pt is POD 21, still has some abd pain. appetite is poor. ostomy output is normal. He is eger to start playing golf. Denies HA. CP, SOB,  constipation, diarrhea, melena, hematuria, dysuria. \par \par \par 7/22/22: Thomas is here for follow up for rectal cancer s/p resection on 3/23/22. Pt had abd pain on July 4th, no ostomy output for a few days. Admitted to [facility] for workup. T showed increased soft tissue thickening of the rectal pouch may reflect pouchitis in the appropriate clinical setting. No bowel obstruction or free air. Seen by Colorectal Surgery and was recommended bowel regimen and fluid resuscitation. Also noted elevated LFTs, ALP. US did not show cholecystitis or gall stones. Pt improves and was discharged. He still feels fatigue. Has blood tinged mucus discharge from rectum yesterday. Following up with Dr. Rodriguez. \par \par Pt planning to go to [place] in September.

## 2022-07-22 NOTE — ASSESSMENT
[FreeTextEntry1] : 60 year old gentleman with rectal adenocarcinoma (init T2/T3a, 6-7cm from anal verge extending into internal anal sphincter), status post total neoadjuvant therapy with FOLFOX x 8 (yT2/N+) followed by chemoradiation with concurrent capecitabine, completed 9/23/19. Underwent resection and ileostomy on 2/5/20, ypT2 ypN0. \par \par Followup sigmoidoscopy x 3 by Dr. Payton ([facility])noted rectal polyp with biopsy showed fragments of adenocarcinoma, most recently on sigmoidoscopy 12/13/21 with path showing at least HG dysplasia/ intramucosal carcinoma. \par -11/30/21 MRI pelvis reviewed showed 0.7x1.3 cm focal enhancement suspicious for recurrence \par -Dr. Payton recommended APR with permanent colostomy but pt is hesitant. Pt saw Dr. Rodriguez for 2nd opinion and eventually underwent APR on 3/23/22.\par -Pathology showed recurrent moderate to poorly differentiated adenocarcinoma, invading through muscularis propria into pericolorectal tissues, <1mm from inked margin, no definitive lymphovascular or perineral invasion, 0/13 nodes.\par -discussed with pt and wife regarding Xeloda+RT if he is eligible for more RT given close margin and risk of recurrence. Pt expressed he does not want to receive any more CRT. He would rather do active surveillance.\par \par # Surveillance plan:\par -H&P every 3 months for 2y then every 6 months for a total of 5 yr\par -CEA, ctDNA (Sinatera starting in October) every 3 months for 2y then every 6 months for total of 5 yr\par -CT c/ap every 6 months for total of 5 yrs\par -Colonoscopy 1y after sx except if no preoperative colonoscopy due to obstructing lesion, colonoscopy in 3-6 mo\par \par \par # elevated liver enzymes\par -hepatitis panel, EBV were negative\par -US, CT didn’t show cholecytitiis or gall stone\par -will repeat CMP\par -if persistent, follow up with GI\par \par \par RTC in 3 months\par \par \par \par \par

## 2022-07-22 NOTE — PHYSICAL EXAM
[Fully active, able to carry on all pre-disease performance without restriction] : Status 0 - Fully active, able to carry on all pre-disease performance without restriction [Normal] : affect appropriate [de-identified] : Ileostomy

## 2022-07-26 LAB
ALBUMIN SERPL ELPH-MCNC: 4.3 G/DL
ALP BLD-CCNC: 168 U/L
ALT SERPL-CCNC: 28 U/L
ANION GAP SERPL CALC-SCNC: 16 MMOL/L
AST SERPL-CCNC: 23 U/L
BILIRUB SERPL-MCNC: 0.8 MG/DL
BUN SERPL-MCNC: 17 MG/DL
CALCIUM SERPL-MCNC: 9.9 MG/DL
CEA SERPL-MCNC: 4 NG/ML
CHLORIDE SERPL-SCNC: 100 MMOL/L
CO2 SERPL-SCNC: 22 MMOL/L
CREAT SERPL-MCNC: 1.32 MG/DL
EGFR: 61 ML/MIN/1.73M2
GLUCOSE SERPL-MCNC: 103 MG/DL
MAGNESIUM SERPL-MCNC: 1.7 MG/DL
POTASSIUM SERPL-SCNC: 4.3 MMOL/L
PROT SERPL-MCNC: 7.3 G/DL
SODIUM SERPL-SCNC: 138 MMOL/L

## 2022-08-26 ENCOUNTER — APPOINTMENT (OUTPATIENT)
Dept: COLORECTAL SURGERY | Facility: CLINIC | Age: 62
End: 2022-08-26

## 2022-08-26 VITALS
DIASTOLIC BLOOD PRESSURE: 84 MMHG | SYSTOLIC BLOOD PRESSURE: 150 MMHG | RESPIRATION RATE: 16 BRPM | HEART RATE: 65 BPM | HEIGHT: 74 IN | WEIGHT: 205 LBS | TEMPERATURE: 97.2 F | BODY MASS INDEX: 26.31 KG/M2

## 2022-08-26 DIAGNOSIS — T81.30XA DISRUPTION OF WOUND, UNSPECIFIED, INITIAL ENCOUNTER: ICD-10-CM

## 2022-08-26 PROCEDURE — 99214 OFFICE O/P EST MOD 30 MIN: CPT

## 2022-08-27 PROBLEM — T81.30XA DISRUPTION OF PERINEAL WOUND IN MALE: Status: ACTIVE | Noted: 2022-08-27

## 2022-08-27 NOTE — HISTORY OF PRESENT ILLNESS
[FreeTextEntry1] : 61-year-old male who presents for follow-up visit.  He has a history of rectal cancer who which he underwent double anterior resection with diverting ileostomy initially followed by abdominal perineal resection in March 2022 due to local recurrent disease.  He presents today for evaluation of discharge from his perineal incision.  About a week ago, he noticed a large amount of fluid gushing from the area.  He was mostly bloody initially but has subsided and is more of a pink tinge fluid.  No associated fevers or chills.  He did have scans chronic mucus discharge from the area since surgery but it eventually healed.  He was recently hospitalized at [facility] approximately 6 weeks ago with abdominal pain.  CT scan at that time was read as pouchitis in the pelvis although he does not have a pouch.

## 2022-08-30 ENCOUNTER — RX RENEWAL (OUTPATIENT)
Age: 62
End: 2022-08-30

## 2022-09-19 ENCOUNTER — APPOINTMENT (OUTPATIENT)
Dept: VASCULAR SURGERY | Facility: CLINIC | Age: 62
End: 2022-09-19

## 2022-09-19 ENCOUNTER — APPOINTMENT (OUTPATIENT)
Dept: COLORECTAL SURGERY | Facility: CLINIC | Age: 62
End: 2022-09-19

## 2022-09-19 VITALS
SYSTOLIC BLOOD PRESSURE: 147 MMHG | BODY MASS INDEX: 26.95 KG/M2 | TEMPERATURE: 96.6 F | RESPIRATION RATE: 14 BRPM | HEART RATE: 76 BPM | OXYGEN SATURATION: 98 % | HEIGHT: 74 IN | DIASTOLIC BLOOD PRESSURE: 86 MMHG | WEIGHT: 210 LBS

## 2022-09-19 VITALS
OXYGEN SATURATION: 99 % | DIASTOLIC BLOOD PRESSURE: 90 MMHG | BODY MASS INDEX: 26.95 KG/M2 | HEART RATE: 89 BPM | TEMPERATURE: 97.3 F | HEIGHT: 74 IN | SYSTOLIC BLOOD PRESSURE: 188 MMHG | WEIGHT: 210 LBS

## 2022-09-19 DIAGNOSIS — I65.21 OCCLUSION AND STENOSIS OF RIGHT CAROTID ARTERY: ICD-10-CM

## 2022-09-19 PROCEDURE — 99213 OFFICE O/P EST LOW 20 MIN: CPT

## 2022-09-19 PROCEDURE — 17250 CHEM CAUT OF GRANLTJ TISSUE: CPT

## 2022-09-19 PROCEDURE — 93880 EXTRACRANIAL BILAT STUDY: CPT

## 2022-09-19 NOTE — PROCEDURE
[FreeTextEntry1] : 2/9/22 carotid artery duplex: \par right: ICA stent widely patent. \par Left: ICA occluded \par \par 9/19/22 carotid artery duplex: \par right: ICA stent widely patent. \par Left: ICA occluded

## 2022-09-19 NOTE — ASSESSMENT
[Arterial/Venous Disease] : arterial/venous disease [FreeTextEntry1] : 62 yo male s/p right TCAR. Stent patent on duplex today, no stenosis. left ICA is occluded. Pt remains asymptomatic \par \par Plan \par Pt counseled on results of duplex \par Continue Plavix and Simvastatin. \par Plan for repeat carotid artery duplex in 6 months \par \par A total of 20 minutes was spent with patient and coordinating care.\par

## 2022-09-19 NOTE — HISTORY OF PRESENT ILLNESS
[FreeTextEntry1] : 1/5/21: 59 y/o male s/p R TCAR 10/17/19 and left carotid occlusion. He is feeling well. No lightheadedness, dizziness, headaches, abnormal sensation to face, lips or tongue, neurological or memory deficit. BP is a little elevated, however, he has anxiety, treated with Xanax by his PCP. He continues taking Amlodipine, Olmesartan or Metoprolol, ASA 81 mg, Clopidogrel 75 mg QD and Simvastatin 40 mg. \par \par 2/9/22: Pt doing well since last visit. He denies any lightheadedness, dizziness, headaches, abnormal sensation to face, lips or tongue, neurological or memory deficit. He has been complaint with medications. \par \par 9/1922: Pt doing well since last visit. He denies any lightheadedness, dizziness, headaches, abnormal sensation to face, lips or tongue, neurological or memory deficit. He has been complaint with Plavix and Simvastatin. \par \par PSHx:\par R TCAR 10/17/19

## 2022-09-19 NOTE — HISTORY OF PRESENT ILLNESS
[FreeTextEntry1] : 61-year-old male who presents for follow-up visit.  He has a history of rectal cancer who which he underwent double anterior resection with diverting ileostomy initially followed by abdominal perineal resection in March 2022 due to local recurrent disease.  \par \par He is here for follow up of his perineal wound. He continues to have drainage from the area. It is a serosanginous fluid and not pus. No fevers or chills. He reports a bulge around his colostomy. \par \par Recent CEA level is down to 1\par

## 2022-09-19 NOTE — PHYSICAL EXAM
[2+] : right 2+ [Ankle Swelling (On Exam)] : not present [Varicose Veins Of Lower Extremities] : not present [] : not present [de-identified] : NAD. well appearing

## 2022-09-19 NOTE — PHYSICAL EXAM
[Respiratory Effort] : normal respiratory effort [Calm] : calm [de-identified] : Soft, nontender, nondistended.  Healthy colostomy in place.  parastomal hernia [de-identified] : 1 cm opening at the most anterior aspect of his perineal incision with granulation tissue.  Treated with silver nitrate.  There is no surrounding erythema or fluctuance. [de-identified] : Well-appearing, in no distress [de-identified] : Normocephalic, atraumatic [de-identified] : Moves extremities without difficulty [de-identified] : Warm and dry [de-identified] : Alert and oriented x3

## 2022-09-29 ENCOUNTER — APPOINTMENT (OUTPATIENT)
Dept: INTERNAL MEDICINE | Facility: CLINIC | Age: 62
End: 2022-09-29

## 2022-09-29 VITALS
SYSTOLIC BLOOD PRESSURE: 120 MMHG | WEIGHT: 205 LBS | OXYGEN SATURATION: 98 % | RESPIRATION RATE: 12 BRPM | BODY MASS INDEX: 26.31 KG/M2 | DIASTOLIC BLOOD PRESSURE: 70 MMHG | HEART RATE: 74 BPM | HEIGHT: 74 IN

## 2022-09-29 DIAGNOSIS — E55.9 VITAMIN D DEFICIENCY, UNSPECIFIED: ICD-10-CM

## 2022-09-29 DIAGNOSIS — R79.89 OTHER SPECIFIED ABNORMAL FINDINGS OF BLOOD CHEMISTRY: ICD-10-CM

## 2022-09-29 DIAGNOSIS — E78.5 HYPERLIPIDEMIA, UNSPECIFIED: ICD-10-CM

## 2022-09-29 DIAGNOSIS — Z79.899 OTHER LONG TERM (CURRENT) DRUG THERAPY: ICD-10-CM

## 2022-09-29 PROCEDURE — 99214 OFFICE O/P EST MOD 30 MIN: CPT

## 2022-09-29 RX ORDER — FUROSEMIDE 20 MG/1
20 TABLET ORAL
Qty: 30 | Refills: 0 | Status: DISCONTINUED | COMMUNITY
Start: 2022-06-10 | End: 2022-09-29

## 2022-09-29 RX ORDER — TAMSULOSIN HYDROCHLORIDE 0.4 MG/1
0.4 CAPSULE ORAL
Qty: 30 | Refills: 0 | Status: DISCONTINUED | COMMUNITY
Start: 2022-05-06 | End: 2022-09-29

## 2022-09-29 RX ORDER — AMOXICILLIN AND CLAVULANATE POTASSIUM 875; 125 MG/1; MG/1
875-125 TABLET, COATED ORAL
Qty: 14 | Refills: 0 | Status: DISCONTINUED | COMMUNITY
Start: 2022-04-21 | End: 2022-09-29

## 2022-09-29 NOTE — HISTORY OF PRESENT ILLNESS
[FreeTextEntry1] : Followup history of rectal cancer, CAD and carotid disease.\par also hypertension and hyperlipidemia [de-identified] : 62-year-old male diagnosed with rectal adenocarcinoma in 2019 status post chemotherapy and radiation therapy with resection and ileostomy February 2020. He has local recurrence therefore had a abdominal peritoneal resection with end colostomy March 2022.\par Considering everything he had done fairly well with this.\par \par Other significant history is CAD and carotid disease.\par The patient had a circumflex stent in 2005 and LAD stent in 2017 and has been stable since.\par Nuclear stress test October 2019 = negative.\par Continues on Plavix\par \par Also carotid disease with totally occluded left internal carotid artery and status post right carotid endarterectomy October 2019 with most recent vascular evaluation January 2022 showing right moderate stenosis without change.\par \par For hypertension he takes amlodipine and metoprolol and for hyperlipidemia he is on simvastatin.\par \par No pulmonary, hepatorenal, hematological or diabetes history.\par \par Overall he is feeling well without chest pain, palpitations, shortness of breath or edema.

## 2022-09-29 NOTE — ASSESSMENT
[FreeTextEntry1] : 62-year-old male with CAD status post stenting and carotid disease currently stable without evidence of cardiac decompensation. Followup with cardiology as scheduled.\par \par Hypertension controlled\par Hyperlipidemia on atorvastatin\par \par rectal cancer with recurrence status post abdominal Perineal resection with colostomy March 2022.\par \par Prescription given for blood work\par Followup in 6 months

## 2022-09-29 NOTE — PHYSICAL EXAM
[No Acute Distress] : no acute distress [Well-Appearing] : well-appearing [No Respiratory Distress] : no respiratory distress  [No Accessory Muscle Use] : no accessory muscle use [Clear to Auscultation] : lungs were clear to auscultation bilaterally [Normal Rate] : normal rate  [Regular Rhythm] : with a regular rhythm [No Focal Deficits] : no focal deficits [Normal Affect] : the affect was normal [Soft] : abdomen soft [Non Tender] : non-tender [de-identified] : Trace edema ( chronic) [de-identified] : Colostomy

## 2022-10-12 ENCOUNTER — NON-APPOINTMENT (OUTPATIENT)
Age: 62
End: 2022-10-12

## 2022-10-26 ENCOUNTER — RX RENEWAL (OUTPATIENT)
Age: 62
End: 2022-10-26

## 2022-11-04 ENCOUNTER — APPOINTMENT (OUTPATIENT)
Dept: COLORECTAL SURGERY | Facility: CLINIC | Age: 62
End: 2022-11-04

## 2022-11-04 VITALS
HEART RATE: 78 BPM | WEIGHT: 210 LBS | TEMPERATURE: 97.5 F | BODY MASS INDEX: 26.95 KG/M2 | HEIGHT: 74 IN | SYSTOLIC BLOOD PRESSURE: 151 MMHG | RESPIRATION RATE: 14 BRPM | DIASTOLIC BLOOD PRESSURE: 88 MMHG

## 2022-11-04 PROCEDURE — 99214 OFFICE O/P EST MOD 30 MIN: CPT

## 2022-11-06 NOTE — PHYSICAL EXAM
[No Rash or Lesion] : No rash or lesion [Alert] : alert [Oriented to Person] : oriented to person [Oriented to Place] : oriented to place [Oriented to Time] : oriented to time [Calm] : calm [de-identified] :  healed incisions colostomy in place [de-identified] : Looks well in no distress, of stated age.

## 2022-11-19 ENCOUNTER — NON-APPOINTMENT (OUTPATIENT)
Age: 62
End: 2022-11-19

## 2022-11-21 ENCOUNTER — NON-APPOINTMENT (OUTPATIENT)
Age: 62
End: 2022-11-21

## 2022-11-28 ENCOUNTER — OUTPATIENT (OUTPATIENT)
Dept: OUTPATIENT SERVICES | Facility: HOSPITAL | Age: 62
LOS: 1 days | Discharge: HOME | End: 2022-11-28

## 2022-11-28 DIAGNOSIS — Z98.890 OTHER SPECIFIED POSTPROCEDURAL STATES: Chronic | ICD-10-CM

## 2022-11-28 DIAGNOSIS — C20 MALIGNANT NEOPLASM OF RECTUM: ICD-10-CM

## 2022-12-01 ENCOUNTER — LABORATORY RESULT (OUTPATIENT)
Age: 62
End: 2022-12-01

## 2022-12-01 ENCOUNTER — APPOINTMENT (OUTPATIENT)
Dept: HEMATOLOGY ONCOLOGY | Facility: CLINIC | Age: 62
End: 2022-12-01

## 2022-12-01 ENCOUNTER — RESULT REVIEW (OUTPATIENT)
Age: 62
End: 2022-12-01

## 2022-12-01 VITALS
TEMPERATURE: 97.1 F | OXYGEN SATURATION: 100 % | HEIGHT: 74 IN | HEART RATE: 96 BPM | DIASTOLIC BLOOD PRESSURE: 96 MMHG | BODY MASS INDEX: 26.06 KG/M2 | SYSTOLIC BLOOD PRESSURE: 174 MMHG | WEIGHT: 203.02 LBS

## 2022-12-01 LAB
BASOPHILS # BLD AUTO: 0.1 K/UL — SIGNIFICANT CHANGE UP (ref 0–0.2)
BASOPHILS NFR BLD AUTO: 0.7 % — SIGNIFICANT CHANGE UP (ref 0–2)
EOSINOPHIL # BLD AUTO: 0.3 K/UL — SIGNIFICANT CHANGE UP (ref 0–0.5)
EOSINOPHIL NFR BLD AUTO: 2.8 % — SIGNIFICANT CHANGE UP (ref 0–6)
HCT VFR BLD CALC: 42.5 % — SIGNIFICANT CHANGE UP (ref 39–50)
HGB BLD-MCNC: 14 G/DL — SIGNIFICANT CHANGE UP (ref 13–17)
LYMPHOCYTES # BLD AUTO: 1.4 K/UL — SIGNIFICANT CHANGE UP (ref 1–3.3)
LYMPHOCYTES # BLD AUTO: 12.7 % — LOW (ref 13–44)
MCHC RBC-ENTMCNC: 33 G/DL — SIGNIFICANT CHANGE UP (ref 32–36)
MCHC RBC-ENTMCNC: 33.3 PG — SIGNIFICANT CHANGE UP (ref 27–34)
MCV RBC AUTO: 101.1 FL — HIGH (ref 80–100)
MONOCYTES # BLD AUTO: 0.4 K/UL — SIGNIFICANT CHANGE UP (ref 0–0.9)
MONOCYTES NFR BLD AUTO: 3.8 % — SIGNIFICANT CHANGE UP (ref 2–14)
NEUTROPHILS # BLD AUTO: 8.6 K/UL — HIGH (ref 1.8–7.4)
NEUTROPHILS NFR BLD AUTO: 80 % — HIGH (ref 43–77)
PLATELET # BLD AUTO: 332 K/UL — SIGNIFICANT CHANGE UP (ref 150–400)
RBC # BLD: 4.2 M/UL — SIGNIFICANT CHANGE UP (ref 4.2–5.8)
RBC # FLD: 12 % — SIGNIFICANT CHANGE UP (ref 10.3–14.5)
WBC # BLD: 10.7 K/UL — HIGH (ref 3.8–10.5)
WBC # FLD AUTO: 10.7 K/UL — HIGH (ref 3.8–10.5)

## 2022-12-01 PROCEDURE — 99213 OFFICE O/P EST LOW 20 MIN: CPT

## 2022-12-01 NOTE — HISTORY OF PRESENT ILLNESS
[de-identified] : \par The patient was diagnosed with adenocarcinoma of the rectum in February 2019 at the age of 58. He began having BRBPR approximately 6 months ago, which he attributed to hemorrhoids seen on his last colonoscopy about 8 years ago. Recently, he noted increased rectal bleeding and was referred for GI evaluation with Dr. Michele Reyes. Colonoscopy revealed a mass 6-7 cm from the anal verge on rigid sigmoidoscopy. The biopsy was consistent with moderately differentiated adenocarcinoma. Endoscopic ultrasound confirmed a 2.8 x 1.8 cm mass, consistent with T2N0. 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 No significant lymphadenopathy in the abdomen. He next saw colorectal surgery, Dr. Adi Garcia, who recommended a transanal resection preceded by neoadjuvant chemoradiation. A CT C/A/P discovered no metastatic disease. \par Patient completed 8 cycles of FOLFOX and chemoRT with Xeloda for adenocarcinoma of the rectum 8/13/19 - 9/23/19. \par . Resection and ileostomy 2/5/20 by Dr. Job Payton ([facility] [place]par \par \par  [de-identified] : 2/8/21 sigmoidoscopy by Dr. Payton; rectal polyp, biopsy with pathology showing fragments of adenocarcinoma, at least intramucosal. \par 4/8/21 - identical procedure and results\par \par Patient is strongly desirous of ileostomy reversal, but recognizes that persistence of dysplasia and intramucosal adenocarcinoma on rectal biopsy  the way of that procedure.\par \par \par 12/13/21 sigmoidoscopy by Dr. Payton: rectal tissue showed portions of rectum containing dysplastic/neoplastic epithelium 9HG dysplasia/intramucosal carcinoma at least)\par \par 1/19/22: Thomas is here for follow up. He feels well. Ileostomy is functioning well. He reports that Dr. Payton ([facility]) is recommending APR with permanent colostomy given findings on Dec 2021 sigmoidoscopy findings. but he is hesitant. He would like a 2nd opinion. \par \par 4/12/22: pt underwent APR for recurrent rectal CA 3/23/22 with Dr. Rodriguez. Pathology showed recurrent moderate to poorly differentiated adenocarcinoma, invading through muscularis propria into pericolorectal tissues, <1mm from inked margin, no definitive lymphovascular or perineral invasion, 0/13 nodes.\par Pt is POD 21, still has some abd pain. appetite is poor. ostomy output is normal. He is eger to start playing golf. Denies HA. CP, SOB,  constipation, diarrhea, melena, hematuria, dysuria. \par \par \par 7/22/22: Thomas is here for follow up for rectal cancer s/p resection on 3/23/22. Pt had abd pain on July 4th, no ostomy output for a few days. Admitted to [facility] for workup. T showed increased soft tissue thickening of the rectal pouch may reflect pouchitis in the appropriate clinical setting. No bowel obstruction or free air. Seen by Colorectal Surgery and was recommended bowel regimen and fluid resuscitation. Also noted elevated LFTs, ALP. US did not show cholecystitis or gall stones. Pt improves and was discharged. He still feels fatigue. Has blood tinged mucus discharge from rectum yesterday. Following up with Dr. Rodriguez. \par \par Pt planning to go to [place] in September.\par \par 12/1/22: Thomas is here for follow up for rectal cancer s/p JASON and resection on 3/23/22. Pt feels well. Appetite is good. Ostomy output is normal. Denies HA. CP, SOB, abd pain, constipation, diarrhea, melena, hematuria, dysuria.

## 2022-12-01 NOTE — PHYSICAL EXAM
[Fully active, able to carry on all pre-disease performance without restriction] : Status 0 - Fully active, able to carry on all pre-disease performance without restriction [Normal] : affect appropriate [de-identified] : Ileostomy

## 2022-12-01 NOTE — ASSESSMENT
[FreeTextEntry1] : 60 year old gentleman with rectal adenocarcinoma (init T2/T3a, 6-7cm from anal verge extending into internal anal sphincter), status post total neoadjuvant therapy with FOLFOX x 8 (yT2/N+) followed by chemoradiation with concurrent capecitabine, completed 9/23/19. Underwent resection and ileostomy on 2/5/20, ypT2 ypN0. \par \par Followup sigmoidoscopy x 3 by Dr. Payton ([facility])noted rectal polyp with biopsy showed fragments of adenocarcinoma, most recently on sigmoidoscopy 12/13/21 with path showing at least HG dysplasia/ intramucosal carcinoma. \par -11/30/21 MRI pelvis reviewed showed 0.7x1.3 cm focal enhancement suspicious for recurrence \par -Dr. Payton recommended APR with permanent colostomy but pt is hesitant. Pt saw Dr. Rodriguez for 2nd opinion and eventually underwent APR on 3/23/22.\par -Pathology showed recurrent moderate to poorly differentiated adenocarcinoma, invading through muscularis propria into pericolorectal tissues, <1mm from inked margin, no definitive lymphovascular or perineural invasion, 0/13 nodes.\par -discussed with pt and wife regarding Xeloda+RT if he is eligible for more RT given close margin and risk of recurrence. Pt expressed he does not want to receive any more CRT. He would rather do active surveillance.\par \par # Surveillance plan:\par -H&P every 3 months for 2y then every 6 months for a total of 5 yr\par -CEA, ctDNA (Sinatera starting in Dec) every 3 months for 2y then every 6 months for total of 5 yr\par -CT c/ap every 6 months for total of 5 yrs\par -Colonoscopy 1y after sx\par \par \par \par \par fu with Dr. hernández  in 3 months\par \par \par \par \par

## 2022-12-02 ENCOUNTER — APPOINTMENT (OUTPATIENT)
Dept: COLORECTAL SURGERY | Facility: CLINIC | Age: 62
End: 2022-12-02

## 2022-12-02 VITALS
BODY MASS INDEX: 26.31 KG/M2 | SYSTOLIC BLOOD PRESSURE: 106 MMHG | HEART RATE: 94 BPM | TEMPERATURE: 98 F | HEIGHT: 74 IN | WEIGHT: 205 LBS | RESPIRATION RATE: 16 BRPM | DIASTOLIC BLOOD PRESSURE: 71 MMHG

## 2022-12-02 PROCEDURE — 17250 CHEM CAUT OF GRANLTJ TISSUE: CPT

## 2022-12-02 NOTE — PHYSICAL EXAM
[No Rash or Lesion] : No rash or lesion [Alert] : alert [Oriented to Person] : oriented to person [Oriented to Place] : oriented to place [Oriented to Time] : oriented to time [Calm] : calm [de-identified] :  healed incisions colostomy in place [de-identified] : Looks well in no distress, of stated age.

## 2022-12-02 NOTE — HISTORY OF PRESENT ILLNESS
[FreeTextEntry1] : 62-year-old male with recurrent rectal cancer post APR doing well overall.  Found to have a slight increase in CEA level

## 2022-12-02 NOTE — PROCEDURE
[FreeTextEntry1] : Cauterization of perineal granulation tissue of wound was performed without complications detailed exam

## 2022-12-05 LAB — CEA SERPL-MCNC: 5.9 NG/ML

## 2022-12-05 NOTE — ED ADULT NURSE NOTE - CAS DISCH ACCOMP BY
Skyrizi Counseling: I discussed with the patient the risks of risankizumab-rzaa including but not limited to immunosuppression, and serious infections.  The patient understands that monitoring is required including a PPD at baseline and must alert us or the primary physician if symptoms of infection or other concerning signs are noted. MD/Transport

## 2022-12-30 ENCOUNTER — NON-APPOINTMENT (OUTPATIENT)
Age: 62
End: 2022-12-30

## 2023-01-19 RX ORDER — METOPROLOL TARTRATE 50 MG
1 TABLET ORAL
Qty: 0 | Refills: 0 | DISCHARGE

## 2023-01-19 RX ORDER — VALSARTAN 80 MG/1
1 TABLET ORAL
Qty: 0 | Refills: 0 | DISCHARGE

## 2023-01-19 RX ORDER — SIMVASTATIN 20 MG/1
1 TABLET, FILM COATED ORAL
Qty: 0 | Refills: 0 | DISCHARGE

## 2023-01-19 RX ORDER — SODIUM BICARBONATE 1 MEQ/ML
1 SYRINGE (ML) INTRAVENOUS
Qty: 0 | Refills: 0 | DISCHARGE

## 2023-01-19 RX ORDER — FERROUS SULFATE 325(65) MG
1 TABLET ORAL
Qty: 0 | Refills: 0 | DISCHARGE

## 2023-01-19 NOTE — ASU PATIENT PROFILE, ADULT - FALL HARM RISK - UNIVERSAL INTERVENTIONS
Bed in lowest position, wheels locked, appropriate side rails in place/Call bell, personal items and telephone in reach/Instruct patient to call for assistance before getting out of bed or chair/Non-slip footwear when patient is out of bed/[place] to call system/Physically safe environment - no spills, clutter or unnecessary equipment/Purposeful Proactive Rounding/Room/bathroom lighting operational, light cord in reach

## 2023-01-20 ENCOUNTER — RESULT REVIEW (OUTPATIENT)
Age: 63
End: 2023-01-20

## 2023-01-20 ENCOUNTER — OUTPATIENT (OUTPATIENT)
Dept: INPATIENT UNIT | Facility: HOSPITAL | Age: 63
LOS: 1 days | Discharge: HOME | End: 2023-01-20
Payer: COMMERCIAL

## 2023-01-20 ENCOUNTER — TRANSCRIPTION ENCOUNTER (OUTPATIENT)
Age: 63
End: 2023-01-20

## 2023-01-20 VITALS
SYSTOLIC BLOOD PRESSURE: 130 MMHG | OXYGEN SATURATION: 100 % | HEART RATE: 84 BPM | WEIGHT: 203.05 LBS | RESPIRATION RATE: 16 BRPM | DIASTOLIC BLOOD PRESSURE: 64 MMHG | HEIGHT: 74 IN | TEMPERATURE: 98 F

## 2023-01-20 VITALS
OXYGEN SATURATION: 100 % | RESPIRATION RATE: 16 BRPM | TEMPERATURE: 97 F | SYSTOLIC BLOOD PRESSURE: 138 MMHG | DIASTOLIC BLOOD PRESSURE: 70 MMHG | HEART RATE: 84 BPM

## 2023-01-20 DIAGNOSIS — Z98.890 OTHER SPECIFIED POSTPROCEDURAL STATES: Chronic | ICD-10-CM

## 2023-01-20 DIAGNOSIS — C20 MALIGNANT NEOPLASM OF RECTUM: ICD-10-CM

## 2023-01-20 DIAGNOSIS — C18.9 MALIGNANT NEOPLASM OF COLON, UNSPECIFIED: ICD-10-CM

## 2023-01-20 LAB
ANION GAP SERPL CALC-SCNC: 7 MMOL/L — SIGNIFICANT CHANGE UP (ref 5–17)
BUN SERPL-MCNC: 15 MG/DL — SIGNIFICANT CHANGE UP (ref 7–23)
CALCIUM SERPL-MCNC: 9 MG/DL — SIGNIFICANT CHANGE UP (ref 8.5–10.1)
CHLORIDE SERPL-SCNC: 101 MMOL/L — SIGNIFICANT CHANGE UP (ref 96–108)
CO2 SERPL-SCNC: 26 MMOL/L — SIGNIFICANT CHANGE UP (ref 22–31)
CREAT SERPL-MCNC: 1.34 MG/DL — HIGH (ref 0.5–1.3)
EGFR: 59 ML/MIN/1.73M2 — LOW
GLUCOSE SERPL-MCNC: 136 MG/DL — HIGH (ref 70–99)
HCT VFR BLD CALC: 34.7 % — LOW (ref 39–50)
HGB BLD-MCNC: 11.5 G/DL — LOW (ref 13–17)
INR BLD: 1.02 RATIO — SIGNIFICANT CHANGE UP (ref 0.88–1.16)
MCHC RBC-ENTMCNC: 32.8 PG — SIGNIFICANT CHANGE UP (ref 27–34)
MCHC RBC-ENTMCNC: 33.1 GM/DL — SIGNIFICANT CHANGE UP (ref 32–36)
MCV RBC AUTO: 98.9 FL — SIGNIFICANT CHANGE UP (ref 80–100)
PLATELET # BLD AUTO: 463 K/UL — HIGH (ref 150–400)
POTASSIUM SERPL-MCNC: 4.4 MMOL/L — SIGNIFICANT CHANGE UP (ref 3.5–5.3)
POTASSIUM SERPL-SCNC: 4.4 MMOL/L — SIGNIFICANT CHANGE UP (ref 3.5–5.3)
PROTHROM AB SERPL-ACNC: 11.8 SEC — SIGNIFICANT CHANGE UP (ref 10.5–13.4)
RBC # BLD: 3.51 M/UL — LOW (ref 4.2–5.8)
RBC # FLD: 13.2 % — SIGNIFICANT CHANGE UP (ref 10.3–14.5)
SODIUM SERPL-SCNC: 134 MMOL/L — LOW (ref 135–145)
WBC # BLD: 11.8 K/UL — HIGH (ref 3.8–10.5)
WBC # FLD AUTO: 11.8 K/UL — HIGH (ref 3.8–10.5)

## 2023-01-20 PROCEDURE — 36415 COLL VENOUS BLD VENIPUNCTURE: CPT

## 2023-01-20 PROCEDURE — 77012 CT SCAN FOR NEEDLE BIOPSY: CPT | Mod: 26

## 2023-01-20 PROCEDURE — 88172 CYTP DX EVAL FNA 1ST EA SITE: CPT

## 2023-01-20 PROCEDURE — 93005 ELECTROCARDIOGRAM TRACING: CPT

## 2023-01-20 PROCEDURE — 93010 ELECTROCARDIOGRAM REPORT: CPT

## 2023-01-20 PROCEDURE — 88342 IMHCHEM/IMCYTCHM 1ST ANTB: CPT | Mod: 26

## 2023-01-20 PROCEDURE — 88305 TISSUE EXAM BY PATHOLOGIST: CPT

## 2023-01-20 PROCEDURE — 20206 BIOPSY MUSCLE PERQ NEEDLE: CPT

## 2023-01-20 PROCEDURE — 88342 IMHCHEM/IMCYTCHM 1ST ANTB: CPT

## 2023-01-20 PROCEDURE — 88341 IMHCHEM/IMCYTCHM EA ADD ANTB: CPT | Mod: 26

## 2023-01-20 PROCEDURE — 80048 BASIC METABOLIC PNL TOTAL CA: CPT

## 2023-01-20 PROCEDURE — 88341 IMHCHEM/IMCYTCHM EA ADD ANTB: CPT

## 2023-01-20 PROCEDURE — 77012 CT SCAN FOR NEEDLE BIOPSY: CPT

## 2023-01-20 PROCEDURE — 85610 PROTHROMBIN TIME: CPT

## 2023-01-20 PROCEDURE — 85027 COMPLETE CBC AUTOMATED: CPT

## 2023-01-20 PROCEDURE — 88305 TISSUE EXAM BY PATHOLOGIST: CPT | Mod: 26

## 2023-01-20 RX ORDER — FERROUS SULFATE 325(65) MG
0 TABLET ORAL
Qty: 0 | Refills: 0 | DISCHARGE

## 2023-01-20 RX ORDER — LOPERAMIDE HCL 2 MG
1 TABLET ORAL
Qty: 0 | Refills: 0 | DISCHARGE

## 2023-01-20 RX ORDER — SODIUM CHLORIDE 9 MG/ML
1000 INJECTION, SOLUTION INTRAVENOUS
Refills: 0 | Status: DISCONTINUED | OUTPATIENT
Start: 2023-01-20 | End: 2023-01-20

## 2023-01-20 RX ORDER — ACETAMINOPHEN 500 MG
650 TABLET ORAL EVERY 6 HOURS
Refills: 0 | Status: DISCONTINUED | OUTPATIENT
Start: 2023-01-20 | End: 2023-01-20

## 2023-01-20 RX ORDER — ONDANSETRON 8 MG/1
4 TABLET, FILM COATED ORAL ONCE
Refills: 0 | Status: DISCONTINUED | OUTPATIENT
Start: 2023-01-20 | End: 2023-01-20

## 2023-01-20 RX ORDER — ERGOCALCIFEROL 1.25 MG/1
1 CAPSULE ORAL
Qty: 0 | Refills: 0 | DISCHARGE

## 2023-01-20 RX ORDER — OXYCODONE HYDROCHLORIDE 5 MG/1
5 TABLET ORAL ONCE
Refills: 0 | Status: DISCONTINUED | OUTPATIENT
Start: 2023-01-20 | End: 2023-01-20

## 2023-01-20 RX ORDER — OXYCODONE HYDROCHLORIDE 5 MG/1
10 TABLET ORAL ONCE
Refills: 0 | Status: DISCONTINUED | OUTPATIENT
Start: 2023-01-20 | End: 2023-01-20

## 2023-01-20 RX ORDER — FENTANYL CITRATE 50 UG/ML
50 INJECTION INTRAVENOUS
Refills: 0 | Status: DISCONTINUED | OUTPATIENT
Start: 2023-01-20 | End: 2023-01-20

## 2023-01-20 NOTE — CHART NOTE - NSCHARTNOTEFT_GEN_A_CORE
Vascular & Interventional Radiology Pre-Procedure Note    Procedure Name: presacral mass biopsy    HPI: 62y Male with presacral mass    The patient was diagnosed with adenocarcinoma of the rectum in February 2019 at the age of 58. He began having BRBPR approximately 6 months ago, which he attributed to hemorrhoids seen on his last colonoscopy about 8 years ago. Recently, he noted increased rectal bleeding and was referred for GI evaluation with Dr. Michele Reyes. Colonoscopy revealed a mass 6-7 cm from the anal verge on rigid sigmoidoscopy. The biopsy was consistent with moderately differentiated adenocarcinoma. Endoscopic ultrasound confirmed a 2.8 x 1.8 cm mass, consistent with T2N0. 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 No significant lymphadenopathy in the abdomen. He next saw colorectal surgery, Dr. Adi Garcia, who recommended a transanal resection preceded by neoadjuvant chemoradiation. A CT C/A/P discovered no metastatic disease. Patient completed 8 cycles of FOLFOX and chemoRT with Xeloda for adenocarcinoma of the rectum 8/13/19 - 9/23/19. Resection and ileostomy 2/5/20 by Dr. Job Payton.  Sigmoidoscopy several months later showed intramucosal adenoca.  pt underwent APR for recurrent rectal CA 3/23/22. Pathology showed recurrent moderate to poorly differentiated adenocarcinoma, invading through muscularis propria into pericolorectal tissues. CT in 7/22 showed increased soft tissue thickening of the rectal pouch. More recent CT shows growing presacral mass.       PMH/PSH  Malignant neoplasm of rectum  Chest pain  HTN (hypertension)  HLD (hyperlipidemia)  CAD (coronary artery disease)  Mitral regurgitation  Depression  Neuropathy  Stented coronary artery  H/O carotid stenosis  H/O renal calculi  DANO (iron deficiency anemia)  Umbilical hernia  Splenic artery aneurysm  History of CEA (carotid endarterectomy)  History of removal of Port-a-Cath      Allergies: No Known Allergies      Medications (Abx/Cardiac/Anticoagulation/Blood Products)      Data:  188  92.079    T(C): 36.4  HR: 84  BP: 130/64  RR: 16  SpO2: 100%        -WBC 11.80 / HgB 11.5 / Hct 34.7 / Plt 463  -Na 134 / Cl 101 / BUN 15 / Glucose 136  -K 4.4 / CO2 26 / Cr 1.34  -ALT -- / Alk Phos -- / T.Bili --  -INR1.02      Imaging: presacral mass    Plan:   -62y Male presents for presacral mass biopsy  -Risks/Benefits/alternatives explained with the patient and witnessed informed consent obtained.

## 2023-01-20 NOTE — BRIEF OPERATIVE NOTE - OPERATION/FINDINGS
17 g coaxial needle into presacral mass via left transgluteal approach.  18 g biopince core bx taken x 6.

## 2023-01-23 ENCOUNTER — APPOINTMENT (OUTPATIENT)
Dept: INTERNAL MEDICINE | Facility: CLINIC | Age: 63
End: 2023-01-23
Payer: COMMERCIAL

## 2023-01-23 VITALS
SYSTOLIC BLOOD PRESSURE: 117 MMHG | DIASTOLIC BLOOD PRESSURE: 70 MMHG | RESPIRATION RATE: 14 BRPM | WEIGHT: 202 LBS | HEIGHT: 74 IN | OXYGEN SATURATION: 98 % | BODY MASS INDEX: 25.93 KG/M2 | HEART RATE: 92 BPM

## 2023-01-23 DIAGNOSIS — R53.1 WEAKNESS: ICD-10-CM

## 2023-01-23 DIAGNOSIS — R73.01 IMPAIRED FASTING GLUCOSE: ICD-10-CM

## 2023-01-23 DIAGNOSIS — N40.0 BENIGN PROSTATIC HYPERPLASIA WITHOUT LOWER URINARY TRACT SYMPMS: ICD-10-CM

## 2023-01-23 PROCEDURE — 36415 COLL VENOUS BLD VENIPUNCTURE: CPT

## 2023-01-23 PROCEDURE — 99214 OFFICE O/P EST MOD 30 MIN: CPT | Mod: 25

## 2023-01-23 NOTE — PHYSICAL EXAM
[No Acute Distress] : no acute distress [No Respiratory Distress] : no respiratory distress  [No Accessory Muscle Use] : no accessory muscle use [Clear to Auscultation] : lungs were clear to auscultation bilaterally [Normal Rate] : normal rate  [Regular Rhythm] : with a regular rhythm [No Edema] : there was no peripheral edema [No Focal Deficits] : no focal deficits [Normal Affect] : the affect was normal

## 2023-01-23 NOTE — ASSESSMENT
[FreeTextEntry1] : Patient with profound weakness and fatigue at least partly if not all explained by poor sleep.\par Patient told to take 2- 0.25 mg alprazolam tablets and hopefully he will get a better night sleep with this.\par Blood work will be done to assess metabolic status.\par Markedly enlarged prostate on most recent CAT scan which is new and appears to potentially be involved with patient's cancer process.  PSA will be checked.

## 2023-01-23 NOTE — HISTORY OF PRESENT ILLNESS
[FreeTextEntry8] : The patient presents with his daughter secondary to 2 to 3 months of at times profound fatigue but worse over the past 2 to 3 weeks.\par The patient continues to deal with rectal cancer which is locally advanced with most recent CAT scan showing increased size and had a biopsy last week with results pending.\par CT results reviewed noting interval development of extensive heterogeneity and enlargement of the prostate inseparable from the rectum and previously noted presacral soft tissue likely related to disease with prostatic involvement.\par Patient with urinary symptoms and put on tamsulosin by urology without much benefit.\par \par Discussing patient's fatigue with direct questioning with patient stating essentially he sleeps poorly every night.  Between his urinary issues and his mental concerned about his disease issues he is not sleeping well at all.\par He has tried a Xanax 0.25 mg with some but not enough benefit.

## 2023-01-24 DIAGNOSIS — R22.2 LOCALIZED SWELLING, MASS AND LUMP, TRUNK: ICD-10-CM

## 2023-01-25 ENCOUNTER — APPOINTMENT (OUTPATIENT)
Dept: COLORECTAL SURGERY | Facility: CLINIC | Age: 63
End: 2023-01-25
Payer: COMMERCIAL

## 2023-01-25 PROCEDURE — 99443: CPT

## 2023-01-26 ENCOUNTER — NON-APPOINTMENT (OUTPATIENT)
Age: 63
End: 2023-01-26

## 2023-01-27 ENCOUNTER — APPOINTMENT (OUTPATIENT)
Dept: HEMATOLOGY ONCOLOGY | Facility: CLINIC | Age: 63
End: 2023-01-27
Payer: COMMERCIAL

## 2023-01-27 VITALS
DIASTOLIC BLOOD PRESSURE: 76 MMHG | HEART RATE: 100 BPM | BODY MASS INDEX: 25.55 KG/M2 | SYSTOLIC BLOOD PRESSURE: 112 MMHG | HEIGHT: 74 IN | WEIGHT: 199.06 LBS | OXYGEN SATURATION: 98 %

## 2023-01-27 PROCEDURE — 99215 OFFICE O/P EST HI 40 MIN: CPT

## 2023-01-27 NOTE — ASSESSMENT
[FreeTextEntry1] : 62 year old male with pelvic recurrence of rectal cancer. on imaging doesn’t appear to be surgically resectable, with pelvic sidewall and prostate invasion. recommend chemotherapy and possible additional radiation for paliation.

## 2023-01-27 NOTE — DATA REVIEWED
[FreeTextEntry1] : IR biopsy of pelvic mass is positive for adenocarcinoma\par mri shows extensive pelvic recurrence with invasion into prostate.

## 2023-01-27 NOTE — HISTORY OF PRESENT ILLNESS
[FreeTextEntry1] : this is a telehealth visit, consent obtained from patient. 62 year old male who underwent APR for recurrent rectal cancer last year, now with recurrence in the pelvis, confirmed with IR biopsy.\par

## 2023-02-03 ENCOUNTER — NON-APPOINTMENT (OUTPATIENT)
Age: 63
End: 2023-02-03

## 2023-02-03 ENCOUNTER — APPOINTMENT (OUTPATIENT)
Dept: COLORECTAL SURGERY | Facility: CLINIC | Age: 63
End: 2023-02-03
Payer: COMMERCIAL

## 2023-02-03 VITALS
BODY MASS INDEX: 25.28 KG/M2 | TEMPERATURE: 98.2 F | HEIGHT: 74 IN | WEIGHT: 197 LBS | DIASTOLIC BLOOD PRESSURE: 73 MMHG | RESPIRATION RATE: 14 BRPM | OXYGEN SATURATION: 99 % | HEART RATE: 100 BPM | SYSTOLIC BLOOD PRESSURE: 125 MMHG

## 2023-02-03 DIAGNOSIS — K65.1 PERITONEAL ABSCESS: ICD-10-CM

## 2023-02-03 PROCEDURE — 99214 OFFICE O/P EST MOD 30 MIN: CPT

## 2023-02-03 NOTE — PHYSICAL EXAM
[Abdomen Masses] : No abdominal masses [Abdomen Tenderness] : ~T No ~M abdominal tenderness [Alert] : alert [Oriented to Person] : oriented to person [Oriented to Place] : oriented to place [Oriented to Time] : oriented to time [Calm] : calm [de-identified] : looks chronically ill

## 2023-02-03 NOTE — REVIEW OF SYSTEMS
[Feeling Poorly] : feeling poorly [Feeling Tired] : feeling tired [Recent Weight Loss (___ Lbs)] : recent [unfilled] ~Ulb weight loss [As Noted in HPI] : as noted in HPI [Negative] : Heme/Lymph [FreeTextEntry7] : drainage from perineum and pain

## 2023-02-03 NOTE — HISTORY OF PRESENT ILLNESS
[FreeTextEntry1] : patient is with daughter and wife.  62 year old male who underwent APR for recurrent rectal cancer last year, now with recurrence in the pelvis, confirmed with IR biopsy.\par

## 2023-02-08 ENCOUNTER — NON-APPOINTMENT (OUTPATIENT)
Age: 63
End: 2023-02-08

## 2023-02-08 NOTE — PHYSICAL EXAM
[Fully active, able to carry on all pre-disease performance without restriction] : Status 0 - Fully active, able to carry on all pre-disease performance without restriction [Normal] : affect appropriate [de-identified] : Ileostomy

## 2023-02-08 NOTE — HISTORY OF PRESENT ILLNESS
[de-identified] : \par The patient was diagnosed with adenocarcinoma of the rectum in February 2019 at the age of 58. He began having BRBPR approximately 6 months ago, which he attributed to hemorrhoids seen on his last colonoscopy about 8 years ago. Recently, he noted increased rectal bleeding and was referred for GI evaluation with Dr. Michele Reyes. Colonoscopy revealed a mass 6-7 cm from the anal verge on rigid sigmoidoscopy. The biopsy was consistent with moderately differentiated adenocarcinoma. Endoscopic ultrasound confirmed a 2.8 x 1.8 cm mass, consistent with T2N0. 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 No significant lymphadenopathy in the abdomen. He next saw colorectal surgery, Dr. Adi Garcia, who recommended a transanal resection preceded by neoadjuvant chemoradiation. A CT C/A/P discovered no metastatic disease. \par Patient completed 8 cycles of FOLFOX and chemoRT with Xeloda for adenocarcinoma of the rectum 8/13/19 - 9/23/19. \par . Resection and ileostomy 2/5/20 by Dr. Job Payton ([facility]par \par \par  [de-identified] : 2/8/21 sigmoidoscopy by Dr. Payton; rectal polyp, biopsy with pathology showing fragments of adenocarcinoma, at least intramucosal. \par 4/8/21 - identical procedure and results\par \par Patient is strongly desirous of ileostomy reversal, but recognizes that persistence of dysplasia and intramucosal adenocarcinoma on rectal biopsy  the way of that procedure.\par \par \par 12/13/21 sigmoidoscopy by Dr. Payton: rectal tissue showed portions of rectum containing dysplastic/neoplastic epithelium 9HG dysplasia/intramucosal carcinoma at least)\par \par 1/19/22: Thomas is here for follow up. He feels well. Ileostomy is functioning well. He reports that Dr. Payton ([facility]) is recommending APR with permanent colostomy given findings on Dec 2021 sigmoidoscopy findings. but he is hesitant. He would like a 2nd opinion. \par \par 4/12/22: pt underwent APR for recurrent rectal CA 3/23/22 with Dr. Rodriguez. Pathology showed recurrent moderate to poorly differentiated adenocarcinoma, invading through muscularis propria into pericolorectal tissues, <1mm from inked margin, no definitive lymphovascular or perineral invasion, 0/13 nodes.\par Pt is POD 21, still has some abd pain. appetite is poor. ostomy output is normal. He is eger to start playing golf. Denies HA. CP, SOB,  constipation, diarrhea, melena, hematuria, dysuria. \par \par \par 7/22/22: Thomas is here for follow up for rectal cancer s/p resection on 3/23/22. Pt had abd pain on July 4th, no ostomy output for a few days. Admitted to [facility] for workup. T showed increased soft tissue thickening of the rectal pouch may reflect pouchitis in the appropriate clinical setting. No bowel obstruction or free air. Seen by Colorectal Surgery and was recommended bowel regimen and fluid resuscitation. Also noted elevated LFTs, ALP. US did not show cholecystitis or gall stones. Pt improves and was discharged. He still feels fatigue. Has blood tinged mucus discharge from rectum yesterday. Following up with Dr. Rodriguez. \par \par Pt planning to go to [place] in September.\par \par 12/1/22: Thomas is here for follow up for rectal cancer s/p JASON and resection on 3/23/22. Pt feels well. Appetite is good. Ostomy output is normal. Denies HA. CP, SOB, abd pain, constipation, diarrhea, melena, hematuria, dysuria. \par \par 12/22/22: Imaging unfortunately showed evidence of local recurrence. He underwent a biopsy with Dr. Silveira, which confirmed residual disease. \par \par 1/27/23: Patient reports to the hematology clinic to discuss his positive biopsy. The patient states that he feels well and denies worsening abdominal pain, rectal pain, and bleeding.

## 2023-02-08 NOTE — ASSESSMENT
[FreeTextEntry1] : 60 year old gentleman with rectal adenocarcinoma (init T2/T3a, 6-7cm from anal verge extending into internal anal sphincter), status post total neoadjuvant therapy with FOLFOX x 8 (yT2/N+) followed by chemoradiation with concurrent capecitabine, completed 9/23/19. Underwent resection and ileostomy on 2/5/20, ypT2 ypN0. \par \par Followup sigmoidoscopy x 3 by Dr. Payton ([facility])noted rectal polyp with biopsy showed fragments of adenocarcinoma, most recently on sigmoidoscopy 12/13/21 with path showing at least HG dysplasia/ intramucosal carcinoma. \par -11/30/21 MRI pelvis reviewed showed 0.7x1.3 cm focal enhancement suspicious for recurrence \par -Dr. Payton recommended APR with permanent colostomy but pt is hesitant. Pt saw Dr. Rodriguez for 2nd opinion and eventually underwent APR on 3/23/22.\par -Pathology showed recurrent moderate to poorly differentiated adenocarcinoma, invading through muscularis propria into pericolorectal tissues, <1mm from inked margin, no definitive lymphovascular or perineural invasion, 0/13 nodes.\par -discussed with pt and wife regarding Xeloda+RT if he is eligible for more RT given close margin and risk of recurrence. Pt expressed he does not want to receive any more CRT. \par - Imaging on 12/22/22 showed evidence of recurrent disease, which was confirmed via biopsy. Case discussed in TB on 1/27/23, which consensus was to treat with chemotherapy. Given his history of treatment with FOLFOX complicated by neuropathies, I suggested treatment with FOLFIRI. I discussed side effects of treatment, and also discussed utilizing other regimens such as 5-FU/LV+ sammy, and [id number]. The patient is adamantly against treatment especially upon hearing that he would like require treatment indefinitely. He would like some time to think about treatment. Will schedule follow up in 2-3 weeks to discuss treatment again. \par \par # Surveillance plan:\par -H&P every 3 months for 2y then every 6 months for a total of 5 yr\par -CEA, ctDNA ([facility] starting in Dec) every 3 months for 2y then every 6 months for total of 5 yr\par -CT c/ap every 6 months for total of 5 yrs\par -Colonoscopy 1y after sx\par \par \par \par \par fu with Dr. hernández  in 3 months\par \par \par \par \par

## 2023-02-16 ENCOUNTER — APPOINTMENT (OUTPATIENT)
Dept: HEMATOLOGY ONCOLOGY | Facility: CLINIC | Age: 63
End: 2023-02-16

## 2023-02-22 ENCOUNTER — RX RENEWAL (OUTPATIENT)
Age: 63
End: 2023-02-22

## 2023-02-24 ENCOUNTER — RX RENEWAL (OUTPATIENT)
Age: 63
End: 2023-02-24

## 2023-02-24 RX ORDER — SODIUM BICARBONATE 650 MG/1
650 TABLET ORAL
Qty: 360 | Refills: 0 | Status: ACTIVE | COMMUNITY
Start: 2021-02-02 | End: 1900-01-01

## 2023-02-26 ENCOUNTER — INPATIENT (INPATIENT)
Facility: HOSPITAL | Age: 63
LOS: 7 days | Discharge: HOME | DRG: 871 | End: 2023-03-06
Attending: INTERNAL MEDICINE | Admitting: STUDENT IN AN ORGANIZED HEALTH CARE EDUCATION/TRAINING PROGRAM
Payer: COMMERCIAL

## 2023-02-26 VITALS
HEART RATE: 128 BPM | DIASTOLIC BLOOD PRESSURE: 67 MMHG | OXYGEN SATURATION: 98 % | HEIGHT: 74 IN | SYSTOLIC BLOOD PRESSURE: 115 MMHG | TEMPERATURE: 99 F | RESPIRATION RATE: 22 BRPM

## 2023-02-26 DIAGNOSIS — Z98.890 OTHER SPECIFIED POSTPROCEDURAL STATES: Chronic | ICD-10-CM

## 2023-02-26 LAB
ALBUMIN SERPL ELPH-MCNC: 2.8 G/DL — LOW (ref 3.3–5.2)
ALP SERPL-CCNC: 49 U/L — SIGNIFICANT CHANGE UP (ref 40–120)
ALT FLD-CCNC: 124 U/L — HIGH
ANION GAP SERPL CALC-SCNC: 20 MMOL/L — HIGH (ref 5–17)
APPEARANCE UR: CLEAR — SIGNIFICANT CHANGE UP
APTT BLD: 27.7 SEC — SIGNIFICANT CHANGE UP (ref 27.5–35.5)
AST SERPL-CCNC: 106 U/L — HIGH
BACTERIA # UR AUTO: ABNORMAL
BASE EXCESS BLDV CALC-SCNC: -5 MMOL/L — LOW (ref -2–3)
BASOPHILS # BLD AUTO: 0 K/UL — SIGNIFICANT CHANGE UP (ref 0–0.2)
BASOPHILS NFR BLD AUTO: 0 % — SIGNIFICANT CHANGE UP (ref 0–2)
BILIRUB SERPL-MCNC: 2.6 MG/DL — HIGH (ref 0.4–2)
BILIRUB UR-MCNC: NEGATIVE — SIGNIFICANT CHANGE UP
BUN SERPL-MCNC: 17.3 MG/DL — SIGNIFICANT CHANGE UP (ref 8–20)
CA-I SERPL-SCNC: 1.09 MMOL/L — LOW (ref 1.15–1.33)
CALCIUM SERPL-MCNC: 8.4 MG/DL — SIGNIFICANT CHANGE UP (ref 8.4–10.5)
CHLORIDE BLDV-SCNC: 96 MMOL/L — SIGNIFICANT CHANGE UP (ref 96–108)
CHLORIDE SERPL-SCNC: 94 MMOL/L — LOW (ref 96–108)
CO2 SERPL-SCNC: 17 MMOL/L — LOW (ref 22–29)
COLOR SPEC: YELLOW — SIGNIFICANT CHANGE UP
CREAT SERPL-MCNC: 1.24 MG/DL — SIGNIFICANT CHANGE UP (ref 0.5–1.3)
DIFF PNL FLD: ABNORMAL
EGFR: 65 ML/MIN/1.73M2 — SIGNIFICANT CHANGE UP
EOSINOPHIL # BLD AUTO: 0.02 K/UL — SIGNIFICANT CHANGE UP (ref 0–0.5)
EOSINOPHIL NFR BLD AUTO: 4.1 % — SIGNIFICANT CHANGE UP (ref 0–6)
EPI CELLS # UR: SIGNIFICANT CHANGE UP
GAS PNL BLDV: 127 MMOL/L — LOW (ref 136–145)
GAS PNL BLDV: SIGNIFICANT CHANGE UP
GLUCOSE BLDV-MCNC: 146 MG/DL — HIGH (ref 70–99)
GLUCOSE SERPL-MCNC: 145 MG/DL — HIGH (ref 70–99)
GLUCOSE UR QL: NEGATIVE MG/DL — SIGNIFICANT CHANGE UP
HCO3 BLDV-SCNC: 20 MMOL/L — LOW (ref 22–29)
HCT VFR BLD CALC: 22.4 % — LOW (ref 39–50)
HCT VFR BLDA CALC: 24 % — SIGNIFICANT CHANGE UP
HGB BLD CALC-MCNC: 8 G/DL — LOW (ref 12.6–17.4)
HGB BLD-MCNC: 7.5 G/DL — LOW (ref 13–17)
IMM GRANULOCYTES NFR BLD AUTO: 14.3 % — HIGH (ref 0–0.9)
INR BLD: 1.65 RATIO — HIGH (ref 0.88–1.16)
KETONES UR-MCNC: ABNORMAL
LACTATE BLDV-MCNC: 6 MMOL/L — CRITICAL HIGH (ref 0.5–2)
LACTATE SERPL-SCNC: 4.2 MMOL/L — CRITICAL HIGH (ref 0.5–2)
LEUKOCYTE ESTERASE UR-ACNC: ABNORMAL
LYMPHOCYTES # BLD AUTO: 0.1 K/UL — LOW (ref 1–3.3)
LYMPHOCYTES # BLD AUTO: 20.4 % — SIGNIFICANT CHANGE UP (ref 13–44)
MCHC RBC-ENTMCNC: 31.4 PG — SIGNIFICANT CHANGE UP (ref 27–34)
MCHC RBC-ENTMCNC: 33.5 GM/DL — SIGNIFICANT CHANGE UP (ref 32–36)
MCV RBC AUTO: 93.7 FL — SIGNIFICANT CHANGE UP (ref 80–100)
MONOCYTES # BLD AUTO: 0.11 K/UL — SIGNIFICANT CHANGE UP (ref 0–0.9)
MONOCYTES NFR BLD AUTO: 22.4 % — HIGH (ref 2–14)
NEUTROPHILS # BLD AUTO: 0.19 K/UL — LOW (ref 1.8–7.4)
NEUTROPHILS NFR BLD AUTO: 38.8 % — LOW (ref 43–77)
NITRITE UR-MCNC: NEGATIVE — SIGNIFICANT CHANGE UP
PCO2 BLDV: 33 MMHG — LOW (ref 42–55)
PH BLDV: 7.39 — SIGNIFICANT CHANGE UP (ref 7.32–7.43)
PH UR: 6 — SIGNIFICANT CHANGE UP (ref 5–8)
PLATELET # BLD AUTO: 175 K/UL — SIGNIFICANT CHANGE UP (ref 150–400)
PO2 BLDV: 51 MMHG — HIGH (ref 25–45)
POTASSIUM BLDV-SCNC: 3.9 MMOL/L — SIGNIFICANT CHANGE UP (ref 3.5–5.1)
POTASSIUM SERPL-MCNC: 4 MMOL/L — SIGNIFICANT CHANGE UP (ref 3.5–5.3)
POTASSIUM SERPL-SCNC: 4 MMOL/L — SIGNIFICANT CHANGE UP (ref 3.5–5.3)
PROT SERPL-MCNC: 5.8 G/DL — LOW (ref 6.6–8.7)
PROT UR-MCNC: 30 MG/DL
PROTHROM AB SERPL-ACNC: 19.2 SEC — HIGH (ref 10.5–13.4)
RAPID RVP RESULT: SIGNIFICANT CHANGE UP
RBC # BLD: 2.39 M/UL — LOW (ref 4.2–5.8)
RBC # FLD: 13.3 % — SIGNIFICANT CHANGE UP (ref 10.3–14.5)
RBC CASTS # UR COMP ASSIST: SIGNIFICANT CHANGE UP /HPF (ref 0–4)
SAO2 % BLDV: 86.2 % — SIGNIFICANT CHANGE UP
SARS-COV-2 RNA SPEC QL NAA+PROBE: SIGNIFICANT CHANGE UP
SODIUM SERPL-SCNC: 130 MMOL/L — LOW (ref 135–145)
SP GR SPEC: 1.01 — SIGNIFICANT CHANGE UP (ref 1.01–1.02)
TROPONIN T SERPL-MCNC: <0.01 NG/ML — SIGNIFICANT CHANGE UP (ref 0–0.06)
UROBILINOGEN FLD QL: NEGATIVE MG/DL — SIGNIFICANT CHANGE UP
WBC # BLD: 0.49 K/UL — CRITICAL LOW (ref 3.8–10.5)
WBC # FLD AUTO: 0.49 K/UL — CRITICAL LOW (ref 3.8–10.5)
WBC UR QL: SIGNIFICANT CHANGE UP /HPF (ref 0–5)

## 2023-02-26 PROCEDURE — 71045 X-RAY EXAM CHEST 1 VIEW: CPT | Mod: 26

## 2023-02-26 PROCEDURE — 36556 INSERT NON-TUNNEL CV CATH: CPT

## 2023-02-26 PROCEDURE — 99291 CRITICAL CARE FIRST HOUR: CPT | Mod: 25

## 2023-02-26 PROCEDURE — 74177 CT ABD & PELVIS W/CONTRAST: CPT | Mod: 26,MG

## 2023-02-26 PROCEDURE — 71275 CT ANGIOGRAPHY CHEST: CPT | Mod: 26,ME

## 2023-02-26 PROCEDURE — G1004: CPT

## 2023-02-26 RX ORDER — SODIUM CHLORIDE 9 MG/ML
500 INJECTION, SOLUTION INTRAVENOUS ONCE
Refills: 0 | Status: COMPLETED | OUTPATIENT
Start: 2023-02-26 | End: 2023-02-26

## 2023-02-26 RX ORDER — PIPERACILLIN AND TAZOBACTAM 4; .5 G/20ML; G/20ML
3.38 INJECTION, POWDER, LYOPHILIZED, FOR SOLUTION INTRAVENOUS ONCE
Refills: 0 | Status: COMPLETED | OUTPATIENT
Start: 2023-02-26 | End: 2023-02-26

## 2023-02-26 RX ORDER — VANCOMYCIN HCL 1 G
1000 VIAL (EA) INTRAVENOUS ONCE
Refills: 0 | Status: COMPLETED | OUTPATIENT
Start: 2023-02-26 | End: 2023-02-26

## 2023-02-26 RX ORDER — IBUPROFEN 200 MG
600 TABLET ORAL ONCE
Refills: 0 | Status: COMPLETED | OUTPATIENT
Start: 2023-02-26 | End: 2023-02-26

## 2023-02-26 RX ORDER — SODIUM CHLORIDE 9 MG/ML
1000 INJECTION, SOLUTION INTRAVENOUS ONCE
Refills: 0 | Status: COMPLETED | OUTPATIENT
Start: 2023-02-26 | End: 2023-02-26

## 2023-02-26 RX ORDER — MORPHINE SULFATE 50 MG/1
4 CAPSULE, EXTENDED RELEASE ORAL ONCE
Refills: 0 | Status: DISCONTINUED | OUTPATIENT
Start: 2023-02-26 | End: 2023-02-26

## 2023-02-26 RX ORDER — ACETAMINOPHEN 500 MG
1000 TABLET ORAL ONCE
Refills: 0 | Status: COMPLETED | OUTPATIENT
Start: 2023-02-26 | End: 2023-02-26

## 2023-02-26 RX ORDER — SODIUM CHLORIDE 9 MG/ML
2000 INJECTION, SOLUTION INTRAVENOUS ONCE
Refills: 0 | Status: COMPLETED | OUTPATIENT
Start: 2023-02-26 | End: 2023-02-26

## 2023-02-26 RX ADMIN — MORPHINE SULFATE 4 MILLIGRAM(S): 50 CAPSULE, EXTENDED RELEASE ORAL at 16:55

## 2023-02-26 RX ADMIN — MORPHINE SULFATE 4 MILLIGRAM(S): 50 CAPSULE, EXTENDED RELEASE ORAL at 17:25

## 2023-02-26 RX ADMIN — Medication 1000 MILLIGRAM(S): at 18:59

## 2023-02-26 RX ADMIN — Medication 250 MILLIGRAM(S): at 18:18

## 2023-02-26 RX ADMIN — PIPERACILLIN AND TAZOBACTAM 25 GRAM(S): 4; .5 INJECTION, POWDER, LYOPHILIZED, FOR SOLUTION INTRAVENOUS at 21:44

## 2023-02-26 RX ADMIN — SODIUM CHLORIDE 500 MILLILITER(S): 9 INJECTION, SOLUTION INTRAVENOUS at 18:59

## 2023-02-26 RX ADMIN — SODIUM CHLORIDE 2000 MILLILITER(S): 9 INJECTION, SOLUTION INTRAVENOUS at 17:05

## 2023-02-26 RX ADMIN — SODIUM CHLORIDE 2000 MILLILITER(S): 9 INJECTION, SOLUTION INTRAVENOUS at 18:59

## 2023-02-26 RX ADMIN — SODIUM CHLORIDE 1000 MILLILITER(S): 9 INJECTION, SOLUTION INTRAVENOUS at 21:35

## 2023-02-26 RX ADMIN — Medication 400 MILLIGRAM(S): at 18:18

## 2023-02-26 RX ADMIN — PIPERACILLIN AND TAZOBACTAM 200 GRAM(S): 4; .5 INJECTION, POWDER, LYOPHILIZED, FOR SOLUTION INTRAVENOUS at 16:55

## 2023-02-26 RX ADMIN — PIPERACILLIN AND TAZOBACTAM 3.38 GRAM(S): 4; .5 INJECTION, POWDER, LYOPHILIZED, FOR SOLUTION INTRAVENOUS at 18:18

## 2023-02-26 RX ADMIN — Medication 600 MILLIGRAM(S): at 21:44

## 2023-02-26 NOTE — ED PROVIDER NOTE - CLINICAL SUMMARY MEDICAL DECISION MAKING FREE TEXT BOX
Patient is a 61yo M with PMHx of HTN, HLD, CAD s/p stents, rectal adenocarcinoma s/p resection, ileostomy>colostomy March 22 on active chemo (last treatment Feb 12) who presents to the ED complaining of fever, SOB, urinary retention, and a draining rectal wound. Patient is very high risk given active cancer and chemotherapy. Initiated sepsis orders immediately.

## 2023-02-26 NOTE — ED ADULT NURSE NOTE - NSIMPLEMENTINTERV_GEN_ALL_ED
Implemented All Fall Risk Interventions:  [place] to call system. Call bell, personal items and telephone within reach. Instruct patient to call for assistance. Room bathroom lighting operational. Non-slip footwear when patient is off stretcher. Physically safe environment: no spills, clutter or unnecessary equipment. Stretcher in lowest position, wheels locked, appropriate side rails in place. Provide visual cue, wrist band, yellow gown, etc. Monitor gait and stability. Monitor for mental status changes and reorient to person, place, and time. Review medications for side effects contributing to fall risk. Reinforce activity limits and safety measures with patient and family.

## 2023-02-26 NOTE — ED ADULT NURSE REASSESSMENT NOTE - NS ED NURSE REASSESS COMMENT FT1
Pt is running sinus tach rate of 140 at this time; pt is shivering oral temp recheck 98.4. Pt denies any pain at this time. Dr. Lemon made aware of same. Awaiting medical order.

## 2023-02-26 NOTE — ED PROVIDER NOTE - NS ED ROS FT
General: + fever, chills.  EENT: No vision changes, hearing changes, nasal congestion, throat pain, difficulty swallowing.  Respiratory: + SOB. No cough, wheezing.  Cardiac: No chest pain, palpitations, lower extremity edema.  GI: + abdominal pain (suprapubic), no nausea, vomiting, diarrhea, constipation.  : see hpi  Skin: No rashes.  Neuro: No headache, dizziness, lightheadedness.  MSK: No muscle pain, joint pain, back pain.  Psych: No known mental health issues.  Endocrine: No heat/cold intolerance, no polyuria/polydipsia.  Heme: No easy bruising or bleeding.  Allergic: No pruritis, dermatitis, or environmental allergies.

## 2023-02-26 NOTE — ED PROVIDER NOTE - OBJECTIVE STATEMENT
Patient is a 63yo M with PMHx of HTN, HLD, CAD s/p stents, rectal adenocarcinoma s/p resection, ileostomy>colostomy March 22 on active chemo (last treatment Feb 12) who presents to the ED complaining of fever, SOB, urinary retention, and a draining rectal wound. Patient has a chronically draining sacral wound that has recently been draining more purulent fluid, improved when he was on antibiotics (cipro, augmentin), but then worsened again after he finished about a week ago. Fever last night to 102. Urinary retention worsening over the past few weeks as the cancer has been growing and pressing on his bladder and prostate; patient has been having a lot of suprapubic pain and episodes of overflow incontinence. Used to follow with Dr. Santillan here but recently switched to Dr. Morena Neville at [facility]. Also has had worsening SOB over the past two days to the point where he is SOB even at rest. Otherwise denies any chest pain, abd pain, nausea, vomiting. +intermittent diarrhea as a side effect of the chemo.

## 2023-02-26 NOTE — ED PROVIDER NOTE - ATTENDING CONTRIBUTION TO CARE
I, Kike Headley, performed a face to face bedside interview with this patient regarding history of present illness, review of symptoms and relevant past medical, social and family history.  I completed an independent physical examination. I have communicated the patient’s plan of care and disposition with the resident.  62 year old male with PMH rectal cancer on chemo, chronic rectal wound presents with urinary retention and fever. pt found to be neutropenic, treated for neutropenic sepsis, and then despite aggressive fluid resuscitation, became hypotensive, with new onset atrial fibrillation. Central line emergently placed, strated on vasopressors and amio drip for the afib, admitted to micu, colorectal following.

## 2023-02-26 NOTE — ED PROVIDER NOTE - PROGRESS NOTE DETAILS
MD Jd: Patient with blood pressure dropping, lactate 6.0, WBC and RBC very low, neutropenic fever. seen with resident; pleasant adult male recently started on chemo for colorectal cancer, has had prior surgeries and has a known chronic wound to buttocks; patient only c/o feeling weak, fatigued, and with intermittent fevers last few days; on exam, some mild pallor, dry oral mucosa, awake and alert, abd soft nt nd, lungs clear; pending ct imaging at time of sign out; noted to be neutropenic.  I personally saw the patient with the resident, and completed the key components of the history and physical exam. I then discussed the management plan with the resident.

## 2023-02-26 NOTE — ED ADULT NURSE REASSESSMENT NOTE - NS ED NURSE REASSESS COMMENT FT1
Pt's SPO2 is sustaining below 90% at this time while asleep. O2 started at 2L via NC. Will continue to monitor.

## 2023-02-26 NOTE — ED ADULT TRIAGE NOTE - CHIEF COMPLAINT QUOTE
cancer patient with rectal wound draining, and urinary retention with SOB, and fevers at home, last taken tylenol at home. respirations even unlabored. currently on chemo

## 2023-02-26 NOTE — ED ADULT NURSE NOTE - OBJECTIVE STATEMENT
pt c/o weakness, fatigue, fevers, chills, loss of appetite, drainage from rectal wound. pt took tylenol @ 230pm. pt has colostomy and RCW port for chemo. last received chemo 2/14, receives chemo every other week, due tuesday pt c/o weakness, fatigue, fevers, chills, loss of appetite, drainage from rectal wound. pt took tylenol @ 230pm. pt has colostomy and RCW port for chemo. last received chemo 2/14, receives chemo every other week, due tuesday.

## 2023-02-27 DIAGNOSIS — A41.9 SEPSIS, UNSPECIFIED ORGANISM: ICD-10-CM

## 2023-02-27 LAB
ALBUMIN SERPL ELPH-MCNC: 2.2 G/DL — LOW (ref 3.3–5.2)
ALBUMIN SERPL ELPH-MCNC: 2.3 G/DL — LOW (ref 3.3–5.2)
ALBUMIN SERPL ELPH-MCNC: 2.4 G/DL — LOW (ref 3.3–5.2)
ALP SERPL-CCNC: 43 U/L — SIGNIFICANT CHANGE UP (ref 40–120)
ALP SERPL-CCNC: 54 U/L — SIGNIFICANT CHANGE UP (ref 40–120)
ALP SERPL-CCNC: 60 U/L — SIGNIFICANT CHANGE UP (ref 40–120)
ALT FLD-CCNC: 1489 U/L — HIGH
ALT FLD-CCNC: 475 U/L — HIGH
ALT FLD-CCNC: 92 U/L — HIGH
ANION GAP SERPL CALC-SCNC: 19 MMOL/L — HIGH (ref 5–17)
ANION GAP SERPL CALC-SCNC: 23 MMOL/L — HIGH (ref 5–17)
ANION GAP SERPL CALC-SCNC: 26 MMOL/L — HIGH (ref 5–17)
APTT BLD: 28.1 SEC — SIGNIFICANT CHANGE UP (ref 27.5–35.5)
AST SERPL-CCNC: 2276 U/L — HIGH
AST SERPL-CCNC: 64 U/L — HIGH
AST SERPL-CCNC: 653 U/L — HIGH
BASOPHILS # BLD AUTO: 0 K/UL — SIGNIFICANT CHANGE UP (ref 0–0.2)
BASOPHILS NFR BLD AUTO: 0 % — SIGNIFICANT CHANGE UP (ref 0–2)
BILIRUB SERPL-MCNC: 2.5 MG/DL — HIGH (ref 0.4–2)
BILIRUB SERPL-MCNC: 3 MG/DL — HIGH (ref 0.4–2)
BILIRUB SERPL-MCNC: 3.5 MG/DL — HIGH (ref 0.4–2)
BLD GP AB SCN SERPL QL: SIGNIFICANT CHANGE UP
BUN SERPL-MCNC: 18.2 MG/DL — SIGNIFICANT CHANGE UP (ref 8–20)
BUN SERPL-MCNC: 21.7 MG/DL — HIGH (ref 8–20)
BUN SERPL-MCNC: 29 MG/DL — HIGH (ref 8–20)
BURR CELLS BLD QL SMEAR: PRESENT — SIGNIFICANT CHANGE UP
CALCIUM SERPL-MCNC: 7.4 MG/DL — LOW (ref 8.4–10.5)
CALCIUM SERPL-MCNC: 7.7 MG/DL — LOW (ref 8.4–10.5)
CALCIUM SERPL-MCNC: 7.8 MG/DL — LOW (ref 8.4–10.5)
CHLORIDE SERPL-SCNC: 89 MMOL/L — LOW (ref 96–108)
CHLORIDE SERPL-SCNC: 92 MMOL/L — LOW (ref 96–108)
CHLORIDE SERPL-SCNC: 95 MMOL/L — LOW (ref 96–108)
CO2 SERPL-SCNC: 11 MMOL/L — LOW (ref 22–29)
CO2 SERPL-SCNC: 15 MMOL/L — LOW (ref 22–29)
CO2 SERPL-SCNC: 17 MMOL/L — LOW (ref 22–29)
CREAT SERPL-MCNC: 1.37 MG/DL — HIGH (ref 0.5–1.3)
CREAT SERPL-MCNC: 1.41 MG/DL — HIGH (ref 0.5–1.3)
CREAT SERPL-MCNC: 1.41 MG/DL — HIGH (ref 0.5–1.3)
CULTURE RESULTS: NO GROWTH — SIGNIFICANT CHANGE UP
D DIMER BLD IA.RAPID-MCNC: 2367 NG/ML DDU — HIGH
E COLI DNA BLD POS QL NAA+NON-PROBE: SIGNIFICANT CHANGE UP
EGFR: 56 ML/MIN/1.73M2 — LOW
EGFR: 56 ML/MIN/1.73M2 — LOW
EGFR: 58 ML/MIN/1.73M2 — LOW
ELLIPTOCYTES BLD QL SMEAR: SLIGHT — SIGNIFICANT CHANGE UP
EOSINOPHIL # BLD AUTO: 0.26 K/UL — SIGNIFICANT CHANGE UP (ref 0–0.5)
EOSINOPHIL NFR BLD AUTO: 15 % — HIGH (ref 0–6)
FIBRINOGEN PPP-MCNC: 878 MG/DL — HIGH (ref 200–450)
GAS PNL BLDA: SIGNIFICANT CHANGE UP
GAS PNL BLDV: SIGNIFICANT CHANGE UP
GIANT PLATELETS BLD QL SMEAR: PRESENT — SIGNIFICANT CHANGE UP
GLUCOSE SERPL-MCNC: 112 MG/DL — HIGH (ref 70–99)
GLUCOSE SERPL-MCNC: 127 MG/DL — HIGH (ref 70–99)
GLUCOSE SERPL-MCNC: 243 MG/DL — HIGH (ref 70–99)
GRAM STN FLD: SIGNIFICANT CHANGE UP
HAPTOGLOB SERPL-MCNC: 347 MG/DL — HIGH (ref 34–200)
HCT VFR BLD CALC: 20.3 % — CRITICAL LOW (ref 39–50)
HCT VFR BLD CALC: 25.2 % — LOW (ref 39–50)
HCT VFR BLD CALC: 26.9 % — LOW (ref 39–50)
HGB BLD-MCNC: 6.7 G/DL — CRITICAL LOW (ref 13–17)
HGB BLD-MCNC: 8.4 G/DL — LOW (ref 13–17)
HGB BLD-MCNC: 8.5 G/DL — LOW (ref 13–17)
INR BLD: 1.67 RATIO — HIGH (ref 0.88–1.16)
LACTATE SERPL-SCNC: 11.4 MMOL/L — CRITICAL HIGH (ref 0.5–2)
LACTATE SERPL-SCNC: 11.6 MMOL/L — CRITICAL HIGH (ref 0.5–2)
LACTATE SERPL-SCNC: 12.2 MMOL/L — CRITICAL HIGH (ref 0.5–2)
LACTATE SERPL-SCNC: 3.3 MMOL/L — HIGH (ref 0.5–2)
LACTATE SERPL-SCNC: 6.8 MMOL/L — CRITICAL HIGH (ref 0.5–2)
LACTATE SERPL-SCNC: 8.3 MMOL/L — CRITICAL HIGH (ref 0.5–2)
LDH SERPL L TO P-CCNC: 127 U/L — SIGNIFICANT CHANGE UP (ref 98–192)
LYMPHOCYTES # BLD AUTO: 0.17 K/UL — LOW (ref 1–3.3)
LYMPHOCYTES # BLD AUTO: 10 % — LOW (ref 13–44)
MAGNESIUM SERPL-MCNC: 1.2 MG/DL — LOW (ref 1.6–2.6)
MAGNESIUM SERPL-MCNC: 1.8 MG/DL — SIGNIFICANT CHANGE UP (ref 1.6–2.6)
MAGNESIUM SERPL-MCNC: 2.1 MG/DL — SIGNIFICANT CHANGE UP (ref 1.6–2.6)
MANUAL SMEAR VERIFICATION: SIGNIFICANT CHANGE UP
MCHC RBC-ENTMCNC: 30.7 PG — SIGNIFICANT CHANGE UP (ref 27–34)
MCHC RBC-ENTMCNC: 30.8 PG — SIGNIFICANT CHANGE UP (ref 27–34)
MCHC RBC-ENTMCNC: 31.2 GM/DL — LOW (ref 32–36)
MCHC RBC-ENTMCNC: 31.5 PG — SIGNIFICANT CHANGE UP (ref 27–34)
MCHC RBC-ENTMCNC: 33 GM/DL — SIGNIFICANT CHANGE UP (ref 32–36)
MCHC RBC-ENTMCNC: 33.7 GM/DL — SIGNIFICANT CHANGE UP (ref 32–36)
MCV RBC AUTO: 100.7 FL — HIGH (ref 80–100)
MCV RBC AUTO: 91.3 FL — SIGNIFICANT CHANGE UP (ref 80–100)
MCV RBC AUTO: 93.1 FL — SIGNIFICANT CHANGE UP (ref 80–100)
METHOD TYPE: SIGNIFICANT CHANGE UP
MONOCYTES # BLD AUTO: 0.17 K/UL — SIGNIFICANT CHANGE UP (ref 0–0.9)
MONOCYTES NFR BLD AUTO: 10 % — SIGNIFICANT CHANGE UP (ref 2–14)
MRSA PCR RESULT.: SIGNIFICANT CHANGE UP
NEUTROPHILS # BLD AUTO: 1.03 K/UL — LOW (ref 1.8–7.4)
NEUTROPHILS NFR BLD AUTO: 60 % — SIGNIFICANT CHANGE UP (ref 43–77)
NRBC # BLD: 1 /100 WBCS — HIGH (ref 0–0)
NRBC # BLD: 5 /100 — HIGH (ref 0–0)
NT-PROBNP SERPL-SCNC: HIGH PG/ML (ref 0–300)
PHOSPHATE SERPL-MCNC: 2.1 MG/DL — LOW (ref 2.4–4.7)
PHOSPHATE SERPL-MCNC: 3.6 MG/DL — SIGNIFICANT CHANGE UP (ref 2.4–4.7)
PHOSPHATE SERPL-MCNC: 5 MG/DL — HIGH (ref 2.4–4.7)
PLAT MORPH BLD: NORMAL — SIGNIFICANT CHANGE UP
PLATELET # BLD AUTO: 139 K/UL — LOW (ref 150–400)
PLATELET # BLD AUTO: 143 K/UL — LOW (ref 150–400)
PLATELET # BLD AUTO: 211 K/UL — SIGNIFICANT CHANGE UP (ref 150–400)
POIKILOCYTOSIS BLD QL AUTO: SIGNIFICANT CHANGE UP
POLYCHROMASIA BLD QL SMEAR: SIGNIFICANT CHANGE UP
POTASSIUM SERPL-MCNC: 3.6 MMOL/L — SIGNIFICANT CHANGE UP (ref 3.5–5.3)
POTASSIUM SERPL-MCNC: 3.8 MMOL/L — SIGNIFICANT CHANGE UP (ref 3.5–5.3)
POTASSIUM SERPL-MCNC: 5 MMOL/L — SIGNIFICANT CHANGE UP (ref 3.5–5.3)
POTASSIUM SERPL-SCNC: 3.6 MMOL/L — SIGNIFICANT CHANGE UP (ref 3.5–5.3)
POTASSIUM SERPL-SCNC: 3.8 MMOL/L — SIGNIFICANT CHANGE UP (ref 3.5–5.3)
POTASSIUM SERPL-SCNC: 5 MMOL/L — SIGNIFICANT CHANGE UP (ref 3.5–5.3)
PROCALCITONIN SERPL-MCNC: >100 NG/ML — HIGH (ref 0.02–0.1)
PROMYELOCYTES # FLD: 5 % — HIGH (ref 0–0)
PROT SERPL-MCNC: 4.9 G/DL — LOW (ref 6.6–8.7)
PROT SERPL-MCNC: 5.1 G/DL — LOW (ref 6.6–8.7)
PROT SERPL-MCNC: 5.1 G/DL — LOW (ref 6.6–8.7)
PROTHROM AB SERPL-ACNC: 19.5 SEC — HIGH (ref 10.5–13.4)
RBC # BLD: 2.18 M/UL — LOW (ref 4.2–5.8)
RBC # BLD: 2.18 M/UL — LOW (ref 4.2–5.8)
RBC # BLD: 2.67 M/UL — LOW (ref 4.2–5.8)
RBC # BLD: 2.76 M/UL — LOW (ref 4.2–5.8)
RBC # FLD: 13.4 % — SIGNIFICANT CHANGE UP (ref 10.3–14.5)
RBC # FLD: 14.5 % — SIGNIFICANT CHANGE UP (ref 10.3–14.5)
RBC # FLD: 14.8 % — HIGH (ref 10.3–14.5)
RBC BLD AUTO: ABNORMAL
RETICS #: 35.1 K/UL — SIGNIFICANT CHANGE UP (ref 25–125)
RETICS/RBC NFR: 1.6 % — SIGNIFICANT CHANGE UP (ref 0.5–2.5)
S AUREUS DNA NOSE QL NAA+PROBE: SIGNIFICANT CHANGE UP
SMUDGE CELLS # BLD: PRESENT — SIGNIFICANT CHANGE UP
SODIUM SERPL-SCNC: 129 MMOL/L — LOW (ref 135–145)
SPECIMEN SOURCE: SIGNIFICANT CHANGE UP
T3FREE SERPL-MCNC: 1.93 PG/ML — LOW (ref 2–4.4)
T4 FREE SERPL-MCNC: 1.4 NG/DL — SIGNIFICANT CHANGE UP (ref 0.9–1.8)
TOXIC GRANULES BLD QL SMEAR: PRESENT — SIGNIFICANT CHANGE UP
TROPONIN T SERPL-MCNC: 0.01 NG/ML — SIGNIFICANT CHANGE UP (ref 0–0.06)
TSH SERPL-MCNC: 9.44 UIU/ML — HIGH (ref 0.27–4.2)
WBC # BLD: 1.71 K/UL — LOW (ref 3.8–10.5)
WBC # BLD: 1.86 K/UL — LOW (ref 3.8–10.5)
WBC # BLD: 2.67 K/UL — LOW (ref 3.8–10.5)
WBC # FLD AUTO: 1.71 K/UL — LOW (ref 3.8–10.5)
WBC # FLD AUTO: 1.86 K/UL — LOW (ref 3.8–10.5)
WBC # FLD AUTO: 2.67 K/UL — LOW (ref 3.8–10.5)

## 2023-02-27 PROCEDURE — 93010 ELECTROCARDIOGRAM REPORT: CPT

## 2023-02-27 PROCEDURE — 99222 1ST HOSP IP/OBS MODERATE 55: CPT

## 2023-02-27 PROCEDURE — 70450 CT HEAD/BRAIN W/O DYE: CPT | Mod: 26,59

## 2023-02-27 PROCEDURE — 99221 1ST HOSP IP/OBS SF/LOW 40: CPT

## 2023-02-27 PROCEDURE — 71045 X-RAY EXAM CHEST 1 VIEW: CPT | Mod: 26

## 2023-02-27 RX ORDER — SIMVASTATIN 20 MG/1
0 TABLET, FILM COATED ORAL
Qty: 0 | Refills: 0 | DISCHARGE

## 2023-02-27 RX ORDER — NOREPINEPHRINE BITARTRATE/D5W 8 MG/250ML
0.05 PLASTIC BAG, INJECTION (ML) INTRAVENOUS
Qty: 8 | Refills: 0 | Status: DISCONTINUED | OUTPATIENT
Start: 2023-02-27 | End: 2023-03-01

## 2023-02-27 RX ORDER — SODIUM BICARBONATE 1 MEQ/ML
2 SYRINGE (ML) INTRAVENOUS
Qty: 0 | Refills: 0 | DISCHARGE

## 2023-02-27 RX ORDER — MEROPENEM 1 G/30ML
1000 INJECTION INTRAVENOUS EVERY 8 HOURS
Refills: 0 | Status: DISCONTINUED | OUTPATIENT
Start: 2023-02-27 | End: 2023-02-27

## 2023-02-27 RX ORDER — HYDROMORPHONE HYDROCHLORIDE 2 MG/ML
1 INJECTION INTRAMUSCULAR; INTRAVENOUS; SUBCUTANEOUS ONCE
Refills: 0 | Status: DISCONTINUED | OUTPATIENT
Start: 2023-02-27 | End: 2023-02-27

## 2023-02-27 RX ORDER — VASOPRESSIN 20 [USP'U]/ML
0.04 INJECTION INTRAVENOUS
Qty: 40 | Refills: 0 | Status: DISCONTINUED | OUTPATIENT
Start: 2023-02-27 | End: 2023-02-28

## 2023-02-27 RX ORDER — CHLORHEXIDINE GLUCONATE 213 G/1000ML
1 SOLUTION TOPICAL
Refills: 0 | Status: DISCONTINUED | OUTPATIENT
Start: 2023-02-27 | End: 2023-03-06

## 2023-02-27 RX ORDER — AMIODARONE HYDROCHLORIDE 400 MG/1
1 TABLET ORAL
Qty: 900 | Refills: 0 | Status: DISCONTINUED | OUTPATIENT
Start: 2023-02-27 | End: 2023-02-28

## 2023-02-27 RX ORDER — HEPARIN SODIUM 5000 [USP'U]/ML
5000 INJECTION INTRAVENOUS; SUBCUTANEOUS EVERY 12 HOURS
Refills: 0 | Status: DISCONTINUED | OUTPATIENT
Start: 2023-02-27 | End: 2023-03-06

## 2023-02-27 RX ORDER — PHENYLEPHRINE HYDROCHLORIDE 10 MG/ML
0.5 INJECTION INTRAVENOUS
Qty: 40 | Refills: 0 | Status: DISCONTINUED | OUTPATIENT
Start: 2023-02-27 | End: 2023-02-27

## 2023-02-27 RX ORDER — ONDANSETRON 8 MG/1
4 TABLET, FILM COATED ORAL EVERY 6 HOURS
Refills: 0 | Status: DISCONTINUED | OUTPATIENT
Start: 2023-02-27 | End: 2023-03-06

## 2023-02-27 RX ORDER — MEROPENEM 1 G/30ML
1000 INJECTION INTRAVENOUS EVERY 8 HOURS
Refills: 0 | Status: DISCONTINUED | OUTPATIENT
Start: 2023-02-27 | End: 2023-03-03

## 2023-02-27 RX ORDER — VANCOMYCIN HCL 1 G
1250 VIAL (EA) INTRAVENOUS EVERY 12 HOURS
Refills: 0 | Status: DISCONTINUED | OUTPATIENT
Start: 2023-02-27 | End: 2023-02-27

## 2023-02-27 RX ORDER — SODIUM CHLORIDE 9 MG/ML
1000 INJECTION, SOLUTION INTRAVENOUS ONCE
Refills: 0 | Status: COMPLETED | OUTPATIENT
Start: 2023-02-27 | End: 2023-02-27

## 2023-02-27 RX ORDER — HYDROMORPHONE HYDROCHLORIDE 2 MG/ML
2 INJECTION INTRAMUSCULAR; INTRAVENOUS; SUBCUTANEOUS ONCE
Refills: 0 | Status: DISCONTINUED | OUTPATIENT
Start: 2023-02-27 | End: 2023-02-27

## 2023-02-27 RX ORDER — AMIODARONE HYDROCHLORIDE 400 MG/1
0.5 TABLET ORAL
Qty: 900 | Refills: 0 | Status: DISCONTINUED | OUTPATIENT
Start: 2023-02-27 | End: 2023-02-28

## 2023-02-27 RX ORDER — SODIUM BICARBONATE 1 MEQ/ML
0.17 SYRINGE (ML) INTRAVENOUS
Qty: 150 | Refills: 0 | Status: DISCONTINUED | OUTPATIENT
Start: 2023-02-27 | End: 2023-03-01

## 2023-02-27 RX ORDER — AMIODARONE HYDROCHLORIDE 400 MG/1
150 TABLET ORAL ONCE
Refills: 0 | Status: COMPLETED | OUTPATIENT
Start: 2023-02-27 | End: 2023-02-27

## 2023-02-27 RX ORDER — POTASSIUM PHOSPHATE, MONOBASIC POTASSIUM PHOSPHATE, DIBASIC 236; 224 MG/ML; MG/ML
15 INJECTION, SOLUTION INTRAVENOUS ONCE
Refills: 0 | Status: COMPLETED | OUTPATIENT
Start: 2023-02-27 | End: 2023-02-27

## 2023-02-27 RX ORDER — HYDROMORPHONE HYDROCHLORIDE 2 MG/ML
1.5 INJECTION INTRAMUSCULAR; INTRAVENOUS; SUBCUTANEOUS EVERY 4 HOURS
Refills: 0 | Status: DISCONTINUED | OUTPATIENT
Start: 2023-02-27 | End: 2023-03-02

## 2023-02-27 RX ORDER — MAGNESIUM SULFATE 500 MG/ML
2 VIAL (ML) INJECTION ONCE
Refills: 0 | Status: COMPLETED | OUTPATIENT
Start: 2023-02-27 | End: 2023-02-27

## 2023-02-27 RX ORDER — ACETAMINOPHEN 500 MG
1000 TABLET ORAL ONCE
Refills: 0 | Status: COMPLETED | OUTPATIENT
Start: 2023-02-27 | End: 2023-02-27

## 2023-02-27 RX ORDER — VANCOMYCIN HCL 1 G
1000 VIAL (EA) INTRAVENOUS EVERY 12 HOURS
Refills: 0 | Status: DISCONTINUED | OUTPATIENT
Start: 2023-02-27 | End: 2023-02-27

## 2023-02-27 RX ADMIN — HYDROMORPHONE HYDROCHLORIDE 1 MILLIGRAM(S): 2 INJECTION INTRAMUSCULAR; INTRAVENOUS; SUBCUTANEOUS at 08:24

## 2023-02-27 RX ADMIN — VASOPRESSIN 6 UNIT(S)/MIN: 20 INJECTION INTRAVENOUS at 03:44

## 2023-02-27 RX ADMIN — Medication 0.5 MILLIGRAM(S): at 11:38

## 2023-02-27 RX ADMIN — MEROPENEM 1000 MILLIGRAM(S): 1 INJECTION INTRAVENOUS at 21:43

## 2023-02-27 RX ADMIN — Medication 100 MEQ/KG/HR: at 15:58

## 2023-02-27 RX ADMIN — Medication 0.5 MILLIGRAM(S): at 18:50

## 2023-02-27 RX ADMIN — HYDROMORPHONE HYDROCHLORIDE 1 MILLIGRAM(S): 2 INJECTION INTRAMUSCULAR; INTRAVENOUS; SUBCUTANEOUS at 08:38

## 2023-02-27 RX ADMIN — Medication 250 MILLIGRAM(S): at 17:10

## 2023-02-27 RX ADMIN — HEPARIN SODIUM 5000 UNIT(S): 5000 INJECTION INTRAVENOUS; SUBCUTANEOUS at 17:06

## 2023-02-27 RX ADMIN — POTASSIUM PHOSPHATE, MONOBASIC POTASSIUM PHOSPHATE, DIBASIC 62.5 MILLIMOLE(S): 236; 224 INJECTION, SOLUTION INTRAVENOUS at 07:51

## 2023-02-27 RX ADMIN — MEROPENEM 1000 MILLIGRAM(S): 1 INJECTION INTRAVENOUS at 13:32

## 2023-02-27 RX ADMIN — Medication 25 GRAM(S): at 07:51

## 2023-02-27 RX ADMIN — PHENYLEPHRINE HYDROCHLORIDE 16.2 MICROGRAM(S)/KG/MIN: 10 INJECTION INTRAVENOUS at 01:18

## 2023-02-27 RX ADMIN — ONDANSETRON 4 MILLIGRAM(S): 8 TABLET, FILM COATED ORAL at 19:30

## 2023-02-27 RX ADMIN — Medication 8.08 MICROGRAM(S)/KG/MIN: at 17:18

## 2023-02-27 RX ADMIN — Medication 8.08 MICROGRAM(S)/KG/MIN: at 04:09

## 2023-02-27 RX ADMIN — Medication 166.67 MILLIGRAM(S): at 05:14

## 2023-02-27 RX ADMIN — CHLORHEXIDINE GLUCONATE 1 APPLICATION(S): 213 SOLUTION TOPICAL at 08:27

## 2023-02-27 RX ADMIN — AMIODARONE HYDROCHLORIDE 618 MILLIGRAM(S): 400 TABLET ORAL at 01:18

## 2023-02-27 RX ADMIN — VASOPRESSIN 6 UNIT(S)/MIN: 20 INJECTION INTRAVENOUS at 15:59

## 2023-02-27 RX ADMIN — MEROPENEM 1000 MILLIGRAM(S): 1 INJECTION INTRAVENOUS at 05:16

## 2023-02-27 RX ADMIN — Medication 400 MILLIGRAM(S): at 00:45

## 2023-02-27 RX ADMIN — AMIODARONE HYDROCHLORIDE 33.3 MG/MIN: 400 TABLET ORAL at 01:19

## 2023-02-27 RX ADMIN — SODIUM CHLORIDE 1000 MILLILITER(S): 9 INJECTION, SOLUTION INTRAVENOUS at 15:58

## 2023-02-27 NOTE — ED ADULT NURSE REASSESSMENT NOTE - NS ED NURSE REASSESS COMMENT FT1
Pt's HR is sustaining between 130-155 irregular rhythm with low bp systolic of 70's - Dr. Headley made aware of same. Pt remains alert and O x4; denies any pain at this time. O2 maintained at 2L via NC.

## 2023-02-27 NOTE — CONSULT NOTE ADULT - TIME BILLING
Seen and examined.  Locally recurrent and progressive rectal cancer with invasion into bladder and prostate, admitted now with neutropenic sepsis with shock after recent salvage chemo with FOLFIRI and avastin at [facility].  On amio gtt for Afib with RVR. On levo, olga and vasopressin gtt.  Labs reviewed Lactate remains elevated at 11.   CT imaging and report reviewed  A/P -   No surgical intervention recommended.   If patient recover counts enough to possibly undergo resection in the future, surgical debulking would result in need for radical cystoprostatectomy with ileal conduit without guarantee for R0 resection, let alone allow for good quality of life.  Pt now DNR/DNI. Per palliative note, family to consider hospice or comfort measures if no significant improvement over next few days.  Discussed with wife over phone.  Please reconsult with questions.

## 2023-02-27 NOTE — ED ADULT NURSE REASSESSMENT NOTE - NS ED NURSE REASSESS COMMENT FT1
assumed care of pt in CC from PEDs1 due to low BP. pt started on olga for bp control awaiting ICU assessment for next step in plan of care

## 2023-02-27 NOTE — H&P ADULT - NS PANP COMMENT GEN_ALL_CORE FT
61 y/o M with history of rectal adenocarcinoma sine 2019, s/p chemo/radiation, with subsequent recurrence which require bowel resection and ileostomy, chronic sacral drainage, present to the ED for fever and SOB.  Per patient, he last received chemo (5 FU on 2/14).   Patient also s/p sacral biopsy on 1/20/23 which confirmed metastatic adenocarcinoma.  In the ED, patient was initial febrile at 101.5F, found to be neutropenic.  started sepsis treatment with fluid resuscitation.  Later patient became hypotensive to 80s/40s, which started pressor.  And patient developed afib rvr which amiodarone was started.  S/p total fo 3.5L IVF bolus.  CT abd and pel showed extensive rectal tumor with possible necrotic/infectious fluid collection concerning for rectal abscess.  Patient is admitted to MICU for septic shock 2/2 to possible rectal abscess in the setting of extensive tumor burden, possible translocation/bacteremia.  Course complicated with afib rvr needing amio drip.   Will start broad spectrum antibiotics, levophed + vaso, continue amiodarone drip. pending culture result.  If CT head negative, will start heparin.   Family updated, will clarify GOC with family.   rest of recommendations per MISTY [place].

## 2023-02-27 NOTE — PATIENT PROFILE ADULT - FALL HARM RISK - HARM RISK INTERVENTIONS

## 2023-02-27 NOTE — PATIENT PROFILE ADULT - NSPROIMPLANTSMEDDEV_GEN_A_NUR
PATIENT HAS CALLED, SHE IS ASKING IF DR BESS COULD WRITE A LETTER FOR HER   GUARDIAN DISABILITY CHECK.   SHE STATED TO INFORM THEM THAT DR BESS IS HER PCP AND THAT DR BESS IS AWARE OF HER BACK ISSUES AND CANCER.    HER FORMER PCP DR WALKER WENT FROM FAMILY PRACTICE TO HOSPICE.      PATIENT IS NEEDING TO FAX IT TO THE   GUARDIAN DISABILITY.      PATIENT STATED THAT [facility] NEVER CALLED HER TO BE SET UP.  SHE STATED THAT SHE IS DESPERATLY NEEDING IT.  WILL CALL [facility] TO SEE WHY PATIENT WASN'T CALLED.     SPOKE WITH NANCY  WITH [facility]. SHE STATED THAT  ON 01/24/2022-- JEANNIE WITH [facility] SPOKE WITH PATIENT AND SHE WANTED TO WAIT ON HOME CARE.   NANCY WITH [facility] DID STATE THAT THEY WOULD NEED A NEW ORDER, REFERRAL AND OFFICE NOTES TO BE REFAXED OVER.             None

## 2023-02-27 NOTE — H&P ADULT - HISTORY OF PRESENT ILLNESS
61 y/o M with a h/o HTN, HLD, CAD (s/p PCI), rectal adenocarcinoma (diagnosed in 2019, s/p resection and diverting ileostomy creation 2020 followed by chemo and radiotherapy, complicated by local recurrence in sigmoid/rectum, s/p APR of rectum/sigmoid colon with colostomy creation 3/2022, most recent chemo 2/12/23), presents to the ED complaining of fever, SOB, urinary retention, and increased purulent drainage from chronic rectal wound. As per report, the urinary retention has been worsening over the past few weeks as the cancer has been growing and pressing on his bladder and prostate and he has been experiencing suprapubic pain and episodes of overflow incontinence. Found to be febrile, pancytopenic, tachycardic (new onset AF with RVR), and hypotensive despite 3.5L crystalloid bolus. Lactate 6. Started on IV vasopressor therapy. Now developing some confusion. CT C/A/P reveals interval growth of the patient's rectal tumor with apparent anterior contained perforation of the mass extending inferiorly to the perineum. Air in the mass may represent necrosis or fistula to the urinary bladder. Prostate gland is indistinguishable and the posterior aspect of the urinary bladder cannot be  from the mass, concerning for direct invasion of tumor into these structures. Small tubular gas-liquid collection extending from the posterior anus to the intergluteal region may represent small abscess or fistula extending from the necrotic rectal cancer which has extended into the perineum. A new 6 mm right lower lobe pulmonary nodule suspicious for metastasis.       63 y/o M with a h/o HTN, HLD, CAD (s/p PCI), rectal adenocarcinoma (diagnosed in 2019, s/p resection and diverting ileostomy creation 2020 followed by chemo and radiotherapy, complicated by local recurrence in sigmoid/rectum, s/p APR of rectum/sigmoid colon with colostomy creation 3/2022, most recent chemo 2/12/23), presents to the ED complaining of fever, SOB, urinary retention, and increased purulent drainage from chronic sacral wound. As per report, the urinary retention has been worsening over the past few weeks as the cancer has been growing and pressing on his bladder and prostate and he has been experiencing suprapubic pain and episodes of overflow incontinence. Found to be febrile, pancytopenic, tachycardic (new onset AF with RVR), and hypotensive despite 3.5L crystalloid bolus. Lactate 6. Started on IV vasopressor therapy. Now developing some confusion. CT C/A/P reveals interval growth of the patient's rectal tumor with apparent anterior contained perforation of the mass extending inferiorly to the perineum. Air in the mass may represent necrosis or fistula to the urinary bladder. Prostate gland is indistinguishable and the posterior aspect of the urinary bladder cannot be  from the mass, concerning for direct invasion of tumor into these structures. Small tubular gas-liquid collection extending from the posterior anus to the intergluteal region may represent small abscess or fistula extending from the necrotic rectal cancer which has extended into the perineum. A new 6 mm right lower lobe pulmonary nodule suspicious for metastasis.

## 2023-02-27 NOTE — CONSULT NOTE ADULT - SUBJECTIVE AND OBJECTIVE BOX
HPI : patient with stage IV/III rectal cancer on chemotherapy , presented in septic shock     from [facility]: 63yo M with PMHx of HTN, HLD, CAD s/p stents, rectal adenocarcinoma s/p resection, ileostomy>colostomy  on active chemo (last treatment ) who presents to the ED complaining of fever, SOB, urinary retention, and a draining rectal wound. Patient has a chronically draining sacral wound that has recently been draining more purulent fluid, improved when he was on antibiotics (cipro, augmentin), but then worsened again after he finished about a week ago. Fever last night to 102. Urinary retention worsening over the past few weeks as the cancer has been growing and pressing on his bladder and prostate; patient has been having a lot of suprapubic pain and episodes of overflow incontinence. Used to follow with Dr. Santillan here but recently switched to Dr. Morena Neville at [facility] [place]. Also has had worsening SOB over the past two days to the point where he is SOB even at rest. Otherwise denies any chest pain, abd pain, nausea, vomiting. +intermittent diarrhea as a side effect of the chemo.  ROS: 10-system review is otherwise negative except HPI above.      PAST MEDICAL & SURGICAL HISTORY:  Chest pain      HTN (hypertension)      HLD (hyperlipidemia)      CAD (coronary artery disease)      Mitral regurgitation      Rectal cancer  2019 s/p chemo and radiation; recurrence of cancer       Depression  with rectal cancer      Neuropathy  feet s/p chemo      Stented coronary artery  circumflex  LAD 2017 x4      H/O carotid stenosis      H/O renal calculi      DANO (iron deficiency anemia)      Umbilical hernia      Splenic artery aneurysm      H/O cardiac catheterization   2017  4 STENTS      History of CEA (carotid endarterectomy)  right 2019      History of rectal surgery  with ileostomy 2020      H/O sigmoidoscopy  x2      History of removal of Port-a-Cath        FAMILY HISTORY:  FH: heart disease  father      Family history not pertinent as reviewed with the patient.    SOCIAL HISTORY:  Denies any toxic habits    ALLERGIES: NKA No Known Allergies      HOME MEDICATIONS:   Home Medications:  ALPRAZolam 0.25 mg oral tablet: 1 tab(s) orally once a day, As Needed (2023 10:30)  amLODIPine 5 mg oral tablet: 1 tab(s) orally once a day (2023 10:30)  Bactrim 400 mg-80 mg oral tablet: 2 tab(s) orally every 12 hours (2023 10:30)  clopidogrel 75 mg oral tablet: 1 tab(s) orally once a day (2023 10:30)  ferrous sulfate 325 mg (65 mg elemental iron) oral delayed release tablet: 1 tab(s) orally 2 times a day (2023 10:30)  Metoprolol Succinate  mg oral tablet, extended release: 1 tab(s) orally 2x a day (2023 10:30)  polyethylene glycol 3350 oral powder for reconstitution: 17 gram(s) orally every 12 hours, As Needed (2023 10:30)  simvastatin:  (2023 10:30)  sodium bicarbonate 650 mg oral tablet: 2 tab(s) orally once a day (2023 10:30)  sodium bicarbonate 650 mg oral tablet: 2 tab(s) orally 2 times a day (2023 10:30)  Vitamin D3 25 mcg (1000 intl units) oral tablet: 1 tab(s) orally once a day (2023 10:30)      --------------------------------------------------------------------------------------------    PHYSICAL EXAM:   General: NAD, Lying in bed comfortably  Neuro: A+Ox3  HEENT: EOMI, PERRLA, MMM  Cardio: RRR  Resp: Non labored breathing on RA  GI/Abd: Soft, mildly tender lower abdomen , distended , ostomy in place   Vascular: All 4 extremities warm and well perfused.   Pelvis: stable  Musculoskeletal: All 4 extremities moving spontaneously, no limitations, no spinal tenderness.  --------------------------------------------------------------------------------------------    LABS                 7.5    0.49   )----------(  175       ( 2023 16:08 )               22.4      130    |  94     |  17.3   ----------------------------<  145        ( 2023 16:08 )  4.0     |  17.0   |  1.24     Ca    8.4        ( 2023 16:08 )    TPro  5.8    /  Alb  2.8    /  TBili  2.6    /  DBili  x      /  AST  106    /  ALT  124    /  AlkPhos  49     ( 2023 16:08 )    LIVER FUNCTIONS - ( 2023 16:08 )  Alb: 2.8 g/dL / Pro: 5.8 g/dL / ALK PHOS: 49 U/L / ALT: 124 U/L / AST: 106 U/L / GGT: x           PT/INR -  19.2 sec / 1.65 ratio   ( 2023 19:50 )       PTT -  27.7 sec   ( 2023 19:50 )  CAPILLARY BLOOD GLUCOSE    CARDIAC MARKERS ( 2023 16:08 )  x     / <0.01 ng/mL / x     / x     / x          Urinalysis Basic - ( 2023 18:50 )    Color: Yellow / Appearance: Clear / S.015 / pH: x  Gluc: x / Ketone: Trace  / Bili: Negative / Urobili: Negative mg/dL   Blood: x / Protein: 30 mg/dL / Nitrite: Negative   Leuk Esterase: Trace / RBC: 0-2 /HPF / WBC 3-5 /HPF   Sq Epi: x / Non Sq Epi: Occasional / Bacteria: Few        16:20 - VBG - pH: 7.390 | pCO2: 33    | pO2: 51    | Lactate: 6.00     --------------------------------------------------------------------------------------------  IMAGING  < from: CT Abdomen and Pelvis w/ IV Cont (02.26.23 @ 23:35) >    IMPRESSION:    No pulmonary embolus.    A new 6 mm right lower lobe pulmonary nodule suspicious for metastasis.    Interval growth of the patient's rectal tumor with apparent anterior   contained perforation of the mass extending inferiorly to the perineum.   Air inthe mass may represent necrosis or fistula to the urinary bladder.   Prostate gland is indistinguishable and the posterior aspect of the   urinary bladder cannot be  from the mass, concerning for direct   invasion of of tumor into these structures.    Small tubular gas-liquid collection extending from the posterior anus to   the intergluteal region may represent small abscess or fistula extending   from the necrotic rectal cancer which has extended into the perineum.    --- End of Report---      < end of copied text >      ASSESSMENT: Patient is a 62y old male with ***    PLAN:    - Admit to [facility] floor/ICU***  - NPO/IVF  - pain control  - DVT ppx  - OOB/ambulate  - strict I/Os  - Plan discussed with Attending,      Consult called at:**  Consult seen at:**

## 2023-02-27 NOTE — CONSULT NOTE ADULT - ASSESSMENT
62M with history of rectal adenocarcinoma s/p resection, ileostomy>colostomy March 22 on active chemo (last treatment Feb 12) who presents to the ED complaining of fever, SOB, urinary retention, and a draining rectal wound. Patient has a chronically draining sacral wound that has recently been draining more purulent fluid. now patient with stage III/IV recurrent rectal cancer on chemotherapy ,on septic shock     Plan:     No acute surgical intervention indicated at this time   recommend ICU evaluation   Resuscitation  Recommend palliative consult   discussed with Dr Gonzalez     62M with history of rectal adenocarcinoma s/p resection, ileostomy>colostomy March 22 on active chemo (last treatment Feb 12) who presents to the ED complaining of fever, SOB, urinary retention, and a draining rectal wound. Patient has a chronically draining sacral wound that has recently been draining more purulent fluid. now patient with stage III/IV rectal cancer on chemotherapy ,on septic shock     Plan:     No acute surgical intervention indicated at this time   recommend ICU evaluation   Resuscitation  Recommend palliative consult   discussed with Dr Gonzalez

## 2023-02-27 NOTE — CONSULT NOTE ADULT - SUBJECTIVE AND OBJECTIVE BOX
HPI:  61yo M w/ PMH of CAD s/p multiple stents and recurrent rectal adenoCA on CTX (most recent 2/12/23) who p/w fever, urinary retention and purulent drainage from rectal wound found to be pancytopenic and hypotensive with new onset AF with RVR requiring pressor therapy.  CT showed interval growth of rectal tumor with contained perforation of mass extending into the perineum with ?necrosis or fistula to bladder and a possible small abscess or fistula from posterior anus to intergluteal region.  Also noted was a 6mm RLL pulm nodule suspicious for metastatic dz.  Regarding cancer hx, pt dx'd 2019 s/p resxn/diverting ileostomy s/p CTX/XRT then local recurrence s/p APR of rectum/sigmoid colon with colostomy creation 3/2022 - now on CTX (5-FU) and s/p sacral bx (1/20/23) which confirmed metastatic dz.  Here pt with neutropenic fever and septic shock requiring pressors, afib w/ RVR on amiodarone and heparin, on broad spectrum abx.  We are consulted for assistance with goals of care.          PERTINENT PMH REVIEWED: Yes    PAST MEDICAL & SURGICAL HISTORY:  as per HPI    SOCIAL HISTORY:                                            Surrogate/HCP/Guardian: Phone#:    FAMILY HISTORY:  FH: heart disease  father    Allergies  No Known Allergies    ADVANCE DIRECTIVES/TREATMENT PREFERENCES:  Full code, all aggressive measures desired   DNR/DNI - MOLST, continue all other medical treatments  DNR/DNI - MOLST, comfort measures only     Baseline ADLs (prior to admission):  Unable to obtain 2/2 AMS    Karnofsky/Palliative Performance Status Version 2:  %  http://npcrc.org/files/news/palliative_performance_scale_ppsv2.pdf    PRESENT SYMPTOMS:   Unable to obtain 2/2 AMS    PAIN:  Unable to obtain 2/2 AMS            Character-            Duration-            Effect-            Factors-            Frequency-            Location-            Severity-    PAIN AD Score:  [email or web address] (press ctrl + left click to view)    REVIEW OF SYSTEMS:   Unable to obtain 2/2 AMS    MEDICATIONS  (STANDING):  aMIOdarone Infusion 1 mG/Min (33.3 mL/Hr) IV Continuous <Continuous>  aMIOdarone Infusion 0.5 mG/Min (16.7 mL/Hr) IV Continuous <Continuous>  chlorhexidine 2% Cloths 1 Application(s) Topical <User Schedule>  meropenem Injectable 1000 milliGRAM(s) IV Push every 8 hours  norepinephrine Infusion 0.05 MICROgram(s)/kG/Min (8.08 mL/Hr) IV Continuous <Continuous>  phenylephrine    Infusion 0.5 MICROgram(s)/kG/Min (16.2 mL/Hr) IV Continuous <Continuous>  vancomycin  IVPB 1250 milliGRAM(s) IV Intermittent every 12 hours  vasopressin Infusion 0.04 Unit(s)/Min (6 mL/Hr) IV Continuous <Continuous>    MEDICATIONS  (PRN):      PHYSICAL EXAM:    Vital Signs Last 24 Hrs  T(C): 37.1 (27 Feb 2023 07:00), Max: 38.6 (26 Feb 2023 17:09)  T(F): 98.8 (27 Feb 2023 07:00), Max: 101.5 (26 Feb 2023 17:09)  HR: 109 (27 Feb 2023 08:15) (89 - 157)  BP: 85/51 (27 Feb 2023 08:15) (72/49 - 118/56)  BP(mean): 63 (27 Feb 2023 08:15) (56 - 85)  RR: 33 (27 Feb 2023 08:15) (18 - 37)  SpO2: 99% (27 Feb 2023 08:15) (88% - 100%)    Parameters below as of 27 Feb 2023 07:30  Patient On (Oxygen Delivery Method): nasal cannula    General: pt lying in bed intubated in NAD  HEENT: NCAT; MM dry; ETT in place  Lungs: breathing unlabored; symmetric chest expansion  GI: soft; NTND  MSK: No apparent deformities  Neuro: unresponsive; no myoclonus  Skin: no rash  Psych: calm    LABS:                        6.7    1.71  )-----------( 211      ( 27 Feb 2023 04:00 )             20.3     02-27    129<L>  |  95<L>  |  18.2  ----------------------------<  112<H>  3.6   |  15.0<L>  |  1.41<H>    Ca    7.8<L>      27 Feb 2023 04:00  Phos  2.1     02-27  Mg     1.2     02-27    TPro  4.9<L>  /  Alb  2.2<L>  /  TBili  2.5<H>  /  DBili  x   /  AST  64<H>  /  ALT  92<H>  /  AlkPhos  43  02-27    PT/INR - ( 27 Feb 2023 08:35 )   PT: 19.5 sec;   INR: 1.67 ratio         PTT - ( 27 Feb 2023 08:35 )  PTT:28.1 sec    RADIOLOGY & ADDITIONAL STUDIES:  CT:  IMPRESSION:    No pulmonary embolus.    A new 6 mm right lower lobe pulmonary nodule suspicious for metastasis.    Interval growth of the patient's rectal tumor with apparent anterior   contained perforation of the mass extending inferiorly to the perineum.   Air in the mass may represent necrosis or fistula to the urinary bladder.   Prostate gland is indistinguishable and the posterior aspect of the   urinary bladder cannot be  from the mass, concerning for direct   invasion of of tumor into these structures.    Small tubular gas-liquid collection extending from the posterior anus to   the intergluteal region may represent small abscess or fistula extending   from the necrotic rectal cancer which has extended into the perineum.    --- End of Report ---    ARACELI SWEENEY MD; Attending Radiologist  This document has been electronically signed. Feb 27 2023 12:49AM      NEUROLOGIC MEDICATIONS/OPIOIDS/BENZODIAZEPINES IN PAST 24HRS  acetaminophen   IVPB ..   400 mL/Hr IV Intermittent (02-27-23 @ 00:45)    acetaminophen   IVPB ..   400 mL/Hr IV Intermittent (02-26-23 @ 18:18)    HYDROmorphone  Injectable   1 milliGRAM(s) IV Push (02-27-23 @ 08:24)    ibuprofen  Tablet.   600 milliGRAM(s) Oral (02-26-23 @ 21:44)    morphine  - Injectable   4 milliGRAM(s) IV Push (02-26-23 @ 16:55)       HPI:  63yo M w/ PMH of CAD s/p multiple stents and recurrent rectal adenoCA on CTX (most recent 2/12/23) who p/w fever, urinary retention and purulent drainage from rectal wound found to be pancytopenic and hypotensive with new onset AF with RVR requiring pressor therapy.  CT showed interval growth of rectal tumor with contained perforation of mass extending into the perineum with ?necrosis or fistula to bladder and a possible small abscess or fistula from posterior anus to intergluteal region.  Also noted was a 6mm RLL pulm nodule suspicious for metastatic dz.  Regarding cancer hx, pt dx'd 2019 s/p resxn/diverting ileostomy s/p CTX/XRT then local recurrence s/p APR of rectum/sigmoid colon with colostomy creation 3/2022 - now on CTX (5-FU) and s/p sacral bx (1/20/23) which confirmed metastatic dz.  Here pt with neutropenic fever and septic shock requiring pressors, afib w/ RVR on amiodarone and heparin, on broad spectrum abx.  We are consulted for assistance with goals of care.    Pt seen lying in bed awake and in NAD.  Per my d/w MICU attending, pt had been in a lot of pain earlier requiring 1mg IV dilaudid which had been only somewhat effective.  On my visit pt denies pain and didn't seem to recall having pain earlier.  He mainly endorses profound fatigue and also c/o "sweats" at times (but not currently).  Denies nausea or SOB or anxiety currently.  He was oriented x 3 but did seem to repeat himself at times and did endorse feeling "foggy" at times.      PERTINENT PMH REVIEWED: Yes    PAST MEDICAL & SURGICAL HISTORY:  as per HPI    SOCIAL HISTORY:                       - lives with wife    Surrogate/HCP/Guardian: Phone#: wife Maricel - [phone]    FAMILY HISTORY:  FH: heart disease  father    Allergies  No Known Allergies    ADVANCE DIRECTIVES/TREATMENT PREFERENCES:  Full code    Baseline ADLs (prior to admission):  Independent    Karnofsky/Palliative Performance Status Version 2:  50%  http://npcrc.org/files/news/palliative_performance_scale_ppsv2.pdf    PRESENT SYMPTOMS:   as per HPI    PAIN:  Denies currently            Character-            Duration-            Effect-            Factors-            Frequency-            Location-            Severity-    REVIEW OF SYSTEMS:   Full review of systems performed and negative except as per HPI    MEDICATIONS  (STANDING):  aMIOdarone Infusion 1 mG/Min (33.3 mL/Hr) IV Continuous <Continuous>  aMIOdarone Infusion 0.5 mG/Min (16.7 mL/Hr) IV Continuous <Continuous>  chlorhexidine 2% Cloths 1 Application(s) Topical <User Schedule>  meropenem Injectable 1000 milliGRAM(s) IV Push every 8 hours  norepinephrine Infusion 0.05 MICROgram(s)/kG/Min (8.08 mL/Hr) IV Continuous <Continuous>  phenylephrine    Infusion 0.5 MICROgram(s)/kG/Min (16.2 mL/Hr) IV Continuous <Continuous>  vancomycin  IVPB 1250 milliGRAM(s) IV Intermittent every 12 hours  vasopressin Infusion 0.04 Unit(s)/Min (6 mL/Hr) IV Continuous <Continuous>    MEDICATIONS  (PRN):      PHYSICAL EXAM:    Vital Signs Last 24 Hrs  T(C): 37.1 (27 Feb 2023 07:00), Max: 38.6 (26 Feb 2023 17:09)  T(F): 98.8 (27 Feb 2023 07:00), Max: 101.5 (26 Feb 2023 17:09)  HR: 109 (27 Feb 2023 08:15) (89 - 157)  BP: 85/51 (27 Feb 2023 08:15) (72/49 - 118/56)  BP(mean): 63 (27 Feb 2023 08:15) (56 - 85)  RR: 33 (27 Feb 2023 08:15) (18 - 37)  SpO2: 99% (27 Feb 2023 08:15) (88% - 100%)    Parameters below as of 27 Feb 2023 07:30  Patient On (Oxygen Delivery Method): nasal cannula    General: pt lying in bed in NAD  HEENT: NCAT; MM dry  Lungs: breathing unlabored; symmetric chest expansion  MSK: No apparent deformities  Neuro: AAOx3; no myoclonus  Skin: no rash  Psych: calm; distractible; flat affect    LABS:                        6.7    1.71  )-----------( 211      ( 27 Feb 2023 04:00 )             20.3     02-27    129<L>  |  95<L>  |  18.2  ----------------------------<  112<H>  3.6   |  15.0<L>  |  1.41<H>    Ca    7.8<L>      27 Feb 2023 04:00  Phos  2.1     02-27  Mg     1.2     02-27    TPro  4.9<L>  /  Alb  2.2<L>  /  TBili  2.5<H>  /  DBili  x   /  AST  64<H>  /  ALT  92<H>  /  AlkPhos  43  02-27    PT/INR - ( 27 Feb 2023 08:35 )   PT: 19.5 sec;   INR: 1.67 ratio         PTT - ( 27 Feb 2023 08:35 )  PTT:28.1 sec    RADIOLOGY & ADDITIONAL STUDIES:  CT:  IMPRESSION:    No pulmonary embolus.    A new 6 mm right lower lobe pulmonary nodule suspicious for metastasis.    Interval growth of the patient's rectal tumor with apparent anterior   contained perforation of the mass extending inferiorly to the perineum.   Air in the mass may represent necrosis or fistula to the urinary bladder.   Prostate gland is indistinguishable and the posterior aspect of the   urinary bladder cannot be  from the mass, concerning for direct   invasion of of tumor into these structures.    Small tubular gas-liquid collection extending from the posterior anus to   the intergluteal region may represent small abscess or fistula extending   from the necrotic rectal cancer which has extended into the perineum.    --- End of Report ---    ARACELI SWEENEY MD; Attending Radiologist  This document has been electronically signed. Feb 27 2023 12:49AM      NEUROLOGIC MEDICATIONS/OPIOIDS/BENZODIAZEPINES IN PAST 24HRS  acetaminophen   IVPB ..   400 mL/Hr IV Intermittent (02-27-23 @ 00:45)    acetaminophen   IVPB ..   400 mL/Hr IV Intermittent (02-26-23 @ 18:18)    HYDROmorphone  Injectable   1 milliGRAM(s) IV Push (02-27-23 @ 08:24)    ibuprofen  Tablet.   600 milliGRAM(s) Oral (02-26-23 @ 21:44)    morphine  - Injectable   4 milliGRAM(s) IV Push (02-26-23 @ 16:55)

## 2023-02-27 NOTE — H&P ADULT - MUSCULOSKELETAL
ROM intact/strength 5/5 bilateral upper extremities/strength 5/5 bilateral lower extremities negative

## 2023-02-27 NOTE — CONSULT NOTE ADULT - ASSESSMENT
62-year-old male with hypertension, CAD status post stent and recurrent rectal adenocarcinoma status post multiple surgeries and most currently on FOLFIRI plus bevacizumab.    1. Recurrent rectal adenocarcinoma -   Most recently  received FOLFIRI plus bevacizumab on 02/15/2023. This is likely the cause of the neutropenia and subsequent infection and decompensation.    No inpatient therapy and will be rediscussed with [place] [facility] [place] once acute critical issues have resolved.    2. Neutropenic fever/shock - on pressors. ANC has started to improve and is already >1000. Monitor.  If drops again may consider growth factor.  On IV antibiotics.  Management as per ICU care.    3. Anemia - due to chemo, shock. s/o transfusion this morning.  Hgb 6.7 pre-transfusion.   Transfuse if hgb <7.0.    Thank you    Ken Huang MD

## 2023-02-27 NOTE — PROGRESS NOTE ADULT - ASSESSMENT
63 y/o M with a h/o HTN, HLD, CAD (s/p PCI), rectal adenocarcinoma (diagnosed in 2019, s/p resection and diverting ileostomy creation 2020 followed by chemo and radiotherapy, complicated by local recurrence in sigmoid/rectum, s/p APR of rectum/sigmoid colon with colostomy creation 3/2022, most recent chemo 2/12/23), neutropenic fever, septic shock secondary to necrotic rectal cancer which has enlarged and appears to have metastasized to the lung despite treatment with invasion/perforation into bladder and prostate, complicated by anal abscess, acute toxic-metabolic encephalopathy, and new onset AFib RVR    Multipressor shock on Norepinephrine, Phenylephrine, and Vasopressin gtts  Given additional 1L crystalloid, shock fluid responsive  Able to wean off Phenylephrine gtt in response to resuscitation and titrating down Norepinephrine gtt to targeted MAP goal 65-70  Continue broad-spectrum antibiotics with Meropenem. Blood cultures positive for Gram negative rods, E. coli pending sensitivities. MRSA PCR negative. D/C Vancomycin.  Renal indices plateaued non-oliguric ATN, however lactate remains severely elevated >11, transaminitis continues to worsen  Metabolic acidosis worsening, started sodium bicarbonate infusion   Monitoring dynamic end-points of perfusion  Continue Amiodarone gtt for AFib, ventricular rates improved.  TTE obtained to eval LVEF, pending official read  Repeat CBC demonstrates appropriate response to blood transfusion, will continue to trend. No overt evidence of bleeding.  Colorectal input appreciated, no plan for surgical intervention at this time  Start Heparin SC DVT ppx    Patient's wife and his sister-in-law were updated at length on above. They are hopeful he can recover and continue chemotherapy treatment

## 2023-02-27 NOTE — H&P ADULT - ASSESSMENT
63 y/o M with a h/o HTN, HLD, CAD (s/p PCI), rectal adenocarcinoma (diagnosed in 2019, s/p resection and diverting ileostomy creation 2020 followed by chemo and radiotherapy, complicated by local recurrence in sigmoid/rectum, s/p APR of rectum/sigmoid colon with colostomy creation 3/2022, most recent chemo 2/12/23), with:    # Neutropenic sepsis/septic shock  # Necrotic rectal CA  # Anal abscess  # New onset AF with RVR  # Metabolic encephalopathy    Admit to MICU.    - started on phenylephrine infusion by ED team, actively titrating to maintain a MAP > 65, plan to transition to norepinephrine as HR is coming under control  - add fixed dose vasopressin  - monitor clinical and laboratory end-points of perfusion closely, trend lactate until it clears  - blood and urine cultures are pending, will send sacral wound culture  - escalate antibiotic regimen to meropenem and vancomycin  - colorectal surgery consulted  - new onset rapid AF secondary to sepsis, started on amiodarone infusion by ED team as HR was in the 150s, will maintain the 24-hour infusion for now as his HR is improving  - start therapeutic anticoagulation with heparin infusion if CT brain does not show ICH  - encephalopathy likely due to sepsis and shock, neuro exam is nonfocal, f/u CT brain    I contacted the patient's daughter, Erma, and updated her on Thomas's deterioration this morning. Diagnosis, prognosis, and management plan outlined. All questions answered and concerns addressed.    Case discussed with MICU physician, Dr. Browne.      CRITICAL CARE TIME SPENT: 63 mins  Time spent evaluating/treating patient with medical issues that pose a high risk for life threatening deterioration and/or end-organ damage, reviewing data/labs/imaging, discussing case with multidisciplinary team, discussing plan/goals of care with patient/family. Non-inclusive of procedure time.

## 2023-02-27 NOTE — CONSULT NOTE ADULT - ASSESSMENT
61yo M w/ PMH of CAD s/p multiple stents and recurrent rectal adenoCA who p/w fever, urinary retention and purulent drainage from rectal wound found to be pancytopenic and hypotensive with new onset AF with RVR requiring pressor therapy.  CT showed interval growth of rectal tumor with contained perforation of mass extending into the perineum with ?necrosis or fistula to bladder and a possible small abscess or fistula from posterior anus to intergluteal region.  Also noted was a 6mm RLL pulm nodule suspicious for metastatic dz.  Regarding cancer hx, pt dx'd 2019 s/p resxn/diverting ileostomy s/p CTX/XRT then local recurrence s/p APR of rectum/sigmoid colon with colostomy creation 3/2022 - now on CTX (5-FU) and s/p sacral bx (1/20/23) which confirmed metastatic dz.  Here pt with neutropenic fever and septic shock requiring pressors, afib w/ RVR on amiodarone and heparin, on broad spectrum abx.  We are consulted for assistance with goals of care.  #Goals of care  - wife Maricel - [phone] 61yo M w/ PMH of CAD s/p multiple stents and recurrent rectal adenoCA who p/w fever, urinary retention and purulent drainage from rectal wound found to be pancytopenic and hypotensive with new onset AF with RVR requiring pressor therapy.  CT showed interval growth of rectal tumor with contained perforation of mass extending into the perineum with ?necrosis or fistula to bladder and a possible small abscess or fistula from posterior anus to intergluteal region.  Also noted was a 6mm RLL pulm nodule suspicious for metastatic dz.  Regarding cancer hx, pt dx'd 2019 s/p resxn/diverting ileostomy s/p CTX/XRT then local recurrence s/p APR of rectum/sigmoid colon with colostomy creation 3/2022 - now on CTX (5-FU) and s/p sacral bx (1/20/23) which confirmed metastatic dz.  Here pt with neutropenic fever and septic shock requiring pressors, afib w/ RVR on amiodarone and heparin, on broad spectrum abx.  We are consulted for assistance with goals of care.  #Goals of care  - pt with metastatic CRC here with neutropenic fever and septic shock with abscess -- rising lactate despite 3 pressors and broad spec IV abx - prognosis is guarded  - per my d/w MICU attending, discussion held earlier with family who expressed preferences for DNR/DNI orders - MICU to complete MOLST  - I spoke to pt's daughter who shared that the plan is to see how he does in next 24hrs and, if not improving, will likely move towards a comfort-based plan  - wife Maricel - [phone] would be legal surrogate but is making decisions with her dtr (who is a MICU RN)    #AMS  - pt seemed to have poor recall of recent events and he seemed to repeat himself (e.g. told me many times that he had fatigue) so there appears to be some element of hypoactive delirium  - Would avoid/minimize known deliriogenic drugs such as benzodiazepines and anticholinergics.  Encourage staff and family to reorient frequently.  Keep shades open during day in effort to maintain day/night cycle.    #Pain - ischemia? - pt denying pain and unable to localize or recall earlier pain episode  - per d/w MICU attending, they are ordering a higher dose of dilaudid prn - will see how he responds and adjust accordingly - currently not c/o pain so don't think there is need for long-acting at this time.    #Diaphoresis -- likely r/t fever  - add APAP 1000mg PO Q6hrs prn fever

## 2023-02-27 NOTE — PROGRESS NOTE ADULT - SUBJECTIVE AND OBJECTIVE BOX
Patient is a 62y old  Male who presents with a chief complaint of Septic shock (2023 12:34)      BRIEF HOSPITAL COURSE: 61 y/o M with a h/o HTN, HLD, CAD (s/p PCI), rectal adenocarcinoma (diagnosed in , s/p resection and diverting ileostomy creation  followed by chemo and radiotherapy, complicated by local recurrence in sigmoid/rectum, s/p APR of rectum/sigmoid colon with colostomy creation 3/2022, most recent chemo 23), presents to the ED complaining of fever, SOB, urinary retention, and increased purulent drainage from chronic rectal wound. As per report, the urinary retention has been worsening over the past few weeks as the cancer has been growing and pressing on his bladder and prostate and he has been experiencing suprapubic pain and episodes of overflow incontinence. Found to be febrile, pancytopenic, tachycardic (new onset AF with RVR), and hypotensive despite 3.5L crystalloid bolus. Lactate 6. Started on IV vasopressor therapy. Now developing some confusion. CT C/A/P reveals interval growth of the patient's rectal tumor with apparent anterior contained perforation of the mass extending inferiorly to the perineum. Air in the mass may represent necrosis or fistula to the urinary bladder. Prostate gland is indistinguishable and the posterior aspect of the urinary bladder cannot be  from the mass, concerning for direct invasion of tumor into these structures. Small tubular gas-liquid collection extending from the posterior anus to the intergluteal region may represent small abscess or fistula extending from the necrotic rectal cancer which has extended into the perineum. A new 6 mm right lower lobe pulmonary nodule suspicious for metastasis.       Events last 24 hours: Received patient in 3 pressor shock on Levophed, Phenylephrine, and Vasopressin. Given additional 1L balanced crystalloid bolus, shock responsive. Able to wean off Phenylephrine gtt. Remains in AFib with rates controlled on Amiodarone gtt. Repeat labs with appropriate response to PRBC transfusion, however worsening metabolic acidosis. Started sodium bicarbonate infusion. Blood cultures positive Gram negative rods, PCR E. coli.       PAST MEDICAL & SURGICAL HISTORY:  Chest pain      HTN (hypertension)      HLD (hyperlipidemia)      CAD (coronary artery disease)      Mitral regurgitation      Rectal cancer  2019 s/p chemo and radiation; recurrence of cancer       Depression  with rectal cancer      Neuropathy  feet s/p chemo      Stented coronary artery  circumflex 2005 LAD 2017 x4      H/O carotid stenosis      H/O renal calculi      DANO (iron deficiency anemia)      Umbilical hernia      Splenic artery aneurysm      H/O cardiac catheterization   2017  4 STENTS      History of CEA (carotid endarterectomy)  right 2019      History of rectal surgery  with ileostomy 2020      H/O sigmoidoscopy  x2      History of removal of Port-a-Cath          Medications:  meropenem Injectable 1000 milliGRAM(s) IV Push every 8 hours    aMIOdarone Infusion 1 mG/Min IV Continuous <Continuous>  aMIOdarone Infusion 0.5 mG/Min IV Continuous <Continuous>  norepinephrine Infusion 0.05 MICROgram(s)/kG/Min IV Continuous <Continuous>      HYDROmorphone  Injectable 1.5 milliGRAM(s) IV Push every 4 hours PRN  LORazepam   Injectable 0.5 milliGRAM(s) IV Push every 4 hours PRN      heparin   Injectable 5000 Unit(s) SubCutaneous every 12 hours        vasopressin Infusion 0.04 Unit(s)/Min IV Continuous <Continuous>    sodium bicarbonate  Infusion 0.167 mEq/kG/Hr IV Continuous <Continuous>      chlorhexidine 2% Cloths 1 Application(s) Topical <User Schedule>            ICU Vital Signs Last 24 Hrs  T(C): 36.4 (2023 11:00), Max: 37.1 (2023 07:00)  T(F): 97.5 (2023 11:00), Max: 98.8 (2023 07:00)  HR: 92 (2023 20:45) (77 - 157)  BP: 109/86 (2023 20:45) (72/49 - 130/87)  BP(mean): 95 (2023 20:45) (56 - 105)  ABP: --  ABP(mean): --  RR: 21 (2023 20:45) (18 - 37)  SpO2: 97% (2023 20:45) (88% - 100%)    O2 Parameters below as of 2023 20:00  Patient On (Oxygen Delivery Method): mask, nonrebreather            ABG - ( 2023 05:10 )  pH, Arterial: 7.410 pH, Blood: x     /  pCO2: <20   /  pO2: 90    / HCO3: 12    / Base Excess: -12.6 /  SaO2: 99.4                I&O's Detail    2023 07:01  -  2023 07:00  --------------------------------------------------------  IN:    Amiodarone: 16.7 mL    Amiodarone: 132.3 mL    IV PiggyBack: 250 mL    Norepinephrine: 48 mL    Phenylephrine: 293 mL    Vasopressin: 24 mL  Total IN: 764 mL    OUT:    Indwelling Catheter - Urethral (mL): 130 mL    Voided (mL): 1100 mL  Total OUT: 1230 mL    Total NET: -466 mL      2023 07:01  -  2023 21:28  --------------------------------------------------------  IN:    Amiodarone: 217.1 mL    IV PiggyBack: 100 mL    IV PiggyBack: 250 mL    IV PiggyBack: 250 mL    Norepinephrine: 161.5 mL    Oral Fluid: 200 mL    Phenylephrine: 97.5 mL    PRBCs (Packed Red Blood Cells): 300 mL    Sodium Bicarbonate: 500 mL    Vasopressin: 78 mL  Total IN: 2154.1 mL    OUT:    Indwelling Catheter - Urethral (mL): 855 mL  Total OUT: 855 mL    Total NET: 1299.1 mL            LABS:                        8.4    1.86  )-----------( 139      ( 2023 12:40 )             26.9         129<L>  |  92<L>  |  21.7<H>  ----------------------------<  127<H>  5.0   |  11.0<L>  |  1.41<H>    Ca    7.7<L>      2023 12:40  Phos  5.0       Mg     2.1         TPro  5.1<L>  /  Alb  2.4<L>  /  TBili  3.5<H>  /  DBili  x   /  AST  653<H>  /  ALT  475<H>  /  AlkPhos  54        CARDIAC MARKERS ( 2023 04:00 )  x     / 0.01 ng/mL / x     / x     / x      CARDIAC MARKERS ( 2023 16:08 )  x     / <0.01 ng/mL / x     / x     / x          CAPILLARY BLOOD GLUCOSE      POCT Blood Glucose.: 166 mg/dL (2023 16:07)    PT/INR - ( 2023 08:35 )   PT: 19.5 sec;   INR: 1.67 ratio         PTT - ( 2023 08:35 )  PTT:28.1 sec  Urinalysis Basic - ( 2023 18:50 )    Color: Yellow / Appearance: Clear / S.015 / pH: x  Gluc: x / Ketone: Trace  / Bili: Negative / Urobili: Negative mg/dL   Blood: x / Protein: 30 mg/dL / Nitrite: Negative   Leuk Esterase: Trace / RBC: 0-2 /HPF / WBC 3-5 /HPF   Sq Epi: x / Non Sq Epi: Occasional / Bacteria: Few      CULTURES:  Culture Results:   Growth in aerobic bottle: Gram Negative Rods ( @ 16:08)  Rapid RVP Result: NotDetec ( @ 16:05)  Culture Results:   Growth in aerobic and anaerobic bottles: Gram Negative Rods  ***Blood Panel PCR results on this specimen are available  approximately 3 hours after the Gram stain result.***  Gram stain, PCR, and/or culture results may not always  correspond due todifference in methodologies.  ************************************************************  This PCR assay was performed by multiplex PCR. This  Assay tests for 66 bacterial and resistance gene targets.  Please refer to the [facility] testdirectory  at [email or web address] for details. ( @ 16:00)          PHYSICAL EXAM:  Toxic appearing male   Lungs clear  S1 S2 with irregular rhythm, regular rate  Abd with LLQ colostomy, no peritoneal signs          RADIOLOGY: < from: CT Abdomen and Pelvis w/ IV Cont (23 @ 23:35) >  IMPRESSION:    No pulmonary embolus.    A new 6 mm right lower lobe pulmonary nodule suspicious for metastasis.    Interval growth of the patient's rectal tumor with apparent anterior   contained perforation of the mass extending inferiorly to the perineum.   Air inthe mass may represent necrosis or fistula to the urinary bladder.   Prostate gland is indistinguishable and the posterior aspect of the   urinary bladder cannot be  from the mass, concerning for direct   invasion of of tumor into these structures.    Small tubular gas-liquid collection extending from the posterior anus to   the intergluteal region may represent small abscess or fistula extending   from the necrotic rectal cancer which has extended into the perineum.    --- End of Report---      ARACELI SWEENEY MD; Attending Radiologist  This document has been electronically signed. 2023 12:49AM          INVASIVE LINES: R IJ TLC   INDWELLING CLARK: Y   VTE PROPHYLAXIS: Heparin SC   CAM ICU: -   CODE STATUS: FULL        CRITICAL CARE TIME SPENT: 45 minutes additional critical care time spent performing frequent bedside reassessments and augmenting plan of care to address problems of acute critical illness that pose high probability of life threatening deterioration and/or end organ damage/dysfunction and discussing goals of care, non-inclusive of time spent on procedures performed.

## 2023-02-27 NOTE — CONSULT NOTE ADULT - SUBJECTIVE AND OBJECTIVE BOX
62-year-old male with many comorbidities including hypertension, hyperlipidemia, CAD status post stent and rectal adenocarcinoma (MMRp) initially diagnosed in 02/2019 (T2/3NxMx) status post chemotherapy followed by chemoradiation and LAR in 2/2020. He developed disease recurrence so he underwent APR on 03/23/2022.  He later developed progression of disease again via a pelvic mass so he has been started on FOLFIRI plus bevacizumab with the last dose being on 02/15/2023.  He undergoes treatment at [facility].  He is now admitted to the hospital with neutropenic shock.  His WBC was 0.49k yesterday. He has been started on IV antibiotics and is now requiring pressors.  Today his hemoglobin was 6.7 so he was transfused a unit of PRBC this morning.  Today his white blood cell count increased to 1.71 K with an ANC of 1030. he is alert and awake and able to answer questions but feels very weak.  His wife was at the bedside.    PAST MEDICAL & SURGICAL HISTORY:  Chest pain      HTN (hypertension)      HLD (hyperlipidemia)      CAD (coronary artery disease)      Mitral regurgitation      Rectal cancer  2/2019 s/p chemo and radiation; recurrence of cancer 2022      Depression  with rectal cancer      Neuropathy  feet s/p chemo      Stented coronary artery  circumflex 2005 LAD 2017 x4      H/O carotid stenosis      H/O renal calculi      DANO (iron deficiency anemia)      Umbilical hernia      Splenic artery aneurysm      H/O cardiac catheterization  2005 2017  4 STENTS      History of CEA (carotid endarterectomy)  right 2019      History of rectal surgery  with ileostomy 2/2020      H/O sigmoidoscopy  x2      History of removal of Port-a-Cath      MEDICATIONS  (STANDING):  aMIOdarone Infusion 1 mG/Min (33.3 mL/Hr) IV Continuous <Continuous>  aMIOdarone Infusion 0.5 mG/Min (16.7 mL/Hr) IV Continuous <Continuous>  chlorhexidine 2% Cloths 1 Application(s) Topical <User Schedule>  meropenem Injectable 1000 milliGRAM(s) IV Push every 8 hours  norepinephrine Infusion 0.05 MICROgram(s)/kG/Min (8.08 mL/Hr) IV Continuous <Continuous>  phenylephrine    Infusion 0.5 MICROgram(s)/kG/Min (16.2 mL/Hr) IV Continuous <Continuous>  vancomycin  IVPB 1000 milliGRAM(s) IV Intermittent every 12 hours  vasopressin Infusion 0.04 Unit(s)/Min (6 mL/Hr) IV Continuous <Continuous>    MEDICATIONS  (PRN):  HYDROmorphone  Injectable 1.5 milliGRAM(s) IV Push every 4 hours PRN Pain  LORazepam   Injectable 0.5 milliGRAM(s) IV Push every 8 hours PRN Anxiety        Vital Signs Last 24 Hrs  T(C): 36.4 (27 Feb 2023 11:00), Max: 38.6 (26 Feb 2023 17:09)  T(F): 97.5 (27 Feb 2023 11:00), Max: 101.5 (26 Feb 2023 17:09)  HR: 87 (27 Feb 2023 12:30) (82 - 157)  BP: 92/64 (27 Feb 2023 12:30) (72/49 - 121/75)  BP(mean): 72 (27 Feb 2023 12:30) (56 - 105)  RR: 22 (27 Feb 2023 12:30) (18 - 37)  SpO2: 96% (27 Feb 2023 12:30) (88% - 100%)    Parameters below as of 27 Feb 2023 12:30  Patient On (Oxygen Delivery Method): nasal cannula  O2 Flow (L/min): 2  PE  CTAB  rrr  a/o x 3  abd soft      Labs:                          6.7    1.71  )-----------( 211      ( 27 Feb 2023 04:00 )             20.3     02-27    129<L>  |  95<L>  |  18.2  ----------------------------<  112<H>  3.6   |  15.0<L>  |  1.41<H>    Ca    7.8<L>      27 Feb 2023 04:00  Phos  2.1     02-27  Mg     1.2     02-27    TPro  4.9<L>  /  Alb  2.2<L>  /  TBili  2.5<H>  /  DBili  x   /  AST  64<H>  /  ALT  92<H>  /  AlkPhos  43  02-27

## 2023-02-28 LAB
ALBUMIN SERPL ELPH-MCNC: 2.3 G/DL — LOW (ref 3.3–5.2)
ALP SERPL-CCNC: 58 U/L — SIGNIFICANT CHANGE UP (ref 40–120)
ALT FLD-CCNC: 1659 U/L — HIGH
ANION GAP SERPL CALC-SCNC: 13 MMOL/L — SIGNIFICANT CHANGE UP (ref 5–17)
ANION GAP SERPL CALC-SCNC: 21 MMOL/L — HIGH (ref 5–17)
AST SERPL-CCNC: 1803 U/L — HIGH
BASOPHILS # BLD AUTO: 0.03 K/UL — SIGNIFICANT CHANGE UP (ref 0–0.2)
BASOPHILS NFR BLD AUTO: 0.8 % — SIGNIFICANT CHANGE UP (ref 0–2)
BILIRUB SERPL-MCNC: 2.5 MG/DL — HIGH (ref 0.4–2)
BUN SERPL-MCNC: 32.6 MG/DL — HIGH (ref 8–20)
BUN SERPL-MCNC: 35 MG/DL — HIGH (ref 8–20)
CALCIUM SERPL-MCNC: 7.4 MG/DL — LOW (ref 8.4–10.5)
CALCIUM SERPL-MCNC: 7.6 MG/DL — LOW (ref 8.4–10.5)
CHLORIDE SERPL-SCNC: 89 MMOL/L — LOW (ref 96–108)
CHLORIDE SERPL-SCNC: 94 MMOL/L — LOW (ref 96–108)
CO2 SERPL-SCNC: 19 MMOL/L — LOW (ref 22–29)
CO2 SERPL-SCNC: 25 MMOL/L — SIGNIFICANT CHANGE UP (ref 22–29)
CREAT SERPL-MCNC: 1.24 MG/DL — SIGNIFICANT CHANGE UP (ref 0.5–1.3)
CREAT SERPL-MCNC: 1.26 MG/DL — SIGNIFICANT CHANGE UP (ref 0.5–1.3)
EGFR: 64 ML/MIN/1.73M2 — SIGNIFICANT CHANGE UP
EGFR: 65 ML/MIN/1.73M2 — SIGNIFICANT CHANGE UP
EOSINOPHIL # BLD AUTO: 0.09 K/UL — SIGNIFICANT CHANGE UP (ref 0–0.5)
EOSINOPHIL NFR BLD AUTO: 2.5 % — SIGNIFICANT CHANGE UP (ref 0–6)
GAS PNL BLDA: SIGNIFICANT CHANGE UP
GLUCOSE BLDC GLUCOMTR-MCNC: 122 MG/DL — HIGH (ref 70–99)
GLUCOSE BLDC GLUCOMTR-MCNC: 128 MG/DL — HIGH (ref 70–99)
GLUCOSE BLDC GLUCOMTR-MCNC: 171 MG/DL — HIGH (ref 70–99)
GLUCOSE SERPL-MCNC: 127 MG/DL — HIGH (ref 70–99)
GLUCOSE SERPL-MCNC: 225 MG/DL — HIGH (ref 70–99)
GRAM STN FLD: SIGNIFICANT CHANGE UP
HCT VFR BLD CALC: 23.6 % — LOW (ref 39–50)
HGB BLD-MCNC: 8.3 G/DL — LOW (ref 13–17)
LYMPHOCYTES # BLD AUTO: 0.31 K/UL — LOW (ref 1–3.3)
LYMPHOCYTES # BLD AUTO: 9.1 % — LOW (ref 13–44)
MAGNESIUM SERPL-MCNC: 1.7 MG/DL — SIGNIFICANT CHANGE UP (ref 1.6–2.6)
MAGNESIUM SERPL-MCNC: 2.1 MG/DL — SIGNIFICANT CHANGE UP (ref 1.6–2.6)
MCHC RBC-ENTMCNC: 31.6 PG — SIGNIFICANT CHANGE UP (ref 27–34)
MCHC RBC-ENTMCNC: 35.2 GM/DL — SIGNIFICANT CHANGE UP (ref 32–36)
MCV RBC AUTO: 89.7 FL — SIGNIFICANT CHANGE UP (ref 80–100)
MONOCYTES # BLD AUTO: 0.17 K/UL — SIGNIFICANT CHANGE UP (ref 0–0.9)
MONOCYTES NFR BLD AUTO: 5 % — SIGNIFICANT CHANGE UP (ref 2–14)
NEUTROPHILS # BLD AUTO: 2.61 K/UL — SIGNIFICANT CHANGE UP (ref 1.8–7.4)
NEUTROPHILS NFR BLD AUTO: 64.5 % — SIGNIFICANT CHANGE UP (ref 43–77)
PHOSPHATE SERPL-MCNC: 1.7 MG/DL — LOW (ref 2.4–4.7)
PHOSPHATE SERPL-MCNC: 3 MG/DL — SIGNIFICANT CHANGE UP (ref 2.4–4.7)
PLATELET # BLD AUTO: 135 K/UL — LOW (ref 150–400)
POTASSIUM SERPL-MCNC: 3.2 MMOL/L — LOW (ref 3.5–5.3)
POTASSIUM SERPL-MCNC: 3.2 MMOL/L — LOW (ref 3.5–5.3)
POTASSIUM SERPL-SCNC: 3.2 MMOL/L — LOW (ref 3.5–5.3)
POTASSIUM SERPL-SCNC: 3.2 MMOL/L — LOW (ref 3.5–5.3)
PROT SERPL-MCNC: 5 G/DL — LOW (ref 6.6–8.7)
RBC # BLD: 2.63 M/UL — LOW (ref 4.2–5.8)
RBC # FLD: 14.6 % — HIGH (ref 10.3–14.5)
SODIUM SERPL-SCNC: 129 MMOL/L — LOW (ref 135–145)
SODIUM SERPL-SCNC: 131 MMOL/L — LOW (ref 135–145)
WBC # BLD: 3.43 K/UL — LOW (ref 3.8–10.5)
WBC # FLD AUTO: 3.43 K/UL — LOW (ref 3.8–10.5)

## 2023-02-28 PROCEDURE — 71045 X-RAY EXAM CHEST 1 VIEW: CPT | Mod: 26

## 2023-02-28 PROCEDURE — 99223 1ST HOSP IP/OBS HIGH 75: CPT

## 2023-02-28 PROCEDURE — 99233 SBSQ HOSP IP/OBS HIGH 50: CPT

## 2023-02-28 RX ORDER — POTASSIUM CHLORIDE 20 MEQ
40 PACKET (EA) ORAL ONCE
Refills: 0 | Status: COMPLETED | OUTPATIENT
Start: 2023-02-28 | End: 2023-02-28

## 2023-02-28 RX ORDER — CALCIUM GLUCONATE 100 MG/ML
1 VIAL (ML) INTRAVENOUS ONCE
Refills: 0 | Status: COMPLETED | OUTPATIENT
Start: 2023-02-28 | End: 2023-02-28

## 2023-02-28 RX ORDER — DEXTROSE 50 % IN WATER 50 %
12.5 SYRINGE (ML) INTRAVENOUS ONCE
Refills: 0 | Status: DISCONTINUED | OUTPATIENT
Start: 2023-02-28 | End: 2023-03-06

## 2023-02-28 RX ORDER — INSULIN LISPRO 100/ML
VIAL (ML) SUBCUTANEOUS EVERY 6 HOURS
Refills: 0 | Status: DISCONTINUED | OUTPATIENT
Start: 2023-02-28 | End: 2023-03-01

## 2023-02-28 RX ORDER — METOPROLOL TARTRATE 50 MG
12.5 TABLET ORAL EVERY 6 HOURS
Refills: 0 | Status: DISCONTINUED | OUTPATIENT
Start: 2023-02-28 | End: 2023-02-28

## 2023-02-28 RX ORDER — METOPROLOL TARTRATE 50 MG
12.5 TABLET ORAL EVERY 6 HOURS
Refills: 0 | Status: DISCONTINUED | OUTPATIENT
Start: 2023-02-28 | End: 2023-03-02

## 2023-02-28 RX ORDER — DEXTROSE 50 % IN WATER 50 %
25 SYRINGE (ML) INTRAVENOUS ONCE
Refills: 0 | Status: DISCONTINUED | OUTPATIENT
Start: 2023-02-28 | End: 2023-03-06

## 2023-02-28 RX ORDER — SODIUM CHLORIDE 9 MG/ML
500 INJECTION, SOLUTION INTRAVENOUS ONCE
Refills: 0 | Status: COMPLETED | OUTPATIENT
Start: 2023-02-28 | End: 2023-02-28

## 2023-02-28 RX ORDER — GLUCAGON INJECTION, SOLUTION 0.5 MG/.1ML
1 INJECTION, SOLUTION SUBCUTANEOUS ONCE
Refills: 0 | Status: DISCONTINUED | OUTPATIENT
Start: 2023-02-28 | End: 2023-03-06

## 2023-02-28 RX ORDER — DEXTROSE 50 % IN WATER 50 %
15 SYRINGE (ML) INTRAVENOUS ONCE
Refills: 0 | Status: DISCONTINUED | OUTPATIENT
Start: 2023-02-28 | End: 2023-03-06

## 2023-02-28 RX ORDER — MAGNESIUM SULFATE 500 MG/ML
2 VIAL (ML) INJECTION ONCE
Refills: 0 | Status: COMPLETED | OUTPATIENT
Start: 2023-02-28 | End: 2023-02-28

## 2023-02-28 RX ADMIN — Medication 25 GRAM(S): at 05:55

## 2023-02-28 RX ADMIN — Medication 0.5 MILLIGRAM(S): at 21:08

## 2023-02-28 RX ADMIN — Medication 1: at 12:05

## 2023-02-28 RX ADMIN — Medication 100 MEQ/KG/HR: at 00:30

## 2023-02-28 RX ADMIN — MEROPENEM 1000 MILLIGRAM(S): 1 INJECTION INTRAVENOUS at 21:09

## 2023-02-28 RX ADMIN — MEROPENEM 1000 MILLIGRAM(S): 1 INJECTION INTRAVENOUS at 13:03

## 2023-02-28 RX ADMIN — MEROPENEM 1000 MILLIGRAM(S): 1 INJECTION INTRAVENOUS at 05:35

## 2023-02-28 RX ADMIN — SODIUM CHLORIDE 250 MILLILITER(S): 9 INJECTION, SOLUTION INTRAVENOUS at 19:34

## 2023-02-28 RX ADMIN — Medication 12.5 MILLIGRAM(S): at 17:54

## 2023-02-28 RX ADMIN — Medication 62.5 MILLIMOLE(S): at 09:00

## 2023-02-28 RX ADMIN — HEPARIN SODIUM 5000 UNIT(S): 5000 INJECTION INTRAVENOUS; SUBCUTANEOUS at 05:35

## 2023-02-28 RX ADMIN — CHLORHEXIDINE GLUCONATE 1 APPLICATION(S): 213 SOLUTION TOPICAL at 05:36

## 2023-02-28 RX ADMIN — Medication 40 MILLIEQUIVALENT(S): at 05:51

## 2023-02-28 RX ADMIN — Medication 100 GRAM(S): at 05:52

## 2023-02-28 RX ADMIN — Medication 40 MILLIEQUIVALENT(S): at 08:59

## 2023-02-28 NOTE — PROGRESS NOTE ADULT - SUBJECTIVE AND OBJECTIVE BOX
INTERVAL HISTORY:  Pt seen lying in bed in NAD  He was on 3 pressors yesterday - weaned down to only 1 now.    Pt reports no pain and feels good today    PRESENT SYMPTOMS:     Dyspnea: No  Nausea/Vomiting: No  Anxiety:  No  Depression: No  Fatigue: Yes   Loss of appetite: No  Constipation: No    PAIN: denies            Character-            Duration-            Effect-            Factors-            Frequency-            Location-            Severity-    REVIEW OF SYSTEMS:   Full review of systems performed and was otherwise negative except as noted above.    MEDICATIONS  (STANDING):  chlorhexidine 2% Cloths 1 Application(s) Topical <User Schedule>  dextrose 50% Injectable 25 Gram(s) IV Push once  dextrose 50% Injectable 12.5 Gram(s) IV Push once  dextrose 50% Injectable 25 Gram(s) IV Push once  dextrose Oral Gel 15 Gram(s) Oral once  glucagon  Injectable 1 milliGRAM(s) IntraMuscular once  heparin   Injectable 5000 Unit(s) SubCutaneous every 12 hours  insulin lispro (ADMELOG) corrective regimen sliding scale   SubCutaneous every 6 hours  meropenem Injectable 1000 milliGRAM(s) IV Push every 8 hours  norepinephrine Infusion 0.05 MICROgram(s)/kG/Min (8.08 mL/Hr) IV Continuous <Continuous>  sodium bicarbonate  Infusion 0.167 mEq/kG/Hr (100 mL/Hr) IV Continuous <Continuous>    MEDICATIONS  (PRN):  HYDROmorphone  Injectable 1.5 milliGRAM(s) IV Push every 4 hours PRN Pain  LORazepam   Injectable 0.5 milliGRAM(s) IV Push every 4 hours PRN Anxiety  ondansetron Injectable 4 milliGRAM(s) IV Push every 6 hours PRN Nausea and/or Vomiting    PHYSICAL EXAM:  Vital Signs Last 24 Hrs  T(C): 37 (28 Feb 2023 04:00), Max: 37 (27 Feb 2023 23:45)  T(F): 98.6 (28 Feb 2023 04:00), Max: 98.6 (27 Feb 2023 23:45)  HR: 131 (28 Feb 2023 10:00) (77 - 131)  BP: 118/79 (28 Feb 2023 10:00) (85/56 - 130/87)  BP(mean): 92 (28 Feb 2023 10:00) (65 - 100)  RR: 19 (28 Feb 2023 10:00) (18 - 29)  SpO2: 100% (28 Feb 2023 10:00) (89% - 100%)    Parameters below as of 28 Feb 2023 09:30  Patient On (Oxygen Delivery Method): nasal cannula  O2 Flow (L/min): 3    General: Pt lying in bed in NAD  HEENT: NCAT; MMM  Resp: breathing unlabored; symmetric chest expansion B/L  MSK: no contractures/deformities  Skin: no rash  Neuro: awake and oriented x 3; no myoclonus  Psych: calm; appropriate speech and affect    LABS:                        8.3    3.43  )-----------( 135      ( 28 Feb 2023 03:47 )             23.6     02-28    129<L>  |  89<L>  |  32.6<H>  ----------------------------<  225<H>  3.2<L>   |  19.0<L>  |  1.26    Ca    7.4<L>      28 Feb 2023 03:47  Phos  3.0     02-28  Mg     1.7     02-28    TPro  5.0<L>  /  Alb  2.3<L>  /  TBili  2.5<H>  /  DBili  x   /  AST  1803<H>  /  ALT  1659<H>  /  AlkPhos  58  02-28    PT/INR - ( 27 Feb 2023 08:35 )   PT: 19.5 sec;   INR: 1.67 ratio         PTT - ( 27 Feb 2023 08:35 )  PTT:28.1 sec    RADIOLOGY & ADDITIONAL STUDIES:    NEUROLOGIC MEDICATIONS/OPIOIDS/BENZODIAZEPINES IN PAST 24HRS:    LORazepam   Injectable   0.5 milliGRAM(s) IV Push (02-27-23 @ 11:38)    LORazepam   Injectable   0.5 milliGRAM(s) IV Push (02-27-23 @ 18:50)    ondansetron Injectable   4 milliGRAM(s) IV Push (02-27-23 @ 19:30)    ADVANCE DIRECTIVES/TREATMENT PREFERENCES:  Code Status: full  MOLST (if not Full Code):  HCP/Surrogate: wife Maricel and dtr Erma

## 2023-02-28 NOTE — PROGRESS NOTE ADULT - SUBJECTIVE AND OBJECTIVE BOX
Patient is a 62y old  Male who presents with a chief complaint of Septic shock (2023 21:28)      BRIEF HOSPITAL COURSE: 61 y/o M with a h/o HTN, HLD, CAD (s/p PCI), rectal adenocarcinoma (diagnosed in , s/p resection and diverting ileostomy creation  followed by chemo and radiotherapy, complicated by local recurrence in sigmoid/rectum, s/p APR of rectum/sigmoid colon with colostomy creation 3/2022, most recent chemo 23), admitted on  with complaints of fever, SOB, urinary retention, and increased purulent drainage from chronic sacral wound. As per report, the urinary retention has been worsening over the past few weeks as the cancer has been growing and pressing on his bladder and prostate and he has been experiencing suprapubic pain and episodes of overflow incontinence. Found to be febrile, pancytopenic, tachycardic (new onset AF with RVR), and hypotensive despite 3.5L crystalloid bolus. Lactate 6. Started on IV vasopressor therapy. CT C/A/P reveals interval growth of the patient's rectal tumor with apparent anterior contained perforation of the mass extending inferiorly to the perineum. Air in the mass may represent necrosis or fistula to the urinary bladder. Prostate gland is indistinguishable and the posterior aspect of the urinary bladder cannot be  from the mass, concerning for direct invasion of tumor into these structures. Small tubular gas-liquid collection extending from the posterior anus to the intergluteal region may represent small abscess or fistula extending from the necrotic rectal cancer which has extended into the perineum. A new 6 mm right lower lobe pulmonary nodule suspicious for metastasis.     Events last 24 hours: ***        PAST MEDICAL & SURGICAL HISTORY:  Chest pain      HTN (hypertension)      HLD (hyperlipidemia)      CAD (coronary artery disease)      Mitral regurgitation      Rectal cancer  2019 s/p chemo and radiation; recurrence of cancer       Depression  with rectal cancer      Neuropathy  feet s/p chemo      Stented coronary artery  circumflex  LAD 2017 x4      H/O carotid stenosis      H/O renal calculi      DANO (iron deficiency anemia)      Umbilical hernia      Splenic artery aneurysm      H/O cardiac catheterization   2017  4 STENTS      History of CEA (carotid endarterectomy)  right 2019      History of rectal surgery  with ileostomy 2020      H/O sigmoidoscopy  x2      History of removal of Port-a-Cath          Review of Systems:  CONSTITUTIONAL: No fever, chills, or fatigue  EYES: No eye pain, visual disturbances, or discharge  ENMT:  No difficulty hearing, tinnitus, vertigo; No sinus or throat pain  NECK: No pain or stiffness  RESPIRATORY: No cough, wheezing, chills or hemoptysis; No shortness of breath  CARDIOVASCULAR: No chest pain, palpitations, dizziness, or leg swelling  GASTROINTESTINAL: No abdominal or epigastric pain. No nausea, vomiting, or hematemesis; No diarrhea or constipation. No melena or hematochezia.  GENITOURINARY: No dysuria, frequency, hematuria, or incontinence  NEUROLOGICAL: No headaches, memory loss, loss of strength, numbness, or tremors  SKIN: No itching, burning, rashes, or lesions   MUSCULOSKELETAL: No joint pain or swelling; No muscle, back, or extremity pain  PSYCHIATRIC: No depression, anxiety, mood swings, or difficulty sleeping      Medications:  meropenem Injectable 1000 milliGRAM(s) IV Push every 8 hours    aMIOdarone Infusion 1 mG/Min IV Continuous <Continuous>  aMIOdarone Infusion 0.5 mG/Min IV Continuous <Continuous>  norepinephrine Infusion 0.05 MICROgram(s)/kG/Min IV Continuous <Continuous>      HYDROmorphone  Injectable 1.5 milliGRAM(s) IV Push every 4 hours PRN  LORazepam   Injectable 0.5 milliGRAM(s) IV Push every 4 hours PRN  ondansetron Injectable 4 milliGRAM(s) IV Push every 6 hours PRN      heparin   Injectable 5000 Unit(s) SubCutaneous every 12 hours        vasopressin Infusion 0.04 Unit(s)/Min IV Continuous <Continuous>    sodium bicarbonate  Infusion 0.167 mEq/kG/Hr IV Continuous <Continuous>      chlorhexidine 2% Cloths 1 Application(s) Topical <User Schedule>            ICU Vital Signs Last 24 Hrs  T(C): 37 (2023 23:45), Max: 37.1 (2023 07:00)  T(F): 98.6 (2023 23:45), Max: 98.8 (2023 07:00)  HR: 98 (2023 02:30) (77 - 119)  BP: 101/79 (2023 02:30) (80/64 - 130/87)  BP(mean): 88 (2023 02:30) (63 - 105)  ABP: --  ABP(mean): --  RR: 26 (2023 02:30) (18 - 37)  SpO2: 94% (2023 02:30) (89% - 100%)    O2 Parameters below as of 2023 00:30  Patient On (Oxygen Delivery Method): nasal cannula  O2 Flow (L/min): 4          ABG - ( 2023 05:10 )  pH, Arterial: 7.410 pH, Blood: x     /  pCO2: <20   /  pO2: 90    / HCO3: 12    / Base Excess: -12.6 /  SaO2: 99.4                I&O's Detail    2023 07:01  -  2023 07:00  --------------------------------------------------------  IN:    Amiodarone: 16.7 mL    Amiodarone: 132.3 mL    IV PiggyBack: 250 mL    Norepinephrine: 48 mL    Phenylephrine: 293 mL    Vasopressin: 24 mL  Total IN: 764 mL    OUT:    Indwelling Catheter - Urethral (mL): 130 mL    Voided (mL): 1100 mL  Total OUT: 1230 mL    Total NET: -466 mL      2023 07:01  -  2023 03:40  --------------------------------------------------------  IN:    Amiodarone: 283.9 mL    IV PiggyBack: 100 mL    IV PiggyBack: 250 mL    IV PiggyBack: 250 mL    Norepinephrine: 217.1 mL    Oral Fluid: 200 mL    Phenylephrine: 97.5 mL    PRBCs (Packed Red Blood Cells): 300 mL    Sodium Bicarbonate: 1000 mL    Vasopressin: 108 mL  Total IN: 2806.5 mL    OUT:    Indwelling Catheter - Urethral (mL): 980 mL  Total OUT: 980 mL    Total NET: 1826.5 mL            LABS:                        8.5    2.67  )-----------( 143      ( 2023 21:56 )             25.2         129<L>  |  89<L>  |  29.0<H>  ----------------------------<  243<H>  3.8   |  17.0<L>  |  1.37<H>    Ca    7.4<L>      2023 21:56  Phos  3.6       Mg     1.8         TPro  5.1<L>  /  Alb  2.3<L>  /  TBili  3.0<H>  /  DBili  x   /  AST  2276<H>  /  ALT  1489<H>  /  AlkPhos  60        CARDIAC MARKERS ( 2023 04:00 )  x     / 0.01 ng/mL / x     / x     / x      CARDIAC MARKERS ( 2023 16:08 )  x     / <0.01 ng/mL / x     / x     / x          CAPILLARY BLOOD GLUCOSE      POCT Blood Glucose.: 166 mg/dL (2023 16:07)    PT/INR - ( 2023 08:35 )   PT: 19.5 sec;   INR: 1.67 ratio         PTT - ( 2023 08:35 )  PTT:28.1 sec  Urinalysis Basic - ( 2023 18:50 )    Color: Yellow / Appearance: Clear / S.015 / pH: x  Gluc: x / Ketone: Trace  / Bili: Negative / Urobili: Negative mg/dL   Blood: x / Protein: 30 mg/dL / Nitrite: Negative   Leuk Esterase: Trace / RBC: 0-2 /HPF / WBC 3-5 /HPF   Sq Epi: x / Non Sq Epi: Occasional / Bacteria: Few      CULTURES:  Culture Results:   No growth (23 @ 18:50)  Culture Results:   Growth in aerobic bottle: Gram Negative Rods  Growth in anaerobic bottle: Gram Negative Rods (23 @ 16:08)  Rapid RVP Result: NotDetec (23 @ 16:05)  Culture Results:   Growth in aerobic and anaerobic bottles: Gram Negative Rods  ***Blood Panel PCR results on this specimen are available  approximately 3 hours after the Gram stain result.***  Gram stain, PCR, and/or culture results may not always  correspond due todifference in methodologies.  ************************************************************  This PCR assay was performed by multiplex PCR. This  Assay tests for 66 bacterial and resistance gene targets.  Please refer to the [facility] Labs testdirectory  at [email or web address] for details. (23 @ 16:00)        Physical Examination:    General: No acute distress.  Alert, oriented, interactive, nonfocal    HEENT: Pupils equal, reactive to light.  Symmetric.    PULM: Clear to auscultation bilaterally, no significant sputum production    CVS: Regular rate and rhythm, no murmurs, rubs, or gallops    ABD: Soft, nondistended, nontender, normoactive bowel sounds, no masses    EXT: No edema, nontender    SKIN: Warm and well perfused, no rashes noted.    NEURO: A&Ox3, strength 5/5 all extremities, cranial nerves grossly intact, no focal deficits        RADIOLOGY: ***        CRITICAL CARE TIME SPENT: ***  Time spent evaluating/treating patient with medical issues that pose a high risk for life threatening deterioration and/or end-organ damage, reviewing data/labs/imaging, discussing case with multidisciplinary team, discussing plan/goals of care with patient/family. Non-inclusive of procedure time.   Patient is a 62y old  Male who presents with a chief complaint of Septic shock (2023 21:28)      BRIEF HOSPITAL COURSE: 61 y/o M with a h/o HTN, HLD, CAD (s/p PCI), rectal adenocarcinoma (diagnosed in , s/p resection and diverting ileostomy creation  followed by chemo and radiotherapy, complicated by local recurrence in sigmoid/rectum, s/p APR of rectum/sigmoid colon with colostomy creation 3/2022, most recent chemo 23), admitted on  with complaints of fever, SOB, urinary retention, and increased purulent drainage from chronic sacral wound. As per report, the urinary retention has been worsening over the past few weeks as the cancer has been growing and pressing on his bladder and prostate and he has been experiencing suprapubic pain and episodes of overflow incontinence. Found to be febrile, pancytopenic, tachycardic (new onset AF with RVR), and hypotensive despite 3.5L crystalloid bolus. Lactate 6. Started on IV vasopressor therapy. CT C/A/P reveals interval growth of the patient's rectal tumor with apparent anterior contained perforation of the mass extending inferiorly to the perineum. Air in the mass may represent necrosis or fistula to the urinary bladder. Prostate gland is indistinguishable and the posterior aspect of the urinary bladder cannot be  from the mass, concerning for direct invasion of tumor into these structures. Small tubular gas-liquid collection extending from the posterior anus to the intergluteal region may represent small abscess or fistula extending from the necrotic rectal cancer which has extended into the perineum. A new 6 mm right lower lobe pulmonary nodule suspicious for metastasis.     Events last 24 hours: Remains in dual IV vasopressor shock state, however have been able to wean norepi. MSOF. Biventricular heart failure noted on formal TTE. Oliguric. Remains in AF but HR controlled now. E coli bacteremia. Received 1u PRBC for progressive anemia (Hgb 6.7) without evidence of bleeding. Mental status has improved somewhat.        PAST MEDICAL & SURGICAL HISTORY:  Chest pain      HTN (hypertension)      HLD (hyperlipidemia)      CAD (coronary artery disease)      Mitral regurgitation      Rectal cancer  2019 s/p chemo and radiation; recurrence of cancer       Depression  with rectal cancer      Neuropathy  feet s/p chemo      Stented coronary artery  circumflex 2005 LAD 2017 x4      H/O carotid stenosis      H/O renal calculi      DANO (iron deficiency anemia)      Umbilical hernia      Splenic artery aneurysm      H/O cardiac catheterization   2017  4 STENTS      History of CEA (carotid endarterectomy)  right 2019      History of rectal surgery  with ileostomy 2020      H/O sigmoidoscopy  x2      History of removal of Port-a-Cath          Review of Systems:  CONSTITUTIONAL: No fever, chills, or fatigue  EYES: No eye pain, visual disturbances, or discharge  ENMT:  No difficulty hearing, tinnitus, vertigo; No sinus or throat pain  NECK: No pain or stiffness  RESPIRATORY: No cough, wheezing, chills or hemoptysis; No shortness of breath  CARDIOVASCULAR: No chest pain, palpitations, dizziness, or leg swelling  GASTROINTESTINAL: No abdominal or epigastric pain. No nausea, vomiting, or hematemesis; No diarrhea or constipation. No melena or hematochezia.  GENITOURINARY: No dysuria, frequency, hematuria, or incontinence  NEUROLOGICAL: No headaches, memory loss, loss of strength, numbness, or tremors  SKIN: No itching, burning, rashes, or lesions   MUSCULOSKELETAL: No joint pain or swelling; No muscle, back, or extremity pain  PSYCHIATRIC: No depression, anxiety, mood swings, or difficulty sleeping      Medications:  meropenem Injectable 1000 milliGRAM(s) IV Push every 8 hours  aMIOdarone Infusion 1 mG/Min IV Continuous <Continuous>  aMIOdarone Infusion 0.5 mG/Min IV Continuous <Continuous>  norepinephrine Infusion 0.05 MICROgram(s)/kG/Min IV Continuous <Continuous>  HYDROmorphone  Injectable 1.5 milliGRAM(s) IV Push every 4 hours PRN  LORazepam   Injectable 0.5 milliGRAM(s) IV Push every 4 hours PRN  ondansetron Injectable 4 milliGRAM(s) IV Push every 6 hours PRN  heparin   Injectable 5000 Unit(s) SubCutaneous every 12 hours  vasopressin Infusion 0.04 Unit(s)/Min IV Continuous <Continuous>  sodium bicarbonate  Infusion 0.167 mEq/kG/Hr IV Continuous <Continuous>  chlorhexidine 2% Cloths 1 Application(s) Topical <User Schedule>            ICU Vital Signs Last 24 Hrs  T(C): 37 (2023 23:45), Max: 37.1 (2023 07:00)  T(F): 98.6 (2023 23:45), Max: 98.8 (2023 07:00)  HR: 98 (2023 02:30) (77 - 119)  BP: 101/79 (2023 02:30) (80/64 - 130/87)  BP(mean): 88 (2023 02:30) (63 - 105)  ABP: --  ABP(mean): --  RR: 26 (2023 02:30) (18 - 37)  SpO2: 94% (2023 02:30) (89% - 100%)    O2 Parameters below as of 2023 00:30  Patient On (Oxygen Delivery Method): nasal cannula  O2 Flow (L/min): 4          ABG - ( 2023 05:10 )  pH, Arterial: 7.410 pH, Blood: x     /  pCO2: <20   /  pO2: 90    / HCO3: 12    / Base Excess: -12.6 /  SaO2: 99.4                I&O's Detail    2023 07:01  -  2023 07:00  --------------------------------------------------------  IN:    Amiodarone: 16.7 mL    Amiodarone: 132.3 mL    IV PiggyBack: 250 mL    Norepinephrine: 48 mL    Phenylephrine: 293 mL    Vasopressin: 24 mL  Total IN: 764 mL    OUT:    Indwelling Catheter - Urethral (mL): 130 mL    Voided (mL): 1100 mL  Total OUT: 1230 mL    Total NET: -466 mL      2023 07:01  -  2023 03:40  --------------------------------------------------------  IN:    Amiodarone: 283.9 mL    IV PiggyBack: 100 mL    IV PiggyBack: 250 mL    IV PiggyBack: 250 mL    Norepinephrine: 217.1 mL    Oral Fluid: 200 mL    Phenylephrine: 97.5 mL    PRBCs (Packed Red Blood Cells): 300 mL    Sodium Bicarbonate: 1000 mL    Vasopressin: 108 mL  Total IN: 2806.5 mL    OUT:    Indwelling Catheter - Urethral (mL): 980 mL  Total OUT: 980 mL    Total NET: 1826.5 mL            LABS:                        8.5    2.67  )-----------( 143      ( 2023 21:56 )             25.2         129<L>  |  89<L>  |  29.0<H>  ----------------------------<  243<H>  3.8   |  17.0<L>  |  1.37<H>    Ca    7.4<L>      2023 21:56  Phos  3.6       Mg     1.8         TPro  5.1<L>  /  Alb  2.3<L>  /  TBili  3.0<H>  /  DBili  x   /  AST  2276<H>  /  ALT  1489<H>  /  AlkPhos  60        CARDIAC MARKERS ( 2023 04:00 )  x     / 0.01 ng/mL / x     / x     / x      CARDIAC MARKERS ( 2023 16:08 )  x     / <0.01 ng/mL / x     / x     / x          CAPILLARY BLOOD GLUCOSE      POCT Blood Glucose.: 166 mg/dL (2023 16:07)    PT/INR - ( 2023 08:35 )   PT: 19.5 sec;   INR: 1.67 ratio         PTT - ( 2023 08:35 )  PTT:28.1 sec  Urinalysis Basic - ( 2023 18:50 )    Color: Yellow / Appearance: Clear / S.015 / pH: x  Gluc: x / Ketone: Trace  / Bili: Negative / Urobili: Negative mg/dL   Blood: x / Protein: 30 mg/dL / Nitrite: Negative   Leuk Esterase: Trace / RBC: 0-2 /HPF / WBC 3-5 /HPF   Sq Epi: x / Non Sq Epi: Occasional / Bacteria: Few      CULTURES:  Culture Results:   No growth (23 @ 18:50)  Culture Results:   Growth in aerobic bottle: Gram Negative Rods  Growth in anaerobic bottle: Gram Negative Rods (23 @ 16:08)  Rapid RVP Result: NotDetec (23 @ 16:05)  Culture Results:   Growth in aerobic and anaerobic bottles: Gram Negative Rods  ***Blood Panel PCR results on this specimen are available  approximately 3 hours after the Gram stain result.***  Gram stain, PCR, and/or culture results may not always  correspond due todifference in methodologies.  ************************************************************  This PCR assay was performed by multiplex PCR. This  Assay tests for 66 bacterial and resistance gene targets.  Please refer to the [facility] Labs testdirectory  at [email or web address] for details. (23 @ 16:00)        Physical Examination:    General: No acute distress.  Alert, interactive, nonfocal, confused    HEENT: Pupils equal, reactive to light.  Symmetric.    PULM: Clear to auscultation bilaterally, no significant sputum production    CVS: Regular rate and irreg irreg rhythm, no murmurs, rubs, or gallops    ABD: Soft, nondistended, nontender, normoactive bowel sounds, (+) LLQ colostomy    EXT: No edema, nontender    SKIN: Warm and well perfused, draining sacral wound    NEURO: confused, follows commands, strength 5/5 all extremities, cranial nerves grossly intact, no focal deficits        RADIOLOGY:     < from: CT Head No Cont (23 @ 03:03) >  FINDINGS:  Brain parenchyma: Patchy areas of bilateral subcortical hypoattenuation   are noted, within the left parieto-occipital region (series 2, image 33)  as well as the left occipital lobe (series 2, 21). There is no mass   effect or intracranial hemorrhage.    Extra-axial compartments: No extra-axial fluid collections are present.   The ventricles and sulci are normal in size and configuration for   patient's stated age. The basal cisterns are patent. Craniocervical   junction and sella turcica are within normal limits.    Calvarium, paranasal sinuses, and orbits: The calvarium is intact.   Paranasal sinuses demonstrate mucosal thickening alongthe sinus walls.   The mastoid air cells are clear. The orbits are within normal limits.    IMPRESSION:  No large territory acute infarct, intracranial hemorrhage, or mass effect.    Patchy subcortical hypoattenuation, left greater than right, most   suggestive of chronic microangiopathic white matter changes although   other etiologies not entirely excluded. MRI brain could be considered for   further evaluation as clinically warranted.        < from: CT Angio Chest PE Protocol w/ IV Cont (23 @ 23:36) >  FINDINGS:  CHEST:  LUNGS AND LARGE AIRWAYS: Patent central airways. Bilateral lower lobe   dependentatelectasis. Mild bilateral lower lobe interlobular septal   thickening, likely trace interstitial edema. A 6 mm right lower lobe   pulmonary nodule, new since prior study.    PLEURA: Trace bilateral pleural effusions. VESSELS: Atherosclerotic   changes of the coronary arteries.  HEART: Heart size is normal. No pericardial effusion.  MEDIASTINUM AND KARMA: No lymphadenopathy.  CHEST WALL AND LOWER NECK: Right anterior chest wall Mediport with   catheter tip in the distal SVC.    ABDOMEN AND PELVIS:  LIVER: Within normal limits.  BILE DUCTS: Normal caliber.  GALLBLADDER: Within normal limits.  SPLEEN: Within normal limits.  PANCREAS: Within normal limits.  ADRENALS: Within normal limits.  KIDNEYS/URETERS: Within normal limits.    BLADDER: The bladder is decompressed by Vásquez catheter containing air.   There is circumferential bladder wall thickening. The posterior bladder   wall is inseparable from the rectal mass..  REPRODUCTIVE ORGANS: Inseparable from the mass.    BOWEL: Left lower quadrant diverting colostomy. Increased size of a large   rectal mass measuring 16.0 x 11.0 x 9.0. There is discontinuity of the   anterior aspect of the mass which contains foci of gas, with inferior   extension of the mass to the perineum. The anterior aspect of the mass   extends up to the posterior urinary bladder wall. No bowel obstruction.   Normal appendix. Lower abdominal small bowel anastomosis.  PERITONEUM: No ascites.  VESSELS: Atherosclerotic changes.  RETROPERITONEUM/LYMPH NODES: Stable left periaortic node measures 1.9 x   0.7 cm..  ABDOMINAL WALL: Small tubular gas-liquid collection extending from the   posterior anus to the intergluteal region. Small parastomal   fat-containing hernia  BONES: Within normal limits.    IMPRESSION:    No pulmonary embolus.    A new 6 mm right lower lobe pulmonary nodule suspicious for metastasis.    Interval growth of the patient's rectal tumor with apparent anterior   contained perforation of the mass extending inferiorly to the perineum.   Air inthe mass may represent necrosis or fistula to the urinary bladder.   Prostate gland is indistinguishable and the posterior aspect of the   urinary bladder cannot be  from the mass, concerning for direct   invasion of of tumor into these structures.    Small tubular gas-liquid collection extending from the posterior anus to   the intergluteal region may represent small abscess or fistula extending   from the necrotic rectal cancer which has extended into the perineum.          CRITICAL CARE TIME SPENT: 40 mins  Time spent evaluating/treating patient with medical issues that pose a high risk for life threatening deterioration and/or end-organ damage, reviewing data/labs/imaging, discussing case with multidisciplinary team, discussing plan/goals of care with patient/family. Non-inclusive of procedure time.

## 2023-02-28 NOTE — PROGRESS NOTE ADULT - SUBJECTIVE AND OBJECTIVE BOX
62-year-old male with many comorbidities including hypertension, hyperlipidemia, CAD status post stent and rectal adenocarcinoma (MMRp) initially diagnosed in 02/2019 (T2/3NxMx) status post chemotherapy followed by chemoradiation and LAR in 2/2020. He developed disease recurrence so he underwent APR on 03/23/2022.  He later developed progression of disease again via a pelvic mass so he has been started on FOLFIRI plus bevacizumab with the last dose being on 02/15/2023.  He undergoes treatment at [facility].  He is now admitted to the hospital with neutropenic shock.  His WBC was 0.49k couple of days ago. He has been started on IV antibiotics and is now requiring pressors.  Yesterday Hb was 6.7 so he was transfused a unit of PRBC yesterday morning and today Hb is 8.3. Today his white blood cell count increased to 3.2 K.  No acute ON events.  Noted to have worsening transaminitis today with AST 1803 and ALT 1659.  On nasal canula at 5 LPM. No fever spike ON.    PAST MEDICAL & SURGICAL HISTORY:  Chest pain  HTN (hypertension)  HLD (hyperlipidemia)  CAD (coronary artery disease)  Mitral regurgitation  Rectal cancer  2/2019 s/p chemo and radiation; recurrence of cancer 2022  Depression  with rectal cancer  Neuropathy  feet s/p chemo  Stented coronary artery  circumflex 2005 LAD 2017 x4  H/O carotid stenosis  H/O renal calculi  DANO (iron deficiency anemia)  Umbilical hernia  Splenic artery aneurysm  H/O cardiac catheterization  2005 2017  4 STENTS  History of CEA (carotid endarterectomy)  right 2019  History of rectal surgery  with ileostomy 2/2020  H/O sigmoidoscopy  x2  History of removal of Port-a-Cath    MEDICATIONS  (STANDING):  chlorhexidine 2% Cloths 1 Application(s) Topical <User Schedule>  dextrose 50% Injectable 25 Gram(s) IV Push once  dextrose 50% Injectable 12.5 Gram(s) IV Push once  dextrose 50% Injectable 25 Gram(s) IV Push once  dextrose Oral Gel 15 Gram(s) Oral once  glucagon  Injectable 1 milliGRAM(s) IntraMuscular once  heparin   Injectable 5000 Unit(s) SubCutaneous every 12 hours  insulin lispro (ADMELOG) corrective regimen sliding scale   SubCutaneous every 6 hours  meropenem Injectable 1000 milliGRAM(s) IV Push every 8 hours  norepinephrine Infusion 0.05 MICROgram(s)/kG/Min (8.08 mL/Hr) IV Continuous <Continuous>  sodium bicarbonate  Infusion 0.167 mEq/kG/Hr (100 mL/Hr) IV Continuous <Continuous>  MEDICATIONS  (PRN):  HYDROmorphone  Injectable 1.5 milliGRAM(s) IV Push every 4 hours PRN Pain  LORazepam   Injectable 0.5 milliGRAM(s) IV Push every 4 hours PRN Anxiety  ondansetron Injectable 4 milliGRAM(s) IV Push every 6 hours PRN Nausea and/or Vomiting    ICU Vital Signs Last 24 Hrs  T(C): 37 (28 Feb 2023 04:00), Max: 37 (27 Feb 2023 23:45)  T(F): 98.6 (28 Feb 2023 04:00), Max: 98.6 (27 Feb 2023 23:45)  HR: 124 (28 Feb 2023 07:23) (77 - 124)  BP: 101/63 (28 Feb 2023 07:15) (83/62 - 130/87)  BP(mean): 76 (28 Feb 2023 07:15) (65 - 105)  ABP: --  ABP(mean): --  RR: 21 (28 Feb 2023 07:23) (18 - 29)  SpO2: 96% (28 Feb 2023 07:23) (89% - 100%)  O2 Parameters below as of 28 Feb 2023 04:00  Patient On (Oxygen Delivery Method): nasal cannula  O2 Flow (L/min): 5    PE  CTAB  rrr  a/o x 3  abd soft    Labs:  CBC:            8.3    3.43  )-----------( 135      ( 02-28-23 @ 03:47 )             23.6     Chem:         ( 02-28-23 @ 03:47 )    129<L>  |  89<L>  |  32.6<H>  ----------------------------<  225<H>  3.2<L>   |  19.0<L>  |  1.26        Liver Functions: ( 02-28-23 @ 03:47 )  Alb: 2.3 g/dL / Pro: 5.0 g/dL / ALK PHOS: 58 U/L / ALT: 1659 U/L / AST: 1803 U/L / GGT: x         62-year-old male with hypertension, CAD status post stent and recurrent rectal adenocarcinoma status post multiple surgeries and most currently on FOLFIRI plus bevacizumab.    1. Recurrent rectal adenocarcinoma -   Most recently  received FOLFIRI plus bevacizumab on 02/15/2023. This is likely the cause of the neutropenia and subsequent infection and decompensation.  No inpatient therapy and will be rediscussed with his [facility] primary oncology team once acute critical issues have resolved.    2. Neutropenic fever/shock - on pressors. ANC has started to improve and is already >1000. Monitor.  If drops again may consider growth factor.  On IV antibiotics.  Management as per ICU care.    3. Anemia - due to chemo, shock. s/o transfusion this morning.  Hgb 6.7 Yesterday, received 1 unit PRBC transfusion, hemoglobin today 8.3  Transfuse if hgb <7.0.    4. transaminitis:  ?  Shock liver   - consider GI evaluation    Will update [facility] daily 62-year-old male with many comorbidities including hypertension, hyperlipidemia, CAD status post stent and rectal adenocarcinoma (MMRp) initially diagnosed in 02/2019 (T2/3NxMx) status post chemotherapy followed by chemoradiation and LAR in 2/2020. He developed disease recurrence so he underwent APR on 03/23/2022.  He later developed progression of disease again via a pelvic mass so he has been started on FOLFIRI plus bevacizumab with the last dose being on 02/15/2023.  He undergoes treatment at [facility].  He is now admitted to the hospital with neutropenic shock.  His WBC was 0.49k couple of days ago. He has been started on IV antibiotics and is now requiring pressors.  Yesterday Hb was 6.7 so he was transfused a unit of PRBC yesterday morning and today Hb is 8.3. Today his white blood cell count increased to 3.2 K.  No acute ON events.  Noted to have worsening transaminitis today with AST 1803 and ALT 1659.  On minimal pressor support with Levophed at 1 mcg at time of examination. No fever spike ON.    PAST MEDICAL & SURGICAL HISTORY:  Chest pain  HTN (hypertension)  HLD (hyperlipidemia)  CAD (coronary artery disease)  Mitral regurgitation  Rectal cancer  2/2019 s/p chemo and radiation; recurrence of cancer 2022  Depression  with rectal cancer  Neuropathy  feet s/p chemo  Stented coronary artery  circumflex 2005 LAD 2017 x4  H/O carotid stenosis  H/O renal calculi  DANO (iron deficiency anemia)  Umbilical hernia  Splenic artery aneurysm  H/O cardiac catheterization  2005 2017  4 STENTS  History of CEA (carotid endarterectomy)  right 2019  History of rectal surgery  with ileostomy 2/2020  H/O sigmoidoscopy  x2  History of removal of Port-a-Cath    MEDICATIONS  (STANDING):  chlorhexidine 2% Cloths 1 Application(s) Topical <User Schedule>  dextrose 50% Injectable 25 Gram(s) IV Push once  dextrose 50% Injectable 12.5 Gram(s) IV Push once  dextrose 50% Injectable 25 Gram(s) IV Push once  dextrose Oral Gel 15 Gram(s) Oral once  glucagon  Injectable 1 milliGRAM(s) IntraMuscular once  heparin   Injectable 5000 Unit(s) SubCutaneous every 12 hours  insulin lispro (ADMELOG) corrective regimen sliding scale   SubCutaneous every 6 hours  meropenem Injectable 1000 milliGRAM(s) IV Push every 8 hours  norepinephrine Infusion 0.05 MICROgram(s)/kG/Min (8.08 mL/Hr) IV Continuous <Continuous>  sodium bicarbonate  Infusion 0.167 mEq/kG/Hr (100 mL/Hr) IV Continuous <Continuous>  MEDICATIONS  (PRN):  HYDROmorphone  Injectable 1.5 milliGRAM(s) IV Push every 4 hours PRN Pain  LORazepam   Injectable 0.5 milliGRAM(s) IV Push every 4 hours PRN Anxiety  ondansetron Injectable 4 milliGRAM(s) IV Push every 6 hours PRN Nausea and/or Vomiting    ICU Vital Signs Last 24 Hrs  T(C): 37 (28 Feb 2023 04:00), Max: 37 (27 Feb 2023 23:45)  T(F): 98.6 (28 Feb 2023 04:00), Max: 98.6 (27 Feb 2023 23:45)  HR: 124 (28 Feb 2023 07:23) (77 - 124)  BP: 101/63 (28 Feb 2023 07:15) (83/62 - 130/87)  BP(mean): 76 (28 Feb 2023 07:15) (65 - 105)  ABP: --  ABP(mean): --  RR: 21 (28 Feb 2023 07:23) (18 - 29)  SpO2: 96% (28 Feb 2023 07:23) (89% - 100%)  O2 Parameters below as of 28 Feb 2023 04:00  Patient On (Oxygen Delivery Method): nasal cannula  O2 Flow (L/min): 5    PE  CTAB  rrr  a/o x 3  abd soft    Labs:  CBC:            8.3    3.43  )-----------( 135      ( 02-28-23 @ 03:47 )             23.6     Chem:         ( 02-28-23 @ 03:47 )    129<L>  |  89<L>  |  32.6<H>  ----------------------------<  225<H>  3.2<L>   |  19.0<L>  |  1.26        Liver Functions: ( 02-28-23 @ 03:47 )  Alb: 2.3 g/dL / Pro: 5.0 g/dL / ALK PHOS: 58 U/L / ALT: 1659 U/L / AST: 1803 U/L / GGT: x

## 2023-02-28 NOTE — PROGRESS NOTE ADULT - ASSESSMENT
61 y/o M with a h/o HTN, HLD, CAD (s/p PCI), rectal adenocarcinoma (diagnosed in 2019, s/p resection and diverting ileostomy creation 2020 followed by chemo and radiotherapy, complicated by local recurrence in sigmoid/rectum, s/p APR of rectum/sigmoid colon with colostomy creation 3/2022, most recent chemo 2/12/23), with:    # Neutropenic sepsis/septic shock  # Lactic acidosis  # Necrotic rectal CA  # Anal abscess  # Septic cardiomyopathy  # Shock liver  # JAS  # New onset AF with RVR  # Pancytopenia  # Metabolic encephalopathy    - actively titrating norepinephrine to maintain a MAP > 65  - will trial off vasopressin now  - monitor clinical and laboratory end-points of perfusion closely, lactate 6.8 but slowly downtrending  - repeat ABG now, may be able to stop sodium bicarbonate infusion if serum pH > 7.20  - suspect septic cardiomyopathy as cause of biventricular dysfunction, would avoid further volume resuscitation at this time (s/p 7.5L in the past 48 hours, reportedly bolused 3L at home prior to arrival)  - blood cultures are growing e coli, urine culture is unremarkable, sacral wound culture gram stain shows numerous gram positive cocci in pairs  - continue meropenem for now until sensitivities return, dose vancomycin by level  - colorectal surgery and heme/onc input appreciated, no surgical or oncological treatment indicated at this time  - new onset rapid AF secondary to sepsis, started on amiodarone infusion by ED team, HR better controlled now, likely more so as a result of improving septic state  - will discontinue amiodarone infusion, especially given our inability to anticoagulate  - H/H improved s/p 1u PRBC, remains without evidence of bleeding, pancytopenia likely all chemotherapy related  - trend CBC, continue to transfuse for Hgb < 7, can consider epoetin if severe anemia becomes recurrent  - JAS secondary to ischemic ATN, optimize end-organ perfusion as above  - trend BUN/Cr, electrolytes, acid-base balance, and monitor hourly UOP (oliguric)  - no indication for urgent hemodialysis at this time  - progressive shock liver, trend LFTs, avoid hepatotoxic agents  - encephalopathy due to sepsis and shock, neuro exam is nonfocal, CT brain unremarkable for acute pathology    Overall prognosis is unfortunately very poor. Will be extremely difficult to ever achieve full infectious source control.    Patient remains FULL CODE for now. If he were to experience rapid deterioration and impending cardiac arrest, his daughter Erma has asked us to reach out to her so she can make goals of care decisions in real time.      Case discussed with MICU physician, Dr. Browne.

## 2023-02-28 NOTE — CONSULT NOTE ADULT - SUBJECTIVE AND OBJECTIVE BOX
INFECTIOUS DISEASES AND INTERNAL MEDICINE at [place]  =======================================================  Andrea Forde MD  Diplomates American Board of Internal Medicine and Infectious Diseases  Telephone [phone]  Fax            [phone]  =======================================================    PASTORA [id number]      HPI:  61 y/o M with a h/o HTN, HLD, CAD (s/p PCI), rectal adenocarcinoma (diagnosed in , s/p resection and diverting ileostomy creation  followed by chemo and radiotherapy, complicated by local recurrence in sigmoid/rectum, s/p APR of rectum/sigmoid colon with colostomy creation 3/2022, most recent chemo 23), presents to the ED complaining of fever, SOB, urinary retention, and increased purulent drainage from chronic sacral wound. As per report, the urinary retention has been worsening over the past few weeks as the cancer has been growing and pressing on his bladder and prostate and he has been experiencing suprapubic pain and episodes of overflow incontinence. Found to be febrile, pancytopenic, tachycardic (new onset AF with RVR), and hypotensive despite 3.5L crystalloid bolus. Lactate 6. Started on IV vasopressor therapy. Now developing some confusion. CT C/A/P reveals interval growth of the patient's rectal tumor with apparent anterior contained perforation of the mass extending inferiorly to the perineum. Air in the mass may represent necrosis or fistula to the urinary bladder. Prostate gland is indistinguishable and the posterior aspect of the urinary bladder cannot be  from the mass, concerning for direct invasion of tumor into these structures. Small tubular gas-liquid collection extending from the posterior anus to the intergluteal region may represent small abscess or fistula extending from the necrotic rectal cancer which has extended into the perineum. A new 6 mm right lower lobe pulmonary nodule suspicious for metastasis.   AS ABOVE PT WITH RECTAL CA ON CHEMO AT [facility]   LAST CHEMO   PT WITH INCREASED FATIGUE AND CONFUSION  ADMITTED HYPOTENSIVE   SHOCK BLOOD CX GM NEG RODS   ASKED TO EVALUATE FROM ID STANDPOINT             PAST MEDICAL & SURGICAL HISTORY:  Chest pain      HTN (hypertension)      HLD (hyperlipidemia)      CAD (coronary artery disease)      Mitral regurgitation      Rectal cancer  2019 s/p chemo and radiation; recurrence of cancer       Depression  with rectal cancer      Neuropathy  feet s/p chemo      Stented coronary artery  circumflex  LAD 2017 x4      H/O carotid stenosis      H/O renal calculi      DANO (iron deficiency anemia)      Umbilical hernia      Splenic artery aneurysm      H/O cardiac catheterization  2005  4 STENTS      History of CEA (carotid endarterectomy)  right       History of rectal surgery  with ileostomy 2020      H/O sigmoidoscopy  x2      History of removal of Port-a-Cath          ANTIBIOTICS  meropenem Injectable 1000 milliGRAM(s) IV Push every 8 hours      Allergies    No Known Allergies    Intolerances        SOCIAL HISTORY:     FAMILY HX   FAMILY HISTORY:  FH: heart disease  father        Vital Signs Last 24 Hrs  T(C): 37 (2023 04:00), Max: 37 (2023 23:45)  T(F): 98.6 (2023 04:00), Max: 98.6 (2023 23:45)  HR: 131 (2023 10:00) (77 - 131)  BP: 118/79 (2023 10:00) (85/56 - 130/87)  BP(mean): 92 (2023 10:00) (65 - 100)  RR: 19 (2023 10:00) (18 - 29)  SpO2: 100% (2023 10:00) (89% - 100%)    Parameters below as of 2023 09:30  Patient On (Oxygen Delivery Method): nasal cannula  O2 Flow (L/min): 3    Drug Dosing Weight  Height (cm): 188 (2023 15:51)  Weight (kg): 90 (2023 07:00)  BMI (kg/m2): 25.5 (2023 07:00)  BSA (m2): 2.17 (2023 07:00)      REVIEW OF SYSTEMS:    CONSTITUTIONAL:  As per HPI.    HEENT:  Eyes:  No diplopia or blurred vision. ENT:  No earache, sore throat or runny nose.    CARDIOVASCULAR:  No pressure, squeezing, strangling, tightness, heaviness or aching about the chest, neck, axilla or epigastrium.    RESPIRATORY:  No cough, shortness of breath, PND or orthopnea.    GASTROINTESTINAL:  AS PER HPI     GENITOURINARY:  No dysuria, frequency or urgency.    MUSCULOSKELETAL:  As per HPI.    SKIN:  No change in skin, hair or nails.    NEUROLOGIC:   weakness.                  PHYSICAL EXAMINATION:    GENERAL: The patient is a _____in no apparent distress. ___     VITAL SIGNS: T(C): 37 (23 @ 04:00), Max: 37 (23 @ 23:45)  HR: 131 (23 @ 10:00) (77 - 131)  BP: 118/79 (23 @ 10:00) (85/56 - 130/87)  RR: 19 (23 @ 10:00) (18 - 29)  SpO2: 100% (23 @ 10:00) (89% - 100%)  Wt(kg): --    HEENT: Head is normocephalic and atraumatic.  ANICTERIC  NECK: Supple. No carotid bruits.  No lymphadenopathy or thyromegaly. PORT IN PLACE RIGHT SIDE CHEST WALL     LUNGS:COARSE BREATH SOUNDS    HEART: Regular rate and rhythm without murmur.    ABDOMEN: Soft, nontender, and nondistended.  Positive bowel sounds.  No hepatosplenomegaly was noted. NO REBOUND NO GUARDING OSTOMY IN PLACE     EXTREMITIES: NO EDEMA NO ERYTHEMA    NEUROLOGIC: NON FOCAL      SKIN: No ulceration or induration present. NO RASH        BLOOD CULTURES  Culture Results:   No growth ( @ 18:50)  Culture Results:   Growth in aerobic bottle: Escherichia coli  See previous culture [id number]  Growth in anaerobic bottle: Gram Negative Rods ( @ 16:08)  Culture Results:   Growth in aerobic and anaerobic bottles: Escherichia coli  ***Blood Panel PCR results on this specimen are available  approximately 3 hours after the Gram stain result.***  Gram stain, PCR, and/or culture results may not always  correspond due to difference in methodologies.  ************************************************************  This PCR assay was performed by multiplex PCR. This  Assay tests for 66 bacterial and resistance gene targets.  Please refer to the [facility] test directory  at [email or web address] for details. ( @ 16:00)       URINE CX          LABS:                        8.3    3.43  )-----------( 135      ( 2023 03:47 )             23.6         129<L>  |  89<L>  |  32.6<H>  ----------------------------<  225<H>  3.2<L>   |  19.0<L>  |  1.26    Ca    7.4<L>      2023 03:47  Phos  3.0       Mg     1.7         TPro  5.0<L>  /  Alb  2.3<L>  /  TBili  2.5<H>  /  DBili  x   /  AST  1803<H>  /  ALT  1659<H>  /  AlkPhos  58      PT/INR - ( 2023 08:35 )   PT: 19.5 sec;   INR: 1.67 ratio         PTT - ( 2023 08:35 )  PTT:28.1 sec  Urinalysis Basic - ( 2023 18:50 )    Color: Yellow / Appearance: Clear / S.015 / pH: x  Gluc: x / Ketone: Trace  / Bili: Negative / Urobili: Negative mg/dL   Blood: x / Protein: 30 mg/dL / Nitrite: Negative   Leuk Esterase: Trace / RBC: 0-2 /HPF / WBC 3-5 /HPF   Sq Epi: x / Non Sq Epi: Occasional / Bacteria: Few        RADIOLOGY & ADDITIONAL STUDIES:      ASSESSMENT/PLAN    61 y/o M with a h/o HTN, HLD, CAD (s/p PCI), rectal adenocarcinoma (diagnosed in 2019, s/p resection and diverting ileostomy creation  followed by chemo and radiotherapy, complicated by local recurrence in sigmoid/rectum, s/p APR of rectum/sigmoid colon with colostomy creation 3/2022, most recent chemo 23), presents to the ED complaining of fever, SOB, urinary retention, and increased purulent drainage from chronic sacral wound. As per report, the urinary retention has been worsening over the past few weeks as the cancer has been growing and pressing on his bladder and prostate and he has been experiencing suprapubic pain and episodes of overflow incontinence. Found to be febrile, pancytopenic, tachycardic (new onset AF with RVR), and hypotensive despite 3.5L crystalloid bolus. Lactate 6. Started on IV vasopressor therapy. Now developing some confusion. CT C/A/P reveals interval growth of the patient's rectal tumor with apparent anterior contained perforation of the mass extending inferiorly to the perineum. Air in the mass may represent necrosis or fistula to the urinary bladder. Prostate gland is indistinguishable and the posterior aspect of the urinary bladder cannot be  from the mass, concerning for direct invasion of tumor into these structures. Small tubular gas-liquid collection extending from the posterior anus to the intergluteal region may represent small abscess or fistula extending from the necrotic rectal cancer which has extended into the perineum. A new 6 mm right lower lobe pulmonary nodule suspicious for metastasis.   AS ABOVE PT WITH RECTAL CA ON CHEMO AT [facility]   LAST CHEMO   PT WITH INCREASED FATIGUE AND CONFUSION  ADMITTED HYPOTENSIVE   SHOCK BLOOD CX GM NEG RODS   WBC IS RECOVERING  WILL CONTINUE MERREM UNTIL FINAL SENSITIVITIES BACK  URINE CX NEGATIVE   MOST LIKELY  SOURCE BOWEL  SPOKE TO FAMILY  AT BEDSIDE   WILL FOLLOW UP WITH FURTHER RECOMMENDATIONS                 IVETTE BLOOD MD INFECTIOUS DISEASES AND INTERNAL MEDICINE at [place]  =======================================================  Andrea Forde MD  Diplomates American Board of Internal Medicine and Infectious Diseases  Telephone [phone]  Fax            [phone]  =======================================================    PASTORA [id number]      HPI:  63 y/o M with a h/o HTN, HLD, CAD (s/p PCI), rectal adenocarcinoma (diagnosed in , s/p resection and diverting ileostomy creation  followed by chemo and radiotherapy, complicated by local recurrence in sigmoid/rectum, s/p APR of rectum/sigmoid colon with colostomy creation 3/2022, most recent chemo 23), presents to the ED complaining of fever, SOB, urinary retention, and increased purulent drainage from chronic sacral wound. As per report, the urinary retention has been worsening over the past few weeks as the cancer has been growing and pressing on his bladder and prostate and he has been experiencing suprapubic pain and episodes of overflow incontinence. Found to be febrile, pancytopenic, tachycardic (new onset AF with RVR), and hypotensive despite 3.5L crystalloid bolus. Lactate 6. Started on IV vasopressor therapy. Now developing some confusion. CT C/A/P reveals interval growth of the patient's rectal tumor with apparent anterior contained perforation of the mass extending inferiorly to the perineum. Air in the mass may represent necrosis or fistula to the urinary bladder. Prostate gland is indistinguishable and the posterior aspect of the urinary bladder cannot be  from the mass, concerning for direct invasion of tumor into these structures. Small tubular gas-liquid collection extending from the posterior anus to the intergluteal region may represent small abscess or fistula extending from the necrotic rectal cancer which has extended into the perineum. A new 6 mm right lower lobe pulmonary nodule suspicious for metastasis.   AS ABOVE PT WITH RECTAL CA ON CHEMO AT [facility]   LAST CHEMO   PT WITH INCREASED FATIGUE AND CONFUSION  ADMITTED HYPOTENSIVE   SHOCK BLOOD CX GM NEG RODS   ASKED TO EVALUATE FROM ID STANDPOINT             PAST MEDICAL & SURGICAL HISTORY:  Chest pain      HTN (hypertension)      HLD (hyperlipidemia)      CAD (coronary artery disease)      Mitral regurgitation      Rectal cancer  2019 s/p chemo and radiation; recurrence of cancer       Depression  with rectal cancer      Neuropathy  feet s/p chemo      Stented coronary artery  circumflex  LAD 2017 x4      H/O carotid stenosis      H/O renal calculi      DANO (iron deficiency anemia)      Umbilical hernia      Splenic artery aneurysm      H/O cardiac catheterization  2005  4 STENTS      History of CEA (carotid endarterectomy)  right       History of rectal surgery  with ileostomy 2020      H/O sigmoidoscopy  x2      History of removal of Port-a-Cath          ANTIBIOTICS  meropenem Injectable 1000 milliGRAM(s) IV Push every 8 hours      Allergies    No Known Allergies    Intolerances        SOCIAL HISTORY:     FAMILY HX   FAMILY HISTORY:  FH: heart disease  father        Vital Signs Last 24 Hrs  T(C): 37 (2023 04:00), Max: 37 (2023 23:45)  T(F): 98.6 (2023 04:00), Max: 98.6 (2023 23:45)  HR: 131 (2023 10:00) (77 - 131)  BP: 118/79 (2023 10:00) (85/56 - 130/87)  BP(mean): 92 (2023 10:00) (65 - 100)  RR: 19 (2023 10:00) (18 - 29)  SpO2: 100% (2023 10:00) (89% - 100%)    Parameters below as of 2023 09:30  Patient On (Oxygen Delivery Method): nasal cannula  O2 Flow (L/min): 3    Drug Dosing Weight  Height (cm): 188 (2023 15:51)  Weight (kg): 90 (2023 07:00)  BMI (kg/m2): 25.5 (2023 07:00)  BSA (m2): 2.17 (2023 07:00)      REVIEW OF SYSTEMS:    CONSTITUTIONAL:  As per HPI.    HEENT:  Eyes:  No diplopia or blurred vision. ENT:  No earache, sore throat or runny nose.    CARDIOVASCULAR:  No pressure, squeezing, strangling, tightness, heaviness or aching about the chest, neck, axilla or epigastrium.    RESPIRATORY:  No cough, shortness of breath, PND or orthopnea.    GASTROINTESTINAL:  AS PER HPI     GENITOURINARY:  No dysuria, frequency or urgency.    MUSCULOSKELETAL:  As per HPI.    SKIN:  No change in skin, hair or nails.    NEUROLOGIC:   weakness.                  PHYSICAL EXAMINATION:    GENERAL: The patient is a _____in no apparent distress. ___     VITAL SIGNS: T(C): 37 (23 @ 04:00), Max: 37 (23 @ 23:45)  HR: 131 (23 @ 10:00) (77 - 131)  BP: 118/79 (23 @ 10:00) (85/56 - 130/87)  RR: 19 (23 @ 10:00) (18 - 29)  SpO2: 100% (23 @ 10:00) (89% - 100%)  Wt(kg): --    HEENT: Head is normocephalic and atraumatic.  ANICTERIC  NECK: Supple. No carotid bruits.  No lymphadenopathy or thyromegaly. PORT IN PLACE RIGHT SIDE CHEST WALL     LUNGS:COARSE BREATH SOUNDS    HEART: Regular rate and rhythm without murmur.    ABDOMEN: Soft, nontender, and nondistended.  Positive bowel sounds.  No hepatosplenomegaly was noted. NO REBOUND NO GUARDING OSTOMY IN PLACE     EXTREMITIES: NO EDEMA NO ERYTHEMA    NEUROLOGIC: NON FOCAL      SKIN: No ulceration or induration present. NO RASH        BLOOD CULTURES  Culture Results:   No growth ( @ 18:50)  Culture Results:   Growth in aerobic bottle: Escherichia coli  See previous culture [id number]  Growth in anaerobic bottle: Gram Negative Rods ( @ 16:08)  Culture Results:   Growth in aerobic and anaerobic bottles: Escherichia coli  ***Blood Panel PCR results on this specimen are available  approximately 3 hours after the Gram stain result.***  Gram stain, PCR, and/or culture results may not always  correspond due to difference in methodologies.  ************************************************************  This PCR assay was performed by multiplex PCR. This  Assay tests for 66 bacterial and resistance gene targets.  Please refer to the [facility] test directory  at [email or web address] for details. ( @ 16:00)       URINE CX          LABS:                        8.3    3.43  )-----------( 135      ( 2023 03:47 )             23.6         129<L>  |  89<L>  |  32.6<H>  ----------------------------<  225<H>  3.2<L>   |  19.0<L>  |  1.26    Ca    7.4<L>      2023 03:47  Phos  3.0       Mg     1.7         TPro  5.0<L>  /  Alb  2.3<L>  /  TBili  2.5<H>  /  DBili  x   /  AST  1803<H>  /  ALT  1659<H>  /  AlkPhos  58      PT/INR - ( 2023 08:35 )   PT: 19.5 sec;   INR: 1.67 ratio         PTT - ( 2023 08:35 )  PTT:28.1 sec  Urinalysis Basic - ( 2023 18:50 )    Color: Yellow / Appearance: Clear / S.015 / pH: x  Gluc: x / Ketone: Trace  / Bili: Negative / Urobili: Negative mg/dL   Blood: x / Protein: 30 mg/dL / Nitrite: Negative   Leuk Esterase: Trace / RBC: 0-2 /HPF / WBC 3-5 /HPF   Sq Epi: x / Non Sq Epi: Occasional / Bacteria: Few        RADIOLOGY & ADDITIONAL STUDIES:      ASSESSMENT/PLAN    63 y/o M with a h/o HTN, HLD, CAD (s/p PCI), rectal adenocarcinoma (diagnosed in 2019, s/p resection and diverting ileostomy creation  followed by chemo and radiotherapy, complicated by local recurrence in sigmoid/rectum, s/p APR of rectum/sigmoid colon with colostomy creation 3/2022, most recent chemo 23), presents to the ED complaining of fever, SOB, urinary retention, and increased purulent drainage from chronic sacral wound. As per report, the urinary retention has been worsening over the past few weeks as the cancer has been growing and pressing on his bladder and prostate and he has been experiencing suprapubic pain and episodes of overflow incontinence. Found to be febrile, pancytopenic, tachycardic (new onset AF with RVR), and hypotensive despite 3.5L crystalloid bolus. Lactate 6. Started on IV vasopressor therapy. Now developing some confusion. CT C/A/P reveals interval growth of the patient's rectal tumor with apparent anterior contained perforation of the mass extending inferiorly to the perineum. Air in the mass may represent necrosis or fistula to the urinary bladder. Prostate gland is indistinguishable and the posterior aspect of the urinary bladder cannot be  from the mass, concerning for direct invasion of tumor into these structures. Small tubular gas-liquid collection extending from the posterior anus to the intergluteal region may represent small abscess or fistula extending from the necrotic rectal cancer which has extended into the perineum. A new 6 mm right lower lobe pulmonary nodule suspicious for metastasis.   AS ABOVE PT WITH RECTAL CA ON CHEMO AT [facility]   LAST CHEMO   PT WITH INCREASED FATIGUE AND CONFUSION  ADMITTED HYPOTENSIVE   SHOCK BLOOD CX GM NEG RODS   WBC IS RECOVERING  WILL CONTINUE MERREM UNTIL FINAL SENSITIVITIES BACK  URINE CX NEGATIVE   MOST LIKELY  SOURCE BOWEL  SUGGEST REPEAT BLOOD CX X2 SETS   SPOKE TO FAMILY  AT BEDSIDE   WILL FOLLOW UP WITH FURTHER RECOMMENDATIONS                 IVETTE BLOOD MD

## 2023-02-28 NOTE — PROGRESS NOTE ADULT - TIME BILLING
discussed with bedside RN, MICU attending, daughter, wife, and pt    Total time also includes discussion during interdisciplinary team rounds, chart review, review of medications/ labs/ imaging, examination, care coordination with other health care professionals, and documentation.

## 2023-02-28 NOTE — PROGRESS NOTE ADULT - ASSESSMENT
63yo M w/ PMH of CAD s/p multiple stents and recurrent rectal adenoCA who p/w fever, urinary retention and purulent drainage from rectal wound found to be pancytopenic and hypotensive with new onset AF with RVR requiring pressor therapy.  CT showed interval growth of rectal tumor with contained perforation of mass extending into the perineum with ?necrosis or fistula to bladder and a possible small abscess or fistula from posterior anus to intergluteal region.  Also noted was a 6mm RLL pulm nodule suspicious for metastatic dz.  Regarding cancer hx, pt dx'd 2019 s/p resxn/diverting ileostomy s/p CTX/XRT then local recurrence s/p APR of rectum/sigmoid colon with colostomy creation 3/2022 - now on CTX (5-FU) and s/p sacral bx (1/20/23) which confirmed metastatic dz.  Here pt with neutropenic fever and septic shock requiring pressors, afib w/ RVR on amiodarone and heparin, on broad spectrum abx.  We are consulted for assistance with goals of care.  #Goals of care  - pt with metastatic CRC here with neutropenic fever and septic shock with abscess -- was on 3 pressors now weaned down to 1 and appears to be clinically improving  - per MICU team, pt remains full code -- family will make decisions regarding aggressive interventions as clinical picture evolves  - wife Maricel - [phone] would be legal surrogate but is making decisions with her dtr Erma (who is a MICU RN)    #AMS  - improved today  - Would avoid/minimize known deliriogenic drugs such as benzodiazepines and anticholinergics.  Encourage staff and family to reorient frequently.  Keep shades open during day in effort to maintain day/night cycle.    #Pain - ischemia? - pt denying pain and unable to localize or recall earlier pain episode  - cont dilaudid 1.5mg IV prn - will see how he responds and adjust accordingly     #Diaphoresis -- likely r/t fever  - add APAP 1000mg PO Q6hrs prn fever

## 2023-02-28 NOTE — PROGRESS NOTE ADULT - ASSESSMENT
62-year-old male with hypertension, CAD status post stent and recurrent rectal adenocarcinoma status post multiple surgeries and most currently on FOLFIRI plus bevacizumab.    1. Recurrent rectal adenocarcinoma -   Most recently  received FOLFIRI plus bevacizumab on 02/15/2023. This is likely the cause of the neutropenia and subsequent infection and decompensation.  No inpatient therapy and will be rediscussed with his [facility] primary oncology team once acute critical issues have resolved.    2. Neutropenic fever/shock - on pressors. ANC has started to improve and is already >1000. Monitor.  If drops again may consider growth factor.  On IV antibiotics.  Management as per ICU care.   follow uo ID recs    3. Anemia - due to chemo, shock. s/o transfusion this morning.  Hgb 6.7 Yesterday, received 1 unit PRBC transfusion, hemoglobin today 8.3  Transfuse if hgb <7.0.    4. transaminitis:  ?  Shock liver   - may consider GI evaluation    Will update [facility] daily

## 2023-03-01 LAB
-  AMIKACIN: SIGNIFICANT CHANGE UP
-  AMOXICILLIN/CLAVULANIC ACID: SIGNIFICANT CHANGE UP
-  AMPICILLIN/SULBACTAM: SIGNIFICANT CHANGE UP
-  AMPICILLIN: SIGNIFICANT CHANGE UP
-  AZTREONAM: SIGNIFICANT CHANGE UP
-  CEFAZOLIN: SIGNIFICANT CHANGE UP
-  CEFEPIME: SIGNIFICANT CHANGE UP
-  CEFOXITIN: SIGNIFICANT CHANGE UP
-  CEFTRIAXONE: SIGNIFICANT CHANGE UP
-  CIPROFLOXACIN: SIGNIFICANT CHANGE UP
-  ERTAPENEM: SIGNIFICANT CHANGE UP
-  GENTAMICIN: SIGNIFICANT CHANGE UP
-  IMIPENEM: SIGNIFICANT CHANGE UP
-  LEVOFLOXACIN: SIGNIFICANT CHANGE UP
-  MEROPENEM: SIGNIFICANT CHANGE UP
-  PIPERACILLIN/TAZOBACTAM: SIGNIFICANT CHANGE UP
-  TOBRAMYCIN: SIGNIFICANT CHANGE UP
-  TRIMETHOPRIM/SULFAMETHOXAZOLE: SIGNIFICANT CHANGE UP
A1C WITH ESTIMATED AVERAGE GLUCOSE RESULT: 5.7 % — HIGH (ref 4–5.6)
ALBUMIN SERPL ELPH-MCNC: 1.8 G/DL — LOW (ref 3.3–5.2)
ALP SERPL-CCNC: 79 U/L — SIGNIFICANT CHANGE UP (ref 40–120)
ALT FLD-CCNC: 774 U/L — HIGH
ANION GAP SERPL CALC-SCNC: 10 MMOL/L — SIGNIFICANT CHANGE UP (ref 5–17)
ANISOCYTOSIS BLD QL: SLIGHT — SIGNIFICANT CHANGE UP
AST SERPL-CCNC: 184 U/L — HIGH
BASOPHILS # BLD AUTO: 0 K/UL — SIGNIFICANT CHANGE UP (ref 0–0.2)
BASOPHILS NFR BLD AUTO: 0 % — SIGNIFICANT CHANGE UP (ref 0–2)
BILIRUB SERPL-MCNC: 1.6 MG/DL — SIGNIFICANT CHANGE UP (ref 0.4–2)
BUN SERPL-MCNC: 35.9 MG/DL — HIGH (ref 8–20)
CALCIUM SERPL-MCNC: 7.3 MG/DL — LOW (ref 8.4–10.5)
CHLORIDE SERPL-SCNC: 97 MMOL/L — SIGNIFICANT CHANGE UP (ref 96–108)
CO2 SERPL-SCNC: 28 MMOL/L — SIGNIFICANT CHANGE UP (ref 22–29)
CREAT SERPL-MCNC: 1.23 MG/DL — SIGNIFICANT CHANGE UP (ref 0.5–1.3)
CULTURE RESULTS: SIGNIFICANT CHANGE UP
EGFR: 66 ML/MIN/1.73M2 — SIGNIFICANT CHANGE UP
EOSINOPHIL # BLD AUTO: 0.16 K/UL — SIGNIFICANT CHANGE UP (ref 0–0.5)
EOSINOPHIL NFR BLD AUTO: 1.8 % — SIGNIFICANT CHANGE UP (ref 0–6)
ESTIMATED AVERAGE GLUCOSE: 117 MG/DL — HIGH (ref 68–114)
GIANT PLATELETS BLD QL SMEAR: PRESENT — SIGNIFICANT CHANGE UP
GLUCOSE BLDC GLUCOMTR-MCNC: 115 MG/DL — HIGH (ref 70–99)
GLUCOSE BLDC GLUCOMTR-MCNC: 115 MG/DL — HIGH (ref 70–99)
GLUCOSE SERPL-MCNC: 107 MG/DL — HIGH (ref 70–99)
HAV IGM SER-ACNC: SIGNIFICANT CHANGE UP
HBV CORE IGM SER-ACNC: SIGNIFICANT CHANGE UP
HBV SURFACE AG SER-ACNC: SIGNIFICANT CHANGE UP
HCT VFR BLD CALC: 25.5 % — LOW (ref 39–50)
HCV AB S/CO SERPL IA: 0.05 S/CO — SIGNIFICANT CHANGE UP (ref 0–0.99)
HCV AB SERPL-IMP: SIGNIFICANT CHANGE UP
HGB BLD-MCNC: 8.8 G/DL — LOW (ref 13–17)
LYMPHOCYTES # BLD AUTO: 0.53 K/UL — LOW (ref 1–3.3)
LYMPHOCYTES # BLD AUTO: 6.1 % — LOW (ref 13–44)
MACROCYTES BLD QL: SLIGHT — SIGNIFICANT CHANGE UP
MAGNESIUM SERPL-MCNC: 2.3 MG/DL — SIGNIFICANT CHANGE UP (ref 1.6–2.6)
MANUAL SMEAR VERIFICATION: SIGNIFICANT CHANGE UP
MCHC RBC-ENTMCNC: 30.6 PG — SIGNIFICANT CHANGE UP (ref 27–34)
MCHC RBC-ENTMCNC: 34.5 GM/DL — SIGNIFICANT CHANGE UP (ref 32–36)
MCV RBC AUTO: 88.5 FL — SIGNIFICANT CHANGE UP (ref 80–100)
METHOD TYPE: SIGNIFICANT CHANGE UP
MONOCYTES # BLD AUTO: 0.53 K/UL — SIGNIFICANT CHANGE UP (ref 0–0.9)
MONOCYTES NFR BLD AUTO: 6.1 % — SIGNIFICANT CHANGE UP (ref 2–14)
MYELOCYTES NFR BLD: 0.9 % — HIGH (ref 0–0)
NEUTROPHILS # BLD AUTO: 7.35 K/UL — SIGNIFICANT CHANGE UP (ref 1.8–7.4)
NEUTROPHILS NFR BLD AUTO: 79.8 % — HIGH (ref 43–77)
NEUTS BAND # BLD: 4.4 % — SIGNIFICANT CHANGE UP (ref 0–8)
NRBC # BLD: 1 /100 — HIGH (ref 0–0)
OVALOCYTES BLD QL SMEAR: SLIGHT — SIGNIFICANT CHANGE UP
PHOSPHATE SERPL-MCNC: 1.4 MG/DL — LOW (ref 2.4–4.7)
PLAT MORPH BLD: NORMAL — SIGNIFICANT CHANGE UP
PLATELET # BLD AUTO: 132 K/UL — LOW (ref 150–400)
POIKILOCYTOSIS BLD QL AUTO: SLIGHT — SIGNIFICANT CHANGE UP
POLYCHROMASIA BLD QL SMEAR: SLIGHT — SIGNIFICANT CHANGE UP
POTASSIUM SERPL-MCNC: 3.2 MMOL/L — LOW (ref 3.5–5.3)
POTASSIUM SERPL-SCNC: 3.2 MMOL/L — LOW (ref 3.5–5.3)
PROMYELOCYTES # FLD: 0.9 % — HIGH (ref 0–0)
PROT SERPL-MCNC: 4.6 G/DL — LOW (ref 6.6–8.7)
RBC # BLD: 2.88 M/UL — LOW (ref 4.2–5.8)
RBC # FLD: 14.1 % — SIGNIFICANT CHANGE UP (ref 10.3–14.5)
RBC BLD AUTO: ABNORMAL
SODIUM SERPL-SCNC: 134 MMOL/L — LOW (ref 135–145)
SPECIMEN SOURCE: SIGNIFICANT CHANGE UP
T4 AB SER-ACNC: 5.2 UG/DL — SIGNIFICANT CHANGE UP (ref 4.5–12)
WBC # BLD: 8.73 K/UL — SIGNIFICANT CHANGE UP (ref 3.8–10.5)
WBC # FLD AUTO: 8.73 K/UL — SIGNIFICANT CHANGE UP (ref 3.8–10.5)

## 2023-03-01 PROCEDURE — 99497 ADVNCD CARE PLAN 30 MIN: CPT | Mod: 25

## 2023-03-01 PROCEDURE — 99223 1ST HOSP IP/OBS HIGH 75: CPT

## 2023-03-01 PROCEDURE — 99232 SBSQ HOSP IP/OBS MODERATE 35: CPT

## 2023-03-01 PROCEDURE — 99233 SBSQ HOSP IP/OBS HIGH 50: CPT

## 2023-03-01 PROCEDURE — 76705 ECHO EXAM OF ABDOMEN: CPT | Mod: 26

## 2023-03-01 RX ORDER — ALPRAZOLAM 0.25 MG
0.5 TABLET ORAL EVERY 6 HOURS
Refills: 0 | Status: DISCONTINUED | OUTPATIENT
Start: 2023-03-01 | End: 2023-03-06

## 2023-03-01 RX ORDER — DIGOXIN 250 MCG
250 TABLET ORAL EVERY 8 HOURS
Refills: 0 | Status: DISCONTINUED | OUTPATIENT
Start: 2023-03-01 | End: 2023-03-01

## 2023-03-01 RX ORDER — DIGOXIN 250 MCG
250 TABLET ORAL EVERY 8 HOURS
Refills: 0 | Status: DISCONTINUED | OUTPATIENT
Start: 2023-03-02 | End: 2023-03-02

## 2023-03-01 RX ORDER — POTASSIUM PHOSPHATE, MONOBASIC POTASSIUM PHOSPHATE, DIBASIC 236; 224 MG/ML; MG/ML
30 INJECTION, SOLUTION INTRAVENOUS ONCE
Refills: 0 | Status: COMPLETED | OUTPATIENT
Start: 2023-03-01 | End: 2023-03-01

## 2023-03-01 RX ORDER — DIGOXIN 250 MCG
500 TABLET ORAL ONCE
Refills: 0 | Status: COMPLETED | OUTPATIENT
Start: 2023-03-01 | End: 2023-03-01

## 2023-03-01 RX ORDER — METOPROLOL TARTRATE 50 MG
12.5 TABLET ORAL ONCE
Refills: 0 | Status: COMPLETED | OUTPATIENT
Start: 2023-03-01 | End: 2023-03-01

## 2023-03-01 RX ORDER — POTASSIUM CHLORIDE 20 MEQ
40 PACKET (EA) ORAL EVERY 4 HOURS
Refills: 0 | Status: COMPLETED | OUTPATIENT
Start: 2023-03-01 | End: 2023-03-01

## 2023-03-01 RX ADMIN — Medication 0.5 MILLIGRAM(S): at 01:49

## 2023-03-01 RX ADMIN — HEPARIN SODIUM 5000 UNIT(S): 5000 INJECTION INTRAVENOUS; SUBCUTANEOUS at 05:07

## 2023-03-01 RX ADMIN — MEROPENEM 1000 MILLIGRAM(S): 1 INJECTION INTRAVENOUS at 13:08

## 2023-03-01 RX ADMIN — Medication 500 MICROGRAM(S): at 17:35

## 2023-03-01 RX ADMIN — HEPARIN SODIUM 5000 UNIT(S): 5000 INJECTION INTRAVENOUS; SUBCUTANEOUS at 17:35

## 2023-03-01 RX ADMIN — CHLORHEXIDINE GLUCONATE 1 APPLICATION(S): 213 SOLUTION TOPICAL at 05:11

## 2023-03-01 RX ADMIN — POTASSIUM PHOSPHATE, MONOBASIC POTASSIUM PHOSPHATE, DIBASIC 83.33 MILLIMOLE(S): 236; 224 INJECTION, SOLUTION INTRAVENOUS at 05:09

## 2023-03-01 RX ADMIN — Medication 40 MILLIEQUIVALENT(S): at 08:59

## 2023-03-01 RX ADMIN — MEROPENEM 1000 MILLIGRAM(S): 1 INJECTION INTRAVENOUS at 05:05

## 2023-03-01 RX ADMIN — Medication 40 MILLIEQUIVALENT(S): at 05:05

## 2023-03-01 RX ADMIN — MEROPENEM 1000 MILLIGRAM(S): 1 INJECTION INTRAVENOUS at 21:11

## 2023-03-01 RX ADMIN — Medication 12.5 MILLIGRAM(S): at 13:13

## 2023-03-01 RX ADMIN — Medication 12.5 MILLIGRAM(S): at 11:57

## 2023-03-01 NOTE — PROVIDER CONTACT NOTE (CRITICAL VALUE NOTIFICATION) - NAME OF MD/NP/PA/DO NOTIFIED:
MD. Matt MULLER
MISTY Rodrigues
MD Browne
MISTY Mason
MISTY Mason
Dr. Matt LYLES
MD Matt MULLER
MD cox
Dr. Matt MULLER
Dr. Matt MULLER

## 2023-03-01 NOTE — DIETITIAN INITIAL EVALUATION ADULT - NS FNS DIET ORDER
Diet, Consistent Carbohydrate Gestational w/3 Snacks:   Supplement Feeding Modality:  Oral  Glucerna Shake Cans or Servings Per Day:  1       Frequency:  Three Times a day  Ensure Pudding Cans or Servings Per Day:  1       Frequency:  Three Times a day (03-01-23 @ 08:11)

## 2023-03-01 NOTE — DIETITIAN INITIAL EVALUATION ADULT - PERTINENT MEDS FT
MEDICATIONS  (STANDING):  chlorhexidine 2% Cloths 1 Application(s) Topical <User Schedule>  dextrose 50% Injectable 25 Gram(s) IV Push once  dextrose 50% Injectable 12.5 Gram(s) IV Push once  dextrose 50% Injectable 25 Gram(s) IV Push once  dextrose Oral Gel 15 Gram(s) Oral once  glucagon  Injectable 1 milliGRAM(s) IntraMuscular once  heparin   Injectable 5000 Unit(s) SubCutaneous every 12 hours  meropenem Injectable 1000 milliGRAM(s) IV Push every 8 hours  metoprolol tartrate 12.5 milliGRAM(s) Oral every 6 hours    MEDICATIONS  (PRN):  ALPRAZolam 0.5 milliGRAM(s) Oral every 6 hours PRN anxiety  HYDROmorphone  Injectable 1.5 milliGRAM(s) IV Push every 4 hours PRN Pain  ondansetron Injectable 4 milliGRAM(s) IV Push every 6 hours PRN Nausea and/or Vomiting

## 2023-03-01 NOTE — PROGRESS NOTE ADULT - NS PANP COMMENT GEN_ALL_CORE FT
Remains off pressors since yesterday. Remains in Afib with RVR. Attempted to uptitrate metoprolol but without significant effect. Will avoid further amiodarone given transaminitis. LFTs improving. Started on digoxin with improvement. Hgb remains stable. Seen by urology and will have to maintain castillo as outpatient. Palliative care discussed code status with the patient and he has opted for DNR/DNI status. If remains hemodynamically stable, can be transferred out of ICU.
63 y/o M baseline functional, but has history of advance rectal Adenocaricnoma s/p multiple chemo/radiation, s/p illeostomy, now with recurrence confirmed with recent sacral biopsy (1/20), chronic sacral drainage, present with fever and urinary retention.  Admitted to MICU for septic shock likely 2/2 to gram negative bacteremia, with gram positive cocci from wound culture from sacral.  Sources likely from necrotic tumor mass and possible perineal abscess?  Per colorectal surgery, no acute intervention at this time, however if patient clinically improves, may consider surgical debulking and possible radical resection.  Currently patient clinically improves with antibiotics.  Lactate downtrending and pressor requirement is lessen.  However overall prognosis remains poor given extensive tumor burden and difficult to control infectious source.  Will continue antibiotic right now with tentative 10 to 14 days course.  Will consult ID.  and will continue GOC discussion.

## 2023-03-01 NOTE — PROGRESS NOTE ADULT - ASSESSMENT
61 y/o M with a h/o HTN, HLD, CAD (s/p PCI), rectal adenocarcinoma (diagnosed in 2019, s/p resection and diverting ileostomy creation 2020 followed by chemo and radiotherapy, complicated by local recurrence in sigmoid/rectum, s/p APR of rectum/sigmoid colon with colostomy creation 3/2022, most recent chemo 2/12/23), presents to the ED complaining of fever, SOB, urinary retention, and increased purulent drainage from chronic sacral wound. As per report, the urinary retention has been worsening over the past few weeks as the cancer has been growing and pressing on his bladder and prostate and he has been experiencing suprapubic pain and episodes of overflow incontinence. Found to be febrile, pancytopenic, tachycardic (new onset AF with RVR), and hypotensive despite 3.5L crystalloid bolus. Lactate 6. Started on IV vasopressor therapy. Now developing some confusion. CT C/A/P reveals interval growth of the patient's rectal tumor with apparent anterior contained perforation of the mass extending inferiorly to the perineum. Air in the mass may represent necrosis or fistula to the urinary bladder. Prostate gland is indistinguishable and the posterior aspect of the urinary bladder cannot be  from the mass, concerning for direct invasion of tumor into these structures. Small tubular gas-liquid collection extending from the posterior anus to the intergluteal region may represent small abscess or fistula extending from the necrotic rectal cancer which has extended into the perineum. A new 6 mm right lower lobe pulmonary nodule suspicious for metastasis.   AS ABOVE PT WITH RECTAL CA ON CHEMO AT [facility]   LAST CHEMO 2/14  PT WITH INCREASED FATIGUE AND CONFUSION  ADMITTED HYPOTENSIVE   SHOCK BLOOD CX GM NEG RODS   ECOLI   WBC IS RECOVERING  WILL CONTINUE MERREM UNTIL FINAL SENSITIVITIES BACK  URINE CX NEGATIVE   MOST LIKELY  SOURCE BOWEL   REPEAT BLOOD CX X2 SETS PENDING      WILL FOLLOW UP WITH FURTHER RECOMMENDATIONS

## 2023-03-01 NOTE — PHYSICAL THERAPY INITIAL EVALUATION ADULT - PERTINENT HX OF CURRENT PROBLEM, REHAB EVAL
fever, SOB, urinary retention, septic shock, ecoli bacteremia, neutropenia, transaminitis, JAS, afib c RVR, metabolic encephalopathy, necrotic rectal cancer

## 2023-03-01 NOTE — PROGRESS NOTE ADULT - ASSESSMENT
62-year-old male with hypertension, CAD status post stent and recurrent rectal adenocarcinoma status post multiple surgeries and most currently on FOLFIRI plus bevacizumab.    1. Recurrent rectal adenocarcinoma -   Most recently  received FOLFIRI plus bevacizumab on 02/15/2023. This is likely the cause of the neutropenia and subsequent infection and decompensation.  No inpatient therapy and will be rediscussed with his [facility] primary oncology team once acute critical issues have resolved.    2. Neutropenic fever/shock - on pressors. ANC has started to improve and is already >1000. Monitor.  If drops again may consider growth factor.  On IV antibiotics.  Management as per ICU care.   follow uo ID recs: on Merrem    3. Anemia - due to chemo, shock. s/o transfusion this morning.  Hgb 6.7 on 2/27/23, received 1 unit PRBC transfusion on 2/27/23, today Hb 8.8.  Transfuse if hgb <7.0.    4. transaminitis:  ?  Shock liver   -LFTs trending down    Will update [facility] daily   62-year-old male with hypertension, CAD status post stent and recurrent rectal adenocarcinoma status post multiple surgeries and most currently on FOLFIRI plus bevacizumab.    1. Recurrent rectal adenocarcinoma -   Most recently  received FOLFIRI plus bevacizumab on 02/15/2023. This is likely the cause of the neutropenia and subsequent infection and decompensation.  No inpatient therapy and will be rediscussed with his [facility] primary oncology team once acute critical issues have resolved.    2. Neutropenic fever/shock - on pressors. ANC has started to improve and is already >1000. Monitor.  If drops again may consider growth factor.  On IV antibiotics.  Management as per ICU care.   follow uo ID recs: on Merrem    3. Anemia - due to chemo, shock. s/o transfusion this morning.  Hgb 6.7 on 2/27/23, received 1 unit PRBC transfusion on 2/27/23, today Hb 8.8.  Transfuse if hgb <7.0.    4. transaminitis:  ?  Shock liver   -LFTs trending down    5. A fib with RVR:  - blood pressure dropped on metoprolol, awaiting cardiology follow up    Will update [facility] daily

## 2023-03-01 NOTE — DIETITIAN INITIAL EVALUATION ADULT - CONTINUE CURRENT NUTRITION CARE PLAN
Liberalize diet to regular to maximize po intake and add Ensure Enlive TID to provide an additional 350 kcal, 20g protein per serving.

## 2023-03-01 NOTE — PROVIDER CONTACT NOTE (CRITICAL VALUE NOTIFICATION) - PERSON GIVING RESULT:
berna hernandes
xochilt Jarrell
[facility]
Nadia Lindo
lab
Lab: Nataly Ding
Charlotte Harper
[facility]
Todd from [facility]

## 2023-03-01 NOTE — DIETITIAN INITIAL EVALUATION ADULT - PHYSICAL ASSESSMENT ORBITAL
moderate

## 2023-03-01 NOTE — PROGRESS NOTE ADULT - ASSESSMENT
Pt is a 61 y/o M pmhx of HTN, CAD s/p PCI, rectal adenocarcinoma (s/p resection and diverting ileostomy in 2020) treated w/ chemo and radiotherapy, complicated by local recurrence in sigmoid/rectum s/p APR w/ colostomy creation, Pt recently received chemo on 2/12/23, presented to [facility] ED on 2/27/23 w complaints of fever and SOB w/ urinary retention.\    1. Septic shock --> resolved   2. E. coli bacteremia   3. Neutropenia   4. Transaminitis   5. JAS  6. Afib w/ RVR   7. Metabolic encephalopathy   8. Necrotic rectal CA     Plan:     Neuro: Encephalopathic in setting of septic shock, improved, continue to monitor. Pain control as needed w/ ativan and Dilaudid      CV: Septic shock state secondary to E. coli bacteremia, now off pressors, continue to monitor and maintain MAP>65. Afib w/ RVR titrated off amio gtt given transaminitis, continue w/ 12.5 of PO metoprolol.     Pulm: No active issues, continue to monitor and supplement O2 requirements as needed to maintain SpO2>92%.     GI: Diet: Consistent carb, zofran PRN for nausea     Renal: JAS secondary to shock state, improved following resolution of shock, continue to monitor and avoid nephrotoxic meds.     Endo: Glucose <180, mag>2, K>4 for arrythmia suppression.     Heme: Received 1 unit PRBC yesterday, w/ appropriate response in Hb. Transfuse as needed for Hb below 7.     ID: Septic shock, secondary to neutropenia and E. coli bacteremia, resolved. Sacral wound cx + for numerous gm + organisms. Continue on meropenem, trend markers of infection daily

## 2023-03-01 NOTE — CONSULT NOTE ADULT - NS ATTEND AMEND GEN_ALL_CORE FT
Patient discussed in detail yesterday with PA and seen.   Likely has anterior displacement of bladder from tumor causing retention.   Vásquez for how.

## 2023-03-01 NOTE — DIETITIAN INITIAL EVALUATION ADULT - NSFNSGIIOFT_GEN_A_CORE
02-28-23 @ 07:01  -  03-01-23 @ 07:00  --------------------------------------------------------  OUT:    Colostomy (mL): 60 mL  Total OUT: 60 mL    Total NET: -60 mL

## 2023-03-01 NOTE — DIETITIAN INITIAL EVALUATION ADULT - OTHER INFO
62 year old male with PMH of CAD s/p multiple stents and recurrent rectal adenoCA who p/w fever, urinary retention and purulent drainage from rectal wound found to be pancytopenic and hypotensive with new onset AF with RVR requiring pressor therapy. CT showed interval growth of rectal tumor with contained perforation of mass extending into the perineum with ?necrosis or fistula to bladder and a possible small abscess or fistula from posterior anus to intergluteal region. Also noted was a 6mm RLL pulm nodule suspicious for metastatic disease. Regarding cancer hx, pt dx'd 2019 s/p resxn/diverting ileostomy s/p CTX/XRT then local recurrence s/p APR of rectum/sigmoid colon with colostomy creation 3/2022 - now on CTX (5-FU) and s/p sacral bx (1/20/23) which confirmed metastatic dz.  Here pt with neutropenic fever and septic shock requiring pressors, afib w/ RVR on amiodarone and heparin, on broad spectrum abx.     Nutrition assessment completed at bedside. Spoke with pt and pts wife. Pt reports very poor appetite/po intake over the last 1.5 weeks, states prior to this appetite/po intake had been "so-so". Wife confirms, states she first noticed pts appetite decreasing in 10/2022. Pt reports an ~10 lb wt loss recently. Pt currently on a consistent carbohydrate diet, no h/o DM, blood sugars WNL. Encouraged small, frequent meals and to make protein a priority. Palliative following for GOC. RD to follow up.

## 2023-03-01 NOTE — PROVIDER CONTACT NOTE (CRITICAL VALUE NOTIFICATION) - ACTION/TREATMENT ORDERED:
CTM
Continue Merrem and Vancomycin Antibiotics
no new orders placed at this time
Repeat to be drawn in 2 hours post blood

## 2023-03-01 NOTE — PROVIDER CONTACT NOTE (CRITICAL VALUE NOTIFICATION) - TEST AND RESULT REPORTED:
Blood Culture: Gram Negative Rods in aerobic bottle
H/H 6.7/20.3
Lactate: 8.3
body fluid grew ecoli and hemolytic strep
Lactate 11.6
Lactate 12.2
lactate 6.8
Lactate 11.4
Blood Culture- Aerobic bottle Gram Negative Rods
body fluid culture gram stain showed few polymorphonuclear lukocytes and gram + cocci in pairs

## 2023-03-01 NOTE — PROGRESS NOTE ADULT - SUBJECTIVE AND OBJECTIVE BOX
62-year-old male with many comorbidities including hypertension, hyperlipidemia, CAD status post stent and rectal adenocarcinoma (MMRp) initially diagnosed in 02/2019 (T2/3NxMx) status post chemotherapy followed by chemoradiation and LAR in 2/2020. He developed disease recurrence so he underwent APR on 03/23/2022.  He later developed progression of disease again via a pelvic mass so he has been started on FOLFIRI plus bevacizumab with the last dose being on 02/15/2023.  He undergoes treatment at [facility].  He is now admitted to the hospital with neutropenic shock.  His WBC was 0.49k at presentation. He has been started on IV antibiotics and required pressor support. Received PRBC transfusion on 2/27/23. Today his white blood cell count increased to 8.7  No acute ON events.  Noted to have worsening transaminitis yesterday with AST 1803 and ALT 1659. Todat  and .  No fever spike ON.    PAST MEDICAL & SURGICAL HISTORY:  Chest pain  HTN (hypertension)  HLD (hyperlipidemia)  CAD (coronary artery disease)  Mitral regurgitation  Rectal cancer  2/2019 s/p chemo and radiation; recurrence of cancer 2022  Depression  with rectal cancer  Neuropathy  feet s/p chemo  Stented coronary artery  circumflex 2005 LAD 2017 x4  H/O carotid stenosis  H/O renal calculi  DANO (iron deficiency anemia)  Umbilical hernia  Splenic artery aneurysm  H/O cardiac catheterization  2005 2017  4 STENTS  History of CEA (carotid endarterectomy)  right 2019  History of rectal surgery  with ileostomy 2/2020  H/O sigmoidoscopy  x2  History of removal of Port-a-Cath    MEDICATIONS  (STANDING):  chlorhexidine 2% Cloths 1 Application(s) Topical <User Schedule>  dextrose 50% Injectable 25 Gram(s) IV Push once  dextrose 50% Injectable 12.5 Gram(s) IV Push once  dextrose 50% Injectable 25 Gram(s) IV Push once  dextrose Oral Gel 15 Gram(s) Oral once  glucagon  Injectable 1 milliGRAM(s) IntraMuscular once  heparin   Injectable 5000 Unit(s) SubCutaneous every 12 hours  insulin lispro (ADMELOG) corrective regimen sliding scale   SubCutaneous every 6 hours  meropenem Injectable 1000 milliGRAM(s) IV Push every 8 hours  metoprolol tartrate 12.5 milliGRAM(s) Oral every 6 hours  norepinephrine Infusion 0.05 MICROgram(s)/kG/Min (8.08 mL/Hr) IV Continuous <Continuous>  sodium bicarbonate  Infusion 0.167 mEq/kG/Hr (100 mL/Hr) IV Continuous <Continuous>  MEDICATIONS  (PRN):  HYDROmorphone  Injectable 1.5 milliGRAM(s) IV Push every 4 hours PRN Pain  LORazepam   Injectable 0.5 milliGRAM(s) IV Push every 4 hours PRN Anxiety  ondansetron Injectable 4 milliGRAM(s) IV Push every 6 hours PRN Nausea and/or Vomiting    Vital Signs Last 24 Hrs  T(C): 35.9 (01 Mar 2023 07:14), Max: 36.8 (28 Feb 2023 20:00)  T(F): 96.7 (01 Mar 2023 07:14), Max: 98.2 (28 Feb 2023 20:00)  HR: 139 (01 Mar 2023 10:00) (101 - 141)  BP: 103/73 (01 Mar 2023 10:00) (83/56 - 124/76)  BP(mean): 82 (01 Mar 2023 10:00) (65 - 98)  RR: 26 (01 Mar 2023 10:00) (14 - 39)  SpO2: 97% (01 Mar 2023 10:00) (78% - 100%)  Parameters below as of 01 Mar 2023 08:00  Patient On (Oxygen Delivery Method): nasal cannula  O2 Flow (L/min): 2      PE  CTAB  rrr  a/o x 3  abd soft    CBC:            8.8    8.73  )-----------( 132      ( 03-01-23 @ 02:45 )             25.5     Chem:         ( 03-01-23 @ 02:45 )    134<L>  |  97  |  35.9<H>  ----------------------------<  107<H>  3.2<L>   |  28.0  |  1.23      Liver Functions: ( 03-01-23 @ 02:45 )  Alb: 1.8 g/dL / Pro: 4.6 g/dL / ALK PHOS: 79 U/L / ALT: 774 U/L / AST: 184 U/L / GGT: x                 62-year-old male with many comorbidities including hypertension, hyperlipidemia, CAD status post stent and rectal adenocarcinoma (MMRp) initially diagnosed in 02/2019 (T2/3NxMx) status post chemotherapy followed by chemoradiation and LAR in 2/2020. He developed disease recurrence so he underwent APR on 03/23/2022.  He later developed progression of disease again via a pelvic mass so he has been started on FOLFIRI plus bevacizumab with the last dose being on 02/15/2023.  He undergoes treatment at [facility].  He is now admitted to the hospital with neutropenic shock.  His WBC was 0.49k at presentation. He has been started on IV antibiotics and required pressor support. Received PRBC transfusion on 2/27/23. Today his white blood cell count increased to 8.7  No acute ON events.  Noted to have worsening transaminitis yesterday with AST 1803 and ALT 1659. Todat  and .  No fever spike ON. Off pressor now.  Continues to A fib with RVR and HR in 140s at time of exam. cardiology on board.    PAST MEDICAL & SURGICAL HISTORY:  Chest pain  HTN (hypertension)  HLD (hyperlipidemia)  CAD (coronary artery disease)  Mitral regurgitation  Rectal cancer  2/2019 s/p chemo and radiation; recurrence of cancer 2022  Depression  with rectal cancer  Neuropathy  feet s/p chemo  Stented coronary artery  circumflex 2005 LAD 2017 x4  H/O carotid stenosis  H/O renal calculi  DANO (iron deficiency anemia)  Umbilical hernia  Splenic artery aneurysm  H/O cardiac catheterization  2005 2017  4 STENTS  History of CEA (carotid endarterectomy)  right 2019  History of rectal surgery  with ileostomy 2/2020  H/O sigmoidoscopy  x2  History of removal of Port-a-Cath    MEDICATIONS  (STANDING):  chlorhexidine 2% Cloths 1 Application(s) Topical <User Schedule>  dextrose 50% Injectable 25 Gram(s) IV Push once  dextrose 50% Injectable 12.5 Gram(s) IV Push once  dextrose 50% Injectable 25 Gram(s) IV Push once  dextrose Oral Gel 15 Gram(s) Oral once  glucagon  Injectable 1 milliGRAM(s) IntraMuscular once  heparin   Injectable 5000 Unit(s) SubCutaneous every 12 hours  insulin lispro (ADMELOG) corrective regimen sliding scale   SubCutaneous every 6 hours  meropenem Injectable 1000 milliGRAM(s) IV Push every 8 hours  metoprolol tartrate 12.5 milliGRAM(s) Oral every 6 hours  norepinephrine Infusion 0.05 MICROgram(s)/kG/Min (8.08 mL/Hr) IV Continuous <Continuous>  sodium bicarbonate  Infusion 0.167 mEq/kG/Hr (100 mL/Hr) IV Continuous <Continuous>  MEDICATIONS  (PRN):  HYDROmorphone  Injectable 1.5 milliGRAM(s) IV Push every 4 hours PRN Pain  LORazepam   Injectable 0.5 milliGRAM(s) IV Push every 4 hours PRN Anxiety  ondansetron Injectable 4 milliGRAM(s) IV Push every 6 hours PRN Nausea and/or Vomiting    Vital Signs Last 24 Hrs  T(C): 35.9 (01 Mar 2023 07:14), Max: 36.8 (28 Feb 2023 20:00)  T(F): 96.7 (01 Mar 2023 07:14), Max: 98.2 (28 Feb 2023 20:00)  HR: 139 (01 Mar 2023 10:00) (101 - 141)  BP: 103/73 (01 Mar 2023 10:00) (83/56 - 124/76)  BP(mean): 82 (01 Mar 2023 10:00) (65 - 98)  RR: 26 (01 Mar 2023 10:00) (14 - 39)  SpO2: 97% (01 Mar 2023 10:00) (78% - 100%)  Parameters below as of 01 Mar 2023 08:00  Patient On (Oxygen Delivery Method): nasal cannula  O2 Flow (L/min): 2      PE  CTAB  rrr  a/o x 3  abd soft    CBC:            8.8    8.73  )-----------( 132      ( 03-01-23 @ 02:45 )             25.5     Chem:         ( 03-01-23 @ 02:45 )    134<L>  |  97  |  35.9<H>  ----------------------------<  107<H>  3.2<L>   |  28.0  |  1.23      Liver Functions: ( 03-01-23 @ 02:45 )  Alb: 1.8 g/dL / Pro: 4.6 g/dL / ALK PHOS: 79 U/L / ALT: 774 U/L / AST: 184 U/L / GGT: x

## 2023-03-01 NOTE — PHYSICAL THERAPY INITIAL EVALUATION ADULT - GAIT PATTERN USED, PT EVAL
mild unsteadiness c turns, decreased gait velocity and activity tolerance, verbal cues for sequencing

## 2023-03-01 NOTE — PROGRESS NOTE ADULT - SUBJECTIVE AND OBJECTIVE BOX
INFECTIOUS DISEASES AND INTERNAL MEDICINE at [facility]  =======================================================  Andrea Forde MD  Diplomates American Board of Internal Medicine and Infectious Diseases  Telephone [phone]  Fax            [phone]  =======================================================    [place] [id number]    Follow up: [facility] NEG BACTEREMIA    Allergies:  No Known Allergies      Medications:  chlorhexidine 2% Cloths 1 Application(s) Topical <User Schedule>  dextrose 50% Injectable 25 Gram(s) IV Push once  dextrose 50% Injectable 12.5 Gram(s) IV Push once  dextrose 50% Injectable 25 Gram(s) IV Push once  dextrose Oral Gel 15 Gram(s) Oral once  glucagon  Injectable 1 milliGRAM(s) IntraMuscular once  heparin   Injectable 5000 Unit(s) SubCutaneous every 12 hours  HYDROmorphone  Injectable 1.5 milliGRAM(s) IV Push every 4 hours PRN  insulin lispro (ADMELOG) corrective regimen sliding scale   SubCutaneous every 6 hours  LORazepam   Injectable 0.5 milliGRAM(s) IV Push every 4 hours PRN  meropenem Injectable 1000 milliGRAM(s) IV Push every 8 hours  metoprolol tartrate 12.5 milliGRAM(s) Oral every 6 hours  norepinephrine Infusion 0.05 MICROgram(s)/kG/Min IV Continuous <Continuous>  ondansetron Injectable 4 milliGRAM(s) IV Push every 6 hours PRN  sodium bicarbonate  Infusion 0.167 mEq/kG/Hr IV Continuous <Continuous>    SOCIAL       FAMILY   FAMILY HISTORY:  FH: heart disease  father      REVIEW OF SYSTEMS:  CONSTITUTIONAL:  No Fever or chills  HEENT:   No diplopia or blurred vision.  No earache, sore throat or runny nose.  CARDIOVASCULAR:  No pressure, squeezing, strangling, tightness, heaviness or aching about the chest, neck, axilla or epigastrium.  RESPIRATORY:  No cough, shortness of breath, PND or orthopnea.  GASTROINTESTINAL:  No nausea, vomiting or diarrhea.  GENITOURINARY:  No dysuria, frequency or urgency. No Blood in urine  MUSCULOSKELETAL:   moves all joints  SKIN:  No change in skin, hair or nails.  NEUROLOGIC:  No paresthesias, fasciculations, seizures or weakness.  PSYCHIATRIC:  No disorder of thought or mood.  ENDOCRINE:  No heat or cold intolerance, polyuria or polydipsia.  HEMATOLOGICAL:  No easy bruising or bleeding.            Physical Exam:  ICU Vital Signs Last 24 Hrs  T(C): 35.9 (01 Mar 2023 07:14), Max: 36.8 (28 Feb 2023 20:00)  T(F): 96.7 (01 Mar 2023 07:14), Max: 98.2 (28 Feb 2023 20:00)  HR: 128 (01 Mar 2023 09:00) (101 - 141)  BP: 108/66 (01 Mar 2023 09:00) (83/56 - 124/76)  BP(mean): 72 (01 Mar 2023 09:00) (65 - 98)  ABP: --  ABP(mean): --  RR: 14 (01 Mar 2023 09:00) (14 - 39)  SpO2: 96% (01 Mar 2023 09:00) (78% - 100%)    O2 Parameters below as of 01 Mar 2023 08:00  Patient On (Oxygen Delivery Method): nasal cannula  O2 Flow (L/min): 2        GEN: NAD,   HEENT: normocephalic and atraumatic. EOMI. JOLEEN.    NECK: Supple. No carotid bruits.  No lymphadenopathy or thyromegaly.  LUNGS: Clear to auscultation. PORT RIGHT CHEST WALL   HEART: Regular rate and rhythm without murmur.  ABDOMEN: Soft, nontender, and nondistended.  Positive bowel sounds.  OSTOMY   : No CVA tenderness  EXTREMITIES: Without any cyanosis, clubbing, rash, lesions or edema.  MSK: no joint swelling  NEUROLOGIC: Cranial nerves II through XII are grossly intact.  PSYCHIATRIC: Appropriate affect .  SKIN: No ulceration or induration present.        Labs:  Vitals:  ============  T(F): 96.7 (01 Mar 2023 07:14), Max: 98.2 (28 Feb 2023 20:00)  HR: 128 (01 Mar 2023 09:00)  BP: 108/66 (01 Mar 2023 09:00)  RR: 14 (01 Mar 2023 09:00)  SpO2: 96% (01 Mar 2023 09:00) (78% - 100%)  temp max in last 48H T(F): , Max: 98.6 (02-27-23 @ 23:45)    =======================================================  Current Antibiotics:  meropenem Injectable 1000 milliGRAM(s) IV Push every 8 hours    Other medications:  chlorhexidine 2% Cloths 1 Application(s) Topical <User Schedule>  dextrose 50% Injectable 25 Gram(s) IV Push once  dextrose 50% Injectable 12.5 Gram(s) IV Push once  dextrose 50% Injectable 25 Gram(s) IV Push once  dextrose Oral Gel 15 Gram(s) Oral once  glucagon  Injectable 1 milliGRAM(s) IntraMuscular once  heparin   Injectable 5000 Unit(s) SubCutaneous every 12 hours  insulin lispro (ADMELOG) corrective regimen sliding scale   SubCutaneous every 6 hours  metoprolol tartrate 12.5 milliGRAM(s) Oral every 6 hours  norepinephrine Infusion 0.05 MICROgram(s)/kG/Min IV Continuous <Continuous>  sodium bicarbonate  Infusion 0.167 mEq/kG/Hr IV Continuous <Continuous>      =======================================================  Labs:                        8.8    8.73  )-----------( 132      ( 01 Mar 2023 02:45 )             25.5     03-01    134<L>  |  97  |  35.9<H>  ----------------------------<  107<H>  3.2<L>   |  28.0  |  1.23    Ca    7.3<L>      01 Mar 2023 02:45  Phos  1.4     03-01  Mg     2.3     03-01    TPro  4.6<L>  /  Alb  1.8<L>  /  TBili  1.6  /  DBili  x   /  AST  184<H>  /  ALT  774<H>  /  AlkPhos  79  03-01      Culture - Body Fluid with Gram Stain (collected 02-27-23 @ 10:20)  Source: .Body Fluid Draining Rectal Wound  Gram Stain (02-27-23 @ 22:31):    Few polymorphonuclear leukocytes seen per low power field    Numerous Gram positive cocci in pairs seen per oil power field    Culture - Urine (collected 02-26-23 @ 18:50)  Source: Clean Catch Clean Catch (Midstream)  Final Report (02-27-23 @ 21:37):    No growth    Culture - Blood (collected 02-26-23 @ 16:08)  Source: .Blood Blood-Peripheral  Gram Stain (02-28-23 @ 02:16):    Growth in aerobic bottle: Gram Negative Rods    Growth in anaerobic bottle: Gram Negative Rods    Culture - Blood (collected 02-26-23 @ 16:00)  Source: .Blood Blood-Peripheral  Gram Stain (02-27-23 @ 15:07):    Growth in aerobic and anaerobic bottles: Gram Negative Rods  Organism: Blood Culture PCR (02-27-23 @ 15:49)  Organism: Blood Culture PCR (02-27-23 @ 15:49)    Sensitivities:      -  Escherichia coli: Detec      Method Type: PCR      Creatinine, Serum: 1.23 mg/dL (03-01-23 @ 02:45)  Creatinine, Serum: 1.24 mg/dL (02-28-23 @ 19:40)  Creatinine, Serum: 1.26 mg/dL (02-28-23 @ 03:47)  Creatinine, Serum: 1.37 mg/dL (02-27-23 @ 21:56)  Creatinine, Serum: 1.41 mg/dL (02-27-23 @ 12:40)  Creatinine, Serum: 1.41 mg/dL (02-27-23 @ 04:00)  Creatinine, Serum: 1.24 mg/dL (02-26-23 @ 16:08)    Procalcitonin, Serum: >100.00 ng/mL (02-27-23 @ 04:00)    D-Dimer Assay, Quantitative: 2367 ng/mL DDU (02-27-23 @ 08:35)        WBC Count: 8.73 K/uL (03-01-23 @ 02:45)  WBC Count: 3.43 K/uL (02-28-23 @ 03:47)  WBC Count: 2.67 K/uL (02-27-23 @ 21:56)  WBC Count: 1.86 K/uL (02-27-23 @ 12:40)  WBC Count: 1.71 K/uL (02-27-23 @ 04:00)  WBC Count: 0.49 K/uL (02-26-23 @ 16:08)    SARS-CoV-2: NotDetec (02-26-23 @ 16:05)    Lactate Dehydrogenase, Serum: 127 U/L (02-27-23 @ 04:00)    Alkaline Phosphatase, Serum: 79 U/L (03-01-23 @ 02:45)  Alkaline Phosphatase, Serum: 58 U/L (02-28-23 @ 03:47)  Alkaline Phosphatase, Serum: 60 U/L (02-27-23 @ 21:56)  Alkaline Phosphatase, Serum: 54 U/L (02-27-23 @ 12:40)  Alkaline Phosphatase, Serum: 43 U/L (02-27-23 @ 04:00)  Alkaline Phosphatase, Serum: 49 U/L (02-26-23 @ 16:08)  Alanine Aminotransferase (ALT/SGPT): 774 U/L (03-01-23 @ 02:45)  Alanine Aminotransferase (ALT/SGPT): 1659 U/L (02-28-23 @ 03:47)  Alanine Aminotransferase (ALT/SGPT): 1489 U/L (02-27-23 @ 21:56)  Alanine Aminotransferase (ALT/SGPT): 475 U/L (02-27-23 @ 12:40)  Alanine Aminotransferase (ALT/SGPT): 92 U/L (02-27-23 @ 04:00)  Alanine Aminotransferase (ALT/SGPT): 124 U/L (02-26-23 @ 16:08)  Aspartate Aminotransferase (AST/SGOT): 184 U/L (03-01-23 @ 02:45)  Aspartate Aminotransferase (AST/SGOT): 1803 U/L (02-28-23 @ 03:47)  Aspartate Aminotransferase (AST/SGOT): 2276 U/L (02-27-23 @ 21:56)  Aspartate Aminotransferase (AST/SGOT): 653 U/L (02-27-23 @ 12:40)  Aspartate Aminotransferase (AST/SGOT): 64 U/L (02-27-23 @ 04:00)  Aspartate Aminotransferase (AST/SGOT): 106 U/L (02-26-23 @ 16:08)  Bilirubin Total, Serum: 1.6 mg/dL (03-01-23 @ 02:45)  Bilirubin Total, Serum: 2.5 mg/dL (02-28-23 @ 03:47)  Bilirubin Total, Serum: 3.0 mg/dL (02-27-23 @ 21:56)  Bilirubin Total, Serum: 3.5 mg/dL (02-27-23 @ 12:40)  Bilirubin Total, Serum: 2.5 mg/dL (02-27-23 @ 04:00)  Bilirubin Total, Serum: 2.6 mg/dL (02-26-23 @ 16:08)

## 2023-03-01 NOTE — DIETITIAN NUTRITION RISK NOTIFICATION - ADDITIONAL COMMENTS/DIETITIAN RECOMMENDATIONS
Liberalize diet to regular to maximize po intake and add Ensure Enlive TID to provide an additional 350 kcal, 20g protein per serving.  Encourage po intake, monitor diet tolerance, and provide assistance at meals as needed. Obtain daily weights to monitor trends.

## 2023-03-01 NOTE — DIETITIAN INITIAL EVALUATION ADULT - PERTINENT LABORATORY DATA
03-01    134<L>  |  97  |  35.9<H>  ----------------------------<  107<H>  3.2<L>   |  28.0  |  1.23    Ca    7.3<L>      01 Mar 2023 02:45  Phos  1.4     03-01  Mg     2.3     03-01    TPro  4.6<L>  /  Alb  1.8<L>  /  TBili  1.6  /  DBili  x   /  AST  184<H>  /  ALT  774<H>  /  AlkPhos  79  03-01  POCT Blood Glucose.: 115 mg/dL (03-01-23 @ 07:29)  A1C with Estimated Average Glucose Result: 5.7 % (03-01-23 @ 02:45)  A1C with Estimated Average Glucose Result: 5.6 % (03-16-22 @ 14:27)

## 2023-03-01 NOTE — CONSULT NOTE ADULT - ASSESSMENT
61 yo male with rectal CA, local recurrences, bacteremia, urinary retention due to recurrence causing compression and obstruction  - keep castillo in place, do not remove  - pt will not be able to void with recurrence and tumor increasing in size  - change castillo [id number]  - pt to f/u with oncology when chemoTx can be restarted

## 2023-03-01 NOTE — DIETITIAN INITIAL EVALUATION ADULT - ETIOLOGY
related to inability to meet sufficient protein-energy in setting of recurrent rectal adenocarcinoma, neutropenic fever, septic shock with abscess, poor appetite

## 2023-03-01 NOTE — PROGRESS NOTE ADULT - SUBJECTIVE AND OBJECTIVE BOX
Patient is a 62y old  Male who presents with a chief complaint of Septic shock (28 Feb 2023 11:43)      BRIEF HOSPITAL COURSE: Pt is a 63 y/o M pmhx of HTN, CAD s/p PCI, rectal adenocarcinoma (s/p resection and diverting ileostomy in 2020) treated w/ chemo and radiotherapy, complicated by local recurrence in sigmoid/rectum s/p APR w/ colostomy creation, Pt recently received chemo on 2/12/23, presented to [facility] ED on 2/27/23 w complaints of fever and SOB w/ urinary retention. In ED pt found to be pancytopenic, febrile and in Afib w/ RVR (new onset), hypotensive s/p 3.5L IVF, started on vasopressors and admitted to MICU. CT chest abdomen and pelvis showed increased growth of pt rectal tumor, w/ possible anterior contained perforation and METS to urinary bladder, prostate, and possibly lung w/ possible abscess or fistula in the perineum.      Events last 24 hours: Titrated off levophed today, remains HD stable off all pressors.     PAST MEDICAL & SURGICAL HISTORY:  Chest pain      HTN (hypertension)      HLD (hyperlipidemia)      CAD (coronary artery disease)      Mitral regurgitation      Rectal cancer  2/2019 s/p chemo and radiation; recurrence of cancer 2022      Depression  with rectal cancer      Neuropathy  feet s/p chemo      Stented coronary artery  circumflex 2005 LAD 2017 x4      H/O carotid stenosis      H/O renal calculi      DANO (iron deficiency anemia)      Umbilical hernia      Splenic artery aneurysm      H/O cardiac catheterization  2005 2017  4 STENTS      History of CEA (carotid endarterectomy)  right 2019      History of rectal surgery  with ileostomy 2/2020      H/O sigmoidoscopy  x2      History of removal of Port-a-Cath          Review of Systems:  CONSTITUTIONAL: No fever, chills, or fatigue  EYES: No eye pain, visual disturbances, or discharge  ENMT:  No difficulty hearing, tinnitus, vertigo; No sinus or throat pain  NECK: No pain or stiffness  RESPIRATORY: No cough, wheezing, chills or hemoptysis; No shortness of breath  CARDIOVASCULAR: No chest pain, palpitations, dizziness, or leg swelling  GASTROINTESTINAL: No abdominal or epigastric pain. No nausea, vomiting, or hematemesis; No diarrhea or constipation. No melena or hematochezia.  GENITOURINARY: No dysuria, frequency, hematuria, or incontinence  NEUROLOGICAL: No headaches, memory loss, loss of strength, numbness, or tremors  SKIN: No itching, burning, rashes, or lesions   MUSCULOSKELETAL: No joint pain or swelling; No muscle, back, or extremity pain  PSYCHIATRIC: No depression, anxiety, mood swings, or difficulty sleeping      Medications:  meropenem Injectable 1000 milliGRAM(s) IV Push every 8 hours    metoprolol tartrate 12.5 milliGRAM(s) Oral every 6 hours  norepinephrine Infusion 0.05 MICROgram(s)/kG/Min IV Continuous <Continuous>      HYDROmorphone  Injectable 1.5 milliGRAM(s) IV Push every 4 hours PRN  LORazepam   Injectable 0.5 milliGRAM(s) IV Push every 4 hours PRN  ondansetron Injectable 4 milliGRAM(s) IV Push every 6 hours PRN      heparin   Injectable 5000 Unit(s) SubCutaneous every 12 hours        dextrose 50% Injectable 25 Gram(s) IV Push once  dextrose 50% Injectable 12.5 Gram(s) IV Push once  dextrose 50% Injectable 25 Gram(s) IV Push once  dextrose Oral Gel 15 Gram(s) Oral once  glucagon  Injectable 1 milliGRAM(s) IntraMuscular once  insulin lispro (ADMELOG) corrective regimen sliding scale   SubCutaneous every 6 hours    sodium bicarbonate  Infusion 0.167 mEq/kG/Hr IV Continuous <Continuous>      chlorhexidine 2% Cloths 1 Application(s) Topical <User Schedule>            ICU Vital Signs Last 24 Hrs  T(C): 36.6 (28 Feb 2023 23:59), Max: 37 (28 Feb 2023 04:00)  T(F): 97.9 (28 Feb 2023 23:59), Max: 98.6 (28 Feb 2023 04:00)  HR: 128 (01 Mar 2023 00:00) (85 - 141)  BP: 85/69 (01 Mar 2023 00:00) (83/56 - 122/80)  BP(mean): 76 (01 Mar 2023 00:00) (65 - 94)  ABP: --  ABP(mean): --  RR: 31 (01 Mar 2023 00:00) (15 - 39)  SpO2: 91% (01 Mar 2023 00:00) (78% - 100%)    O2 Parameters below as of 01 Mar 2023 00:00  Patient On (Oxygen Delivery Method): nasal cannula  O2 Flow (L/min): 3          ABG - ( 28 Feb 2023 05:14 )  pH, Arterial: 7.540 pH, Blood: x     /  pCO2: 28    /  pO2: 62    / HCO3: 24    / Base Excess: 1.4   /  SaO2: 94.3                I&O's Detail    27 Feb 2023 07:01  -  28 Feb 2023 07:00  --------------------------------------------------------  IN:    Amiodarone: 283.9 mL    IV PiggyBack: 250 mL    IV PiggyBack: 250 mL    IV PiggyBack: 50 mL    IV PiggyBack: 150 mL    Norepinephrine: 257.5 mL    Oral Fluid: 300 mL    Phenylephrine: 97.5 mL    PRBCs (Packed Red Blood Cells): 300 mL    Sodium Bicarbonate: 1410 mL    Vasopressin: 108 mL  Total IN: 3456.9 mL    OUT:    Indwelling Catheter - Urethral (mL): 1105 mL  Total OUT: 1105 mL    Total NET: 2351.9 mL      28 Feb 2023 07:01  -  01 Mar 2023 00:24  --------------------------------------------------------  IN:    IV PiggyBack: 250 mL    Lactated Ringers Bolus: 500 mL    Norepinephrine: 22.3 mL    Oral Fluid: 320 mL  Total IN: 1092.3 mL    OUT:    Indwelling Catheter - Urethral (mL): 1080 mL    Sodium Bicarbonate: 0 mL  Total OUT: 1080 mL    Total NET: 12.3 mL            LABS:                        8.3    3.43  )-----------( 135      ( 28 Feb 2023 03:47 )             23.6     02-28    131<L>  |  94<L>  |  35.0<H>  ----------------------------<  127<H>  3.2<L>   |  25.0  |  1.24    Ca    7.6<L>      28 Feb 2023 19:40  Phos  1.7     02-28  Mg     2.1     02-28    TPro  5.0<L>  /  Alb  2.3<L>  /  TBili  2.5<H>  /  DBili  x   /  AST  1803<H>  /  ALT  1659<H>  /  AlkPhos  58  02-28      CARDIAC MARKERS ( 27 Feb 2023 04:00 )  x     / 0.01 ng/mL / x     / x     / x          CAPILLARY BLOOD GLUCOSE      POCT Blood Glucose.: 128 mg/dL (28 Feb 2023 23:28)    PT/INR - ( 27 Feb 2023 08:35 )   PT: 19.5 sec;   INR: 1.67 ratio         PTT - ( 27 Feb 2023 08:35 )  PTT:28.1 sec    CULTURES:  Culture Results:   Numerous Escherichia coli  Moderate Alpha hemolytic strep "Susceptibilities not performed" (02-27-23 @ 10:20)  Culture Results:   No growth (02-26-23 @ 18:50)  Culture Results:   Growth in aerobic bottle: Escherichia coli  See previous culture [id number]  Growth in anaerobic bottle: Bacteroides fragilis "Susceptibilities not  performed" (02-26-23 @ 16:08)  Rapid RVP Result: NotDetec (02-26-23 @ 16:05)  Culture Results:   Growth in aerobic and anaerobic bottles: Escherichia coli  ***Blood Panel PCR results on this specimen are available  approximately 3 hours after the Gram stain result.***  Gram stain, PCR, and/or culture results may not always  correspond due to difference in methodologies.  ************************************************************  This PCR assay was performed by multiplex PCR. This  Assay tests for 66 bacterial and resistance gene targets.  Please refer to the [facility] test directory  at [email or web address] for details. (02-26-23 @ 16:00)      Physical Examination:    General: Pt laying in hospital bed in no acute distress.  Alert, oriented, interactive.     HEENT: Pupils equal, reactive to light.  Symmetric.    PULM: Clear to auscultation bilaterally, no significant sputum production    CVS: Regular rate and rhythm, no murmurs, rubs, or gallops    ABD: Soft, nondistended, nontender, normoactive bowel sounds, no masses    EXT: No edema, nontender    SKIN: Warm and well perfused. Severe rectal wounds noted.       RADIOLOGY:   < from: Xray Chest 1 View- PORTABLE-Urgent (Xray Chest 1 View- PORTABLE-Urgent .) (02.28.23 @ 00:47) >  ACC: [id number] EXAM:  XR CHEST PORTABLE URGENT 1V   ORDERED BY: NORI STUART     PROCEDURE DATE:  02/28/2023          INTERPRETATION:  Portable chest radiograph    CLINICAL INFORMATION: Septic shock.    TECHNIQUE:  Portable  AP chest radiograph.    COMPARISON: 2/27/2023 chest x-ray .    FINDINGS:  CATHETERS AND TUBES: Mediport catheter tip in SVC.  RIGHT IJ catheter tip in SVC RIGHT atrium junction.    PULMONARY: The visualized lungs are clear of airspace consolidations or   pleural effusions.   No pneumothorax.    HEART/VASCULAR: The heart and mediastinum size and configuration are   within normal limits.    BONES: Visualized osseous structures are intact.    IMPRESSION:   No radiographic evidence of active chest disease.     Catheters inplace.    --- End of Report ---            GRACE CARVER MD; Attending Radiologist  This document has been electronically signed. Feb 28 2023 11:00AM

## 2023-03-01 NOTE — CONSULT NOTE ADULT - SUBJECTIVE AND OBJECTIVE BOX
HPI:   63 y/o M with a h/o HTN, HLD, CAD (s/p PCI), rectal adenocarcinoma (diagnosed in 2019, s/p resection and diverting ileostomy creation 2020 followed by chemo and radiotherapy, complicated by local recurrence in sigmoid/rectum, s/p APR of rectum/sigmoid colon with colostomy creation 3/2022, most recent chemo 2/12/23), presents to the ED complaining of fever, SOB, urinary retention, and increased purulent drainage from chronic sacral wound. As per report, the urinary retention has been worsening over the past few weeks as the cancer has been growing and pressing on his bladder and prostate and he has been experiencing suprapubic pain and episodes of overflow incontinence. Found to be febrile, pancytopenic, tachycardic (new onset AF with RVR), and hypotensive despite 3.5L crystalloid bolus. Lactate 6. Started on IV vasopressor therapy. Now developing some confusion. CT C/A/P reveals interval growth of the patient's rectal tumor with apparent anterior contained perforation of the mass extending inferiorly to the perineum. Air in the mass may represent necrosis or fistula to the urinary bladder. Prostate gland is indistinguishable and the posterior aspect of the urinary bladder cannot be  from the mass, concerning for direct invasion of tumor into these structures. Small tubular gas-liquid collection extending from the posterior anus to the intergluteal region may represent small abscess or fistula extending from the necrotic rectal cancer which has extended into the perineum. A new 6 mm right lower lobe pulmonary nodule suspicious for metastasis.       (27 Feb 2023 03:22)    Urology:  called to see pt for urinary retention.  Pt admitted with sepsis.  Pt with h/o rectal CA, LAR, recurrence, APR and ileostomy, chemoradiation tx, recurrence again in rectum invading perineum, compressing prostate and posterior bladder, poss rectal tumor-bladder fistula.  Pt has increased difficulty over the past few weeks voiding, dribbles, overflow incontinence, took pt an hour to try to empty bladder recently.  Vásquez placed during this admission, >1L drained.  Pt had started another round of chemTx 2 weeks ago, became septic and was admitted 2/27/23.    PAST MEDICAL & SURGICAL HISTORY:  Chest pain      HTN (hypertension)      HLD (hyperlipidemia)      CAD (coronary artery disease)      Mitral regurgitation      Rectal cancer  2/2019 s/p chemo and radiation; recurrence of cancer 2022      Depression  with rectal cancer      Neuropathy  feet s/p chemo      Stented coronary artery  circumflex 2005 LAD 2017 x4      H/O carotid stenosis      H/O renal calculi      DANO (iron deficiency anemia)      Umbilical hernia      Splenic artery aneurysm      H/O cardiac catheterization  2005 2017  4 STENTS      History of CEA (carotid endarterectomy)  right 2019      History of rectal surgery  with ileostomy 2/2020      H/O sigmoidoscopy  x2      History of removal of Port-a-Cath          chlorhexidine 2% Cloths 1 Application(s) Topical <User Schedule>  dextrose 50% Injectable 25 Gram(s) IV Push once  dextrose 50% Injectable 12.5 Gram(s) IV Push once  dextrose 50% Injectable 25 Gram(s) IV Push once  dextrose Oral Gel 15 Gram(s) Oral once  glucagon  Injectable 1 milliGRAM(s) IntraMuscular once  heparin   Injectable 5000 Unit(s) SubCutaneous every 12 hours  HYDROmorphone  Injectable 1.5 milliGRAM(s) IV Push every 4 hours PRN  insulin lispro (ADMELOG) corrective regimen sliding scale   SubCutaneous every 6 hours  LORazepam   Injectable 0.5 milliGRAM(s) IV Push every 4 hours PRN  meropenem Injectable 1000 milliGRAM(s) IV Push every 8 hours  metoprolol tartrate 12.5 milliGRAM(s) Oral every 6 hours  norepinephrine Infusion 0.05 MICROgram(s)/kG/Min IV Continuous <Continuous>  ondansetron Injectable 4 milliGRAM(s) IV Push every 6 hours PRN  sodium bicarbonate  Infusion 0.167 mEq/kG/Hr IV Continuous <Continuous>      No Known Allergies      T(C): 35.9 (03-01-23 @ 07:14), Max: 36.8 (02-28-23 @ 20:00)  HR: 139 (03-01-23 @ 10:00) (101 - 141)  BP: 103/73 (03-01-23 @ 10:00) (83/56 - 124/76)  RR: 26 (03-01-23 @ 10:00) (14 - 39)  SpO2: 97% (03-01-23 @ 10:00) (78% - 100%)      02-28-23 @ 07:01  -  03-01-23 @ 07:00  --------------------------------------------------------  IN: 1592.2 mL / OUT: 1495 mL / NET: 97.2 mL    03-01-23 @ 07:01  -  03-01-23 @ 10:42  --------------------------------------------------------  IN: 489.9 mL / OUT: 160 mL / NET: 329.9 mL                              8.8    8.73  )-----------( 132      ( 01 Mar 2023 02:45 )             25.5       03-01    134<L>  |  97  |  35.9<H>  ----------------------------<  107<H>  3.2<L>   |  28.0  |  1.23    Ca    7.3<L>      01 Mar 2023 02:45  Phos  1.4     03-01  Mg     2.3     03-01    TPro  4.6<L>  /  Alb  1.8<L>  /  TBili  1.6  /  DBili  x   /  AST  184<H>  /  ALT  774<H>  /  AlkPhos  79  03-01      Radiology: < from: CT Abdomen and Pelvis w/ IV Cont (02.26.23 @ 23:35) >  ACC: [id number] EXAM:  CT ANGIO CHEST PULM ART [facility]   ORDERED BY: GERMAN ARAUJO     ACC: [id number] EXAM:  CT ABDOMEN AND PELVIS IC   ORDERED BY: GERMAN ARAUJO     PROCEDURE DATE:  02/26/2023          INTERPRETATION:  CLINICAL INFORMATION: 2 days of shortness of breath.   Fever, urinary retention, and draining rectal wound. History of rectal   adenocarcinoma.    COMPARISON: CT abdomen pelvis 7/4/2022 and CT chest 6/9/2020    CONTRAST/COMPLICATIONS:  IV Contrast: Omnipaque 350 (accession [id number]), IV contrast documented   in unlinked concurrent exam (accession [id number])  97 cc administered   3   cc discarded  Oral Contrast: NONE  Complications: None reported at time of study completion    PROCEDURE:  CT Angiography of the Chest was performed followed by portal venous phase   imaging of the Abdomen and Pelvis.  Sagittal and coronal reformats were performed as well as 3D (MIP)   reconstructions.    FINDINGS:  CHEST:  LUNGS AND LARGE AIRWAYS: Patent central airways. Bilateral lower lobe   dependentatelectasis. Mild bilateral lower lobe interlobular septal   thickening, likely trace interstitial edema. A 6 mm right lower lobe   pulmonary nodule, new since prior study.    PLEURA: Trace bilateral pleural effusions. VESSELS: Atherosclerotic   changes of the coronary arteries.  HEART: Heart size is normal. No pericardial effusion.  MEDIASTINUM AND KARMA: No lymphadenopathy.  CHEST WALL AND LOWER NECK: Right anterior chest wall Mediport with   catheter tip in the distal SVC.    ABDOMEN AND PELVIS:  LIVER: Within normal limits.  BILE DUCTS: Normal caliber.  GALLBLADDER: Within normal limits.  SPLEEN: Within normal limits.  PANCREAS: Within normal limits.  ADRENALS: Within normal limits.  KIDNEYS/URETERS: Within normal limits.    BLADDER: The bladder is decompressed by Vásquez catheter containing air.   There is circumferential bladder wall thickening. The posterior bladder   wall is inseparable from the rectal mass..  REPRODUCTIVE ORGANS: Inseparable from the mass.    BOWEL: Left lower quadrant diverting colostomy. Increased size of a large   rectal mass measuring 16.0 x 11.0 x 9.0. There is discontinuity of the   anterior aspect of the mass which contains foci of gas, with inferior   extension of the mass to the perineum. The anterior aspect of the mass   extends up to the posterior urinary bladder wall. No bowel obstruction.   Normal appendix. Lower abdominal small bowel anastomosis.  PERITONEUM: No ascites.  VESSELS: Atherosclerotic changes.  RETROPERITONEUM/LYMPH NODES: Stable left periaortic node measures 1.9 x   0.7 cm..  ABDOMINAL WALL: Small tubular gas-liquid collection extending from the   posterior anus to the intergluteal region. Small parastomal   fat-containing hernia  BONES: Within normal limits.    IMPRESSION:    No pulmonary embolus.    A new 6 mm right lower lobe pulmonary nodule suspicious for metastasis.    Interval growth of the patient's rectal tumor with apparent anterior   contained perforation of the mass extending inferiorly to the perineum.   Air inthe mass may represent necrosis or fistula to the urinary bladder.   Prostate gland is indistinguishable and the posterior aspect of the   urinary bladder cannot be  from the mass, concerning for direct   invasion of of tumor into these structures.    Small tubular gas-liquid collection extending from the posterior anus to   the intergluteal region may represent small abscess or fistula extending   from the necrotic rectal cancer which has extended into the perineum.    < end of copied text >

## 2023-03-01 NOTE — PROGRESS NOTE ADULT - ASSESSMENT
61yo M w/ PMH of CAD s/p multiple stents and recurrent rectal adenoCA who p/w fever, urinary retention and purulent drainage from rectal wound found to be pancytopenic and hypotensive with new onset AF with RVR requiring pressor therapy.  CT showed interval growth of rectal tumor with contained perforation of mass extending into the perineum with ?necrosis or fistula to bladder and a possible small abscess or fistula from posterior anus to intergluteal region.  Also noted was a 6mm RLL pulm nodule suspicious for metastatic dz.  Regarding cancer hx, pt dx'd 2019 s/p resxn/diverting ileostomy s/p CTX/XRT then local recurrence s/p APR of rectum/sigmoid colon with colostomy creation 3/2022 - now on CTX (5-FU) and s/p sacral bx (1/20/23) which confirmed metastatic dz.  Here pt with neutropenic fever and septic shock requiring pressors, afib w/ RVR on amiodarone and heparin, on broad spectrum abx.  We are consulted for assistance with goals of care.  #Goals of care  - pt with metastatic CRC here with neutropenic fever and septic shock with abscess -- was on 3 pressors now weaned off of all pressors and appears to be clinically improving - unfortunately now w/ new onset AF w/ RVR - getting TTE  - per MICU team, pt remains full code -- pt and family will make decisions regarding interventions as clinical picture evolves  - wife Maricel - [phone] would be legal surrogate but is making decisions with her dtr Erma (who is a MICU RN)  - Will cont to follow and remain available to assist with future goals of care discussions if/when the need arises.    #AMS  - improved today  - Would avoid/minimize known deliriogenic drugs such as anticholinergics.  Encourage staff and family to reorient frequently.  Keep shades open during day in effort to maintain day/night cycle.    #Anxiety  - per my d/w dtr Erma, pt takes xanax 0.5mg PO prn at home for quite some time  - per her request, will order xanax 0.5mg PO Q6hrs prn anxiety  - will d/c ativan order    #Pain - ischemia? - pt denying pain and unable to localize or recall earlier pain episode  - cont dilaudid 1.5mg IV prn - will see how he responds and adjust accordingly     #Diaphoresis -- likely r/t fever  - add APAP 1000mg PO Q6hrs prn fever 61yo M w/ PMH of CAD s/p multiple stents and recurrent rectal adenoCA who p/w fever, urinary retention and purulent drainage from rectal wound found to be pancytopenic and hypotensive with new onset AF with RVR requiring pressor therapy.  CT showed interval growth of rectal tumor with contained perforation of mass extending into the perineum with ?necrosis or fistula to bladder and a possible small abscess or fistula from posterior anus to intergluteal region.  Also noted was a 6mm RLL pulm nodule suspicious for metastatic dz.  Regarding cancer hx, pt dx'd 2019 s/p resxn/diverting ileostomy s/p CTX/XRT then local recurrence s/p APR of rectum/sigmoid colon with colostomy creation 3/2022 - now on CTX (5-FU) and s/p sacral bx (1/20/23) which confirmed metastatic dz.  Here pt with neutropenic fever and septic shock requiring pressors, afib w/ RVR on amiodarone and heparin, on broad spectrum abx.  We are consulted for assistance with goals of care.  #Goals of care  - pt with metastatic CRC here with neutropenic fever and septic shock with abscess -- was on 3 pressors now weaned off of all pressors and appears to be clinically improving - unfortunately now w/ new onset AF w/ RVR - getting TTE  - Spoke to pt and reviewed code status preferences.  I explained that people with advanced cancer tend not to benefit from CPR in the event they get so sick that their heart stops and I recommended against CPR and instead to let nature take its course in that scenario.  He was in agreement.  I then reviewed intubation in the event of respiratory distress and whether he would be ok with it, would want a trial or would never want it.  He indicated clearly he would never want to be intubated.  - DNR/DNI - MOLST completed  - wife Maricel - [phone] would be legal surrogate and this is who pt named as his preferred surrogate - she is making decisions with her dtr Erma (who is a MICU RN)  - Will cont to follow and remain available to assist with future goals of care discussions if/when the need arises.    #AMS  - improved   - Would avoid/minimize known deliriogenic drugs such as anticholinergics.  Encourage staff and family to reorient frequently.  Keep shades open during day in effort to maintain day/night cycle.    #Anxiety  - per my d/w dtr Erma, pt takes xanax 0.5mg PO prn at home for quite some time  - per her request, will order xanax 0.5mg PO Q6hrs prn anxiety  - will d/c ativan order    #Pain - ischemia? - pt denying pain and unable to localize or recall earlier pain episode  - cont dilaudid 1.5mg IV prn - will see how he responds and adjust accordingly     #Diaphoresis -- likely r/t fever  - add APAP 1000mg PO Q6hrs prn fever

## 2023-03-01 NOTE — PROGRESS NOTE ADULT - CONVERSATION DETAILS
Spoke to pt and reviewed code status preferences.  I explained that people with advanced cancer tend not to benefit from CPR in the event they get so sick that their heart stops and I recommended against CPR and instead to let nature take its course in that scenario.  He was in agreement.  I then reviewed intubation in the event of respiratory distress and whether he would be ok with it, would want a trial or would never want it.  He indicated clearly he would never want to be intubated.  - DNR/DNI - MOLST completed

## 2023-03-01 NOTE — DIETITIAN NUTRITION RISK NOTIFICATION - TREATMENT: THE FOLLOWING DIET HAS BEEN RECOMMENDED
Diet, Consistent Carbohydrate Gestational w/3 Snacks:   Supplement Feeding Modality:  Oral  Glucerna Shake Cans or Servings Per Day:  1       Frequency:  Three Times a day  Ensure Pudding Cans or Servings Per Day:  1       Frequency:  Three Times a day (03-01-23 @ 08:11) [Active]

## 2023-03-01 NOTE — DIETITIAN INITIAL EVALUATION ADULT - ADD RECOMMEND
Encourage po intake, monitor diet tolerance, and provide assistance at meals as needed. Obtain daily weights to monitor trends.

## 2023-03-01 NOTE — PROGRESS NOTE ADULT - SUBJECTIVE AND OBJECTIVE BOX
INTERVAL HISTORY:  Pt with new onset Afib w/ RVR  Pt seen getting bedside TTE  Denies pain  Denies anxiety but says that afib is making him a bit nervous    PRESENT SYMPTOMS:     Dyspnea: No  Nausea/Vomiting: No  Anxiety:  No  Depression: No  Fatigue: No  Loss of appetite: No  Constipation: No    PAIN: denies            Character-            Duration-            Effect-            Factors-            Frequency-            Location-            Severity-    REVIEW OF SYSTEMS:   Full review of systems performed and was otherwise negative except as noted above.    MEDICATIONS  (STANDING):  chlorhexidine 2% Cloths 1 Application(s) Topical <User Schedule>  dextrose 50% Injectable 25 Gram(s) IV Push once  dextrose 50% Injectable 12.5 Gram(s) IV Push once  dextrose 50% Injectable 25 Gram(s) IV Push once  dextrose Oral Gel 15 Gram(s) Oral once  glucagon  Injectable 1 milliGRAM(s) IntraMuscular once  heparin   Injectable 5000 Unit(s) SubCutaneous every 12 hours  insulin lispro (ADMELOG) corrective regimen sliding scale   SubCutaneous every 6 hours  meropenem Injectable 1000 milliGRAM(s) IV Push every 8 hours  metoprolol tartrate 12.5 milliGRAM(s) Oral every 6 hours  norepinephrine Infusion 0.05 MICROgram(s)/kG/Min (8.08 mL/Hr) IV Continuous <Continuous>  sodium bicarbonate  Infusion 0.167 mEq/kG/Hr (100 mL/Hr) IV Continuous <Continuous>    MEDICATIONS  (PRN):  ALPRAZolam 0.5 milliGRAM(s) Oral every 6 hours PRN anxiety  HYDROmorphone  Injectable 1.5 milliGRAM(s) IV Push every 4 hours PRN Pain  ondansetron Injectable 4 milliGRAM(s) IV Push every 6 hours PRN Nausea and/or Vomiting    PHYSICAL EXAM:  Vital Signs Last 24 Hrs  T(C): 35.9 (01 Mar 2023 07:14), Max: 36.8 (28 Feb 2023 20:00)  T(F): 96.7 (01 Mar 2023 07:14), Max: 98.2 (28 Feb 2023 20:00)  HR: 139 (01 Mar 2023 10:00) (101 - 141)  BP: 103/73 (01 Mar 2023 10:00) (83/56 - 124/76)  BP(mean): 82 (01 Mar 2023 10:00) (65 - 98)  RR: 26 (01 Mar 2023 10:00) (14 - 39)  SpO2: 97% (01 Mar 2023 10:00) (78% - 100%)    Parameters below as of 01 Mar 2023 08:00  Patient On (Oxygen Delivery Method): nasal cannula  O2 Flow (L/min): 2    General: Pt lying in bed in NAD  HEENT: NCAT; MMM  Resp: breathing unlabored; symmetric chest expansion B/L  MSK: no contractures/deformities  Skin: no rash  Neuro: awake and oriented x 3; no myoclonus  Psych: calm; appropriate speech and affect    LABS:                        8.8    8.73  )-----------( 132      ( 01 Mar 2023 02:45 )             25.5     03-01    134<L>  |  97  |  35.9<H>  ----------------------------<  107<H>  3.2<L>   |  28.0  |  1.23    Ca    7.3<L>      01 Mar 2023 02:45  Phos  1.4     03-01  Mg     2.3     03-01    TPro  4.6<L>  /  Alb  1.8<L>  /  TBili  1.6  /  DBili  x   /  AST  184<H>  /  ALT  774<H>  /  AlkPhos  79  03-01    RADIOLOGY & ADDITIONAL STUDIES:    NEUROLOGIC MEDICATIONS/OPIOIDS/BENZODIAZEPINES IN PAST 24HRS:    LORazepam   Injectable   0.5 milliGRAM(s) IV Push (03-01-23 @ 01:49)   0.5 milliGRAM(s) IV Push (02-28-23 @ 21:08)      ADVANCE DIRECTIVES/TREATMENT PREFERENCES:  Code Status: full code  MOLST (if not Full Code):  HCP/Surrogate: wife and dtr INTERVAL HISTORY:  Pt with new onset Afib w/ RVR  Pt seen getting bedside TTE  Denies pain    PRESENT SYMPTOMS:     Dyspnea: No  Nausea/Vomiting: No  Anxiety:  No  Depression: No  Fatigue: No  Loss of appetite: No  Constipation: No    PAIN: denies            Character-            Duration-            Effect-            Factors-            Frequency-            Location-            Severity-    REVIEW OF SYSTEMS:   Full review of systems performed and was otherwise negative except as noted above.    MEDICATIONS  (STANDING):  chlorhexidine 2% Cloths 1 Application(s) Topical <User Schedule>  dextrose 50% Injectable 25 Gram(s) IV Push once  dextrose 50% Injectable 12.5 Gram(s) IV Push once  dextrose 50% Injectable 25 Gram(s) IV Push once  dextrose Oral Gel 15 Gram(s) Oral once  glucagon  Injectable 1 milliGRAM(s) IntraMuscular once  heparin   Injectable 5000 Unit(s) SubCutaneous every 12 hours  insulin lispro (ADMELOG) corrective regimen sliding scale   SubCutaneous every 6 hours  meropenem Injectable 1000 milliGRAM(s) IV Push every 8 hours  metoprolol tartrate 12.5 milliGRAM(s) Oral every 6 hours  norepinephrine Infusion 0.05 MICROgram(s)/kG/Min (8.08 mL/Hr) IV Continuous <Continuous>  sodium bicarbonate  Infusion 0.167 mEq/kG/Hr (100 mL/Hr) IV Continuous <Continuous>    MEDICATIONS  (PRN):  ALPRAZolam 0.5 milliGRAM(s) Oral every 6 hours PRN anxiety  HYDROmorphone  Injectable 1.5 milliGRAM(s) IV Push every 4 hours PRN Pain  ondansetron Injectable 4 milliGRAM(s) IV Push every 6 hours PRN Nausea and/or Vomiting    PHYSICAL EXAM:  Vital Signs Last 24 Hrs  T(C): 35.9 (01 Mar 2023 07:14), Max: 36.8 (28 Feb 2023 20:00)  T(F): 96.7 (01 Mar 2023 07:14), Max: 98.2 (28 Feb 2023 20:00)  HR: 139 (01 Mar 2023 10:00) (101 - 141)  BP: 103/73 (01 Mar 2023 10:00) (83/56 - 124/76)  BP(mean): 82 (01 Mar 2023 10:00) (65 - 98)  RR: 26 (01 Mar 2023 10:00) (14 - 39)  SpO2: 97% (01 Mar 2023 10:00) (78% - 100%)    Parameters below as of 01 Mar 2023 08:00  Patient On (Oxygen Delivery Method): nasal cannula  O2 Flow (L/min): 2    General: Pt lying in bed in NAD  HEENT: NCAT; MMM  Resp: breathing unlabored; symmetric chest expansion B/L  MSK: no contractures/deformities  Skin: no rash  Neuro: awake and oriented x 3; no myoclonus  Psych: calm; appropriate speech and affect    LABS:                        8.8    8.73  )-----------( 132      ( 01 Mar 2023 02:45 )             25.5     03-01    134<L>  |  97  |  35.9<H>  ----------------------------<  107<H>  3.2<L>   |  28.0  |  1.23    Ca    7.3<L>      01 Mar 2023 02:45  Phos  1.4     03-01  Mg     2.3     03-01    TPro  4.6<L>  /  Alb  1.8<L>  /  TBili  1.6  /  DBili  x   /  AST  184<H>  /  ALT  774<H>  /  AlkPhos  79  03-01    RADIOLOGY & ADDITIONAL STUDIES:    NEUROLOGIC MEDICATIONS/OPIOIDS/BENZODIAZEPINES IN PAST 24HRS:    LORazepam   Injectable   0.5 milliGRAM(s) IV Push (03-01-23 @ 01:49)   0.5 milliGRAM(s) IV Push (02-28-23 @ 21:08)    ADVANCE DIRECTIVES/TREATMENT PREFERENCES:  Code Status: full code  MOLST (if not Full Code):   HCP/Surrogate: wife and dtr

## 2023-03-01 NOTE — PHYSICAL THERAPY INITIAL EVALUATION ADULT - GENERAL OBSERVATIONS, REHAB EVAL
received semi ochoa position in bed, NAD, agreeable to PT received semi ochoa position in bed, NAD, agreeable to PT, cardaic monitor, anna

## 2023-03-02 LAB
-  AMIKACIN: SIGNIFICANT CHANGE UP
-  AMPICILLIN/SULBACTAM: SIGNIFICANT CHANGE UP
-  AMPICILLIN: SIGNIFICANT CHANGE UP
-  AZTREONAM: SIGNIFICANT CHANGE UP
-  CEFAZOLIN: SIGNIFICANT CHANGE UP
-  CEFEPIME: SIGNIFICANT CHANGE UP
-  CEFOXITIN: SIGNIFICANT CHANGE UP
-  CEFTRIAXONE: SIGNIFICANT CHANGE UP
-  CIPROFLOXACIN: SIGNIFICANT CHANGE UP
-  ERTAPENEM: SIGNIFICANT CHANGE UP
-  GENTAMICIN: SIGNIFICANT CHANGE UP
-  IMIPENEM: SIGNIFICANT CHANGE UP
-  LEVOFLOXACIN: SIGNIFICANT CHANGE UP
-  MEROPENEM: SIGNIFICANT CHANGE UP
-  PIPERACILLIN/TAZOBACTAM: SIGNIFICANT CHANGE UP
-  TOBRAMYCIN: SIGNIFICANT CHANGE UP
-  TRIMETHOPRIM/SULFAMETHOXAZOLE: SIGNIFICANT CHANGE UP
ALBUMIN SERPL ELPH-MCNC: 2.3 G/DL — LOW (ref 3.3–5.2)
ALP SERPL-CCNC: 275 U/L — HIGH (ref 40–120)
ALT FLD-CCNC: 402 U/L — HIGH
ANION GAP SERPL CALC-SCNC: 9 MMOL/L — SIGNIFICANT CHANGE UP (ref 5–17)
AST SERPL-CCNC: 74 U/L — HIGH
BASOPHILS # BLD AUTO: 0 K/UL — SIGNIFICANT CHANGE UP (ref 0–0.2)
BASOPHILS NFR BLD AUTO: 0 % — SIGNIFICANT CHANGE UP (ref 0–2)
BILIRUB SERPL-MCNC: 1.3 MG/DL — SIGNIFICANT CHANGE UP (ref 0.4–2)
BUN SERPL-MCNC: 27.8 MG/DL — HIGH (ref 8–20)
CALCIUM SERPL-MCNC: 7.9 MG/DL — LOW (ref 8.4–10.5)
CHLORIDE SERPL-SCNC: 96 MMOL/L — SIGNIFICANT CHANGE UP (ref 96–108)
CO2 SERPL-SCNC: 27 MMOL/L — SIGNIFICANT CHANGE UP (ref 22–29)
CREAT SERPL-MCNC: 1.02 MG/DL — SIGNIFICANT CHANGE UP (ref 0.5–1.3)
CULTURE RESULTS: SIGNIFICANT CHANGE UP
EGFR: 82 ML/MIN/1.73M2 — SIGNIFICANT CHANGE UP
EOSINOPHIL # BLD AUTO: 0.22 K/UL — SIGNIFICANT CHANGE UP (ref 0–0.5)
EOSINOPHIL NFR BLD AUTO: 1.7 % — SIGNIFICANT CHANGE UP (ref 0–6)
GIANT PLATELETS BLD QL SMEAR: PRESENT — SIGNIFICANT CHANGE UP
GLUCOSE SERPL-MCNC: 111 MG/DL — HIGH (ref 70–99)
HCT VFR BLD CALC: 26.3 % — LOW (ref 39–50)
HGB BLD-MCNC: 9.2 G/DL — LOW (ref 13–17)
LYMPHOCYTES # BLD AUTO: 0.34 K/UL — LOW (ref 1–3.3)
LYMPHOCYTES # BLD AUTO: 2.6 % — LOW (ref 13–44)
MAGNESIUM SERPL-MCNC: 2 MG/DL — SIGNIFICANT CHANGE UP (ref 1.6–2.6)
MANUAL SMEAR VERIFICATION: SIGNIFICANT CHANGE UP
MCHC RBC-ENTMCNC: 31 PG — SIGNIFICANT CHANGE UP (ref 27–34)
MCHC RBC-ENTMCNC: 35 GM/DL — SIGNIFICANT CHANGE UP (ref 32–36)
MCV RBC AUTO: 88.6 FL — SIGNIFICANT CHANGE UP (ref 80–100)
METAMYELOCYTES # FLD: 2.6 % — HIGH (ref 0–0)
METHOD TYPE: SIGNIFICANT CHANGE UP
MONOCYTES # BLD AUTO: 0.68 K/UL — SIGNIFICANT CHANGE UP (ref 0–0.9)
MONOCYTES NFR BLD AUTO: 5.2 % — SIGNIFICANT CHANGE UP (ref 2–14)
MYELOCYTES NFR BLD: 0.9 % — HIGH (ref 0–0)
NEUTROPHILS # BLD AUTO: 10.98 K/UL — HIGH (ref 1.8–7.4)
NEUTROPHILS NFR BLD AUTO: 78.3 % — HIGH (ref 43–77)
NEUTS BAND # BLD: 5.2 % — SIGNIFICANT CHANGE UP (ref 0–8)
ORGANISM # SPEC MICROSCOPIC CNT: SIGNIFICANT CHANGE UP
PHOSPHATE SERPL-MCNC: 2.2 MG/DL — LOW (ref 2.4–4.7)
PLAT MORPH BLD: NORMAL — SIGNIFICANT CHANGE UP
PLATELET # BLD AUTO: 163 K/UL — SIGNIFICANT CHANGE UP (ref 150–400)
POLYCHROMASIA BLD QL SMEAR: SIGNIFICANT CHANGE UP
POTASSIUM SERPL-MCNC: 3.9 MMOL/L — SIGNIFICANT CHANGE UP (ref 3.5–5.3)
POTASSIUM SERPL-SCNC: 3.9 MMOL/L — SIGNIFICANT CHANGE UP (ref 3.5–5.3)
PROMYELOCYTES # FLD: 3.5 % — HIGH (ref 0–0)
PROT SERPL-MCNC: 5 G/DL — LOW (ref 6.6–8.7)
RBC # BLD: 2.97 M/UL — LOW (ref 4.2–5.8)
RBC # FLD: 14.5 % — SIGNIFICANT CHANGE UP (ref 10.3–14.5)
RBC BLD AUTO: ABNORMAL
SODIUM SERPL-SCNC: 132 MMOL/L — LOW (ref 135–145)
SPECIMEN SOURCE: SIGNIFICANT CHANGE UP
WBC # BLD: 13.15 K/UL — HIGH (ref 3.8–10.5)
WBC # FLD AUTO: 13.15 K/UL — HIGH (ref 3.8–10.5)

## 2023-03-02 PROCEDURE — 99233 SBSQ HOSP IP/OBS HIGH 50: CPT

## 2023-03-02 PROCEDURE — 99232 SBSQ HOSP IP/OBS MODERATE 35: CPT

## 2023-03-02 RX ORDER — DIPHENHYDRAMINE HYDROCHLORIDE AND LIDOCAINE HYDROCHLORIDE AND ALUMINUM HYDROXIDE AND MAGNESIUM HYDRO
5 KIT
Refills: 0 | Status: DISCONTINUED | OUTPATIENT
Start: 2023-03-02 | End: 2023-03-06

## 2023-03-02 RX ORDER — METOPROLOL TARTRATE 50 MG
25 TABLET ORAL
Refills: 0 | Status: DISCONTINUED | OUTPATIENT
Start: 2023-03-02 | End: 2023-03-03

## 2023-03-02 RX ORDER — HYDROMORPHONE HYDROCHLORIDE 2 MG/ML
1 INJECTION INTRAMUSCULAR; INTRAVENOUS; SUBCUTANEOUS EVERY 4 HOURS
Refills: 0 | Status: DISCONTINUED | OUTPATIENT
Start: 2023-03-02 | End: 2023-03-06

## 2023-03-02 RX ORDER — DRONABINOL 2.5 MG
2.5 CAPSULE ORAL
Refills: 0 | Status: DISCONTINUED | OUTPATIENT
Start: 2023-03-02 | End: 2023-03-06

## 2023-03-02 RX ADMIN — Medication 0.5 MILLIGRAM(S): at 22:02

## 2023-03-02 RX ADMIN — HEPARIN SODIUM 5000 UNIT(S): 5000 INJECTION INTRAVENOUS; SUBCUTANEOUS at 06:17

## 2023-03-02 RX ADMIN — CHLORHEXIDINE GLUCONATE 1 APPLICATION(S): 213 SOLUTION TOPICAL at 01:42

## 2023-03-02 RX ADMIN — DIPHENHYDRAMINE HYDROCHLORIDE AND LIDOCAINE HYDROCHLORIDE AND ALUMINUM HYDROXIDE AND MAGNESIUM HYDRO 5 MILLILITER(S): KIT at 13:39

## 2023-03-02 RX ADMIN — Medication 0.5 MILLIGRAM(S): at 00:19

## 2023-03-02 RX ADMIN — Medication 12.5 MILLIGRAM(S): at 13:27

## 2023-03-02 RX ADMIN — Medication 0.5 MILLIGRAM(S): at 06:14

## 2023-03-02 RX ADMIN — Medication 12.5 MILLIGRAM(S): at 06:17

## 2023-03-02 RX ADMIN — MEROPENEM 1000 MILLIGRAM(S): 1 INJECTION INTRAVENOUS at 06:17

## 2023-03-02 RX ADMIN — Medication 250 MICROGRAM(S): at 01:30

## 2023-03-02 RX ADMIN — DIPHENHYDRAMINE HYDROCHLORIDE AND LIDOCAINE HYDROCHLORIDE AND ALUMINUM HYDROXIDE AND MAGNESIUM HYDRO 5 MILLILITER(S): KIT at 17:31

## 2023-03-02 RX ADMIN — HEPARIN SODIUM 5000 UNIT(S): 5000 INJECTION INTRAVENOUS; SUBCUTANEOUS at 17:32

## 2023-03-02 RX ADMIN — MEROPENEM 1000 MILLIGRAM(S): 1 INJECTION INTRAVENOUS at 13:20

## 2023-03-02 RX ADMIN — MEROPENEM 1000 MILLIGRAM(S): 1 INJECTION INTRAVENOUS at 22:02

## 2023-03-02 RX ADMIN — Medication 12.5 MILLIGRAM(S): at 00:19

## 2023-03-02 RX ADMIN — Medication 25 MILLIGRAM(S): at 17:32

## 2023-03-02 RX ADMIN — Medication 2.5 MILLIGRAM(S): at 17:31

## 2023-03-02 NOTE — CONSULT NOTE ADULT - SUBJECTIVE AND OBJECTIVE BOX
[place] [facility], [facility]                                   [address] [place] [address]                                                      PHONE: [phone]                                                         FAX: [phone]  [email or web address]  ---------------------------------------------------------------------------------------------------------------------------------    Reason for Consult: Afib    HPI:  62 year old male with a PMHx of CAD s/p multiple PCIs (most recent in 2017 to LAD), HTN, HLD, right CEA (2019), rectal adenocarcinoma s/p resection with diverting ileostomy (2020) followed by chemo and radiotherapy c/b local recurrence s/p colostomy creation 3/2022 who presents with septic shock/e. coli bacteremia found to have new onset afib. Patient was given digoxin load and metoprolol for better rate control. Patient transferred out of ICU to the floor. Patient spontaneously converted to sinus rhythm this morning. Patient currently denies any cardiac complaints including chest pain, SOB, palpitations, near syncope, or syncope.    PAST MEDICAL & SURGICAL HISTORY:  Chest pain      HTN (hypertension)      HLD (hyperlipidemia)      CAD (coronary artery disease)      Mitral regurgitation      Rectal cancer  2/2019 s/p chemo and radiation; recurrence of cancer 2022      Depression  with rectal cancer      Neuropathy  feet s/p chemo      Stented coronary artery  circumflex 2005 LAD 2017 x4      H/O carotid stenosis      H/O renal calculi      DANO (iron deficiency anemia)      Umbilical hernia      Splenic artery aneurysm      H/O cardiac catheterization  2005 2017  4 STENTS      History of CEA (carotid endarterectomy)  right 2019      History of rectal surgery  with ileostomy 2/2020      H/O sigmoidoscopy  x2      History of removal of Port-a-Cath          No Known Allergies      MEDICATIONS  (STANDING):  chlorhexidine 2% Cloths 1 Application(s) Topical <User Schedule>  dextrose 50% Injectable 25 Gram(s) IV Push once  dextrose 50% Injectable 12.5 Gram(s) IV Push once  dextrose 50% Injectable 25 Gram(s) IV Push once  dextrose Oral Gel 15 Gram(s) Oral once  digoxin  Injectable 250 MICROGram(s) IV Push every 8 hours  glucagon  Injectable 1 milliGRAM(s) IntraMuscular once  heparin   Injectable 5000 Unit(s) SubCutaneous every 12 hours  meropenem Injectable 1000 milliGRAM(s) IV Push every 8 hours  metoprolol tartrate 12.5 milliGRAM(s) Oral every 6 hours    MEDICATIONS  (PRN):  ALPRAZolam 0.5 milliGRAM(s) Oral every 6 hours PRN anxiety  HYDROmorphone  Injectable 1.5 milliGRAM(s) IV Push every 4 hours PRN Pain  ondansetron Injectable 4 milliGRAM(s) IV Push every 6 hours PRN Nausea and/or Vomiting      Social History:  Cigarettes:  Denies current tobacco use                  Alcohol:  Denies daily etoh            Illicit Drug Abuse:  Denies    Family History:  denies sudden cardiac death.    ROS: Negative other than as mentioned in HPI.    Vital Signs Last 24 Hrs  T(C): 36.7 (02 Mar 2023 05:30), Max: 37.2 (01 Mar 2023 19:00)  T(F): 98.1 (02 Mar 2023 05:30), Max: 99 (01 Mar 2023 19:00)  HR: 95 (02 Mar 2023 05:30) (94 - 153)  BP: 145/73 (02 Mar 2023 05:30) (95/72 - 145/73)  BP(mean): 95 (02 Mar 2023 04:00) (75 - 99)  RR: 20 (02 Mar 2023 05:30) (20 - 34)  SpO2: 96% (02 Mar 2023 05:30) (93% - 98%)    Parameters below as of 02 Mar 2023 05:30  Patient On (Oxygen Delivery Method): room air      ICU Vital Signs Last 24 Hrs  PASTORA CHURCHILL  I&O's Detail    01 Mar 2023 07:01  -  02 Mar 2023 07:00  --------------------------------------------------------  IN:    IV PiggyBack: 249.9 mL    Oral Fluid: 1040 mL  Total IN: 1289.9 mL    OUT:    Colostomy (mL): 150 mL    Indwelling Catheter - Urethral (mL): 1775 mL  Total OUT: 1925 mL    Total NET: -635.1 mL        I&O's Summary    01 Mar 2023 07:01  -  02 Mar 2023 07:00  --------------------------------------------------------  IN: 1289.9 mL / OUT: 1925 mL / NET: -635.1 mL      Drug Dosing Weight  PASTORA CHURCHILL      PHYSICAL EXAM:  General: NAD  HEENT: Head; normocephalic, atraumatic.  Eyes: EOMI, conjunctiva normal  Neck: Supple, no JVD noted  CARDIOVASCULAR: RRR, S1 S2, Systolic murmur  LUNGS: Clear to auscultation b/l, No rales, rhonchi or wheeze, normal inspiratory effort  ABDOMEN: Soft, colostomy present  EXTREMITIES: No edema b/l, no cyanosis   SKIN: warm and dry  NEURO: Alert/oriented x 3  PSYCH: normal affect.        LABS:                        9.2    13.15 )-----------( 163      ( 02 Mar 2023 03:59 )             26.3     03-02    132<L>  |  96  |  27.8<H>  ----------------------------<  111<H>  3.9   |  27.0  |  1.02    Ca    7.9<L>      02 Mar 2023 03:59  Phos  2.2     03-02  Mg     2.0     03-02    TPro  5.0<L>  /  Alb  2.3<L>  /  TBili  1.3  /  DBili  x   /  AST  74<H>  /  ALT  402<H>  /  AlkPhos  275<H>  03-02    PASTORA CHURCHILL            RADIOLOGY & ADDITIONAL STUDIES:    INTERPRETATION OF TELEMETRY (personally reviewed): Currently sinus rhythm. Spontaneously converted to sinus from afib at 8:08AM this morning.    ECG: Atrial fibrillation with rapid ventricular response    ECHO: 2/27/23  Summary:   1. Left ventricular ejection fraction, by visual estimation, is 40 to   45%.   2. Moderately decreased global left ventricular systolic function.   3. The left ventricular diastolic function could not be assessed in this   study.   4. Moderately enlarged right ventricle.   5. Moderately reduced RV systolic function.   6. Mildly enlarged left atrium.   7. Mildly enlarged right atrium.   8. There is no evidence of pericardial effusion.   9. Mild mitral annular calcification.  10. Mild thickening and calcification of the anterior and posterior   mitral valve leaflets.  11. Moderate mitral valve regurgitation.  12. Moderate tricuspid regurgitation.  13. Estimated pulmonary artery systolic pressure is 37.7 mmHg assuming a   right atrial pressure of 15 mmHg, which is consistent with borderline   pulmonary hypertension.  14. Endocardial visualization was enhanced with intravenous echo contrast.    Assessment and Plan:  62 year old male with a PMHx of CAD s/p multiple PCIs (most recent in 2017 to LAD), HTN, HLD, right CEA (2019), rectal adenocarcinoma s/p resection with diverting ileostomy (2020) followed by chemo and radiotherapy c/b local recurrence s/p colostomy creation 3/2022 who presents with septic shock/e. coli bacteremia found to have new onset afib.    Afib  - patient was in afib RVR while in ICU  - currently in sinus rhythm, however patient was in afib overnight and converted to sinus spontaneously at 8:08 AM today  - currently undergoing digoxin load. Would discontinue as patient's BP is much improved  - change lopressor 12.5mg q6h to toprol XL 25mg BID  - CHADSVASC at least 2 points. Had lengthy discussion regarding anticoagulation for the patient as it is indicated from a cardiac perspective. Patient at this time would not like to be trialed on anticoagulation. Benefits and risks were explained to the patient including but not limited to the risk of stroke by not being on anticoagulation. Patient remains to not want to be on anticoagulation at this time.   - echo as above with mildly reduced EF and moderate MR/TR with recent chemotherapy given last month. Oncology will need to be aware prior to further doses of chemotherapy in the future  - liver enzymes remain elevated at this time and not a candidate for amiodarone at this time    Thank you for letting [facility] to assist in the management of this patient. Please call with any questions.

## 2023-03-02 NOTE — PROGRESS NOTE ADULT - SUBJECTIVE AND OBJECTIVE BOX
INFECTIOUS DISEASES AND INTERNAL MEDICINE at [place]  =======================================================  Andrea Forde MD  Diplomates American Board of Internal Medicine and Infectious Diseases  Telephone [phone]  Fax            [phone]  =======================================================    PASTORA CHURCHILL [id number]    Follow up: GM NEG BACTEREMIA ECOLI    Allergies:  No Known Allergies      Medications:  chlorhexidine 2% Cloths 1 Application(s) Topical <User Schedule>  dextrose 50% Injectable 25 Gram(s) IV Push once  dextrose 50% Injectable 12.5 Gram(s) IV Push once  dextrose 50% Injectable 25 Gram(s) IV Push once  dextrose Oral Gel 15 Gram(s) Oral once  glucagon  Injectable 1 milliGRAM(s) IntraMuscular once  heparin   Injectable 5000 Unit(s) SubCutaneous every 12 hours  HYDROmorphone  Injectable 1.5 milliGRAM(s) IV Push every 4 hours PRN  insulin lispro (ADMELOG) corrective regimen sliding scale   SubCutaneous every 6 hours  LORazepam   Injectable 0.5 milliGRAM(s) IV Push every 4 hours PRN  meropenem Injectable 1000 milliGRAM(s) IV Push every 8 hours  metoprolol tartrate 12.5 milliGRAM(s) Oral every 6 hours  norepinephrine Infusion 0.05 MICROgram(s)/kG/Min IV Continuous <Continuous>  ondansetron Injectable 4 milliGRAM(s) IV Push every 6 hours PRN  sodium bicarbonate  Infusion 0.167 mEq/kG/Hr IV Continuous <Continuous>    SOCIAL       FAMILY   FAMILY HISTORY:  FH: heart disease  father      REVIEW OF SYSTEMS:  CONSTITUTIONAL:  No Fever or chills  HEENT:   No diplopia or blurred vision.  No earache, sore throat or runny nose.  CARDIOVASCULAR:  No pressure, squeezing, strangling, tightness, heaviness or aching about the chest, neck, axilla or epigastrium.  RESPIRATORY:  No cough, shortness of breath, PND or orthopnea.  GASTROINTESTINAL:  No nausea, vomiting or diarrhea.  GENITOURINARY:  No dysuria, frequency or urgency. No Blood in urine  MUSCULOSKELETAL:   moves all joints  SKIN:  No change in skin, hair or nails.  NEUROLOGIC:  No paresthesias, fasciculations, seizures or weakness.  PSYCHIATRIC:  No disorder of thought or mood.  ENDOCRINE:  No heat or cold intolerance, polyuria or polydipsia.  HEMATOLOGICAL:  No easy bruising or bleeding.            Physical Exam:   Vital Signs Last 24 Hrs  T(C): 36.7 (02 Mar 2023 05:30), Max: 37.2 (01 Mar 2023 19:00)  T(F): 98.1 (02 Mar 2023 05:30), Max: 99 (01 Mar 2023 19:00)  HR: 95 (02 Mar 2023 05:30) (94 - 153)  BP: 145/73 (02 Mar 2023 05:30) (95/72 - 145/73)  BP(mean): 95 (02 Mar 2023 04:00) (72 - 99)  RR: 20 (02 Mar 2023 05:30) (14 - 34)  SpO2: 96% (02 Mar 2023 05:30) (93% - 98%)    Parameters below as of 02 Mar 2023 05:30  Patient On (Oxygen Delivery Method): room air            GEN: NAD,   HEENT: normocephalic and atraumatic. EOMI. JOLEEN.    NECK: Supple. No carotid bruits.  No lymphadenopathy or thyromegaly.  LUNGS: Clear to auscultation. PORT RIGHT CHEST WALL   HEART: Regular rate and rhythm without murmur.  ABDOMEN: Soft, nontender, and nondistended.  Positive bowel sounds.  OSTOMY   : No CVA tenderness  EXTREMITIES: Without any cyanosis, clubbing, rash, lesions or edema.  MSK: no joint swelling  NEUROLOGIC: Cranial nerves II through XII are grossly intact.  PSYCHIATRIC: Appropriate affect .  SKIN: No ulceration or induration present.        Labs:  Vitals:  =======   t     =======================================================  Current Antibiotics:  meropenem Injectable 1000 milliGRAM(s) IV Push every 8 hours    Other medications:  chlorhexidine 2% Cloths 1 Application(s) Topical <User Schedule>  dextrose 50% Injectable 25 Gram(s) IV Push once  dextrose 50% Injectable 12.5 Gram(s) IV Push once  dextrose 50% Injectable 25 Gram(s) IV Push once  dextrose Oral Gel 15 Gram(s) Oral once  glucagon  Injectable 1 milliGRAM(s) IntraMuscular once  heparin   Injectable 5000 Unit(s) SubCutaneous every 12 hours  insulin lispro (ADMELOG) corrective regimen sliding scale   SubCutaneous every 6 hours  metoprolol tartrate 12.5 milliGRAM(s) Oral every 6 hours  norepinephrine Infusion 0.05 MICROgram(s)/kG/Min IV Continuous <Continuous>  sodium bicarbonate  Infusion 0.167 mEq/kG/Hr IV Continuous <Continuous>      =======================================================  Labs:                                            9.2    13.15 )-----------( 163      ( 02 Mar 2023 03:59 )             26.3     79  03-01  03-02    132<L>  |  96  |  27.8<H>  ----------------------------<  111<H>  3.9   |  27.0  |  1.02    Ca    7.9<L>      02 Mar 2023 03:59  Phos  2.2     03-02  Mg     2.0     03-02    TPro  5.0<L>  /  Alb  2.3<L>  /  TBili  1.3  /  DBili  x   /  AST  74<H>  /  ALT  402<H>  /  AlkPhos  275<H>  03-02        Culture - Body Fluid with Gram Stain (collected 02-27-23 @ 10:20)  Source: .Body Fluid Draining Rectal Wound  Gram Stain (02-27-23 @ 22:31):    Few polymorphonuclear leukocytes seen per low power field    Numerous Gram positive cocci in pairs seen per oil power field    Culture - Urine (collected 02-26-23 @ 18:50)  Source: Clean Catch Clean Catch (Midstream)  Final Report (02-27-23 @ 21:37):    No growth    Culture - Blood (collected 02-26-23 @ 16:08)  Source: .Blood Blood-Peripheral  Gram Stain (02-28-23 @ 02:16):    Growth in aerobic bottle: Gram Negative Rods    Growth in anaerobic bottle: Gram Negative Rods    Culture - Blood (collected 02-26-23 @ 16:00)  Source: .Blood Blood-Peripheral  Gram Stain (02-27-23 @ 15:07):    Growth in aerobic and anaerobic bottles: Gram Negative Rods  Organism: Blood Culture PCR (02-27-23 @ 15:49)  Organism: Blood Culture PCR (02-27-23 @ 15:49)    Sensitivities:      -  Escherichia coli: Detec      Method Type: PCR      Creatinine, Serum: 1.23 mg/dL (03-01-23 @ 02:45)  Creatinine, Serum: 1.24 mg/dL (02-28-23 @ 19:40)  Creatinine, Serum: 1.26 mg/dL (02-28-23 @ 03:47)  Creatinine, Serum: 1.37 mg/dL (02-27-23 @ 21:56)  Creatinine, Serum: 1.41 mg/dL (02-27-23 @ 12:40)  Creatinine, Serum: 1.41 mg/dL (02-27-23 @ 04:00)  Creatinine, Serum: 1.24 mg/dL (02-26-23 @ 16:08)    Procalcitonin, Serum: >100.00 ng/mL (02-27-23 @ 04:00)    D-Dimer Assay, Quantitative: 2367 ng/mL DDU (02-27-23 @ 08:35)        WBC Count: 8.73 K/uL (03-01-23 @ 02:45)  WBC Count: 3.43 K/uL (02-28-23 @ 03:47)  WBC Count: 2.67 K/uL (02-27-23 @ 21:56)  WBC Count: 1.86 K/uL (02-27-23 @ 12:40)  WBC Count: 1.71 K/uL (02-27-23 @ 04:00)  WBC Count: 0.49 K/uL (02-26-23 @ 16:08)    SARS-CoV-2: NotDetec (02-26-23 @ 16:05)    Lactate Dehydrogenase, Serum: 127 U/L (02-27-23 @ 04:00)    Alkaline Phosphatase, Serum: 79 U/L (03-01-23 @ 02:45)  Alkaline Phosphatase, Serum: 58 U/L (02-28-23 @ 03:47)  Alkaline Phosphatase, Serum: 60 U/L (02-27-23 @ 21:56)  Alkaline Phosphatase, Serum: 54 U/L (02-27-23 @ 12:40)  Alkaline Phosphatase, Serum: 43 U/L (02-27-23 @ 04:00)  Alkaline Phosphatase, Serum: 49 U/L (02-26-23 @ 16:08)  Alanine Aminotransferase (ALT/SGPT): 774 U/L (03-01-23 @ 02:45)  Alanine Aminotransferase (ALT/SGPT): 1659 U/L (02-28-23 @ 03:47)  Alanine Aminotransferase (ALT/SGPT): 1489 U/L (02-27-23 @ 21:56)  Alanine Aminotransferase (ALT/SGPT): 475 U/L (02-27-23 @ 12:40)  Alanine Aminotransferase (ALT/SGPT): 92 U/L (02-27-23 @ 04:00)  Alanine Aminotransferase (ALT/SGPT): 124 U/L (02-26-23 @ 16:08)  Aspartate Aminotransferase (AST/SGOT): 184 U/L (03-01-23 @ 02:45)  Aspartate Aminotransferase (AST/SGOT): 1803 U/L (02-28-23 @ 03:47)  Aspartate Aminotransferase (AST/SGOT): 2276 U/L (02-27-23 @ 21:56)  Aspartate Aminotransferase (AST/SGOT): 653 U/L (02-27-23 @ 12:40)  Aspartate Aminotransferase (AST/SGOT): 64 U/L (02-27-23 @ 04:00)  Aspartate Aminotransferase (AST/SGOT): 106 U/L (02-26-23 @ 16:08)  Bilirubin Total, Serum: 1.6 mg/dL (03-01-23 @ 02:45)  Bilirubin Total, Serum: 2.5 mg/dL (02-28-23 @ 03:47)  Bilirubin Total, Serum: 3.0 mg/dL (02-27-23 @ 21:56)  Bilirubin Total, Serum: 3.5 mg/dL (02-27-23 @ 12:40)  Bilirubin Total, Serum: 2.5 mg/dL (02-27-23 @ 04:00)  Bilirubin Total, Serum: 2.6 mg/dL (02-26-23 @ 16:08)

## 2023-03-02 NOTE — PROGRESS NOTE ADULT - ASSESSMENT
63yo M w/ PMH of CAD s/p multiple stents and recurrent rectal adenoCA who p/w fever, urinary retention and purulent drainage from rectal wound found to be pancytopenic and hypotensive with new onset AF with RVR requiring pressor therapy.  CT showed interval growth of rectal tumor with contained perforation of mass extending into the perineum with ?necrosis or fistula to bladder and a possible small abscess or fistula from posterior anus to intergluteal region.  Also noted was a 6mm RLL pulm nodule suspicious for metastatic dz.  Regarding cancer hx, pt dx'd 2019 s/p resxn/diverting ileostomy s/p CTX/XRT then local recurrence s/p APR of rectum/sigmoid colon with colostomy creation 3/2022 - now on CTX (5-FU) and s/p sacral bx (1/20/23) which confirmed metastatic dz.  Here pt with neutropenic fever and septic shock requiring pressors, afib w/ RVR on amiodarone and heparin, on broad spectrum abx. Improved and off pressors and xferred out of MICU.  We are consulted for assistance with goals of care.  #Goals of care  - pt with metastatic CRC here with neutropenic fever and septic shock with abscess -- was on 3 pressors now weaned off of all pressors and appears to be clinically improving. Also w/ new onset AF w/ RVR - getting TTE  - Spoke to pt 3/1 and reviewed code status preferences. - DNR/DNI - MOLST completed  - wife Maricel - [phone] would be legal surrogate and this is who pt named as his preferred surrogate - she is making decisions with her dtr Erma (who is a MICU RN)  - Unclear if pt will need drainage of abscess and duration of IV abx - ID is following  - Spoke to daughter - plan is to go home and f/u with oncology to see what, if any, cancer treatment options he has  - Will cont to follow and remain available to assist with future goals of care discussions if/when the need arises.    #AMS  - improved   - Would avoid/minimize known deliriogenic drugs such as anticholinergics.  Encourage staff and family to reorient frequently.  Keep shades open during day in effort to maintain day/night cycle.    #Anxiety  - cont home med - xanax 0.5mg PO Q6hrs prn anxiety    #Pain - pt reportedly had pain in MICU but has repeatedly denied current or past pain on my visits  - would decrease dilaudid to 1mg IV Q4hr prn

## 2023-03-02 NOTE — PROGRESS NOTE ADULT - SUBJECTIVE AND OBJECTIVE BOX
Subjective:62yMale admitted with sepsis, bacteremia, E. coli, rectal tumor recurrence, urinary retention due to increasing size of mass and compression on prostate and bladder.  Vásquez in place, draining clear, yellow urine. Pt in NAD.    Vásquez: yellow    Vital Signs Last 24 Hrs  T(C): 36.7 (02 Mar 2023 05:30), Max: 37.2 (01 Mar 2023 19:00)  T(F): 98.1 (02 Mar 2023 05:30), Max: 99 (01 Mar 2023 19:00)  HR: 95 (02 Mar 2023 05:30) (94 - 153)  BP: 145/73 (02 Mar 2023 05:30) (95/72 - 145/73)  BP(mean): 95 (02 Mar 2023 04:00) (75 - 99)  RR: 20 (02 Mar 2023 05:30) (20 - 34)  SpO2: 96% (02 Mar 2023 05:30) (93% - 98%)    Parameters below as of 02 Mar 2023 05:30  Patient On (Oxygen Delivery Method): room air      I&O's Detail    01 Mar 2023 07:01  -  02 Mar 2023 07:00  --------------------------------------------------------  IN:    IV PiggyBack: 249.9 mL    Oral Fluid: 1040 mL  Total IN: 1289.9 mL    OUT:    Colostomy (mL): 150 mL    Indwelling Catheter - Urethral (mL): 1775 mL  Total OUT: 1925 mL    Total NET: -635.1 mL          Labs:                        9.2    13.15 )-----------( 163      ( 02 Mar 2023 03:59 )             26.3     03-02    132<L>  |  96  |  27.8<H>  ----------------------------<  111<H>  3.9   |  27.0  |  1.02    Ca    7.9<L>      02 Mar 2023 03:59  Phos  2.2     03-02  Mg     2.0     03-02    TPro  5.0<L>  /  Alb  2.3<L>  /  TBili  1.3  /  DBili  x   /  AST  74<H>  /  ALT  402<H>  /  AlkPhos  275<H>  03-02          Culture - Blood (collected 28 Feb 2023 14:10)  Source: .Blood Blood-Peripheral  Preliminary Report (01 Mar 2023 18:02):    No growth to date.    Culture - Blood (collected 28 Feb 2023 14:10)  Source: .Blood Blood-Peripheral  Preliminary Report (01 Mar 2023 18:02):    No growth to date.    Culture - Body Fluid with Gram Stain (collected 27 Feb 2023 10:20)  Source: .Body Fluid Draining Rectal Wound  Gram Stain (27 Feb 2023 22:31):    Few polymorphonuclear leukocytes seen per low power field    Numerous Gram positive cocci in pairs seen per oil power field  Preliminary Report (01 Mar 2023 17:34):    Numerous Escherichia coli    Moderate Alpha hemolytic strep "Susceptibilities not performed"    Moderate Gemella morbillorum "Susceptibilities not performed"  Organism: Escherichia coli (01 Mar 2023 15:30)  Organism: Escherichia coli (01 Mar 2023 15:30)

## 2023-03-02 NOTE — PROGRESS NOTE ADULT - SUBJECTIVE AND OBJECTIVE BOX
MICU to Medicine Transfer Acceptance Note    [facility] Division of Hospital Medicine  Chad Cardona MD    Chief Complaint:  Patient is a 62y old  Male who presents with a chief complaint of Septic shock (02 Mar 2023 00:06)    Hospital Course:   Patient is a 61 yo M /w PMH of HTN, CAD s/p PCI, Rectal Adenocarcinoma s/p resection and diverting colostomy presenting with fever, SOB and admitted to MICU for septic shock 2/2 UTI and E. coli bacteremia. ID consulted and patient started on IV antibiotics and vasopressors. CT done and showed "interval growth of the patient's rectal tumor with apparent anterior contained perforation of the mass extending inferiorly to the perineum. Air in the mass may represent necrosis or fistula to the urinary bladder.  Small tubular gas-liquid collection extending from the posterior anus to the intergluteal region may represent small abscess or fistula extending from the necrotic rectal cancer which has extended into the perineum." Patinet was seen by Heme/Onc and Palliative. Patient was made DNR/DNI. Patient's repeat Bcx have been negative. He was also noted to be in AFib w/ RVR. He was started on an Amiodarone drip and then loaded with Digoxin with improvement. Patient was weaned off vasopressors. Patient now deemed appropriate by MICU for downgrade to Medicine.     SUBJECTIVE / OVERNIGHT EVENTS:  Patient seen and examined at bedside. No acute events reported overnight. No new complaints.    MEDICATIONS  (STANDING):  chlorhexidine 2% Cloths 1 Application(s) Topical <User Schedule>  dextrose 50% Injectable 25 Gram(s) IV Push once  dextrose 50% Injectable 12.5 Gram(s) IV Push once  dextrose 50% Injectable 25 Gram(s) IV Push once  dextrose Oral Gel 15 Gram(s) Oral once  digoxin  Injectable 250 MICROGram(s) IV Push every 8 hours  glucagon  Injectable 1 milliGRAM(s) IntraMuscular once  heparin   Injectable 5000 Unit(s) SubCutaneous every 12 hours  meropenem Injectable 1000 milliGRAM(s) IV Push every 8 hours  metoprolol tartrate 12.5 milliGRAM(s) Oral every 6 hours    MEDICATIONS  (PRN):  ALPRAZolam 0.5 milliGRAM(s) Oral every 6 hours PRN anxiety  HYDROmorphone  Injectable 1.5 milliGRAM(s) IV Push every 4 hours PRN Pain  ondansetron Injectable 4 milliGRAM(s) IV Push every 6 hours PRN Nausea and/or Vomiting        I&O's Summary    01 Mar 2023 07:01  -  02 Mar 2023 07:00  --------------------------------------------------------  IN: 1289.9 mL / OUT: 1925 mL / NET: -635.1 mL        PHYSICAL EXAM:  Vital Signs Last 24 Hrs  T(C): 36.7 (02 Mar 2023 05:30), Max: 37.2 (01 Mar 2023 19:00)  T(F): 98.1 (02 Mar 2023 05:30), Max: 99 (01 Mar 2023 19:00)  HR: 95 (02 Mar 2023 05:30) (94 - 153)  BP: 145/73 (02 Mar 2023 05:30) (95/72 - 145/73)  BP(mean): 95 (02 Mar 2023 04:00) (72 - 99)  RR: 20 (02 Mar 2023 05:30) (14 - 34)  SpO2: 96% (02 Mar 2023 05:30) (93% - 98%)    Parameters below as of 02 Mar 2023 05:30  Patient On (Oxygen Delivery Method): room air            CONSTITUTIONAL: NAD  HEENT: NC/AT, PERRL, no JVD  RESPIRATORY: CTA bilaterally, normal effort  CARDIOVASCULAR: RRR, S1/S2+, no m/g/r  ABDOMEN: Nontender to palpation, normoactive bowel sounds, no rebound/guarding  MUSCULOSKELETAL: No edema, cyanosis or deformities.  PSYCH: Calm, affect appropriate.  NEUROLOGY: Awake, alert, no focal neurological deficits.   SKIN: No rashes; no palpable lesions  VASC: Distal pulses palpable    LABS:                        9.2    13.15 )-----------( 163      ( 02 Mar 2023 03:59 )             26.3     03-02    132<L>  |  96  |  27.8<H>  ----------------------------<  111<H>  3.9   |  27.0  |  1.02    Ca    7.9<L>      02 Mar 2023 03:59  Phos  2.2     03-02  Mg     2.0     03-02    TPro  5.0<L>  /  Alb  2.3<L>  /  TBili  1.3  /  DBili  x   /  AST  74<H>  /  ALT  402<H>  /  AlkPhos  275<H>  03-02              Culture - Blood (collected 28 Feb 2023 14:10)  Source: .Blood Blood-Peripheral  Preliminary Report (01 Mar 2023 18:02):    No growth to date.    Culture - Blood (collected 28 Feb 2023 14:10)  Source: .Blood Blood-Peripheral  Preliminary Report (01 Mar 2023 18:02):    No growth to date.    Culture - Body Fluid with Gram Stain (collected 27 Feb 2023 10:20)  Source: .Body Fluid Draining Rectal Wound  Gram Stain (27 Feb 2023 22:31):    Few polymorphonuclear leukocytes seen per low power field    Numerous Gram positive cocci in pairs seen per oil power field  Preliminary Report (01 Mar 2023 17:34):    Numerous Escherichia coli    Moderate Alpha hemolytic strep "Susceptibilities not performed"    Moderate Gemella morbillorum "Susceptibilities not performed"  Organism: Escherichia coli (01 Mar 2023 15:30)  Organism: Escherichia coli (01 Mar 2023 15:30)      CAPILLARY BLOOD GLUCOSE            RADIOLOGY & ADDITIONAL TESTS:  Results Reviewed:   Imaging Personally Reviewed:  Electrocardiogram Personally Reviewed:                                           MICU to Medicine Transfer Acceptance Note    [facility] Division of Hospital Medicine  Chad Cardona MD    Chief Complaint:  Patient is a 62y old  Male who presents with a chief complaint of Septic shock (02 Mar 2023 00:06)    Hospital Course:   Patient is a 61 yo M /w PMH of HTN, CAD s/p PCI, Rectal Adenocarcinoma s/p resection and diverting colostomy presenting with fever, SOB and admitted to MICU for septic shock 2/2 UTI and E. coli bacteremia. ID consulted and patient started on IV antibiotics and vasopressors. CT done and showed "interval growth of the patient's rectal tumor with apparent anterior contained perforation of the mass extending inferiorly to the perineum. Air in the mass may represent necrosis or fistula to the urinary bladder.  Small tubular gas-liquid collection extending from the posterior anus to the intergluteal region may represent small abscess or fistula extending from the necrotic rectal cancer which has extended into the perineum." Patinet was seen by Heme/Onc and Palliative. Patient was made DNR/DNI. Patient's repeat Bcx have been negative. He was also noted to be in AFib w/ RVR. He was started on an Amiodarone drip and then loaded with Digoxin with improvement. Patient was weaned off vasopressors. Patient now deemed appropriate by MICU for downgrade to Medicine.     SUBJECTIVE / OVERNIGHT EVENTS:  Patient seen and examined at bedside. No acute events reported overnight. No new complaints.    MEDICATIONS  (STANDING):  chlorhexidine 2% Cloths 1 Application(s) Topical <User Schedule>  dextrose 50% Injectable 25 Gram(s) IV Push once  dextrose 50% Injectable 12.5 Gram(s) IV Push once  dextrose 50% Injectable 25 Gram(s) IV Push once  dextrose Oral Gel 15 Gram(s) Oral once  digoxin  Injectable 250 MICROGram(s) IV Push every 8 hours  glucagon  Injectable 1 milliGRAM(s) IntraMuscular once  heparin   Injectable 5000 Unit(s) SubCutaneous every 12 hours  meropenem Injectable 1000 milliGRAM(s) IV Push every 8 hours  metoprolol tartrate 12.5 milliGRAM(s) Oral every 6 hours    MEDICATIONS  (PRN):  ALPRAZolam 0.5 milliGRAM(s) Oral every 6 hours PRN anxiety  HYDROmorphone  Injectable 1.5 milliGRAM(s) IV Push every 4 hours PRN Pain  ondansetron Injectable 4 milliGRAM(s) IV Push every 6 hours PRN Nausea and/or Vomiting        I&O's Summary    01 Mar 2023 07:01  -  02 Mar 2023 07:00  --------------------------------------------------------  IN: 1289.9 mL / OUT: 1925 mL / NET: -635.1 mL        PHYSICAL EXAM:  Vital Signs Last 24 Hrs  T(C): 36.7 (02 Mar 2023 05:30), Max: 37.2 (01 Mar 2023 19:00)  T(F): 98.1 (02 Mar 2023 05:30), Max: 99 (01 Mar 2023 19:00)  HR: 95 (02 Mar 2023 05:30) (94 - 153)  BP: 145/73 (02 Mar 2023 05:30) (95/72 - 145/73)  BP(mean): 95 (02 Mar 2023 04:00) (72 - 99)  RR: 20 (02 Mar 2023 05:30) (14 - 34)  SpO2: 96% (02 Mar 2023 05:30) (93% - 98%)    Parameters below as of 02 Mar 2023 05:30  Patient On (Oxygen Delivery Method): room air            CONSTITUTIONAL: NAD  HEENT: NC/AT, PERRL, no JVD  RESPIRATORY: CTA bilaterally, normal effort  CARDIOVASCULAR: RRR, S1/S2+, no m/g/r  ABDOMEN: +Colostomy, NT/ND, BS+  MUSCULOSKELETAL: No edema, cyanosis or deformities.  PSYCH: Calm, affect appropriate.  NEUROLOGY: Awake, alert, no focal neurological deficits.   SKIN: No rashes; no palpable lesions  VASC: Distal pulses palpable    LABS:                        9.2    13.15 )-----------( 163      ( 02 Mar 2023 03:59 )             26.3     03-02    132<L>  |  96  |  27.8<H>  ----------------------------<  111<H>  3.9   |  27.0  |  1.02    Ca    7.9<L>      02 Mar 2023 03:59  Phos  2.2     03-02  Mg     2.0     03-02    TPro  5.0<L>  /  Alb  2.3<L>  /  TBili  1.3  /  DBili  x   /  AST  74<H>  /  ALT  402<H>  /  AlkPhos  275<H>  03-02              Culture - Blood (collected 28 Feb 2023 14:10)  Source: .Blood Blood-Peripheral  Preliminary Report (01 Mar 2023 18:02):    No growth to date.    Culture - Blood (collected 28 Feb 2023 14:10)  Source: .Blood Blood-Peripheral  Preliminary Report (01 Mar 2023 18:02):    No growth to date.    Culture - Body Fluid with Gram Stain (collected 27 Feb 2023 10:20)  Source: .Body Fluid Draining Rectal Wound  Gram Stain (27 Feb 2023 22:31):    Few polymorphonuclear leukocytes seen per low power field    Numerous Gram positive cocci in pairs seen per oil power field  Preliminary Report (01 Mar 2023 17:34):    Numerous Escherichia coli    Moderate Alpha hemolytic strep "Susceptibilities not performed"    Moderate Gemella morbillorum "Susceptibilities not performed"  Organism: Escherichia coli (01 Mar 2023 15:30)  Organism: Escherichia coli (01 Mar 2023 15:30)      CAPILLARY BLOOD GLUCOSE            RADIOLOGY & ADDITIONAL TESTS:  Results Reviewed:   Imaging Personally Reviewed:  Electrocardiogram Personally Reviewed:

## 2023-03-02 NOTE — ADVANCED PRACTICE NURSE CONSULT - RECOMMEDATIONS
·	Cleanse with NS, pat dry, apply heavy drainage pack or ABD pad, secure with tape and reinforce with incontinence absorbent pad.    ·	Adult briefs when ambulating with PT  ·	Green waffle seat cushion or pillow at all times when OOB to chair    ·	Turn and Reposition Q2-4H  ·	Offload sacral-coccygeal area with positioner pillow, alternate sides Q2-4H  ·	Incontinence care with repositioning Q2-4H    -Continue turning/positioning patient from side-to-side q2h while in bed, q1h when/if OOB chair, or in accordance w/ pt's plan of care. Utilize pillows and/or Spry positioner pillow to assist w/ turning/positioning. When/if OOB chair, utilize pillows or chair cushion to offload pressure.   -Continue to offload heels from bed surface with soft pillow under calfs or by applying offloading boots to BLEs.   -Continue utilizing one underpad underneath patient to contain incontinence episodes; change pad when saturated/soiled.   -Consider utilizing female incontinence device/Primafit (if patient candidate) to contain urinary incontinence.  -Consider utilizing condom catheter (if patient candidate) to contain any urinary incontinence.   -Continue nutrition consult for optimal wound healing & nutritional status.   -Assess skin/wound qshift, report changes to primary provider.     Plan of Care: Primary RN made aware of above recs. Spoke w/ floor PA in regards to above. No further needs/recs from [facility] service at this time. Staff RN to perform routine skin/wound assessment and manage wound care. Questions or concerns or if wound worsens and reconsult needed, please contact [facility]

## 2023-03-02 NOTE — PROGRESS NOTE ADULT - ASSESSMENT
Pt is a 63 y/o M pmhx of HTN, CAD s/p PCI, rectal adenocarcinoma (s/p resection and diverting ileostomy in 2020) treated w/ chemo and radiotherapy, complicated by local recurrence in sigmoid/rectum s/p APR w/ colostomy creation, Pt recently received chemo on 2/12/23, presented to [facility] ED on 2/27/23 w complaints of fever and SOB w/ urinary retention.\    1. Septic shock --> resolved   2. E. coli bacteremia   3. Neutropenia   4. Transaminitis   5. JAS  6. Afib w/ RVR   7. Metabolic encephalopathy   8. Necrotic rectal CA     Plan:     Neuro: Encephalopathic in setting of septic shock, continues to improve. Pain control as needed w/ ativan and Dilaudid.      CV: Septic shock state secondary to E. coli bacteremia, resolved, continue to monitor and maintain MAP>65. Afib w/ RVR Dig loaded today, continue w/ 12.5 of PO metoprolol for rate control.     Pulm: No active issues, continue to monitor and supplement O2 requirements as needed to maintain SpO2>92%.     GI: Diet: Consistent carb, zofran PRN for nausea     Renal: JAS secondary to shock state, improved following resolution of shock, continue to monitor and avoid nephrotoxic meds.     Endo: Glucose <180, mag>2, K>4 for arrythmia suppression.     Heme: Hb remains stable, Transfuse as needed for Hb below 7. Heparin for DVT PPX.     ID: Septic shock, secondary to neutropenia and E. coli bacteremia, continues to improve. Cx grew E. coli and hemolytic strep. Continue on meropenem, trend markers of infection daily.

## 2023-03-02 NOTE — PROGRESS NOTE ADULT - SUBJECTIVE AND OBJECTIVE BOX
Patient is a 62y old  Male who presents with a chief complaint of Sepsis     (01 Mar 2023 14:21)      BRIEF HOSPITAL COURSE: Pt is a 63 y/o M pmhx of HTN, CAD s/p PCI, rectal adenocarcinoma (s/p resection and diverting ileostomy in 2020) treated w/ chemo and radiotherapy, complicated by local recurrence in sigmoid/rectum s/p APR w/ colostomy creation, Pt recently received chemo on 2/12/23, presented to [facility] ED on 2/27/23 w complaints of fever and SOB w/ urinary retention. In ED pt found to be pancytopenic, febrile and in Afib w/ RVR (new onset), hypotensive s/p 3.5L IVF, started on vasopressors and admitted to MICU. CT chest abdomen and pelvis showed increased growth of pt rectal tumor, w/ possible anterior contained perforation and METS to urinary bladder, prostate, and possibly lung w/ possible abscess or fistula in the perineum.      Events last 24 hours: Remains HD stable off pressors, Started on Digoxin for Afib w/ RVR. Made DNR/DNI today following palliative care meeting. Transaminitis improving.     PAST MEDICAL & SURGICAL HISTORY:  Chest pain      HTN (hypertension)      HLD (hyperlipidemia)      CAD (coronary artery disease)      Mitral regurgitation      Rectal cancer  2/2019 s/p chemo and radiation; recurrence of cancer 2022      Depression  with rectal cancer      Neuropathy  feet s/p chemo      Stented coronary artery  circumflex 2005 LAD 2017 x4      H/O carotid stenosis      H/O renal calculi      DANO (iron deficiency anemia)      Umbilical hernia      Splenic artery aneurysm      H/O cardiac catheterization  2005 2017  4 STENTS      History of CEA (carotid endarterectomy)  right 2019      History of rectal surgery  with ileostomy 2/2020      H/O sigmoidoscopy  x2      History of removal of Port-a-Cath          Review of Systems:  CONSTITUTIONAL: No fever, chills, or fatigue  EYES: No eye pain, visual disturbances, or discharge  ENMT:  No difficulty hearing, tinnitus, vertigo; No sinus or throat pain  NECK: No pain or stiffness  RESPIRATORY: No cough, wheezing, chills or hemoptysis; No shortness of breath  CARDIOVASCULAR: No chest pain, palpitations, dizziness, or leg swelling  GASTROINTESTINAL: No abdominal or epigastric pain. No nausea, vomiting, or hematemesis; No diarrhea or constipation. No melena or hematochezia.  GENITOURINARY: No dysuria, frequency, hematuria, or incontinence  NEUROLOGICAL: No headaches, memory loss, loss of strength, numbness, or tremors  SKIN: No itching, burning, rashes, or lesions   MUSCULOSKELETAL: No joint pain or swelling; No muscle, back, or extremity pain  PSYCHIATRIC: No depression, anxiety, mood swings, or difficulty sleeping      Medications:  meropenem Injectable 1000 milliGRAM(s) IV Push every 8 hours    digoxin  Injectable 250 MICROGram(s) IV Push every 8 hours  metoprolol tartrate 12.5 milliGRAM(s) Oral every 6 hours      ALPRAZolam 0.5 milliGRAM(s) Oral every 6 hours PRN  HYDROmorphone  Injectable 1.5 milliGRAM(s) IV Push every 4 hours PRN  ondansetron Injectable 4 milliGRAM(s) IV Push every 6 hours PRN      heparin   Injectable 5000 Unit(s) SubCutaneous every 12 hours        dextrose 50% Injectable 25 Gram(s) IV Push once  dextrose 50% Injectable 12.5 Gram(s) IV Push once  dextrose 50% Injectable 25 Gram(s) IV Push once  dextrose Oral Gel 15 Gram(s) Oral once  glucagon  Injectable 1 milliGRAM(s) IntraMuscular once        chlorhexidine 2% Cloths 1 Application(s) Topical <User Schedule>            ICU Vital Signs Last 24 Hrs  T(C): 37.2 (01 Mar 2023 19:00), Max: 37.2 (01 Mar 2023 19:00)  T(F): 99 (01 Mar 2023 19:00), Max: 99 (01 Mar 2023 19:00)  HR: 94 (01 Mar 2023 23:00) (94 - 153)  BP: 122/68 (01 Mar 2023 23:00) (93/69 - 125/86)  BP(mean): 84 (01 Mar 2023 23:00) (72 - 99)  ABP: --  ABP(mean): --  RR: 29 (01 Mar 2023 23:00) (14 - 34)  SpO2: 93% (01 Mar 2023 23:00) (92% - 100%)    O2 Parameters below as of 01 Mar 2023 20:00  Patient On (Oxygen Delivery Method): room air            ABG - ( 28 Feb 2023 05:14 )  pH, Arterial: 7.540 pH, Blood: x     /  pCO2: 28    /  pO2: 62    / HCO3: 24    / Base Excess: 1.4   /  SaO2: 94.3                I&O's Detail    28 Feb 2023 07:01  -  01 Mar 2023 07:00  --------------------------------------------------------  IN:    IV PiggyBack: 249.9 mL    IV PiggyBack: 250 mL    Lactated Ringers Bolus: 500 mL    Norepinephrine: 22.3 mL    Oral Fluid: 570 mL  Total IN: 1592.2 mL    OUT:    Colostomy (mL): 60 mL    Indwelling Catheter - Urethral (mL): 1435 mL    Sodium Bicarbonate: 0 mL  Total OUT: 1495 mL    Total NET: 97.2 mL      01 Mar 2023 07:01  -  02 Mar 2023 00:06  --------------------------------------------------------  IN:    IV PiggyBack: 249.9 mL    Oral Fluid: 940 mL  Total IN: 1189.9 mL    OUT:    Indwelling Catheter - Urethral (mL): 800 mL  Total OUT: 800 mL    Total NET: 389.9 mL            LABS:                        8.8    8.73  )-----------( 132      ( 01 Mar 2023 02:45 )             25.5     03-01    134<L>  |  97  |  35.9<H>  ----------------------------<  107<H>  3.2<L>   |  28.0  |  1.23    Ca    7.3<L>      01 Mar 2023 02:45  Phos  1.4     03-01  Mg     2.3     03-01    TPro  4.6<L>  /  Alb  1.8<L>  /  TBili  1.6  /  DBili  x   /  AST  184<H>  /  ALT  774<H>  /  AlkPhos  79  03-01          CAPILLARY BLOOD GLUCOSE      POCT Blood Glucose.: 115 mg/dL (01 Mar 2023 07:29)        CULTURES:  Culture Results:   No growth to date. (02-28-23 @ 14:10)  Culture Results:   No growth to date. (02-28-23 @ 14:10)  Culture Results:   Numerous Escherichia coli  Moderate Alpha hemolytic strep "Susceptibilities not performed"  Moderate Gemella morbillorum "Susceptibilities not performed" (02-27-23 @ 10:20)  Culture Results:   No growth (02-26-23 @ 18:50)  Culture Results:   Growth in aerobic bottle: Escherichia coli  See previous culture [id number]  Growth in anaerobic bottle: Bacteroides fragilis "Susceptibilities not  performed" (02-26-23 @ 16:08)  Rapid RVP Result: NotDetec (02-26-23 @ 16:05)  Culture Results:   Growth in aerobic and anaerobic bottles: Escherichia coli  ***Blood Panel PCR results on this specimen are available  approximately 3 hours after the Gram stain result.***  Gram stain, PCR, and/or culture results may not always  correspond due to difference in methodologies.  ************************************************************  This PCR assay was performed by multiplex PCR. This  Assay tests for 66 bacterial and resistance gene targets.  Please refer to the [facility] test directory  at [email or web address] for details. (02-26-23 @ 16:00)      Physical Examination:    General: Pt laying in hospital bed in no acute distress.  Alert, oriented, interactive.     HEENT: Pupils equal, reactive to light.  Symmetric.    PULM: Clear to auscultation bilaterally, no significant sputum production    CVS: Regular rate and rhythm, no murmurs, rubs, or gallops    ABD: Soft, nondistended, nontender, normoactive bowel sounds, no masses    EXT: No edema, nontender    SKIN: Warm and well perfused, sacral wounds noted.       RADIOLOGY:   < from: US Abdomen Upper Quadrant Right (03.01.23 @ 12:04) >  ACC: [id number] EXAM:  US ABDOMEN RT UPR QUADRANT   ORDERED BY: EZE CALVILLO DATE:  03/01/2023          INTERPRETATION:  CLINICAL INFORMATION: Sepsis and transaminitis, fever    COMPARISON: CT of the abdomen and pelvis from 2/26/2023 and 12/22/2022    TECHNIQUE: Sonography of the right upper quadrant.    FINDINGS:  Liver: There is increased hepatic echogenicity, suggesting hepatic   steatosis and/or fibrosis. The liver is mildly enlarged, measuring up to   19 cm in length. The visualized main portal vein appears patent with   normal direction of flow.  Bile ducts: Normal caliber. Common bile duct measures 3 mm.  Gallbladder: Question mild sludge within the gallbladder. The gallbladder   is relatively contracted. There is no gallbladder wall thickening.   Negative sonographic Michel sign.  Pancreas: Are poorly visualized.  Right kidney: 9.7 cm. No hydronephrosis.  Ascites: None.  Aorta/IVC: Poorly visualized.    IMPRESSION:    Increased hepatic echogenicity, suggesting hepatic steatosis and/or   fibrosis. Hepatomegaly.    Question gallbladder sludge. No sonographic evidence for cholecystitis.   No intra or extrahepatic biliary ductal dilatation.    --- End of Report ---            HOOD IRIZARRY MD; Attending Radiologist  This document has been electronically signed. Mar  1 2023 12:43PM

## 2023-03-02 NOTE — PROGRESS NOTE ADULT - ASSESSMENT
61 y/o M with a h/o HTN, HLD, CAD (s/p PCI), rectal adenocarcinoma (diagnosed in 2019, s/p resection and diverting ileostomy creation 2020 followed by chemo and radiotherapy, complicated by local recurrence in sigmoid/rectum, s/p APR of rectum/sigmoid colon with colostomy creation 3/2022, most recent chemo 2/12/23), presents to the ED complaining of fever, SOB, urinary retention, and increased purulent drainage from chronic sacral wound. As per report, the urinary retention has been worsening over the past few weeks as the cancer has been growing and pressing on his bladder and prostate and he has been experiencing suprapubic pain and episodes of overflow incontinence. Found to be febrile, pancytopenic, tachycardic (new onset AF with RVR), and hypotensive despite 3.5L crystalloid bolus. Lactate 6. Started on IV vasopressor therapy. Now developing some confusion. CT C/A/P reveals interval growth of the patient's rectal tumor with apparent anterior contained perforation of the mass extending inferiorly to the perineum. Air in the mass may represent necrosis or fistula to the urinary bladder. Prostate gland is indistinguishable and the posterior aspect of the urinary bladder cannot be  from the mass, concerning for direct invasion of tumor into these structures. Small tubular gas-liquid collection extending from the posterior anus to the intergluteal region may represent small abscess or fistula extending from the necrotic rectal cancer which has extended into the perineum. A new 6 mm right lower lobe pulmonary nodule suspicious for metastasis.   AS ABOVE PT WITH RECTAL CA ON CHEMO AT [facility]   LAST CHEMO 2/14  PT WITH INCREASED FATIGUE AND CONFUSION  ADMITTED HYPOTENSIVE   SHOCK BLOOD CX GM NEG RODS   ECOLI   WBC IS RECOVERED  ON  MERREM       URINE CX NEGATIVE   MOST LIKELY  SOURCE BOWEL   REPEAT BLOOD CX X2 SETS NEGATIVE      FINAL SENSITIVITIES BACK   PAN SENSITIVE  CAN CHANGE TO ROCEPHIN   WILL FOLLOW UP WITH FURTHER RECOMMENDATIONS        63 y/o M with a h/o HTN, HLD, CAD (s/p PCI), rectal adenocarcinoma (diagnosed in 2019, s/p resection and diverting ileostomy creation 2020 followed by chemo and radiotherapy, complicated by local recurrence in sigmoid/rectum, s/p APR of rectum/sigmoid colon with colostomy creation 3/2022, most recent chemo 2/12/23), presents to the ED complaining of fever, SOB, urinary retention, and increased purulent drainage from chronic sacral wound. As per report, the urinary retention has been worsening over the past few weeks as the cancer has been growing and pressing on his bladder and prostate and he has been experiencing suprapubic pain and episodes of overflow incontinence. Found to be febrile, pancytopenic, tachycardic (new onset AF with RVR), and hypotensive despite 3.5L crystalloid bolus. Lactate 6. Started on IV vasopressor therapy. Now developing some confusion. CT C/A/P reveals interval growth of the patient's rectal tumor with apparent anterior contained perforation of the mass extending inferiorly to the perineum. Air in the mass may represent necrosis or fistula to the urinary bladder. Prostate gland is indistinguishable and the posterior aspect of the urinary bladder cannot be  from the mass, concerning for direct invasion of tumor into these structures. Small tubular gas-liquid collection extending from the posterior anus to the intergluteal region may represent small abscess or fistula extending from the necrotic rectal cancer which has extended into the perineum. A new 6 mm right lower lobe pulmonary nodule suspicious for metastasis.   AS ABOVE PT WITH RECTAL CA ON CHEMO AT [facility]   LAST CHEMO 2/14  PT WITH INCREASED FATIGUE AND CONFUSION  ADMITTED HYPOTENSIVE   SHOCK BLOOD CX GM NEG RODS   ECOLI   WBC IS RECOVERED  ON  MERREM       URINE CX NEGATIVE   MOST LIKELY  SOURCE BOWEL   REPEAT BLOOD CX X2 SETS NEGATIVE      FINAL SENSITIVITIES BACK   PAN SENSITIVE  CAN CHANGE TO ROCEPHIN  2gm daily  WILL FOLLOW UP WITH FURTHER RECOMMENDATIONS

## 2023-03-02 NOTE — PROGRESS NOTE ADULT - ASSESSMENT
Patient is a 63 yo M /w PMH of HTN, CAD s/p PCI, Rectal Adenocarcinoma s/p resection and diverting colostomy presenting with fever, SOB and admitted to MICU for septic shock 2/2 UTI and E. coli bacteremia. ID consulted and patient started on IV antibiotics and vasopressors. CT done and showed "interval growth of the patient's rectal tumor with apparent anterior contained perforation of the mass extending inferiorly to the perineum. Air in the mass may represent necrosis or fistula to the urinary bladder.  Small tubular gas-liquid collection extending from the posterior anus to the intergluteal region may represent small abscess or fistula extending from the necrotic rectal cancer which has extended into the perineum." Patinet was seen by Heme/Onc and Palliative. Patient was made DNR/DNI. Patient's repeat Bcx have been negative. He was also noted to be in AFib w/ RVR. He was started on an Amiodarone drip and then loaded with Digoxin with improvement. Patient was weaned off vasopressors. Patient now deemed appropriate by MICU for downgrade to Medicine.     Sepsis POA 2/2 UTI and E. Coli Bacteremia  Septic encephalopathy  - S/p vasopressors  - ID following  - Continue Merrem  - Repeat BCx: NGTD  - Rectal Cx: E. coli, Gemella morbillorum, Alpha hemolytic strep  - Monitor WBC, fever curve    Hx of Rectal Adenocarcinoma s/p resection and diverting ileostomy in 2020  - CT C/A/P: interval growth of the patient's rectal tumor with apparent anterior contained perforation of the mass extending inferiorly to the perineum. Air in the mass may represent necrosis or fistula to the urinary bladder. Prostate gland is indistinguishable and the posterior aspect of the urinary bladder cannot be  from the mass, concerning for direct invasion of tumor into these structures. Small tubular gas-liquid collection extending from the posterior anus to the intergluteal region may represent small abscess or fistula extending from the necrotic rectal cancer which has extended into the perineum. A new 6 mm right lower lobe pulmonary nodule suspicious for metastasis.   - Heme/Onc and Palliative following    AUR  - Continue Vásquez  - Urology consult appreciated  - Outpt follow up with Vásquez    AFib w/ RVR  - Started on Lopressor and Digoxin loading dose by MICU  - TTE - LVEF of 40-45  - Telemetry  - RVR improved  - Cardiology consulted today  - Heparin gtt    Hx of CAD  - Holding Plavix due to anemia  - Holding statin due to transaminitis  - Continue BB     Transaminitis  - Improving  - Abdominal US reviewed  - Monitor     Advance Directives: DNR/DNI, Palliative following    DVT ppx: Heparin    Patient is a 61 yo M /w PMH of HTN, CAD s/p PCI, Rectal Adenocarcinoma s/p resection and diverting colostomy presenting with fever, SOB and admitted to MICU for septic shock 2/2 UTI and E. coli bacteremia. ID consulted and patient started on IV antibiotics and vasopressors. CT done and showed "interval growth of the patient's rectal tumor with apparent anterior contained perforation of the mass extending inferiorly to the perineum. Air in the mass may represent necrosis or fistula to the urinary bladder.  Small tubular gas-liquid collection extending from the posterior anus to the intergluteal region may represent small abscess or fistula extending from the necrotic rectal cancer which has extended into the perineum." Patinet was seen by Heme/Onc and Palliative. Patient was made DNR/DNI. Patient's repeat Bcx have been negative. He was also noted to be in AFib w/ RVR. He was started on an Amiodarone drip and then loaded with Digoxin with improvement. Patient was weaned off vasopressors. Patient now deemed appropriate by MICU for downgrade to Medicine.     Sepsis POA 2/2 UTI and E. Coli Bacteremia  Septic encephalopathy  - S/p vasopressors  - ID following  - Continue Merrem  - Repeat BCx: NGTD  - Rectal Cx: E. coli, Gemella morbillorum, Alpha hemolytic strep  - Monitor WBC, fever curve    Hx of Rectal Adenocarcinoma s/p resection and diverting ileostomy in 2020  - CT C/A/P: interval growth of the patient's rectal tumor with apparent anterior contained perforation of the mass extending inferiorly to the perineum. Air in the mass may represent necrosis or fistula to the urinary bladder. Prostate gland is indistinguishable and the posterior aspect of the urinary bladder cannot be  from the mass, concerning for direct invasion of tumor into these structures. Small tubular gas-liquid collection extending from the posterior anus to the intergluteal region may represent small abscess or fistula extending from the necrotic rectal cancer which has extended into the perineum. A new 6 mm right lower lobe pulmonary nodule suspicious for metastasis.   - Heme/Onc and Palliative following    AUR  - Continue Vásquez  - Urology consult appreciated  - Outpt follow up with Vásquez    AFib w/ RVR  - Started on Lopressor and Digoxin loading dose by MICU  - TTE - LVEF of 40-45  - Telemetry  - Now in SR  - Cardiology consulted today, will follow up recommendations  - Heparin gtt    Cardiomyopathy  - LVEF of 40-45%  - ?stress induced  - Cardio consulted    Hx of CAD  - Holding Plavix due to anemia  - Holding statin due to transaminitis  - Continue BB     Transaminitis  - Improving  - Abdominal US reviewed  - Monitor     Advance Directives: DNR/DNI, Palliative following    DVT ppx: Heparin

## 2023-03-02 NOTE — PROGRESS NOTE ADULT - ASSESSMENT
62-year-old male with hypertension, CAD status post stent and recurrent rectal adenocarcinoma status post multiple surgeries and most currently on FOLFIRI plus bevacizumab.    1. Recurrent rectal adenocarcinoma -   Most recently  received FOLFIRI plus bevacizumab on 02/15/2023. This is likely the cause of the neutropenia and subsequent infection and decompensation.  No inpatient therapy and will be rediscussed with his [facility] primary oncology team once acute critical issues have resolved.    2. Neutropenic fever/shock - on pressors. ANC has started to improve and is already >1000. Monitor.  If drops again may consider growth factor.  On IV antibiotics.  Management as per primary team    follow up ID recs: on Merrem  Urine Cx neg;  switch to Rocephin as per ID     3. Anemia - due to chemo, shock. s/o transfusion this morning.  Hgb 6.7 on 2/27/23, received 1 unit PRBC transfusion on 2/27/23, today Hb 9.2  Transfuse if hgb <7.0.    4. transaminitis:  ?  Shock liver   -LFTs trending down  - Abd sonogram pending     5. A fib with RVR:  - blood pressure dropped on metoprolol, follow cardiology recommendations   - now in SR     DNR/DNI     Will update [facility] daily      Monroe Albert MD  [facility]  [phone]  62-year-old male with hypertension, CAD status post stent and recurrent rectal adenocarcinoma status post multiple surgeries and most currently on FOLFIRI plus bevacizumab.    1. Recurrent rectal adenocarcinoma -   Most recently  received FOLFIRI plus bevacizumab on 02/15/2023. This is likely the cause of the neutropenia and subsequent infection and decompensation.  No inpatient therapy and will be rediscussed with his [facility] primary oncology team once acute critical issues have resolved.    2. Neutropenic fever/shock - on pressors. ANC has started to improve and is already >1000. Monitor.  If drops again may consider growth factor.  On IV antibiotics.  Management as per primary team    follow up ID recs: on Merrem  Urine Cx neg;  switch to Rocephin as per ID   WBC 13    3. Anemia - due to chemo, shock. s/o transfusion this morning.  Hgb 6.7 on 2/27/23, received 1 unit PRBC transfusion on 2/27/23, today Hb 9.2  Transfuse if hgb <7.0.    4. transaminitis:  ?  Shock liver   -LFTs trending down  - Abd sonogram pending     5. A fib with RVR:  - blood pressure dropped on metoprolol, follow cardiology recommendations   - now in SR     grade I mucositis  - will prescribe magic mouthwash S&S     DNR/DNI     Will update [facility] daily      Monroe Albert MD  [facility]  [phone]

## 2023-03-02 NOTE — CHART NOTE - NSCHARTNOTEFT_GEN_A_CORE
Pt remains in NSR, HR between 90's and 110.   Normotensive, mentation improved.   Stable for transfer to medicine team.   Case discussed w/ Hospitalist Dr. Berry.

## 2023-03-02 NOTE — PROGRESS NOTE ADULT - SUBJECTIVE AND OBJECTIVE BOX
INTERVAL HISTORY:  Pt seen lying in bed in NAD  Denies pain  Denies nausea or SOB    PRESENT SYMPTOMS:     Dyspnea: No  Nausea/Vomiting: No  Anxiety:  No  Depression: No  Fatigue: No  Loss of appetite: No  Constipation: No    PAIN: denies            Character-            Duration-            Effect-            Factors-            Frequency-            Location-            Severity-    REVIEW OF SYSTEMS:   Full review of systems performed and was otherwise negative except as noted above.    MEDICATIONS  (STANDING):  chlorhexidine 2% Cloths 1 Application(s) Topical <User Schedule>  dextrose 50% Injectable 25 Gram(s) IV Push once  dextrose 50% Injectable 12.5 Gram(s) IV Push once  dextrose 50% Injectable 25 Gram(s) IV Push once  dextrose Oral Gel 15 Gram(s) Oral once  FIRST- Mouthwash  BLM 5 milliLiter(s) Swish and Spit four times a day  glucagon  Injectable 1 milliGRAM(s) IntraMuscular once  heparin   Injectable 5000 Unit(s) SubCutaneous every 12 hours  meropenem Injectable 1000 milliGRAM(s) IV Push every 8 hours  metoprolol tartrate 12.5 milliGRAM(s) Oral every 6 hours    MEDICATIONS  (PRN):  ALPRAZolam 0.5 milliGRAM(s) Oral every 6 hours PRN anxiety  HYDROmorphone  Injectable 1.5 milliGRAM(s) IV Push every 4 hours PRN Pain  ondansetron Injectable 4 milliGRAM(s) IV Push every 6 hours PRN Nausea and/or Vomiting    PHYSICAL EXAM:  Vital Signs Last 24 Hrs  T(C): 36.3 (02 Mar 2023 10:00), Max: 37.2 (01 Mar 2023 19:00)  T(F): 97.3 (02 Mar 2023 10:00), Max: 99 (01 Mar 2023 19:00)  HR: 78 (02 Mar 2023 10:00) (78 - 153)  BP: 129/72 (02 Mar 2023 10:00) (95/72 - 145/73)  BP(mean): 95 (02 Mar 2023 04:00) (75 - 99)  RR: 18 (02 Mar 2023 10:00) (18 - 34)  SpO2: 91% (02 Mar 2023 10:00) (91% - 98%)    Parameters below as of 02 Mar 2023 05:30  Patient On (Oxygen Delivery Method): room air    General: Pt lying in bed in [facility]  HEENT: NCAT; MMM  Resp: breathing unlabored; symmetric chest expansion B/L  [facility]: no contractures/deformities  Skin: no rash  Neuro: awake and oriented x 3; no myoclonus  Psych: calm; appropriate speech and affect    LABS:                        9.2    13.15 )-----------( 163      ( 02 Mar 2023 03:59 )             26.3     03-02    132<L>  |  96  |  27.8<H>  ----------------------------<  111<H>  3.9   |  27.0  |  1.02    Ca    7.9<L>      02 Mar 2023 03:59  Phos  2.2     03-02  Mg     2.0     03-02    TPro  5.0<L>  /  Alb  2.3<L>  /  TBili  1.3  /  DBili  x   /  AST  74<H>  /  ALT  402<H>  /  AlkPhos  275<H>  03-02      RADIOLOGY & ADDITIONAL STUDIES:    NEUROLOGIC MEDICATIONS/OPIOIDS/BENZODIAZEPINES IN PAST 24HRS:    ALPRAZolam   0.5 milliGRAM(s) Oral (03-02-23 @ 06:14)   0.5 milliGRAM(s) Oral (03-02-23 @ 00:19)    ADVANCE DIRECTIVES/TREATMENT PREFERENCES:  Code Status: DNR/DNI  MOLST (if not Full Code): yes  HCP/Surrogate: wife Maricel

## 2023-03-02 NOTE — CONSULT NOTE ADULT - CONSULT REASON
BACTEREMIA
Recurrence of rectal cancer
rectal cancer, neutropenic fever
Afib
"advance rectal cancer, extensive tumor burder, now with neutropenic fever, septic shock likely from rectal abscess"
urinary retention

## 2023-03-02 NOTE — PROGRESS NOTE ADULT - SUBJECTIVE AND OBJECTIVE BOX
62-year-old male with many comorbidities including hypertension, hyperlipidemia, CAD status post stent and rectal adenocarcinoma (MMRp) initially diagnosed in 02/2019 (T2/3NxMx) status post chemotherapy followed by chemoradiation and LAR in 2/2020. He developed disease recurrence so he underwent APR on 03/23/2022.  He later developed progression of disease again via a pelvic mass so he has been started on FOLFIRI plus bevacizumab with the last dose being on 02/15/2023.  He undergoes treatment at [facility].  He is now admitted to the hospital with neutropenic shock.  His WBC was 0.49k at presentation. He has been started on IV antibiotics and required pressor support. Received PRBC transfusion on 2/27/23. Today his white blood cell count increased to 8.7  No acute ON events.  Tmax 99; No fever spike ON. Off pressor now.        PAST MEDICAL & SURGICAL HISTORY:  Chest pain  HTN (hypertension)  HLD (hyperlipidemia)  CAD (coronary artery disease)  Mitral regurgitation  Rectal cancer  2/2019 s/p chemo and radiation; recurrence of cancer 2022  Depression  with rectal cancer  Neuropathy  feet s/p chemo  Stented coronary artery  circumflex 2005 LAD 2017 x4  H/O carotid stenosis  H/O renal calculi  DANO (iron deficiency anemia)  Umbilical hernia  Splenic artery aneurysm  H/O cardiac catheterization  2005 2017  4 STENTS  History of CEA (carotid endarterectomy)  right 2019  History of rectal surgery  with ileostomy 2/2020  H/O sigmoidoscopy  x2  History of removal of Port-a-Cath    MEDICATIONS  (STANDING):  chlorhexidine 2% Cloths 1 Application(s) Topical <User Schedule>  dextrose 50% Injectable 25 Gram(s) IV Push once  dextrose 50% Injectable 12.5 Gram(s) IV Push once  dextrose 50% Injectable 25 Gram(s) IV Push once  dextrose Oral Gel 15 Gram(s) Oral once  digoxin  Injectable 250 MICROGram(s) IV Push every 8 hours  glucagon  Injectable 1 milliGRAM(s) IntraMuscular once  heparin   Injectable 5000 Unit(s) SubCutaneous every 12 hours  meropenem Injectable 1000 milliGRAM(s) IV Push every 8 hours  metoprolol tartrate 12.5 milliGRAM(s) Oral every 6 hours    MEDICATIONS  (PRN):  ALPRAZolam 0.5 milliGRAM(s) Oral every 6 hours PRN anxiety  HYDROmorphone  Injectable 1.5 milliGRAM(s) IV Push every 4 hours PRN Pain  ondansetron Injectable 4 milliGRAM(s) IV Push every 6 hours PRN Nausea and/or Vomiting    Vital Signs Last 24 Hrs  T(C): 36.7 (02 Mar 2023 05:30), Max: 37.2 (01 Mar 2023 19:00)  T(F): 98.1 (02 Mar 2023 05:30), Max: 99 (01 Mar 2023 19:00)  HR: 95 (02 Mar 2023 05:30) (94 - 153)  BP: 145/73 (02 Mar 2023 05:30) (95/72 - 145/73)  BP(mean): 95 (02 Mar 2023 04:00) (75 - 99)  RR: 20 (02 Mar 2023 05:30) (20 - 34)  SpO2: 96% (02 Mar 2023 05:30) (93% - 98%)    Parameters below as of 02 Mar 2023 05:30  Patient On (Oxygen Delivery Method): room air        PE  CTAB  rrr  a/o x 3  abd soft    CBC:                            9.2    13.15 )-----------( 163      ( 02 Mar 2023 03:59 )             26.3                   8.8    8.73  )-----------( 132      ( 03-01-23 @ 02:45 )             25.5     Chem:         ( 03-01-23 @ 02:45 )    134<L>  |  97  |  35.9<H>  ----------------------------<  107<H>  3.2<L>   |  28.0  |  1.23      Liver Functions: ( 03-01-23 @ 02:45 )  Alb: 1.8 g/dL / Pro: 4.6 g/dL / ALK PHOS: 79 U/L / ALT: 774 U/L / AST: 184 U/L / GGT: x

## 2023-03-02 NOTE — ADVANCED PRACTICE NURSE CONSULT - ASSESSMENT
Patient and spouse at bedside.  Per spouse, patient had ileostomy changed to colostomy in March 2022 and fistula noted in May 2022.  PTA patient had been receiving care at [facility].  Patient spouse states care was being performed by Dr. Huan Wylie [phone] or [phone], Spouse states that Dr. Wylie was out of options to stop continuous drainage from main fistula site and recommended chemo/radiation to shrink tumor mass that may be pressing into anal cavity and exacerbating drainage from fistula site.  Patient states past attempts using Silver Nitrate to cauterize the wound have failed.  Patient spouse states Dr. Mayer had tried Silver Nitrate in a past admission.      ·	Perianal Fistula 2 sites (proximal and distal)  ·	Proximal - Fistula (0.1x0.1cm) - small opening without drainage, periwound clean dry intact  ·	Distal - Fistula (6.5x0.5cm) - linear opening with tan yellow wound bed, well-defined edges with copious thick, tan/cream drainage.  Periwound clean moist intact

## 2023-03-02 NOTE — PROGRESS NOTE ADULT - ASSESSMENT
63 yo male with h/o rectal CA, LAR, APR, ileostomy, rectal tumor recurrence, urinary retention due to compression from tumor, bacteremia  - cont castillo catheter, do not remove  - pt may f/u as OP with Dr. Calabrese  - cont care as per primary team

## 2023-03-03 LAB
ALBUMIN SERPL ELPH-MCNC: 2.1 G/DL — LOW (ref 3.3–5.2)
ALP SERPL-CCNC: 357 U/L — HIGH (ref 40–120)
ALT FLD-CCNC: 265 U/L — HIGH
ANION GAP SERPL CALC-SCNC: 11 MMOL/L — SIGNIFICANT CHANGE UP (ref 5–17)
AST SERPL-CCNC: 96 U/L — HIGH
BILIRUB SERPL-MCNC: 1 MG/DL — SIGNIFICANT CHANGE UP (ref 0.4–2)
BUN SERPL-MCNC: 19.9 MG/DL — SIGNIFICANT CHANGE UP (ref 8–20)
CALCIUM SERPL-MCNC: 8 MG/DL — LOW (ref 8.4–10.5)
CHLORIDE SERPL-SCNC: 97 MMOL/L — SIGNIFICANT CHANGE UP (ref 96–108)
CO2 SERPL-SCNC: 25 MMOL/L — SIGNIFICANT CHANGE UP (ref 22–29)
CREAT SERPL-MCNC: 0.79 MG/DL — SIGNIFICANT CHANGE UP (ref 0.5–1.3)
EGFR: 99 ML/MIN/1.73M2 — SIGNIFICANT CHANGE UP
GLUCOSE SERPL-MCNC: 107 MG/DL — HIGH (ref 70–99)
HCT VFR BLD CALC: 27.1 % — LOW (ref 39–50)
HGB BLD-MCNC: 8.9 G/DL — LOW (ref 13–17)
MAGNESIUM SERPL-MCNC: 1.8 MG/DL — SIGNIFICANT CHANGE UP (ref 1.6–2.6)
MCHC RBC-ENTMCNC: 30.1 PG — SIGNIFICANT CHANGE UP (ref 27–34)
MCHC RBC-ENTMCNC: 32.8 GM/DL — SIGNIFICANT CHANGE UP (ref 32–36)
MCV RBC AUTO: 91.6 FL — SIGNIFICANT CHANGE UP (ref 80–100)
PHOSPHATE SERPL-MCNC: 3.1 MG/DL — SIGNIFICANT CHANGE UP (ref 2.4–4.7)
PLATELET # BLD AUTO: 210 K/UL — SIGNIFICANT CHANGE UP (ref 150–400)
POTASSIUM SERPL-MCNC: 4.2 MMOL/L — SIGNIFICANT CHANGE UP (ref 3.5–5.3)
POTASSIUM SERPL-SCNC: 4.2 MMOL/L — SIGNIFICANT CHANGE UP (ref 3.5–5.3)
PROT SERPL-MCNC: 4.9 G/DL — LOW (ref 6.6–8.7)
RBC # BLD: 2.96 M/UL — LOW (ref 4.2–5.8)
RBC # FLD: 14.9 % — HIGH (ref 10.3–14.5)
SODIUM SERPL-SCNC: 133 MMOL/L — LOW (ref 135–145)
WBC # BLD: 12.29 K/UL — HIGH (ref 3.8–10.5)
WBC # FLD AUTO: 12.29 K/UL — HIGH (ref 3.8–10.5)

## 2023-03-03 PROCEDURE — 99233 SBSQ HOSP IP/OBS HIGH 50: CPT

## 2023-03-03 PROCEDURE — 99231 SBSQ HOSP IP/OBS SF/LOW 25: CPT

## 2023-03-03 RX ORDER — METOPROLOL TARTRATE 50 MG
75 TABLET ORAL DAILY
Refills: 0 | Status: DISCONTINUED | OUTPATIENT
Start: 2023-03-03 | End: 2023-03-06

## 2023-03-03 RX ORDER — CEFTRIAXONE 500 MG/1
2000 INJECTION, POWDER, FOR SOLUTION INTRAMUSCULAR; INTRAVENOUS EVERY 24 HOURS
Refills: 0 | Status: DISCONTINUED | OUTPATIENT
Start: 2023-03-03 | End: 2023-03-06

## 2023-03-03 RX ORDER — CEFTRIAXONE 500 MG/1
2000 INJECTION, POWDER, FOR SOLUTION INTRAMUSCULAR; INTRAVENOUS EVERY 24 HOURS
Refills: 0 | Status: DISCONTINUED | OUTPATIENT
Start: 2023-03-03 | End: 2023-03-03

## 2023-03-03 RX ORDER — METRONIDAZOLE 500 MG
500 TABLET ORAL EVERY 8 HOURS
Refills: 0 | Status: DISCONTINUED | OUTPATIENT
Start: 2023-03-03 | End: 2023-03-06

## 2023-03-03 RX ADMIN — DIPHENHYDRAMINE HYDROCHLORIDE AND LIDOCAINE HYDROCHLORIDE AND ALUMINUM HYDROXIDE AND MAGNESIUM HYDRO 5 MILLILITER(S): KIT at 21:24

## 2023-03-03 RX ADMIN — Medication 500 MILLIGRAM(S): at 21:23

## 2023-03-03 RX ADMIN — Medication 25 MILLIGRAM(S): at 05:26

## 2023-03-03 RX ADMIN — CEFTRIAXONE 2000 MILLIGRAM(S): 500 INJECTION, POWDER, FOR SOLUTION INTRAMUSCULAR; INTRAVENOUS at 09:52

## 2023-03-03 RX ADMIN — Medication 75 MILLIGRAM(S): at 11:02

## 2023-03-03 RX ADMIN — Medication 0.5 MILLIGRAM(S): at 21:23

## 2023-03-03 RX ADMIN — Medication 0.5 MILLIGRAM(S): at 05:34

## 2023-03-03 RX ADMIN — DIPHENHYDRAMINE HYDROCHLORIDE AND LIDOCAINE HYDROCHLORIDE AND ALUMINUM HYDROXIDE AND MAGNESIUM HYDRO 5 MILLILITER(S): KIT at 17:25

## 2023-03-03 RX ADMIN — CHLORHEXIDINE GLUCONATE 1 APPLICATION(S): 213 SOLUTION TOPICAL at 05:27

## 2023-03-03 RX ADMIN — DIPHENHYDRAMINE HYDROCHLORIDE AND LIDOCAINE HYDROCHLORIDE AND ALUMINUM HYDROXIDE AND MAGNESIUM HYDRO 5 MILLILITER(S): KIT at 11:02

## 2023-03-03 RX ADMIN — Medication 2.5 MILLIGRAM(S): at 08:12

## 2023-03-03 RX ADMIN — HEPARIN SODIUM 5000 UNIT(S): 5000 INJECTION INTRAVENOUS; SUBCUTANEOUS at 05:26

## 2023-03-03 RX ADMIN — DIPHENHYDRAMINE HYDROCHLORIDE AND LIDOCAINE HYDROCHLORIDE AND ALUMINUM HYDROXIDE AND MAGNESIUM HYDRO 5 MILLILITER(S): KIT at 03:26

## 2023-03-03 RX ADMIN — Medication 500 MILLIGRAM(S): at 17:24

## 2023-03-03 RX ADMIN — Medication 2.5 MILLIGRAM(S): at 17:24

## 2023-03-03 RX ADMIN — HEPARIN SODIUM 5000 UNIT(S): 5000 INJECTION INTRAVENOUS; SUBCUTANEOUS at 17:24

## 2023-03-03 RX ADMIN — MEROPENEM 1000 MILLIGRAM(S): 1 INJECTION INTRAVENOUS at 05:26

## 2023-03-03 NOTE — PROGRESS NOTE ADULT - SUBJECTIVE AND OBJECTIVE BOX
INFECTIOUS DISEASES AND INTERNAL MEDICINE at [facility]  =======================================================  Andrea Forde MD  Diplomates American Board of Internal Medicine and Infectious Diseases  Telephone [phone]  Fax            [phone]  =======================================================    PASTORA CHURCHILL [id number]    Follow up: GM NEG BACTEREMIA ECOLI    Allergies:  No Known Allergies      Medications:  chlorhexidine 2% Cloths 1 Application(s) Topical <User Schedule>  dextrose 50% Injectable 25 Gram(s) IV Push once  dextrose 50% Injectable 12.5 Gram(s) IV Push once  dextrose 50% Injectable 25 Gram(s) IV Push once  dextrose Oral Gel 15 Gram(s) Oral once  glucagon  Injectable 1 milliGRAM(s) IntraMuscular once  heparin   Injectable 5000 Unit(s) SubCutaneous every 12 hours  HYDROmorphone  Injectable 1.5 milliGRAM(s) IV Push every 4 hours PRN  insulin lispro (ADMELOG) corrective regimen sliding scale   SubCutaneous every 6 hours  LORazepam   Injectable 0.5 milliGRAM(s) IV Push every 4 hours PRN  meropenem Injectable 1000 milliGRAM(s) IV Push every 8 hours  metoprolol tartrate 12.5 milliGRAM(s) Oral every 6 hours  norepinephrine Infusion 0.05 MICROgram(s)/kG/Min IV Continuous <Continuous>  ondansetron Injectable 4 milliGRAM(s) IV Push every 6 hours PRN  sodium bicarbonate  Infusion 0.167 mEq/kG/Hr IV Continuous <Continuous>    SOCIAL       FAMILY   FAMILY HISTORY:  FH: heart disease  father      REVIEW OF SYSTEMS:  CONSTITUTIONAL:  No Fever or chills  HEENT:   No diplopia or blurred vision.  No earache, sore throat or runny nose.  CARDIOVASCULAR:  No pressure, squeezing, strangling, tightness, heaviness or aching about the chest, neck, axilla or epigastrium.  RESPIRATORY:  No cough, shortness of breath, PND or orthopnea.  GASTROINTESTINAL:  No nausea, vomiting or diarrhea.  GENITOURINARY:  No dysuria, frequency or urgency. No Blood in urine  MUSCULOSKELETAL:   moves all joints  SKIN:  No change in skin, hair or nails.  NEUROLOGIC:  No paresthesias, fasciculations, seizures or weakness.  PSYCHIATRIC:  No disorder of thought or mood.  ENDOCRINE:  No heat or cold intolerance, polyuria or polydipsia.  HEMATOLOGICAL:  No easy bruising or bleeding.            Physical Exam:    Vital Signs Last 24 Hrs  T(C): 36.9 (03 Mar 2023 04:09), Max: 36.9 (03 Mar 2023 04:09)  T(F): 98.4 (03 Mar 2023 04:09), Max: 98.4 (03 Mar 2023 04:09)  HR: 88 (03 Mar 2023 04:09) (85 - 101)  BP: 149/70 (03 Mar 2023 04:09) (125/75 - 159/87)  BP(mean): --  RR: 18 (03 Mar 2023 04:09) (18 - 18)  SpO2: 95% (03 Mar 2023 04:09) (93% - 96%)    Parameters below as of 03 Mar 2023 04:09  Patient On (Oxygen Delivery Method): nasal cannula                GEN: NAD,   HEENT: normocephalic and atraumatic. EOMI. JOLEEN.    NECK: Supple. No carotid bruits.  No lymphadenopathy or thyromegaly.  LUNGS: Clear to auscultation. PORT RIGHT CHEST WALL   HEART: Regular rate and rhythm without murmur.  ABDOMEN: Soft, nontender, and nondistended.  Positive bowel sounds.  OSTOMY   : No CVA tenderness  EXTREMITIES: Without any cyanosis, clubbing, rash, lesions or edema.  MSK: no joint swelling  NEUROLOGIC: Cranial nerves II through XII are grossly intact.  PSYCHIATRIC: Appropriate affect .  SKIN: No ulceration or induration present.        Labs:  Vitals:  =======   t     =======================================================  Current Antibiotics:  rocephin    Other medications:  chlorhexidine 2% Cloths 1 Application(s) Topical <User Schedule>  dextrose 50% Injectable 25 Gram(s) IV Push once  dextrose 50% Injectable 12.5 Gram(s) IV Push once  dextrose 50% Injectable 25 Gram(s) IV Push once  dextrose Oral Gel 15 Gram(s) Oral once  glucagon  Injectable 1 milliGRAM(s) IntraMuscular once  heparin   Injectable 5000 Unit(s) SubCutaneous every 12 hours  insulin lispro (ADMELOG) corrective regimen sliding scale   SubCutaneous every 6 hours  metoprolol tartrate 12.5 milliGRAM(s) Oral every 6 hours  norepinephrine Infusion 0.05 MICROgram(s)/kG/Min IV Continuous <Continuous>  sodium bicarbonate  Infusion 0.167 mEq/kG/Hr IV Continuous <Continuous>      =======================================================  Labs:                                                                 8.9    12.29 )-----------( 210      ( 03 Mar 2023 04:25 )             27.1   03-03    133<L>  |  97  |  19.9  ----------------------------<  107<H>  4.2   |  25.0  |  0.79    Ca    8.0<L>      03 Mar 2023 04:25  Phos  3.1     03-03  Mg     1.8     03-03    TPro  4.9<L>  /  Alb  2.1<L>  /  TBili  1.0  /  DBili  x   /  AST  96<H>  /  ALT  265<H>  /  AlkPhos  357<H>  03-03    1       Culture - Body Fluid with Gram Stain (collected 02-27-23 @ 10:20)  Source: .Body Fluid Draining Rectal Wound  Gram Stain (02-27-23 @ 22:31):    Few polymorphonuclear leukocytes seen per low power field    Numerous Gram positive cocci in pairs seen per oil power field    Culture - Urine (collected 02-26-23 @ 18:50)  Source: Clean Catch Clean Catch (Midstream)  Final Report (02-27-23 @ 21:37):    No growth    Culture - Blood (collected 02-26-23 @ 16:08)  Source: .Blood Blood-Peripheral  Gram Stain (02-28-23 @ 02:16):    Growth in aerobic bottle: Gram Negative Rods    Growth in anaerobic bottle: Gram Negative Rods    Culture - Blood (collected 02-26-23 @ 16:00)  Source: .Blood Blood-Peripheral  Gram Stain (02-27-23 @ 15:07):    Growth in aerobic and anaerobic bottles: Gram Negative Rods  Organism: Blood Culture PCR (02-27-23 @ 15:49)  Organism: Blood Culture PCR (02-27-23 @ 15:49)    Sensitivities:      -  Escherichia coli: Detec      Method Type: PCR      Creatinine, Serum: 1.23 mg/dL (03-01-23 @ 02:45)  Creatinine, Serum: 1.24 mg/dL (02-28-23 @ 19:40)  Creatinine, Serum: 1.26 mg/dL (02-28-23 @ 03:47)  Creatinine, Serum: 1.37 mg/dL (02-27-23 @ 21:56)  Creatinine, Serum: 1.41 mg/dL (02-27-23 @ 12:40)  Creatinine, Serum: 1.41 mg/dL (02-27-23 @ 04:00)  Creatinine, Serum: 1.24 mg/dL (02-26-23 @ 16:08)    Procalcitonin, Serum: >100.00 ng/mL (02-27-23 @ 04:00)    D-Dimer Assay, Quantitative: 2367 ng/mL DDU (02-27-23 @ 08:35)        WBC Count: 8.73 K/uL (03-01-23 @ 02:45)  WBC Count: 3.43 K/uL (02-28-23 @ 03:47)  WBC Count: 2.67 K/uL (02-27-23 @ 21:56)  WBC Count: 1.86 K/uL (02-27-23 @ 12:40)  WBC Count: 1.71 K/uL (02-27-23 @ 04:00)  WBC Count: 0.49 K/uL (02-26-23 @ 16:08)    SARS-CoV-2: NotDetec (02-26-23 @ 16:05)    Lactate Dehydrogenase, Serum: 127 U/L (02-27-23 @ 04:00)    Alkaline Phosphatase, Serum: 79 U/L (03-01-23 @ 02:45)  Alkaline Phosphatase, Serum: 58 U/L (02-28-23 @ 03:47)  Alkaline Phosphatase, Serum: 60 U/L (02-27-23 @ 21:56)  Alkaline Phosphatase, Serum: 54 U/L (02-27-23 @ 12:40)  Alkaline Phosphatase, Serum: 43 U/L (02-27-23 @ 04:00)  Alkaline Phosphatase, Serum: 49 U/L (02-26-23 @ 16:08)  Alanine Aminotransferase (ALT/SGPT): 774 U/L (03-01-23 @ 02:45)  Alanine Aminotransferase (ALT/SGPT): 1659 U/L (02-28-23 @ 03:47)  Alanine Aminotransferase (ALT/SGPT): 1489 U/L (02-27-23 @ 21:56)  Alanine Aminotransferase (ALT/SGPT): 475 U/L (02-27-23 @ 12:40)  Alanine Aminotransferase (ALT/SGPT): 92 U/L (02-27-23 @ 04:00)  Alanine Aminotransferase (ALT/SGPT): 124 U/L (02-26-23 @ 16:08)  Aspartate Aminotransferase (AST/SGOT): 184 U/L (03-01-23 @ 02:45)  Aspartate Aminotransferase (AST/SGOT): 1803 U/L (02-28-23 @ 03:47)  Aspartate Aminotransferase (AST/SGOT): 2276 U/L (02-27-23 @ 21:56)  Aspartate Aminotransferase (AST/SGOT): 653 U/L (02-27-23 @ 12:40)  Aspartate Aminotransferase (AST/SGOT): 64 U/L (02-27-23 @ 04:00)  Aspartate Aminotransferase (AST/SGOT): 106 U/L (02-26-23 @ 16:08)  Bilirubin Total, Serum: 1.6 mg/dL (03-01-23 @ 02:45)  Bilirubin Total, Serum: 2.5 mg/dL (02-28-23 @ 03:47)  Bilirubin Total, Serum: 3.0 mg/dL (02-27-23 @ 21:56)  Bilirubin Total, Serum: 3.5 mg/dL (02-27-23 @ 12:40)  Bilirubin Total, Serum: 2.5 mg/dL (02-27-23 @ 04:00)  Bilirubin Total, Serum: 2.6 mg/dL (02-26-23 @ 16:08)

## 2023-03-03 NOTE — PROGRESS NOTE ADULT - SUBJECTIVE AND OBJECTIVE BOX
62-year-old male with many comorbidities including hypertension, hyperlipidemia, CAD status post stent and rectal adenocarcinoma (MMRp) initially diagnosed in 02/2019 (T2/3NxMx) status post chemotherapy followed by chemoradiation and LAR in 2/2020. He developed disease recurrence so he underwent APR on 03/23/2022.  He later developed progression of disease again via a pelvic mass so he has been started on FOLFIRI plus bevacizumab with the last dose being on 02/15/2023.  He undergoes treatment at [facility].  He is now admitted to the hospital with neutropenic shock.  His WBC was 0.49k at presentation. He has been started on IV antibiotics and required pressor support. Received PRBC transfusion on 2/27/23. Today his white blood cell count increased to 8.7  No acute ON events.    T max 98.4, afebrile, was able to eat a little bit, eager to work with PT  on IV rocephin, castillo in place            MEDICATIONS  (STANDING):  cefTRIAXone Injectable. 2000 milliGRAM(s) IV Push every 24 hours  chlorhexidine 2% Cloths 1 Application(s) Topical <User Schedule>  dextrose 50% Injectable 25 Gram(s) IV Push once  dextrose 50% Injectable 12.5 Gram(s) IV Push once  dextrose 50% Injectable 25 Gram(s) IV Push once  dextrose Oral Gel 15 Gram(s) Oral once  dronabinol 2.5 milliGRAM(s) Oral two times a day before meals  FIRST- Mouthwash  BLM 5 milliLiter(s) Swish and Spit four times a day  glucagon  Injectable 1 milliGRAM(s) IntraMuscular once  heparin   Injectable 5000 Unit(s) SubCutaneous every 12 hours  metoprolol succinate ER 25 milliGRAM(s) Oral two times a day    ICU Vital Signs Last 24 Hrs  T(C): 36.9 (03 Mar 2023 04:09), Max: 36.9 (03 Mar 2023 04:09)  T(F): 98.4 (03 Mar 2023 04:09), Max: 98.4 (03 Mar 2023 04:09)  HR: 88 (03 Mar 2023 04:09) (78 - 101)  BP: 149/70 (03 Mar 2023 04:09) (125/75 - 159/87)  BP(mean): --  ABP: --  ABP(mean): --  RR: 18 (03 Mar 2023 04:09) (18 - 18)  SpO2: 95% (03 Mar 2023 04:09) (91% - 96%)    O2 Parameters below as of 03 Mar 2023 04:09  Patient On (Oxygen Delivery Method): nasal cannula                PE  CTAB  rrr  a/o x 3  abd soft    CBC:                            8.9    12.29 )-----------( 210      ( 03 Mar 2023 04:25 )             27.1                           9.2    13.15 )-----------( 163      ( 02 Mar 2023 03:59 )             26.3                   8.8    8.73  )-----------( 132      ( 03-01-23 @ 02:45 )             25.5     Chem:    03-03    133<L>  |  97  |  19.9  ----------------------------<  107<H>  4.2   |  25.0  |  0.79    Ca    8.0<L>      03 Mar 2023 04:25  Phos  3.1     03-03  Mg     1.8     03-03    TPro  4.9<L>  /  Alb  2.1<L>  /  TBili  1.0  /  DBili  x   /  AST  96<H>  /  ALT  265<H>  /  AlkPhos  357<H>  03-03       ( 03-01-23 @ 02:45 )    134<L>  |  97  |  35.9<H>  ----------------------------<  107<H>  3.2<L>   |  28.0  |  1.23      Liver Functions: ( 03-01-23 @ 02:45 )  Alb: 1.8 g/dL / Pro: 4.6 g/dL / ALK PHOS: 79 U/L / ALT: 774 U/L / AST: 184 U/L / GGT: x

## 2023-03-03 NOTE — PROGRESS NOTE ADULT - ASSESSMENT
63 y/o M with a h/o HTN, HLD, CAD (s/p PCI), rectal adenocarcinoma (diagnosed in 2019, s/p resection and diverting ileostomy creation 2020 followed by chemo and radiotherapy, complicated by local recurrence in sigmoid/rectum, s/p APR of rectum/sigmoid colon with colostomy creation 3/2022, most recent chemo 2/12/23), presents to the ED complaining of fever, SOB, urinary retention, and increased purulent drainage from chronic sacral wound. As per report, the urinary retention has been worsening over the past few weeks as the cancer has been growing and pressing on his bladder and prostate and he has been experiencing suprapubic pain and episodes of overflow incontinence. Found to be febrile, pancytopenic, tachycardic (new onset AF with RVR), and hypotensive despite 3.5L crystalloid bolus. Lactate 6. Started on IV vasopressor therapy. Now developing some confusion. CT C/A/P reveals interval growth of the patient's rectal tumor with apparent anterior contained perforation of the mass extending inferiorly to the perineum. Air in the mass may represent necrosis or fistula to the urinary bladder. Prostate gland is indistinguishable and the posterior aspect of the urinary bladder cannot be  from the mass, concerning for direct invasion of tumor into these structures. Small tubular gas-liquid collection extending from the posterior anus to the intergluteal region may represent small abscess or fistula extending from the necrotic rectal cancer which has extended into the perineum. A new 6 mm right lower lobe pulmonary nodule suspicious for metastasis.   AS ABOVE PT WITH RECTAL CA ON CHEMO AT [facility]   LAST CHEMO 2/14  PT WITH INCREASED FATIGUE AND CONFUSION  ADMITTED HYPOTENSIVE   SHOCK BLOOD CX GM NEG RODS   ECOLI   WBC IS RECOVERED  ON  MERREM       URINE CX NEGATIVE   MOST LIKELY  SOURCE BOWEL   REPEAT BLOOD CX X2 SETS NEGATIVE      FINAL SENSITIVITIES BACK   PAN SENSITIVE  on ROCEPHIN  2gm daily  BLOOD CX ALSO WITH ONE BOTTLE BACTEROIDES  ? Significance  WHEN READY FOR D/C CAN  CHANGE TO AUGMENTIN 875BID  WOULD TREAT FOR  2 WEEKS THROUGH 2/12/23  WILL FOLLOW UP WITH FURTHER RECOMMENDATIONS        63 y/o M with a h/o HTN, HLD, CAD (s/p PCI), rectal adenocarcinoma (diagnosed in 2019, s/p resection and diverting ileostomy creation 2020 followed by chemo and radiotherapy, complicated by local recurrence in sigmoid/rectum, s/p APR of rectum/sigmoid colon with colostomy creation 3/2022, most recent chemo 2/12/23), presents to the ED complaining of fever, SOB, urinary retention, and increased purulent drainage from chronic sacral wound. As per report, the urinary retention has been worsening over the past few weeks as the cancer has been growing and pressing on his bladder and prostate and he has been experiencing suprapubic pain and episodes of overflow incontinence. Found to be febrile, pancytopenic, tachycardic (new onset AF with RVR), and hypotensive despite 3.5L crystalloid bolus. Lactate 6. Started on IV vasopressor therapy. Now developing some confusion. CT C/A/P reveals interval growth of the patient's rectal tumor with apparent anterior contained perforation of the mass extending inferiorly to the perineum. Air in the mass may represent necrosis or fistula to the urinary bladder. Prostate gland is indistinguishable and the posterior aspect of the urinary bladder cannot be  from the mass, concerning for direct invasion of tumor into these structures. Small tubular gas-liquid collection extending from the posterior anus to the intergluteal region may represent small abscess or fistula extending from the necrotic rectal cancer which has extended into the perineum. A new 6 mm right lower lobe pulmonary nodule suspicious for metastasis.   AS ABOVE PT WITH RECTAL CA ON CHEMO AT [facility]   LAST CHEMO 2/14  PT WITH INCREASED FATIGUE AND CONFUSION  ADMITTED HYPOTENSIVE   SHOCK BLOOD CX GM NEG RODS   ECOLI   WBC IS RECOVERED  ON  MERREM       URINE CX NEGATIVE   MOST LIKELY  SOURCE BOWEL   REPEAT BLOOD CX X2 SETS NEGATIVE      FINAL SENSITIVITIES BACK   PAN SENSITIVE  on ROCEPHIN  2gm daily  BLOOD CX ALSO WITH ONE BOTTLE BACTEROIDES  ? Significance  WHEN READY FOR D/C CAN  CHANGE TO AUGMENTIN 875BID  WOULD TREAT FOR  2 WEEKS THROUGH 3/12/23  WILL FOLLOW UP WITH FURTHER RECOMMENDATIONS

## 2023-03-03 NOTE — PROGRESS NOTE ADULT - NS ATTEND AMEND GEN_ALL_CORE FT
The patient has been doing well with castillo.   outpatient followup
Discussed with Dr. Moura as well.   Patient likely does not have urosepsis and bacteremia from GI source.  Continue foely for retention.

## 2023-03-03 NOTE — PROGRESS NOTE ADULT - SUBJECTIVE AND OBJECTIVE BOX
Hospital Day # 4 presented with septic shock    Patient seen and examined at bedside with Dr. Calabrese  awake, alert resting in bed comfortable, no acute distress    Vital Signs Last 24 Hrs  T(C): 36.9 (03 Mar 2023 04:09), Max: 36.9 (03 Mar 2023 04:09)  T(F): 98.4 (03 Mar 2023 04:09), Max: 98.4 (03 Mar 2023 04:09)  HR: 88 (03 Mar 2023 04:09) (78 - 101)  BP: 149/70 (03 Mar 2023 04:09) (125/75 - 159/87)  RR: 18 (03 Mar 2023 04:09) (18 - 18)  SpO2: 95% (03 Mar 2023 04:09) (91% - 96%)      : castillo in place adequate output, urine yellow/clear, denies discomfort    Labs:                         8.9    12.29 )-----------( 210      ( 03 Mar 2023 04:25 )             27.1     Chemistry:  03-03    133<L>  |  97  |  BUN: 19.9  ----------------------------<  107<H>  4.2   |  25.0  | CREAT.:  0.79    Impression: urinary retention, secondary to recurrent rectal cancer.    Plan: Continue care and disposition as per medicine, recommend home with castillo ( leg bag ) follow up with Urology as outpatient.  We will see again upon request.

## 2023-03-03 NOTE — PROGRESS NOTE ADULT - ASSESSMENT
62-year-old male with hypertension, CAD status post stent and recurrent rectal adenocarcinoma status post multiple surgeries and most currently on FOLFIRI plus bevacizumab.    1. Recurrent rectal adenocarcinoma -   Most recently  received FOLFIRI plus bevacizumab on 02/15/2023. This is likely the cause of the neutropenia and subsequent infection and decompensation.  No inpatient therapy and will be rediscussed with his [facility] primary oncology team once acute critical issues have resolved.    2. Neutropenic fever/shock - on pressors. ANC has started to improve and is already >1000. Monitor.  If drops again may consider growth factor.  On IV antibiotics.  Management as per primary team    follow up ID recs: now on Rocephin  Urine Cx neg;  switch to Rocephin as per ID   WBC 13    3. Anemia - due to chemo, shock. s/o transfusion this morning.  Hgb 6.7 on 2/27/23, received 1 unit PRBC transfusion on 2/27/23, today Hb 8.9  Transfuse if hgb <7.0.    4. transaminitis:  ?  Shock liver   -LFTs trending down  - USG EUQ - Increased hepatic echogenicity, suggesting hepatic steatosis and/or fibrosis. Hepatomegaly.    5. A fib with RVR:  - blood pressure dropped on metoprolol, follow cardiology recommendations   - now in SR     grade I mucositis  - will prescribe magic mouthwash S&S     PT eval and ambulation  DNR/DNI     Will update MSK daily        [facility]  [phone]

## 2023-03-03 NOTE — PROGRESS NOTE ADULT - SUBJECTIVE AND OBJECTIVE BOX
[place] [facility]                                   [address] [place] [address]                                                      PHONE: [phone]                                                         FAX: [phone]  [email or web address]  ---------------------------------------------------------------------------------------------------------------------------------    Overnight events/patient complaints:      NAD feeling well today     No Known Allergies    MEDICATIONS  (STANDING):  cefTRIAXone Injectable. 2000 milliGRAM(s) IV Push every 24 hours  chlorhexidine 2% Cloths 1 Application(s) Topical <User Schedule>  dextrose 50% Injectable 25 Gram(s) IV Push once  dextrose 50% Injectable 12.5 Gram(s) IV Push once  dextrose 50% Injectable 25 Gram(s) IV Push once  dextrose Oral Gel 15 Gram(s) Oral once  dronabinol 2.5 milliGRAM(s) Oral two times a day before meals  FIRST- Mouthwash  BLM 5 milliLiter(s) Swish and Spit four times a day  glucagon  Injectable 1 milliGRAM(s) IntraMuscular once  heparin   Injectable 5000 Unit(s) SubCutaneous every 12 hours  metoprolol succinate ER 75 milliGRAM(s) Oral daily    MEDICATIONS  (PRN):  ALPRAZolam 0.5 milliGRAM(s) Oral every 6 hours PRN anxiety  HYDROmorphone  Injectable 1 milliGRAM(s) IV Push every 4 hours PRN Severe Pain (7 - 10)  ondansetron Injectable 4 milliGRAM(s) IV Push every 6 hours PRN Nausea and/or Vomiting      Vital Signs Last 24 Hrs  T(C): 36.9 (03 Mar 2023 04:09), Max: 36.9 (03 Mar 2023 04:09)  T(F): 98.4 (03 Mar 2023 04:09), Max: 98.4 (03 Mar 2023 04:09)  HR: 88 (03 Mar 2023 04:09) (85 - 101)  BP: 149/70 (03 Mar 2023 04:09) (125/75 - 159/87)  BP(mean): --  RR: 18 (03 Mar 2023 04:09) (18 - 18)  SpO2: 95% (03 Mar 2023 04:09) (93% - 96%)    Parameters below as of 03 Mar 2023 04:09  Patient On (Oxygen Delivery Method): nasal cannula      ICU Vital Signs Last 24 Hrs  PASTORA CHURCHILL  I&O's Detail    02 Mar 2023 07:01  -  03 Mar 2023 07:00  --------------------------------------------------------  IN:  Total IN: 0 mL    OUT:    Colostomy (mL): 75 mL    Indwelling Catheter - Urethral (mL): 2750 mL  Total OUT: 2825 mL    Total NET: -2825 mL        I&O's Summary    02 Mar 2023 07:01  -  03 Mar 2023 07:00  --------------------------------------------------------  IN: 0 mL / OUT: 2825 mL / NET: -2825 mL      Drug Dosing Weight  PASTORA CHURCHILL      PHYSICAL EXAM:  General: Appears well developed, alert and cooperative.  HEENT: Head; normocephalic, atraumatic.  Eyes: Pupils reactive, cornea wnl.  Neck: Supple, no nodes adenopathy, no NVD or carotid bruit or thyromegaly.  CARDIOVASCULAR: Normal S1 and S2, 1/6 murmur, rub, gallop or lift.   LUNGS: Decrease BS at the lower bases   ABDOMEN: Soft, nontender without mass or organomegaly. bowel sounds normoactive.  EXTREMITIES: No clubbing, cyanosis or edema. Distal pulses wnl.   SKIN: warm and dry with normal turgor.  NEURO: Alert/oriented x 3/normal motor exam. No pathologic reflexes.    PSYCH: normal affect.        LABS:                        8.9    12.29 )-----------( 210      ( 03 Mar 2023 04:25 )             27.1     03-03    133<L>  |  97  |  19.9  ----------------------------<  107<H>  4.2   |  25.0  |  0.79    Ca    8.0<L>      03 Mar 2023 04:25  Phos  3.1     03-03  Mg     1.8     03-03    TPro  4.9<L>  /  Alb  2.1<L>  /  TBili  1.0  /  DBili  x   /  AST  96<H>  /  ALT  265<H>  /  AlkPhos  357<H>  03-03    PASTORA CHURCHILL            RADIOLOGY & ADDITIONAL STUDIES:    INTERPRETATION OF TELEMETRY (personally reviewed):        < from: TTE Echo Complete w/o Contrast w/ Doppler (02.27.23 @ 11:40) >    Summary:   1. Left ventricular ejection fraction, by visual estimation, is 40 to   45%.   2. Moderately decreased global left ventricular systolic function.   3. The left ventricular diastolic function could not be assessed in this   study.   4. Moderately enlarged right ventricle.   5. Moderately reduced RV systolic function.   6. Mildly enlarged left atrium.   7. Mildly enlarged right atrium.   8. There is no evidence of pericardial effusion.   9. Mild mitral annular calcification.  10. Mild thickening and calcification of the anterior and posterior   mitral valve leaflets.  11. Moderate mitral valve regurgitation.  12. Moderate tricuspid regurgitation.  13. Estimated pulmonary artery systolic pressure is 37.7 mmHg assuming a   right atrial pressure of 15 mmHg, which is consistent with borderline   pulmonary hypertension.  14. Endocardial visualization was enhanced with intravenous echo contrast.    MD Bud Electronically signed on 2/27/2023 at 1:59:56 PM    < end of copied text >      ASSESSMENT AND PLAN:  In summary, PASTORA CHURCHILL is an 62y Male with past medical history significant for of CAD/MI s/p multiple PCIs (most recent in 2017 to LAD), HTN, HLD, right CEA (2019), rectal adenocarcinoma s/p resection with diverting ileostomy (2020) followed by chemo and radiotherapy c/b local recurrence s/p colostomy creation 3/2022 who presents with septic shock/e. coli bacteremia found to have new onset with spontaneous conversion     Refusing long term AC; risk vs  benefits D/W with patient at length    Toprol XL 75 mg daily     TTE reviewed see above

## 2023-03-03 NOTE — PROGRESS NOTE ADULT - SUBJECTIVE AND OBJECTIVE BOX
Patient is a 62y old  Male who presents with a chief complaint of Septic shock (03 Mar 2023 10:30)      INTERVAL HPI/OVERNIGHT EVENTS: pt seen and examined. Reports feeling much better. Does not have insight into his condition. Asking to discharge him home, as " has to go to work"     MEDICATIONS  (STANDING):  cefTRIAXone Injectable. 2000 milliGRAM(s) IV Push every 24 hours  chlorhexidine 2% Cloths 1 Application(s) Topical <User Schedule>  dextrose 50% Injectable 25 Gram(s) IV Push once  dextrose 50% Injectable 12.5 Gram(s) IV Push once  dextrose 50% Injectable 25 Gram(s) IV Push once  dextrose Oral Gel 15 Gram(s) Oral once  dronabinol 2.5 milliGRAM(s) Oral two times a day before meals  FIRST- Mouthwash  BLM 5 milliLiter(s) Swish and Spit four times a day  glucagon  Injectable 1 milliGRAM(s) IntraMuscular once  heparin   Injectable 5000 Unit(s) SubCutaneous every 12 hours  metoprolol succinate ER 75 milliGRAM(s) Oral daily    MEDICATIONS  (PRN):  ALPRAZolam 0.5 milliGRAM(s) Oral every 6 hours PRN anxiety  HYDROmorphone  Injectable 1 milliGRAM(s) IV Push every 4 hours PRN Severe Pain (7 - 10)  ondansetron Injectable 4 milliGRAM(s) IV Push every 6 hours PRN Nausea and/or Vomiting      Allergies    No Known Allergies    Intolerances        REVIEW OF SYSTEMS:  CONSTITUTIONAL: No fever, weight loss, or fatigue  RESPIRATORY: No cough, wheezing, chills or hemoptysis; No shortness of breath  CARDIOVASCULAR: No chest pain, palpitations, dizziness, or leg swelling  GASTROINTESTINAL: No abdominal or epigastric pain. No nausea, vomiting, or hematemesis; No diarrhea or constipation. No melena or hematochezia.  NEUROLOGICAL: No headaches, memory loss, loss of strength, numbness, or tremors  MUSCULOSKELETAL: No joint pain or swelling; No muscle, back, or extremity pain      Vital Signs Last 24 Hrs  T(C): 36.8 (03 Mar 2023 10:00), Max: 36.9 (03 Mar 2023 04:09)  T(F): 98.2 (03 Mar 2023 10:00), Max: 98.4 (03 Mar 2023 04:09)  HR: 88 (03 Mar 2023 10:00) (85 - 101)  BP: 152/67 (03 Mar 2023 10:00) (149/70 - 159/87)  BP(mean): --  RR: 18 (03 Mar 2023 10:00) (18 - 18)  SpO2: 96% (03 Mar 2023 10:00) (95% - 96%)    Parameters below as of 03 Mar 2023 10:00  Patient On (Oxygen Delivery Method): nasal cannula        PHYSICAL EXAM:  GENERAL: NAD, laying in bed   HEAD:  Atraumatic, Normocephalic  EYES: EOMI, PERRLA, conjunctiva and sclera clear  NECK: Supple, No JVD, Normal thyroid  NERVOUS SYSTEM:  Alert & Oriented X3, No gross focal deficits  CHEST/LUNG: Clear to percussion bilaterally; No rales, rhonchi, wheezing, or rubs  HEART: Regular rate and rhythm; No murmurs, rubs, or gallops  ABDOMEN: Soft, Nontender, Nondistended; Bowel sounds present, colostomy   EXTREMITIES:  No clubbing, cyanosis, or edema      LABS:                        8.9    12.29 )-----------( 210      ( 03 Mar 2023 04:25 )             27.1     03-03    133<L>  |  97  |  19.9  ----------------------------<  107<H>  4.2   |  25.0  |  0.79    Ca    8.0<L>      03 Mar 2023 04:25  Phos  3.1     03-03  Mg     1.8     03-03    TPro  4.9<L>  /  Alb  2.1<L>  /  TBili  1.0  /  DBili  x   /  AST  96<H>  /  ALT  265<H>  /  AlkPhos  357<H>  03-03        CAPILLARY BLOOD GLUCOSE          RADIOLOGY & ADDITIONAL TESTS:    Imaging Personally Reviewed:  [ x] YES  [ ] NO    Consultant(s) Notes Reviewed:  [ x] YES  [ ] NO    Care Discussed with Consultants/Other Providers [ ] YES  [ ] NO    Plan of Care discussed with Housestaff [ x]YES [ ] NO

## 2023-03-03 NOTE — PROGRESS NOTE ADULT - ASSESSMENT
Patient is a 61 yo M /w PMH of HTN, CAD s/p PCI, Rectal Adenocarcinoma s/p resection and diverting colostomy presenting with fever, SOB and admitted to MICU for septic shock 2/2 UTI and E. coli bacteremia. ID consulted and patient started on IV antibiotics and vasopressors. CT done and showed "interval growth of the patient's rectal tumor with apparent anterior contained perforation of the mass extending inferiorly to the perineum. Air in the mass may represent necrosis or fistula to the urinary bladder.  Small tubular gas-liquid collection extending from the posterior anus to the intergluteal region may represent small abscess or fistula extending from the necrotic rectal cancer which has extended into the perineum." Patinet was seen by Heme/Onc and Palliative. Patient was made DNR/DNI. Patient's repeat Bcx have been negative. He was also noted to be in AFib w/ RVR. He was started on an Amiodarone drip and then loaded with Digoxin with improvement. Patient was weaned off vasopressors. Patient now deemed appropriate by MICU for downgrade to Medicine.     Sepsis POA 2/2 UTI and E. Coli Bacteremia  Septic encephalopathy  - S/p vasopressors  - ID following  - Repeat BCx: NGTD  - Rectal Cx: E. coli, Gemella morbillorum, Alpha hemolytic strep  - Meropenem switched to Ceftriaxone   - Monitor WBC, fever curve  Get out of bed to chair and PT evaluation     Hx of Rectal Adenocarcinoma s/p resection and diverting ileostomy in 2020  - CT C/A/P: interval growth of the patient's rectal tumor with apparent anterior contained perforation of the mass extending inferiorly to the perineum. Air in the mass may represent necrosis or fistula to the urinary bladder. Prostate gland is indistinguishable and the posterior aspect of the urinary bladder cannot be  from the mass, concerning for direct invasion of tumor into these structures. Small tubular gas-liquid collection extending from the posterior anus to the intergluteal region may represent small abscess or fistula extending from the necrotic rectal cancer which has extended into the perineum. A new 6 mm right lower lobe pulmonary nodule suspicious for metastasis.   - Heme/Onc and Palliative following    AUR  - Continue Vásquez  - Urology consult appreciated  - Outpt follow up with Vásquez    AFib w/ RVR  - Started on Lopressor and Digoxin loading dose by MICU  Digoxin stopped   - TTE - LVEF of 40-45  - Now in SR  - Cardiology consulted today, will follow up recommendations    Cardiomyopathy  - LVEF of 40-45%  - ?stress induced  - Cardio consulted    Hx of CAD  - Holding Plavix due to anemia  - Holding statin due to transaminitis  - Continue BB     Transaminitis  - Improving  - Abdominal US reviewed  - Monitor     Advance Directives: DNR/DNI, Palliative following    DVT ppx: Heparin

## 2023-03-03 NOTE — PROGRESS NOTE ADULT - NS_MD_PANP_GEN_ALL_CORE
Abdomen soft, non-tender, no guarding.
Attending and PA/NP shared services statement (NON-critical care):

## 2023-03-04 LAB
ANION GAP SERPL CALC-SCNC: 10 MMOL/L — SIGNIFICANT CHANGE UP (ref 5–17)
ANISOCYTOSIS BLD QL: SLIGHT — SIGNIFICANT CHANGE UP
BASOPHILS # BLD AUTO: 0 K/UL — SIGNIFICANT CHANGE UP (ref 0–0.2)
BASOPHILS NFR BLD AUTO: 0 % — SIGNIFICANT CHANGE UP (ref 0–2)
BUN SERPL-MCNC: 16.1 MG/DL — SIGNIFICANT CHANGE UP (ref 8–20)
CALCIUM SERPL-MCNC: 8.1 MG/DL — LOW (ref 8.4–10.5)
CHLORIDE SERPL-SCNC: 97 MMOL/L — SIGNIFICANT CHANGE UP (ref 96–108)
CO2 SERPL-SCNC: 25 MMOL/L — SIGNIFICANT CHANGE UP (ref 22–29)
CREAT SERPL-MCNC: 0.82 MG/DL — SIGNIFICANT CHANGE UP (ref 0.5–1.3)
CULTURE RESULTS: SIGNIFICANT CHANGE UP
EGFR: 98 ML/MIN/1.73M2 — SIGNIFICANT CHANGE UP
EOSINOPHIL # BLD AUTO: 0.42 K/UL — SIGNIFICANT CHANGE UP (ref 0–0.5)
EOSINOPHIL NFR BLD AUTO: 3.6 % — SIGNIFICANT CHANGE UP (ref 0–6)
GIANT PLATELETS BLD QL SMEAR: PRESENT — SIGNIFICANT CHANGE UP
GLUCOSE SERPL-MCNC: 101 MG/DL — HIGH (ref 70–99)
HCT VFR BLD CALC: 27.9 % — LOW (ref 39–50)
HGB BLD-MCNC: 9.3 G/DL — LOW (ref 13–17)
HYPOCHROMIA BLD QL: SLIGHT — SIGNIFICANT CHANGE UP
LYMPHOCYTES # BLD AUTO: 0.51 K/UL — LOW (ref 1–3.3)
LYMPHOCYTES # BLD AUTO: 4.4 % — LOW (ref 13–44)
MACROCYTES BLD QL: SLIGHT — SIGNIFICANT CHANGE UP
MAGNESIUM SERPL-MCNC: 1.6 MG/DL — SIGNIFICANT CHANGE UP (ref 1.6–2.6)
MANUAL SMEAR VERIFICATION: SIGNIFICANT CHANGE UP
MCHC RBC-ENTMCNC: 31 PG — SIGNIFICANT CHANGE UP (ref 27–34)
MCHC RBC-ENTMCNC: 33.3 GM/DL — SIGNIFICANT CHANGE UP (ref 32–36)
MCV RBC AUTO: 93 FL — SIGNIFICANT CHANGE UP (ref 80–100)
METAMYELOCYTES # FLD: 0.9 % — HIGH (ref 0–0)
MICROCYTES BLD QL: SLIGHT — SIGNIFICANT CHANGE UP
MONOCYTES # BLD AUTO: 0.52 K/UL — SIGNIFICANT CHANGE UP (ref 0–0.9)
MONOCYTES NFR BLD AUTO: 4.5 % — SIGNIFICANT CHANGE UP (ref 2–14)
NEUTROPHILS # BLD AUTO: 9.93 K/UL — HIGH (ref 1.8–7.4)
NEUTROPHILS NFR BLD AUTO: 84.8 % — HIGH (ref 43–77)
NEUTS BAND # BLD: 0.9 % — SIGNIFICANT CHANGE UP (ref 0–8)
ORGANISM # SPEC MICROSCOPIC CNT: SIGNIFICANT CHANGE UP
ORGANISM # SPEC MICROSCOPIC CNT: SIGNIFICANT CHANGE UP
PLAT MORPH BLD: NORMAL — SIGNIFICANT CHANGE UP
PLATELET # BLD AUTO: 262 K/UL — SIGNIFICANT CHANGE UP (ref 150–400)
POLYCHROMASIA BLD QL SMEAR: SIGNIFICANT CHANGE UP
POTASSIUM SERPL-MCNC: 4.2 MMOL/L — SIGNIFICANT CHANGE UP (ref 3.5–5.3)
POTASSIUM SERPL-SCNC: 4.2 MMOL/L — SIGNIFICANT CHANGE UP (ref 3.5–5.3)
RBC # BLD: 3 M/UL — LOW (ref 4.2–5.8)
RBC # FLD: 15 % — HIGH (ref 10.3–14.5)
RBC BLD AUTO: ABNORMAL
SODIUM SERPL-SCNC: 132 MMOL/L — LOW (ref 135–145)
SPECIMEN SOURCE: SIGNIFICANT CHANGE UP
VARIANT LYMPHS # BLD: 0.9 % — SIGNIFICANT CHANGE UP (ref 0–6)
WBC # BLD: 11.59 K/UL — HIGH (ref 3.8–10.5)
WBC # FLD AUTO: 11.59 K/UL — HIGH (ref 3.8–10.5)

## 2023-03-04 PROCEDURE — 99233 SBSQ HOSP IP/OBS HIGH 50: CPT

## 2023-03-04 RX ORDER — MAGNESIUM OXIDE 400 MG ORAL TABLET 241.3 MG
400 TABLET ORAL
Refills: 0 | Status: DISCONTINUED | OUTPATIENT
Start: 2023-03-04 | End: 2023-03-06

## 2023-03-04 RX ORDER — MAGNESIUM OXIDE 400 MG ORAL TABLET 241.3 MG
500 TABLET ORAL
Refills: 0 | Status: DISCONTINUED | OUTPATIENT
Start: 2023-03-04 | End: 2023-03-04

## 2023-03-04 RX ADMIN — Medication 0.5 MILLIGRAM(S): at 22:52

## 2023-03-04 RX ADMIN — CEFTRIAXONE 2000 MILLIGRAM(S): 500 INJECTION, POWDER, FOR SOLUTION INTRAMUSCULAR; INTRAVENOUS at 09:27

## 2023-03-04 RX ADMIN — Medication 500 MILLIGRAM(S): at 22:52

## 2023-03-04 RX ADMIN — DIPHENHYDRAMINE HYDROCHLORIDE AND LIDOCAINE HYDROCHLORIDE AND ALUMINUM HYDROXIDE AND MAGNESIUM HYDRO 5 MILLILITER(S): KIT at 23:36

## 2023-03-04 RX ADMIN — Medication 75 MILLIGRAM(S): at 06:44

## 2023-03-04 RX ADMIN — Medication 500 MILLIGRAM(S): at 13:43

## 2023-03-04 RX ADMIN — DIPHENHYDRAMINE HYDROCHLORIDE AND LIDOCAINE HYDROCHLORIDE AND ALUMINUM HYDROXIDE AND MAGNESIUM HYDRO 5 MILLILITER(S): KIT at 17:34

## 2023-03-04 RX ADMIN — MAGNESIUM OXIDE 400 MG ORAL TABLET 400 MILLIGRAM(S): 241.3 TABLET ORAL at 17:35

## 2023-03-04 RX ADMIN — HEPARIN SODIUM 5000 UNIT(S): 5000 INJECTION INTRAVENOUS; SUBCUTANEOUS at 17:34

## 2023-03-04 RX ADMIN — HEPARIN SODIUM 5000 UNIT(S): 5000 INJECTION INTRAVENOUS; SUBCUTANEOUS at 06:44

## 2023-03-04 RX ADMIN — Medication 2.5 MILLIGRAM(S): at 17:35

## 2023-03-04 RX ADMIN — DIPHENHYDRAMINE HYDROCHLORIDE AND LIDOCAINE HYDROCHLORIDE AND ALUMINUM HYDROXIDE AND MAGNESIUM HYDRO 5 MILLILITER(S): KIT at 13:43

## 2023-03-04 RX ADMIN — Medication 2.5 MILLIGRAM(S): at 09:27

## 2023-03-04 RX ADMIN — Medication 500 MILLIGRAM(S): at 06:44

## 2023-03-04 RX ADMIN — DIPHENHYDRAMINE HYDROCHLORIDE AND LIDOCAINE HYDROCHLORIDE AND ALUMINUM HYDROXIDE AND MAGNESIUM HYDRO 5 MILLILITER(S): KIT at 06:44

## 2023-03-04 NOTE — PROGRESS NOTE ADULT - ASSESSMENT
62-year-old male with hypertension, CAD status post stent and recurrent rectal adenocarcinoma status post multiple surgeries and most currently on FOLFIRI plus bevacizumab.    1. Recurrent rectal adenocarcinoma -   Most recently  received FOLFIRI plus bevacizumab on 02/15/2023. This is likely the cause of the neutropenia and subsequent infection and decompensation.  No inpatient therapy and will be rediscussed with his [facility] primary oncology team once acute critical issues have resolved.  has appt this upcoming week    2. Neutropenic fever/shock - on pressors. ANC has started to improve and is already >1000. Monitor.    On IV antibiotics. Cleared to start po Ab  Management as per primary team    follow up ID recs: now on Rocephin  Urine Cx neg;  switch to Rocephin as per ID   WBC 11.59    3. Anemia - due to chemo, shock. s/o transfusion this morning.  Hgb 6.7 on 2/27/23, received 1 unit PRBC transfusion on 2/27/23, today Hb 9.3   Transfuse if hgb <7.0.    4. transaminitis:  ?  Shock liver   -LFTs trending down  - USG EUQ - Increased hepatic echogenicity, suggesting hepatic steatosis and/or fibrosis. Hepatomegaly.    5. A fib with RVR:  - blood pressure dropped on metoprolol, follow cardiology recommendations   - now in SR     grade I mucositis; resolved  - will prescribe magic mouthwash S&S     PT eval and ambulation  DNR/DNI     Will update MSK daily        [facility]  [phone]

## 2023-03-04 NOTE — PROGRESS NOTE ADULT - SUBJECTIVE AND OBJECTIVE BOX
[facility] Division of Hospital Medicine    Chief Complaint:  Septic shock     SUBJECTIVE / OVERNIGHT EVENTS:  Pt reports he feels, asking to go home tomorrow   Wife at bedside     Patient denies chest pain, SOB, abd pain, N/V, fever, chills, dysuria or any other complaints. All remainder ROS negative.     MEDICATIONS  (STANDING):  cefTRIAXone Injectable. 2000 milliGRAM(s) IV Push every 24 hours  chlorhexidine 2% Cloths 1 Application(s) Topical <User Schedule>  dextrose 50% Injectable 25 Gram(s) IV Push once  dextrose 50% Injectable 12.5 Gram(s) IV Push once  dextrose 50% Injectable 25 Gram(s) IV Push once  dextrose Oral Gel 15 Gram(s) Oral once  dronabinol 2.5 milliGRAM(s) Oral two times a day before meals  FIRST- Mouthwash  BLM 5 milliLiter(s) Swish and Spit four times a day  glucagon  Injectable 1 milliGRAM(s) IntraMuscular once  heparin   Injectable 5000 Unit(s) SubCutaneous every 12 hours  magnesium oxide 400 milliGRAM(s) Oral three times a day with meals  metoprolol succinate ER 75 milliGRAM(s) Oral daily  metroNIDAZOLE    Tablet 500 milliGRAM(s) Oral every 8 hours    MEDICATIONS  (PRN):  ALPRAZolam 0.5 milliGRAM(s) Oral every 6 hours PRN anxiety  HYDROmorphone  Injectable 1 milliGRAM(s) IV Push every 4 hours PRN Severe Pain (7 - 10)  ondansetron Injectable 4 milliGRAM(s) IV Push every 6 hours PRN Nausea and/or Vomiting        I&O's Summary    03 Mar 2023 07:01  -  04 Mar 2023 07:00  --------------------------------------------------------  IN: 0 mL / OUT: 2500 mL / NET: -2500 mL    04 Mar 2023 07:01  -  04 Mar 2023 15:17  --------------------------------------------------------  IN: 0 mL / OUT: 550 mL / NET: -550 mL        PHYSICAL EXAM:  Vital Signs Last 24 Hrs  T(C): 36.6 (04 Mar 2023 14:00), Max: 37.1 (03 Mar 2023 20:48)  T(F): 97.9 (04 Mar 2023 14:00), Max: 98.7 (03 Mar 2023 20:48)  HR: 96 (04 Mar 2023 14:00) (93 - 96)  BP: 140/84 (04 Mar 2023 14:00) (140/84 - 157/79)  BP(mean): --  RR: 18 (04 Mar 2023 14:00) (16 - 18)  SpO2: 100% (04 Mar 2023 14:00) (94% - 100%)    Parameters below as of 04 Mar 2023 14:00  Patient On (Oxygen Delivery Method): room air          CONSTITUTIONAL: NAD, lying in bed  ENMT: Moist oral mucosa, EOMI   RESPIRATORY: Normal respiratory effort; lungs are clear to auscultation bilaterally  CARDIOVASCULAR: Regular rate and rhythm, normal S1 and S2, No lower extremity edema  ABDOMEN: Nontender to palpation, normoactive bowel sounds, +colostomy bag   : +Vásquez   MUSCULOSKELETAL:  No clubbing or cyanosis of digits   PSYCH: A+O to person, place, and time; affect appropriate  NEUROLOGY: No gross sensory or motor deficits   SKIN: warm and dry       LABS:                        9.3    11.59 )-----------( 262      ( 04 Mar 2023 05:28 )             27.9     03-04    132<L>  |  97  |  16.1  ----------------------------<  101<H>  4.2   |  25.0  |  0.82    Ca    8.1<L>      04 Mar 2023 05:28  Phos  3.1     03-03  Mg     1.6     03-04    TPro  4.9<L>  /  Alb  2.1<L>  /  TBili  1.0  /  DBili  x   /  AST  96<H>  /  ALT  265<H>  /  AlkPhos  357<H>  03-03

## 2023-03-04 NOTE — PROGRESS NOTE ADULT - SUBJECTIVE AND OBJECTIVE BOX
62-year-old male with many comorbidities including hypertension, hyperlipidemia, CAD status post stent and rectal adenocarcinoma (MMRp) initially diagnosed in 02/2019 (T2/3NxMx) status post chemotherapy followed by chemoradiation and LAR in 2/2020. He developed disease recurrence so he underwent APR on 03/23/2022.  He later developed progression of disease again via a pelvic mass so he has been started on FOLFIRI plus bevacizumab with the last dose being on 02/15/2023.  He undergoes treatment at [facility].  He is now admitted to the hospital with neutropenic shock.  His WBC was 0.49k at presentation. He has been started on IV antibiotics and required pressor support. Received PRBC transfusion on 2/27/23. Today his white blood cell count increased to 11.59  No acute ON events.    T max 98.4, afebrile, was able to eat a little bit, eager to work with PT; has been OOB, walking with walker and in chair  on IV rocephin, castillo in place; cleared by ID to begin po Ab            MEDICATIONS  (STANDING):  cefTRIAXone Injectable. 2000 milliGRAM(s) IV Push every 24 hours  chlorhexidine 2% Cloths 1 Application(s) Topical <User Schedule>  dextrose 50% Injectable 25 Gram(s) IV Push once  dextrose 50% Injectable 12.5 Gram(s) IV Push once  dextrose 50% Injectable 25 Gram(s) IV Push once  dextrose Oral Gel 15 Gram(s) Oral once  dronabinol 2.5 milliGRAM(s) Oral two times a day before meals  FIRST- Mouthwash  BLM 5 milliLiter(s) Swish and Spit four times a day  glucagon  Injectable 1 milliGRAM(s) IntraMuscular once  heparin   Injectable 5000 Unit(s) SubCutaneous every 12 hours  metoprolol succinate ER 25 milliGRAM(s) Oral two times a day    ICU Vital Signs Last 24 Hrs  T(C): 36.9 (03 Mar 2023 04:09), Max: 36.9 (03 Mar 2023 04:09)  T(F): 98.4 (03 Mar 2023 04:09), Max: 98.4 (03 Mar 2023 04:09)  HR: 88 (03 Mar 2023 04:09) (78 - 101)  BP: 149/70 (03 Mar 2023 04:09) (125/75 - 159/87)  BP(mean): --  ABP: --  ABP(mean): --  RR: 18 (03 Mar 2023 04:09) (18 - 18)  SpO2: 95% (03 Mar 2023 04:09) (91% - 96%)    O2 Parameters below as of 03 Mar 2023 04:09  Patient On (Oxygen Delivery Method): nasal cannula  Remains afebrile, /84, pulse 100, RR 18              PE  CTAB  rrr  a/o x 3  abd soft  Extrem no edema    CBC:         11.59, H/H 9.3/27.9, plt 262,000 84% polys                   8.9    12.29 )-----------( 210      ( 03 Mar 2023 04:25 )             27.1                           9.2    13.15 )-----------( 163      ( 02 Mar 2023 03:59 )             26.3                   8.8    8.73  )-----------( 132      ( 03-01-23 @ 02:45 )             25.5     Chem:    03-03    133<L>  |  97  |  19.9  ----------------------------<  107<H>  4.2   |  25.0  |  0.79    Ca    8.0<L>      03 Mar 2023 04:25  Phos  3.1     03-03  Mg     1.8     03-03    TPro  4.9<L>  /  Alb  2.1<L>  /  TBili  1.0  /  DBili  x   /  AST  96<H>  /  ALT  265<H>  /  AlkPhos  357<H>  03-03       ( 03-01-23 @ 02:45 )    134<L>  |  97  |  35.9<H>  ----------------------------<  107<H>  3.2<L>   |  28.0  |  1.23      Liver Functions: ( 03-01-23 @ 02:45 )  Alb: 1.8 g/dL / Pro: 4.6 g/dL / ALK PHOS: 79 U/L / ALT: 774 U/L / AST: 184 U/L / GGT: x

## 2023-03-04 NOTE — PROGRESS NOTE ADULT - ASSESSMENT
63 yo M /w PMH of HTN, CAD s/p PCI, Rectal Adenocarcinoma s/p resection and diverting colostomy presenting with fever, SOB and admitted to MICU for septic shock 2/2 UTI and E. coli bacteremia. ID consulted and patient started on IV antibiotics and vasopressors. CT done and showed "interval growth of the patient's rectal tumor with apparent anterior contained perforation of the mass extending inferiorly to the perineum. Air in the mass may represent necrosis or fistula to the urinary bladder.  Small tubular gas-liquid collection extending from the posterior anus to the intergluteal region may represent small abscess or fistula extending from the necrotic rectal cancer which has extended into the perineum." Patinet was seen by Heme/Onc and Palliative. Patient was made DNR/DNI. Patient's repeat Bcx have been negative. He was also noted to be in AFib w/ RVR. He was started on an Amiodarone drip and then loaded with Digoxin with improvement. Patient was weaned off vasopressors. Patient now deemed appropriate by MICU for downgrade to Medicine.     Sepsis POA 2/2 UTI and E. Coli Bacteremia  Septic encephalopathy  - S/p vasopressors  - ID following  - Repeat BCx: NGTD  - Rectal Cx: E. coli, Gemella morbillorum, Alpha hemolytic strep  - Meropenem switched to Ceftriaxone   - plan to switch to Augmentin on DC through 3/12/23   - PT eval     Hx of Rectal Adenocarcinoma s/p resection and diverting ileostomy in 2020  - CT C/A/P: interval growth of the patient's rectal tumor with apparent anterior contained perforation of the mass extending inferiorly to the perineum. Air in the mass may represent necrosis or fistula to the urinary bladder. Prostate gland is indistinguishable and the posterior aspect of the urinary bladder cannot be  from the mass, concerning for direct invasion of tumor into these structures. Small tubular gas-liquid collection extending from the posterior anus to the intergluteal region may represent small abscess or fistula extending from the necrotic rectal cancer which has extended into the perineum. A new 6 mm right lower lobe pulmonary nodule suspicious for metastasis.   - Heme/Onc and Palliative following    AUR  - Continue Vásquez  - Urology consult appreciated  - Outpt follow up with Vásquez    AFib w/ RVR  - c/w Toprol XL   - Digoxin stopped   - TTE - LVEF of 40-45  - Now in SR  - Cardiology consult appreciated, pt refusing long term AC     Cardiomyopathy  - LVEF of 40-45%  - ?stress induced  - needs outpt Cardio follow up   - Cardio consult appreciated     Hx of CAD  - Holding Plavix due to anemia  - Holding statin due to transaminitis  - Continue BB     Transaminitis  - Improving  - Abdominal US reviewed  - Monitor     Advance Directives: DNR/DNI, Palliative following    DVT ppx: Heparin   Dispo; acute, DC planning for early next week

## 2023-03-05 ENCOUNTER — TRANSCRIPTION ENCOUNTER (OUTPATIENT)
Age: 63
End: 2023-03-05

## 2023-03-05 LAB
ANION GAP SERPL CALC-SCNC: 10 MMOL/L — SIGNIFICANT CHANGE UP (ref 5–17)
BASOPHILS # BLD AUTO: 0.05 K/UL — SIGNIFICANT CHANGE UP (ref 0–0.2)
BASOPHILS NFR BLD AUTO: 0.5 % — SIGNIFICANT CHANGE UP (ref 0–2)
BUN SERPL-MCNC: 16.1 MG/DL — SIGNIFICANT CHANGE UP (ref 8–20)
CALCIUM SERPL-MCNC: 8.2 MG/DL — LOW (ref 8.4–10.5)
CHLORIDE SERPL-SCNC: 97 MMOL/L — SIGNIFICANT CHANGE UP (ref 96–108)
CO2 SERPL-SCNC: 24 MMOL/L — SIGNIFICANT CHANGE UP (ref 22–29)
CREAT SERPL-MCNC: 0.75 MG/DL — SIGNIFICANT CHANGE UP (ref 0.5–1.3)
CULTURE RESULTS: SIGNIFICANT CHANGE UP
CULTURE RESULTS: SIGNIFICANT CHANGE UP
EGFR: 101 ML/MIN/1.73M2 — SIGNIFICANT CHANGE UP
EOSINOPHIL # BLD AUTO: 0.09 K/UL — SIGNIFICANT CHANGE UP (ref 0–0.5)
EOSINOPHIL NFR BLD AUTO: 0.9 % — SIGNIFICANT CHANGE UP (ref 0–6)
GLUCOSE SERPL-MCNC: 101 MG/DL — HIGH (ref 70–99)
HCT VFR BLD CALC: 26 % — LOW (ref 39–50)
HGB BLD-MCNC: 8.6 G/DL — LOW (ref 13–17)
IMM GRANULOCYTES NFR BLD AUTO: 6.9 % — HIGH (ref 0–0.9)
LYMPHOCYTES # BLD AUTO: 1.13 K/UL — SIGNIFICANT CHANGE UP (ref 1–3.3)
LYMPHOCYTES # BLD AUTO: 11.1 % — LOW (ref 13–44)
MCHC RBC-ENTMCNC: 30.4 PG — SIGNIFICANT CHANGE UP (ref 27–34)
MCHC RBC-ENTMCNC: 33.1 GM/DL — SIGNIFICANT CHANGE UP (ref 32–36)
MCV RBC AUTO: 91.9 FL — SIGNIFICANT CHANGE UP (ref 80–100)
MONOCYTES # BLD AUTO: 0.93 K/UL — HIGH (ref 0–0.9)
MONOCYTES NFR BLD AUTO: 9.1 % — SIGNIFICANT CHANGE UP (ref 2–14)
NEUTROPHILS # BLD AUTO: 7.31 K/UL — SIGNIFICANT CHANGE UP (ref 1.8–7.4)
NEUTROPHILS NFR BLD AUTO: 71.5 % — SIGNIFICANT CHANGE UP (ref 43–77)
PLATELET # BLD AUTO: 325 K/UL — SIGNIFICANT CHANGE UP (ref 150–400)
POTASSIUM SERPL-MCNC: 4.1 MMOL/L — SIGNIFICANT CHANGE UP (ref 3.5–5.3)
POTASSIUM SERPL-SCNC: 4.1 MMOL/L — SIGNIFICANT CHANGE UP (ref 3.5–5.3)
RBC # BLD: 2.83 M/UL — LOW (ref 4.2–5.8)
RBC # FLD: 14.9 % — HIGH (ref 10.3–14.5)
SODIUM SERPL-SCNC: 131 MMOL/L — LOW (ref 135–145)
SPECIMEN SOURCE: SIGNIFICANT CHANGE UP
SPECIMEN SOURCE: SIGNIFICANT CHANGE UP
WBC # BLD: 10.21 K/UL — SIGNIFICANT CHANGE UP (ref 3.8–10.5)
WBC # FLD AUTO: 10.21 K/UL — SIGNIFICANT CHANGE UP (ref 3.8–10.5)

## 2023-03-05 PROCEDURE — 99232 SBSQ HOSP IP/OBS MODERATE 35: CPT

## 2023-03-05 RX ADMIN — Medication 2.5 MILLIGRAM(S): at 06:30

## 2023-03-05 RX ADMIN — MAGNESIUM OXIDE 400 MG ORAL TABLET 400 MILLIGRAM(S): 241.3 TABLET ORAL at 08:19

## 2023-03-05 RX ADMIN — Medication 500 MILLIGRAM(S): at 14:11

## 2023-03-05 RX ADMIN — Medication 500 MILLIGRAM(S): at 21:22

## 2023-03-05 RX ADMIN — HEPARIN SODIUM 5000 UNIT(S): 5000 INJECTION INTRAVENOUS; SUBCUTANEOUS at 17:43

## 2023-03-05 RX ADMIN — CEFTRIAXONE 2000 MILLIGRAM(S): 500 INJECTION, POWDER, FOR SOLUTION INTRAMUSCULAR; INTRAVENOUS at 11:08

## 2023-03-05 RX ADMIN — DIPHENHYDRAMINE HYDROCHLORIDE AND LIDOCAINE HYDROCHLORIDE AND ALUMINUM HYDROXIDE AND MAGNESIUM HYDRO 5 MILLILITER(S): KIT at 17:43

## 2023-03-05 RX ADMIN — Medication 2.5 MILLIGRAM(S): at 16:38

## 2023-03-05 RX ADMIN — MAGNESIUM OXIDE 400 MG ORAL TABLET 400 MILLIGRAM(S): 241.3 TABLET ORAL at 12:35

## 2023-03-05 RX ADMIN — Medication 500 MILLIGRAM(S): at 05:21

## 2023-03-05 RX ADMIN — MAGNESIUM OXIDE 400 MG ORAL TABLET 400 MILLIGRAM(S): 241.3 TABLET ORAL at 17:43

## 2023-03-05 RX ADMIN — Medication 0.5 MILLIGRAM(S): at 21:24

## 2023-03-05 RX ADMIN — HEPARIN SODIUM 5000 UNIT(S): 5000 INJECTION INTRAVENOUS; SUBCUTANEOUS at 05:21

## 2023-03-05 RX ADMIN — DIPHENHYDRAMINE HYDROCHLORIDE AND LIDOCAINE HYDROCHLORIDE AND ALUMINUM HYDROXIDE AND MAGNESIUM HYDRO 5 MILLILITER(S): KIT at 11:09

## 2023-03-05 RX ADMIN — CHLORHEXIDINE GLUCONATE 1 APPLICATION(S): 213 SOLUTION TOPICAL at 05:22

## 2023-03-05 RX ADMIN — DIPHENHYDRAMINE HYDROCHLORIDE AND LIDOCAINE HYDROCHLORIDE AND ALUMINUM HYDROXIDE AND MAGNESIUM HYDRO 5 MILLILITER(S): KIT at 05:21

## 2023-03-05 RX ADMIN — Medication 75 MILLIGRAM(S): at 05:21

## 2023-03-05 NOTE — PROGRESS NOTE ADULT - SUBJECTIVE AND OBJECTIVE BOX
[facility] Division of Hospital Medicine    Chief Complaint:  Septic shock     SUBJECTIVE / OVERNIGHT EVENTS:  Pt says he feels better   Wants to go home tomorrow    Patient denies chest pain, SOB, abd pain, N/V, fever, chills, dysuria or any other complaints. All remainder ROS negative.     MEDICATIONS  (STANDING):  cefTRIAXone Injectable. 2000 milliGRAM(s) IV Push every 24 hours  chlorhexidine 2% Cloths 1 Application(s) Topical <User Schedule>  dextrose 50% Injectable 25 Gram(s) IV Push once  dextrose 50% Injectable 12.5 Gram(s) IV Push once  dextrose 50% Injectable 25 Gram(s) IV Push once  dextrose Oral Gel 15 Gram(s) Oral once  dronabinol 2.5 milliGRAM(s) Oral two times a day before meals  FIRST- Mouthwash  BLM 5 milliLiter(s) Swish and Spit four times a day  glucagon  Injectable 1 milliGRAM(s) IntraMuscular once  heparin   Injectable 5000 Unit(s) SubCutaneous every 12 hours  magnesium oxide 400 milliGRAM(s) Oral three times a day with meals  metoprolol succinate ER 75 milliGRAM(s) Oral daily  metroNIDAZOLE    Tablet 500 milliGRAM(s) Oral every 8 hours    MEDICATIONS  (PRN):  ALPRAZolam 0.5 milliGRAM(s) Oral every 6 hours PRN anxiety  HYDROmorphone  Injectable 1 milliGRAM(s) IV Push every 4 hours PRN Severe Pain (7 - 10)  ondansetron Injectable 4 milliGRAM(s) IV Push every 6 hours PRN Nausea and/or Vomiting        I&O's Summary    04 Mar 2023 07:01  -  05 Mar 2023 07:00  --------------------------------------------------------  IN: 0 mL / OUT: 1200 mL / NET: -1200 mL    05 Mar 2023 07:01  -  05 Mar 2023 12:48  --------------------------------------------------------  IN: 0 mL / OUT: 1400 mL / NET: -1400 mL        PHYSICAL EXAM:  Vital Signs Last 24 Hrs  T(C): 36.5 (05 Mar 2023 09:52), Max: 37.4 (04 Mar 2023 17:04)  T(F): 97.7 (05 Mar 2023 09:52), Max: 99.4 (04 Mar 2023 17:04)  HR: 91 (05 Mar 2023 09:52) (91 - 100)  BP: 146/74 (05 Mar 2023 09:52) (140/84 - 159/77)  BP(mean): --  RR: 16 (05 Mar 2023 09:52) (16 - 19)  SpO2: 92% (05 Mar 2023 09:52) (92% - 100%)    Parameters below as of 05 Mar 2023 09:52  Patient On (Oxygen Delivery Method): room air          CONSTITUTIONAL: NAD, lying in bed  ENMT: Moist oral mucosa, EOMI   RESPIRATORY: Normal respiratory effort; lungs are clear to auscultation bilaterally  CARDIOVASCULAR: Regular rate and rhythm, normal S1 and S2, No lower extremity edema  ABDOMEN: Nontender to palpation, normoactive bowel sounds, +colostomy bag   : +Vásquez   MUSCULOSKELETAL:  No clubbing or cyanosis of digits   PSYCH: A+O to person, place, and time; affect appropriate  NEUROLOGY: No gross sensory or motor deficits   SKIN: warm and dry       LABS:                        8.6    10.21 )-----------( 325      ( 05 Mar 2023 06:12 )             26.0     03-05    131<L>  |  97  |  16.1  ----------------------------<  101<H>  4.1   |  24.0  |  0.75    Ca    8.2<L>      05 Mar 2023 06:12  Mg     1.6     03-04

## 2023-03-05 NOTE — DISCHARGE NOTE PROVIDER - NSDCFUADDINST_GEN_ALL_CORE_FT
Local Vásquez care  [facility] V[facility]   Flush Vásquez with 100 cc saline daily Local Vásquez care   Flush Vásquez with 10 cc saline daily

## 2023-03-05 NOTE — DISCHARGE NOTE PROVIDER - CARE PROVIDERS DIRECT ADDRESSES
,DirectAddress_Unknown,[email or web address],DirectAddress_Unknown

## 2023-03-05 NOTE — DISCHARGE NOTE PROVIDER - NSDCMRMEDTOKEN_GEN_ALL_CORE_FT
ALPRAZolam 0.25 mg oral tablet: 1 tab(s) orally once a day, As Needed  amLODIPine 5 mg oral tablet: 1 tab(s) orally once a day  clopidogrel 75 mg oral tablet: 1 tab(s) orally once a day  ferrous sulfate 325 mg (65 mg elemental iron) oral delayed release tablet: 1 tab(s) orally 2 times a day  magnesium oxide 500 mg oral tablet: 1 tab(s) orally 2 times a day   Metoprolol Succinate  mg oral tablet, extended release: 1 tab(s) orally 2x a day  polyethylene glycol 3350 oral powder for reconstitution: 17 gram(s) orally every 12 hours, As Needed  simvastatin 40 mg oral tablet: 1 tab(s) orally once a day (at bedtime)  sodium bicarbonate 650 mg oral tablet: 2 tab(s) orally once a day  Vitamin D3 25 mcg (1000 intl units) oral tablet: 1 tab(s) orally once a day   ALPRAZolam 0.25 mg oral tablet: 1 tab(s) orally once a day, As Needed  amoxicillin-clavulanate 875 mg-125 mg oral tablet: 1 tab(s) orally 2 times a day   clopidogrel 75 mg oral tablet: 1 tab(s) orally once a day  dronabinol 2.5 mg oral capsule: 1 cap(s) orally 2 times a day (before meals)  ferrous sulfate 325 mg (65 mg elemental iron) oral delayed release tablet: 1 tab(s) orally 2 times a day  FIRST Mouthwash BLM mucous membrane suspension: 5 milliliter(s) mucous membrane 4 times a day  magnesium oxide 500 mg oral tablet: 1 tab(s) orally 2 times a day   polyethylene glycol 3350 oral powder for reconstitution: 17 gram(s) orally every 12 hours, As Needed  Toprol-XL 25 mg oral tablet, extended release: 3 tab(s) orally once a day  Vitamin D3 25 mcg (1000 intl units) oral tablet: 1 tab(s) orally once a day

## 2023-03-05 NOTE — DISCHARGE NOTE PROVIDER - ATTENDING DISCHARGE PHYSICAL EXAMINATION:
CONSTITUTIONAL: NAD, lying in bed  ENMT: Moist oral mucosa, EOMI   RESPIRATORY: Normal respiratory effort; lungs are clear to auscultation bilaterally  CARDIOVASCULAR: Regular rate and rhythm, normal S1 and S2, No lower extremity edema  ABDOMEN: Nontender to palpation, normoactive bowel sounds, +colostomy bag   : +Vásquez   MUSCULOSKELETAL:  No clubbing or cyanosis of digits   PSYCH: A+O to person, place, and time; affect appropriate  NEUROLOGY: No gross sensory or motor deficits   SKIN: warm and dry

## 2023-03-05 NOTE — DISCHARGE NOTE PROVIDER - PROVIDER TOKENS
PROVIDER:[TOKEN:[[id number]]],PROVIDER:[TOKEN:[[id number]]],FREE:[LAST:[[facility]],PHONE:[(   )    -],FAX:[(   )    -]]

## 2023-03-05 NOTE — PROGRESS NOTE ADULT - ASSESSMENT
61 yo M /w PMH of HTN, CAD s/p PCI, Rectal Adenocarcinoma s/p resection and diverting colostomy presenting with fever, SOB and admitted to MICU for septic shock 2/2 UTI and E. coli bacteremia. ID consulted and patient started on IV antibiotics and vasopressors. CT done and showed "interval growth of the patient's rectal tumor with apparent anterior contained perforation of the mass extending inferiorly to the perineum. Air in the mass may represent necrosis or fistula to the urinary bladder.  Small tubular gas-liquid collection extending from the posterior anus to the intergluteal region may represent small abscess or fistula extending from the necrotic rectal cancer which has extended into the perineum." Patinet was seen by Heme/Onc and Palliative. Patient was made DNR/DNI. Patient's repeat Bcx have been negative. He was also noted to be in AFib w/ RVR. He was started on an Amiodarone drip and then loaded with Digoxin with improvement. Patient was weaned off vasopressors. Patient now deemed appropriate by MICU for downgrade to Medicine.     Sepsis POA 2/2 UTI and E. Coli Bacteremia  Septic encephalopathy  - S/p vasopressors  - ID following  - Repeat BCx: NGTD  - Rectal Cx: E. coli, Gemella morbillorum, Alpha hemolytic strep  - Meropenem switched to Ceftriaxone   - plan to switch to Augmentin on DC and complete through 3/12/23   - PT eval     Hx of Rectal Adenocarcinoma s/p resection and diverting ileostomy in 2020  - CT C/A/P: interval growth of the patient's rectal tumor with apparent anterior contained perforation of the mass extending inferiorly to the perineum. Air in the mass may represent necrosis or fistula to the urinary bladder. Prostate gland is indistinguishable and the posterior aspect of the urinary bladder cannot be  from the mass, concerning for direct invasion of tumor into these structures. Small tubular gas-liquid collection extending from the posterior anus to the intergluteal region may represent small abscess or fistula extending from the necrotic rectal cancer which has extended into the perineum. A new 6 mm right lower lobe pulmonary nodule suspicious for metastasis.   - Heme/Onc and Palliative following    AUR  - Continue Vásquez  - Urology consult appreciated  - Outpt follow up with Vásquez    AFib w/ RVR  - c/w Toprol XL   - Digoxin stopped   - TTE - LVEF of 40-45  - Now in SR  - Cardiology consult appreciated, pt refusing long term AC     Cardiomyopathy  - LVEF of 40-45%  - ?stress induced  - needs outpt Cardio follow up   - Cardio consult appreciated     Hx of CAD  - Holding Plavix due to anemia  - Holding statin due to transaminitis  - Continue BB     Transaminitis  - Improving  - Abdominal US reviewed  - Monitor     Advance Directives: DNR/DNI, Palliative following    DVT ppx: Heparin   Dispo; acute, DC in AM likely

## 2023-03-05 NOTE — DISCHARGE NOTE PROVIDER - NSDCCPCAREPLAN_GEN_ALL_CORE_FT
PRINCIPAL DISCHARGE DIAGNOSIS  Diagnosis: Bacteremia  Assessment and Plan of Treatment: You were treated with IV antibiotics and ID consulted.   Repeat Blood cultures have been negative.   You have improved and will be discharged on oral antibiotics Augmentin and complete through 3/12/23      SECONDARY DISCHARGE DIAGNOSES  Diagnosis: Atrial fibrillation with RVR  Assessment and Plan of Treatment: Also Cardiomyopathy   Please continue medications as prescribed   Per Cardiology, continue Toprol XL  Please follow up with Cardiology as you need further work up as outpatient    Diagnosis: Urinary retention  Assessment and Plan of Treatment: Hx of Rectal Adenocarcinoma   Continue Vásquez per Urology with plan for follow up after discharge     PRINCIPAL DISCHARGE DIAGNOSIS  Diagnosis: Bacteremia  Assessment and Plan of Treatment: You were treated with IV antibiotics and ID consulted.   Repeat Blood cultures have been negative.   You have improved and will be discharged on oral antibiotics Augmentin and complete through 3/12/23      SECONDARY DISCHARGE DIAGNOSES  Diagnosis: Atrial fibrillation with RVR  Assessment and Plan of Treatment: Also Cardiomyopathy   Please continue medications as prescribed   Per Cardiology, continue Toprol XL (yopur dose was changed to 75 mg daily)  Please follow up with Cardiology as you need further work up as outpatient    Diagnosis: Urinary retention  Assessment and Plan of Treatment: Hx of Rectal Adenocarcinoma   Continue Vásquez per Urology with plan for follow up after discharge

## 2023-03-05 NOTE — PROGRESS NOTE ADULT - SUBJECTIVE AND OBJECTIVE BOX
62-year-old male with many comorbidities including hypertension, hyperlipidemia, CAD status post stent and rectal adenocarcinoma (MMRp) initially diagnosed in 02/2019 (T2/3NxMx) status post chemotherapy followed by chemoradiation and LAR in 2/2020. He developed disease recurrence so he underwent APR on 03/23/2022.  He later developed progression of disease again via a pelvic mass so he has been started on FOLFIRI plus bevacizumab with the last dose being on 02/15/2023.  He undergoes treatment at [facility].  He is now admitted to the hospital with neutropenic shock.  His WBC was 0.49k at presentation. He has been started on IV antibiotics and required pressor support. Received PRBC transfusion on 2/27/23. Today his white blood cell count increased to 11.59  No acute ON events.    T max 97   , afebrile, was able to eat a little bit, eager to work with PT; has been OOB, walking with walker and in chair  on IV rocephin, castillo in place; cleared by ID to begin po Ab  States potential discharge tomorrow            MEDICATIONS  (STANDING):  cefTRIAXone Injectable. 2000 milliGRAM(s) IV Push every 24 hours  chlorhexidine 2% Cloths 1 Application(s) Topical <User Schedule>  dextrose 50% Injectable 25 Gram(s) IV Push once  dextrose 50% Injectable 12.5 Gram(s) IV Push once  dextrose 50% Injectable 25 Gram(s) IV Push once  dextrose Oral Gel 15 Gram(s) Oral once  dronabinol 2.5 milliGRAM(s) Oral two times a day before meals  FIRST- Mouthwash  BLM 5 milliLiter(s) Swish and Spit four times a day  glucagon  Injectable 1 milliGRAM(s) IntraMuscular once  heparin   Injectable 5000 Unit(s) SubCutaneous every 12 hours  metoprolol succinate ER 25 milliGRAM(s) Oral two times a day    ICU Vital Signs Last 24 Hrs  T(C): 36.9 (03 Mar 2023 04:09), Max: 36.9 (03 Mar 2023 04:09)  T(F): 98.4 (03 Mar 2023 04:09), Max: 98.4 (03 Mar 2023 04:09)  HR: 88 (03 Mar 2023 04:09) (78 - 101)  BP: 149/70 (03 Mar 2023 04:09) (125/75 - 159/87)  BP(mean): --  ABP: --  ABP(mean): --  RR: 18 (03 Mar 2023 04:09) (18 - 18)  SpO2: 95% (03 Mar 2023 04:09) (91% - 96%)    O2 Parameters below as of 03 Mar 2023 04:09  Patient On (Oxygen Delivery Method): nasal cannula  Remains afebrile, /84, pulse 100, RR 18          /74  pulse 91  Afebrile 97.7    PE  CTAB  rrr  a/o x 3  abd soft  Extrem no edema    CBC:       WBC 10.21  H/H 8.6/26  plt ct 325,000     11.59, H/H 9.3/27.9, plt 262,000 84% polys                   8.9    12.29 )-----------( 210      ( 03 Mar 2023 04:25 )             27.1                           9.2    13.15 )-----------( 163      ( 02 Mar 2023 03:59 )             26.3                   8.8    8.73  )-----------( 132      ( 03-01-23 @ 02:45 )             25.5     Chem:    03-03    133<L>  |  97  |  19.9  ----------------------------<  107<H>  4.2   |  25.0  |  0.79    Ca    8.0<L>      03 Mar 2023 04:25  Phos  3.1     03-03  Mg     1.8     03-03    TPro  4.9<L>  /  Alb  2.1<L>  /  TBili  1.0  /  DBili  x   /  AST  96<H>  /  ALT  265<H>  /  AlkPhos  357<H>  03-03       ( 03-01-23 @ 02:45 )    134<L>  |  97  |  35.9<H>  ----------------------------<  107<H>  3.2<L>   |  28.0  |  1.23      Liver Functions: ( 03-01-23 @ 02:45 )  Alb: 1.8 g/dL / Pro: 4.6 g/dL / ALK PHOS: 79 U/L / ALT: 774 U/L / AST: 184 U/L / GGT: x                 Stage 3: Additional Anesthesia Type: 1% lidocaine with epinephrine

## 2023-03-05 NOTE — DISCHARGE NOTE PROVIDER - DETAILS OF MALNUTRITION DIAGNOSIS/DIAGNOSES
This patient has been assessed with a concern for Malnutrition and was treated during this hospitalization for the following Nutrition diagnosis/diagnoses:     -  03/01/2023: Severe protein-calorie malnutrition

## 2023-03-05 NOTE — DISCHARGE NOTE PROVIDER - HOSPITAL COURSE
63 yo M /w PMH of HTN, CAD s/p PCI, Rectal Adenocarcinoma s/p resection and diverting colostomy presenting with fever, SOB and admitted to MICU for septic shock 2/2 UTI and E. coli bacteremia. Rectal Cx: E. coli, Gemella morbillorum, Alpha hemolytic strep. ID consulted and patient started on IV antibiotics and vasopressors. CT done and showed "interval growth of the patient's rectal tumor with apparent anterior contained perforation of the mass extending inferiorly to the perineum. Air in the mass may represent necrosis or fistula to the urinary bladder.  Small tubular gas-liquid collection extending from the posterior anus to the intergluteal region may represent small abscess or fistula extending from the necrotic rectal cancer which has extended into the perineum." Patinet was seen by Heme/Onc and Palliative. Patient was made DNR/DNI. Patient's repeat Bcx have been negative. He was also noted to be in AFib w/ RVR. He was started on an Amiodarone drip and then loaded with Digoxin with improvement. Patient was weaned off vasopressors. Patient then transferred to Medicine. Per ID, Meropenem switched to Ceftriaxone with plan to switch to Augmentin on DC and complete through 3/12/23.     Discharge diagnosis:   Sepsis POA 2/2 UTI and E. Coli Bacteremia  Septic encephalopathy  Hx of Rectal Adenocarcinoma s/p resection and diverting ileostomy in 2020  Acute urinary rentention - Continue Vásquez per Urology with plan for follow up after discharge   AFib w/ RVR - c/w Toprol XL. Cardiology consult appreciated, pt refusing long term AC   Cardiomyopathy - LVEF of 40-45% liekly ?stress induced. Per Cardiology needs outpt follow up    63 yo M /w PMH of HTN, CAD s/p PCI, Rectal Adenocarcinoma s/p resection and diverting colostomy presenting with fever, SOB and admitted to MICU for septic shock 2/2 UTI and E. coli bacteremia. Rectal Cx: E. coli, Gemella morbillorum, Alpha hemolytic strep. ID consulted and patient started on IV antibiotics and vasopressors. CT done and showed "interval growth of the patient's rectal tumor with apparent anterior contained perforation of the mass extending inferiorly to the perineum. Air in the mass may represent necrosis or fistula to the urinary bladder.  Small tubular gas-liquid collection extending from the posterior anus to the intergluteal region may represent small abscess or fistula extending from the necrotic rectal cancer which has extended into the perineum." Patient was seen by Heme/Onc and Palliative. Patient was made DNR/DNI. Patient's repeat Bcx have been negative. He was also noted to be in AFib w/ RVR. He was started on an Amiodarone drip and then loaded with Digoxin with improvement. Patient was weaned off vasopressors. Patient then transferred to Medicine. Per ID, Meropenem switched to Ceftriaxone with plan to switch to Augmentin on DC and complete through 3/12/23.     Discharge diagnosis:   Sepsis POA 2/2 UTI and E. Coli Bacteremia  Septic encephalopathy  Hx of Rectal Adenocarcinoma s/p resection and diverting ileostomy in 2020  Acute urinary rentention - Continue Vásquez per Urology with plan for follow up after discharge   AFib w/ RVR - c/w Toprol XL. Cardiology consult appreciated, pt refusing long term AC   Cardiomyopathy - LVEF of 40-45% liekly ?stress induced. Per Cardiology needs outpt follow up

## 2023-03-05 NOTE — DISCHARGE NOTE PROVIDER - NSDCFUSCHEDAPPT_GEN_ALL_CORE_FT
[facility]  VASCULAR [address]  Scheduled Appointment: 03/07/2023     [facility] [address] [facility]  Scheduled Appointment: 03/20/2023

## 2023-03-05 NOTE — PROGRESS NOTE ADULT - ASSESSMENT
62-year-old male with hypertension, CAD status post stent and recurrent rectal adenocarcinoma status post multiple surgeries and most currently on FOLFIRI plus bevacizumab.    1. Recurrent rectal adenocarcinoma -   Most recently  received FOLFIRI plus bevacizumab on 02/15/2023. This is likely the cause of the neutropenia and subsequent infection and decompensation.  No inpatient therapy and will be rediscussed with his [facility] primary oncology team once acute critical issues have resolved.  has appt this upcoming week    2. Neutropenic fever/shock - on pressors. ANC has started to improve and is already >1000. Monitor.  WBC remains normal    On IV antibiotics. Cleared to start po Ab  Management as per primary team    follow up ID recs: now on Rocephin  Urine Cx neg;    WBC 10.21      3. Anemia - due to chemo, shock. s/o transfusion this morning.  Hgb 6.7 on 2/27/23, received 1 unit PRBC transfusion on 2/27/23, today Hb 8.6      Transfuse if hgb <7.0.    4. transaminitis:  ?  Shock liver   -LFTs trending down  - USG EUQ - Increased hepatic echogenicity, suggesting hepatic steatosis and/or fibrosis. Hepatomegaly.    5. A fib with RVR:  - blood pressure dropped on metoprolol, follow cardiology recommendations   - now in SR     grade I mucositis; resolved  - will prescribe magic mouthwash S&S     PT eval and ambulation  DNR/DNI     Will update [facility] daily      Steve Oliva MD   [facility]  [phone]

## 2023-03-05 NOTE — DISCHARGE NOTE PROVIDER - CARE PROVIDER_API CALL
Bayron Vaughn (DO)  Internal Medicine  [address] [place] [address]  Phone: [phone]  Follow Up Time:     [facility])  Infectious Disease; Internal Medicine  [address] [place] [address]  Phone: [phone]  Fax: [phone]  Follow Up Time:     [facility],   Phone: (   )    -  Fax: (   )    -  Follow Up Time:

## 2023-03-06 ENCOUNTER — TRANSCRIPTION ENCOUNTER (OUTPATIENT)
Age: 63
End: 2023-03-06

## 2023-03-06 ENCOUNTER — APPOINTMENT (OUTPATIENT)
Dept: HEMATOLOGY ONCOLOGY | Facility: CLINIC | Age: 63
End: 2023-03-06

## 2023-03-06 VITALS
DIASTOLIC BLOOD PRESSURE: 77 MMHG | OXYGEN SATURATION: 95 % | TEMPERATURE: 98 F | HEART RATE: 92 BPM | RESPIRATION RATE: 18 BRPM | SYSTOLIC BLOOD PRESSURE: 167 MMHG

## 2023-03-06 LAB
ANION GAP SERPL CALC-SCNC: 10 MMOL/L — SIGNIFICANT CHANGE UP (ref 5–17)
BASOPHILS # BLD AUTO: 0.03 K/UL — SIGNIFICANT CHANGE UP (ref 0–0.2)
BASOPHILS NFR BLD AUTO: 0.3 % — SIGNIFICANT CHANGE UP (ref 0–2)
BUN SERPL-MCNC: 16.4 MG/DL — SIGNIFICANT CHANGE UP (ref 8–20)
CALCIUM SERPL-MCNC: 8 MG/DL — LOW (ref 8.4–10.5)
CHLORIDE SERPL-SCNC: 101 MMOL/L — SIGNIFICANT CHANGE UP (ref 96–108)
CO2 SERPL-SCNC: 23 MMOL/L — SIGNIFICANT CHANGE UP (ref 22–29)
CREAT SERPL-MCNC: 0.69 MG/DL — SIGNIFICANT CHANGE UP (ref 0.5–1.3)
EGFR: 103 ML/MIN/1.73M2 — SIGNIFICANT CHANGE UP
EOSINOPHIL # BLD AUTO: 0.05 K/UL — SIGNIFICANT CHANGE UP (ref 0–0.5)
EOSINOPHIL NFR BLD AUTO: 0.5 % — SIGNIFICANT CHANGE UP (ref 0–6)
GLUCOSE SERPL-MCNC: 99 MG/DL — SIGNIFICANT CHANGE UP (ref 70–99)
HCT VFR BLD CALC: 26.8 % — LOW (ref 39–50)
HGB BLD-MCNC: 8.9 G/DL — LOW (ref 13–17)
IMM GRANULOCYTES NFR BLD AUTO: 4.6 % — HIGH (ref 0–0.9)
LYMPHOCYTES # BLD AUTO: 1.02 K/UL — SIGNIFICANT CHANGE UP (ref 1–3.3)
LYMPHOCYTES # BLD AUTO: 10.9 % — LOW (ref 13–44)
MCHC RBC-ENTMCNC: 30.6 PG — SIGNIFICANT CHANGE UP (ref 27–34)
MCHC RBC-ENTMCNC: 33.2 GM/DL — SIGNIFICANT CHANGE UP (ref 32–36)
MCV RBC AUTO: 92.1 FL — SIGNIFICANT CHANGE UP (ref 80–100)
MONOCYTES # BLD AUTO: 0.79 K/UL — SIGNIFICANT CHANGE UP (ref 0–0.9)
MONOCYTES NFR BLD AUTO: 8.4 % — SIGNIFICANT CHANGE UP (ref 2–14)
NEUTROPHILS # BLD AUTO: 7.08 K/UL — SIGNIFICANT CHANGE UP (ref 1.8–7.4)
NEUTROPHILS NFR BLD AUTO: 75.3 % — SIGNIFICANT CHANGE UP (ref 43–77)
PLATELET # BLD AUTO: 402 K/UL — HIGH (ref 150–400)
POTASSIUM SERPL-MCNC: 4.1 MMOL/L — SIGNIFICANT CHANGE UP (ref 3.5–5.3)
POTASSIUM SERPL-SCNC: 4.1 MMOL/L — SIGNIFICANT CHANGE UP (ref 3.5–5.3)
RBC # BLD: 2.91 M/UL — LOW (ref 4.2–5.8)
RBC # FLD: 15.2 % — HIGH (ref 10.3–14.5)
SODIUM SERPL-SCNC: 134 MMOL/L — LOW (ref 135–145)
WBC # BLD: 9.4 K/UL — SIGNIFICANT CHANGE UP (ref 3.8–10.5)
WBC # FLD AUTO: 9.4 K/UL — SIGNIFICANT CHANGE UP (ref 3.8–10.5)

## 2023-03-06 PROCEDURE — 99232 SBSQ HOSP IP/OBS MODERATE 35: CPT

## 2023-03-06 PROCEDURE — 87077 CULTURE AEROBIC IDENTIFY: CPT

## 2023-03-06 PROCEDURE — 83880 ASSAY OF NATRIURETIC PEPTIDE: CPT

## 2023-03-06 PROCEDURE — 97163 PT EVAL HIGH COMPLEX 45 MIN: CPT

## 2023-03-06 PROCEDURE — 82803 BLOOD GASES ANY COMBINATION: CPT

## 2023-03-06 PROCEDURE — 85379 FIBRIN DEGRADATION QUANT: CPT

## 2023-03-06 PROCEDURE — 84295 ASSAY OF SERUM SODIUM: CPT

## 2023-03-06 PROCEDURE — 84436 ASSAY OF TOTAL THYROXINE: CPT

## 2023-03-06 PROCEDURE — 84132 ASSAY OF SERUM POTASSIUM: CPT

## 2023-03-06 PROCEDURE — 85027 COMPLETE CBC AUTOMATED: CPT

## 2023-03-06 PROCEDURE — 86901 BLOOD TYPING SEROLOGIC RH(D): CPT

## 2023-03-06 PROCEDURE — 87641 MR-STAPH DNA AMP PROBE: CPT

## 2023-03-06 PROCEDURE — 96367 TX/PROPH/DG ADDL SEQ IV INF: CPT

## 2023-03-06 PROCEDURE — 82962 GLUCOSE BLOOD TEST: CPT

## 2023-03-06 PROCEDURE — 93306 TTE W/DOPPLER COMPLETE: CPT

## 2023-03-06 PROCEDURE — 84145 PROCALCITONIN (PCT): CPT

## 2023-03-06 PROCEDURE — 87040 BLOOD CULTURE FOR BACTERIA: CPT

## 2023-03-06 PROCEDURE — 84439 ASSAY OF FREE THYROXINE: CPT

## 2023-03-06 PROCEDURE — 83036 HEMOGLOBIN GLYCOSYLATED A1C: CPT

## 2023-03-06 PROCEDURE — 87640 STAPH A DNA AMP PROBE: CPT

## 2023-03-06 PROCEDURE — 85045 AUTOMATED RETICULOCYTE COUNT: CPT

## 2023-03-06 PROCEDURE — 85014 HEMATOCRIT: CPT

## 2023-03-06 PROCEDURE — 84484 ASSAY OF TROPONIN QUANT: CPT

## 2023-03-06 PROCEDURE — 99239 HOSP IP/OBS DSCHRG MGMT >30: CPT

## 2023-03-06 PROCEDURE — 87070 CULTURE OTHR SPECIMN AEROBIC: CPT

## 2023-03-06 PROCEDURE — 70450 CT HEAD/BRAIN W/O DYE: CPT

## 2023-03-06 PROCEDURE — 80048 BASIC METABOLIC PNL TOTAL CA: CPT

## 2023-03-06 PROCEDURE — 96368 THER/DIAG CONCURRENT INF: CPT

## 2023-03-06 PROCEDURE — 84443 ASSAY THYROID STIM HORMONE: CPT

## 2023-03-06 PROCEDURE — 36430 TRANSFUSION BLD/BLD COMPNT: CPT

## 2023-03-06 PROCEDURE — 87186 SC STD MICRODIL/AGAR DIL: CPT

## 2023-03-06 PROCEDURE — 96365 THER/PROPH/DIAG IV INF INIT: CPT

## 2023-03-06 PROCEDURE — 96375 TX/PRO/DX INJ NEW DRUG ADDON: CPT

## 2023-03-06 PROCEDURE — 80053 COMPREHEN METABOLIC PANEL: CPT

## 2023-03-06 PROCEDURE — 82947 ASSAY GLUCOSE BLOOD QUANT: CPT

## 2023-03-06 PROCEDURE — 71045 X-RAY EXAM CHEST 1 VIEW: CPT

## 2023-03-06 PROCEDURE — 87086 URINE CULTURE/COLONY COUNT: CPT

## 2023-03-06 PROCEDURE — 76705 ECHO EXAM OF ABDOMEN: CPT

## 2023-03-06 PROCEDURE — 85025 COMPLETE CBC W/AUTO DIFF WBC: CPT

## 2023-03-06 PROCEDURE — 71275 CT ANGIOGRAPHY CHEST: CPT | Mod: ME

## 2023-03-06 PROCEDURE — 83605 ASSAY OF LACTIC ACID: CPT

## 2023-03-06 PROCEDURE — 74177 CT ABD & PELVIS W/CONTRAST: CPT | Mod: MG

## 2023-03-06 PROCEDURE — 83010 ASSAY OF HAPTOGLOBIN QUANT: CPT

## 2023-03-06 PROCEDURE — 87150 DNA/RNA AMPLIFIED PROBE: CPT

## 2023-03-06 PROCEDURE — 86850 RBC ANTIBODY SCREEN: CPT

## 2023-03-06 PROCEDURE — 87075 CULTR BACTERIA EXCEPT BLOOD: CPT

## 2023-03-06 PROCEDURE — 84100 ASSAY OF PHOSPHORUS: CPT

## 2023-03-06 PROCEDURE — 96376 TX/PRO/DX INJ SAME DRUG ADON: CPT

## 2023-03-06 PROCEDURE — G1004: CPT

## 2023-03-06 PROCEDURE — 80074 ACUTE HEPATITIS PANEL: CPT

## 2023-03-06 PROCEDURE — 93005 ELECTROCARDIOGRAM TRACING: CPT

## 2023-03-06 PROCEDURE — 86900 BLOOD TYPING SEROLOGIC ABO: CPT

## 2023-03-06 PROCEDURE — 84481 FREE ASSAY (FT-3): CPT

## 2023-03-06 PROCEDURE — 82435 ASSAY OF BLOOD CHLORIDE: CPT

## 2023-03-06 PROCEDURE — 85730 THROMBOPLASTIN TIME PARTIAL: CPT

## 2023-03-06 PROCEDURE — 85610 PROTHROMBIN TIME: CPT

## 2023-03-06 PROCEDURE — 83735 ASSAY OF MAGNESIUM: CPT

## 2023-03-06 PROCEDURE — 87205 SMEAR GRAM STAIN: CPT

## 2023-03-06 PROCEDURE — 99291 CRITICAL CARE FIRST HOUR: CPT

## 2023-03-06 PROCEDURE — 86923 COMPATIBILITY TEST ELECTRIC: CPT

## 2023-03-06 PROCEDURE — 85018 HEMOGLOBIN: CPT

## 2023-03-06 PROCEDURE — P9016: CPT

## 2023-03-06 PROCEDURE — 83615 LACTATE (LD) (LDH) ENZYME: CPT

## 2023-03-06 PROCEDURE — 36415 COLL VENOUS BLD VENIPUNCTURE: CPT

## 2023-03-06 PROCEDURE — 81001 URINALYSIS AUTO W/SCOPE: CPT

## 2023-03-06 PROCEDURE — 82330 ASSAY OF CALCIUM: CPT

## 2023-03-06 PROCEDURE — 0225U NFCT DS DNA&RNA 21 SARSCOV2: CPT

## 2023-03-06 RX ORDER — METOPROLOL TARTRATE 50 MG
3 TABLET ORAL
Qty: 0 | Refills: 0 | DISCHARGE
Start: 2023-03-06

## 2023-03-06 RX ORDER — METOPROLOL TARTRATE 50 MG
3 TABLET ORAL
Qty: 90 | Refills: 0
Start: 2023-03-06 | End: 2023-04-04

## 2023-03-06 RX ORDER — DIPHENHYDRAMINE HYDROCHLORIDE AND LIDOCAINE HYDROCHLORIDE AND ALUMINUM HYDROXIDE AND MAGNESIUM HYDRO
5 KIT
Qty: 100 | Refills: 0
Start: 2023-03-06 | End: 2023-03-10

## 2023-03-06 RX ORDER — SIMVASTATIN 20 MG/1
1 TABLET, FILM COATED ORAL
Qty: 0 | Refills: 0 | DISCHARGE

## 2023-03-06 RX ORDER — AMLODIPINE BESYLATE 2.5 MG/1
1 TABLET ORAL
Qty: 0 | Refills: 0 | DISCHARGE

## 2023-03-06 RX ORDER — DRONABINOL 2.5 MG
1 CAPSULE ORAL
Qty: 0 | Refills: 0 | DISCHARGE
Start: 2023-03-06

## 2023-03-06 RX ORDER — SODIUM BICARBONATE 1 MEQ/ML
2 SYRINGE (ML) INTRAVENOUS
Qty: 0 | Refills: 0 | DISCHARGE

## 2023-03-06 RX ORDER — METOPROLOL TARTRATE 50 MG
1 TABLET ORAL
Qty: 0 | Refills: 0 | DISCHARGE

## 2023-03-06 RX ADMIN — Medication 2.5 MILLIGRAM(S): at 06:06

## 2023-03-06 RX ADMIN — CHLORHEXIDINE GLUCONATE 1 APPLICATION(S): 213 SOLUTION TOPICAL at 06:02

## 2023-03-06 RX ADMIN — MAGNESIUM OXIDE 400 MG ORAL TABLET 400 MILLIGRAM(S): 241.3 TABLET ORAL at 12:08

## 2023-03-06 RX ADMIN — MAGNESIUM OXIDE 400 MG ORAL TABLET 400 MILLIGRAM(S): 241.3 TABLET ORAL at 09:55

## 2023-03-06 RX ADMIN — DIPHENHYDRAMINE HYDROCHLORIDE AND LIDOCAINE HYDROCHLORIDE AND ALUMINUM HYDROXIDE AND MAGNESIUM HYDRO 5 MILLILITER(S): KIT at 06:05

## 2023-03-06 RX ADMIN — Medication 2.5 MILLIGRAM(S): at 16:37

## 2023-03-06 RX ADMIN — Medication 500 MILLIGRAM(S): at 06:06

## 2023-03-06 RX ADMIN — Medication 75 MILLIGRAM(S): at 06:06

## 2023-03-06 RX ADMIN — HEPARIN SODIUM 5000 UNIT(S): 5000 INJECTION INTRAVENOUS; SUBCUTANEOUS at 06:06

## 2023-03-06 RX ADMIN — Medication 500 MILLIGRAM(S): at 13:04

## 2023-03-06 RX ADMIN — DIPHENHYDRAMINE HYDROCHLORIDE AND LIDOCAINE HYDROCHLORIDE AND ALUMINUM HYDROXIDE AND MAGNESIUM HYDRO 5 MILLILITER(S): KIT at 12:08

## 2023-03-06 RX ADMIN — CEFTRIAXONE 2000 MILLIGRAM(S): 500 INJECTION, POWDER, FOR SOLUTION INTRAMUSCULAR; INTRAVENOUS at 10:28

## 2023-03-06 RX ADMIN — MAGNESIUM OXIDE 400 MG ORAL TABLET 400 MILLIGRAM(S): 241.3 TABLET ORAL at 17:08

## 2023-03-06 RX ADMIN — DIPHENHYDRAMINE HYDROCHLORIDE AND LIDOCAINE HYDROCHLORIDE AND ALUMINUM HYDROXIDE AND MAGNESIUM HYDRO 5 MILLILITER(S): KIT at 17:08

## 2023-03-06 NOTE — PROGRESS NOTE ADULT - ASSESSMENT
63 y/o M with a h/o HTN, HLD, CAD (s/p PCI), rectal adenocarcinoma (diagnosed in 2019, s/p resection and diverting ileostomy creation 2020 followed by chemo and radiotherapy, complicated by local recurrence in sigmoid/rectum, s/p APR of rectum/sigmoid colon with colostomy creation 3/2022, most recent chemo 2/12/23), presents to the ED complaining of fever, SOB, urinary retention, and increased purulent drainage from chronic sacral wound. As per report, the urinary retention has been worsening over the past few weeks as the cancer has been growing and pressing on his bladder and prostate and he has been experiencing suprapubic pain and episodes of overflow incontinence. Found to be febrile, pancytopenic, tachycardic (new onset AF with RVR), and hypotensive despite 3.5L crystalloid bolus. Lactate 6. Started on IV vasopressor therapy. Now developing some confusion. CT C/A/P reveals interval growth of the patient's rectal tumor with apparent anterior contained perforation of the mass extending inferiorly to the perineum. Air in the mass may represent necrosis or fistula to the urinary bladder. Prostate gland is indistinguishable and the posterior aspect of the urinary bladder cannot be  from the mass, concerning for direct invasion of tumor into these structures. Small tubular gas-liquid collection extending from the posterior anus to the intergluteal region may represent small abscess or fistula extending from the necrotic rectal cancer which has extended into the perineum. A new 6 mm right lower lobe pulmonary nodule suspicious for metastasis.   AS ABOVE PT WITH RECTAL CA ON CHEMO AT [facility]   LAST CHEMO 2/14  PT WITH INCREASED FATIGUE AND CONFUSION  ADMITTED HYPOTENSIVE   SHOCK BLOOD CX GM NEG RODS   ECOLI   WBC IS RECOVERED  ON  MERREM       URINE CX NEGATIVE   MOST LIKELY  SOURCE BOWEL   REPEAT BLOOD CX X2 SETS NEGATIVE      FINAL SENSITIVITIES BACK   PAN SENSITIVE  on ROCEPHIN  2gm daily/FLAGYL   BLOOD CX ALSO WITH ONE BOTTLE BACTEROIDES  ? Significance  WHEN READY FOR D/C CAN  CHANGE TO AUGMENTIN 875BID  WOULD TREAT FOR  2 WEEKS THROUGH 3/12/23  SPOKE TO DAUGHTER   WILL FOLLOW UP WITH FURTHER RECOMMENDATIONS

## 2023-03-06 NOTE — DISCHARGE NOTE NURSING/CASE MANAGEMENT/SOCIAL WORK - NSDCPEFALRISK_GEN_ALL_CORE
For information on Fall & Injury Prevention, visit: [email or web address] OR  [email or web address] OR  https://www.cdc.gov/steadi/patient.html

## 2023-03-06 NOTE — PROGRESS NOTE ADULT - SUBJECTIVE AND OBJECTIVE BOX
[facility]                                   [address] [place] [address]                                                      PHONE: [phone]                                                         FAX: [phone]  [email or web address]  ---------------------------------------------------------------------------------------------------------------------------------    Overnight events/patient complaints: Patient without complaints this morning. States he feels well.      No Known Allergies    MEDICATIONS  (STANDING):  cefTRIAXone Injectable. 2000 milliGRAM(s) IV Push every 24 hours  chlorhexidine 2% Cloths 1 Application(s) Topical <User Schedule>  dextrose 50% Injectable 25 Gram(s) IV Push once  dextrose 50% Injectable 12.5 Gram(s) IV Push once  dextrose 50% Injectable 25 Gram(s) IV Push once  dextrose Oral Gel 15 Gram(s) Oral once  dronabinol 2.5 milliGRAM(s) Oral two times a day before meals  FIRST- Mouthwash  BLM 5 milliLiter(s) Swish and Spit four times a day  glucagon  Injectable 1 milliGRAM(s) IntraMuscular once  heparin   Injectable 5000 Unit(s) SubCutaneous every 12 hours  magnesium oxide 400 milliGRAM(s) Oral three times a day with meals  metoprolol succinate ER 75 milliGRAM(s) Oral daily  metroNIDAZOLE    Tablet 500 milliGRAM(s) Oral every 8 hours    MEDICATIONS  (PRN):  ALPRAZolam 0.5 milliGRAM(s) Oral every 6 hours PRN anxiety  HYDROmorphone  Injectable 1 milliGRAM(s) IV Push every 4 hours PRN Severe Pain (7 - 10)  ondansetron Injectable 4 milliGRAM(s) IV Push every 6 hours PRN Nausea and/or Vomiting      Vital Signs Last 24 Hrs  T(C): 36.7 (06 Mar 2023 05:00), Max: 37.1 (05 Mar 2023 17:18)  T(F): 98 (06 Mar 2023 05:00), Max: 98.8 (05 Mar 2023 17:18)  HR: 89 (06 Mar 2023 05:00) (89 - 98)  BP: 152/74 (06 Mar 2023 05:00) (146/74 - 154/74)  BP(mean): --  RR: 18 (06 Mar 2023 05:00) (16 - 18)  SpO2: 98% (06 Mar 2023 05:00) (92% - 98%)    Parameters below as of 06 Mar 2023 05:00  Patient On (Oxygen Delivery Method): room air      ICU Vital Signs Last 24 Hrs  PASTORA CHURCHILL  I&O's Detail    05 Mar 2023 07:01  -  06 Mar 2023 07:00  --------------------------------------------------------  IN:  Total IN: 0 mL    OUT:    Colostomy (mL): 200 mL    Indwelling Catheter - Urethral (mL): 1200 mL  Total OUT: 1400 mL    Total NET: -1400 mL        Drug Dosing Weight  PASTORA CHURCHILL      PHYSICAL EXAM:  General: NAD  HEENT: Head; normocephalic, atraumatic.  Eyes: EOMI, conjunctiva normal  Neck: Supple, no JVD noted  CARDIOVASCULAR: RRR, S1 S2, No murmurs or gallops  LUNGS: Clear to auscultation b/l, No rales, rhonchi or wheeze, normal inspiratory effort  ABDOMEN: Soft  EXTREMITIES: No edema b/l, no cyanosis   SKIN: warm and dry  NEURO: Alert/oriented x 3        LABS:                        8.9    9.40  )-----------( 402      ( 06 Mar 2023 07:18 )             26.8     03-06    134<L>  |  101  |  16.4  ----------------------------<  99  4.1   |  23.0  |  0.69    Ca    8.0<L>      06 Mar 2023 07:18      PASTORA CHURCHILL            RADIOLOGY & ADDITIONAL STUDIES:    INTERPRETATION OF TELEMETRY (personally reviewed): Sinus rhythm    ASSESSMENT AND PLAN:  62y Male with past medical history significant for of CAD/MI s/p multiple PCIs (most recent in 2017 to LAD), HTN, HLD, right CEA (2019), rectal adenocarcinoma s/p resection with diverting ileostomy (2020) followed by chemo and radiotherapy c/b local recurrence s/p colostomy creation 3/2022 who presents with septic shock/e. coli bacteremia found to have new onset afib with spontaneous conversion to sinus rhythm.    Afib  -CHADSVASC at least 2 points  -Patient refusing anticoagulation. Benefits and risks were discussed  -Patient remains in sinus rhythm  -c/w toprol 75mg daily    No further inpatient cardiac testing required. Will schedule close follow up as an outpatient. Cardiology to sign off. Please call back with any questions.    Thank you for letting [facility] to assist in the management of this patient. Please call with any questions.

## 2023-03-06 NOTE — PROGRESS NOTE ADULT - PROVIDER SPECIALTY LIST ADULT
Cardiology
Heme/Onc
Heme/Onc
Palliative Care
Cardiology
Critical Care
Critical Care
Hospitalist
Infectious Disease
MICU
Palliative Care
Urology
Heme/Onc
Hospitalist
Hospitalist
Infectious Disease
Heme/Onc
Hospitalist
Infectious Disease
Infectious Disease
MICU
Palliative Care
Heme/Onc
Urology

## 2023-03-06 NOTE — PROGRESS NOTE ADULT - REASON FOR ADMISSION
Septic shock

## 2023-03-06 NOTE — PROGRESS NOTE ADULT - ASSESSMENT
62-year-old male with hypertension, CAD status post stent and recurrent rectal adenocarcinoma status post multiple surgeries and most currently on FOLFIRI plus bevacizumab.    1. Recurrent rectal adenocarcinoma -   Most recently  received FOLFIRI plus bevacizumab on 02/15/2023. This is likely the cause of the neutropenia and subsequent infection and decompensation.  No inpatient therapy and will be rediscussed with his [facility] primary oncology team once acute critical issues have resolved.   Patient to keep follow up appointment with [facility] tomorrow     2. Neutropenic fever/shock - resolved   ANC has started to improve and is already >1000. Monitor.    WBC remains normal  ID following   On IV antibiotics - Cleared to start transition to po Ab by ID     3. Anemia - due to chemo, shock. - improved   Hgb 6.7 on 2/27/23, received 1 unit PRBC transfusion on 2/27/23    Transfuse if hgb <7.0  hgb 8.9g/dl today     4. transaminitis  ?  Shock liver   LFTs trending down  USG EUQ - Increased hepatic echogenicity, suggesting hepatic steatosis and/or fibrosis. Hepatomegaly.    5. A fib with RVR:  - blood pressure dropped on metoprolol  - cardiology consulted, patient deferring AC   - now in SR     6. grade I mucositis; resolved  - magic mouthwash S&S     PT eval and ambulation    Thank you   Will update [facility] daily      [facility]  [phone]

## 2023-03-06 NOTE — PROGRESS NOTE ADULT - SUBJECTIVE AND OBJECTIVE BOX
62-year-old male with many comorbidities including hypertension, hyperlipidemia, CAD status post stent and rectal adenocarcinoma (MMRp) initially diagnosed in 02/2019 (T2/3NxMx) status post chemotherapy followed by chemoradiation and LAR in 2/2020. He developed disease recurrence so he underwent APR on 03/23/2022.  He later developed progression of disease again via a pelvic mass so he has been started on FOLFIRI plus bevacizumab with the last dose being on 02/15/2023.  He undergoes treatment at [facility].  He is now admitted to the hospital with neutropenic shock.  His WBC was 0.49k at presentation. He has been started on IV antibiotics and required pressor support. Received PRBC transfusion on 2/27/23. Today his white blood cell count increased to 11.59  No acute ON events.    T max 98.8F  Feeling well this AM   In no acute distress  Hopeful for discharge home today         MEDICATIONS  (STANDING):  cefTRIAXone Injectable. 2000 milliGRAM(s) IV Push every 24 hours  chlorhexidine 2% Cloths 1 Application(s) Topical <User Schedule>  dextrose 50% Injectable 25 Gram(s) IV Push once  dextrose 50% Injectable 12.5 Gram(s) IV Push once  dextrose 50% Injectable 25 Gram(s) IV Push once  dextrose Oral Gel 15 Gram(s) Oral once  dronabinol 2.5 milliGRAM(s) Oral two times a day before meals  FIRST- Mouthwash  BLM 5 milliLiter(s) Swish and Spit four times a day  glucagon  Injectable 1 milliGRAM(s) IntraMuscular once  heparin   Injectable 5000 Unit(s) SubCutaneous every 12 hours  magnesium oxide 400 milliGRAM(s) Oral three times a day with meals  metoprolol succinate ER 75 milliGRAM(s) Oral daily  metroNIDAZOLE    Tablet 500 milliGRAM(s) Oral every 8 hours    MEDICATIONS  (PRN):  ALPRAZolam 0.5 milliGRAM(s) Oral every 6 hours PRN anxiety  HYDROmorphone  Injectable 1 milliGRAM(s) IV Push every 4 hours PRN Severe Pain (7 - 10)  ondansetron Injectable 4 milliGRAM(s) IV Push every 6 hours PRN Nausea and/or Vomiting      Vital Signs Last 24 Hrs  T(C): 37.1 (06 Mar 2023 12:23), Max: 37.1 (05 Mar 2023 17:18)  T(F): 98.7 (06 Mar 2023 12:23), Max: 98.8 (05 Mar 2023 17:18)  HR: 91 (06 Mar 2023 12:23) (89 - 98)  BP: 149/79 (06 Mar 2023 12:23) (149/79 - 154/74)  BP(mean): --  RR: 18 (06 Mar 2023 12:23) (18 - 18)  SpO2: 97% (06 Mar 2023 12:23) (96% - 98%)    Parameters below as of 06 Mar 2023 12:23  Patient On (Oxygen Delivery Method): room air      GEN: NAD, alert and comfortable   NECK: Supple. No carotid bruits.  No lymphadenopathy or thyromegaly.  LUNGS: Clear to auscultation. PORT RIGHT CHEST WALL   HEART: Regular rate and rhythm without murmur.  ABDOMEN: Soft, nontender, and nondistended.  Positive bowel sounds. ostomy present with output    : No CVA tenderness, castillo   EXTREMITIES: Without any cyanosis, clubbing, rash, lesions or edema.  MSK: no joint swelling  NEUROLOGIC: Cranial nerves II through XII are grossly intact.  PSYCHIATRIC: Appropriate affect .  SKIN: No ulceration or induration present.                                    8.9    9.40  )-----------( 402      ( 06 Mar 2023 07:18 )             26.8

## 2023-03-06 NOTE — DISCHARGE NOTE NURSING/CASE MANAGEMENT/SOCIAL WORK - PATIENT PORTAL LINK FT
You can access the FollowMyHealth Patient Portal offered by [facility] by registering at the following website: [email or web address]. By joining [facility]’s FollowMyHealth portal, you will also be able to view your health information using other applications (apps) compatible with our system.

## 2023-03-06 NOTE — PROGRESS NOTE ADULT - NUTRITIONAL ASSESSMENT
This patient has been assessed with a concern for Malnutrition and has been determined to have a diagnosis/diagnoses of Severe protein-calorie malnutrition.    This patient is being managed with:   Diet DASH/TLC-  Sodium & Cholesterol Restricted  Supplement Feeding Modality:  Oral  Ensure Enlive Cans or Servings Per Day:  1       Frequency:  Three Times a day  Entered: Mar  2 2023  8:10AM    
This patient has been assessed with a concern for Malnutrition and has been determined to have a diagnosis/diagnoses of Severe protein-calorie malnutrition.    This patient is being managed with:   Diet Consistent Carbohydrate Gestational w/3 Snacks-  Supplement Feeding Modality:  Oral  Glucerna Shake Cans or Servings Per Day:  1       Frequency:  Three Times a day  Ensure Pudding Cans or Servings Per Day:  1       Frequency:  Three Times a day  Entered: Mar  1 2023  8:08AM    
This patient has been assessed with a concern for Malnutrition and has been determined to have a diagnosis/diagnoses of Severe protein-calorie malnutrition.    This patient is being managed with:   Diet DASH/TLC-  Sodium & Cholesterol Restricted  Supplement Feeding Modality:  Oral  Ensure Enlive Cans or Servings Per Day:  1       Frequency:  Three Times a day  Entered: Mar  2 2023  8:10AM    
This patient has been assessed with a concern for Malnutrition and has been determined to have a diagnosis/diagnoses of Severe protein-calorie malnutrition.    This patient is being managed with:   Diet DASH/TLC-  Sodium & Cholesterol Restricted  Supplement Feeding Modality:  Oral  Ensure Enlive Cans or Servings Per Day:  1       Frequency:  Three Times a day  Entered: Mar  2 2023  8:10AM    
This patient has been assessed with a concern for Malnutrition and has been determined to have a diagnosis/diagnoses of Severe protein-calorie malnutrition.    This patient is being managed with:   Diet Consistent Carbohydrate Gestational w/3 Snacks-  Supplement Feeding Modality:  Oral  Glucerna Shake Cans or Servings Per Day:  1       Frequency:  Three Times a day  Ensure Pudding Cans or Servings Per Day:  1       Frequency:  Three Times a day  Entered: Mar  1 2023  8:08AM    
This patient has been assessed with a concern for Malnutrition and has been determined to have a diagnosis/diagnoses of Severe protein-calorie malnutrition.    This patient is being managed with:   Diet DASH/TLC-  Sodium & Cholesterol Restricted  Supplement Feeding Modality:  Oral  Ensure Enlive Cans or Servings Per Day:  1       Frequency:  Three Times a day  Entered: Mar  2 2023  8:10AM    

## 2023-03-07 ENCOUNTER — APPOINTMENT (OUTPATIENT)
Dept: VASCULAR SURGERY | Facility: CLINIC | Age: 63
End: 2023-03-07

## 2023-03-09 ENCOUNTER — NON-APPOINTMENT (OUTPATIENT)
Age: 63
End: 2023-03-09

## 2023-03-10 ENCOUNTER — NON-APPOINTMENT (OUTPATIENT)
Age: 63
End: 2023-03-10

## 2023-03-13 ENCOUNTER — EMERGENCY (EMERGENCY)
Facility: HOSPITAL | Age: 63
LOS: 1 days | Discharge: HOME | End: 2023-03-13
Attending: EMERGENCY MEDICINE
Payer: COMMERCIAL

## 2023-03-13 ENCOUNTER — APPOINTMENT (OUTPATIENT)
Dept: INTERNAL MEDICINE | Facility: CLINIC | Age: 63
End: 2023-03-13
Payer: COMMERCIAL

## 2023-03-13 ENCOUNTER — NON-APPOINTMENT (OUTPATIENT)
Age: 63
End: 2023-03-13

## 2023-03-13 VITALS
OXYGEN SATURATION: 99 % | HEART RATE: 101 BPM | HEIGHT: 74 IN | RESPIRATION RATE: 16 BRPM | WEIGHT: 184.97 LBS | TEMPERATURE: 98 F | DIASTOLIC BLOOD PRESSURE: 71 MMHG | SYSTOLIC BLOOD PRESSURE: 113 MMHG

## 2023-03-13 VITALS
WEIGHT: 185 LBS | BODY MASS INDEX: 23.74 KG/M2 | DIASTOLIC BLOOD PRESSURE: 80 MMHG | HEART RATE: 62 BPM | SYSTOLIC BLOOD PRESSURE: 128 MMHG | OXYGEN SATURATION: 96 % | HEIGHT: 74 IN

## 2023-03-13 VITALS
HEART RATE: 88 BPM | SYSTOLIC BLOOD PRESSURE: 155 MMHG | TEMPERATURE: 98 F | DIASTOLIC BLOOD PRESSURE: 76 MMHG | OXYGEN SATURATION: 100 % | RESPIRATION RATE: 16 BRPM

## 2023-03-13 DIAGNOSIS — B96.20 BACTEREMIA: ICD-10-CM

## 2023-03-13 DIAGNOSIS — Z09 ENCOUNTER FOR FOLLOW-UP EXAMINATION AFTER COMPLETED TREATMENT FOR CONDITIONS OTHER THAN MALIGNANT NEOPLASM: ICD-10-CM

## 2023-03-13 DIAGNOSIS — Z93.3 COLOSTOMY STATUS: ICD-10-CM

## 2023-03-13 DIAGNOSIS — Z98.890 OTHER SPECIFIED POSTPROCEDURAL STATES: Chronic | ICD-10-CM

## 2023-03-13 DIAGNOSIS — R31.9 HEMATURIA, UNSPECIFIED: ICD-10-CM

## 2023-03-13 DIAGNOSIS — L02.215 CUTANEOUS ABSCESS OF PERINEUM: ICD-10-CM

## 2023-03-13 DIAGNOSIS — R53.83 OTHER FATIGUE: ICD-10-CM

## 2023-03-13 DIAGNOSIS — R78.81 BACTEREMIA: ICD-10-CM

## 2023-03-13 LAB
ALBUMIN SERPL ELPH-MCNC: 3.4 G/DL — SIGNIFICANT CHANGE UP (ref 3.3–5.2)
ALP SERPL-CCNC: 97 U/L — SIGNIFICANT CHANGE UP (ref 40–120)
ALT FLD-CCNC: 31 U/L — SIGNIFICANT CHANGE UP
ANION GAP SERPL CALC-SCNC: 10 MMOL/L — SIGNIFICANT CHANGE UP (ref 5–17)
APPEARANCE UR: ABNORMAL
APTT BLD: 27.3 SEC — LOW (ref 27.5–35.5)
AST SERPL-CCNC: 26 U/L — SIGNIFICANT CHANGE UP
BACTERIA # UR AUTO: ABNORMAL
BASE EXCESS BLDV CALC-SCNC: -1.1 MMOL/L — SIGNIFICANT CHANGE UP (ref -2–3)
BASOPHILS # BLD AUTO: 0.11 K/UL — SIGNIFICANT CHANGE UP (ref 0–0.2)
BASOPHILS NFR BLD AUTO: 1.1 % — SIGNIFICANT CHANGE UP (ref 0–2)
BILIRUB SERPL-MCNC: 0.7 MG/DL — SIGNIFICANT CHANGE UP (ref 0.4–2)
BILIRUB UR-MCNC: NEGATIVE — SIGNIFICANT CHANGE UP
BUN SERPL-MCNC: 16.7 MG/DL — SIGNIFICANT CHANGE UP (ref 8–20)
CA-I SERPL-SCNC: 1.25 MMOL/L — SIGNIFICANT CHANGE UP (ref 1.15–1.33)
CALCIUM SERPL-MCNC: 9.3 MG/DL — SIGNIFICANT CHANGE UP (ref 8.4–10.5)
CHLORIDE BLDV-SCNC: 102 MMOL/L — SIGNIFICANT CHANGE UP (ref 96–108)
CHLORIDE SERPL-SCNC: 99 MMOL/L — SIGNIFICANT CHANGE UP (ref 96–108)
CO2 SERPL-SCNC: 25 MMOL/L — SIGNIFICANT CHANGE UP (ref 22–29)
COLOR SPEC: ABNORMAL
CREAT SERPL-MCNC: 0.91 MG/DL — SIGNIFICANT CHANGE UP (ref 0.5–1.3)
DIFF PNL FLD: ABNORMAL
EGFR: 94 ML/MIN/1.73M2 — SIGNIFICANT CHANGE UP
EOSINOPHIL # BLD AUTO: 0 K/UL — SIGNIFICANT CHANGE UP (ref 0–0.5)
EOSINOPHIL NFR BLD AUTO: 0 % — SIGNIFICANT CHANGE UP (ref 0–6)
EPI CELLS # UR: SIGNIFICANT CHANGE UP
FLUAV AG NPH QL: SIGNIFICANT CHANGE UP
FLUBV AG NPH QL: SIGNIFICANT CHANGE UP
GAS PNL BLDV: 133 MMOL/L — LOW (ref 136–145)
GAS PNL BLDV: SIGNIFICANT CHANGE UP
GLUCOSE BLDV-MCNC: 112 MG/DL — HIGH (ref 70–99)
GLUCOSE SERPL-MCNC: 111 MG/DL — HIGH (ref 70–99)
GLUCOSE UR QL: NEGATIVE MG/DL — SIGNIFICANT CHANGE UP
HCO3 BLDV-SCNC: 24 MMOL/L — SIGNIFICANT CHANGE UP (ref 22–29)
HCT VFR BLD CALC: 28.5 % — LOW (ref 39–50)
HCT VFR BLDA CALC: 29 % — SIGNIFICANT CHANGE UP
HGB BLD CALC-MCNC: 9.5 G/DL — LOW (ref 12.6–17.4)
HGB BLD-MCNC: 9 G/DL — LOW (ref 13–17)
IMM GRANULOCYTES NFR BLD AUTO: 0.4 % — SIGNIFICANT CHANGE UP (ref 0–0.9)
INR BLD: 1.01 RATIO — SIGNIFICANT CHANGE UP (ref 0.88–1.16)
KETONES UR-MCNC: ABNORMAL
LACTATE BLDV-MCNC: 1.7 MMOL/L — SIGNIFICANT CHANGE UP (ref 0.5–2)
LEUKOCYTE ESTERASE UR-ACNC: ABNORMAL
LYMPHOCYTES # BLD AUTO: 0.9 K/UL — LOW (ref 1–3.3)
LYMPHOCYTES # BLD AUTO: 8.7 % — LOW (ref 13–44)
MCHC RBC-ENTMCNC: 30.4 PG — SIGNIFICANT CHANGE UP (ref 27–34)
MCHC RBC-ENTMCNC: 31.6 GM/DL — LOW (ref 32–36)
MCV RBC AUTO: 96.3 FL — SIGNIFICANT CHANGE UP (ref 80–100)
MONOCYTES # BLD AUTO: 0.76 K/UL — SIGNIFICANT CHANGE UP (ref 0–0.9)
MONOCYTES NFR BLD AUTO: 7.4 % — SIGNIFICANT CHANGE UP (ref 2–14)
NEUTROPHILS # BLD AUTO: 8.52 K/UL — HIGH (ref 1.8–7.4)
NEUTROPHILS NFR BLD AUTO: 82.4 % — HIGH (ref 43–77)
NITRITE UR-MCNC: NEGATIVE — SIGNIFICANT CHANGE UP
PCO2 BLDV: 43 MMHG — SIGNIFICANT CHANGE UP (ref 42–55)
PH BLDV: 7.36 — SIGNIFICANT CHANGE UP (ref 7.32–7.43)
PH UR: 6.5 — SIGNIFICANT CHANGE UP (ref 5–8)
PLATELET # BLD AUTO: 818 K/UL — HIGH (ref 150–400)
PO2 BLDV: 69 MMHG — HIGH (ref 25–45)
POTASSIUM BLDV-SCNC: 5.3 MMOL/L — HIGH (ref 3.5–5.1)
POTASSIUM SERPL-MCNC: 5.3 MMOL/L — SIGNIFICANT CHANGE UP (ref 3.5–5.3)
POTASSIUM SERPL-SCNC: 5.3 MMOL/L — SIGNIFICANT CHANGE UP (ref 3.5–5.3)
PROT SERPL-MCNC: 5.9 G/DL — LOW (ref 6.6–8.7)
PROT UR-MCNC: 100
PROTHROM AB SERPL-ACNC: 11.7 SEC — SIGNIFICANT CHANGE UP (ref 10.5–13.4)
RBC # BLD: 2.96 M/UL — LOW (ref 4.2–5.8)
RBC # FLD: 15.3 % — HIGH (ref 10.3–14.5)
RBC CASTS # UR COMP ASSIST: >50 /HPF (ref 0–4)
RSV RNA NPH QL NAA+NON-PROBE: SIGNIFICANT CHANGE UP
SAO2 % BLDV: 93.5 % — SIGNIFICANT CHANGE UP
SARS-COV-2 RNA SPEC QL NAA+PROBE: SIGNIFICANT CHANGE UP
SODIUM SERPL-SCNC: 134 MMOL/L — LOW (ref 135–145)
SP GR SPEC: 1.01 — SIGNIFICANT CHANGE UP (ref 1.01–1.02)
UROBILINOGEN FLD QL: NEGATIVE MG/DL — SIGNIFICANT CHANGE UP
WBC # BLD: 10.33 K/UL — SIGNIFICANT CHANGE UP (ref 3.8–10.5)
WBC # FLD AUTO: 10.33 K/UL — SIGNIFICANT CHANGE UP (ref 3.8–10.5)
WBC UR QL: ABNORMAL /HPF (ref 0–5)

## 2023-03-13 PROCEDURE — 84132 ASSAY OF SERUM POTASSIUM: CPT

## 2023-03-13 PROCEDURE — 36415 COLL VENOUS BLD VENIPUNCTURE: CPT

## 2023-03-13 PROCEDURE — 99285 EMERGENCY DEPT VISIT HI MDM: CPT

## 2023-03-13 PROCEDURE — 96360 HYDRATION IV INFUSION INIT: CPT | Mod: XU

## 2023-03-13 PROCEDURE — 87086 URINE CULTURE/COLONY COUNT: CPT

## 2023-03-13 PROCEDURE — 87637 SARSCOV2&INF A&B&RSV AMP PRB: CPT

## 2023-03-13 PROCEDURE — 85025 COMPLETE CBC W/AUTO DIFF WBC: CPT

## 2023-03-13 PROCEDURE — 85610 PROTHROMBIN TIME: CPT

## 2023-03-13 PROCEDURE — 99285 EMERGENCY DEPT VISIT HI MDM: CPT | Mod: 25

## 2023-03-13 PROCEDURE — 85730 THROMBOPLASTIN TIME PARTIAL: CPT

## 2023-03-13 PROCEDURE — 82435 ASSAY OF BLOOD CHLORIDE: CPT

## 2023-03-13 PROCEDURE — 81001 URINALYSIS AUTO W/SCOPE: CPT

## 2023-03-13 PROCEDURE — 84466 ASSAY OF TRANSFERRIN: CPT

## 2023-03-13 PROCEDURE — 84295 ASSAY OF SERUM SODIUM: CPT

## 2023-03-13 PROCEDURE — 83540 ASSAY OF IRON: CPT

## 2023-03-13 PROCEDURE — 83550 IRON BINDING TEST: CPT

## 2023-03-13 PROCEDURE — 82803 BLOOD GASES ANY COMBINATION: CPT

## 2023-03-13 PROCEDURE — 85014 HEMATOCRIT: CPT

## 2023-03-13 PROCEDURE — 80053 COMPREHEN METABOLIC PANEL: CPT

## 2023-03-13 PROCEDURE — 93005 ELECTROCARDIOGRAM TRACING: CPT

## 2023-03-13 PROCEDURE — 83605 ASSAY OF LACTIC ACID: CPT

## 2023-03-13 PROCEDURE — 93010 ELECTROCARDIOGRAM REPORT: CPT

## 2023-03-13 PROCEDURE — 82947 ASSAY GLUCOSE BLOOD QUANT: CPT

## 2023-03-13 PROCEDURE — 74177 CT ABD & PELVIS W/CONTRAST: CPT | Mod: 26,MA

## 2023-03-13 PROCEDURE — 85018 HEMOGLOBIN: CPT

## 2023-03-13 PROCEDURE — 74177 CT ABD & PELVIS W/CONTRAST: CPT | Mod: MA

## 2023-03-13 PROCEDURE — 82330 ASSAY OF CALCIUM: CPT

## 2023-03-13 PROCEDURE — 99215 OFFICE O/P EST HI 40 MIN: CPT

## 2023-03-13 RX ORDER — SODIUM CHLORIDE 9 MG/ML
1000 INJECTION INTRAMUSCULAR; INTRAVENOUS; SUBCUTANEOUS ONCE
Refills: 0 | Status: COMPLETED | OUTPATIENT
Start: 2023-03-13 | End: 2023-03-13

## 2023-03-13 RX ADMIN — SODIUM CHLORIDE 1000 MILLILITER(S): 9 INJECTION INTRAMUSCULAR; INTRAVENOUS; SUBCUTANEOUS at 13:05

## 2023-03-13 RX ADMIN — SODIUM CHLORIDE 1000 MILLILITER(S): 9 INJECTION INTRAMUSCULAR; INTRAVENOUS; SUBCUTANEOUS at 14:05

## 2023-03-13 NOTE — PHYSICAL EXAM
[General Appearance - Alert] : alert [General Appearance - In No Acute Distress] : in no acute distress [Sclera] : the sclera and conjunctiva were normal [PERRL With Normal Accommodation] : pupils were equal in size, round, reactive to light [Extraocular Movements] : extraocular movements were intact [Outer Ear] : the ears and nose were normal in appearance [Oropharynx] : the oropharynx was normal with no thrush [Neck Appearance] : the appearance of the neck was normal [Neck Cervical Mass (___cm)] : no neck mass was observed [Jugular Venous Distention Increased] : there was no jugular-venous distention [Thyroid Diffuse Enlargement] : the thyroid was not enlarged [Auscultation Breath Sounds / Voice Sounds] : lungs were clear to auscultation bilaterally [Heart Rate And Rhythm] : heart rate was normal and rhythm regular [Heart Sounds] : normal S1 and S2 [Heart Sounds Gallop] : no gallops [Murmurs] : no murmurs [Heart Sounds Pericardial Friction Rub] : no pericardial rub [Bowel Sounds] : normal bowel sounds [Abdomen Soft] : soft [Abdomen Tenderness] : non-tender [] : no hepato-splenomegaly [Abdomen Mass (___ Cm)] : no abdominal mass palpated [Costovertebral Angle Tenderness] : no CVA tenderness [Musculoskeletal - Swelling] : no joint swelling [Nail Clubbing] : no clubbing  or cyanosis of the fingernails [Motor Tone] : muscle strength and tone were normal [FreeTextEntry1] : PERINEAL WOUND CLEAN  SLIGHT  NO RICARDO PUS NO ODOR

## 2023-03-13 NOTE — CONSULT NOTE ADULT - SUBJECTIVE AND OBJECTIVE BOX
62M PMH HTN, HLD, CAD, rectal adenocarcinoma s/p JASON (2019), LAW with ileostomy complicated by leak (2020), anastomotic recurrence (2021) treated with APR in 3/2022 now with biopsy proven recurrence 12/2022 on active treatment with [facility] with Folfiri (LD 2/15/23).  Recent admission for sepsis secondary to rectal mass c/b abscess and ecoli bacteremia, sent home with castillo catheter presenting with hematuria since last night.   Castillo noted to have lakisha hematuria with clots. Daughter exchanged catheter without difficulty but he continued to have hematuria.       Past Medical, Past Surgical, and Family History:  PAST MEDICAL HISTORY:  CAD (coronary artery disease)     Chest pain     Depression with rectal cancer    H/O carotid stenosis     H/O renal calculi     HLD (hyperlipidemia)     HTN (hypertension)     DANO (iron deficiency anemia)     Mitral regurgitation     Neuropathy feet s/p chemo    Rectal cancer 2/2019 s/p chemo and radiation; recurrence of cancer 2022    Splenic artery aneurysm     Stented coronary artery circumflex 2005 LAD 2017 x4    Umbilical hernia.     PAST SURGICAL HISTORY:  H/O cardiac catheterization 2005 2017  4 STENTS    H/O sigmoidoscopy x2    History of CEA (carotid endarterectomy) right 2019    History of rectal surgery with ileostomy 2/2020    History of removal of Port-a-Cath.       MEDICATIONS  (STANDING):    MEDICATIONS  (PRN):        ·Physical Examination:   Constitutional: Well appearing, awake, alert, oriented to person, place, and time/situation and in no apparent distress  EYES: clear bilaterally, pupils equal, round and reactive to light  Cardiac: Tachycardic, regular rhythm. Heart sounds S1, S2. No murmurs, rubs or gallops.  Respiratory: Lungs clear throughout   Gastrointestinal: Abdomen nondistended, non-tender, no rebound guarding or peritoneal signs. Colostomy bag with liquid brown stool no melena   Genitourinary: No CVA tenderness, pelvis nontender, bladder nondistended. Castillo bag with bright red blood no clots noted  Musculoskeletal: Spine appears normal, range of motion is not limited, no muscle or joint tenderness  Neurological: Alert and oriented, no focal deficits      CBC Full  -  ( 13 Mar 2023 13:10 )  WBC Count : 10.33 K/uL  RBC Count : 2.96 M/uL  Hemoglobin : 9.0 g/dL  Hematocrit : 28.5 %  Platelet Count - Automated : 818 K/uL  Mean Cell Volume : 96.3 fl  Mean Cell Hemoglobin : 30.4 pg  Mean Cell Hemoglobin Concentration : 31.6 gm/dL  Auto Neutrophil # : 8.52 K/uL  Auto Lymphocyte # : 0.90 K/uL  Auto Monocyte # : 0.76 K/uL  Auto Eosinophil # : 0.00 K/uL  Auto Basophil # : 0.11 K/uL  Auto Neutrophil % : 82.4 %  Auto Lymphocyte % : 8.7 %  Auto Monocyte % : 7.4 %  Auto Eosinophil % : 0.0 %  Auto Basophil % : 1.1 %       62M PMH HTN, HLD, CAD, rectal adenocarcinoma s/p JASON (2019), LAW with ileostomy complicated by leak (2020), anastomotic recurrence (2021) treated with APR in 3/2022 now with biopsy proven recurrence 12/2022 on active treatment with [facility] with Folfiri (LD 2/15/23).  Recent admission for sepsis secondary to rectal mass c/b abscess and ecoli bacteremia, sent home with castillo catheter presenting with hematuria since last night.   Castillo noted to have lakisha hematuria with small amount and quantity of clots. Daughter exchanged catheter without difficulty last night at home but he continued to have hematuria.         Past Medical, Past Surgical, and Family History:  PAST MEDICAL HISTORY:  CAD (coronary artery disease)   Chest pain   Depression with rectal cancer  H/O carotid stenosis   H/O renal calculi   HLD (hyperlipidemia)   HTN (hypertension)   DANO (iron deficiency anemia)   Mitral regurgitation   Neuropathy feet s/p chemo  Rectal cancer 2/2019 s/p chemo and radiation; recurrence of cancer 2022  Splenic artery aneurysm   Stented coronary artery circumflex 2005 LAD 2017 x4  Umbilical hernia.     PAST SURGICAL HISTORY:  H/O cardiac catheterization 2005 2017  4 STENTS  H/O sigmoidoscopy x2  History of CEA (carotid endarterectomy) right 2019  History of rectal surgery with ileostomy 2/2020        Physical Examination:   Constitutional: Well appearing, awake, alert, oriented to person, place, and time/situation and in no apparent distress  EYES: clear bilaterally, pupils equal, round and reactive to light  Cardiac: Tachycardic, regular rhythm. Heart sounds S1, S2. No murmurs, rubs or gallops.  Respiratory: Lungs clear throughout   Gastrointestinal: Abdomen nondistended, non-tender, no rebound guarding or peritoneal signs. Colostomy bag with liquid brown stool no melena   Genitourinary: No CVA tenderness, pelvis nontender, bladder nondistended. Castillo bag with bright red blood no clots noted  Musculoskeletal: Spine appears normal, range of motion is not limited, no muscle or joint tenderness  Neurological: Alert and oriented, no focal deficits      CBC Full  -  ( 13 Mar 2023 13:10 )  WBC Count : 10.33 K/uL  RBC Count : 2.96 M/uL  Hemoglobin : 9.0 g/dL  Hematocrit : 28.5 %  Platelet Count - Automated : 818 K/uL  Mean Cell Volume : 96.3 fl  Mean Cell Hemoglobin : 30.4 pg  Mean Cell Hemoglobin Concentration : 31.6 gm/dL  Auto Neutrophil # : 8.52 K/uL  Auto Lymphocyte # : 0.90 K/uL  Auto Monocyte # : 0.76 K/uL  Auto Eosinophil # : 0.00 K/uL  Auto Basophil # : 0.11 K/uL  Auto Neutrophil % : 82.4 %  Auto Lymphocyte % : 8.7 %  Auto Monocyte % : 7.4 %  Auto Eosinophil % : 0.0 %  Auto Basophil % : 1.1 %

## 2023-03-13 NOTE — ED ADULT TRIAGE NOTE - CHIEF COMPLAINT QUOTE
Pt sent In from Dr Moura office for bleeding in his Vásquez. Bleeding started 2 days ago. Denies any abdominal pain. Pt is also being treated for a rectal abscess with Augmentin. Pt is being treated at [place] for rectal cancer. Last chemo was 2 weeks ago.

## 2023-03-13 NOTE — CONSULT NOTE ADULT - SUBJECTIVE AND OBJECTIVE BOX
Above notes noted:     Patient wife and daughter present at bedside ( daughter an ICU nurse usually manages dad at home )      Patient is a 63 yo male with PMHx HTN, HLD, CAD, rectal adenocarcinoma s/p resection and diverting ileostomy s/p APR rectum/sigmoid colon with colostomy creation, on chemotherapy, recent admission for sepsis secondary to rectal mass c/b abscess sent home with castillo catheter presenting with hematuria since last night. As per patient and family at bedside patient was sent home on augmentin for a rectal abscess which is improving and with an indwelling castillo for urinary retention secondary to his colonic mass eroding into his prostate and bladder. Patient has had follow up with his urologist and hematologist. Last night patient developed bright red hematuria with intermittent clots; patient daughter was able to replace the castillo (16 fr 2 way) without any complications but has had persistent bright red hematuria. Denies any other symptoms. Denies fevers, chills, dizziness, lightheadedness, dysphagia, dysarthria, diplopia, photophobia, syncope, cough, congestion, SOB, CP, abdominal pain, neck pain, back pain, diarrhea, dysuria, hematochezia, hematemesis, n/v, recent travel, sick contacts, leg swelling.    NKA    Past Medical History:  CAD, depression with rectal cancer, carotid stenosis, renal calculi, HLD, HTN, DANO, MR, neuropathy feet s/p chemo, rectal cancer , with recurrance rectal cancer, splenic artery aneurysm    Past Surgical History:  cardiac catheterization, sigmoidoscopy x 2, right CEA, rectal (APR) surgery with Ileostomy, insertion port-a-cath,  removal port-a-cath.

## 2023-03-13 NOTE — ASSESSMENT
[FreeTextEntry1] : 61YO MALE PMHG HTN RECTAL CARCINOMA WITH RECENT [place] [facility] ADMISSION 2/27-3/6\par ADMITTED WITH SEPSIS SHOCK IN  ICU ON PRESSORS  BLOOD CX ECOLI  BACTEROIDES WAS ON EMMETT BX AND THEN DISCHARGED ON  ORAL ABX ALSO WITH CLARK IN  PLACE\par HERE FOR FOLLOWUP FROM ID STANDPOINT\par OVERALL NON TOXIC \par WAS TO COMPLETE ABX TODAY BUT WULD CONTNUE ANOTHER 2 WEEKS UNTIL KP DECIDES ON ANY FURTHER PROCEDURE\par PT WITH RICARDO BLOOD IN  FOEY BAG\par SUGGEST GO TO [facility] ER\par  AS THEY CAN DEAL WITH HEMATURIA AND THEN EVENTUAL RESUME CHEMO\par PT INSISTS ON GOING TO [place]\par CALLED ER TO LET THEM KNOW HE IS COMING IN\par SPOKE TO DAUGHTER ND WIFE

## 2023-03-13 NOTE — ED PROVIDER NOTE - ATTENDING CONTRIBUTION TO CARE
61 yo male with PMHx HTN, HLD, CAD, rectal adenocarcinoma s/p resection and diverting ileostomy s/p APR rectum/sigmoid colon with colostomy creation, on chemotherapy,  with hematuria x 1 day; as per daughter who changed castillo catheter hematuria started yesterday; pt has followed up with Dr Wilcox for rectal abscess; Pt finished couse of augmentin.  pe awake alert heent ncat neck supple cor s1s2 lungs clear abd  colostomy in place nontender castillo with bright red urine;  neuro nonfocal  dx hematuria

## 2023-03-13 NOTE — CONSULT NOTE ADULT - ASSESSMENT
PE:  awake and alert and oriented X 3; in no acute distress:     Vital Signs Last 24 Hrs  T(C): 36.6 (13 Mar 2023 12:10), Max: 36.6 (13 Mar 2023 12:10)  T(F): 97.9 (13 Mar 2023 12:10), Max: 97.9 (13 Mar 2023 12:10)  HR: 91 (13 Mar 2023 16:35) (91 - 101)  BP: 155/78 (13 Mar 2023 16:35) (113/71 - 155/78)  RR: 18 (13 Mar 2023 16:35) (16 - 18)  SpO2: 100% (13 Mar 2023 16:35) (99% - 100%)    Abdomen: ostomy: functioning good output  Rectal abscess- which was drained-recieving antibiotics for.   :  castillo in place draining well now light pink , to BSD.  Irrigated castillo catheter at request patient wife; , few small clots evacuated urine running freely, remains light pink.     LABS:                         9.0    10.33 )-----------( 818      ( 13 Mar 2023 13:10 )             28.5     03-13    134<L>  |  99  |  16.7  ----------------------------<  111<H>  5.3   |  25.0  |  0.91    Ca    9.3      13 Mar 2023 13:10    TPro  5.9<L>  /  Alb  3.4  /  TBili  0.7  /  DBili  x   /  AST  26  /  ALT  31  /  AlkPhos  97  03-13      CT scan Abdomen & pelvis w/IV contrast 3/13/23:  KIDNEYS/URETERS: Within normal limits.    BLADDER/REPRODUCTIVE ORGANS/BOWEL: Large heterogeneous mass in the rectal   bed measuring 9.6 x 14.1 x 9.1 cm, not significantly changed in size,   invading and fistulizing with the posterior wall of the bladder. Castillo   catheter balloon in the bladder lumen. Rectal mass also invades the   prostate. The mass is also in contact with the sacrum, coccyx and   obturator internus muscles. Small bowel anastomosis in the pelvis. Left   lower quadrant end colostomy.    IMPRESSION:  No significant change in large rectal bed mass which invades the bladder   and fistulizes with the bladder lumen.      Impression:  recurrent rectal carcinoma with rectal mass invading the bladder- hematuria, light pink no cbi in progress no clots Patient does not wish to be admitted and miss his appointment in [place] ) - patient states is going in am to [place] for continued treatment ( [facility] ) stable at present.  Discuss with family present situation and possibly clot of castillo again - daughter aware understands and if neccesary will irrigate and if need change castillo as she has been doing when and if needed.   To discuss present situation with continued care and management  Plan: patient is urologically ok for discharge to continue his treatment at [facility].  Recall if necessary.

## 2023-03-13 NOTE — ED PROVIDER NOTE - PHYSICAL EXAMINATION
Constitutional: Well appearing, awake, alert, oriented to person, place, and time/situation and in no apparent distress  ENMT: Airway patent nasal mucosa clear. Mouth with normal mucosa. Throat has no vesicles no oropharyngeal exudates and uvula is midline. No blood in the oropharynx  EYES: clear bilaterally, pupils equal, round and reactive to light  Cardiac: Tachycardic, regular rhythm. Heart sounds S1, S2. No murmurs, rubs or gallops. Good capillary refill, 2+ pulses, no peripheral edema  Respiratory: Lungs CTAB, no use of accessory muscles, no crackles, satting 99% on RA in no distress  Gastrointestinal: Abdomen nondistended, non-tender, no rebound guarding or peritoneal signs. Colostomy bag with liquid brown stool no melena or hematochezia, c/d/i nontender  Genitourinary: No CVA tenderness, pelvis nontender, bladder nondistended. Vásquez bag with bright red blood no clots   Musculoskeletal: Spine appears normal, range of motion is not limited, no muscle or joint tenderness  Neurological: Alert and oriented, no focal deficits, no motor or sensory deficits. CN 2-12 intact, PERRLA, EOMI, No FND  Skin: Skin normal color for race, warm, dry and intact, No evidence of rash

## 2023-03-13 NOTE — ED ADULT NURSE NOTE - CHIEF COMPLAINT QUOTE
Pt sent In from Dr Moura office for bleeding in his Vásquez. Bleeding started 2 days ago. Denies any abdominal pain. Pt is also being treated for a rectal abscess with Augmentin. Pt is being treated at [place] for rectal cancer. Last chemo was 2 weeks ago.

## 2023-03-13 NOTE — CONSULT NOTE ADULT - ASSESSMENT
62M PMH HTN, HLD, CAD, rectal adenocarcinoma s/p JASON (2019), LAW with ileostomy complicated by leak (2020), anastomotic recurrence (2021) treated with APR in 3/2022 now with biopsy proven recurrence 12/2022 on active treatment with [facility] with Folfiri (LD 2/15/23). Presenting for evaluation of hematuria     Hematuria  - chronic castillo   - intermittent clots noted by patient/family   - urology consult  - check urine studies/culture  - CT abd/pelvis pending   - hgb 9g/dl   - baseline outpatient hgb appears 9-10   - prn PRBC transfusion for hgb < 7   - check iron studies     rectal adenocarcinoma   - on active treatment with FOLFIRI (LD 2/15/23)  - hold treatment while inpatient   - outpatient follow up with [facility]K upon discharge     thrombocytosis  - likely reactive   - monitor  - has baseline thrombocytosis ~ 600k    62M PMH HTN, HLD, CAD, rectal adenocarcinoma s/p JASON (2019), LAW with ileostomy complicated by leak (2020), anastomotic recurrence (2021) treated with APR in 3/2022 now with biopsy proven recurrence 12/2022 on active treatment with [facility] with Folfiri (LD 2/15/23). Presenting for evaluation of hematuria. He us currently on abx treatment with augmentin for rectal abscess with improvement.     Hematuria  - chronic castillo   - intermittent clots noted by patient/family   - urology consulted  - check urine studies/culture  - CT abd/pelvis pending   - hgb 9g/dl   - baseline outpatient hgb appears 9-10   - prn PRBC transfusion for hgb < 7   - check iron studies   - renal function normal, monitor BMP    rectal adenocarcinoma   - on active treatment with FOLFIRI (LD 2/15/23)  - hold treatment while inpatient   - outpatient follow up with [facility] upon discharge     rectal abscess  - on augmentin  - no leukocytosis   - monitor temps    thrombocytosis  - likely reactive   - monitor        thank you  Will update [facility] daily       [facility]   [phone]

## 2023-03-13 NOTE — ED PROVIDER NOTE - CLINICAL SUMMARY MEDICAL DECISION MAKING FREE TEXT BOX
Patient is a 61 yo male with PMHx HTN, HLD, CAD on plavix, rectal adenocarcinoma s/p resection and diverting ileostomy s/p APR rectum/sigmoid colon with colostomy creation, on chemotherapy, recent admission for sepsis secondary to rectal mass c/b abscess sent home with castillo catheter presenting with hematuria since last night. As per patient and family at bedside patient was sent home on augmentin for a rectal abscess which is improving and with an indwelling castillo for urinary retention secondary to his colonic mass eroding into his prostate and bladder. Tachycardic, vss otherwise, lungs ctab, belly soft nontender, castillo with bright red blood, no cva ttp, no edema no rash, capillary refill < 2 seconds.    Will check labs, irrigate castillo, r/o blood loss anemia, r/o electrolyte abnormalities, hydrate, obtain CT A/P to evaluate for worsening mass, reassess.

## 2023-03-13 NOTE — ED ADULT NURSE NOTE - OBJECTIVE STATEMENT
Pt A&O x 4 hx of prostate cancer & leg bag c/o hematuria starting last night. Denies pain. Denies N/V. Denies chills/fever. Last chemo tx 02/14/23. Leg bag changed last night, contains 150ml of dark red urine. Pt placed on telemonitor. IV access obtained; specimens sent to lab.

## 2023-03-13 NOTE — HISTORY OF PRESENT ILLNESS
[FreeTextEntry1] : 63YO MALE PMHG HTN RECTAL CARCINOMA WITH RECENT [facility] ADMISSION 2/27-3/6\par ADMITTED WITH SEPSIS SHOCK IN  ICU ON PRESSORS  BLOOD CX ECOLI  BACTEROIDES WAS ON EMMETT BX AND THEN DISCHARGED ON  ORAL ABX ALSO WITH CLARK IN  PLACE\par HRE FOR FOLLOWUP FROM ID STANDPOINT

## 2023-03-13 NOTE — ED PROVIDER NOTE - OBJECTIVE STATEMENT
Patient is a 61 yo male with PMHx HTN, HLD, CAD, rectal adenocarcinoma s/p resection and diverting ileostomy s/p APR rectum/sigmoid colon with colostomy creation, on chemotherapy, recent admission for sepsis secondary to rectal mass c/b abscess sent home with castillo catheter presenting with hematuria since last night. Patient is a 61 yo male with PMHx HTN, HLD, CAD, rectal adenocarcinoma s/p resection and diverting ileostomy s/p APR rectum/sigmoid colon with colostomy creation, on chemotherapy, recent admission for sepsis secondary to rectal mass c/b abscess sent home with castillo catheter presenting with hematuria since last night. As per patient and family at bedside patient was sent home on augmentin for a rectal abscess which is improving and with an indwelling castillo for urinary retention secondary to his colonic mass eroding into his prostate and bladder. Patient has had follow up with his urologist and hematologist. Last night patient developed bright red hematuria with intermittent clots; patient daughter was able to replace the castillo (16 fr [address]) without any complications but has had persistent bright red hematuria. Denies any other symptoms. Denies fevers, chills, dizziness, lightheadedness, dysphagia, dysarthria, diplopia, photophobia, syncope, cough, congestion, SOB, CP, abdominal pain, neck pain, back pain, diarrhea, dysuria, hematochezia, hematemesis, n/v, recent travel, sick contacts, leg swelling.

## 2023-03-13 NOTE — ED PROVIDER NOTE - PROGRESS NOTE DETAILS
JK - patient H/H, WBC, CMP WNL. CT A/P without acute findings. Castillo irrigated via Urology. Clear urine in castillo bag. VSS, resting comfortably. Ready for DC with return precautions, close urology and heme/onc follow up. Currently stable.

## 2023-03-13 NOTE — REASON FOR VISIT
[Post Hospitalization] : a post hospitalization visit [Spouse] : spouse [Family Member] : family member [FreeTextEntry1] : bacteremia rectal cancer

## 2023-03-13 NOTE — ED PROVIDER NOTE - PATIENT PORTAL LINK FT
You can access the FollowMyHealth Patient Portal offered by [facility] by registering at the following website: [email or web address]. By joining [employer]’s FollowMyHealth portal, you will also be able to view your health information using other applications (apps) compatible with our system.

## 2023-03-13 NOTE — ED ADULT NURSE REASSESSMENT NOTE - NS ED NURSE REASSESS COMMENT FT1
Pt A&O x 4 resting comfortably on stretcher; denies complaints at this time. Vásquez bag with 200ml bloody urine. Denies pain. Bed locked and in lowest position. Call bell within reach. Able to make needs known.

## 2023-03-14 LAB
CULTURE RESULTS: NO GROWTH — SIGNIFICANT CHANGE UP
SPECIMEN SOURCE: SIGNIFICANT CHANGE UP

## 2023-03-17 ENCOUNTER — NON-APPOINTMENT (OUTPATIENT)
Age: 63
End: 2023-03-17

## 2023-03-30 ENCOUNTER — NON-APPOINTMENT (OUTPATIENT)
Age: 63
End: 2023-03-30

## 2023-03-30 RX ORDER — METOPROLOL SUCCINATE 100 MG/1
100 TABLET, EXTENDED RELEASE ORAL TWICE DAILY
Qty: 180 | Refills: 1 | Status: DISCONTINUED | COMMUNITY
End: 2023-03-30

## 2023-03-30 RX ORDER — AMLODIPINE BESYLATE 5 MG/1
5 TABLET ORAL DAILY
Qty: 90 | Refills: 1 | Status: DISCONTINUED | COMMUNITY
End: 2023-03-30

## 2023-04-15 NOTE — ED PROVIDER NOTE - CLINICAL SUMMARY MEDICAL DECISION MAKING FREE TEXT BOX
Pt with rectal bleeding post-ileostomy. VSS, well appearing. Baseline Hb as per family ~9. Plan for labs, IVF, reeval. Likely transfer.
Cigarettes

## 2023-04-16 ENCOUNTER — NON-APPOINTMENT (OUTPATIENT)
Age: 63
End: 2023-04-16

## 2023-04-16 ENCOUNTER — EMERGENCY (EMERGENCY)
Facility: HOSPITAL | Age: 63
LOS: 1 days | Discharge: HOME | End: 2023-04-16
Attending: EMERGENCY MEDICINE
Payer: COMMERCIAL

## 2023-04-16 VITALS
RESPIRATION RATE: 18 BRPM | WEIGHT: 182.98 LBS | OXYGEN SATURATION: 100 % | HEART RATE: 98 BPM | DIASTOLIC BLOOD PRESSURE: 87 MMHG | SYSTOLIC BLOOD PRESSURE: 144 MMHG | HEIGHT: 74 IN | TEMPERATURE: 98 F

## 2023-04-16 VITALS
DIASTOLIC BLOOD PRESSURE: 84 MMHG | SYSTOLIC BLOOD PRESSURE: 166 MMHG | TEMPERATURE: 98 F | OXYGEN SATURATION: 100 % | HEART RATE: 85 BPM | RESPIRATION RATE: 18 BRPM

## 2023-04-16 DIAGNOSIS — Z98.890 OTHER SPECIFIED POSTPROCEDURAL STATES: Chronic | ICD-10-CM

## 2023-04-16 LAB
ALBUMIN SERPL ELPH-MCNC: 3.4 G/DL — SIGNIFICANT CHANGE UP (ref 3.3–5.2)
ALP SERPL-CCNC: 120 U/L — SIGNIFICANT CHANGE UP (ref 40–120)
ALT FLD-CCNC: 14 U/L — SIGNIFICANT CHANGE UP
ANION GAP SERPL CALC-SCNC: 12 MMOL/L — SIGNIFICANT CHANGE UP (ref 5–17)
ANISOCYTOSIS BLD QL: SLIGHT — SIGNIFICANT CHANGE UP
APPEARANCE UR: ABNORMAL
AST SERPL-CCNC: 16 U/L — SIGNIFICANT CHANGE UP
BACTERIA # UR AUTO: ABNORMAL
BASOPHILS # BLD AUTO: 0 K/UL — SIGNIFICANT CHANGE UP (ref 0–0.2)
BASOPHILS NFR BLD AUTO: 0 % — SIGNIFICANT CHANGE UP (ref 0–2)
BILIRUB SERPL-MCNC: 1.4 MG/DL — SIGNIFICANT CHANGE UP (ref 0.4–2)
BILIRUB UR-MCNC: ABNORMAL
BUN SERPL-MCNC: 35 MG/DL — HIGH (ref 8–20)
CALCIUM SERPL-MCNC: 8.6 MG/DL — SIGNIFICANT CHANGE UP (ref 8.4–10.5)
CHLORIDE SERPL-SCNC: 102 MMOL/L — SIGNIFICANT CHANGE UP (ref 96–108)
CO2 SERPL-SCNC: 21 MMOL/L — LOW (ref 22–29)
COLOR SPEC: YELLOW — SIGNIFICANT CHANGE UP
COMMENT - URINE: SIGNIFICANT CHANGE UP
CREAT SERPL-MCNC: 0.73 MG/DL — SIGNIFICANT CHANGE UP (ref 0.5–1.3)
DACRYOCYTES BLD QL SMEAR: SLIGHT — SIGNIFICANT CHANGE UP
DIFF PNL FLD: ABNORMAL
EGFR: 102 ML/MIN/1.73M2 — SIGNIFICANT CHANGE UP
EOSINOPHIL # BLD AUTO: 0.18 K/UL — SIGNIFICANT CHANGE UP (ref 0–0.5)
EOSINOPHIL NFR BLD AUTO: 0.9 % — SIGNIFICANT CHANGE UP (ref 0–6)
EPI CELLS # UR: SIGNIFICANT CHANGE UP
GIANT PLATELETS BLD QL SMEAR: PRESENT — SIGNIFICANT CHANGE UP
GLUCOSE SERPL-MCNC: 109 MG/DL — HIGH (ref 70–99)
GLUCOSE UR QL: NEGATIVE — SIGNIFICANT CHANGE UP
GRAN CASTS # UR COMP ASSIST: ABNORMAL /LPF
HCT VFR BLD CALC: 27.2 % — LOW (ref 39–50)
HGB BLD-MCNC: 9 G/DL — LOW (ref 13–17)
HYALINE CASTS # UR AUTO: ABNORMAL /LPF
KETONES UR-MCNC: NEGATIVE — SIGNIFICANT CHANGE UP
LEUKOCYTE ESTERASE UR-ACNC: ABNORMAL
LYMPHOCYTES # BLD AUTO: 1.59 K/UL — SIGNIFICANT CHANGE UP (ref 1–3.3)
LYMPHOCYTES # BLD AUTO: 7.9 % — LOW (ref 13–44)
MACROCYTES BLD QL: SLIGHT — SIGNIFICANT CHANGE UP
MANUAL SMEAR VERIFICATION: SIGNIFICANT CHANGE UP
MCHC RBC-ENTMCNC: 32.7 PG — SIGNIFICANT CHANGE UP (ref 27–34)
MCHC RBC-ENTMCNC: 33.1 GM/DL — SIGNIFICANT CHANGE UP (ref 32–36)
MCV RBC AUTO: 98.9 FL — SIGNIFICANT CHANGE UP (ref 80–100)
MONOCYTES # BLD AUTO: 0.18 K/UL — SIGNIFICANT CHANGE UP (ref 0–0.9)
MONOCYTES NFR BLD AUTO: 0.9 % — LOW (ref 2–14)
NEUTROPHILS # BLD AUTO: 18.15 K/UL — HIGH (ref 1.8–7.4)
NEUTROPHILS NFR BLD AUTO: 90.3 % — HIGH (ref 43–77)
NITRITE UR-MCNC: POSITIVE
OVALOCYTES BLD QL SMEAR: SLIGHT — SIGNIFICANT CHANGE UP
PH UR: 6 — SIGNIFICANT CHANGE UP (ref 5–8)
PLAT MORPH BLD: NORMAL — SIGNIFICANT CHANGE UP
PLATELET # BLD AUTO: 214 K/UL — SIGNIFICANT CHANGE UP (ref 150–400)
POIKILOCYTOSIS BLD QL AUTO: SLIGHT — SIGNIFICANT CHANGE UP
POLYCHROMASIA BLD QL SMEAR: SLIGHT — SIGNIFICANT CHANGE UP
POTASSIUM SERPL-MCNC: 3.7 MMOL/L — SIGNIFICANT CHANGE UP (ref 3.5–5.3)
POTASSIUM SERPL-SCNC: 3.7 MMOL/L — SIGNIFICANT CHANGE UP (ref 3.5–5.3)
PROT SERPL-MCNC: 5.6 G/DL — LOW (ref 6.6–8.7)
PROT UR-MCNC: 100
RBC # BLD: 2.75 M/UL — LOW (ref 4.2–5.8)
RBC # FLD: 18.6 % — HIGH (ref 10.3–14.5)
RBC BLD AUTO: ABNORMAL
RBC CASTS # UR COMP ASSIST: ABNORMAL /HPF (ref 0–4)
SODIUM SERPL-SCNC: 135 MMOL/L — SIGNIFICANT CHANGE UP (ref 135–145)
SP GR SPEC: 1.02 — SIGNIFICANT CHANGE UP (ref 1.01–1.02)
UROBILINOGEN FLD QL: NEGATIVE — SIGNIFICANT CHANGE UP
WBC # BLD: 20.1 K/UL — HIGH (ref 3.8–10.5)
WBC # FLD AUTO: 20.1 K/UL — HIGH (ref 3.8–10.5)
WBC UR QL: ABNORMAL /HPF (ref 0–5)

## 2023-04-16 PROCEDURE — 81001 URINALYSIS AUTO W/SCOPE: CPT

## 2023-04-16 PROCEDURE — 96361 HYDRATE IV INFUSION ADD-ON: CPT

## 2023-04-16 PROCEDURE — 80053 COMPREHEN METABOLIC PANEL: CPT

## 2023-04-16 PROCEDURE — 87186 SC STD MICRODIL/AGAR DIL: CPT

## 2023-04-16 PROCEDURE — 99284 EMERGENCY DEPT VISIT MOD MDM: CPT | Mod: 25

## 2023-04-16 PROCEDURE — 85025 COMPLETE CBC W/AUTO DIFF WBC: CPT

## 2023-04-16 PROCEDURE — 36415 COLL VENOUS BLD VENIPUNCTURE: CPT

## 2023-04-16 PROCEDURE — 96374 THER/PROPH/DIAG INJ IV PUSH: CPT

## 2023-04-16 PROCEDURE — 87086 URINE CULTURE/COLONY COUNT: CPT

## 2023-04-16 PROCEDURE — 99284 EMERGENCY DEPT VISIT MOD MDM: CPT

## 2023-04-16 PROCEDURE — 87077 CULTURE AEROBIC IDENTIFY: CPT

## 2023-04-16 RX ORDER — SODIUM CHLORIDE 9 MG/ML
1000 INJECTION INTRAMUSCULAR; INTRAVENOUS; SUBCUTANEOUS ONCE
Refills: 0 | Status: COMPLETED | OUTPATIENT
Start: 2023-04-16 | End: 2023-04-16

## 2023-04-16 RX ORDER — CEFTRIAXONE 500 MG/1
1000 INJECTION, POWDER, FOR SOLUTION INTRAMUSCULAR; INTRAVENOUS ONCE
Refills: 0 | Status: DISCONTINUED | OUTPATIENT
Start: 2023-04-16 | End: 2023-04-16

## 2023-04-16 RX ORDER — CEFTRIAXONE 500 MG/1
1000 INJECTION, POWDER, FOR SOLUTION INTRAMUSCULAR; INTRAVENOUS ONCE
Refills: 0 | Status: COMPLETED | OUTPATIENT
Start: 2023-04-16 | End: 2023-04-16

## 2023-04-16 RX ADMIN — SODIUM CHLORIDE 1000 MILLILITER(S): 9 INJECTION INTRAMUSCULAR; INTRAVENOUS; SUBCUTANEOUS at 15:30

## 2023-04-16 RX ADMIN — CEFTRIAXONE 1000 MILLIGRAM(S): 500 INJECTION, POWDER, FOR SOLUTION INTRAMUSCULAR; INTRAVENOUS at 16:57

## 2023-04-16 NOTE — ED PROVIDER NOTE - PATIENT PORTAL LINK FT
You can access the FollowMyHealth Patient Portal offered by [facility] by registering at the following website: [email or web address]. By joining [employer]’s FollowMyHealth portal, you will also be able to view your health information using other applications (apps) compatible with our system.

## 2023-04-16 NOTE — ED PROVIDER NOTE - OBJECTIVE STATEMENT
63 yo male with PMHx HTN, HLD, CAD, rectal adenocarcinoma s/p resection and diverting ileostomy s/p APR rectum/sigmoid colon with colostomy creation, on chemotherapy, present with issues with castillo. has had dark brown urine with sediment over past 2 days with decreased outpt. family changed castillo this morning small amount of dark urine with small clot. denies back pain, no f/c. no other pain.

## 2023-04-16 NOTE — ED PROVIDER NOTE - ATTENDING CONTRIBUTION TO CARE
Aryan: I performed a face to face evaluation of this patient and performed a full history and physical examination on the patient.  I agree with the resident's history, physical examination, and plan of the patient unless otherwise noted. My brief assessment is as follows: see note

## 2023-04-16 NOTE — ED ADULT NURSE NOTE - NSIMPLEMENTINTERV_GEN_ALL_ED
Implemented All Universal Safety Interventions:  [place] to call system. Call bell, personal items and telephone within reach. Instruct patient to call for assistance. Room bathroom lighting operational. Non-slip footwear when patient is off stretcher. Physically safe environment: no spills, clutter or unnecessary equipment. Stretcher in lowest position, wheels locked, appropriate side rails in place.

## 2023-04-16 NOTE — ED PROVIDER NOTE - PROGRESS NOTE DETAILS
Aryan: pt feels well. new castillo draining well with yellow urine, small sediment. >300 ml over past 1-2 hour. no pain or fever. wbc 20k, received neulasta d/t chemo this past tuesday. pt/family requesting no ct given is scheduled to get one in 2 weeks and castillo now draining. bun: cr 50:1, given fluids. Rosario: Vásquez irrigated and patient feeling improved.  Discussed UTI and patient was previously on Augmentin Prescribed by ID, state he did well on this previously so will represcribe.  Patient advised to follow up with doctor regarding minocyline but will take both at this time.  Discussed case with urology, nothing else to do from their standpoint.    Patient is now feeling improved and wishes to leave.  Patient / family members have capacity.    Patient/family was given full return precautions, counseled on red flag symptoms such as LOC, fever, severe pain, or focal deficits and advised to return to the ED for these reasons or any reason that was concerning to them. Patient/family was informed of all significant and incidental findings found on this workup today and all results were reviewed.   All questions were answered, advised to make close follow up with their primary care provider and specialty clinics (as applicable) to follow up with this visit and continue investigation/treatment.   Patient/family has shown adequate understanding and is agreeable to the plan.

## 2023-04-16 NOTE — ED ADULT TRIAGE NOTE - CHIEF COMPLAINT QUOTE
Patient ambulated into ED with steady gait, Pt c/o Vásquez not draining fully for 2 days, stopped completely last night and having blot clots with sediment.

## 2023-04-16 NOTE — ED PROVIDER NOTE - NSFOLLOWUPINSTRUCTIONS_ED_ALL_ED_FT
Urinary Retention    Urinary retention is the inability to completely empty your bladder. This is a common problem in older men, especially with enlarged prostates. If you are sent home with a castillo catheter and a drainage system make sure to keep the drainage bag emptied and lower than your catheter. Keep the castillo catheter in until you follow up with a urologist.    SEEK IMMEDIATE MEDICAL CARE IF YOU DEVELOP THE FOLLOWING SYMPTOMS: the catheter stops draining urine, the catheter falls out, abdominal pain, nausea/vomiting, or chills/fever.     Urinary Tract Infection    A urinary tract infection (UTI) is an infection of any part of the urinary tract, which includes the kidneys, ureters, bladder, and urethra. Risk factors include ignoring your need to urinate, wiping back to front if female, being an uncircumcised male, and having diabetes or a weak immune system. Symptoms include frequent urination, pain or burning with urination, foul smelling urine, cloudy urine, pain in the lower abdomen, blood in the urine, and fever. If you were prescribed an antibiotic medicine, take it as told by your health care provider. Do not stop taking the antibiotic even if you start to feel better.    SEEK IMMEDIATE MEDICAL CARE IF YOU HAVE ANY OF THE FOLLOWING SYMPTOMS: severe back or abdominal pain, fever, inability to keep fluids or medicine down, dizziness/lightheadedness, or a change in mental status.

## 2023-04-16 NOTE — ED PROVIDER NOTE - PHYSICAL EXAMINATION
Gen: No acute distress, non toxic  HEENT: Mucous membranes moist, pink conjunctivae, EOMI  CV: RRR, nl s1/s2.  Resp: CTAB, normal rate and effort  GI: Abdomen soft, NT, ND. No rebound, no guarding  : castillo in place with small dark urine with sediment  Neuro: A&O x 3, moving all 4 extremities  MSK: No spine or joint tenderness to palpation  Skin: No rashes. intact and perfused.

## 2023-04-17 ENCOUNTER — EMERGENCY (EMERGENCY)
Facility: HOSPITAL | Age: 63
LOS: 1 days | Discharge: HOME | End: 2023-04-17
Attending: EMERGENCY MEDICINE
Payer: COMMERCIAL

## 2023-04-17 ENCOUNTER — NON-APPOINTMENT (OUTPATIENT)
Age: 63
End: 2023-04-17

## 2023-04-17 VITALS
SYSTOLIC BLOOD PRESSURE: 171 MMHG | OXYGEN SATURATION: 100 % | DIASTOLIC BLOOD PRESSURE: 78 MMHG | TEMPERATURE: 98 F | HEART RATE: 98 BPM | RESPIRATION RATE: 18 BRPM

## 2023-04-17 VITALS
WEIGHT: 184.97 LBS | HEART RATE: 100 BPM | SYSTOLIC BLOOD PRESSURE: 124 MMHG | HEIGHT: 74 IN | DIASTOLIC BLOOD PRESSURE: 72 MMHG | OXYGEN SATURATION: 100 % | RESPIRATION RATE: 18 BRPM | TEMPERATURE: 98 F

## 2023-04-17 DIAGNOSIS — Z98.890 OTHER SPECIFIED POSTPROCEDURAL STATES: Chronic | ICD-10-CM

## 2023-04-17 LAB
ALBUMIN SERPL ELPH-MCNC: 3.5 G/DL — SIGNIFICANT CHANGE UP (ref 3.3–5.2)
ALP SERPL-CCNC: 96 U/L — SIGNIFICANT CHANGE UP (ref 40–120)
ALT FLD-CCNC: 10 U/L — SIGNIFICANT CHANGE UP
ANION GAP SERPL CALC-SCNC: 14 MMOL/L — SIGNIFICANT CHANGE UP (ref 5–17)
AST SERPL-CCNC: 14 U/L — SIGNIFICANT CHANGE UP
BASOPHILS # BLD AUTO: 0 K/UL — SIGNIFICANT CHANGE UP (ref 0–0.2)
BASOPHILS NFR BLD AUTO: 0 % — SIGNIFICANT CHANGE UP (ref 0–2)
BILIRUB SERPL-MCNC: 1.3 MG/DL — SIGNIFICANT CHANGE UP (ref 0.4–2)
BUN SERPL-MCNC: 28.7 MG/DL — HIGH (ref 8–20)
CALCIUM SERPL-MCNC: 8.4 MG/DL — SIGNIFICANT CHANGE UP (ref 8.4–10.5)
CHLORIDE SERPL-SCNC: 109 MMOL/L — HIGH (ref 96–108)
CO2 SERPL-SCNC: 17 MMOL/L — LOW (ref 22–29)
CREAT SERPL-MCNC: 0.93 MG/DL — SIGNIFICANT CHANGE UP (ref 0.5–1.3)
EGFR: 92 ML/MIN/1.73M2 — SIGNIFICANT CHANGE UP
EOSINOPHIL # BLD AUTO: 0.03 K/UL — SIGNIFICANT CHANGE UP (ref 0–0.5)
EOSINOPHIL NFR BLD AUTO: 0.9 % — SIGNIFICANT CHANGE UP (ref 0–6)
GLUCOSE SERPL-MCNC: 88 MG/DL — SIGNIFICANT CHANGE UP (ref 70–99)
HCT VFR BLD CALC: 25.4 % — LOW (ref 39–50)
HGB BLD-MCNC: 8.2 G/DL — LOW (ref 13–17)
LIDOCAIN IGE QN: 19 U/L — LOW (ref 22–51)
LYMPHOCYTES # BLD AUTO: 0.99 K/UL — LOW (ref 1–3.3)
LYMPHOCYTES # BLD AUTO: 27 % — SIGNIFICANT CHANGE UP (ref 13–44)
MCHC RBC-ENTMCNC: 32.3 GM/DL — SIGNIFICANT CHANGE UP (ref 32–36)
MCHC RBC-ENTMCNC: 32.4 PG — SIGNIFICANT CHANGE UP (ref 27–34)
MCV RBC AUTO: 100.4 FL — HIGH (ref 80–100)
MONOCYTES # BLD AUTO: 0.03 K/UL — SIGNIFICANT CHANGE UP (ref 0–0.9)
MONOCYTES NFR BLD AUTO: 0.9 % — LOW (ref 2–14)
NEUTROPHILS # BLD AUTO: 2.61 K/UL — SIGNIFICANT CHANGE UP (ref 1.8–7.4)
NEUTROPHILS NFR BLD AUTO: 67.6 % — SIGNIFICANT CHANGE UP (ref 43–77)
PLATELET # BLD AUTO: 161 K/UL — SIGNIFICANT CHANGE UP (ref 150–400)
POTASSIUM SERPL-MCNC: 3.7 MMOL/L — SIGNIFICANT CHANGE UP (ref 3.5–5.3)
POTASSIUM SERPL-SCNC: 3.7 MMOL/L — SIGNIFICANT CHANGE UP (ref 3.5–5.3)
PROT SERPL-MCNC: 5.4 G/DL — LOW (ref 6.6–8.7)
RBC # BLD: 2.53 M/UL — LOW (ref 4.2–5.8)
RBC # FLD: 18.6 % — HIGH (ref 10.3–14.5)
SODIUM SERPL-SCNC: 140 MMOL/L — SIGNIFICANT CHANGE UP (ref 135–145)
WBC # BLD: 3.67 K/UL — LOW (ref 3.8–10.5)
WBC # FLD AUTO: 3.67 K/UL — LOW (ref 3.8–10.5)

## 2023-04-17 PROCEDURE — G1004: CPT

## 2023-04-17 PROCEDURE — 99285 EMERGENCY DEPT VISIT HI MDM: CPT

## 2023-04-17 PROCEDURE — 74177 CT ABD & PELVIS W/CONTRAST: CPT | Mod: 26,MG

## 2023-04-17 RX ORDER — IOHEXOL 300 MG/ML
30 INJECTION, SOLUTION INTRAVENOUS ONCE
Refills: 0 | Status: COMPLETED | OUTPATIENT
Start: 2023-04-17 | End: 2023-04-17

## 2023-04-17 RX ORDER — SODIUM CHLORIDE 9 MG/ML
1000 INJECTION INTRAMUSCULAR; INTRAVENOUS; SUBCUTANEOUS ONCE
Refills: 0 | Status: COMPLETED | OUTPATIENT
Start: 2023-04-17 | End: 2023-04-17

## 2023-04-17 RX ADMIN — SODIUM CHLORIDE 1000 MILLILITER(S): 9 INJECTION INTRAMUSCULAR; INTRAVENOUS; SUBCUTANEOUS at 18:09

## 2023-04-17 RX ADMIN — IOHEXOL 30 MILLILITER(S): 300 INJECTION, SOLUTION INTRAVENOUS at 18:10

## 2023-04-17 RX ADMIN — SODIUM CHLORIDE 1000 MILLILITER(S): 9 INJECTION INTRAMUSCULAR; INTRAVENOUS; SUBCUTANEOUS at 19:11

## 2023-04-17 NOTE — ED PROVIDER NOTE - PATIENT PORTAL LINK FT
You can access the FollowMyHealth Patient Portal offered by [facility] by registering at the following website: [email or web address]. By joining [employer]’s FollowMyHealth portal, you will also be able to view your health information using other applications (apps) compatible with our system.

## 2023-04-17 NOTE — ED ADULT NURSE NOTE - OBJECTIVE STATEMENT
pt. sent to ED by urologist because of increased out put in colostomy and no out put in Vásquez bag. pt. stated chronic Vásquez was placed in february then replaced yesterday 4/16 in ED because of obstruction. little drainage noted in leg bag with sediment, small drainage noted around catheter insertion site. Liquid drainage from colostomy with sediment. pt. denies fever, pain, chills, SOB. NSR on cardiac monitor; family at bedside.

## 2023-04-17 NOTE — ED ADULT TRIAGE NOTE - CHIEF COMPLAINT QUOTE
pt states he was here yesterday, had a castillo catheter, states no urine from the castillo but is coming out into colostomy  needs a CT possible fistula, from bladder to intestine  A&Ox3, resp wnl

## 2023-04-17 NOTE — ED PROVIDER NOTE - OBJECTIVE STATEMENT
Pt is a 63 yo M here for possible bladder fistula.  PMHx significant for rectal CA s/p resection on chemotherapy with [facility]. Pt was here yesterday and his castillo was draining very little brown sediment. the castillo was changed and was draining urine and patient discharged.  it stopped draining again last night and he had increased liquid in the ostomy bag. pt went to his urologist today. Dr. dunn who was concerned for a bowel-bladder fistula and sent to the ER for evaluation.  Pt's oncology team is at [facility]

## 2023-04-17 NOTE — ED PROVIDER NOTE - CLINICAL SUMMARY MEDICAL DECISION MAKING FREE TEXT BOX
labs reviewed.  CT pending.  Pt signed out to dr. ronquillo pending ct and reassessment. labs reviewed.  CT pending.  Pt signed out to dr. ronquillo pending ct and reassessment.  Yanna RODRIGUEZ: CT showing colovesicular fistula similar to a month ago.  Patient with urine output through ostomy and small amounts of feculent material through Vásquez catheter.  Patient asymptomatic and feeling well.  Long discussion had with patient and spouse, prefer if needs surgery to do so through [facility].  Discussed with the fellow covering for Dr. Neville ([facility] oncologist), discussed results.  Fellow was unable to reach Dr. Neville.  Patient will prefer to go home [facility] and follow-up with Dr. Neville in the morning rather than be transferred to [facility].   Patient currently stable with normal vital signs no signs of sepsis or acute infection.  Ready for discharge.  Fellow from [facility] states they will reach out to the patient in the morning.  Strict return precautions given for any worsening symptoms or signs of infection.

## 2023-04-17 NOTE — ED ADULT NURSE NOTE - NSIMPLEMENTINTERV_GEN_ALL_ED
Implemented All Universal Safety Interventions:  [place] to call system. Call bell, personal items and telephone within reach. Instruct patient to call for assistance. Room bathroom lighting operational. Non-slip footwear when patient is off stretcher. Physically safe environment: no spills, clutter or unnecessary equipment. Stretcher in lowest position, wheels locked, appropriate side rails in place.

## 2023-04-17 NOTE — ED PROVIDER NOTE - NSFOLLOWUPINSTRUCTIONS_ED_ALL_ED_FT
- Call Dr Neville in the morning for follow up within the next 24 hours.   - Bring results with you to the appointment.   - Return to the ED for any new or worsening symptoms.

## 2023-04-18 PROCEDURE — 96360 HYDRATION IV INFUSION INIT: CPT

## 2023-04-18 PROCEDURE — G1004: CPT

## 2023-04-18 PROCEDURE — 85025 COMPLETE CBC W/AUTO DIFF WBC: CPT

## 2023-04-18 PROCEDURE — 99284 EMERGENCY DEPT VISIT MOD MDM: CPT | Mod: 25

## 2023-04-18 PROCEDURE — 86900 BLOOD TYPING SEROLOGIC ABO: CPT

## 2023-04-18 PROCEDURE — 36415 COLL VENOUS BLD VENIPUNCTURE: CPT

## 2023-04-18 PROCEDURE — 80053 COMPREHEN METABOLIC PANEL: CPT

## 2023-04-18 PROCEDURE — 86850 RBC ANTIBODY SCREEN: CPT

## 2023-04-18 PROCEDURE — 86901 BLOOD TYPING SEROLOGIC RH(D): CPT

## 2023-04-18 PROCEDURE — 83690 ASSAY OF LIPASE: CPT

## 2023-04-18 PROCEDURE — 74177 CT ABD & PELVIS W/CONTRAST: CPT | Mod: MG

## 2023-04-18 RX ADMIN — Medication 1 TABLET(S): at 00:51

## 2023-04-19 ENCOUNTER — NON-APPOINTMENT (OUTPATIENT)
Age: 63
End: 2023-04-19

## 2023-04-22 ENCOUNTER — NON-APPOINTMENT (OUTPATIENT)
Age: 63
End: 2023-04-22

## 2023-04-30 ENCOUNTER — NON-APPOINTMENT (OUTPATIENT)
Age: 63
End: 2023-04-30

## 2023-05-18 ENCOUNTER — NON-APPOINTMENT (OUTPATIENT)
Age: 63
End: 2023-05-18

## 2023-05-27 ENCOUNTER — NON-APPOINTMENT (OUTPATIENT)
Age: 63
End: 2023-05-27

## 2023-06-08 ENCOUNTER — INPATIENT (INPATIENT)
Facility: HOSPITAL | Age: 63
LOS: 4 days | Discharge: HOME | DRG: 698 | End: 2023-06-13
Attending: HOSPITALIST | Admitting: FAMILY MEDICINE
Payer: COMMERCIAL

## 2023-06-08 VITALS
OXYGEN SATURATION: 99 % | TEMPERATURE: 98 F | HEART RATE: 129 BPM | HEIGHT: 74 IN | SYSTOLIC BLOOD PRESSURE: 122 MMHG | RESPIRATION RATE: 24 BRPM | DIASTOLIC BLOOD PRESSURE: 88 MMHG

## 2023-06-08 DIAGNOSIS — Z98.890 OTHER SPECIFIED POSTPROCEDURAL STATES: Chronic | ICD-10-CM

## 2023-06-08 DIAGNOSIS — A41.9 SEPSIS, UNSPECIFIED ORGANISM: ICD-10-CM

## 2023-06-08 DIAGNOSIS — K92.2 GASTROINTESTINAL HEMORRHAGE, UNSPECIFIED: ICD-10-CM

## 2023-06-08 LAB
ALBUMIN SERPL ELPH-MCNC: 3 G/DL — LOW (ref 3.3–5.2)
ALP SERPL-CCNC: 124 U/L — HIGH (ref 40–120)
ALT FLD-CCNC: 8 U/L — SIGNIFICANT CHANGE UP
ANION GAP SERPL CALC-SCNC: 17 MMOL/L — SIGNIFICANT CHANGE UP (ref 5–17)
APPEARANCE UR: ABNORMAL
APTT BLD: 31.7 SEC — SIGNIFICANT CHANGE UP (ref 27.5–35.5)
AST SERPL-CCNC: 12 U/L — SIGNIFICANT CHANGE UP
BACTERIA # UR AUTO: ABNORMAL
BASOPHILS # BLD AUTO: 0.08 K/UL — SIGNIFICANT CHANGE UP (ref 0–0.2)
BASOPHILS NFR BLD AUTO: 0.3 % — SIGNIFICANT CHANGE UP (ref 0–2)
BILIRUB SERPL-MCNC: 0.2 MG/DL — LOW (ref 0.4–2)
BILIRUB UR-MCNC: NEGATIVE — SIGNIFICANT CHANGE UP
BLD GP AB SCN SERPL QL: SIGNIFICANT CHANGE UP
BUN SERPL-MCNC: 19.8 MG/DL — SIGNIFICANT CHANGE UP (ref 8–20)
C DIFF BY PCR RESULT: SIGNIFICANT CHANGE UP
C DIFF BY PCR RESULT: SIGNIFICANT CHANGE UP
CALCIUM SERPL-MCNC: 8.9 MG/DL — SIGNIFICANT CHANGE UP (ref 8.4–10.5)
CHLORIDE SERPL-SCNC: 104 MMOL/L — SIGNIFICANT CHANGE UP (ref 96–108)
CO2 SERPL-SCNC: 16 MMOL/L — LOW (ref 22–29)
COLOR SPEC: YELLOW — SIGNIFICANT CHANGE UP
COMMENT - URINE: SIGNIFICANT CHANGE UP
CREAT SERPL-MCNC: 1.51 MG/DL — HIGH (ref 0.5–1.3)
DIFF PNL FLD: ABNORMAL
EGFR: 51 ML/MIN/1.73M2 — LOW
EOSINOPHIL # BLD AUTO: 0.16 K/UL — SIGNIFICANT CHANGE UP (ref 0–0.5)
EOSINOPHIL NFR BLD AUTO: 0.7 % — SIGNIFICANT CHANGE UP (ref 0–6)
EPI CELLS # UR: SIGNIFICANT CHANGE UP
GLUCOSE BLDC GLUCOMTR-MCNC: 115 MG/DL — HIGH (ref 70–99)
GLUCOSE SERPL-MCNC: 196 MG/DL — HIGH (ref 70–99)
GLUCOSE UR QL: NEGATIVE MG/DL — SIGNIFICANT CHANGE UP
HCT VFR BLD CALC: 27.1 % — LOW (ref 39–50)
HGB BLD-MCNC: 8.5 G/DL — LOW (ref 13–17)
IMM GRANULOCYTES NFR BLD AUTO: 1.3 % — HIGH (ref 0–0.9)
INR BLD: 1.17 RATIO — HIGH (ref 0.88–1.16)
KETONES UR-MCNC: NEGATIVE — SIGNIFICANT CHANGE UP
LACTATE BLDV-MCNC: 2.4 MMOL/L — HIGH (ref 0.5–2)
LACTATE BLDV-MCNC: 5.2 MMOL/L — CRITICAL HIGH (ref 0.5–2)
LACTATE SERPL-SCNC: 1.1 MMOL/L — SIGNIFICANT CHANGE UP (ref 0.5–2)
LEUKOCYTE ESTERASE UR-ACNC: ABNORMAL
LYMPHOCYTES # BLD AUTO: 0.99 K/UL — LOW (ref 1–3.3)
LYMPHOCYTES # BLD AUTO: 4.3 % — LOW (ref 13–44)
MAGNESIUM SERPL-MCNC: 2.2 MG/DL — SIGNIFICANT CHANGE UP (ref 1.6–2.6)
MCHC RBC-ENTMCNC: 31.4 GM/DL — LOW (ref 32–36)
MCHC RBC-ENTMCNC: 31.6 PG — SIGNIFICANT CHANGE UP (ref 27–34)
MCV RBC AUTO: 100.7 FL — HIGH (ref 80–100)
MONOCYTES # BLD AUTO: 0.48 K/UL — SIGNIFICANT CHANGE UP (ref 0–0.9)
MONOCYTES NFR BLD AUTO: 2.1 % — SIGNIFICANT CHANGE UP (ref 2–14)
NEUTROPHILS # BLD AUTO: 20.99 K/UL — HIGH (ref 1.8–7.4)
NEUTROPHILS NFR BLD AUTO: 91.3 % — HIGH (ref 43–77)
NITRITE UR-MCNC: NEGATIVE — SIGNIFICANT CHANGE UP
NT-PROBNP SERPL-SCNC: 413 PG/ML — HIGH (ref 0–300)
PH UR: 6.5 — SIGNIFICANT CHANGE UP (ref 5–8)
PLAT MORPH BLD: SIGNIFICANT CHANGE UP
PLATELET # BLD AUTO: 932 K/UL — HIGH (ref 150–400)
POTASSIUM SERPL-MCNC: 4.4 MMOL/L — SIGNIFICANT CHANGE UP (ref 3.5–5.3)
POTASSIUM SERPL-MCNC: 5.4 MMOL/L — HIGH (ref 3.5–5.3)
POTASSIUM SERPL-SCNC: 4.4 MMOL/L — SIGNIFICANT CHANGE UP (ref 3.5–5.3)
POTASSIUM SERPL-SCNC: 5.4 MMOL/L — HIGH (ref 3.5–5.3)
PROT SERPL-MCNC: 5.9 G/DL — LOW (ref 6.6–8.7)
PROT UR-MCNC: 100
PROTHROM AB SERPL-ACNC: 13.6 SEC — HIGH (ref 10.5–13.4)
RAPID RVP RESULT: SIGNIFICANT CHANGE UP
RBC # BLD: 2.69 M/UL — LOW (ref 4.2–5.8)
RBC # FLD: 15.6 % — HIGH (ref 10.3–14.5)
RBC BLD AUTO: NORMAL — SIGNIFICANT CHANGE UP
RBC CASTS # UR COMP ASSIST: ABNORMAL /HPF (ref 0–4)
SARS-COV-2 RNA SPEC QL NAA+PROBE: SIGNIFICANT CHANGE UP
SODIUM SERPL-SCNC: 137 MMOL/L — SIGNIFICANT CHANGE UP (ref 135–145)
SP GR SPEC: 1.01 — SIGNIFICANT CHANGE UP (ref 1.01–1.02)
TROPONIN T SERPL-MCNC: <0.01 NG/ML — SIGNIFICANT CHANGE UP (ref 0–0.06)
UROBILINOGEN FLD QL: NEGATIVE MG/DL — SIGNIFICANT CHANGE UP
WBC # BLD: 22.99 K/UL — HIGH (ref 3.8–10.5)
WBC # FLD AUTO: 22.99 K/UL — HIGH (ref 3.8–10.5)
WBC UR QL: >50 /HPF (ref 0–5)

## 2023-06-08 PROCEDURE — 99223 1ST HOSP IP/OBS HIGH 75: CPT

## 2023-06-08 PROCEDURE — 99291 CRITICAL CARE FIRST HOUR: CPT

## 2023-06-08 PROCEDURE — 71045 X-RAY EXAM CHEST 1 VIEW: CPT | Mod: 26

## 2023-06-08 PROCEDURE — 93010 ELECTROCARDIOGRAM REPORT: CPT

## 2023-06-08 PROCEDURE — 71275 CT ANGIOGRAPHY CHEST: CPT | Mod: 26,MA

## 2023-06-08 PROCEDURE — 99285 EMERGENCY DEPT VISIT HI MDM: CPT

## 2023-06-08 PROCEDURE — 74178 CT ABD&PLV WO CNTR FLWD CNTR: CPT | Mod: 26,MA

## 2023-06-08 RX ORDER — VANCOMYCIN HCL 1 G
1000 VIAL (EA) INTRAVENOUS EVERY 24 HOURS
Refills: 0 | Status: DISCONTINUED | OUTPATIENT
Start: 2023-06-08 | End: 2023-06-08

## 2023-06-08 RX ORDER — MAGNESIUM OXIDE 400 MG ORAL TABLET 241.3 MG
400 TABLET ORAL
Refills: 0 | Status: DISCONTINUED | OUTPATIENT
Start: 2023-06-08 | End: 2023-06-13

## 2023-06-08 RX ORDER — ALPRAZOLAM 0.25 MG
1 TABLET ORAL
Qty: 0 | Refills: 0 | DISCHARGE

## 2023-06-08 RX ORDER — FERROUS SULFATE 325(65) MG
1 TABLET ORAL
Qty: 0 | Refills: 0 | DISCHARGE

## 2023-06-08 RX ORDER — PIPERACILLIN AND TAZOBACTAM 4; .5 G/20ML; G/20ML
3.38 INJECTION, POWDER, LYOPHILIZED, FOR SOLUTION INTRAVENOUS EVERY 8 HOURS
Refills: 0 | Status: DISCONTINUED | OUTPATIENT
Start: 2023-06-08 | End: 2023-06-08

## 2023-06-08 RX ORDER — SODIUM ZIRCONIUM CYCLOSILICATE 10 G/10G
10 POWDER, FOR SUSPENSION ORAL ONCE
Refills: 0 | Status: COMPLETED | OUTPATIENT
Start: 2023-06-08 | End: 2023-06-08

## 2023-06-08 RX ORDER — VANCOMYCIN HCL 1 G
1000 VIAL (EA) INTRAVENOUS ONCE
Refills: 0 | Status: COMPLETED | OUTPATIENT
Start: 2023-06-08 | End: 2023-06-08

## 2023-06-08 RX ORDER — PIPERACILLIN AND TAZOBACTAM 4; .5 G/20ML; G/20ML
3.38 INJECTION, POWDER, LYOPHILIZED, FOR SOLUTION INTRAVENOUS ONCE
Refills: 0 | Status: COMPLETED | OUTPATIENT
Start: 2023-06-08 | End: 2023-06-08

## 2023-06-08 RX ORDER — ATORVASTATIN CALCIUM 80 MG/1
40 TABLET, FILM COATED ORAL AT BEDTIME
Refills: 0 | Status: DISCONTINUED | OUTPATIENT
Start: 2023-06-08 | End: 2023-06-13

## 2023-06-08 RX ORDER — SODIUM CHLORIDE 9 MG/ML
1000 INJECTION, SOLUTION INTRAVENOUS ONCE
Refills: 0 | Status: COMPLETED | OUTPATIENT
Start: 2023-06-08 | End: 2023-06-08

## 2023-06-08 RX ORDER — SODIUM CHLORIDE 9 MG/ML
500 INJECTION, SOLUTION INTRAVENOUS ONCE
Refills: 0 | Status: COMPLETED | OUTPATIENT
Start: 2023-06-08 | End: 2023-06-08

## 2023-06-08 RX ORDER — MAGNESIUM SULFATE 500 MG/ML
1 VIAL (ML) INJECTION ONCE
Refills: 0 | Status: COMPLETED | OUTPATIENT
Start: 2023-06-08 | End: 2023-06-08

## 2023-06-08 RX ORDER — LANOLIN ALCOHOL/MO/W.PET/CERES
3 CREAM (GRAM) TOPICAL AT BEDTIME
Refills: 0 | Status: DISCONTINUED | OUTPATIENT
Start: 2023-06-08 | End: 2023-06-13

## 2023-06-08 RX ORDER — PIPERACILLIN AND TAZOBACTAM 4; .5 G/20ML; G/20ML
3.38 INJECTION, POWDER, LYOPHILIZED, FOR SOLUTION INTRAVENOUS ONCE
Refills: 0 | Status: DISCONTINUED | OUTPATIENT
Start: 2023-06-08 | End: 2023-06-08

## 2023-06-08 RX ORDER — PANTOPRAZOLE SODIUM 20 MG/1
8 TABLET, DELAYED RELEASE ORAL
Qty: 80 | Refills: 0 | Status: DISCONTINUED | OUTPATIENT
Start: 2023-06-08 | End: 2023-06-10

## 2023-06-08 RX ORDER — MEROPENEM-VABORBACTAM 1; 1 G/2G; G/2G
4 INJECTION, POWDER, FOR SOLUTION INTRAVENOUS ONCE
Refills: 0 | Status: COMPLETED | OUTPATIENT
Start: 2023-06-08 | End: 2023-06-08

## 2023-06-08 RX ORDER — MEROPENEM-VABORBACTAM 1; 1 G/2G; G/2G
4 INJECTION, POWDER, FOR SOLUTION INTRAVENOUS EVERY 8 HOURS
Refills: 0 | Status: DISCONTINUED | OUTPATIENT
Start: 2023-06-09 | End: 2023-06-13

## 2023-06-08 RX ORDER — CHOLECALCIFEROL (VITAMIN D3) 125 MCG
1000 CAPSULE ORAL DAILY
Refills: 0 | Status: DISCONTINUED | OUTPATIENT
Start: 2023-06-08 | End: 2023-06-13

## 2023-06-08 RX ORDER — VANCOMYCIN HCL 1 G
125 VIAL (EA) INTRAVENOUS EVERY 6 HOURS
Refills: 0 | Status: DISCONTINUED | OUTPATIENT
Start: 2023-06-08 | End: 2023-06-09

## 2023-06-08 RX ORDER — CHLORHEXIDINE GLUCONATE 213 G/1000ML
1 SOLUTION TOPICAL DAILY
Refills: 0 | Status: DISCONTINUED | OUTPATIENT
Start: 2023-06-08 | End: 2023-06-13

## 2023-06-08 RX ORDER — ONDANSETRON 8 MG/1
4 TABLET, FILM COATED ORAL EVERY 8 HOURS
Refills: 0 | Status: DISCONTINUED | OUTPATIENT
Start: 2023-06-08 | End: 2023-06-13

## 2023-06-08 RX ORDER — SODIUM CHLORIDE 9 MG/ML
1000 INJECTION, SOLUTION INTRAVENOUS
Refills: 0 | Status: DISCONTINUED | OUTPATIENT
Start: 2023-06-08 | End: 2023-06-10

## 2023-06-08 RX ORDER — CARVEDILOL PHOSPHATE 80 MG/1
12.5 CAPSULE, EXTENDED RELEASE ORAL EVERY 12 HOURS
Refills: 0 | Status: DISCONTINUED | OUTPATIENT
Start: 2023-06-08 | End: 2023-06-13

## 2023-06-08 RX ORDER — ACETAMINOPHEN 500 MG
650 TABLET ORAL EVERY 6 HOURS
Refills: 0 | Status: DISCONTINUED | OUTPATIENT
Start: 2023-06-08 | End: 2023-06-13

## 2023-06-08 RX ADMIN — SODIUM CHLORIDE 1000 MILLILITER(S): 9 INJECTION, SOLUTION INTRAVENOUS at 13:15

## 2023-06-08 RX ADMIN — PIPERACILLIN AND TAZOBACTAM 25 GRAM(S): 4; .5 INJECTION, POWDER, LYOPHILIZED, FOR SOLUTION INTRAVENOUS at 16:00

## 2023-06-08 RX ADMIN — PIPERACILLIN AND TAZOBACTAM 3.38 GRAM(S): 4; .5 INJECTION, POWDER, LYOPHILIZED, FOR SOLUTION INTRAVENOUS at 12:10

## 2023-06-08 RX ADMIN — PANTOPRAZOLE SODIUM 10 MG/HR: 20 TABLET, DELAYED RELEASE ORAL at 17:01

## 2023-06-08 RX ADMIN — SODIUM CHLORIDE 1000 MILLILITER(S): 9 INJECTION, SOLUTION INTRAVENOUS at 10:59

## 2023-06-08 RX ADMIN — SODIUM CHLORIDE 1000 MILLILITER(S): 9 INJECTION, SOLUTION INTRAVENOUS at 12:12

## 2023-06-08 RX ADMIN — CARVEDILOL PHOSPHATE 12.5 MILLIGRAM(S): 80 CAPSULE, EXTENDED RELEASE ORAL at 17:17

## 2023-06-08 RX ADMIN — Medication 100 GRAM(S): at 16:00

## 2023-06-08 RX ADMIN — Medication 250 MILLIGRAM(S): at 12:13

## 2023-06-08 RX ADMIN — MAGNESIUM OXIDE 400 MG ORAL TABLET 400 MILLIGRAM(S): 241.3 TABLET ORAL at 17:17

## 2023-06-08 RX ADMIN — PIPERACILLIN AND TAZOBACTAM 200 GRAM(S): 4; .5 INJECTION, POWDER, LYOPHILIZED, FOR SOLUTION INTRAVENOUS at 11:00

## 2023-06-08 RX ADMIN — Medication 1000 MILLIGRAM(S): at 13:15

## 2023-06-08 RX ADMIN — SODIUM CHLORIDE 500 MILLILITER(S): 9 INJECTION, SOLUTION INTRAVENOUS at 15:35

## 2023-06-08 RX ADMIN — SODIUM CHLORIDE 500 MILLILITER(S): 9 INJECTION, SOLUTION INTRAVENOUS at 14:23

## 2023-06-08 RX ADMIN — SODIUM CHLORIDE 100 MILLILITER(S): 9 INJECTION, SOLUTION INTRAVENOUS at 16:19

## 2023-06-08 RX ADMIN — ATORVASTATIN CALCIUM 40 MILLIGRAM(S): 80 TABLET, FILM COATED ORAL at 21:08

## 2023-06-08 RX ADMIN — Medication 125 MILLIGRAM(S): at 21:08

## 2023-06-08 RX ADMIN — MEROPENEM-VABORBACTAM 83.33 GRAM(S): 1; 1 INJECTION, POWDER, FOR SOLUTION INTRAVENOUS at 21:15

## 2023-06-08 NOTE — ED PROVIDER NOTE - PHYSICAL EXAMINATION
Gen: Ill appearing, in mild distress.   HEENT: pale mucous membranes, EOMI  CV: Tachycardic 111, nl s1/s2.  Resp: CTAB, normal rate and effort  GI: Abdomen soft, NT, ND. No rebound, no guarding. ostomy bag in place with melena. ileal conduit in place with yellow urine.   Neuro: A&O x 3, moving all 4 extremities  MSK: No spine or joint tenderness to palpation  Skin: Pale, no rashes.   Vascular: No LE edema Gen: Ill appearing, in mild distress.   HEENT: pale mucous membranes, EOMI  CV: Tachycardic 111, nl s1/s2.  Resp: CTAB, normal rate and effort  GI: Abdomen soft, NT, ND. No rebound, no guarding. ostomy bag in place with bloody output. ileal conduit in place with yellow urine.   Neuro: A&O x 3, moving all 4 extremities  MSK: No spine or joint tenderness to palpation  Skin: Pale, no rashes.   Vascular: No LE edema

## 2023-06-08 NOTE — CONSULT NOTE ADULT - PROBLEM SELECTOR RECOMMENDATION 9
Melena via colostomy this morning. Pn Plavix, last dose this morning. CT of abdomen and pelvis revealed Status post resection of fistulized small bowel and creation of an ileal conduit. No evidence of active intraluminal extravasation of contrast.   Hemoglobin stable at 8.5gm, currently receiving 1uPRBC.    Plan:  - Possible EGD tomorrow  - Clear liquid diet. NPO after midnight  - Maintain current T&S, INR <1.5, Hgb >7.0, Plt >50 prior to procedure.  - Hold AC for procedure   - Cardiology evaluation   - Pantoprazole Infusion Melena via colostomy this morning. Pn Plavix, last dose this morning. CT of abdomen and pelvis revealed Status post resection of fistulized small bowel and creation of an ileal conduit. No evidence of active intraluminal extravasation of contrast.   Hemoglobin stable at 8.5gm, currently receiving 1uPRBC.    Plan:  -  EGD tomorrow  - Clear liquid diet. NPO after midnight  - Maintain current T&S, INR <1.5, Hgb >7.0, Plt >50 prior to procedure.  - Hold AC for procedure   - Cardiology evaluation   - Pantoprazole Infusion

## 2023-06-08 NOTE — CONSULT NOTE ADULT - SUBJECTIVE AND OBJECTIVE BOX
[facility]                                   [address] [place] [address]                                                      PHONE: [phone]                                                         FAX: [phone]  [email or web address]  ---------------------------------------------------------------------------------------------------------------------------------    Reason for Consult:  CARDIAC CLEARANCE    HPI:      PASTORA CHURCHILL is an 62y Male with past medical history significant for of CAD/MI s/p multiple PCIs (most recent in 2017 to LAD), HTN, HLD, right CEA (), rectal adenocarcinoma s/p resection with diverting ileostomy () followed by chemo and radiotherapy c/b local recurrence s/p colostomy creation 3/2022,  Patient also with recent sepsis secondary to rectal mass c/b abscess and ecoli bacteremia. Now s/p fistula takedown and ileal conduit at [facility] on 23 and was Discharged on 23. presents with bloody outpt from colostomy  Called for clearance for EGD     PAST MEDICAL & SURGICAL HISTORY:  Chest pain      HTN (hypertension)      HLD (hyperlipidemia)      CAD (coronary artery disease)      Mitral regurgitation      Rectal cancer  2019 s/p chemo and radiation; recurrence of cancer       Depression  with rectal cancer      Neuropathy  feet s/p chemo      Stented coronary artery  circumflex  LAD 2017 x4      H/O carotid stenosis      H/O renal calculi      DANO (iron deficiency anemia)      Umbilical hernia      Splenic artery aneurysm      H/O cardiac catheterization   2017  4 STENTS      History of CEA (carotid endarterectomy)  right 2019      History of rectal surgery  with ileostomy 2020      H/O sigmoidoscopy  x2      History of removal of Port-a-Cath          No Known Allergies      MEDICATIONS  (STANDING):  atorvastatin 40 milliGRAM(s) Oral at bedtime  carvedilol 12.5 milliGRAM(s) Oral every 12 hours  cholecalciferol 1000 Unit(s) Oral daily  dextrose 5% + sodium chloride 0.9%. 1000 milliLiter(s) (100 mL/Hr) IV Continuous <Continuous>  magnesium oxide 400 milliGRAM(s) Oral two times a day with meals  pantoprazole Infusion 8 mG/Hr (10 mL/Hr) IV Continuous <Continuous>  piperacillin/tazobactam IVPB.. 3.375 Gram(s) IV Intermittent every 8 hours    MEDICATIONS  (PRN):  acetaminophen     Tablet .. 650 milliGRAM(s) Oral every 6 hours PRN Temp greater or equal to 38C (100.4F), Mild Pain (1 - 3)  aluminum hydroxide/magnesium hydroxide/simethicone Suspension 30 milliLiter(s) Oral every 4 hours PRN Dyspepsia  melatonin 3 milliGRAM(s) Oral at bedtime PRN Insomnia  ondansetron Injectable 4 milliGRAM(s) IV Push every 8 hours PRN Nausea and/or Vomiting      Social History:  Cigarettes:                    Alchohol:                 Illicit Drug Abuse:      REVIEW OF SYSTEMS:    Constitutional: No fever, weight loss or fatigue  Eyes: No eye pain, visual disturbances, or discharge  ENMT:  No difficulty hearing, tinnitus, vertigo; No sinus or throat pain  Neck: No pain or stiffness  Respiratory: No cough, wheezing, chills or hemoptysis  Cardiovascular: No chest pain, palpitations, shortness of breath, dizziness or leg swelling  Gastrointestinal: No abdominal or epigastric pain. No nausea, vomiting or hematemesis; No diarrhea or constipation. No melena or hematochezia.  Genitourinary: No dysuria, frequency, hematuria or incontinence  Rectal: No pain, hemorrhoids or incontinence  Neurological: No headaches, memory loss, loss of strength, numbness or tremors  Skin: No itching, burning, rashes or lesions   Lymph Nodes: No enlarged glands  Endocrine: No heat or cold intolerance; No hair loss  Musculoskeletal: No joint pain or swelling; No muscle, back or extremity pain  Psychiatric: No depression, anxiety, mood swings or difficulty sleeping  Heme/Lymph: No easy bruising or bleeding gums  Allergy and Immunologic: No hives or eczema    Vital Signs Last 24 Hrs  T(C): 36.6 (2023 13:00), Max: 36.8 (2023 12:40)  T(F): 97.9 (2023 13:00), Max: 98.3 (2023 12:40)  HR: 87 (2023 15:) (87 - 129)  BP: 149/75 (2023 15:) (122/88 - 149/75)  BP(mean): --  RR: 16 (2023 15:) (16 - 24)  SpO2: 100% (2023 15:) (99% - 100%)    Parameters below as of 2023 15:  Patient On (Oxygen Delivery Method): room air      ICU Vital Signs Last 24 Hrs  PASTORA CHURCHILL  I&O's Detail    2023 07:01  -  2023 17:03  --------------------------------------------------------  IN:  Total IN: 0 mL    OUT:    Voided (mL): 400 mL  Total OUT: 400 mL    Total NET: -400 mL        I&O's Summary    2023 07:01  -  2023 17:03  --------------------------------------------------------  IN: 0 mL / OUT: 400 mL / NET: -400 mL      Drug Dosing Weight  PASTORA CHURCHILL      PHYSICAL EXAM:  General: Appears alert and cooperative.  HEENT: Head; normocephalic, atraumatic.  Eyes: Pupils reactive, cornea wnl.  Neck: Supple, no nodes adenopathy, no NVD or carotid bruit or thyromegaly.  CARDIOVASCULAR: Normal S1 and S2, No murmur, rub, gallop or lift.   LUNGS: No rales, rhonchi or wheeze. Normal breath sounds bilaterally.  ABDOMEN: Soft, nontender without mass or organomegaly. bowel sounds normoactive.  EXTREMITIES: No clubbing, cyanosis or edema. Distal pulses wnl.   SKIN: warm and dry with normal turgor.  NEURO: Alert/oriented x 3/normal motor exam. No pathologic reflexes.    PSYCH: normal affect.      >>> <<<  LABS:                        8.5    22.99 )-----------( 932      ( 2023 10:35 )             27.1     08    x   |  x   |  x   ----------------------------<  x   4.4   |  x   |  x     Ca    8.9      2023 10:35  Mg     1.5         TPro  5.9<L>  /  Alb  3.0<L>  /  TBili  0.2<L>  /  DBili  x   /  AST  12  /  ALT  8   /  AlkPhos  124<H>      PSATORA CHURCHILL  CARDIAC MARKERS ( 2023 10:35 )  x     / <0.01 ng/mL / x     / x     / x          PT/INR - ( 2023 10:35 )   PT: 13.6 sec;   INR: 1.17 ratio         PTT - ( 2023 10:35 )  PTT:31.7 sec  Urinalysis Basic - ( 2023 11:31 )    Color: Yellow / Appearance: Slightly Turbid / S.010 / pH: x  Gluc: x / Ketone: Negative  / Bili: Negative / Urobili: Negative mg/dL   Blood: x / Protein: 100 / Nitrite: Negative   Leuk Esterase: Moderate / RBC: 3-5 /HPF / WBC >50 /HPF   Sq Epi: x / Non Sq Epi: x / Bacteria: Occasional        RADIOLOGY & ADDITIONAL STUDIES:    INTERPRETATION OF TELEMETRY (personally reviewed):    ECG: NSR     ECHO:   < from: TTE Echo Complete w/o Contrast w/ Doppler (23 @ 11:40) >  Summary:   1. Left ventricular ejection fraction, by visual estimation, is 40 to   45%.   2. Moderately decreased global left ventricular systolic function.   3. The left ventricular diastolic function could not be assessed in this   study.   4. Moderately enlarged right ventricle.   5. Moderately reduced RV systolic function.   6. Mildly enlarged left atrium.   7. Mildly enlarged right atrium.   8. There is no evidence of pericardial effusion.   9. Mild mitral annular calcification.  10. Mild thickening and calcification of the anterior and posterior   mitral valve leaflets.  11. Moderate mitral valve regurgitation.  12. Moderate tricuspid regurgitation.  13. Estimated pulmonary artery systolic pressure is 37.7 mmHg assuming a   right atrial pressure of 15 mmHg, which is consistent with borderline   pulmonary hypertension.  14. Endocardial visualization was enhanced with intravenous echo contrast.    < end of copied text >    STRESS TEST:    CARDIAC CATHETERIZATION:   < from: Cardiac Cath Lab - Adult (17 @ 11:25) >  DIAGNOSTIC RECOMMENDATIONS: ASA and Plavix  INTERVENTIONAL IMPRESSIONS: Prior stent in LCX is patent with severe LAD  disease treated with PTCA and STENT (AQUILINO-Resolute) with IVUS.  INTERVENTIONAL RECOMMENDATIONS: ASA and Plavix  Prepared and signed by  Tim Joaquin MD    < end of copied text >    ACTIVE PROBLEMS:  HEALTH ISSUES - PROBLEM Dx:  GIB (gastrointestinal bleeding)            Assessment and Plan:    In summary,   PASTORA CHURCHILL is an 62y Male with past medical history significant for of CAD/MI s/p multiple PCIs (most recent in  to LAD), HTN, HLD, right CEA (), rectal adenocarcinoma s/p resection with diverting ileostomy () followed by chemo and radiotherapy c/b local recurrence s/p colostomy creation 3/2022,  Patient also with recent sepsis secondary to rectal mass c/b abscess and ecoli bacteremia. Now s/p fistula takedown and ileal conduit at [facility] on 23 and was Discharged on 23. presents with bloody outpt from colostomy  Called for clearance for EGD     1) Preoperative Optimization       -No Cardiovascular Contraindication to proceed with EGD       -Patient is acceptable risk for a low risk procedure, Pt took his plavix today       -Continue cardiac medications as scheduled       -Please recall with any questions or concerns Continue present cardiac therapy    SEAN PIZARRO MD, FACC, JENNIFER

## 2023-06-08 NOTE — ED ADULT TRIAGE NOTE - CHIEF COMPLAINT QUOTE
Patient presents to Ed for medical evaluation, reports colostomy bleeding with onset this am, urostomy placed 1.5 weeks ago, patient tachycardic, tachipneic, resp even/labored, A&OX4, placed in critical care.

## 2023-06-08 NOTE — ED ADULT NURSE NOTE - NS PRO AD PATIENT TYPE
PATIENT INFORMATION      IMMUNIZATIONS GIVE TODAY: Infarix, FLU  Anticipatory guidance discussed  Importance of varied diet    Follow-Up  - Return for your 1 year well child visit.    18 months old Health and Safety Tips - The following hyperlinks are available to access via [employer]    Parent Education from Healthy Parent    Educación para padres sobre niños sanos      Common dosing for acetaminophen and ibuprofen:   Acetaminophen (Tylenol) can be given every 4 hours.  · Infant or Children's Elixir: 160mg/5ml for  Weight 18-23 lbs 24-35 lbs 36-47 lbs   Dose 3.75 ml 5 ml 7.5 ml      Weight 48-59 lbs 60-71 lsb 72-95 lbs   Dose 10 ml 12.5 ml 15 ml     Ibuprofen (Motrin) can be given every 6 hours.  Safe for children 6 months and older.  · Infant Drops: 50mg/1.25ml  Weight 12-17 lbs 18-23 lbs   Dose 1.25 ml 1.875 ml     · Children's Elixir 100mg/5ml   Weight 12-17 lbs 18-23 lbs 24-35 lbs 36-47 lbs   Dose 2.5 ml 3.75 ml 5 ml 7.5 ml        Weight 48-59 lbs 60-71 lbs 72-95 lbs   Dose 10 ml 12.5 ml 15 ml     Additional Educational Resources:  For additional resources regarding your symptoms, diagnosis, or further health information, please visit the Discover a Healthier You section on [email or web address] or the Online Health Resources section in [employer].   Health Care Proxy (HCP)/Do Not Resuscitate (DNR)/Medical Orders for Life-Sustaining Treatment (MOLST)

## 2023-06-08 NOTE — H&P ADULT - NSHPPHYSICALEXAM_GEN_ALL_CORE
T(C): 36.6 (06-08-23 @ 13:00), Max: 36.8 (06-08-23 @ 12:40)  HR: 87 (06-08-23 @ 15:31) (87 - 129)  BP: 149/75 (06-08-23 @ 15:31) (122/88 - 149/75)  RR: 16 (06-08-23 @ 15:31) (16 - 24)  SpO2: 100% (06-08-23 @ 15:31) (99% - 100%)    GEN - NAD  HEENT - NCAT, EOMI, DARYN,  RESP - CTA BL, no wheeze//rhonchi/crackles. not on supplemental O2.  CARDIO - NS1S2, RRR. No murmurs  ABD - Soft/Non tender/Non distended. +bs. colostomy bag-black loose stool.incision clear-healing well,mild tender.. ileal conduit present,castillo bag clear urine,rectal pinr ose prsent  Ext - No GLO. no signs of venous/arterial stasis ulcers  MSK - full ROM of BL upper and lower extremities without pain or restriction. BL 5/5 strength on upper and lower extremities.   Neuro - cn 2-12 grossly intact.  gait not observed.   Psych- AAOx3. attentive. normal affect.

## 2023-06-08 NOTE — H&P ADULT - ASSESSMENT
62M PMH HTN, HLD, CAD, S/p stents (in 2006 & 2018),afib not on AC per pt choice,  LAD), HTN, HLD, right CEA (2019), rectal adenocarcinoma s/p JASON (2019), LAW with ileostomy complicated by leak (2020), anastomotic recurrence (2021) treated with APR rectum/sigmoid colon in 3/2022 with colostomy creation, now with biopsy proven recurrence 12/2022 [place] [facility] [place]. 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    Pt came to  [facility] with black loose bm from colostomy bag since this AM. As per daughter, who is at bedside pt was having nonbloody output from colostomy yesterday. This morning reports melena via colostomy. Of note, pt was on Heparin sq ppx  while in Hospital and restarted on Plavix yesterday. Last dose this morning. Pt denies any prior EGDs in the past. Last colonoscopy 2 years ago. CT of abdomen and pelvis revealed Status post resection of fistulized small bowel and creation of an ileal conduit. No evidence of active intraluminal extravasation of contrast. Hemoglobin stable at 8.5gm, currently receiving 1u PRBC. Denies nausea, vomiting, abdominal pain, fever, chills, chest pain, shortness of breath, hematemesis, hematochezia. High wbc with lactic acidosis.      Sepsis unclear etiology r/o cdif,UTI  Recent bladder/rectal absces with prolong abx cource  -Blood c/s,urine c/s  -stool cdiff  -iv zosyn,vanco per ID.F/trough adjust dose  -repeat LA. S/P 2.5 L BOLUS ED,WILL CONTINUE  Gi bleed   62M PMH HTN, HLD, CAD, S/p stents (in 2006 & 2018),afib not on AC per pt choice,  LAD), HTN, HLD, right CEA (2019), rectal adenocarcinoma s/p JASON (2019), LAW with ileostomy complicated by leak (2020), anastomotic recurrence (2021) treated with APR rectum/sigmoid colon in 3/2022 with colostomy creation, now with biopsy proven recurrence 12/2022 last [place] [facility] [place]. 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    Pt came to  [facility] with black loose bm from colostomy bag since this AM. As per daughter, who is at bedside pt was having nonbloody output from colostomy yesterday. This morning reports melena via colostomy. Of note, pt was on Heparin sq ppx  while in Hospital and restarted on Plavix yesterday. Last dose this morning. Pt denies any prior EGDs in the past. Last colonoscopy 2 years ago. CT of abdomen and pelvis revealed Status post resection of fistulized small bowel and creation of an ileal conduit. No evidence of active intraluminal extravasation of contrast. Hemoglobin stable at 8.5gm, currently receiving 1u PRBC. Denies nausea, vomiting, abdominal pain, fever, chills, chest pain, shortness of breath, hematemesis, hematochezia. High wbc with lactic acidosis.      Sepsis unclear etiology r/o cdif,UTI  Recent bladder/rectal absces with prolong abx cource  -Blood c/s,urine c/s  -stool cdiff  -iv zosyn,vanco per ID.F/trough adjust dose  -repeat LA. S/P 2.5 L BOLUS ED,continue ivf  -f/u cbc,lactic acid  -reviewed ct abd/pelvis        Gi bleed  -hb 8.5. s/p 1 u prbc  -ppi  -f/u cbc.ct abd    62M PMH HTN, HLD, CAD, S/p stents (in 2006 & 2018),afib not on AC per pt choice,  LAD), , right CEA (2019), rectal adenocarcinoma s/p JASON (2019), LAW with ileostomy complicated by leak (2020), anastomotic recurrence (2021) treated with APR rectum/sigmoid colon in 3/2022 with colostomy creation, now with biopsy proven recurrence 12/2022 last [place] [facility] [place]. 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    Pt came to  [facility] with black loose bm from colostomy bag since this AM. As per daughter, who is at bedside pt was having nonbloody output from colostomy yesterday. This morning reports melena via colostomy. Of note, pt was on Heparin sq ppx  while in Hospital and restarted on Plavix yesterday. Last dose this morning. Pt denies any prior EGDs in the past. Last colonoscopy 2 years ago. CT of abdomen and pelvis revealed Status post resection of fistulized small bowel and creation of an ileal conduit. No evidence of active intraluminal extravasation of contrast. Hemoglobin stable at 8.5gm, currently receiving 1u PRBC. Denies nausea, vomiting, abdominal pain, fever, chills, chest pain, shortness of breath, hematemesis, hematochezia. High wbc with lactic acidosis.      Sepsis unclear etiology r/o cdif,UTI  Recent bladder/rectal absces with prolong abx cource  hx rectal adenocarcinoma on chemo,last chemo April  -Blood c/s,urine c/s  -stool cdiff  -iv zosyn,vanco per ID.F/trough adjust dose  -repeat LA. S/P 2.5 L BOLUS ED,continue ivf  -f/u cbc,lactic acid  -reviewed ct abd/pelvis        Gi bleed  -hb 8.5. s/p 1 u prbc  -ppi  -f/u cbc.ct abd   -plan for egd per Gi  -c/s cardio for clearance  -hold plavix      HTN, HLD, CAD, S/p stents (in 2006 & 2018),afib not on AC per pt choice,  LAD)  -continue coreg,hold lasix  -Cont statin,hold plavix  -f/u cardiology rec      dvt ppx scd's  Plan of care dw pt, wife,daughter RN .confirmed DRN/DNI       62M PMH HTN, HLD, CAD, S/p stents (in 2006 & 2018),afib not on AC per pt choice,  LAD), , right CEA (2019), rectal adenocarcinoma s/p JASON (2019), LAW with ileostomy complicated by leak (2020), anastomotic recurrence (2021) treated with APR rectum/sigmoid colon in 3/2022 with colostomy creation, now with biopsy proven recurrence 12/2022 [place] [facility] [place]. 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    Pt came to  [facility] with black loose bm from colostomy bag since this AM. As per daughter, who is at bedside pt was having nonbloody output from colostomy yesterday. This morning reports melena via colostomy. Of note, pt was on Heparin sq ppx  while in Hospital and restarted on Plavix yesterday. Last dose this morning. Pt denies any prior EGDs in the past. Last colonoscopy 2 years ago. CT of abdomen and pelvis revealed Status post resection of fistulized small bowel and creation of an ileal conduit. No evidence of active intraluminal extravasation of contrast. Hemoglobin stable at 8.5gm, currently receiving 1u PRBC. Denies nausea, vomiting, abdominal pain, fever, chills, chest pain, shortness of breath, hematemesis, hematochezia. High wbc with lactic acidosis.      Sepsis unclear etiology r/o cdif,UTI  Recent bladder/rectal absces with prolong abx cource  hx rectal adenocarcinoma on chemo,last chemo April  -Blood c/s,urine c/s  -stool cdiff  -iv zosyn,vanco per ID.F/trough adjust dose  -repeat LA. S/P 2.5 L BOLUS ED,continue ivf  -f/u cbc,lactic acid  -reviewed ct abd/pelvis        Gi bleed  -hb 8.5. s/p 1 u prbc  -ppi  -f/u cbc.ct abd   -plan for egd per Gi  -c/s cardio for clearance  -hold plavix    Hyperkalemia  -lokelma.hold home po k    Hypomagnesemia  -iv mag, resume po mag.recheck mag      HTN, HLD, CAD, S/p stents (in 2006 & 2018),afib not on AC per pt choice,  LAD)  -continue coreg,hold lasix  -Cont statin,hold plavix  -f/u cardiology rec      dvt ppx scd's  Plan of care dw pt, wife,daughter RN .confirmed DRN/DNI       62M PMH HTN, HLD, CAD, S/p stents (in 2006 & 2018),afib not on AC per pt choice,  LAD), , right CEA (2019), rectal adenocarcinoma s/p JASON (2019), LAW with ileostomy complicated by leak (2020), anastomotic recurrence (2021) treated with APR rectum/sigmoid colon in 3/2022 with colostomy creation, now with biopsy proven recurrence 12/2022 last [place] with [facility] [place] in April/[place]23. 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    Pt came to  [facility] with black loose bm from colostomy bag since this AM. As per daughter, who is at bedside pt was having nonbloody output from colostomy yesterday. This morning reports melena via colostomy. Of note, pt was on Heparin sq ppx  while in Hospital and restarted on Plavix yesterday. Last dose this morning. Pt denies any prior EGDs in the past. Last colonoscopy 2 years ago. CT of abdomen and pelvis revealed Status post resection of fistulized small bowel and creation of an ileal conduit. No evidence of active intraluminal extravasation of contrast. Hemoglobin stable at 8.5gm, currently receiving 1u PRBC. Denies nausea, vomiting, abdominal pain, fever, chills, chest pain, shortness of breath, hematemesis, hematochezia. High wbc with lactic acidosis.      Sepsis unclear etiology r/o cdif,UTI  Recent bladder/rectal absces with prolong abx cource  hx rectal adenocarcinoma on chemo,last chemo April  -Blood c/s,urine c/s  -stool cdiff  -iv zosyn,vanco per ID.F/trough adjust dose  -repeat LA. S/P 2.5 L BOLUS ED,continue ivf  -f/u cbc,lactic acid  -reviewed ct abd/pelvis        Gi bleed  -hb 8.5. s/p 1 u prbc  -ppi  -f/u cbc.ct abd   -plan for egd per Gi  -c/s cardio for clearance  -hold plavix      JAS  Hyperkalemia  -lokelma.hold home po k  -CR 1.5.Hold lasix,ivf.f/u am    Hypomagnesemia  -iv mag, resume po mag.recheck mag      HTN, HLD, CAD, S/p stents (in 2006 & 2018),afib not on AC per pt choice,  LAD)  -continue coreg,hold lasix  -Cont statin,hold plavix  -f/u cardiology rec      dvt ppx scd's  Plan of care dw pt, wife,daughter RN .confirmed DRN/DNI       62M PMH HTN, HLD, CAD, S/p stents (in 2006 & 2018),afib not on AC per pt choice,  LAD), , right CEA (2019), rectal adenocarcinoma s/p JASON (2019), LAW with ileostomy complicated by leak (2020), anastomotic recurrence (2021) treated with APR rectum/sigmoid colon in 3/2022 with colostomy creation, now with biopsy proven recurrence 12/2022 last chemo with [facility] [place] in April/2023. 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    Pt came to  [facility] with black loose bm from colostomy bag since this AM. As per daughter, who is at bedside pt was having nonbloody output from colostomy yesterday. This morning reports melena via colostomy. Of note, pt was on Heparin sq ppx  while in Hospital and restarted on Plavix yesterday. Last dose this morning. Pt denies any prior EGDs in the past. Last colonoscopy 2 years ago. CT of abdomen and pelvis revealed Status post resection of fistulized small bowel and creation of an ileal conduit. No evidence of active intraluminal extravasation of contrast. Hemoglobin stable at 8.5gm, currently receiving 1u PRBC. Denies nausea, vomiting, abdominal pain, fever, chills, chest pain, shortness of breath, hematemesis, hematochezia. High wbc with lactic acidosis.      Sepsis unclear etiology r/o cdif,UTI  Recent bladder/rectal absces with prolong abx cource  hx rectal adenocarcinoma on chemo,last chemo April  -leukocytosis,thrombocytosis likely from infection  -Blood c/s,urine c/s  -stool cdiff  -iv zosyn,vanco per ID.F/trough adjust dose  -repeat LA. S/P 2.5 L BOLUS ED,continue ivf  -f/u cbc,lactic acid  -reviewed ct abd/pelvis/chest        Gi bleed  -hb 8.5. s/p 1 u prbc  -ppi  -f/u cbc.ct abd   -plan for egd per Gi  -c/s cardio for clearance  -hold plavix      JAS  Hyperkalemia  -lokelma.hold home po k  -CR 1.5.Hold lasix,ivf.f/u am    Hypomagnesemia  -iv mag, resume po mag.recheck mag      HTN, HLD, CAD, S/p stents (in 2006 & 2018),afib not on AC per pt choice,  LAD)  -continue coreg,hold lasix  -Cont statin,hold plavix. last plavis today morning.  -f/u cardiology rec      dvt ppx scd's  Plan of care dw pt, wife,daughter RN ([facility]) .confirmed DRN/DNI  Pt and family refuged transfer to [facility] [place] [facility].  Spoke with GI at bedside  spoke with ID  per cardiology low risk for procedure  Total time spent 60 mins for critical care.

## 2023-06-08 NOTE — ED PROVIDER NOTE - CLINICAL SUMMARY MEDICAL DECISION MAKING FREE TEXT BOX
Pt arrived with bloody output from the colostomy, Hgb stable, and bleeding severity improved throughout stay here. However, pt found to have significant leukocytosis, arrived with tachycardia, presentation consistent with sepsis. Broad spectrum Abx administered. No obvious source. CT does not show any obvious surgical site infection. Cultures pending. Pt is declining transfer back [place] [facility].

## 2023-06-08 NOTE — CONSULT NOTE ADULT - SUBJECTIVE AND OBJECTIVE BOX
Patient is a 62y old  Male who presents with a chief complaint of bleeding from colostomy.     HPI: 62M PMH HTN, HLD, CAD, S/p stents (in  & ), rectal adenocarcinoma s/p JASON (), LAW with ileostomy complicated by leak (), anastomotic recurrence () treated with APR rectum/sigmoid colon in 3/2022 with colostomy creation, now with biopsy proven recurrence 2022 last [place] [facility] [place]. Patient also with recent sepsis secondary to rectal mass c/b abscess and ecoli bacteremia. Now s/p fistula takedown and ileal conduit at [facility] on 23 and was Discharged on 23. Presents to [facility] with bleeding from colostomy bag since this AM. As per daughter, who is at bedside pt was having nonbloody output from colostomy yesterday. This morning reports melena via colostomy. Of note, pt was on Heparin while in Hospital and restarted on Plavix yesterday. Last dose this morning. Pt denies any prior EGDs in the past. Last colonoscopy 2 years ago. CT of abdomen and pelvis revealed Status post resection of fistulized small bowel andcreation of an ileal conduit. No evidence of active intraluminal extravasation of contrast. Hemoglobin stable at 8.5gm, currently receiving 1uPRBC. Denies nausea, vomiting, abdominal pain, fever, chills, chest pain, shortness of breath, hematemesis, hematochezia.         PAST MEDICAL & SURGICAL HISTORY:  Chest pain      HTN (hypertension)      HLD (hyperlipidemia)      CAD (coronary artery disease)      Mitral regurgitation      Rectal cancer  2019 s/p chemo and radiation; recurrence of cancer       Depression  with rectal cancer      Neuropathy  feet s/p chemo      Stented coronary artery  circumflex  LAD 2017 x4      H/O carotid stenosis      H/O renal calculi      DANO (iron deficiency anemia)      Umbilical hernia      Splenic artery aneurysm      H/O cardiac catheterization   2017  4 STENTS      History of CEA (carotid endarterectomy)  right       History of rectal surgery  with ileostomy 2020      H/O sigmoidoscopy  x2      History of removal of Port-a-Cath          Allergies    No Known Allergies    Intolerances        MEDICATIONS  (STANDING):    MEDICATIONS  (PRN):      Social History:    Marital Status:  (   )    (   ) Single    (   )    (  )   Occupation:   Lives with: (  ) alone  (  ) children   (  ) spouse   (  ) parents  (  ) other    Substance Use (street drugs): (  ) never used  (  ) other:  Tobacco Usage:  (   ) never smoked   (   ) former smoker   (   ) current smoker  (     ) pack year  (        ) last cigarette date  Alcohol Usage:  Sexual History:     Family History   IBD (  ) Yes   (  ) No  GI Malignancy (  )  Yes    (  ) No    Health Management     Last Colonoscopy -      (     ) Advanced Directives: (     ) None    (      ) DNR    (     ) DNI    (     ) Health Care Proxy:       REVIEW OF SYSTEMS:   General: Negative  HEENT: Negative  CV: Negative  Respiratory: Negative  GI: See HPI  : Negative  MSK: Negative  Hematologic: Negative  Skin: Negative      Vital Signs Last 24 Hrs  T(C): 36.6 (2023 13:00), Max: 36.8 (2023 12:40)  T(F): 97.9 (2023 13:00), Max: 98.3 (2023 12:40)  HR: 98 (2023 13:00) (98 - 129)  BP: 136/73 (2023 13:00) (122/88 - 147/75)  BP(mean): --  RR: 16 (2023 13:00) (16 - 24)  SpO2: 100% (2023 13:00) (99% - 100%)    Parameters below as of 2023 13:00  Patient On (Oxygen Delivery Method): room air        PHYSICAL EXAM:   GENERAL:  No acute distress  HEENT:  NC/AT, conjunctiva clear, sclera anicteric  CHEST:  No increased effort, breath sounds clear  HEART:  Regular rhythm  ABDOMEN:  Left colostomy with dark output, Right ileal conduit, penile drain, Soft, non-tender, non-distended, normoactive bowel sounds  EXTREMITIES: No edema  SKIN:  Warm, dry  NEURO:  Calm, cooperative        LABS:                        8.5    22.99 )-----------( 932      ( 2023 10:35 )             27.1     06-08    137  |  104  |  19.8  ----------------------------<  196<H>  5.4<H>   |  16.0<L>  |  1.51<H>    Ca    8.9      2023 10:35  Mg     1.5     06-08    TPro  5.9<L>  /  Alb  3.0<L>  /  TBili  0.2<L>  /  DBili  x   /  AST  12  /  ALT  8   /  AlkPhos  124<H>      PT/INR - ( 2023 10:35 )   PT: 13.6 sec;   INR: 1.17 ratio         PTT - ( 2023 10:35 )  PTT:31.7 sec  Urinalysis Basic - ( 2023 11:31 )    Color: Yellow / Appearance: Slightly Turbid / S.010 / pH: x  Gluc: x / Ketone: Negative  / Bili: Negative / Urobili: Negative mg/dL   Blood: x / Protein: 100 / Nitrite: Negative   Leuk Esterase: Moderate / RBC: 3-5 /HPF / WBC >50 /HPF   Sq Epi: x / Non Sq Epi: x / Bacteria: Occasional      LIVER FUNCTIONS - ( 2023 10:35 )  Alb: 3.0 g/dL / Pro: 5.9 g/dL / ALK PHOS: 124 U/L / ALT: 8 U/L / AST: 12 U/L / GGT: x                     RADIOLOGY & ADDITIONAL TESTS:        < from: CT Abdomen and Pelvis w/wo IV Cont (23 @ 12:36) >    ACC: [id number] EXAM:  CT ABDOMEN AND PELVIS WAW IC   ORDERED BY: KURT JEFFERSON     PROCEDURE DATE:  2023        FINDINGS:  LOWER CHEST: Trace layering right pleural fluid. The lung bases are clear.    LIVER: Within normal limits.  BILE DUCTS: Normal caliber.  GALLBLADDER: Layering gallstone.  SPLEEN: Within normal limits. The splenic artery and splenic vein are   patent.  PANCREAS: The pancreas is unremarkable in appearance and enhancement.   There is new mild peripancreatic stranding. The pancreatic duct is normal   in course and caliber.  ADRENALS: Within normal limits.  KIDNEYS/URETERS: Subtle bilateral renal cortical irregularity and   thinning, compatible with scarring. Otherwise, within normal limits.    BLADDER: Vásquez catheter in the collapsed thickened bladder.  REPRODUCTIVE ORGANS: The prostate is not enlarged.    BOWEL: The patient is status post interval resection of distal small   bowel with reanastomosis. There is a residualperipherally enhancing   rectal mass with a pigtail drainage catheter extending to the anal canal.   There are minimal bubbles of gas in the pelvis at the resection site. No   drainable collection is identified within the peritoneal cavity.    There is a new right lower quadrant ileal conduit. Again is noted a left   lower quadrant end sigmoid colostomy. There is no evidence of obstruction   of the enteric tract. No active intraluminal extravasation of contrast is   identified.  PERITONEUM: No ascites.  VESSELS: Within normal limits.  RETROPERITONEUM/LYMPH NODES: No lymphadenopathy.  ABDOMINAL WALL: Within normal limits.  BONES: Within normal limits.    IMPRESSION:  1. Mild pancreatitis.  2. Status post resection of fistulized small bowel andcreation of an   ileal conduit.  3. No evidence of active intraluminal extravasation of contrast.    --- End of Report ---        < end of copied text >       Patient is a 62y old  Male who presents with a chief complaint of bleeding from colostomy.     HPI: 62M PMH HTN, HLD, CAD, S/p stents (in  & ), rectal adenocarcinoma s/p JASON (), LAW with ileostomy complicated by leak (), anastomotic recurrence () treated with APR rectum/sigmoid colon in 3/2022 with colostomy creation, now with biopsy proven recurrence 2022 [place] [facility] [place] Patient also with recent sepsis secondary to rectal mass c/b abscess and ecoli bacteremia. Now s/p fistula takedown and ileal conduit [place] [facility] [place] and was Discharged on 23. Presents to [facility] with bleeding from colostomy bag since this AM. As per daughter, who is at bedside pt was having nonbloody output from colostomy yesterday. This morning reports melena via colostomy. Of note, pt was on Heparin while in Hospital and restarted on Plavix yesterday. Last dose this morning. Pt denies any prior EGDs in the past. Last colonoscopy 2 years ago. CT of abdomen and pelvis revealed Status post resection of fistulized small bowel and creation of an ileal conduit. No evidence of active intraluminal extravasation of contrast. Hemoglobin stable at 8.5gm, currently receiving 1uPRBC. Denies nausea, vomiting, abdominal pain, fever, chills, chest pain, shortness of breath, hematemesis, hematochezia.         PAST MEDICAL & SURGICAL HISTORY:  Chest pain      HTN (hypertension)      HLD (hyperlipidemia)      CAD (coronary artery disease)      Mitral regurgitation      Rectal cancer  2019 s/p chemo and radiation; recurrence of cancer       Depression  with rectal cancer      Neuropathy  feet s/p chemo      Stented coronary artery  circumflex  LAD 2017 x4      H/O carotid stenosis      H/O renal calculi      DANO (iron deficiency anemia)      Umbilical hernia      Splenic artery aneurysm      H/O cardiac catheterization   2017  4 STENTS      History of CEA (carotid endarterectomy)  right 2019      History of rectal surgery  with ileostomy 2020      H/O sigmoidoscopy  x2      History of removal of Port-a-Cath          Allergies    No Known Allergies    Intolerances        MEDICATIONS  (STANDING):    MEDICATIONS  (PRN):      Social History:    Marital Status:  (   )    (   ) Single    (   )    (  )   Occupation:   Lives with: (  ) alone  (  ) children   (  ) spouse   (  ) parents  (  ) other    Substance Use (street drugs): (  ) never used  (  ) other:  Tobacco Usage:  (   ) never smoked   (   ) former smoker   (   ) current smoker  (     ) pack year  (        ) last cigarette date  Alcohol Usage:  Sexual History:     Family History   IBD (  ) Yes   (  ) No  GI Malignancy (  )  Yes    (  ) No    Health Management     Last Colonoscopy -      (     ) Advanced Directives: (     ) None    (      ) DNR    (     ) DNI    (     ) Health Care Proxy:       REVIEW OF SYSTEMS:   General: Negative  HEENT: Negative  CV: Negative  Respiratory: Negative  GI: See HPI  : Negative  MSK: Negative  Hematologic: Negative  Skin: Negative      Vital Signs Last 24 Hrs  T(C): 36.6 (2023 13:00), Max: 36.8 (2023 12:40)  T(F): 97.9 (2023 13:00), Max: 98.3 (2023 12:40)  HR: 98 (2023 13:00) (98 - 129)  BP: 136/73 (2023 13:00) (122/88 - 147/75)  BP(mean): --  RR: 16 (2023 13:00) (16 - 24)  SpO2: 100% (2023 13:00) (99% - 100%)    Parameters below as of 2023 13:00  Patient On (Oxygen Delivery Method): room air        PHYSICAL EXAM:   GENERAL:  No acute distress  HEENT:  NC/AT, conjunctiva clear, sclera anicteric  CHEST:  No increased effort, breath sounds clear  HEART:  Regular rhythm  ABDOMEN:  Left colostomy with dark output, Right ileal conduit, penile drain, Soft, non-tender, non-distended, normoactive bowel sounds  EXTREMITIES: No edema  SKIN:  Warm, dry  NEURO:  Calm, cooperative        LABS:                        8.5    22.99 )-----------( 932      ( 2023 10:35 )             27.1     06-08    137  |  104  |  19.8  ----------------------------<  196<H>  5.4<H>   |  16.0<L>  |  1.51<H>    Ca    8.9      2023 10:35  Mg     1.5     06-08    TPro  5.9<L>  /  Alb  3.0<L>  /  TBili  0.2<L>  /  DBili  x   /  AST  12  /  ALT  8   /  AlkPhos  124<H>      PT/INR - ( 2023 10:35 )   PT: 13.6 sec;   INR: 1.17 ratio         PTT - ( 2023 10:35 )  PTT:31.7 sec  Urinalysis Basic - ( 2023 11:31 )    Color: Yellow / Appearance: Slightly Turbid / S.010 / pH: x  Gluc: x / Ketone: Negative  / Bili: Negative / Urobili: Negative mg/dL   Blood: x / Protein: 100 / Nitrite: Negative   Leuk Esterase: Moderate / RBC: 3-5 /HPF / WBC >50 /HPF   Sq Epi: x / Non Sq Epi: x / Bacteria: Occasional      LIVER FUNCTIONS - ( 2023 10:35 )  Alb: 3.0 g/dL / Pro: 5.9 g/dL / ALK PHOS: 124 U/L / ALT: 8 U/L / AST: 12 U/L / GGT: x                     RADIOLOGY & ADDITIONAL TESTS:        < from: CT Abdomen and Pelvis w/wo IV Cont (23 @ 12:36) >    ACC: [id number] EXAM:  CT ABDOMEN AND PELVIS WAW IC   ORDERED BY: KURT JEFFERSON     PROCEDURE DATE:  2023        FINDINGS:  LOWER CHEST: Trace layering right pleural fluid. The lung bases are clear.    LIVER: Within normal limits.  BILE DUCTS: Normal caliber.  GALLBLADDER: Layering gallstone.  SPLEEN: Within normal limits. The splenic artery and splenic vein are   patent.  PANCREAS: The pancreas is unremarkable in appearance and enhancement.   There is new mild peripancreatic stranding. The pancreatic duct is normal   in course and caliber.  ADRENALS: Within normal limits.  KIDNEYS/URETERS: Subtle bilateral renal cortical irregularity and   thinning, compatible with scarring. Otherwise, within normal limits.    BLADDER: Vásquez catheter in the collapsed thickened bladder.  REPRODUCTIVE ORGANS: The prostate is not enlarged.    BOWEL: The patient is status post interval resection of distal small   bowel with reanastomosis. There is a residualperipherally enhancing   rectal mass with a pigtail drainage catheter extending to the anal canal.   There are minimal bubbles of gas in the pelvis at the resection site. No   drainable collection is identified within the peritoneal cavity.    There is a new right lower quadrant ileal conduit. Again is noted a left   lower quadrant end sigmoid colostomy. There is no evidence of obstruction   of the enteric tract. No active intraluminal extravasation of contrast is   identified.  PERITONEUM: No ascites.  VESSELS: Within normal limits.  RETROPERITONEUM/LYMPH NODES: No lymphadenopathy.  ABDOMINAL WALL: Within normal limits.  BONES: Within normal limits.    IMPRESSION:  1. Mild pancreatitis.  2. Status post resection of fistulized small bowel andcreation of an   ileal conduit.  3. No evidence of active intraluminal extravasation of contrast.    --- End of Report ---        < end of copied text >

## 2023-06-08 NOTE — CONSULT NOTE ADULT - TIME BILLING
chart review including past records, records [place] [facility] [place]  obtaining history from ID MD at [facility] Freed

## 2023-06-08 NOTE — CONSULT NOTE ADULT - ASSESSMENT
62M PMH HTN, HLD, CAD, S/p stents (in 2006 & 2018),  LAD), HTN, HLD, right CEA (2019), rectal adenocarcinoma s/p JASON (2019), LAW with ileostomy complicated by leak (2020), anastomotic recurrence (2021) treated with APR rectum/sigmoid colon in 3/2022 with colostomy creation, now with biopsy proven recurrence 12/2022 last chemo with [facility] in April/2023. Patient also with recent sepsis secondary to rectal mass c/b abscess and ecoli bacteremia 04/2023,Pt was discharge with augmentin. Pt was on po abx till 05/26/23.Pt was admitted to [facility] 05/26 found to have Small bowel fistula,stool was coming with urine.Now s/p fistula takedown and ileal conduit at [facility] on 5/26/23 and was Discharged on 6/7/23 with castillo and rectal pen hugo,was on antibiotics for bladder abscess w/suspected residual rectal abscess. (zosyn at [facility] x 1 week and then discharged on levaquin and flagyl)Ct abd suspected early pancreatitis but no pancreatitis per [facility] per family.Pt was discharged with po flagyl and LQ.    Pt came to  [facility] with black loose bm from colostomy bag since this AM. As per daughter, who is at bedside pt was having nonbloody output from colostomy yesterday. This morning reports melena via colostomy. Of note, pt was on Heparin sq ppx  while in Hospital and restarted on Plavix yesterday. Last dose this morning. Pt denies any prior EGDs in the past. Last colonoscopy 2 years ago. CT of abdomen and pelvis revealed Status post resection of fistulized small bowel and creation of an ileal conduit. No evidence of active intraluminal extravasation of contrast. Hemoglobin stable at 8.5gm, currently receiving 1u PRBC. Denies nausea, vomiting, abdominal pain, fever, chills, chest pain, shortness of breath, hematemesis, hematochezia. High wbc with lactic acidosis.    Leukocytosis  Colovesical fistula s/p fistula takedown and ileal conduit  Bladder abscess  Melena r/o GIB  JAS  Pancreatitis  Lactic acidosis  h/o CRE enterobacter    - no fever  - [place] [facility] [place] Wbc 10, now up to 22  - noted dark watery output from ostomy, possible Gib however H/H remains stable and at baseline per daughter. ? C diff  given watery output  - UA-obtained from conduit-unclear significance. purulent drainage from perineal drain but per pt has been decreasing in quantity  - Ct a/p peripherally enhancing rectal mass but no drainable collection  - per daughter -pancreatitis was noted on imaging post op and therefore not a new finding-no symptoms  - spoke to RITA Freed at [facility]- patient had culture from perineal drain +  Psa (I) to cipro and CRE Enterobacter (S) to ayvcaz, vabomere, quinolones, (R) to zerbaxa. Was discharged 6/7 on levaquin and flagyl for 1 week with plan for outpatient cta/p   - dc zosyn/vanco-->vabomere  - check c diff, contact iso  - empiric po vanco, will Dc if Cdiff negative  - plan for EGd in am  - f/u Blood cultures    - Trend Fever  - Trend Leukocytosis  - Trend Lactate  - Trend cr  - trend h/h    d/w attending, RITA Szymanski at [facility], pharmacy, family  Will Follow       62M PMH HTN, HLD, CAD, S/p stents (in 2006 & 2018),  LAD), HTN, HLD, right CEA (2019), rectal adenocarcinoma s/p JASON (2019), LAW with ileostomy complicated by leak (2020), anastomotic recurrence (2021) treated with APR rectum/sigmoid colon in 3/2022 with colostomy creation, now with biopsy proven recurrence 12/2022 last chemo with [facility] in April/2023. Patient also with recent sepsis secondary to rectal mass c/b abscess and ecoli bacteremia 04/2023,Pt was discharge with augmentin. Pt was on po abx till 05/26/23.[place] [facility] [place] found to have Small bowel fistula,stool was coming with urine.Now s/p fistula takedown and ileal conduit [place] [facility] on [place]23 and was Discharged on 6/7/23 with castillo and rectal pen hugo,was on antibiotics for bladder abscess w/suspected residual rectal abscess. (zosyn at [facility] [place] 1 week and then discharged on levaquin and flagyl)Ct abd suspected early pancreatitis but no pancreatitis per [facility] per family.Pt was discharged with po flagyl and LQ.    Pt came to  [facility] with black loose bm from colostomy bag since this AM. As per daughter, who is at bedside pt was having nonbloody output from colostomy yesterday. This morning reports melena via colostomy. Of note, pt was on Heparin sq ppx  while in Hospital and restarted on Plavix yesterday. Last dose this morning. Pt denies any prior EGDs in the past. Last colonoscopy 2 years ago. CT of abdomen and pelvis revealed Status post resection of fistulized small bowel and creation of an ileal conduit. No evidence of active intraluminal extravasation of contrast. Hemoglobin stable at 8.5gm, currently receiving 1u PRBC. Denies nausea, vomiting, abdominal pain, fever, chills, chest pain, shortness of breath, hematemesis, hematochezia. High wbc with lactic acidosis.    Leukocytosis  Colovesical fistula s/p fistula takedown and ileal conduit  Bladder abscess  Melena r/o GIB  JAS  Pancreatitis  Lactic acidosis  h/o CRE enterobacter    - no fever  - labs 6/4 at [facility] with Wbc 10, now up to 22  - noted dark watery output from ostomy, possible Gib however H/H remains stable and at baseline per daughter. ? C diff  given watery output  - UA-obtained from conduit-unclear significance. purulent drainage from perineal drain but per pt has been decreasing in quantity  - Ct a/p peripherally enhancing rectal mass but no drainable collection  - per daughter -pancreatitis was noted on imaging post op and therefore not a new finding-no symptoms  - spoke to RITA Freed at [facility]- patient had culture from perineal drain +  Psa (I) to cipro and CRE Enterobacter (S) to ayvcaz, vabomere, quinolones, (R) to zerbaxa. Was discharged 6/7 on levaquin and flagyl for 1 week with plan for outpatient cta/p   - prior UCX e.faecium at [facility]  - dc zosyn/vanco-->vabomere  - check c diff, contact iso  - empiric po vanco, will Dc if Cdiff negative  - plan for EGd in am  - f/u Blood cultures    - Trend Fever  - Trend Leukocytosis  - Trend Lactate  - Trend cr  - trend h/h    d/w attending, RITA Szymanski at [facility], pharmacy, family  Will Follow

## 2023-06-08 NOTE — CONSULT NOTE ADULT - NS ATTEND AMEND GEN_ALL_CORE FT
Patient seen with NP   I spoke to daughter at bedside , Daughter is MICU nurse at [employer] [facility] and gave a detailed hx.    Patient diagnosed with rectal cancer in 2020 , Had  chemo and radiation at [facility].  Had reoccurance and required APR in 3/2022. Then found to have pelvic mass.  Required chemo  from 2/23 to 4/23.  Was admitted to [facility] in 4/23 with septic shock , neutrapenic at that time.  Had shock liver at that time.  Spring  2023  developed colovesical fistula .  Pt went to [facility] and had  fistula repair and ileoconduit .  Pt developed infection, fluid collection in the abdomen.  Has a drain int he perineum and castillo putting out pus.  Post op pt placed on  IV heparin .  Started Plavix just before discharge. Yesterday , he had brown stool in colostomy. This am dark stool, melena.      Pt denies abdominal pain, nausea, vomiting, no heartburn, no regurgitation , no hx of PUD, no NSAIDS. Is not on a PPI at home.  Hgb was 8.5 in 4/23 and now 8.5.  Daughter states pt had one unit PRBC post   op during the [facility] admission .      Abdominal exam- ileoconduit with yellow urine. + melena in colostomy bag.     A/P  melena in colostomy post ileal conduit   May be UGI bleed. EGD to R/O PUD  MAybe oozing from the SB resection  site , on anticoagulation  PPI drip  clear liquids , NPO after midnight for EGD in am  needs cardiac clearance for EGD

## 2023-06-08 NOTE — CONSULT NOTE ADULT - ASSESSMENT
62M PMH HTN, HLD, CAD, S/p stents (in 2006 & 2018), rectal adenocarcinoma s/p JASON (2019), LAW with ileostomy complicated by leak (2020), anastomotic recurrence (2021) treated with APR rectum/sigmoid colon in 3/2022 with colostomy creation, now with biopsy proven recurrence 12/2022 last [place] [facility] [place]. Patient also with recent sepsis secondary to rectal mass c/b abscess and ecoli bacteremia. Now s/p fistula takedown and ileal conduit [place] [facility] [place]23 and was Discharged on 6/7/23. Presents to [facility] with bleeding from colostomy bag since this AM.

## 2023-06-08 NOTE — ED PROVIDER NOTE - OBJECTIVE STATEMENT
63 yo male with pmhx of rectal cancer, HTN, HLD, CAD s/p stents presents with bleeding from colostomy bag since this AM. States this morning felt short of breath, lightheaded and fatigued, noted blood in the colostomy. States nl output in the bag yesterday. Was discharge from [facility] yesterday after an ileal conduit was placed for colovesicular fistula. Was on heparin while in hospital, restarted plavix today. Denies fever, chills, LOC, CP, palpitations, abd pain, N/V.

## 2023-06-08 NOTE — H&P ADULT - HISTORY OF PRESENT ILLNESS
62M PMH HTN, HLD, CAD, S/p stents (in 2006 & 2018),  LAD), HTN, HLD, right CEA (2019), rectal adenocarcinoma s/p JASON (2019), LAW with ileostomy complicated by leak (2020), anastomotic recurrence (2021) treated with APR rectum/sigmoid colon in 3/2022 with colostomy creation, now with biopsy proven recurrence 12/2022 last chemo with [facility] [place] in April/2023. 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    Pt came to  [facility] with black loose bm from colostomy bag since this AM. As per daughter, who is at bedside pt was having nonbloody output from colostomy yesterday. This morning reports melena via colostomy. Of note, pt was on Heparin sq ppx  while in Hospital and restarted on Plavix yesterday. Last dose this morning. Pt denies any prior EGDs in the past. Last colonoscopy 2 years ago. CT of abdomen and pelvis revealed Status post resection of fistulized small bowel and creation of an ileal conduit. No evidence of active intraluminal extravasation of contrast. Hemoglobin stable at 8.5gm, currently receiving 1u PRBC. Denies nausea, vomiting, abdominal pain, fever, chills, chest pain, shortness of breath, hematemesis, hematochezia. High wbc with lactic acidosis.        PAST MEDICAL HISTORY:   HTN, HLD, CAD, S/p stents (in 2006 & 2018),  LAD), HTN, HLD, right CEA (2019), rectal adenocarcinoma s/p JASON (2019), LAW with ileostomy complicated by leak (2020), anastomotic recurrence (2021) treated with APR rectum/sigmoid colon in 3/2022 with colostomy creation, now with biopsy proven recurrence 12/2022 last chemo with [facility] in April/2023  recent sepsis secondary to rectal mass c/b abscess and ecoli bacteremia 04/2023    s/p fistula takedown and ileal conduit at [facility] [place] on 5/26/23   rectal/bladder abscess.    CAD (coronary artery disease)     Chest pain     Depression with rectal cancer    H/O carotid stenosis     H/O renal calculi     HLD (hyperlipidemia)     HTN (hypertension)     DANO (iron deficiency anemia)     Mitral regurgitation     Neuropathy feet s/p chemo    Rectal cancer 2/2019 s/p chemo and radiation; recurrence of cancer 2022    Splenic artery aneurysm     Stented coronary artery circumflex 2005 LAD 2017 x4    Umbilical hernia.     H/O cardiac catheterization 2005 2017  4 STENTS    H/O sigmoidoscopy x2    History of CEA (carotid endarterectomy) right 2019    History of rectal surgery with ileostomy 2/2020    History of removal of Port-a-Cath.     FAMILY HISTORY:  FH: heart disease, father.     Social History:  · Substance use	No  · Social History (marital status, living situation, occupation, and sexual history)	Denies tobacco use or illicit substance use. Occasional alcohol consumption.

## 2023-06-08 NOTE — CONSULT NOTE ADULT - SUBJECTIVE AND OBJECTIVE BOX
[facility]                                                INFECTIOUS DISEASES  =======================================================                               Zuhair Moura MD#    Dixie Pedroza MD*                           Valerie Nicholas MD*   Donna Forde MD*            Diplomates American Board of Internal Medicine & Infectious Diseases                  # [place] Office - Appt - Tel  [phone] Fax [phone]                * [place] Office - Appt - Tel [phone] Fax [phone]                                  Hospital Consult line:  [phone]  =======================================================      [id number]  PASTORA CHURCHILL   HPI:  62M PMH HTN, HLD, CAD, S/p stents (in 2006 & 2018),  LAD), HTN, HLD, right CEA (2019), rectal adenocarcinoma s/p JASON (2019), LAW with ileostomy complicated by leak (2020), anastomotic recurrence (2021) treated with APR rectum/sigmoid colon in 3/2022 with colostomy creation, now with biopsy proven recurrence 12/2022 last chemo with [facility] [place] in April/2023. Patient also with recent sepsis secondary to rectal mass c/b abscess and ecoli bacteremia 04/2023,Pt was discharge with anitibiotics.Pt was on po abx till 05/26/23.Pt was admitted to [facility] [place]26 found to have Small bowel fistula,stool was coming with urine.Now s/p fistula takedown and ileal conduit at [facility] [place] on 5/26/23 and was Discharged on 6/7/23 with castillo and rectal pin hugo,was on antibiotics for bladder abscess w/suspected residual rectal abscess.Ct abd suspected early pancreatitis but no pancreatis per [facility] [place] per family.Pt was discharge with po flagyl and LQ.    Pt came to  [facility] with black loose bm from colostomy bag since this AM. As per daughter, who is at bedside pt was having nonbloody output from colostomy yesterday. This morning reports melena via colostomy. Of note, pt was on Heparin sq ppx  while in Hospital and restarted on Plavix yesterday. Last dose this morning. Pt denies any prior EGDs in the past. Last colonoscopy 2 years ago. CT of abdomen and pelvis revealed Status post resection of fistulized small bowel and creation of an ileal conduit. No evidence of active intraluminal extravasation of contrast. Hemoglobin stable at 8.5gm, currently receiving 1u PRBC. Denies nausea, vomiting, abdominal pain, fever, chills, chest pain, shortness of breath, hematemesis, hematochezia. High wbc with lactic acidosis.        PAST MEDICAL HISTORY:   HTN, HLD, CAD, S/p stents (in 2006 & 2018),  LAD), HTN, HLD, right CEA (2019), rectal adenocarcinoma s/p JASON (2019), LAW with ileostomy complicated by leak (2020), anastomotic recurrence (2021) treated with APR rectum/sigmoid colon in 3/2022 with colostomy creation, now with biopsy proven recurrence 12/2022 last chemo with [facility] in April/2023  recent sepsis secondary to rectal mass c/b abscess and ecoli bacteremia 04/2023    s/p fistula takedown and ileal conduit at [facility] [place] on [place]26/23   rectal/bladder abscess.    CAD (coronary artery disease)     Chest pain     Depression with rectal cancer    H/O carotid stenosis     H/O renal calculi     HLD (hyperlipidemia)     HTN (hypertension)     DANO (iron deficiency anemia)     Mitral regurgitation     Neuropathy feet s/p chemo    Rectal cancer 2/2019 s/p chemo and radiation; recurrence of cancer 2022    Splenic artery aneurysm     Stented coronary artery circumflex 2005 LAD 2017 x4    Umbilical hernia.     H/O cardiac catheterization 2005 2017  4 STENTS    H/O sigmoidoscopy x2    History of CEA (carotid endarterectomy) right 2019    History of rectal surgery with ileostomy 2/2020    History of removal of Port-a-Cath.     FAMILY HISTORY:  FH: heart disease, father.     Social History:  · Substance use	No  · Social History (marital status, living situation, occupation, and sexual history)	Denies tobacco use or illicit substance use. Occasional alcohol consumption.     (08 Jun 2023 15:27)          I have personally reviewed the labs and data; pertinent labs and data are listed in this note; please see below.   =======================================================  Past Medical & Surgical Hx:  =====================  PAST MEDICAL & SURGICAL HISTORY:  Chest pain      HTN (hypertension)      HLD (hyperlipidemia)      CAD (coronary artery disease)      Mitral regurgitation      Rectal cancer  2/2019 s/p chemo and radiation; recurrence of cancer 2022      Depression  with rectal cancer      Neuropathy  feet s/p chemo      Stented coronary artery  circumflex 2005 LAD 2017 x4      H/O carotid stenosis      H/O renal calculi      DANO (iron deficiency anemia)      Umbilical hernia      Splenic artery aneurysm      H/O cardiac catheterization  2005 2017  4 STENTS      History of CEA (carotid endarterectomy)  right 2019      History of rectal surgery  with ileostomy 2/2020      H/O sigmoidoscopy  x2      History of removal of Port-a-Cath        Problem List:  ==========  HEALTH ISSUES - PROBLEM Dx:  GIB (gastrointestinal bleeding)          Social Hx:  =======  no toxic habits currently    FAMILY HISTORY:  FH: heart disease  father    no significant family history of immunosuppressive disorders in mother or father   =======================================================    REVIEW OF SYSTEMS:  CONSTITUTIONAL:  No Fever or chills  HEENT:  No diplopia or blurred vision.  No earache, sore throat or runny nose.  CARDIOVASCULAR:  No pressure, squeezing, strangling, tightness, heaviness or aching about the chest, neck, axilla or epigastrium.  RESPIRATORY:  No cough, shortness of breath  GASTROINTESTINAL:  No nausea, vomiting or diarrhea.  GENITOURINARY:  No dysuria, frequency or urgency. No Blood in urine  MUSCULOSKELETAL:  no joint aches, no muscle pain  SKIN:  No change in skin, hair or nails.  NEUROLOGIC:  No Headaches, seizures or weakness.  PSYCHIATRIC:  No disorder of thought or mood.  ENDOCRINE:  No heat or cold intolerance  HEMATOLOGICAL:  No easy bruising or bleeding.    =======================================================  Allergies    No Known Allergies    Intolerances    Antibiotics:  piperacillin/tazobactam IVPB.. 3.375 Gram(s) IV Intermittent every 8 hours    Other medications:  atorvastatin 40 milliGRAM(s) Oral at bedtime  carvedilol 12.5 milliGRAM(s) Oral every 12 hours  cholecalciferol 1000 Unit(s) Oral daily  dextrose 5% + sodium chloride 0.9%. 1000 milliLiter(s) IV Continuous <Continuous>  magnesium oxide 400 milliGRAM(s) Oral two times a day with meals  pantoprazole Infusion 8 mG/Hr IV Continuous <Continuous>   amoxicillin  875 milliGRAM(s)/clavulanate   1 Tablet(s) Oral (04-18-23 @ 00:51)    cefTRIAXone Injectable.   1000 milliGRAM(s) IV Push (04-16-23 @ 16:57)    piperacillin/tazobactam IVPB..   25 mL/Hr IV Intermittent (06-08-23 @ 16:00)    piperacillin/tazobactam IVPB...   200 mL/Hr IV Intermittent (06-08-23 @ 11:00)    vancomycin  IVPB.   250 mL/Hr IV Intermittent (06-08-23 @ 12:13)      ======================================================  Physical Exam:  ============  T(F): 98.2 (08 Jun 2023 17:43), Max: 98.3 (08 Jun 2023 12:40)  HR: 101 (08 Jun 2023 17:45)  BP: 138/70 (08 Jun 2023 17:45)  RR: 16 (08 Jun 2023 17:45)  SpO2: 100% (08 Jun 2023 17:45) (97% - 100%)  temp max in last 48H T(F): , Max: 98.3 (06-08-23 @ 12:40)Height (cm): 188 (06-08-23 @ 10:19)    General:  No acute distress.  Eye: Pupils are equal, round and reactive to light, Normal conjunctiva.  HENT: Normocephalic, Oral mucosa is moist, No pharyngeal erythema, No sinus tenderness.  Neck: Supple, No lymphadenopathy.  Respiratory: Lungs are clear to auscultation, Respirations are non-labored.  Cardiovascular: Normal rate, Regular rhythm, s1 + s2  Gastrointestinal: Soft, Non-tender, Non-distended, Normal bowel sounds.  Genitourinary: No costovertebral angle tenderness.  Lymphatics: No lymphadenopathy neck,   Musculoskeletal: Normal range of motion, Normal strength.  Integumentary: No rash.  Neurologic: Alert, Oriented, No focal deficits  Psychiatric: Appropriate mood & affect.    =======================================================  Labs:                        8.5    22.99 )-----------( 932      ( 08 Jun 2023 10:35 )             27.1     06-08    x   |  x   |  x   ----------------------------<  x   4.4   |  x   |  x     Ca    8.9      08 Jun 2023 10:35  Mg     2.2     06-08    TPro  5.9<L>  /  Alb  3.0<L>  /  TBili  0.2<L>  /  DBili  x   /  AST  12  /  ALT  8   /  AlkPhos  124<H>  06-08       SARS-CoV-2: [facility] (06-08-23 @ 13:20)                                                   [facility]                                                INFECTIOUS DISEASES  =======================================================                               Zuhair Moura MD#    Dixie Pedroza MD*                           Valerie Nicholas MD*   Donna Forde MD*            Diplomates American Board of Internal Medicine & Infectious Diseases                  # [place] Office - Appt - Tel  [phone] Fax [phone]                * [place] Office - Appt - Tel [phone] Fax [phone]                                  Hospital Consult line:  [phone]  =======================================================      [id number]  PASTORA CHURCHILL   HPI:  62M PMH HTN, HLD, CAD, S/p stents (in 2006 & 2018),  LAD), HTN, HLD, right CEA (2019), rectal adenocarcinoma s/p JASON (2019), LAW with ileostomy complicated by leak (2020), anastomotic recurrence (2021) treated with APR rectum/sigmoid colon in 3/2022 with colostomy creation, now with biopsy proven recurrence 12/2022 last chemo with [facility] [place] in April/2023. 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    Pt came to  [facility] with black loose bm from colostomy bag since this AM. As per daughter, who is at bedside pt was having nonbloody output from colostomy yesterday. This morning reports melena via colostomy. Of note, pt was on Heparin sq ppx  while in Hospital and restarted on Plavix yesterday. Last dose this morning. Pt denies any prior EGDs in the past. Last colonoscopy 2 years ago. CT of abdomen and pelvis revealed Status post resection of fistulized small bowel and creation of an ileal conduit. No evidence of active intraluminal extravasation of contrast. Hemoglobin stable at 8.5gm, currently receiving 1u PRBC. Denies nausea, vomiting, abdominal pain, fever, chills, chest pain, shortness of breath, hematemesis, hematochezia. High wbc with lactic acidosis.        PAST MEDICAL HISTORY:   HTN, HLD, CAD, S/p stents (in 2006 & 2018),  LAD), HTN, HLD, right CEA (2019), rectal adenocarcinoma s/p JASON (2019), LAW with ileostomy complicated by leak (2020), anastomotic recurrence (2021) treated with APR rectum/sigmoid colon in 3/2022 with colostomy creation, now with biopsy proven recurrence 12/2022 last chemo with [facility] in April/2023  recent sepsis secondary to rectal mass c/b abscess and ecoli bacteremia 04/2023    s/p fistula takedown and ileal conduit at [facility] [place] on 5/26/23   rectal/bladder abscess.    CAD (coronary artery disease)     Chest pain     Depression with rectal cancer    H/O carotid stenosis     H/O renal calculi     HLD (hyperlipidemia)     HTN (hypertension)     DANO (iron deficiency anemia)     Mitral regurgitation     Neuropathy feet s/p chemo    Rectal cancer 2/2019 s/p chemo and radiation; recurrence of cancer 2022    Splenic artery aneurysm     Stented coronary artery circumflex 2005 LAD 2017 x4    Umbilical hernia.     H/O cardiac catheterization 2005 2017  4 STENTS    H/O sigmoidoscopy x2    History of CEA (carotid endarterectomy) right 2019    History of rectal surgery with ileostomy 2/2020    History of removal of Port-a-Cath.     FAMILY HISTORY:  FH: heart disease, father.     Social History:  · Substance use	No  · Social History (marital status, living situation, occupation, and sexual history)	Denies tobacco use or illicit substance use. Occasional alcohol consumption.     (08 Jun 2023 15:27)          I have personally reviewed the labs and data; pertinent labs and data are listed in this note; please see below.   =======================================================  Past Medical & Surgical Hx:  =====================  PAST MEDICAL & SURGICAL HISTORY:  Chest pain      HTN (hypertension)      HLD (hyperlipidemia)      CAD (coronary artery disease)      Mitral regurgitation      Rectal cancer  2/2019 s/p chemo and radiation; recurrence of cancer 2022      Depression  with rectal cancer      Neuropathy  feet s/p chemo      Stented coronary artery  circumflex 2005 LAD 2017 x4      H/O carotid stenosis      H/O renal calculi      DANO (iron deficiency anemia)      Umbilical hernia      Splenic artery aneurysm      H/O cardiac catheterization  2005 2017  4 STENTS      History of CEA (carotid endarterectomy)  right 2019      History of rectal surgery  with ileostomy 2/2020      H/O sigmoidoscopy  x2      History of removal of Port-a-Cath        Problem List:  ==========  HEALTH ISSUES - PROBLEM Dx:  GIB (gastrointestinal bleeding)          Social Hx:  =======  no toxic habits currently    FAMILY HISTORY:  FH: heart disease  father    no significant family history of immunosuppressive disorders in mother or father   =======================================================    REVIEW OF SYSTEMS:  CONSTITUTIONAL:  No Fever or chills  HEENT:  No diplopia or blurred vision.  No earache, sore throat or runny nose.  CARDIOVASCULAR:  No pressure, squeezing, strangling, tightness, heaviness or aching about the chest, neck, axilla or epigastrium.  RESPIRATORY:  No cough, shortness of breath  GASTROINTESTINAL:  No nausea, vomiting or diarrhea.  GENITOURINARY:  No dysuria, frequency or urgency. No Blood in urine  MUSCULOSKELETAL:  no joint aches, no muscle pain  SKIN:  No change in skin, hair or nails.  NEUROLOGIC:  No Headaches, seizures or weakness.  PSYCHIATRIC:  No disorder of thought or mood.  ENDOCRINE:  No heat or cold intolerance  HEMATOLOGICAL:  No easy bruising or bleeding.    =======================================================  Allergies    No Known Allergies    Intolerances    Antibiotics:  piperacillin/tazobactam IVPB.. 3.375 Gram(s) IV Intermittent every 8 hours    Other medications:  atorvastatin 40 milliGRAM(s) Oral at bedtime  carvedilol 12.5 milliGRAM(s) Oral every 12 hours  cholecalciferol 1000 Unit(s) Oral daily  dextrose 5% + sodium chloride 0.9%. 1000 milliLiter(s) IV Continuous <Continuous>  magnesium oxide 400 milliGRAM(s) Oral two times a day with meals  pantoprazole Infusion 8 mG/Hr IV Continuous <Continuous>   amoxicillin  875 milliGRAM(s)/clavulanate   1 Tablet(s) Oral (04-18-23 @ 00:51)    cefTRIAXone Injectable.   1000 milliGRAM(s) IV Push (04-16-23 @ 16:57)    piperacillin/tazobactam IVPB..   25 mL/Hr IV Intermittent (06-08-23 @ 16:00)    piperacillin/tazobactam IVPB...   200 mL/Hr IV Intermittent (06-08-23 @ 11:00)    vancomycin  IVPB.   250 mL/Hr IV Intermittent (06-08-23 @ 12:13)      ======================================================  Physical Exam:  ============  T(F): 98.2 (08 Jun 2023 17:43), Max: 98.3 (08 Jun 2023 12:40)  HR: 101 (08 Jun 2023 17:45)  BP: 138/70 (08 Jun 2023 17:45)  RR: 16 (08 Jun 2023 17:45)  SpO2: 100% (08 Jun 2023 17:45) (97% - 100%)  temp max in last 48H T(F): , Max: 98.3 (06-08-23 @ 12:40)Height (cm): 188 (06-08-23 @ 10:19)    General:  No acute distress.  Eye: Normal conjunctiva.  HENT: Normocephalic, Oral mucosa is moist, No pharyngeal erythema, No sinus tenderness.  Neck: Supple, No lymphadenopathy.  Respiratory: Lungs are clear to auscultation, Respirations are non-labored.  Cardiovascular: Normal rate, Regular rhythm, s1 + s2 rt chest mediport  Gastrointestinal: Soft, Non-tender, rt ileal conduit with clear urine, penile drain to leg bag with seropurulent drainage, midline steristreps intact, ostomy with dark, watery output  Genitourinary: No costovertebral angle tenderness.  Lymphatics: No lymphadenopathy neck,   Musculoskeletal: Normal range of motion, Normal strength.  Integumentary: No rash.  Neurologic: Alert, Oriented, No focal deficits  Psychiatric: Appropriate mood & affect.    =======================================================  Labs:                        8.5    22.99 )-----------( 932      ( 08 Jun 2023 10:35 )             27.1     06-08    x   |  x   |  x   ----------------------------<  x   4.4   |  x   |  x     Ca    8.9      08 Jun 2023 10:35  Mg     2.2     06-08    TPro  5.9<L>  /  Alb  3.0<L>  /  TBili  0.2<L>  /  DBili  x   /  AST  12  /  ALT  8   /  AlkPhos  124<H>  06-08       SARS-CoV-2: [facility] (06-08-23 @ 13:20)       < from: CT Abdomen and Pelvis w/wo IV Cont (06.08.23 @ 12:36) >    FINDINGS:  LOWER CHEST: Trace layering right pleural fluid. The lung bases are clear.    LIVER: Within normal limits.  BILE DUCTS: Normal caliber.  GALLBLADDER: Layering gallstone.  SPLEEN: Within normal limits. The splenic artery and splenic vein are   patent.  PANCREAS: The pancreas is unremarkable in appearance and enhancement.   There is new mild peripancreatic stranding. The pancreatic duct is normal   in course and caliber.  ADRENALS: Within normal limits.  KIDNEYS/URETERS: Subtle bilateral renal cortical irregularity and   thinning, compatible with scarring. Otherwise, within normal limits.    BLADDER: Castillo catheter in the collapsed thickened bladder.  REPRODUCTIVE ORGANS: The prostate is not enlarged.    BOWEL: The patient is status post interval resection of distal small   bowel with reanastomosis. There is a residualperipherally enhancing   rectal mass with a pigtail drainage catheter extending to the anal canal.   There are minimal bubbles of gas in the pelvis at the resection site. No   drainable collection is identified within the peritoneal cavity.    There is a new right lower quadrant ileal conduit. Again is noted a left   lower quadrant end sigmoid colostomy. There is no evidence of obstruction   of the enteric tract. No active intraluminal extravasation of contrast is   identified.  PERITONEUM: No ascites.  VESSELS: Within normal limits.  RETROPERITONEUM/LYMPH NODES: No lymphadenopathy.  ABDOMINAL WALL: Within normal limits.  BONES: Within normal limits.    IMPRESSION:  1. Mild pancreatitis.  2. Status post resection of fistulized small bowel andcreation of an   ileal conduit.  3. No evidence of active intraluminal extravasation of contrast.      < end of copied text >

## 2023-06-08 NOTE — ED ADULT NURSE NOTE - NSFALLHARMRISKINTERV_ED_ALL_ED

## 2023-06-08 NOTE — ED PROVIDER NOTE - ATTENDING APP SHARED VISIT CONTRIBUTION OF CARE
I, Kike Headley, performed a face to face bedside interview with this patient regarding history of present illness, review of symptoms and relevant past medical, social and family history.  I completed an independent physical examination. I have communicated the patient’s plan of care and disposition with the ACP.  62 year old male with PMH CAD, rectal cancer s/p colostomy in 2019 and recurrence of disease with recent resection and formation of ileal conduit presents with bloody outpt from colostomy. Pt reports fatgieu and GARCIA, no chest pain, no abd pain, no fevers  Gen: NAD, pale appearing  CV: RRR  Pul: CTA b/l  Abd: Soft, non-distended, non-tender, + LLQ colostomy with bloody output, +RLQ ileal conduit with yellow urine in bag  Neuro: no focal deficits  Pt with bloody output from colostomy, seems to be improving while here, hgb stable. Pt with leukocytosis,  elevated lactate, arrived with tachycardia (which has improved, all consistent with infectious etiology. No obvious surgical site infection identified on imaging. Discussed case with Dr. Jackson from [facility] ([phone]) who is agreeable with accepting transfer back however patient and family would like to hold off at this time, stating that if there is no overt surgical intervention at this time, they would prefer to remain here.

## 2023-06-08 NOTE — ED ADULT NURSE NOTE - OBJECTIVE STATEMENT
pt. with a PMH rectal cancer presents to ED reporting blood in his colostomy bag and new on set SOB. (colostomy 2022). R/ Ileostomy (2020) pt. mmoved to critical care placed on cardiac monitor and pulse ox MD at bed side.

## 2023-06-09 ENCOUNTER — TRANSCRIPTION ENCOUNTER (OUTPATIENT)
Age: 63
End: 2023-06-09

## 2023-06-09 LAB
ALBUMIN SERPL ELPH-MCNC: 2.4 G/DL — LOW (ref 3.3–5.2)
ALP SERPL-CCNC: 84 U/L — SIGNIFICANT CHANGE UP (ref 40–120)
ALT FLD-CCNC: <5 U/L — SIGNIFICANT CHANGE UP
ANION GAP SERPL CALC-SCNC: 10 MMOL/L — SIGNIFICANT CHANGE UP (ref 5–17)
APTT BLD: 29.1 SEC — SIGNIFICANT CHANGE UP (ref 27.5–35.5)
AST SERPL-CCNC: 9 U/L — SIGNIFICANT CHANGE UP
BASOPHILS # BLD AUTO: 0.06 K/UL — SIGNIFICANT CHANGE UP (ref 0–0.2)
BASOPHILS NFR BLD AUTO: 0.6 % — SIGNIFICANT CHANGE UP (ref 0–2)
BILIRUB SERPL-MCNC: 0.4 MG/DL — SIGNIFICANT CHANGE UP (ref 0.4–2)
BUN SERPL-MCNC: 12 MG/DL — SIGNIFICANT CHANGE UP (ref 8–20)
CALCIUM SERPL-MCNC: 7.8 MG/DL — LOW (ref 8.4–10.5)
CHLORIDE SERPL-SCNC: 106 MMOL/L — SIGNIFICANT CHANGE UP (ref 96–108)
CO2 SERPL-SCNC: 20 MMOL/L — LOW (ref 22–29)
CREAT SERPL-MCNC: 0.9 MG/DL — SIGNIFICANT CHANGE UP (ref 0.5–1.3)
EGFR: 95 ML/MIN/1.73M2 — SIGNIFICANT CHANGE UP
EOSINOPHIL # BLD AUTO: 0.4 K/UL — SIGNIFICANT CHANGE UP (ref 0–0.5)
EOSINOPHIL NFR BLD AUTO: 4 % — SIGNIFICANT CHANGE UP (ref 0–6)
GLUCOSE BLDC GLUCOMTR-MCNC: 154 MG/DL — HIGH (ref 70–99)
GLUCOSE SERPL-MCNC: 117 MG/DL — HIGH (ref 70–99)
HCT VFR BLD CALC: 22.6 % — LOW (ref 39–50)
HCT VFR BLD CALC: 24.6 % — LOW (ref 39–50)
HCT VFR BLD CALC: 26.2 % — LOW (ref 39–50)
HGB BLD-MCNC: 7.2 G/DL — LOW (ref 13–17)
HGB BLD-MCNC: 8 G/DL — LOW (ref 13–17)
HGB BLD-MCNC: 8.4 G/DL — LOW (ref 13–17)
IMM GRANULOCYTES NFR BLD AUTO: 0.5 % — SIGNIFICANT CHANGE UP (ref 0–0.9)
INR BLD: 1.12 RATIO — SIGNIFICANT CHANGE UP (ref 0.88–1.16)
LYMPHOCYTES # BLD AUTO: 0.88 K/UL — LOW (ref 1–3.3)
LYMPHOCYTES # BLD AUTO: 8.7 % — LOW (ref 13–44)
MCHC RBC-ENTMCNC: 30.5 PG — SIGNIFICANT CHANGE UP (ref 27–34)
MCHC RBC-ENTMCNC: 30.9 PG — SIGNIFICANT CHANGE UP (ref 27–34)
MCHC RBC-ENTMCNC: 30.9 PG — SIGNIFICANT CHANGE UP (ref 27–34)
MCHC RBC-ENTMCNC: 31.9 GM/DL — LOW (ref 32–36)
MCHC RBC-ENTMCNC: 32.1 GM/DL — SIGNIFICANT CHANGE UP (ref 32–36)
MCHC RBC-ENTMCNC: 32.5 GM/DL — SIGNIFICANT CHANGE UP (ref 32–36)
MCV RBC AUTO: 95 FL — SIGNIFICANT CHANGE UP (ref 80–100)
MCV RBC AUTO: 95.3 FL — SIGNIFICANT CHANGE UP (ref 80–100)
MCV RBC AUTO: 97 FL — SIGNIFICANT CHANGE UP (ref 80–100)
MONOCYTES # BLD AUTO: 0.46 K/UL — SIGNIFICANT CHANGE UP (ref 0–0.9)
MONOCYTES NFR BLD AUTO: 4.6 % — SIGNIFICANT CHANGE UP (ref 2–14)
MRSA PCR RESULT.: SIGNIFICANT CHANGE UP
NEUTROPHILS # BLD AUTO: 8.21 K/UL — HIGH (ref 1.8–7.4)
NEUTROPHILS NFR BLD AUTO: 81.6 % — HIGH (ref 43–77)
PLATELET # BLD AUTO: 602 K/UL — HIGH (ref 150–400)
PLATELET # BLD AUTO: 604 K/UL — HIGH (ref 150–400)
PLATELET # BLD AUTO: 636 K/UL — HIGH (ref 150–400)
POTASSIUM SERPL-MCNC: 3.7 MMOL/L — SIGNIFICANT CHANGE UP (ref 3.5–5.3)
POTASSIUM SERPL-SCNC: 3.7 MMOL/L — SIGNIFICANT CHANGE UP (ref 3.5–5.3)
PROT SERPL-MCNC: 4.5 G/DL — LOW (ref 6.6–8.7)
PROTHROM AB SERPL-ACNC: 13 SEC — SIGNIFICANT CHANGE UP (ref 10.5–13.4)
RBC # BLD: 2.33 M/UL — LOW (ref 4.2–5.8)
RBC # BLD: 2.59 M/UL — LOW (ref 4.2–5.8)
RBC # BLD: 2.75 M/UL — LOW (ref 4.2–5.8)
RBC # FLD: 16.3 % — HIGH (ref 10.3–14.5)
RBC # FLD: 17.4 % — HIGH (ref 10.3–14.5)
RBC # FLD: 17.9 % — HIGH (ref 10.3–14.5)
S AUREUS DNA NOSE QL NAA+PROBE: SIGNIFICANT CHANGE UP
SODIUM SERPL-SCNC: 136 MMOL/L — SIGNIFICANT CHANGE UP (ref 135–145)
VANCOMYCIN TROUGH SERPL-MCNC: 4.1 UG/ML — LOW (ref 10–20)
WBC # BLD: 10.06 K/UL — SIGNIFICANT CHANGE UP (ref 3.8–10.5)
WBC # BLD: 10.28 K/UL — SIGNIFICANT CHANGE UP (ref 3.8–10.5)
WBC # BLD: 9.71 K/UL — SIGNIFICANT CHANGE UP (ref 3.8–10.5)
WBC # FLD AUTO: 10.06 K/UL — SIGNIFICANT CHANGE UP (ref 3.8–10.5)
WBC # FLD AUTO: 10.28 K/UL — SIGNIFICANT CHANGE UP (ref 3.8–10.5)
WBC # FLD AUTO: 9.71 K/UL — SIGNIFICANT CHANGE UP (ref 3.8–10.5)

## 2023-06-09 PROCEDURE — 99233 SBSQ HOSP IP/OBS HIGH 50: CPT

## 2023-06-09 PROCEDURE — 43235 EGD DIAGNOSTIC BRUSH WASH: CPT

## 2023-06-09 RX ORDER — FUROSEMIDE 40 MG
20 TABLET ORAL DAILY
Refills: 0 | Status: DISCONTINUED | OUTPATIENT
Start: 2023-06-09 | End: 2023-06-11

## 2023-06-09 RX ORDER — ALPRAZOLAM 0.25 MG
0.25 TABLET ORAL ONCE
Refills: 0 | Status: DISCONTINUED | OUTPATIENT
Start: 2023-06-09 | End: 2023-06-09

## 2023-06-09 RX ADMIN — Medication 125 MILLIGRAM(S): at 05:20

## 2023-06-09 RX ADMIN — MEROPENEM-VABORBACTAM 83.33 GRAM(S): 1; 1 INJECTION, POWDER, FOR SOLUTION INTRAVENOUS at 15:52

## 2023-06-09 RX ADMIN — Medication 20 MILLIGRAM(S): at 19:02

## 2023-06-09 RX ADMIN — Medication 0.25 MILLIGRAM(S): at 21:47

## 2023-06-09 RX ADMIN — PANTOPRAZOLE SODIUM 10 MG/HR: 20 TABLET, DELAYED RELEASE ORAL at 12:49

## 2023-06-09 RX ADMIN — SODIUM CHLORIDE 100 MILLILITER(S): 9 INJECTION, SOLUTION INTRAVENOUS at 21:46

## 2023-06-09 RX ADMIN — SODIUM CHLORIDE 100 MILLILITER(S): 9 INJECTION, SOLUTION INTRAVENOUS at 15:52

## 2023-06-09 RX ADMIN — SODIUM CHLORIDE 100 MILLILITER(S): 9 INJECTION, SOLUTION INTRAVENOUS at 05:23

## 2023-06-09 RX ADMIN — CARVEDILOL PHOSPHATE 12.5 MILLIGRAM(S): 80 CAPSULE, EXTENDED RELEASE ORAL at 19:02

## 2023-06-09 RX ADMIN — PANTOPRAZOLE SODIUM 10 MG/HR: 20 TABLET, DELAYED RELEASE ORAL at 21:46

## 2023-06-09 RX ADMIN — MAGNESIUM OXIDE 400 MG ORAL TABLET 400 MILLIGRAM(S): 241.3 TABLET ORAL at 19:02

## 2023-06-09 RX ADMIN — ATORVASTATIN CALCIUM 40 MILLIGRAM(S): 80 TABLET, FILM COATED ORAL at 21:47

## 2023-06-09 RX ADMIN — CHLORHEXIDINE GLUCONATE 1 APPLICATION(S): 213 SOLUTION TOPICAL at 14:00

## 2023-06-09 RX ADMIN — MEROPENEM-VABORBACTAM 83.33 GRAM(S): 1; 1 INJECTION, POWDER, FOR SOLUTION INTRAVENOUS at 21:49

## 2023-06-09 RX ADMIN — CARVEDILOL PHOSPHATE 12.5 MILLIGRAM(S): 80 CAPSULE, EXTENDED RELEASE ORAL at 05:21

## 2023-06-09 RX ADMIN — MEROPENEM-VABORBACTAM 83.33 GRAM(S): 1; 1 INJECTION, POWDER, FOR SOLUTION INTRAVENOUS at 05:21

## 2023-06-09 RX ADMIN — Medication 125 MILLIGRAM(S): at 12:40

## 2023-06-09 RX ADMIN — PANTOPRAZOLE SODIUM 10 MG/HR: 20 TABLET, DELAYED RELEASE ORAL at 02:57

## 2023-06-09 NOTE — CHART NOTE - NSCHARTNOTEFT_GEN_A_CORE
Writer received call from Dr. Jackson ([facility] Colorectal surgeon) regarding pt care and possible transfer to [facility]. Writer updated Dr. Jackson on current plan of care and any changes that occurred. Dr. Jackson stated she felt that the pt is likely bleeding from anastomosis site as pt was started on Plavix approx. 3 days ago. She recommended d/c Plavix and, if family wanted transfer to [facility], she would accept. Writer will continue to follow.

## 2023-06-09 NOTE — CHART NOTE - NSCHARTNOTEFT_GEN_A_CORE
PA NOTE-MEDICINE    Called by RN due to H/H resulting                       8.0    9.71  )-----------( 602      ( 09 Jun 2023 20:00 )             24.6   Along with downtrending BP.  Pt has GI Bleed   Ordered 1 U PRBC for Transfusion.   Bl Transfusion Consent on Chart.    Continue to Monitor Pt  Recall PA for any changes in Pt Status   Will sign out to AM Team

## 2023-06-09 NOTE — CHART NOTE - NSCHARTNOTEFT_GEN_A_CORE
PA NOTE-MEDICINE    Pt requesting Xanax 0.25 due to Anxiety/cannot sleep.  Pt states he takes Xanax PRN at home for Same Complaint.  VSS   Xanax 0.25 po x 1 Ordered.    Continue to Monitor Pt.  Recall PA for any changes in Pt Status.

## 2023-06-09 NOTE — PROCEDURE NOTE - ADDITIONAL PROCEDURE DETAILS
4 Divehi Bard Power Midline 11cm catheter, insertion in L brachial vein. Good flash, good blood return, flushes easily, tourniquet removed, catheter secured, bed lowered and sharps removed and disposed.    Risks and benefits of procedure discussed with patient.

## 2023-06-09 NOTE — PROCEDURE NOTE - NSCOMPLICATION_GEN_A_CORE
Admitting date: 11/7/2017    Discharging date: 11/10/2017    Admitting diagnosis: Right colon mass    Discharging diagnoses: Dysplastic polyp in the right colon to a 2.5 centimeters    Admitting/discharging physician: Dr. Cárdenas    Procedures: Laparoscopic right colon resection    Hospital course:  This is a pleasant 72-year-old gentleman who had a history of colon polyps.  Patient had undergone a surveillance colonoscopy when he was found to have a polyps in the right colon in which 7 of them could be removed endoscopically.  However, he had a very large flat polyp near the cecum that could not be endoscopically removed.  Patient was admitted to the hospital on 11/7/2017 and underwent a laparoscopic right colon resection.  Pathology revealed mild dysplasia 2.5 cm polyp which was discussed with the patient and family.  Patient had an expected postoperative ileus that resolved on postop day #3.  Patient was tolerating a diet, by mouth pain meds, ambulating in the alvarado and urinating without any difficulties.  Patient was discharged to home on his home medications and Percocet.  Patient was instructed to follow-up in 2 weeks, no lifting greater than 10 pounds and no driving on pain medicine.        Joan Cárdenas MD  General, Minimally Invasive and Endoscopic Surgery  [facility]    [address], [address]  [place], [place], [address]  P: [phone]  F: [phone]    Cc:  Flaco Badillo MD      
leakage at site

## 2023-06-09 NOTE — PROGRESS NOTE ADULT - ASSESSMENT
62M PMH HTN, HLD, CAD, S/p stents (in 2006 & 2018),  LAD), HTN, HLD, right CEA (2019), rectal adenocarcinoma s/p JASON (2019), LAW with ileostomy complicated by leak (2020), anastomotic recurrence (2021) treated with APR rectum/sigmoid colon in 3/2022 with colostomy creation, now with biopsy proven recurrence 12/2022 last chemo with MSK in April/2023. Patient also with recent sepsis secondary to rectal mass c/b abscess and ecoli bacteremia 04/2023,Pt was discharge with augmentin. Pt was on po abx till 05/26/23.Pt was admitted to [facility] 05/26 found to have Small bowel fistula,stool was coming with urine.Now s/p fistula takedown and ileal conduit at [facility] on 5/26/23 and was Discharged on 6/7/23 with castillo and rectal pen hugo,was on antibiotics for bladder abscess w/suspected residual rectal abscess. (zosyn at [facility] x 1 week and then discharged on levaquin and flagyl)Ct abd suspected early pancreatitis but no pancreatitis per [facility] per family.Pt was discharged with po flagyl and LQ.    Pt came to  [facility] with black loose bm from colostomy bag since this AM. As per daughter, who is at bedside pt was having nonbloody output from colostomy yesterday. This morning reports melena via colostomy. Of note, pt was on Heparin sq ppx  while in Hospital and restarted on Plavix yesterday. Last dose this morning. Pt denies any prior EGDs in the past. Last colonoscopy 2 years ago. CT of abdomen and pelvis revealed Status post resection of fistulized small bowel and creation of an ileal conduit. No evidence of active intraluminal extravasation of contrast. Hemoglobin stable at 8.5gm, currently receiving 1u PRBC. Denies nausea, vomiting, abdominal pain, fever, chills, chest pain, shortness of breath, hematemesis, hematochezia. High wbc with lactic acidosis.    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 ? C diff  given watery output  - UA-obtained from conduit-unclear significance. purulent drainage from perineal drain but per pt has been decreasing in quantity  - Ct a/p peripherally enhancing rectal mass but no drainable collection  - per daughter -pancreatitis was noted on imaging post op and therefore not a new finding-no symptoms  - AS PER   ID Dr Freed at [facility]- patient had culture from perineal drain +  Psa (I) to cipro and CRE Enterobacter (S) to ayvcaz, vabomere, quinolones, (R) to zerbaxa. Was discharged 6/7 on levaquin and flagyl for 1 week with plan for outpatient cta/p   - prior UCX e.faecium at [facility]  -CONTINUE VABOMERE   - check c diff, contact iso  - empiric po vanco, will Dc if Cdiff negative  -  WILL FOLLOW UP

## 2023-06-09 NOTE — PROGRESS NOTE ADULT - SUBJECTIVE AND OBJECTIVE BOX
INFECTIOUS DISEASES AND INTERNAL MEDICINE at [place]  =======================================================  Andrea Forde MD  Diplomates American Board of Internal Medicine and Infectious Diseases  Telephone [phone]  Fax            [phone]  =======================================================    TEREMAGALISPASTORA [id number]    Follow up:  HX OF FISTULA    Allergies:  No Known Allergies      Medications:  acetaminophen     Tablet .. 650 milliGRAM(s) Oral every 6 hours PRN  aluminum hydroxide/magnesium hydroxide/simethicone Suspension 30 milliLiter(s) Oral every 4 hours PRN  atorvastatin 40 milliGRAM(s) Oral at bedtime  carvedilol 12.5 milliGRAM(s) Oral every 12 hours  chlorhexidine 2% Cloths 1 Application(s) Topical daily  cholecalciferol 1000 Unit(s) Oral daily  dextrose 5% + sodium chloride 0.9%. 1000 milliLiter(s) IV Continuous <Continuous>  magnesium oxide 400 milliGRAM(s) Oral two times a day with meals  melatonin 3 milliGRAM(s) Oral at bedtime PRN  meropenem/vaborbactam IVPB 4 Gram(s) IV Intermittent every 8 hours  ondansetron Injectable 4 milliGRAM(s) IV Push every 8 hours PRN  pantoprazole Infusion 8 mG/Hr IV Continuous <Continuous>  vancomycin    Solution 125 milliGRAM(s) Oral every 6 hours    SOCIAL       FAMILY   FAMILY HISTORY:  FH: heart disease  father      REVIEW OF SYSTEMS:  CONSTITUTIONAL:  No Fever or chills  HEENT:   No diplopia or blurred vision.  No earache, sore throat or runny nose.  CARDIOVASCULAR:  No pressure, squeezing, strangling, tightness, heaviness or aching about the chest, neck, axilla or epigastrium.  RESPIRATORY:  No cough, shortness of breath, PND or orthopnea.  GASTROINTESTINAL:  No nausea, vomiting or diarrhea.  GENITOURINARY:  No dysuria, frequency or urgency. No Blood in urine  MUSCULOSKELETAL:   moves all joints  SKIN:  No change in skin, hair or nails.  NEUROLOGIC:  No paresthesias, fasciculations, seizures or weakness.  PSYCHIATRIC:  No disorder of thought or mood.  ENDOCRINE:  No heat or cold intolerance, polyuria or polydipsia.  HEMATOLOGICAL:  No easy bruising or bleeding.            Physical Exam:  ICU Vital Signs Last 24 Hrs  T(C): 36.7 (09 Jun 2023 11:30), Max: 37.1 (08 Jun 2023 23:40)  T(F): 98 (09 Jun 2023 11:30), Max: 98.7 (08 Jun 2023 23:40)  HR: 84 (09 Jun 2023 11:30) (72 - 103)  BP: 141/71 (09 Jun 2023 11:30) (113/70 - 159/82)  BP(mean): 88 (09 Jun 2023 11:30) (68 - 98)  ABP: --  ABP(mean): --  RR: 16 (09 Jun 2023 11:30) (16 - 18)  SpO2: 95% (09 Jun 2023 11:30) (95% - 100%)    O2 Parameters below as of 09 Jun 2023 11:30  Patient On (Oxygen Delivery Method): room air          GEN: NAD,   HEENT: normocephalic and atraumatic. EOMI. JOLEEN.    NECK: Supple. No carotid bruits.  No lymphadenopathy or thyromegaly.  LUNGS: Clear to auscultation.  HEART: Regular rate and rhythm without murmur.  ABDOMEN: Soft, nontender, and nondistended.  Positive bowel sounds. OSTOMY     : No CVA tenderness  EXTREMITIES: Without any cyanosis, clubbing, rash, lesions or edema.  MSK: no joint swelling  NEUROLOGIC: Cranial nerves II through XII are grossly intact.  PSYCHIATRIC: Appropriate affect .  SKIN: No ulceration or induration present.        Labs:  Vitals:  ============  T(F): 98 (09 Jun 2023 11:30), Max: 98.7 (08 Jun 2023 23:40)  HR: 84 (09 Jun 2023 11:30)  BP: 141/71 (09 Jun 2023 11:30)  RR: 16 (09 Jun 2023 11:30)  SpO2: 95% (09 Jun 2023 11:30) (95% - 100%)  temp max in last 48H T(F): , Max: 98.7 (06-08-23 @ 23:40)    =======================================================  Current Antibiotics:  meropenem/vaborbactam IVPB 4 Gram(s) IV Intermittent every 8 hours  vancomycin    Solution 125 milliGRAM(s) Oral every 6 hours    Other medications:  atorvastatin 40 milliGRAM(s) Oral at bedtime  carvedilol 12.5 milliGRAM(s) Oral every 12 hours  chlorhexidine 2% Cloths 1 Application(s) Topical daily  cholecalciferol 1000 Unit(s) Oral daily  dextrose 5% + sodium chloride 0.9%. 1000 milliLiter(s) IV Continuous <Continuous>  magnesium oxide 400 milliGRAM(s) Oral two times a day with meals  pantoprazole Infusion 8 mG/Hr IV Continuous <Continuous>      =======================================================  Labs:                        7.2    10.06 )-----------( 604      ( 09 Jun 2023 07:05 )             22.6     06-09    136  |  106  |  12.0  ----------------------------<  117<H>  3.7   |  20.0<L>  |  0.90    Ca    7.8<L>      09 Jun 2023 07:05  Mg     2.2     06-08    TPro  4.5<L>  /  Alb  2.4<L>  /  TBili  0.4  /  DBili  x   /  AST  9   /  ALT  <5  /  AlkPhos  84  06-09      Creatinine, Serum: 0.90 mg/dL (06-09-23 @ 07:05)  Creatinine, Serum: 1.51 mg/dL (06-08-23 @ 10:35)            WBC Count: 10.06 K/uL (06-09-23 @ 07:05)  WBC Count: 22.99 K/uL (06-08-23 @ 10:35)    SARS-CoV-2: NotDetec (06-08-23 @ 13:20)      Alkaline Phosphatase, Serum: 84 U/L (06-09-23 @ 07:05)  Alkaline Phosphatase, Serum: 124 U/L (06-08-23 @ 10:35)  Alanine Aminotransferase (ALT/SGPT): <5 U/L (06-09-23 @ 07:05)  Alanine Aminotransferase (ALT/SGPT): 8 U/L (06-08-23 @ 10:35)  Aspartate Aminotransferase (AST/SGOT): 9 U/L (06-09-23 @ 07:05)  Aspartate Aminotransferase (AST/SGOT): 12 U/L (06-08-23 @ 10:35)  Bilirubin Total, Serum: 0.4 mg/dL (06-09-23 @ 07:05)  Bilirubin Total, Serum: 0.2 mg/dL (06-08-23 @ 10:35)

## 2023-06-09 NOTE — PROGRESS NOTE ADULT - SUBJECTIVE AND OBJECTIVE BOX
[facility] Division of Hospital Medicine    Chief Complaint:      SUBJECTIVE / OVERNIGHT EVENTS:    Patient denies chest pain, SOB, abd pain, N/V, fever, chills, dysuria or any other complaints. All remainder ROS negative.     MEDICATIONS  (STANDING):  atorvastatin 40 milliGRAM(s) Oral at bedtime  carvedilol 12.5 milliGRAM(s) Oral every 12 hours  chlorhexidine 2% Cloths 1 Application(s) Topical daily  cholecalciferol 1000 Unit(s) Oral daily  dextrose 5% + sodium chloride 0.9%. 1000 milliLiter(s) (100 mL/Hr) IV Continuous <Continuous>  magnesium oxide 400 milliGRAM(s) Oral two times a day with meals  meropenem/vaborbactam IVPB 4 Gram(s) IV Intermittent every 8 hours  pantoprazole Infusion 8 mG/Hr (10 mL/Hr) IV Continuous <Continuous>  vancomycin    Solution 125 milliGRAM(s) Oral every 6 hours    MEDICATIONS  (PRN):  acetaminophen     Tablet .. 650 milliGRAM(s) Oral every 6 hours PRN Temp greater or equal to 38C (100.4F), Mild Pain (1 - 3)  aluminum hydroxide/magnesium hydroxide/simethicone Suspension 30 milliLiter(s) Oral every 4 hours PRN Dyspepsia  melatonin 3 milliGRAM(s) Oral at bedtime PRN Insomnia  ondansetron Injectable 4 milliGRAM(s) IV Push every 8 hours PRN Nausea and/or Vomiting        I&O's Summary    2023 07:01  -  2023 07:00  --------------------------------------------------------  IN: 1130 mL / OUT: 2650 mL / NET: -1520 mL        PHYSICAL EXAM:  Vital Signs Last 24 Hrs  T(C): 36.7 (2023 07:48), Max: 37.1 (2023 23:40)  T(F): 98.1 (2023 07:48), Max: 98.7 (2023 23:40)  HR: 84 (2023 08:00) (72 - 129)  BP: 139/67 (2023 08:00) (113/70 - 159/82)  BP(mean): 85 (2023 08:00) (68 - 98)  RR: 16 (2023 08:00) (16 - 24)  SpO2: 95% (2023 08:00) (95% - 100%)    Parameters below as of 2023 08:00  Patient On (Oxygen Delivery Method): room air            CONSTITUTIONAL: NAD, well-developed, well-groomed  ENMT: Moist oral mucosa, no pharyngeal injection or exudates; normal dentition  RESPIRATORY: Normal respiratory effort; lungs are clear to auscultation bilaterally  CARDIOVASCULAR: Regular rate and rhythm, normal S1 and S2, no murmur/rub/gallop; No lower extremity edema; Peripheral pulses are 2+ bilaterally  ABDOMEN: Nontender to palpation, normoactive bowel sounds, no rebound/guarding; No hepatosplenomegaly  MUSCLOSKELETAL:  Normal gait; no clubbing or cyanosis of digits; no joint swelling or tenderness to palpation  PSYCH: A+O to person, place, and time; affect appropriate  NEUROLOGY: CN 2-12 are intact and symmetric; no gross sensory deficits;   SKIN: No rashes; no palpable lesions    LABS:                        7.2    10.06 )-----------( 604      ( 2023 07:05 )             22.6     06-09    136  |  106  |  12.0  ----------------------------<  117<H>  3.7   |  20.0<L>  |  0.90    Ca    7.8<L>      2023 07:05  Mg     2.2     06-08    TPro  4.5<L>  /  Alb  2.4<L>  /  TBili  0.4  /  DBili  x   /  AST  9   /  ALT  <5  /  AlkPhos  84  06-09    PT/INR - ( 2023 07:05 )   PT: 13.0 sec;   INR: 1.12 ratio         PTT - ( 2023 07:05 )  PTT:29.1 sec  CARDIAC MARKERS ( 2023 10:35 )  x     / <0.01 ng/mL / x     / x     / x          Urinalysis Basic - ( 2023 11:31 )    Color: Yellow / Appearance: Slightly Turbid / S.010 / pH: x  Gluc: x / Ketone: Negative  / Bili: Negative / Urobili: Negative mg/dL   Blood: x / Protein: 100 / Nitrite: Negative   Leuk Esterase: Moderate / RBC: 3-5 /HPF / WBC >50 /HPF   Sq Epi: x / Non Sq Epi: x / Bacteria: Occasional        CAPILLARY BLOOD GLUCOSE      POCT Blood Glucose.: 154 mg/dL (2023 05:25)  POCT Blood Glucose.: 115 mg/dL (2023 23:49)        RADIOLOGY & ADDITIONAL TESTS:  Results Reviewed:   Imaging Personally Reviewed:  Electrocardiogram Personally Reviewed:                                           [facility] Division of Hospital Medicine    Chief Complaint:  GIB    SUBJECTIVE / OVERNIGHT EVENTS:    Pt seen and examined at bedside. Pt c/o blood in colostomy bag but otherwise states feels well. Denies CP, SOB, N/V/D, abd pain, fever, chills. Rest of ROS (-).     MEDICATIONS  (STANDING):  atorvastatin 40 milliGRAM(s) Oral at bedtime  carvedilol 12.5 milliGRAM(s) Oral every 12 hours  chlorhexidine 2% Cloths 1 Application(s) Topical daily  cholecalciferol 1000 Unit(s) Oral daily  dextrose 5% + sodium chloride 0.9%. 1000 milliLiter(s) (100 mL/Hr) IV Continuous <Continuous>  magnesium oxide 400 milliGRAM(s) Oral two times a day with meals  meropenem/vaborbactam IVPB 4 Gram(s) IV Intermittent every 8 hours  pantoprazole Infusion 8 mG/Hr (10 mL/Hr) IV Continuous <Continuous>  vancomycin    Solution 125 milliGRAM(s) Oral every 6 hours    MEDICATIONS  (PRN):  acetaminophen     Tablet .. 650 milliGRAM(s) Oral every 6 hours PRN Temp greater or equal to 38C (100.4F), Mild Pain (1 - 3)  aluminum hydroxide/magnesium hydroxide/simethicone Suspension 30 milliLiter(s) Oral every 4 hours PRN Dyspepsia  melatonin 3 milliGRAM(s) Oral at bedtime PRN Insomnia  ondansetron Injectable 4 milliGRAM(s) IV Push every 8 hours PRN Nausea and/or Vomiting        I&O's Summary    2023 07:01  -  2023 07:00  --------------------------------------------------------  IN: 1130 mL / OUT: 2650 mL / NET: -1520 mL        PHYSICAL EXAM:  Vital Signs Last 24 Hrs  T(C): 36.7 (2023 07:48), Max: 37.1 (2023 23:40)  T(F): 98.1 (2023 07:48), Max: 98.7 (2023 23:40)  HR: 84 (2023 08:00) (72 - 129)  BP: 139/67 (2023 08:00) (113/70 - 159/82)  BP(mean): 85 (2023 08:00) (68 - 98)  RR: 16 (2023 08:00) (16 - 24)  SpO2: 95% (2023 08:00) (95% - 100%)    Parameters below as of 2023 08:00  Patient On (Oxygen Delivery Method): room air      GEN - NAD  HEENT - NCAT, EOMI, DARYN,  RESP - CTA BL, no wheeze//rhonchi/crackles. not on supplemental O2.  CARDIO - NS1S2, RRR. No murmurs  ABD - Soft/Non tender/Non distended. +bs. colostomy bag-red loose stool.incision clear-healing well,mild tender.. ileal conduit present,castillo bag clear urine,rectal pinr ose prsent  Ext - No GLO. no signs of venous/arterial stasis ulcers  MSK - full ROM of BL upper and lower extremities without pain or restriction. BL 5/5 strength on upper and lower extremities.   Neuro - cn 2-12 grossly intact.  gait not observed.   Psych- AAOx3. attentive. normal affect.   - castillo cath in place (pt arrived with castillo)    LABS:                        7.2    10.06 )-----------( 604      ( 2023 07:05 )             22.6     06-09    136  |  106  |  12.0  ----------------------------<  117<H>  3.7   |  20.0<L>  |  0.90    Ca    7.8<L>      2023 07:05  Mg     2.2     06-08    TPro  4.5<L>  /  Alb  2.4<L>  /  TBili  0.4  /  DBili  x   /  AST  9   /  ALT  <5  /  AlkPhos  84  06-09    PT/INR - ( 2023 07:05 )   PT: 13.0 sec;   INR: 1.12 ratio         PTT - ( 2023 07:05 )  PTT:29.1 sec  CARDIAC MARKERS ( 2023 10:35 )  x     / <0.01 ng/mL / x     / x     / x          Urinalysis Basic - ( 2023 11:31 )    Color: Yellow / Appearance: Slightly Turbid / S.010 / pH: x  Gluc: x / Ketone: Negative  / Bili: Negative / Urobili: Negative mg/dL   Blood: x / Protein: 100 / Nitrite: Negative   Leuk Esterase: Moderate / RBC: 3-5 /HPF / WBC >50 /HPF   Sq Epi: x / Non Sq Epi: x / Bacteria: Occasional        CAPILLARY BLOOD GLUCOSE      POCT Blood Glucose.: 154 mg/dL (2023 05:25)  POCT Blood Glucose.: 115 mg/dL (2023 23:49)        RADIOLOGY & ADDITIONAL TESTS:  Results Reviewed:   Imaging Personally Reviewed:  Electrocardiogram Personally Reviewed:

## 2023-06-09 NOTE — CONSULT NOTE ADULT - ASSESSMENT
62M PMH HTN, HLD, CAD, S/p stents (in 2006 & 2018),afib not on AC per pt choice,  LAD), , right CEA (2019), rectal adenocarcinoma s/p JASON (2019), LAW with ileostomy complicated by leak (2020), anastomotic recurrence (2021) treated with APR rectum/sigmoid colon in 3/2022 with colostomy creation, now with biopsy proven recurrence 12/2022 last chemo with MSK in MAY 2023    1) Rectal cancer  as above  last 5FU + Panitumumab infusion wa son 5/11/2023  all treatment on hold inpatient  recently underwent exploratory enterolysis, SB resection and anastomosis, cystectomy and ileal conduit creation on 5/27 at [facility]      2) Sepsis/C diff /UTI  w/u in progress  await cultures  on meropenem/Vancomycin  ID following    3) GIB/Anemia  s/p 1u PRBC  hGB 7.2 this am  going for EGD  check iron studies    4) DVT ppx  SCD    further mx as per primary team  will update [facility]

## 2023-06-09 NOTE — CONSULT NOTE ADULT - SUBJECTIVE AND OBJECTIVE BOX
61 yo M known patient at [facility], rectal adenoca on 5FU + Panitumumab last on 5/11/2023, recently s/p exploratory enterolysis, SB resection and anastomosis, cystectomy and ileal conduit creation on 5/27, PM HTN, HLD, CAD, S/p stents (in 2006 & 2018),  LAD), HTN, HLD, right CEA (2019), rectal adenocarcinoma s/p JASON (2019), LAW with ileostomy complicated by leak (2020), anastomotic recurrence (2021) treated with APR rectum/sigmoid colon in 3/2022 with colostomy creation, now with biopsy proven recurrence 12/2022 last [place] [facility]. 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    Pt came to  [facility] with black loose bm from colostomy bag since this AM. As per daughter, who is at bedside pt was having nonbloody output from colostomy yesterday. This morning reports melena via colostomy. Of note, pt was on Heparin sq ppx  while in Hospital and restarted on Plavix yesterday. Last dose this morning. Pt denies any prior EGDs in the past. Last colonoscopy 2 years ago. CT of abdomen and pelvis revealed Status post resection of fistulized small bowel and creation of an ileal conduit. No evidence of active intraluminal extravasation of contrast. Hemoglobin stable at 8.5gm, currently receiving 1u PRBC. Denies nausea, vomiting, abdominal pain, fever, chills, chest pain, shortness of breath, hematemesis, hematochezia. High wbc with lactic acidosis.    seen at bedside    ROS  as per HPI    PAST MEDICAL & SURGICAL HISTORY:  Chest pain      HTN (hypertension)      HLD (hyperlipidemia)      CAD (coronary artery disease)      Mitral regurgitation      Rectal cancer  2/2019 s/p chemo and radiation; recurrence of cancer 2022      Depression  with rectal cancer      Neuropathy  feet s/p chemo      Stented coronary artery  circumflex 2005 LAD 2017 x4      H/O carotid stenosis      H/O renal calculi      DANO (iron deficiency anemia)      Umbilical hernia      Splenic artery aneurysm      H/O cardiac catheterization  2005 2017  4 STENTS      History of CEA (carotid endarterectomy)  right 2019      History of rectal surgery  with ileostomy 2/2020      H/O sigmoidoscopy  x2      History of removal of Port-a-Cath      Social History:    FAMILY HISTORY:  FH: heart disease  father      MEDICATIONS  (STANDING):  atorvastatin 40 milliGRAM(s) Oral at bedtime  carvedilol 12.5 milliGRAM(s) Oral every 12 hours  chlorhexidine 2% Cloths 1 Application(s) Topical daily  cholecalciferol 1000 Unit(s) Oral daily  dextrose 5% + sodium chloride 0.9%. 1000 milliLiter(s) (100 mL/Hr) IV Continuous <Continuous>  magnesium oxide 400 milliGRAM(s) Oral two times a day with meals  meropenem/vaborbactam IVPB 4 Gram(s) IV Intermittent every 8 hours  pantoprazole Infusion 8 mG/Hr (10 mL/Hr) IV Continuous <Continuous>  vancomycin    Solution 125 milliGRAM(s) Oral every 6 hours    MEDICATIONS  (PRN):  acetaminophen     Tablet .. 650 milliGRAM(s) Oral every 6 hours PRN Temp greater or equal to 38C (100.4F), Mild Pain (1 - 3)  aluminum hydroxide/magnesium hydroxide/simethicone Suspension 30 milliLiter(s) Oral every 4 hours PRN Dyspepsia  melatonin 3 milliGRAM(s) Oral at bedtime PRN Insomnia  ondansetron Injectable 4 milliGRAM(s) IV Push every 8 hours PRN Nausea and/or Vomiting    ICU Vital Signs Last 24 Hrs  T(C): 36.7 (09 Jun 2023 07:48), Max: 37.1 (08 Jun 2023 23:40)  T(F): 98.1 (09 Jun 2023 07:48), Max: 98.7 (08 Jun 2023 23:40)  HR: 84 (09 Jun 2023 08:00) (72 - 129)  BP: 139/67 (09 Jun 2023 08:00) (113/70 - 159/82)  BP(mean): 85 (09 Jun 2023 08:00) (68 - 98)  ABP: --  ABP(mean): --  RR: 16 (09 Jun 2023 08:00) (16 - 24)  SpO2: 95% (09 Jun 2023 08:00) (95% - 100%)    O2 Parameters below as of 09 Jun 2023 08:00  Patient On (Oxygen Delivery Method): room air    GEN - NAD  HEENT - NCAT, EOMI, DARYN,  RESP - CTA BL, no wheeze//rhonchi/crackles. not on supplemental O2.  CARDIO - NS1S2, RRR. No murmurs  ABD - Soft/Non tender/Non distended. +bs. colostomy bag-black loose stool.incision clear-healing well,mild tender.. ileal conduit present,castillo bag clear urine,rectal pinr ose prsent  Ext - No GLO. no signs of venous/arterial stasis ulcers  MSK - full ROM of BL upper and lower extremities without pain or restriction. BL 5/5 strength on upper and lower extremities.   Neuro - cn 2-12 grossly intact.  gait not observed.   Psych- AAOx3. attentive. normal affect.                            7.2    10.06 )-----------( 604      ( 09 Jun 2023 07:05 )             22.6     06-09    136  |  106  |  12.0  ----------------------------<  117<H>  3.7   |  20.0<L>  |  0.90    Ca    7.8<L>      09 Jun 2023 07:05  Mg     2.2     06-08    TPro  4.5<L>  /  Alb  2.4<L>  /  TBili  0.4  /  DBili  x   /  AST  9   /  ALT  <5  /  AlkPhos  84  06-09         63 yo M known [place] [facility] [place], [place] adenoca on 5FU + Panitumumab last on 5/11/2023, recently s/p exploratory enterolysis, SB resection and anastomosis, cystectomy and ileal conduit creation on 5/27, PM HTN, HLD, CAD, S/p stents (in 2006 & 2018),  LAD), HTN, HLD, right CEA (2019), rectal adenocarcinoma s/p JASON (2019), LAW with ileostomy complicated by leak (2020), anastomotic recurrence (2021) treated with APR rectum/sigmoid colon in 3/2022 with colostomy creation, now with biopsy proven recurrence 12/2022 last chemo with [facility] [place] in April/2023. 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    Pt came to  [facility] with black loose bm from colostomy bag since this AM. As per daughter, who is at bedside pt was having nonbloody output from colostomy yesterday. This morning reports melena via colostomy. Of note, pt was on Heparin sq ppx  while in Hospital and restarted on Plavix yesterday. Last dose this morning. Pt denies any prior EGDs in the past. Last colonoscopy 2 years ago. CT of abdomen and pelvis revealed Status post resection of fistulized small bowel and creation of an ileal conduit. No evidence of active intraluminal extravasation of contrast. Hemoglobin stable at 8.5gm, currently receiving 1u PRBC. Denies nausea, vomiting, abdominal pain, fever, chills, chest pain, shortness of breath, hematemesis, hematochezia. High wbc with lactic acidosis.    seen at bedside    ROS  as per HPI    PAST MEDICAL & SURGICAL HISTORY:  Chest pain      HTN (hypertension)      HLD (hyperlipidemia)      CAD (coronary artery disease)      Mitral regurgitation      Rectal cancer  2/2019 s/p chemo and radiation; recurrence of cancer 2022      Depression  with rectal cancer      Neuropathy  feet s/p chemo      Stented coronary artery  circumflex 2005 LAD 2017 x4      H/O carotid stenosis      H/O renal calculi      DANO (iron deficiency anemia)      Umbilical hernia      Splenic artery aneurysm      H/O cardiac catheterization  2005 2017  4 STENTS      History of CEA (carotid endarterectomy)  right 2019      History of rectal surgery  with ileostomy 2/2020      H/O sigmoidoscopy  x2      History of removal of Port-a-Cath      Social History:    FAMILY HISTORY:  FH: heart disease  father      MEDICATIONS  (STANDING):  atorvastatin 40 milliGRAM(s) Oral at bedtime  carvedilol 12.5 milliGRAM(s) Oral every 12 hours  chlorhexidine 2% Cloths 1 Application(s) Topical daily  cholecalciferol 1000 Unit(s) Oral daily  dextrose 5% + sodium chloride 0.9%. 1000 milliLiter(s) (100 mL/Hr) IV Continuous <Continuous>  magnesium oxide 400 milliGRAM(s) Oral two times a day with meals  meropenem/vaborbactam IVPB 4 Gram(s) IV Intermittent every 8 hours  pantoprazole Infusion 8 mG/Hr (10 mL/Hr) IV Continuous <Continuous>  vancomycin    Solution 125 milliGRAM(s) Oral every 6 hours    MEDICATIONS  (PRN):  acetaminophen     Tablet .. 650 milliGRAM(s) Oral every 6 hours PRN Temp greater or equal to 38C (100.4F), Mild Pain (1 - 3)  aluminum hydroxide/magnesium hydroxide/simethicone Suspension 30 milliLiter(s) Oral every 4 hours PRN Dyspepsia  melatonin 3 milliGRAM(s) Oral at bedtime PRN Insomnia  ondansetron Injectable 4 milliGRAM(s) IV Push every 8 hours PRN Nausea and/or Vomiting    ICU Vital Signs Last 24 Hrs  T(C): 36.7 (09 Jun 2023 07:48), Max: 37.1 (08 Jun 2023 23:40)  T(F): 98.1 (09 Jun 2023 07:48), Max: 98.7 (08 Jun 2023 23:40)  HR: 84 (09 Jun 2023 08:00) (72 - 129)  BP: 139/67 (09 Jun 2023 08:00) (113/70 - 159/82)  BP(mean): 85 (09 Jun 2023 08:00) (68 - 98)  ABP: --  ABP(mean): --  RR: 16 (09 Jun 2023 08:00) (16 - 24)  SpO2: 95% (09 Jun 2023 08:00) (95% - 100%)    O2 Parameters below as of 09 Jun 2023 08:00  Patient On (Oxygen Delivery Method): room air    GEN - NAD  HEENT - NCAT, EOMI, DARYN,  RESP - CTA BL, no wheeze//rhonchi/crackles. not on supplemental O2.  CARDIO - NS1S2, RRR. No murmurs  ABD - Soft/Non tender/Non distended. +bs. colostomy bag-black loose stool.incision clear-healing well,mild tender.. ileal conduit present,castillo bag clear urine,rectal pinr ose prsent  Ext - No GLO. no signs of venous/arterial stasis ulcers  MSK - full ROM of BL upper and lower extremities without pain or restriction. BL 5/5 strength on upper and lower extremities.   Neuro - cn 2-12 grossly intact.  gait not observed.   Psych- AAOx3. attentive. normal affect.                            7.2   10.06 )-----------( 604      ( 09 Jun 2023 07:05 )             22.6     06-09    136  |  106  |  12.0  ----------------------------<  117<H>  3.7   |  20.0<L>  |  0.90    Ca    7.8<L>      09 Jun 2023 07:05  Mg     2.2     06-08    TPro  4.5<L>  /  Alb  2.4<L>  /  TBili  0.4  /  DBili  x   /  AST  9   /  ALT  <5  /  AlkPhos  84  06-09         63 yo M known patient at [facility] [place], rectal adenoca on 5FU + Panitumumab last on 5/11/2023, recently s/p exploratory enterolysis, SB resection and anastomosis, cystectomy and ileal conduit creation on 5/27, PM HTN, HLD, CAD, S/p stents (in 2006 & 2018),  LAD), HTN, HLD, right CEA (2019), rectal adenocarcinoma s/p JASON (2019), LAW with ileostomy complicated by leak (2020), anastomotic recurrence (2021) treated with APR rectum/sigmoid colon in 3/2022 with colostomy creation, now with biopsy proven recurrence 12/2022 last chemo with [facility] in April/2023. 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    Pt came to  [facility] with black loose bm from colostomy bag since this AM. As per daughter, who is at bedside pt was having nonbloody output from colostomy yesterday. This morning reports melena via colostomy. Of note, pt was on Heparin sq ppx  while in Hospital and restarted on Plavix yesterday. Last dose this morning. Pt denies any prior EGDs in the past. Last colonoscopy 2 years ago. CT of abdomen and pelvis revealed Status post resection of fistulized small bowel and creation of an ileal conduit. No evidence of active intraluminal extravasation of contrast. Hemoglobin stable at 8.5gm, currently receiving 1u PRBC. Denies nausea, vomiting, abdominal pain, fever, chills, chest pain, shortness of breath, hematemesis, hematochezia. High wbc with lactic acidosis.    seen at bedside, afebrile, some dark stools in ostomy bag    ROS  as per HPI    PAST MEDICAL & SURGICAL HISTORY:  Chest pain      HTN (hypertension)      HLD (hyperlipidemia)      CAD (coronary artery disease)      Mitral regurgitation      Rectal cancer  2/2019 s/p chemo and radiation; recurrence of cancer 2022      Depression  with rectal cancer      Neuropathy  feet s/p chemo      Stented coronary artery  circumflex 2005 LAD 2017 x4      H/O carotid stenosis      H/O renal calculi      DANO (iron deficiency anemia)      Umbilical hernia      Splenic artery aneurysm      H/O cardiac catheterization  2005 2017  4 STENTS      History of CEA (carotid endarterectomy)  right 2019      History of rectal surgery  with ileostomy 2/2020      H/O sigmoidoscopy  x2      History of removal of Port-a-Cath      Social History:    FAMILY HISTORY:  FH: heart disease  father      MEDICATIONS  (STANDING):  atorvastatin 40 milliGRAM(s) Oral at bedtime  carvedilol 12.5 milliGRAM(s) Oral every 12 hours  chlorhexidine 2% Cloths 1 Application(s) Topical daily  cholecalciferol 1000 Unit(s) Oral daily  dextrose 5% + sodium chloride 0.9%. 1000 milliLiter(s) (100 mL/Hr) IV Continuous <Continuous>  magnesium oxide 400 milliGRAM(s) Oral two times a day with meals  meropenem/vaborbactam IVPB 4 Gram(s) IV Intermittent every 8 hours  pantoprazole Infusion 8 mG/Hr (10 mL/Hr) IV Continuous <Continuous>  vancomycin    Solution 125 milliGRAM(s) Oral every 6 hours    MEDICATIONS  (PRN):  acetaminophen     Tablet .. 650 milliGRAM(s) Oral every 6 hours PRN Temp greater or equal to 38C (100.4F), Mild Pain (1 - 3)  aluminum hydroxide/magnesium hydroxide/simethicone Suspension 30 milliLiter(s) Oral every 4 hours PRN Dyspepsia  melatonin 3 milliGRAM(s) Oral at bedtime PRN Insomnia  ondansetron Injectable 4 milliGRAM(s) IV Push every 8 hours PRN Nausea and/or Vomiting    ICU Vital Signs Last 24 Hrs  T(C): 36.7 (09 Jun 2023 07:48), Max: 37.1 (08 Jun 2023 23:40)  T(F): 98.1 (09 Jun 2023 07:48), Max: 98.7 (08 Jun 2023 23:40)  HR: 84 (09 Jun 2023 08:00) (72 - 129)  BP: 139/67 (09 Jun 2023 08:00) (113/70 - 159/82)  BP(mean): 85 (09 Jun 2023 08:00) (68 - 98)  ABP: --  ABP(mean): --  RR: 16 (09 Jun 2023 08:00) (16 - 24)  SpO2: 95% (09 Jun 2023 08:00) (95% - 100%)    O2 Parameters below as of 09 Jun 2023 08:00  Patient On (Oxygen Delivery Method): room air    GEN - NAD  HEENT - NCAT, EOMI, DARYN,  RESP - CTA BL, no wheeze//rhonchi/crackles. not on supplemental O2.  CARDIO - NS1S2, RRR. No murmurs  ABD - Soft/Non tender/Non distended. +bs. colostomy bag-black loose stool.incision clear-healing well,mild tender.. ileal conduit present,castillo bag clear urine,rectal pinr ose prsent  Ext - No GLO. no signs of venous/arterial stasis ulcers  MSK - full ROM of BL upper and lower extremities without pain or restriction. BL 5/5 strength on upper and lower extremities.   Neuro - cn 2-12 grossly intact.  gait not observed.   Psych- AAOx3. attentive. normal affect.                            7.2   10.06 )-----------( 604      ( 09 Jun 2023 07:05 )             22.6     06-09    136  |  106  |  12.0  ----------------------------<  117<H>  3.7   |  20.0<L>  |  0.90    Ca    7.8<L>      09 Jun 2023 07:05  Mg     2.2     06-08    TPro  4.5<L>  /  Alb  2.4<L>  /  TBili  0.4  /  DBili  x   /  AST  9   /  ALT  <5  /  AlkPhos  84  06-09

## 2023-06-09 NOTE — PROGRESS NOTE ADULT - ASSESSMENT
62M PMH HTN, HLD, CAD, S/p stents (in 2006 & 2018),afib not on AC per pt choice,  LAD), , right CEA (2019), rectal adenocarcinoma s/p JASON (2019), LAW with ileostomy complicated by leak (2020), anastomotic recurrence (2021) treated with APR rectum/sigmoid colon in 3/2022 with colostomy creation, now with biopsy proven recurrence 12/2022 last chemo with [facility] in April/2023. 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    Pt came to  [facility] with black loose bm from colostomy bag since this AM. As per daughter, who is at bedside pt was having nonbloody output from colostomy yesterday. This morning reports melena via colostomy. Of note, pt was on Heparin sq ppx  while in Hospital and restarted on Plavix yesterday. Last dose this morning. Pt denies any prior EGDs in the past. Last colonoscopy 2 years ago. CT of abdomen and pelvis revealed Status post resection of fistulized small bowel and creation of an ileal conduit. No evidence of active intraluminal extravasation of contrast. Hemoglobin stable at 8.5gm, currently receiving 1u PRBC. Denies nausea, vomiting, abdominal pain, fever, chills, chest pain, shortness of breath, hematemesis, hematochezia. High wbc with lactic acidosis.      Sepsis unclear etiology r/o cdif,UTI  Recent bladder/rectal absces with prolong abx cource  hx rectal adenocarcinoma on chemo,last chemo April  -leukocytosis,thrombocytosis likely from infection  -Blood c/s,urine c/s  -stool cdiff  -iv zosyn,vanco per ID.F/trough adjust dose  -repeat LA. S/P 2.5 L BOLUS ED,continue ivf  -f/u cbc,lactic acid  -reviewed ct abd/pelvis/chest        Gi bleed  -hb 8.5. s/p 1 u prbc  -ppi  -f/u cbc.ct abd   -plan for egd per Gi  -c/s cardio for clearance  -hold plavix      JAS  Hyperkalemia  -lokelma.hold home po k  -CR 1.5.Hold lasix,ivf.f/u am    Hypomagnesemia  -iv mag, resume po mag.recheck mag      HTN, HLD, CAD, S/p stents (in 2006 & 2018),afib not on AC per pt choice,  LAD)  -continue coreg,hold lasix  -Cont statin,hold plavix. last plavis today morning.  -f/u cardiology rec      dvt ppx scd's  Plan of care dw pt, wife,daughter RN ([facility]) .confirmed DRN/DNI  Pt and family refuged transfer to [facility].  Spoke with GI at bedside  spoke with ID  per cardiology low risk for procedure  Total time spent 60 mins for critical care.       62M PMH HTN, HLD, CAD, S/p stents (in 2006 & 2018),afib not on AC per pt choice,  LAD), , right CEA (2019), rectal adenocarcinoma s/p JASON (2019), LAW with ileostomy complicated by leak (2020), anastomotic recurrence (2021) treated with APR rectum/sigmoid colon in 3/2022 with colostomy creation, now with biopsy proven recurrence 12/2022 last chemo with [facility] in April/2023. 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    Pt came to  [facility] with black loose bm from colostomy bag since this AM. As per daughter, who is at bedside pt was having nonbloody output from colostomy yesterday. This morning reports melena via colostomy. Of note, pt was on Heparin sq ppx  while in Hospital and restarted on Plavix yesterday. Last dose this morning. Pt denies any prior EGDs in the past. Last colonoscopy 2 years ago. CT of abdomen and pelvis revealed Status post resection of fistulized small bowel and creation of an ileal conduit. No evidence of active intraluminal extravasation of contrast. Hemoglobin stable at 8.5gm, currently receiving 1u PRBC. Denies nausea, vomiting, abdominal pain, fever, chills, chest pain, shortness of breath, hematemesis, hematochezia. High wbc with lactic acidosis.      #Sepsis likely due to UTI   Recent bladder/rectal absces with prolong abx cource  hx rectal adenocarcinoma on chemo,last chemo April  c. diff ruled out.  Plan:  F/u blood c/s, urine c/s  F/u ID - recs noted  C/w Vabomere as per ID recs  IVF hydration    #GI bleed  s/p 1u pRBC.  Plan:  Monitor H/H  Transfuse Hb<8, if clinically indicated (pt has hx of CAD)  PPI IV q12  Maintain 2 large bore IV's  F/u GI - recs noted, plan for EGD today  Hold A/C in light of GIB    #JAS (resolved)  #Hyperkalemia (resolved)  Plan:  Monitor renal function  Avoid nephrotoxic meds  Monitor K    #Hypomagnesemia (resolved)  -iv mag, resume po mag.recheck mag      #HTN, HLD, CAD, S/p stents (in 2006 & 2018),afib not on AC per pt choice, LAD)  Plan:  -continue coreg, lasix  -Cont statin,hold plavix. last plavis today morning.    dvt ppx scd's  DNR/DNI           62M PMH HTN, HLD, CAD, S/p stents (in 2006 & 2018),afib not on AC per pt choice,  LAD), , right CEA (2019), rectal adenocarcinoma s/p JASON (2019), LAW with ileostomy complicated by leak (2020), anastomotic recurrence (2021) treated with APR rectum/sigmoid colon in 3/2022 with colostomy creation, now with biopsy proven recurrence 12/2022 last chemo with [facility] [place]23. 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    Pt came to  [facility] with black loose bm from colostomy bag since this AM. As per daughter, who is at bedside pt was having nonbloody output from colostomy yesterday. This morning reports melena via colostomy. Of note, pt was on Heparin sq ppx  while in Hospital and restarted on Plavix yesterday. Last dose this morning. Pt denies any prior EGDs in the past. Last colonoscopy 2 years ago. CT of abdomen and pelvis revealed Status post resection of fistulized small bowel and creation of an ileal conduit. No evidence of active intraluminal extravasation of contrast. Hemoglobin stable at 8.5gm, currently receiving 1u PRBC. Denies nausea, vomiting, abdominal pain, fever, chills, chest pain, shortness of breath, hematemesis, hematochezia. High wbc with lactic acidosis.      #Sepsis likely due to UTI   Recent bladder/rectal absces with prolong abx cource  hx rectal adenocarcinoma on chemo,last chemo April  c. diff ruled out.  Plan:  F/u blood c/s, urine c/s  F/u ID - recs noted  C/w Vabomere as per ID recs  IVF hydration    #GI bleed  s/p 1u pRBC.  Plan:  Monitor H/H q12  Transfuse Hb<8, if clinically indicated (pt has hx of CAD)  PPI IV q12  Maintain 2 large bore IV's  F/u GI - recs noted, plan for EGD today  Hold A/C in light of GIB    #JAS (resolved)  #Hyperkalemia (resolved)  Plan:  Monitor renal function  Avoid nephrotoxic meds  Monitor K    #Hypomagnesemia (resolved)  -iv mag, resume po mag.recheck mag      #HTN, HLD, CAD, S/p stents (in 2006 & 2018),afib not on AC per pt choice, LAD)  Plan:  -continue coreg, lasix  -Cont statin,hold plavix. last plavis today morning.    dvt ppx scd's  DNR/DNI

## 2023-06-10 LAB
ALBUMIN SERPL ELPH-MCNC: 2.3 G/DL — LOW (ref 3.3–5.2)
ALP SERPL-CCNC: 70 U/L — SIGNIFICANT CHANGE UP (ref 40–120)
ALT FLD-CCNC: <5 U/L — SIGNIFICANT CHANGE UP
ANION GAP SERPL CALC-SCNC: 8 MMOL/L — SIGNIFICANT CHANGE UP (ref 5–17)
AST SERPL-CCNC: 12 U/L — SIGNIFICANT CHANGE UP
BILIRUB SERPL-MCNC: 0.8 MG/DL — SIGNIFICANT CHANGE UP (ref 0.4–2)
BUN SERPL-MCNC: 7.6 MG/DL — LOW (ref 8–20)
CALCIUM SERPL-MCNC: 7.6 MG/DL — LOW (ref 8.4–10.5)
CHLORIDE SERPL-SCNC: 109 MMOL/L — HIGH (ref 96–108)
CO2 SERPL-SCNC: 22 MMOL/L — SIGNIFICANT CHANGE UP (ref 22–29)
CREAT SERPL-MCNC: 0.83 MG/DL — SIGNIFICANT CHANGE UP (ref 0.5–1.3)
CULTURE RESULTS: SIGNIFICANT CHANGE UP
EGFR: 98 ML/MIN/1.73M2 — SIGNIFICANT CHANGE UP
GLUCOSE SERPL-MCNC: 125 MG/DL — HIGH (ref 70–99)
HCT VFR BLD CALC: 24.2 % — LOW (ref 39–50)
HCT VFR BLD CALC: 25.6 % — LOW (ref 39–50)
HGB BLD-MCNC: 8.2 G/DL — LOW (ref 13–17)
HGB BLD-MCNC: 8.6 G/DL — LOW (ref 13–17)
MAGNESIUM SERPL-MCNC: 1.4 MG/DL — LOW (ref 1.6–2.6)
MCHC RBC-ENTMCNC: 30.8 PG — SIGNIFICANT CHANGE UP (ref 27–34)
MCHC RBC-ENTMCNC: 30.8 PG — SIGNIFICANT CHANGE UP (ref 27–34)
MCHC RBC-ENTMCNC: 33.6 GM/DL — SIGNIFICANT CHANGE UP (ref 32–36)
MCHC RBC-ENTMCNC: 33.9 GM/DL — SIGNIFICANT CHANGE UP (ref 32–36)
MCV RBC AUTO: 91 FL — SIGNIFICANT CHANGE UP (ref 80–100)
MCV RBC AUTO: 91.8 FL — SIGNIFICANT CHANGE UP (ref 80–100)
PHOSPHATE SERPL-MCNC: 2.9 MG/DL — SIGNIFICANT CHANGE UP (ref 2.4–4.7)
PLATELET # BLD AUTO: 495 K/UL — HIGH (ref 150–400)
PLATELET # BLD AUTO: 533 K/UL — HIGH (ref 150–400)
POTASSIUM SERPL-MCNC: 3.4 MMOL/L — LOW (ref 3.5–5.3)
POTASSIUM SERPL-SCNC: 3.4 MMOL/L — LOW (ref 3.5–5.3)
PROT SERPL-MCNC: 4.2 G/DL — LOW (ref 6.6–8.7)
RBC # BLD: 2.66 M/UL — LOW (ref 4.2–5.8)
RBC # BLD: 2.79 M/UL — LOW (ref 4.2–5.8)
RBC # FLD: 17.8 % — HIGH (ref 10.3–14.5)
RBC # FLD: 17.9 % — HIGH (ref 10.3–14.5)
SODIUM SERPL-SCNC: 139 MMOL/L — SIGNIFICANT CHANGE UP (ref 135–145)
SPECIMEN SOURCE: SIGNIFICANT CHANGE UP
WBC # BLD: 11.45 K/UL — HIGH (ref 3.8–10.5)
WBC # BLD: 8.28 K/UL — SIGNIFICANT CHANGE UP (ref 3.8–10.5)
WBC # FLD AUTO: 11.45 K/UL — HIGH (ref 3.8–10.5)
WBC # FLD AUTO: 8.28 K/UL — SIGNIFICANT CHANGE UP (ref 3.8–10.5)

## 2023-06-10 PROCEDURE — 99233 SBSQ HOSP IP/OBS HIGH 50: CPT

## 2023-06-10 PROCEDURE — 99232 SBSQ HOSP IP/OBS MODERATE 35: CPT

## 2023-06-10 RX ORDER — ALPRAZOLAM 0.25 MG
0.25 TABLET ORAL ONCE
Refills: 0 | Status: DISCONTINUED | OUTPATIENT
Start: 2023-06-10 | End: 2023-06-10

## 2023-06-10 RX ORDER — MAGNESIUM SULFATE 500 MG/ML
2 VIAL (ML) INJECTION ONCE
Refills: 0 | Status: COMPLETED | OUTPATIENT
Start: 2023-06-10 | End: 2023-06-10

## 2023-06-10 RX ORDER — POTASSIUM CHLORIDE 20 MEQ
20 PACKET (EA) ORAL ONCE
Refills: 0 | Status: COMPLETED | OUTPATIENT
Start: 2023-06-10 | End: 2023-06-10

## 2023-06-10 RX ORDER — PANTOPRAZOLE SODIUM 20 MG/1
40 TABLET, DELAYED RELEASE ORAL
Refills: 0 | Status: DISCONTINUED | OUTPATIENT
Start: 2023-06-10 | End: 2023-06-13

## 2023-06-10 RX ADMIN — CHLORHEXIDINE GLUCONATE 1 APPLICATION(S): 213 SOLUTION TOPICAL at 16:22

## 2023-06-10 RX ADMIN — MEROPENEM-VABORBACTAM 83.33 GRAM(S): 1; 1 INJECTION, POWDER, FOR SOLUTION INTRAVENOUS at 05:12

## 2023-06-10 RX ADMIN — MEROPENEM-VABORBACTAM 83.33 GRAM(S): 1; 1 INJECTION, POWDER, FOR SOLUTION INTRAVENOUS at 21:29

## 2023-06-10 RX ADMIN — ATORVASTATIN CALCIUM 40 MILLIGRAM(S): 80 TABLET, FILM COATED ORAL at 21:29

## 2023-06-10 RX ADMIN — Medication 20 MILLIGRAM(S): at 05:13

## 2023-06-10 RX ADMIN — PANTOPRAZOLE SODIUM 10 MG/HR: 20 TABLET, DELAYED RELEASE ORAL at 10:43

## 2023-06-10 RX ADMIN — CARVEDILOL PHOSPHATE 12.5 MILLIGRAM(S): 80 CAPSULE, EXTENDED RELEASE ORAL at 05:13

## 2023-06-10 RX ADMIN — Medication 0.25 MILLIGRAM(S): at 21:29

## 2023-06-10 RX ADMIN — MAGNESIUM OXIDE 400 MG ORAL TABLET 400 MILLIGRAM(S): 241.3 TABLET ORAL at 10:43

## 2023-06-10 RX ADMIN — Medication 1000 UNIT(S): at 10:43

## 2023-06-10 RX ADMIN — MEROPENEM-VABORBACTAM 83.33 GRAM(S): 1; 1 INJECTION, POWDER, FOR SOLUTION INTRAVENOUS at 16:22

## 2023-06-10 RX ADMIN — CARVEDILOL PHOSPHATE 12.5 MILLIGRAM(S): 80 CAPSULE, EXTENDED RELEASE ORAL at 18:02

## 2023-06-10 RX ADMIN — Medication 25 GRAM(S): at 10:42

## 2023-06-10 RX ADMIN — MAGNESIUM OXIDE 400 MG ORAL TABLET 400 MILLIGRAM(S): 241.3 TABLET ORAL at 18:02

## 2023-06-10 RX ADMIN — Medication 50 MILLIEQUIVALENT(S): at 10:42

## 2023-06-10 NOTE — PROGRESS NOTE ADULT - SUBJECTIVE AND OBJECTIVE BOX
[facility] Division of Hospital Medicine    Chief Complaint:      SUBJECTIVE / OVERNIGHT EVENTS:    Patient denies chest pain, SOB, abd pain, N/V, fever, chills, dysuria or any other complaints. All remainder ROS negative.     MEDICATIONS  (STANDING):  atorvastatin 40 milliGRAM(s) Oral at bedtime  carvedilol 12.5 milliGRAM(s) Oral every 12 hours  chlorhexidine 2% Cloths 1 Application(s) Topical daily  cholecalciferol 1000 Unit(s) Oral daily  dextrose 5% + sodium chloride 0.9%. 1000 milliLiter(s) (100 mL/Hr) IV Continuous <Continuous>  furosemide    Tablet 20 milliGRAM(s) Oral daily  magnesium oxide 400 milliGRAM(s) Oral two times a day with meals  magnesium sulfate  IVPB 2 Gram(s) IV Intermittent once  meropenem/vaborbactam IVPB 4 Gram(s) IV Intermittent every 8 hours  pantoprazole Infusion 8 mG/Hr (10 mL/Hr) IV Continuous <Continuous>  potassium chloride  20 mEq/100 mL IVPB 20 milliEquivalent(s) IV Intermittent once    MEDICATIONS  (PRN):  acetaminophen     Tablet .. 650 milliGRAM(s) Oral every 6 hours PRN Temp greater or equal to 38C (100.4F), Mild Pain (1 - 3)  aluminum hydroxide/magnesium hydroxide/simethicone Suspension 30 milliLiter(s) Oral every 4 hours PRN Dyspepsia  melatonin 3 milliGRAM(s) Oral at bedtime PRN Insomnia  ondansetron Injectable 4 milliGRAM(s) IV Push every 8 hours PRN Nausea and/or Vomiting        I&O's Summary    2023 07:01  -  10 Eddy 2023 07:00  --------------------------------------------------------  IN: 1900 mL / OUT: 4175 mL / NET: -2275 mL        PHYSICAL EXAM:  Vital Signs Last 24 Hrs  T(C): 36.4 (10 Eddy 2023 04:02), Max: 37.7 (2023 15:49)  T(F): 97.5 (10 Eddy 2023 04:02), Max: 99.8 (2023 15:49)  HR: 88 (10 Eddy 2023 06:00) (72 - 102)  BP: 118/70 (10 Eddy 2023 06:00) (92/59 - 154/80)  BP(mean): 82 (10 Eddy 2023 06:00) (64 - 98)  RR: 18 (10 Eddy 2023 06:00) (16 - 18)  SpO2: 100% (10 Eddy 2023 06:00) (95% - 100%)    Parameters below as of 10 Eddy 2023 06:00  Patient On (Oxygen Delivery Method): room air            CONSTITUTIONAL: NAD, well-developed, well-groomed  ENMT: Moist oral mucosa, no pharyngeal injection or exudates; normal dentition  RESPIRATORY: Normal respiratory effort; lungs are clear to auscultation bilaterally  CARDIOVASCULAR: Regular rate and rhythm, normal S1 and S2, no murmur/rub/gallop; No lower extremity edema; Peripheral pulses are 2+ bilaterally  ABDOMEN: Nontender to palpation, normoactive bowel sounds, no rebound/guarding; No hepatosplenomegaly  MUSCLOSKELETAL:  Normal gait; no clubbing or cyanosis of digits; no joint swelling or tenderness to palpation  PSYCH: A+O to person, place, and time; affect appropriate  NEUROLOGY: CN 2-12 are intact and symmetric; no gross sensory deficits;   SKIN: No rashes; no palpable lesions    LABS:                        8.2    8.28  )-----------( 495      ( 10 Eddy 2023 05:22 )             24.2     06-10    139  |  109<H>  |  7.6<L>  ----------------------------<  125<H>  3.4<L>   |  22.0  |  0.83    Ca    7.6<L>      10 Eddy 2023 05:22  Phos  2.9     06-10  Mg     1.4     06-10    TPro  4.2<L>  /  Alb  2.3<L>  /  TBili  0.8  /  DBili  x   /  AST  12  /  ALT  <5  /  AlkPhos  70  06-10    PT/INR - ( 2023 07:05 )   PT: 13.0 sec;   INR: 1.12 ratio         PTT - ( 2023 07:05 )  PTT:29.1 sec  CARDIAC MARKERS ( 2023 10:35 )  x     / <0.01 ng/mL / x     / x     / x          Urinalysis Basic - ( 2023 11:31 )    Color: Yellow / Appearance: Slightly Turbid / S.010 / pH: x  Gluc: x / Ketone: Negative  / Bili: Negative / Urobili: Negative mg/dL   Blood: x / Protein: 100 / Nitrite: Negative   Leuk Esterase: Moderate / RBC: 3-5 /HPF / WBC >50 /HPF   Sq Epi: x / Non Sq Epi: x / Bacteria: Occasional        Culture - Blood (collected 2023 11:16)  Source: .Blood Blood-Peripheral  Preliminary Report (2023 17:01):    No growth to date.    Culture - Blood (collected 2023 11:01)  Source: .Blood Blood-Peripheral  Preliminary Report (2023 17:01):    No growth to date.      CAPILLARY BLOOD GLUCOSE            RADIOLOGY & ADDITIONAL TESTS:  Results Reviewed:   Imaging Personally Reviewed:  Electrocardiogram Personally Reviewed:                                           [facility] Division of Hospital Medicine    Chief Complaint:  GIB    SUBJECTIVE / OVERNIGHT EVENTS:    Pt seen and examined at bedside. Pt has no complaints. States feels well. Denies CP, SOB, N/V/D, abd pain, fever, chills. Rest of ROS (-).     MEDICATIONS  (STANDING):  atorvastatin 40 milliGRAM(s) Oral at bedtime  carvedilol 12.5 milliGRAM(s) Oral every 12 hours  chlorhexidine 2% Cloths 1 Application(s) Topical daily  cholecalciferol 1000 Unit(s) Oral daily  dextrose 5% + sodium chloride 0.9%. 1000 milliLiter(s) (100 mL/Hr) IV Continuous <Continuous>  furosemide    Tablet 20 milliGRAM(s) Oral daily  magnesium oxide 400 milliGRAM(s) Oral two times a day with meals  magnesium sulfate  IVPB 2 Gram(s) IV Intermittent once  meropenem/vaborbactam IVPB 4 Gram(s) IV Intermittent every 8 hours  pantoprazole Infusion 8 mG/Hr (10 mL/Hr) IV Continuous <Continuous>  potassium chloride  20 mEq/100 mL IVPB 20 milliEquivalent(s) IV Intermittent once    MEDICATIONS  (PRN):  acetaminophen     Tablet .. 650 milliGRAM(s) Oral every 6 hours PRN Temp greater or equal to 38C (100.4F), Mild Pain (1 - 3)  aluminum hydroxide/magnesium hydroxide/simethicone Suspension 30 milliLiter(s) Oral every 4 hours PRN Dyspepsia  melatonin 3 milliGRAM(s) Oral at bedtime PRN Insomnia  ondansetron Injectable 4 milliGRAM(s) IV Push every 8 hours PRN Nausea and/or Vomiting        I&O's Summary    2023 07:01  -  10 Eddy 2023 07:00  --------------------------------------------------------  IN: 1900 mL / OUT: 4175 mL / NET: -2275 mL        PHYSICAL EXAM:  Vital Signs Last 24 Hrs  T(C): 36.4 (10 Eddy 2023 04:02), Max: 37.7 (2023 15:49)  T(F): 97.5 (10 Eddy 2023 04:02), Max: 99.8 (2023 15:49)  HR: 88 (10 Eddy 2023 06:00) (72 - 102)  BP: 118/70 (10 Eddy 2023 06:00) (92/59 - 154/80)  BP(mean): 82 (10 Eddy 2023 06:00) (64 - 98)  RR: 18 (10 Eddy 2023 06:00) (16 - 18)  SpO2: 100% (10 Eddy 2023 06:00) (95% - 100%)    Parameters below as of 10 Eddy 2023 06:00  Patient On (Oxygen Delivery Method): room air          GEN - NAD  HEENT - NCAT, EOMI, DARYN,  RESP - CTA BL, no wheeze//rhonchi/crackles. not on supplemental O2.  CARDIO - NS1S2, RRR. No murmurs  ABD - Soft/Non tender/Non distended. +bs. colostomy bag-red loose stool.incision clear-healing well,mild tender.. ileal conduit present,castillo bag clear urine,rectal pinr ose prsent  Ext - No GLO. no signs of venous/arterial stasis ulcers  MSK - full ROM of BL upper and lower extremities without pain or restriction. BL 5/5 strength on upper and lower extremities.   Neuro - cn 2-12 grossly intact.  gait not observed.   Psych- AAOx3. attentive. normal affect.   - castillo cath in place (pt arrived with castillo)    LABS:                        8.2    8.28  )-----------( 495      ( 10 Eddy 2023 05:22 )             24.2     06-10    139  |  109<H>  |  7.6<L>  ----------------------------<  125<H>  3.4<L>   |  22.0  |  0.83    Ca    7.6<L>      10 Eddy 2023 05:22  Phos  2.9     06-10  Mg     1.4     06-10    TPro  4.2<L>  /  Alb  2.3<L>  /  TBili  0.8  /  DBili  x   /  AST  12  /  ALT  <5  /  AlkPhos  70  06-10    PT/INR - ( 2023 07:05 )   PT: 13.0 sec;   INR: 1.12 ratio         PTT - ( 2023 07:05 )  PTT:29.1 sec  CARDIAC MARKERS ( 2023 10:35 )  x     / <0.01 ng/mL / x     / x     / x          Urinalysis Basic - ( 2023 11:31 )    Color: Yellow / Appearance: Slightly Turbid / S.010 / pH: x  Gluc: x / Ketone: Negative  / Bili: Negative / Urobili: Negative mg/dL   Blood: x / Protein: 100 / Nitrite: Negative   Leuk Esterase: Moderate / RBC: 3-5 /HPF / WBC >50 /HPF   Sq Epi: x / Non Sq Epi: x / Bacteria: Occasional        Culture - Blood (collected 2023 11:16)  Source: .Blood Blood-Peripheral  Preliminary Report (2023 17:01):    No growth to date.    Culture - Blood (collected 2023 11:01)  Source: .Blood Blood-Peripheral  Preliminary Report (2023 17:01):    No growth to date.      CAPILLARY BLOOD GLUCOSE            RADIOLOGY & ADDITIONAL TESTS:  Results Reviewed:   Imaging Personally Reviewed:  Electrocardiogram Personally Reviewed:

## 2023-06-10 NOTE — PROGRESS NOTE ADULT - ASSESSMENT
62M PMH HTN, HLD, CAD, S/p stents (in 2006 & 2018),afib not on AC per pt choice,  LAD), , right CEA (2019), rectal adenocarcinoma s/p JASON (2019), LAW with ileostomy complicated by leak (2020), anastomotic recurrence (2021) treated with APR rectum/sigmoid colon in 3/2022 with colostomy creation, now with biopsy proven recurrence 12/2022 last chemo with [facility] in April/2023. 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    Pt came to  [facility] with black loose bm from colostomy bag since this AM. As per daughter, who is at bedside pt was having nonbloody output from colostomy yesterday. This morning reports melena via colostomy. Of note, pt was on Heparin sq ppx  while in Hospital and restarted on Plavix yesterday. Last dose this morning. Pt denies any prior EGDs in the past. Last colonoscopy 2 years ago. CT of abdomen and pelvis revealed Status post resection of fistulized small bowel and creation of an ileal conduit. No evidence of active intraluminal extravasation of contrast. Hemoglobin stable at 8.5gm, currently receiving 1u PRBC. Denies nausea, vomiting, abdominal pain, fever, chills, chest pain, shortness of breath, hematemesis, hematochezia. High wbc with lactic acidosis.      #Sepsis likely due to UTI   Recent bladder/rectal absces with prolong abx cource  hx rectal adenocarcinoma on chemo,last chemo April  c. diff ruled out.  Plan:  F/u blood c/s, urine c/s  F/u ID - recs noted  C/w Vabomere as per ID recs  IVF hydration    #GI bleed  s/p 1u pRBC.  Plan:  Monitor H/H q12  Transfuse Hb<8, if clinically indicated (pt has hx of CAD)  PPI IV q12  Maintain 2 large bore IV's  F/u GI - recs noted, plan for EGD today  Hold A/C in light of GIB    #JAS (resolved)  #Hyperkalemia (resolved)  Plan:  Monitor renal function  Avoid nephrotoxic meds  Monitor K    #Hypomagnesemia (resolved)  -iv mag, resume po mag.recheck mag      #HTN, HLD, CAD, S/p stents (in 2006 & 2018),afib not on AC per pt choice, LAD)  Plan:  -continue coreg, lasix  -Cont statin,hold plavix. last plavis today morning.    dvt ppx scd's  DNR/DNI           62M PMH HTN, HLD, CAD, S/p stents (in 2006 & 2018),afib not on AC per pt choice,  LAD), , right CEA (2019), rectal adenocarcinoma s/p JASON (2019), LAW with ileostomy complicated by leak (2020), anastomotic recurrence (2021) treated with APR rectum/sigmoid colon in 3/2022 with colostomy creation, now with biopsy proven recurrence 12/2022 last chemo with [facility] in April/2023. 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    Pt came to  [facility] with black loose bm from colostomy bag since this AM. As per daughter, who is at bedside pt was having nonbloody output from colostomy yesterday. This morning reports melena via colostomy. Of note, pt was on Heparin sq ppx  while in Hospital and restarted on Plavix yesterday. Last dose this morning. Pt denies any prior EGDs in the past. Last colonoscopy 2 years ago. CT of abdomen and pelvis revealed Status post resection of fistulized small bowel and creation of an ileal conduit. No evidence of active intraluminal extravasation of contrast. Hemoglobin stable at 8.5gm, currently receiving 1u PRBC. Denies nausea, vomiting, abdominal pain, fever, chills, chest pain, shortness of breath, hematemesis, hematochezia. High wbc with lactic acidosis.      #Sepsis likely due to UTI (clinically improving)  Recent bladder/rectal absces with prolong abx cource  hx rectal adenocarcinoma on chemo,last chemo April  c. diff ruled out.  Plan:  F/u blood c/s, urine c/s  F/u ID - recs noted  C/w Vabomere as per ID recs  IVF hydration    #GI bleed  s/p 2u pRBC.  Plan:  Monitor H/H q12  Transfuse Hb<8, if clinically indicated (pt has hx of CAD)  PPI 40mg PO daily  D/c IVF hydration  Maintain 2 large bore IV's  F/u GI - recs noted  F/u Dr. Jackson (Colorectal surgeon at [facility]) - will c/t hold Plavix as bleeding is likely from anastomosis site  Hold A/C in light of GIB    #JAS (resolved)  #Hypokalemia  Plan:  Monitor renal function  Avoid nephrotoxic meds  Monitor K    #Hypomagnesemia  Plan:  IV mag, resume po mag.recheck mag      #HTN, HLD, CAD, S/p stents (in 2006 & 2018),afib not on AC per pt choice, LAD)  Plan:  -continue coreg, lasix  -Cont statin,hold plavix. last plavis today morning.    dvt ppx scd's  DNR/DNI           62M PMH HTN, HLD, CAD, S/p stents (in 2006 & 2018),afib not on AC per pt choice,  LAD), , right CEA (2019), rectal adenocarcinoma s/p JASON (2019), LAW with ileostomy complicated by leak (2020), anastomotic recurrence (2021) treated with APR rectum/sigmoid colon in 3/2022 with colostomy creation, now with biopsy proven recurrence 12/2022 last chemo with [facility] in April/2023. 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    Pt came to  [facility] with black loose bm from colostomy bag since this AM. As per daughter, who is at bedside pt was having nonbloody output from colostomy yesterday. This morning reports melena via colostomy. Of note, pt was on Heparin sq ppx  while in Hospital and restarted on Plavix yesterday. Last dose this morning. Pt denies any prior EGDs in the past. Last colonoscopy 2 years ago. CT of abdomen and pelvis revealed Status post resection of fistulized small bowel and creation of an ileal conduit. No evidence of active intraluminal extravasation of contrast. Hemoglobin stable at 8.5gm, currently receiving 1u PRBC. Denies nausea, vomiting, abdominal pain, fever, chills, chest pain, shortness of breath, hematemesis, hematochezia. High wbc with lactic acidosis.      #Sepsis likely due to UTI (clinically improving)  Recent bladder/rectal absces with prolong abx cource  hx rectal adenocarcinoma on chemo,last chemo April  c. diff ruled out.  Plan:  F/u blood c/s, urine c/s  F/u ID - recs noted  C/w Vabomere as per ID recs  IVF hydration    #GI bleed  s/p 2u pRBC.  Plan:  Monitor H/H q12  Transfuse Hb<8, if clinically indicated (pt has hx of CAD)  PPI 40mg PO daily  Advance diet as tolerated   D/c IVF hydration  Maintain 2 large bore IV's  F/u GI - recs noted  F/u Dr. Jackson (Colorectal surgeon at [facility]) - will c/t hold Plavix as bleeding is likely from anastomosis site  Hold A/C in light of GIB    #JAS (resolved)  #Hypokalemia  Plan:  Monitor renal function  Avoid nephrotoxic meds  Monitor K    #Hypomagnesemia  Plan:  IV mag, resume po mag.recheck mag      #HTN, HLD, CAD, S/p stents (in 2006 & 2018),afib not on AC per pt choice, LAD)  Plan:  -continue coreg, lasix  -Cont statin,hold plavix. last plavis today morning.    dvt ppx scd's  DNR/DNI

## 2023-06-10 NOTE — PROGRESS NOTE ADULT - SUBJECTIVE AND OBJECTIVE BOX
ICU Vital Signs Last 24 Hrs  T(C): 36.4 (10 Eddy 2023 04:02), Max: 37.7 (09 Jun 2023 15:49)  T(F): 97.5 (10 Eddy 2023 04:02), Max: 99.8 (09 Jun 2023 15:49)  HR: 72 (10 Eddy 2023 04:00) (72 - 102)  BP: 119/67 (10 Eddy 2023 04:00) (92/59 - 154/80)  BP(mean): 80 (10 Eddy 2023 04:00) (64 - 98)  ABP: --  ABP(mean): --  RR: 18 (10 Eddy 2023 04:00) (16 - 18)  SpO2: 99% (10 Eddy 2023 04:00) (95% - 100%)    O2 Parameters below as of 10 Eddy 2023 04:00  Patient On (Oxygen Delivery Method): room air        MEDICATIONS  (STANDING):  atorvastatin 40 milliGRAM(s) Oral at bedtime  carvedilol 12.5 milliGRAM(s) Oral every 12 hours  chlorhexidine 2% Cloths 1 Application(s) Topical daily  cholecalciferol 1000 Unit(s) Oral daily  dextrose 5% + sodium chloride 0.9%. 1000 milliLiter(s) (100 mL/Hr) IV Continuous <Continuous>  furosemide    Tablet 20 milliGRAM(s) Oral daily  magnesium oxide 400 milliGRAM(s) Oral two times a day with meals  meropenem/vaborbactam IVPB 4 Gram(s) IV Intermittent every 8 hours  pantoprazole Infusion 8 mG/Hr (10 mL/Hr) IV Continuous <Continuous>    MEDICATIONS  (PRN):  acetaminophen     Tablet .. 650 milliGRAM(s) Oral every 6 hours PRN Temp greater or equal to 38C (100.4F), Mild Pain (1 - 3)  aluminum hydroxide/magnesium hydroxide/simethicone Suspension 30 milliLiter(s) Oral every 4 hours PRN Dyspepsia  melatonin 3 milliGRAM(s) Oral at bedtime PRN Insomnia  ondansetron Injectable 4 milliGRAM(s) IV Push every 8 hours PRN Nausea and/or Vomiting                            8.0    9.71  )-----------( 602      ( 09 Jun 2023 20:00 )             24.6    61 yo M known patient at [facility], rectal adenoca on 5FU + Panitumumab last on 5/11/2023, recently s/p exploratory enterolysis, SB resection and anastomosis, cystectomy and ileal conduit creation on 5/27, PMH HTN, HLD, CAD, S/p stents (in 2006 & 2018),  LAD), HTN, HLD, right CEA (2019), rectal adenocarcinoma s/p JASON (2019), LAW with ileostomy complicated by leak (2020), anastomotic recurrence (2021) treated with APR rectum/sigmoid colon in 3/2022 with colostomy creation, now with biopsy proven recurrence 12/2022 last chemo with [facility] in April/2023. 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    Pt came to  [facility] with black loose bm from colostomy bag since this AM. As per daughter, who is at bedside pt was having nonbloody output from colostomy yesterday. This morning reports melena via colostomy. Of note, pt was on Heparin sq ppx  while in Hospital and restarted on Plavix yesterday. Last dose this morning. Pt denies any prior EGDs in the past. Last colonoscopy 2 years ago. CT of abdomen and pelvis revealed Status post resection of fistulized small bowel and creation of an ileal conduit. No evidence of active intraluminal extravasation of contrast. Hemoglobin stable at 8.5gm, currently receiving 1u PRBC. Denies nausea, vomiting, abdominal pain, fever, chills, chest pain, shortness of breath, hematemesis, hematochezia. High wbc with lactic acidosis.    s/p EGD yesterday - no bleeding site identified   Hb this am 8.2- s/p blood tx yesterday   no N/V  no bleed from ostomy   family at bedside     ROS  as per HPI    PAST MEDICAL & SURGICAL HISTORY:  Chest pain      HTN (hypertension)      HLD (hyperlipidemia)      CAD (coronary artery disease)      Mitral regurgitation      Rectal cancer  2/2019 s/p chemo and radiation; recurrence of cancer 2022      Depression  with rectal cancer      Neuropathy  feet s/p chemo      Stented coronary artery  circumflex 2005 LAD 2017 x4      H/O carotid stenosis      H/O renal calculi      DANO (iron deficiency anemia)      Umbilical hernia      Splenic artery aneurysm      H/O cardiac catheterization  2005 2017  4 STENTS      History of CEA (carotid endarterectomy)  right 2019      History of rectal surgery  with ileostomy 2/2020      H/O sigmoidoscopy  x2      History of removal of Port-a-Cath      Social History:    FAMILY HISTORY:  FH: heart disease  father    MEDICATIONS  (STANDING):  atorvastatin 40 milliGRAM(s) Oral at bedtime  carvedilol 12.5 milliGRAM(s) Oral every 12 hours  chlorhexidine 2% Cloths 1 Application(s) Topical daily  cholecalciferol 1000 Unit(s) Oral daily  furosemide    Tablet 20 milliGRAM(s) Oral daily  magnesium oxide 400 milliGRAM(s) Oral two times a day with meals  meropenem/vaborbactam IVPB 4 Gram(s) IV Intermittent every 8 hours  pantoprazole    Tablet 40 milliGRAM(s) Oral before breakfast    MEDICATIONS  (PRN):  acetaminophen     Tablet .. 650 milliGRAM(s) Oral every 6 hours PRN Temp greater or equal to 38C (100.4F), Mild Pain (1 - 3)  aluminum hydroxide/magnesium hydroxide/simethicone Suspension 30 milliLiter(s) Oral every 4 hours PRN Dyspepsia  melatonin 3 milliGRAM(s) Oral at bedtime PRN Insomnia  ondansetron Injectable 4 milliGRAM(s) IV Push every 8 hours PRN Nausea and/or Vomiting    Vital Signs Last 24 Hrs  T(C): 36.9 (10 Eddy 2023 08:00), Max: 37.7 (09 Jun 2023 15:49)  T(F): 98.5 (10 Eddy 2023 08:00), Max: 99.8 (09 Jun 2023 15:49)  HR: 84 (10 Eddy 2023 08:00) (72 - 102)  BP: 111/67 (10 Eddy 2023 08:00) (92/59 - 154/80)  BP(mean): 79 (10 Eddy 2023 08:00) (64 - 98)  RR: 16 (10 Eddy 2023 08:00) (16 - 18)  SpO2: 97% (10 Eddy 2023 08:00) (95% - 100%)    Parameters below as of 10 Eddy 2023 08:00  Patient On (Oxygen Delivery Method): room air      O2 Parameters below as of 09 Jun 2023 08:00  Patient On (Oxygen Delivery Method): room air    GEN - NAD  HEENT - NCAT, EOMI, DARYN,  RESP - CTA BL, no wheeze//rhonchi/crackles. not on supplemental O2.  CARDIO - NS1S2, RRR. No murmurs  ABD - Soft/Non tender/Non distended. +bs. colostomy bag-black loose stool.incision clear-healing well,mild tender.. ileal conduit present,castillo bag clear urine,rectal pinr ose prsent  Ext - No GLO. no signs of venous/arterial stasis ulcers  MSK - full ROM of BL upper and lower extremities without pain or restriction. BL 5/5 strength on upper and lower extremities.   Neuro - cn 2-12 grossly intact.  gait not observed.   Psych- AAOx3. attentive. normal affect.                            8.6    11.45 )-----------( 533      ( 10 Eddy 2023 15:11 )             25.6                           7.2   10.06 )-----------( 604      ( 09 Jun 2023 07:05 )             22.6     06-09    136  |  106  |  12.0  ----------------------------<  117<H>  3.7   |  20.0<L>  |  0.90    Ca    7.8<L>      09 Jun 2023 07:05  Mg     2.2     06-08    TPro  4.5<L>  /  Alb  2.4<L>  /  TBili  0.4  /  DBili  x   /  AST  9   /  ALT  <5  /  AlkPhos  84  06-09

## 2023-06-10 NOTE — PATIENT PROFILE ADULT - FALL HARM RISK - HARM RISK INTERVENTIONS

## 2023-06-10 NOTE — PROGRESS NOTE ADULT - ASSESSMENT
Patient with a complicated course and medical history with history of hypertension, dyslipidemia, coronary disease rectal adenocarcinoma, recurrence, APR, and now with small bowel fistula status post fistula takedown and ileal conduit and colostomy.  There was concern for GI bleed most likely which is anastomosis related bleed in the setting of anticoagulation and Plavix.  Patient with improvement in the color of the colostomy output.  No evidence of any upper GI pathology.  Change the PPI to once daily by mouth.  Monitor CBC.  Discontinue IV diet to soft diet.  If there is improvement in the consistency colostomy output, prepare for discharge.  Resumption of the anticoagulation is may be decided after the stabilization of the hemoglobin.

## 2023-06-10 NOTE — PATIENT PROFILE ADULT - IS THERE A SUSPICION OF ABUSE/NEGLIGENCE?
Form printed and put on Dr Ochoa's mailbox at [facility] to review and complete   no

## 2023-06-10 NOTE — PROGRESS NOTE ADULT - ASSESSMENT
62M PMH HTN, HLD, CAD, S/p stents (in 2006 & 2018),  LAD), HTN, HLD, right CEA (2019), rectal adenocarcinoma s/p JASON (2019), LAW with ileostomy complicated by leak (2020), anastomotic recurrence (2021) treated with APR rectum/sigmoid colon in 3/2022 with colostomy creation, now with biopsy proven recurrence 12/2022 last chemo with [facility] in April/2023. Patient also with recent sepsis secondary to rectal mass c/b abscess and ecoli bacteremia 04/2023,Pt was discharge with augmentin. Pt was on po abx till 05/26/23.Pt was admitted to [facility] 05/26 found to have Small bowel fistula,stool was coming with urine.Now s/p fistula takedown and ileal conduit at [facility] on 5/26/23 and was Discharged on 6/7/23 with castillo and rectal pen hugo,was on antibiotics for bladder abscess w/suspected residual rectal abscess. (zosyn at [facility] x 1 week and then discharged on levaquin and flagyl)Ct abd suspected early pancreatitis but no pancreatitis per [facility] per family.Pt was discharged with po flagyl and LQ.    Pt came to  [facility] with black loose bm from colostomy bag since this AM. As per daughter, who is at bedside pt was having nonbloody output from colostomy yesterday. This morning reports melena via colostomy. Of note, pt was on Heparin sq ppx  while in Hospital and restarted on Plavix yesterday. Last dose this morning. Pt denies any prior EGDs in the past. Last colonoscopy 2 years ago. CT of abdomen and pelvis revealed Status post resection of fistulized small bowel and creation of an ileal conduit. No evidence of active intraluminal extravasation of contrast. Hemoglobin stable at 8.5gm, currently receiving 1u PRBC. Denies nausea, vomiting, abdominal pain, fever, chills, chest pain, shortness of breath, hematemesis, hematochezia. High wbc with lactic acidosis.    Leukocytosis  Colovesical fistula s/p fistula takedown and ileal conduit  Bladder abscess  Melena r/o GIB  JAS  Pancreatitis  Lactic acidosis  h/o CRE enterobacter    - no fever  - [place] [facility] [place] Wbc 10, now up to 22  - noted dark watery output from ostomy, possible Gib however H/H remains stable and at baseline per daughter. ? C diff  given watery output  - UA-obtained from conduit-unclear significance. purulent drainage from perineal drain but per pt has been decreasing in quantity  - Ct a/p peripherally enhancing rectal mass but no drainable collection  - per daughter -pancreatitis was noted on imaging post op and therefore not a new finding-no symptoms  - AS PER   ID Dr Freed at [facility]- patient had culture from perineal drain +  Psa (I) to cipro and CRE Enterobacter (S) to ayvcaz, vabomere, quinolones, (R) to zerbaxa. Was discharged 6/7 on levaquin and flagyl for 1 week with plan for outpatient cta/p   - prior UCX e.faecium at [facility]  -CONTINUE VABOMERE   - Blood cultures 6/8 no growth   - Leukocytosis improved       Will follow    d/w Clinical pharmacy

## 2023-06-10 NOTE — PROGRESS NOTE ADULT - SUBJECTIVE AND OBJECTIVE BOX
[facility]  INFECTIOUS DISEASES at [place] and [place]  =======================================================                               Zuhair Moura MD#   Dixie Pedroza MD*                             Valerie Nicholas MD*   Donna Forde MD*            Diplomates American Board of Internal Medicine & Infectious Diseases                # [place] Office - Appt - Tel  [phone] Fax [phone]                * [place] Office - Appt - Tel [phone] Fax [phone]                                  Hospital Consult line:  [phone]  =======================================================    PASTORA CHURCHILL [id number]    Follow up:      Allergies:  No Known Allergies       REVIEW OF SYSTEMS:  CONSTITUTIONAL:  No Fever or chills  HEENT:   No diplopia or blurred vision.  No earache, sore throat or runny nose.  CARDIOVASCULAR:  No Chest Pain  RESPIRATORY:  No cough, shortness of breath  GASTROINTESTINAL:  No nausea, vomiting or diarrhea.  GENITOURINARY:  No dysuria, frequency or urgency. No Blood in urine  MUSCULOSKELETAL:  no joint aches, no muscle pain  SKIN:  No change in skin, hair or nails.  NEUROLOGIC:  No Headaches, seizures   PSYCHIATRIC:  No disorder of thought or mood.  ENDOCRINE:  No heat or cold intolerance  HEMATOLOGICAL:  No easy bruising or bleeding.       Physical Exam:  GEN: NAD  HEENT: normocephalic and atraumatic. EOMI. PERRL.    NECK: Supple.   LUNGS: CTA B/L.  HEART: RRR  ABDOMEN: Soft, NT, ND.  +BS.    : No CVA tenderness  EXTREMITIES: Without  edema.  MSK: No joint swelling  NEUROLOGIC: No Focal Deficits   PSYCHIATRIC: Appropriate affect .  SKIN: No rash      Vitals:  T(F): 98.5 (10 Eddy 2023 08:00), Max: 99.8 (09 Jun 2023 15:49)  HR: 84 (10 Eddy 2023 08:00)  BP: 111/67 (10 Eddy 2023 08:00)  RR: 16 (10 Eddy 2023 08:00)  SpO2: 97% (10 Eddy 2023 08:00) (95% - 100%)  temp max in last 48H T(F): , Max: 99.8 (06-09-23 @ 15:49)      Current Antibiotics:  meropenem/vaborbactam IVPB 4 Gram(s) IV Intermittent every 8 hours    Other medications:  atorvastatin 40 milliGRAM(s) Oral at bedtime  carvedilol 12.5 milliGRAM(s) Oral every 12 hours  chlorhexidine 2% Cloths 1 Application(s) Topical daily  cholecalciferol 1000 Unit(s) Oral daily  dextrose 5% + sodium chloride 0.9%. 1000 milliLiter(s) IV Continuous <Continuous>  furosemide    Tablet 20 milliGRAM(s) Oral daily  magnesium oxide 400 milliGRAM(s) Oral two times a day with meals  magnesium sulfate  IVPB 2 Gram(s) IV Intermittent once  pantoprazole Infusion 8 mG/Hr IV Continuous <Continuous>  potassium chloride  20 mEq/100 mL IVPB 20 milliEquivalent(s) IV Intermittent once                            8.2    8.28  )-----------( 495      ( 10 Eddy 2023 05:22 )             24.2     06-10    139  |  109<H>  |  7.6<L>  ----------------------------<  125<H>  3.4<L>   |  22.0  |  0.83    Ca    7.6<L>      10 Eddy 2023 05:22  Phos  2.9     06-10  Mg     1.4     06-10    TPro  4.2<L>  /  Alb  2.3<L>  /  TBili  0.8  /  DBili  x   /  AST  12  /  ALT  <5  /  AlkPhos  70  06-10    RECENT CULTURES:  06-08 @ 13:20    [facility]    06-08 @ 11:16 .Blood Blood-Peripheral     No growth to date.    06-08 @ 11:01 .Blood Blood-Peripheral     No growth to date.      WBC Count: 8.28 K/uL (06-10-23 @ 05:22)  WBC Count: 9.71 K/uL (06-09-23 @ 20:00)  WBC Count: 10.28 K/uL (06-09-23 @ 15:30)  WBC Count: 10.06 K/uL (06-09-23 @ 07:05)  WBC Count: 22.99 K/uL (06-08-23 @ 10:35)    Creatinine, Serum: 0.83 mg/dL (06-10-23 @ 05:22)  Creatinine, Serum: 0.90 mg/dL (06-09-23 @ 07:05)  Creatinine, Serum: 1.51 mg/dL (06-08-23 @ 10:35)    SARS-CoV-2: NotDetec (06-08-23 @ 13:20)

## 2023-06-10 NOTE — PROGRESS NOTE ADULT - SUBJECTIVE AND OBJECTIVE BOX
INTERVAL HPI/OVERNIGHT EVENTS:Follow-up is being performed for acute blood loss anemia.  EGD was performed yesterday which was unimpressive.  Patient is on clear liquid diet.      MEDICATIONS  (STANDING):  atorvastatin 40 milliGRAM(s) Oral at bedtime  carvedilol 12.5 milliGRAM(s) Oral every 12 hours  chlorhexidine 2% Cloths 1 Application(s) Topical daily  cholecalciferol 1000 Unit(s) Oral daily  furosemide    Tablet 20 milliGRAM(s) Oral daily  magnesium oxide 400 milliGRAM(s) Oral two times a day with meals  meropenem/vaborbactam IVPB 4 Gram(s) IV Intermittent every 8 hours  pantoprazole    Tablet 40 milliGRAM(s) Oral before breakfast    MEDICATIONS  (PRN):  acetaminophen     Tablet .. 650 milliGRAM(s) Oral every 6 hours PRN Temp greater or equal to 38C (100.4F), Mild Pain (1 - 3)  aluminum hydroxide/magnesium hydroxide/simethicone Suspension 30 milliLiter(s) Oral every 4 hours PRN Dyspepsia  melatonin 3 milliGRAM(s) Oral at bedtime PRN Insomnia  ondansetron Injectable 4 milliGRAM(s) IV Push every 8 hours PRN Nausea and/or Vomiting      Allergies    No Known Allergies    Intolerances        Vital Signs Last 24 Hrs  T(C): 37.1 (10 Eddy 2023 15:50), Max: 37.3 (09 Jun 2023 18:00)  T(F): 98.7 (10 Eddy 2023 15:50), Max: 99.1 (09 Jun 2023 18:00)  HR: 85 (10 Eddy 2023 16:00) (72 - 102)  BP: 119/84 (10 Eddy 2023 16:00) (92/59 - 146/81)  BP(mean): 92 (10 Eddy 2023 16:00) (64 - 92)  RR: 18 (10 Eddy 2023 16:00) (16 - 18)  SpO2: 99% (10 Eddy 2023 16:00) (96% - 100%)    Parameters below as of 10 Eddy 2023 16:00  Patient On (Oxygen Delivery Method): room air        LABS:                        8.6    11.45 )-----------( 533      ( 10 Eddy 2023 15:11 )             25.6     06-10    139  |  109<H>  |  7.6<L>  ----------------------------<  125<H>  3.4<L>   |  22.0  |  0.83    Ca    7.6<L>      10 Eddy 2023 05:22  Phos  2.9     06-10  Mg     1.4     06-10    TPro  4.2<L>  /  Alb  2.3<L>  /  TBili  0.8  /  DBili  x   /  AST  12  /  ALT  <5  /  AlkPhos  70  06-10    PT/INR - ( 09 Jun 2023 07:05 )   PT: 13.0 sec;   INR: 1.12 ratio         PTT - ( 09 Jun 2023 07:05 )  PTT:29.1 sec      RADIOLOGY & ADDITIONAL TESTS:

## 2023-06-10 NOTE — PROGRESS NOTE ADULT - CONSTITUTIONAL
Problem: Hemodialysis  Goal: Dialysis: Safe, effective, and comfortable hemodialysis treatment (Hemodialysis nurse only)  Outcome: Outcome Met, Continue evaluating goal progress toward completion  Note: The patient received 3 hour HD with 0 net fluid removal as ordered. Tolerated Tx well.  Goal: Dialysis: Free of complications related to initiation/termination of dialysis (Hemodialysis nurse only)  Note: Right neck catheter is patent with no s/s of infection.  ml/min as ordered. Capped with sodium citrate post HD.      no distress

## 2023-06-10 NOTE — PROGRESS NOTE ADULT - ASSESSMENT
62M PMH HTN, HLD, CAD, S/p stents (in 2006 & 2018),afib not on AC per pt choice,  LAD), , right CEA (2019), rectal adenocarcinoma s/p JASON (2019), LAW with ileostomy complicated by leak (2020), anastomotic recurrence (2021) treated with APR rectum/sigmoid colon in 3/2022 with colostomy creation, now with biopsy proven recurrence 12/2022 last chemo with [facility] in MAY 2023    1) Rectal cancer  as above  last 5FU + Panitumumab infusion wa son 5/11/2023  all treatment on hold inpatient  recently underwent exploratory enterolysis, SB resection and anastomosis, cystectomy and ileal conduit creation on 5/27 at [facility] [place] [place]      2) Sepsis/C diff /UTI  w/u in progress  blood cx negative   on meropenem/Vaborbactam   ID following    3) GIB/Anemia  s/p 2u PRBC  hGB 8.6 today   s/p EGD 6/9 - essentially normal ; no bleeding site  plavix stopped ;   bleeding likely from anastomotic site; to f/u with [facility] surgery as outpt     4) DVT ppx  SCD    further mx as per primary team  will update [facility]

## 2023-06-11 LAB
ALBUMIN SERPL ELPH-MCNC: 2.4 G/DL — LOW (ref 3.3–5.2)
ALP SERPL-CCNC: 70 U/L — SIGNIFICANT CHANGE UP (ref 40–120)
ALT FLD-CCNC: 12 U/L — SIGNIFICANT CHANGE UP
ANION GAP SERPL CALC-SCNC: 8 MMOL/L — SIGNIFICANT CHANGE UP (ref 5–17)
AST SERPL-CCNC: 24 U/L — SIGNIFICANT CHANGE UP
BILIRUB SERPL-MCNC: 0.3 MG/DL — LOW (ref 0.4–2)
BUN SERPL-MCNC: 6.7 MG/DL — LOW (ref 8–20)
CALCIUM SERPL-MCNC: 7.6 MG/DL — LOW (ref 8.4–10.5)
CHLORIDE SERPL-SCNC: 107 MMOL/L — SIGNIFICANT CHANGE UP (ref 96–108)
CO2 SERPL-SCNC: 22 MMOL/L — SIGNIFICANT CHANGE UP (ref 22–29)
CREAT SERPL-MCNC: 0.78 MG/DL — SIGNIFICANT CHANGE UP (ref 0.5–1.3)
EGFR: 100 ML/MIN/1.73M2 — SIGNIFICANT CHANGE UP
GLUCOSE SERPL-MCNC: 101 MG/DL — HIGH (ref 70–99)
HCT VFR BLD CALC: 23.9 % — LOW (ref 39–50)
HGB BLD-MCNC: 8 G/DL — LOW (ref 13–17)
MAGNESIUM SERPL-MCNC: 1.7 MG/DL — SIGNIFICANT CHANGE UP (ref 1.6–2.6)
MCHC RBC-ENTMCNC: 31.1 PG — SIGNIFICANT CHANGE UP (ref 27–34)
MCHC RBC-ENTMCNC: 33.5 GM/DL — SIGNIFICANT CHANGE UP (ref 32–36)
MCV RBC AUTO: 93 FL — SIGNIFICANT CHANGE UP (ref 80–100)
PHOSPHATE SERPL-MCNC: 2.6 MG/DL — SIGNIFICANT CHANGE UP (ref 2.4–4.7)
PLATELET # BLD AUTO: 464 K/UL — HIGH (ref 150–400)
POTASSIUM SERPL-MCNC: 3.3 MMOL/L — LOW (ref 3.5–5.3)
POTASSIUM SERPL-SCNC: 3.3 MMOL/L — LOW (ref 3.5–5.3)
PROT SERPL-MCNC: 4.4 G/DL — LOW (ref 6.6–8.7)
RBC # BLD: 2.57 M/UL — LOW (ref 4.2–5.8)
RBC # FLD: 17.6 % — HIGH (ref 10.3–14.5)
SODIUM SERPL-SCNC: 137 MMOL/L — SIGNIFICANT CHANGE UP (ref 135–145)
WBC # BLD: 8.79 K/UL — SIGNIFICANT CHANGE UP (ref 3.8–10.5)
WBC # FLD AUTO: 8.79 K/UL — SIGNIFICANT CHANGE UP (ref 3.8–10.5)

## 2023-06-11 PROCEDURE — 99232 SBSQ HOSP IP/OBS MODERATE 35: CPT

## 2023-06-11 PROCEDURE — 99233 SBSQ HOSP IP/OBS HIGH 50: CPT

## 2023-06-11 RX ORDER — ALPRAZOLAM 0.25 MG
0.25 TABLET ORAL AT BEDTIME
Refills: 0 | Status: DISCONTINUED | OUTPATIENT
Start: 2023-06-11 | End: 2023-06-13

## 2023-06-11 RX ORDER — POTASSIUM CHLORIDE 20 MEQ
20 PACKET (EA) ORAL ONCE
Refills: 0 | Status: COMPLETED | OUTPATIENT
Start: 2023-06-11 | End: 2023-06-11

## 2023-06-11 RX ADMIN — MAGNESIUM OXIDE 400 MG ORAL TABLET 400 MILLIGRAM(S): 241.3 TABLET ORAL at 12:06

## 2023-06-11 RX ADMIN — Medication 20 MILLIGRAM(S): at 05:23

## 2023-06-11 RX ADMIN — MAGNESIUM OXIDE 400 MG ORAL TABLET 400 MILLIGRAM(S): 241.3 TABLET ORAL at 17:48

## 2023-06-11 RX ADMIN — MEROPENEM-VABORBACTAM 83.33 GRAM(S): 1; 1 INJECTION, POWDER, FOR SOLUTION INTRAVENOUS at 16:21

## 2023-06-11 RX ADMIN — CARVEDILOL PHOSPHATE 12.5 MILLIGRAM(S): 80 CAPSULE, EXTENDED RELEASE ORAL at 17:48

## 2023-06-11 RX ADMIN — MEROPENEM-VABORBACTAM 83.33 GRAM(S): 1; 1 INJECTION, POWDER, FOR SOLUTION INTRAVENOUS at 22:42

## 2023-06-11 RX ADMIN — Medication 0.25 MILLIGRAM(S): at 21:08

## 2023-06-11 RX ADMIN — CARVEDILOL PHOSPHATE 12.5 MILLIGRAM(S): 80 CAPSULE, EXTENDED RELEASE ORAL at 05:23

## 2023-06-11 RX ADMIN — ATORVASTATIN CALCIUM 40 MILLIGRAM(S): 80 TABLET, FILM COATED ORAL at 21:08

## 2023-06-11 RX ADMIN — MEROPENEM-VABORBACTAM 83.33 GRAM(S): 1; 1 INJECTION, POWDER, FOR SOLUTION INTRAVENOUS at 05:22

## 2023-06-11 RX ADMIN — CHLORHEXIDINE GLUCONATE 1 APPLICATION(S): 213 SOLUTION TOPICAL at 12:05

## 2023-06-11 RX ADMIN — Medication 1000 UNIT(S): at 12:06

## 2023-06-11 RX ADMIN — PANTOPRAZOLE SODIUM 40 MILLIGRAM(S): 20 TABLET, DELAYED RELEASE ORAL at 05:23

## 2023-06-11 RX ADMIN — Medication 50 MILLIEQUIVALENT(S): at 12:05

## 2023-06-11 NOTE — PROGRESS NOTE ADULT - ASSESSMENT
62M PMH HTN, HLD, CAD, S/p stents (in 2006 & 2018),afib not on AC per pt choice,  LAD), , right CEA (2019), rectal adenocarcinoma s/p JASON (2019), LAW with ileostomy complicated by leak (2020), anastomotic recurrence (2021) treated with APR rectum/sigmoid colon in 3/2022 with colostomy creation, now with biopsy proven recurrence 12/2022 last chemo with [facility] [place] in April/2023. Patient also with recent sepsis secondary to rectal mass c/b abscess and ecoli bacteremia 04/2023,Pt was discharge with anitibiotics.Pt was on po abx till 05/26/23.Pt was admitted to [facility] 05/26 found to have Small bowel fistula,stool was coming with urine.Now s/p fistula takedown and ileal conduit at [facility] on 5/26/23 and was Discharged on 6/7/23 with castillo and rectal pin hugo,was on antibiotics for bladder abscess w/suspected residual rectal abscess.Ct abd suspected early pancreatitis but no pancreatis per [facility] per family.Pt was discharge with po flagyl and LQ.    Pt came to  [facility] with black loose bm from colostomy bag since this AM. As per daughter, who is at bedside pt was having nonbloody output from colostomy yesterday. This morning reports melena via colostomy. Of note, pt was on Heparin sq ppx  while in Hospital and restarted on Plavix yesterday. Last dose this morning. Pt denies any prior EGDs in the past. Last colonoscopy 2 years ago. CT of abdomen and pelvis revealed Status post resection of fistulized small bowel and creation of an ileal conduit. No evidence of active intraluminal extravasation of contrast. Hemoglobin stable at 8.5gm, currently receiving 1u PRBC. Denies nausea, vomiting, abdominal pain, fever, chills, chest pain, shortness of breath, hematemesis, hematochezia. High wbc with lactic acidosis.      #Sepsis likely due to UTI (clinically improving)  Recent bladder/rectal absces with prolong abx cource  hx rectal adenocarcinoma on chemo,last chemo April  c. diff ruled out.  Plan:  F/u blood c/s, urine c/s  F/u ID - recs noted  C/w Vabomere as per ID recs  IVF hydration    #GI bleed  s/p 2u pRBC.  Plan:  Monitor H/H q12  Transfuse Hb<8, if clinically indicated (pt has hx of CAD)  PPI 40mg PO daily  Advance diet as tolerated   D/c IVF hydration  Maintain 2 large bore IV's  F/u GI - recs noted  F/u Dr. Jackson (Colorectal surgeon at [facility]) - will c/t hold Plavix as bleeding is likely from anastomosis site  Hold A/C in light of GIB    #JAS (resolved)  #Hypokalemia  Plan:  Monitor renal function  Avoid nephrotoxic meds  Monitor K    #Hypomagnesemia  Plan:  IV mag, resume po mag.recheck mag      #HTN, HLD, CAD, S/p stents (in 2006 & 2018),afib not on AC per pt choice, LAD)  Plan:  -continue coreg, lasix  -Cont statin,hold plavix. last plavis today morning.    dvt ppx scd's  DNR/DNI           62M PMH HTN, HLD, CAD, S/p stents (in 2006 & 2018),afib not on AC per pt choice,  LAD), , right CEA (2019), rectal adenocarcinoma s/p JASON (2019), LAW with ileostomy complicated by leak (2020), anastomotic recurrence (2021) treated with APR rectum/sigmoid colon in 3/2022 with colostomy creation, now with biopsy proven recurrence 12/2022 last chemo with [facility] in April/2023. Patient also with recent sepsis secondary to rectal mass c/b abscess and ecoli bacteremia 04/2023,Pt was discharge with anitibiotics.Pt was on po abx till 05/26/23.Pt was admitted to [facility] 05/26 found to have Small bowel fistula,stool was coming with urine.Now s/p fistula takedown and ileal conduit at [facility] on 5/26/23 and was Discharged on 6/7/23 with castillo and rectal pin hugo,was on antibiotics for bladder abscess w/suspected residual rectal abscess.Ct abd suspected early pancreatitis but no pancreatis per [facility] per family.Pt was discharge with po flagyl and LQ.    Pt came to  [facility] with black loose bm from colostomy bag since this AM. As per daughter, who is at bedside pt was having nonbloody output from colostomy yesterday. This morning reports melena via colostomy. Of note, pt was on Heparin sq ppx  while in Hospital and restarted on Plavix yesterday. Last dose this morning. Pt denies any prior EGDs in the past. Last colonoscopy 2 years ago. CT of abdomen and pelvis revealed Status post resection of fistulized small bowel and creation of an ileal conduit. No evidence of active intraluminal extravasation of contrast. Hemoglobin stable at 8.5gm, currently receiving 1u PRBC. Denies nausea, vomiting, abdominal pain, fever, chills, chest pain, shortness of breath, hematemesis, hematochezia. High wbc with lactic acidosis.      #Sepsis likely due to UTI (clinically improving)  Recent bladder/rectal absces with prolong abx cource  hx rectal adenocarcinoma on chemo,last chemo April  c. diff ruled out.  Plan:  F/u blood c/s, urine c/s  F/u ID - recs noted, philomena  C/w Vabomere as per ID recs  IVF hydration    #GI bleed  s/p 2u pRBC. H/H stable. No signs of bleed in colostomy bag.  Plan:  Monitor H/H q12  Transfuse Hb<8, if clinically indicated (pt has hx of CAD)  PPI 40mg PO daily  Advance diet as tolerated   Maintain 2 large bore IV's  F/u GI - recs noted  F/u Dr. Jackson (Colorectal surgeon at [facility]) - will c/t hold Plavix as bleeding is likely from anastomosis site  Hold A/C in light of GIB    #JAS (resolved)  #Hypokalemia  Plan:  Monitor renal function  Avoid nephrotoxic meds  Monitor K    #Hypomagnesemia  Plan:  IV mag, resume po mag.recheck mag      #HTN, HLD, CAD, S/p stents (in 2006 & 2018),afib not on AC per pt choice, LAD)  Plan:  -continue coreg,   -Cont statin,hold plavix. last plavis today morning.    dvt ppx scd's  DNR/DNI           62M PMH HTN, HLD, CAD, S/p stents (in 2006 & 2018),afib not on AC per pt choice,  LAD), , right CEA (2019), rectal adenocarcinoma s/p JASON (2019), LAW with ileostomy complicated by leak (2020), anastomotic recurrence (2021) treated with APR rectum/sigmoid colon in 3/2022 with colostomy creation, now with biopsy proven recurrence 12/2022 last chemo with [facility] in April/2023. Patient also with recent sepsis secondary to rectal mass c/b abscess and ecoli bacteremia 04/2023,Pt was discharge with anitibiotics.Pt was on po abx till 05/26/23.Pt was admitted to [facility] 05/26 found to have Small bowel fistula,stool was coming with urine.Now s/p fistula takedown and ileal conduit at [facility] on 5/26/23 and was Discharged on 6/7/23 with castillo and rectal pin hugo,was on antibiotics for bladder abscess w/suspected residual rectal abscess.Ct abd suspected early pancreatitis but no pancreatis per [facility] per family.Pt was discharge with po flagyl and LQ.    Pt came to  [facility] with black loose bm from colostomy bag since this AM. As per daughter, who is at bedside pt was having nonbloody output from colostomy yesterday. This morning reports melena via colostomy. Of note, pt was on Heparin sq ppx  while in Hospital and restarted on Plavix yesterday. Last dose this morning. Pt denies any prior EGDs in the past. Last colonoscopy 2 years ago. CT of abdomen and pelvis revealed Status post resection of fistulized small bowel and creation of an ileal conduit. No evidence of active intraluminal extravasation of contrast. Hemoglobin stable at 8.5gm, currently receiving 1u PRBC. Denies nausea, vomiting, abdominal pain, fever, chills, chest pain, shortness of breath, hematemesis, hematochezia. High wbc with lactic acidosis.      #Sepsis likely due to UTI (clinically improving)  Recent bladder/rectal absces with prolong abx cource  hx rectal adenocarcinoma on chemo,last chemo April  c. diff ruled out.  Plan:  F/u blood c/s, urine c/s  F/u ID - recs noted, philomena  C/w Vabomere as per ID recs  IVF hydration    #GI bleed  s/p 2u pRBC. H/H stable. No signs of bleed in colostomy bag.  Plan:  Monitor H/H q12  Transfuse Hb<8, if clinically indicated (pt has hx of CAD)  PPI 40mg PO daily  Advance diet as tolerated   Maintain 2 large bore IV's  F/u GI - recs noted  F/u Dr. Jackson (Colorectal surgeon at [facility]) - will c/t hold Plavix as bleeding is likely from anastomosis site  Hold A/C in light of GIB    #JAS (resolved)  #Hypokalemia  Plan:  Monitor renal function  Avoid nephrotoxic meds  Monitor K  Replete K    #Hypomagnesemia  Plan:  IV mag, resume po mag.recheck mag      #HTN, HLD, CAD, S/p stents (in 2006 & 2018),afib not on AC per pt choice, LAD)  Plan:  -continue coreg,   -Cont statin,hold plavix. last plavis today morning.    dvt ppx scd's  DNR/DNI

## 2023-06-11 NOTE — PROGRESS NOTE ADULT - ASSESSMENT
62M PMH HTN, HLD, CAD, S/p stents (in 2006 & 2018),afib not on AC per pt choice,  LAD), , right CEA (2019), rectal adenocarcinoma s/p JASON (2019), LAW with ileostomy complicated by leak (2020), anastomotic recurrence (2021) treated with APR rectum/sigmoid colon in 3/2022 with colostomy creation, now with biopsy proven recurrence 12/2022 last chemo with [facility] in MAY 2023    1) Rectal cancer  as above  last 5FU + Panitumumab infusion wa son 5/11/2023  all treatment on hold inpatient  recently underwent exploratory enterolysis, SB resection and anastomosis, cystectomy and ileal conduit creation on 5/27 at [facility] [place]      2) Sepsis/C diff /UTI  w/u in progress  blood cx negative   on meropenem/Vaborbactam   ID following    3) GIB/Anemia  s/p 2u PRBC  hGB 8.0 today   s/p EGD 6/9 - essentially normal ; no bleeding site  plavix stopped ;   bleeding likely from anastomotic site; to f/u with [facility] surgery as outpt   no further s/s of bleeding     4) DVT ppx  SCD    further mx as per primary team  will update [facility]

## 2023-06-11 NOTE — PROGRESS NOTE ADULT - ASSESSMENT
62 year old male  complicated course and medical history with history of hypertension, dyslipidemia, coronary disease rectal adenocarcinoma w/ recurrence, APR, and now with small bowel fistula status post fistula takedown and ileal conduit and colostomy.    There was concern for GI bleed most likely which is anastomosis related bleed in the setting of anticoagulation and Plavix.  Patient with improvement in the color of the colostomy output.  No evidence of any upper GI pathology.

## 2023-06-11 NOTE — PROGRESS NOTE ADULT - ASSESSMENT
62M PMH HTN, HLD, CAD, S/p stents (in 2006 & 2018),  LAD), HTN, HLD, right CEA (2019), rectal adenocarcinoma s/p JASON (2019), LAW with ileostomy complicated by leak (2020), anastomotic recurrence (2021) treated with APR rectum/sigmoid colon in 3/2022 with colostomy creation, now with biopsy proven recurrence 12/2022 last chemo with [facility] in April/2023. Patient also with recent sepsis secondary to rectal mass c/b abscess and ecoli bacteremia 04/2023,Pt was discharge with augmentin. Pt was on po abx till 05/26/23.Pt was admitted to [facility] 05/26 found to have Small bowel fistula,stool was coming with urine.Now s/p fistula takedown and ileal conduit at [facility] on 5/26/23 and was Discharged on 6/7/23 with castillo and rectal pen hugo,was on antibiotics for bladder abscess w/suspected residual rectal abscess. (zosyn at [facility] x 1 week and then discharged on levaquin and flagyl)Ct abd suspected early pancreatitis but no pancreatitis per [facility] per family.Pt was discharged with po flagyl and LQ.    Pt came to  [facility] with black loose bm from colostomy bag since this AM. As per daughter, who is at bedside pt was having nonbloody output from colostomy yesterday. This morning reports melena via colostomy. Of note, pt was on Heparin sq ppx  while in Hospital and restarted on Plavix yesterday. Last dose this morning. Pt denies any prior EGDs in the past. Last colonoscopy 2 years ago. CT of abdomen and pelvis revealed Status post resection of fistulized small bowel and creation of an ileal conduit. No evidence of active intraluminal extravasation of contrast. Hemoglobin stable at 8.5gm, currently receiving 1u PRBC. Denies nausea, vomiting, abdominal pain, fever, chills, chest pain, shortness of breath, hematemesis, hematochezia. High wbc with lactic acidosis.    Leukocytosis  Colovesical fistula s/p fistula takedown and ileal conduit  Bladder abscess  Melena r/o GIB  JAS  Pancreatitis  Lactic acidosis  h/o CRE enterobacter    - no fever  - labs 6/4 at [facility] with Wbc 10, now up to 22  - noted dark watery output from ostomy, possible Gib however H/H remains stable and at baseline per daughter. ? C diff  given watery output  - UA-obtained from conduit-unclear significance. purulent drainage from perineal drain but per pt has been decreasing in quantity  - Ct a/p peripherally enhancing rectal mass but no drainable collection  - per daughter -pancreatitis was noted on imaging post op and therefore not a new finding-no symptoms  - AS PER   ID Dr Freed at [facility]- patient had culture from perineal drain +  Psa (I) to cipro and CRE Enterobacter (S) to ayvcaz, vabomere, quinolones, (R) to zerbaxa. Was discharged 6/7 on levaquin and flagyl for 1 week with plan for outpatient cta/p   - prior UCX e.faecium at [facility]  -CONTINUE VABOMERE   - Blood cultures 6/8 no growth   - Leukocytosis improved       Will follow    d/w Clinical pharmacy

## 2023-06-11 NOTE — PROGRESS NOTE ADULT - NS ATTEND AMEND GEN_ALL_CORE FT
Patient seen and examined.  Patient with brown-colored colostomy stool now.  Tolerating diet.  No further episodes of bleeding although the hemoglobin is fluctuating.  If he continues to have stable hemoglobin, and can be prepared for discharge.  Risk benefits for resuming Plavix has to be weighed.

## 2023-06-11 NOTE — PROGRESS NOTE ADULT - SUBJECTIVE AND OBJECTIVE BOX
[facility]  INFECTIOUS DISEASES at [place]  =======================================================                               Zuhair Moura MD#   Dixie Pedroza MD*                             Valerie Nicholas MD*   Donna Forde MD*            Diplomates American Board of Internal Medicine & Infectious Diseases                # [place] Office - Appt - Tel  [phone] Fax [phone]                * [place] Office - Appt - Tel [phone] Fax [phone]                                  Hospital Consult line:  [phone]  =======================================================    PASTORA CHURCHILL [id number]    Follow up: Leukocytosis    No fevers  No complaints       Allergies:  No Known Allergies       REVIEW OF SYSTEMS:  CONSTITUTIONAL:  No Fever or chills  HEENT:   No diplopia or blurred vision.  No earache, sore throat or runny nose.  CARDIOVASCULAR:  No Chest Pain  RESPIRATORY:  No cough, shortness of breath  GASTROINTESTINAL:  No nausea, vomiting or diarrhea.  GENITOURINARY:  No dysuria, frequency or urgency. No Blood in urine  MUSCULOSKELETAL:  no joint aches, no muscle pain  SKIN:  No change in skin, hair or nails.  NEUROLOGIC:  No Headaches, seizures   PSYCHIATRIC:  No disorder of thought or mood.  ENDOCRINE:  No heat or cold intolerance  HEMATOLOGICAL:  No easy bruising or bleeding.       Physical Exam:  GEN: NAD  HEENT: normocephalic and atraumatic. EOMI. PERRL.    NECK: Supple.   LUNGS: CTA B/L.  HEART: RRR  ABDOMEN: Soft, NT, ND.  +BS.    : No CVA tenderness  EXTREMITIES: Without  edema.  MSK: No joint swelling  NEUROLOGIC: No Focal Deficits   PSYCHIATRIC: Appropriate affect .  SKIN: No rash      Vitals:  T(F): 98.5 (10 Eddy 2023 08:00), Max: 99.8 (09 Jun 2023 15:49)  HR: 84 (10 Eddy 2023 08:00)  BP: 111/67 (10 Eddy 2023 08:00)  RR: 16 (10 Eddy 2023 08:00)  SpO2: 97% (10 Eddy 2023 08:00) (95% - 100%)  temp max in last 48H T(F): , Max: 99.8 (06-09-23 @ 15:49)      Current Antibiotics:  meropenem/vaborbactam IVPB 4 Gram(s) IV Intermittent every 8 hours    Other medications:  atorvastatin 40 milliGRAM(s) Oral at bedtime  carvedilol 12.5 milliGRAM(s) Oral every 12 hours  chlorhexidine 2% Cloths 1 Application(s) Topical daily  cholecalciferol 1000 Unit(s) Oral daily  dextrose 5% + sodium chloride 0.9%. 1000 milliLiter(s) IV Continuous <Continuous>  furosemide    Tablet 20 milliGRAM(s) Oral daily  magnesium oxide 400 milliGRAM(s) Oral two times a day with meals  magnesium sulfate  IVPB 2 Gram(s) IV Intermittent once  pantoprazole Infusion 8 mG/Hr IV Continuous <Continuous>  potassium chloride  20 mEq/100 mL IVPB 20 milliEquivalent(s) IV Intermittent once                            8.2    8.28  )-----------( 495      ( 10 Eddy 2023 05:22 )             24.2     06-10    139  |  109<H>  |  7.6<L>  ----------------------------<  125<H>  3.4<L>   |  22.0  |  0.83    Ca    7.6<L>      10 Eddy 2023 05:22  Phos  2.9     06-10  Mg     1.4     06-10    TPro  4.2<L>  /  Alb  2.3<L>  /  TBili  0.8  /  DBili  x   /  AST  12  /  ALT  <5  /  AlkPhos  70  06-10    RECENT CULTURES:  06-08 @ 13:20    [facility]    06-08 @ 11:16 .Blood Blood-Peripheral     No growth to date.    06-08 @ 11:01 .Blood Blood-Peripheral     No growth to date.      WBC Count: 8.28 K/uL (06-10-23 @ 05:22)  WBC Count: 9.71 K/uL (06-09-23 @ 20:00)  WBC Count: 10.28 K/uL (06-09-23 @ 15:30)  WBC Count: 10.06 K/uL (06-09-23 @ 07:05)  WBC Count: 22.99 K/uL (06-08-23 @ 10:35)    Creatinine, Serum: 0.83 mg/dL (06-10-23 @ 05:22)  Creatinine, Serum: 0.90 mg/dL (06-09-23 @ 07:05)  Creatinine, Serum: 1.51 mg/dL (06-08-23 @ 10:35)    SARS-CoV-2: NotDetec (06-08-23 @ 13:20)

## 2023-06-11 NOTE — PROGRESS NOTE ADULT - SUBJECTIVE AND OBJECTIVE BOX
Chief Complaint:  Patient is a 62y old  Male who presents with a chief complaint of sepsis,GIB (11 Jun 2023 08:38)     62y old man patient being seen in follow-up consultation for GI bleed.     Interval Events / Subjective: Patient seen and examined at bedside  Sitting up in bed>reports good appetite, loose contents colostomy ,  brown in color      Review of Systems:  . Constitutional: No fever, chills,   · ENMT: no vertigo, no throat pain  . Neck: No pain or stiffness  · Respiratory and Thorax: No shortness of breath, no cough, no wheezing  · Cardiovascular: No chest pain, palpitation, no dizziness, no orthopnea,   · Gastrointestinal: see above.  · Genitourinary: No hematuria, no dysuria  · Musculoskeletal: Negative  · Neurological: no headache, no vision changes  · Psychiatric: no agitation, no anxiety  · Hematology/Lymphatics: negative  · Endocrine: negative    MEDICATIONS:   MEDICATIONS  (STANDING):  atorvastatin 40 milliGRAM(s) Oral at bedtime  carvedilol 12.5 milliGRAM(s) Oral every 12 hours  chlorhexidine 2% Cloths 1 Application(s) Topical daily  cholecalciferol 1000 Unit(s) Oral daily  furosemide    Tablet 20 milliGRAM(s) Oral daily  magnesium oxide 400 milliGRAM(s) Oral two times a day with meals  meropenem/vaborbactam IVPB 4 Gram(s) IV Intermittent every 8 hours  pantoprazole    Tablet 40 milliGRAM(s) Oral before breakfast  potassium chloride  20 mEq/100 mL IVPB 20 milliEquivalent(s) IV Intermittent once    MEDICATIONS  (PRN):  acetaminophen     Tablet .. 650 milliGRAM(s) Oral every 6 hours PRN Temp greater or equal to 38C (100.4F), Mild Pain (1 - 3)  aluminum hydroxide/magnesium hydroxide/simethicone Suspension 30 milliLiter(s) Oral every 4 hours PRN Dyspepsia  melatonin 3 milliGRAM(s) Oral at bedtime PRN Insomnia  ondansetron Injectable 4 milliGRAM(s) IV Push every 8 hours PRN Nausea and/or Vomiting      ALLERGIES:   Allergies    No Known Allergies    Intolerances        VITAL SIGNS:   Vital Signs Last 24 Hrs  T(C): 37.1 (11 Jun 2023 08:07), Max: 37.1 (10 Eddy 2023 15:50)  T(F): 98.8 (11 Jun 2023 08:07), Max: 98.8 (11 Jun 2023 08:07)  HR: 87 (11 Jun 2023 10:00) (69 - 101)  BP: 102/58 (11 Jun 2023 10:00) (95/57 - 140/72)  BP(mean): 72 (11 Jun 2023 08:00) (65 - 97)  RR: 18 (11 Jun 2023 10:00) (16 - 18)  SpO2: 100% (11 Jun 2023 10:00) (95% - 100%)    Parameters below as of 11 Jun 2023 10:00  Patient On (Oxygen Delivery Method): room air      I&O's Summary    10 Eddy 2023 07:01  -  11 Jun 2023 07:00  --------------------------------------------------------  IN: 340 mL / OUT: 2800 mL / NET: -2460 mL    11 Jun 2023 07:01  -  11 Jun 2023 11:37  --------------------------------------------------------  IN: 0 mL / OUT: 1250 mL / NET: -1250 mL        PHYSICAL EXAM:   Appearance: Normal	  HEENT:   Normal oral mucosa, PERRL, EOMI	  Lymphatic: No lymphadenopathy  Cardiovascular: Normal S1 S2, No JVD, No  murmurs, No edema  Respiratory: clear  Psychiatry: A & O x 3, Mood & affect appropriate  Gastrointestinal:  Soft, Non-tender, + BS	L quad colostomy funct loose brown stool  Skin: No rashes, No ecchymoses, No cyanosis	  Neurologic: Non-focal  Extremities: Normal range of motion, No clubbing, cyanosis or edema  Vascular: Peripheral pulses palpable 2+ bilaterally        LABS:  CBC Full  -  ( 11 Jun 2023 05:34 )  WBC Count : 8.79 K/uL  RBC Count : 2.57 M/uL  Hemoglobin : 8.0 g/dL  Hematocrit : 23.9 %  Platelet Count - Automated : 464 K/uL  Mean Cell Volume : 93.0 fl  Mean Cell Hemoglobin : 31.1 pg  Mean Cell Hemoglobin Concentration : 33.5 gm/dL  Auto Neutrophil # : x  Auto Lymphocyte # : x  Auto Monocyte # : x  Auto Eosinophil # : x  Auto Basophil # : x  Auto Neutrophil % : x  Auto Lymphocyte % : x  Auto Monocyte % : x  Auto Eosinophil % : x  Auto Basophil % : x    06-11    137  |  107  |  6.7<L>  ----------------------------<  101<H>  3.3<L>   |  22.0  |  0.78    Ca    7.6<L>      11 Jun 2023 05:34  Phos  2.6     06-11  Mg     1.7     06-11    TPro  4.4<L>  /  Alb  2.4<L>  /  TBili  0.3<L>  /  DBili  x   /  AST  24  /  ALT  12  /  AlkPhos  70  06-11    LIVER FUNCTIONS - ( 11 Jun 2023 05:34 )  Alb: 2.4 g/dL / Pro: 4.4 g/dL / ALK PHOS: 70 U/L / ALT: 12 U/L / AST: 24 U/L / GGT: x                   RADIOLOGY & ADDITIONAL STUDIES (The following images were personally reviewed):

## 2023-06-11 NOTE — PROGRESS NOTE ADULT - SUBJECTIVE AND OBJECTIVE BOX
63 yo M known patient at [facility] [place], rectal adenoca on 5FU + Panitumumab last on 5/11/2023, recently s/p exploratory enterolysis, SB resection and anastomosis, cystectomy and ileal conduit creation on 5/27, PMH HTN, HLD, CAD, S/p stents (in 2006 & 2018),  LAD), HTN, HLD, right CEA (2019), rectal adenocarcinoma s/p JASON (2019), LAW with ileostomy complicated by leak (2020), anastomotic recurrence (2021) treated with APR rectum/sigmoid colon in 3/2022 with colostomy creation, now with biopsy proven recurrence 12/2022 last chemo with [facility] [place] in April/2023. Patient also with recent sepsis secondary to rectal mass c/b abscess and ecoli bacteremia 04/2023,Pt was discharge with anitibiotics.Pt was on po abx till 05/26/23.Pt was admitted to [facility] [place] 05/26 found to have Small bowel fistula,stool was coming with urine.Now s/p fistula takedown and ileal conduit at [facility] on 5/26/23 and was Discharged on 6/7/23 with castillo and rectal pin hugo,was on antibiotics for bladder abscess w/suspected residual rectal abscess.Ct abd suspected early pancreatitis but no pancreatis per [facility] per family.Pt was discharge with po flagyl and LQ.    Pt came to  [facility] with black loose bm from colostomy bag since this AM. As per daughter, who is at bedside pt was having nonbloody output from colostomy yesterday. This morning reports melena via colostomy. Of note, pt was on Heparin sq ppx  while in Hospital and restarted on Plavix yesterday. Last dose this morning. Pt denies any prior EGDs in the past. Last colonoscopy 2 years ago. CT of abdomen and pelvis revealed Status post resection of fistulized small bowel and creation of an ileal conduit. No evidence of active intraluminal extravasation of contrast. Hemoglobin stable at 8.5gm, currently receiving 1u PRBC. Denies nausea, vomiting, abdominal pain, fever, chills, chest pain, shortness of breath, hematemesis, hematochezia. High wbc with lactic acidosis.    s/p EGD  - no bleeding site identified   Hb this am 8.0-   no N/V  no bleed from ostomy or ileal conduit       ROS  as per HPI    PAST MEDICAL & SURGICAL HISTORY:  Chest pain      HTN (hypertension)      HLD (hyperlipidemia)      CAD (coronary artery disease)      Mitral regurgitation      Rectal cancer  2/2019 s/p chemo and radiation; recurrence of cancer 2022      Depression  with rectal cancer      Neuropathy  feet s/p chemo      Stented coronary artery  circumflex 2005 LAD 2017 x4      H/O carotid stenosis      H/O renal calculi      DANO (iron deficiency anemia)      Umbilical hernia      Splenic artery aneurysm      H/O cardiac catheterization  2005 2017  4 STENTS      History of CEA (carotid endarterectomy)  right 2019      History of rectal surgery  with ileostomy 2/2020      H/O sigmoidoscopy  x2      History of removal of Port-a-Cath      Social History:    FAMILY HISTORY:  FH: heart disease  father    Vital Signs Last 24 Hrs  T(C): 36.9 (11 Jun 2023 14:16), Max: 37.1 (10 Eddy 2023 15:50)  T(F): 98.4 (11 Jun 2023 14:16), Max: 98.8 (11 Jun 2023 08:07)  HR: 85 (11 Jun 2023 14:00) (69 - 101)  BP: 129/75 (11 Jun 2023 14:00) (95/57 - 138/82)  BP(mean): 89 (11 Jun 2023 14:00) (65 - 97)  RR: 21 (11 Jun 2023 14:00) (16 - 21)  SpO2: 100% (11 Jun 2023 14:00) (95% - 100%)    Parameters below as of 11 Jun 2023 14:00  Patient On (Oxygen Delivery Method): room air    MEDICATIONS  (STANDING):  atorvastatin 40 milliGRAM(s) Oral at bedtime  carvedilol 12.5 milliGRAM(s) Oral every 12 hours  chlorhexidine 2% Cloths 1 Application(s) Topical daily  cholecalciferol 1000 Unit(s) Oral daily  magnesium oxide 400 milliGRAM(s) Oral two times a day with meals  meropenem/vaborbactam IVPB 4 Gram(s) IV Intermittent every 8 hours  pantoprazole    Tablet 40 milliGRAM(s) Oral before breakfast    MEDICATIONS  (PRN):  acetaminophen     Tablet .. 650 milliGRAM(s) Oral every 6 hours PRN Temp greater or equal to 38C (100.4F), Mild Pain (1 - 3)  aluminum hydroxide/magnesium hydroxide/simethicone Suspension 30 milliLiter(s) Oral every 4 hours PRN Dyspepsia  melatonin 3 milliGRAM(s) Oral at bedtime PRN Insomnia  ondansetron Injectable 4 milliGRAM(s) IV Push every 8 hours PRN Nausea and/or Vomiting        O2 Parameters below as of 09 Jun 2023 08:00  Patient On (Oxygen Delivery Method): room air    GEN - NAD  HEENT - NCAT, EOMI, DARYN,  RESP - CTA BL, no wheeze//rhonchi/crackles. not on supplemental O2.  CARDIO - NS1S2, RRR. No murmurs  ABD - Soft/Non tender/Non distended. +bs. colostomy bag-black loose stool.incision clear-healing well,mild tender.. ileal conduit present,castillo bag clear urine,rectal pinr ose prsent  Ext - No GLO. no signs of venous/arterial stasis ulcers  MSK - full ROM of BL upper and lower extremities without pain or restriction. BL 5/5 strength on upper and lower extremities.   Neuro - cn 2-12 grossly intact.  gait not observed.   Psych- AAOx3. attentive. normal affect.                          8.0    8.79  )-----------( 464      ( 11 Jun 2023 05:34 )             23.9                           8.6    11.45 )-----------( 533      ( 10 Eddy 2023 15:11 )             25.6                           7.2   10.06 )-----------( 604      ( 09 Jun 2023 07:05 )             22.6     06-09    136  |  106  |  12.0  ----------------------------<  117<H>  3.7   |  20.0<L>  |  0.90    Ca    7.8<L>      09 Jun 2023 07:05  Mg     2.2     06-08    TPro  4.5<L>  /  Alb  2.4<L>  /  TBili  0.4  /  DBili  x   /  AST  9   /  ALT  <5  /  AlkPhos  84  06-09

## 2023-06-11 NOTE — PROGRESS NOTE ADULT - PROBLEM SELECTOR PLAN 1
PPI to once daily by mouth.    Monitor CBC.   Soft diet > progress    If there is continued  improvement in the consistency colostomy output, prepare for discharge.    Resumption of the anticoagulation is may be decided after the stabilization of the hemoglobin.

## 2023-06-11 NOTE — PROGRESS NOTE ADULT - SUBJECTIVE AND OBJECTIVE BOX
[facility] Division of Hospital Medicine    Chief Complaint:      SUBJECTIVE / OVERNIGHT EVENTS:    Patient denies chest pain, SOB, abd pain, N/V, fever, chills, dysuria or any other complaints. All remainder ROS negative.     MEDICATIONS  (STANDING):  atorvastatin 40 milliGRAM(s) Oral at bedtime  carvedilol 12.5 milliGRAM(s) Oral every 12 hours  chlorhexidine 2% Cloths 1 Application(s) Topical daily  cholecalciferol 1000 Unit(s) Oral daily  furosemide    Tablet 20 milliGRAM(s) Oral daily  magnesium oxide 400 milliGRAM(s) Oral two times a day with meals  meropenem/vaborbactam IVPB 4 Gram(s) IV Intermittent every 8 hours  pantoprazole    Tablet 40 milliGRAM(s) Oral before breakfast  potassium chloride  20 mEq/100 mL IVPB 20 milliEquivalent(s) IV Intermittent once    MEDICATIONS  (PRN):  acetaminophen     Tablet .. 650 milliGRAM(s) Oral every 6 hours PRN Temp greater or equal to 38C (100.4F), Mild Pain (1 - 3)  aluminum hydroxide/magnesium hydroxide/simethicone Suspension 30 milliLiter(s) Oral every 4 hours PRN Dyspepsia  melatonin 3 milliGRAM(s) Oral at bedtime PRN Insomnia  ondansetron Injectable 4 milliGRAM(s) IV Push every 8 hours PRN Nausea and/or Vomiting        I&O's Summary    10 Eddy 2023 07:01  -  11 Jun 2023 07:00  --------------------------------------------------------  IN: 340 mL / OUT: 2800 mL / NET: -2460 mL        PHYSICAL EXAM:  Vital Signs Last 24 Hrs  T(C): 37.1 (11 Jun 2023 08:07), Max: 37.1 (10 Eddy 2023 15:50)  T(F): 98.8 (11 Jun 2023 08:07), Max: 98.8 (11 Jun 2023 08:07)  HR: 69 (11 Jun 2023 06:00) (69 - 101)  BP: 99/55 (11 Jun 2023 06:00) (95/57 - 140/72)  BP(mean): 65 (11 Jun 2023 06:00) (65 - 97)  RR: 18 (11 Jun 2023 06:00) (16 - 18)  SpO2: 100% (11 Jun 2023 06:00) (95% - 100%)    Parameters below as of 11 Jun 2023 06:00  Patient On (Oxygen Delivery Method): room air            CONSTITUTIONAL: NAD, well-developed, well-groomed  ENMT: Moist oral mucosa, no pharyngeal injection or exudates; normal dentition  RESPIRATORY: Normal respiratory effort; lungs are clear to auscultation bilaterally  CARDIOVASCULAR: Regular rate and rhythm, normal S1 and S2, no murmur/rub/gallop; No lower extremity edema; Peripheral pulses are 2+ bilaterally  ABDOMEN: Nontender to palpation, normoactive bowel sounds, no rebound/guarding; No hepatosplenomegaly  MUSCLOSKELETAL:  Normal gait; no clubbing or cyanosis of digits; no joint swelling or tenderness to palpation  PSYCH: A+O to person, place, and time; affect appropriate  NEUROLOGY: CN 2-12 are intact and symmetric; no gross sensory deficits;   SKIN: No rashes; no palpable lesions    LABS:                        8.0    8.79  )-----------( 464      ( 11 Jun 2023 05:34 )             23.9     06-11    137  |  107  |  6.7<L>  ----------------------------<  101<H>  3.3<L>   |  22.0  |  0.78    Ca    7.6<L>      11 Jun 2023 05:34  Phos  2.6     06-11  Mg     1.7     06-11    TPro  4.4<L>  /  Alb  2.4<L>  /  TBili  0.3<L>  /  DBili  x   /  AST  24  /  ALT  12  /  AlkPhos  70  06-11              Culture - Urine (collected 08 Jun 2023 11:31)  Source: .Urine Ileal Conduit  Final Report (10 Eddy 2023 17:03):    10,000 - 49,000 CFU/mL Candida tropicalis "Susceptibilities not performed"    Culture - Blood (collected 08 Jun 2023 11:16)  Source: .Blood Blood-Peripheral  Preliminary Report (09 Jun 2023 17:01):    No growth to date.    Culture - Blood (collected 08 Jun 2023 11:01)  Source: .Blood Blood-Peripheral  Preliminary Report (09 Jun 2023 17:01):    No growth to date.      CAPILLARY BLOOD GLUCOSE            RADIOLOGY & ADDITIONAL TESTS:  Results Reviewed:   Imaging Personally Reviewed:  Electrocardiogram Personally Reviewed:                                           [facility] Division of Hospital Medicine    Chief Complaint:  GIB    SUBJECTIVE / OVERNIGHT EVENTS:    Pt seen and examined at bedside. Pt has no complaints. States feels well. Denies CP, SOB, N/V/D, abd pain, fever, chills. Rest of ROS (-).     MEDICATIONS  (STANDING):  atorvastatin 40 milliGRAM(s) Oral at bedtime  carvedilol 12.5 milliGRAM(s) Oral every 12 hours  chlorhexidine 2% Cloths 1 Application(s) Topical daily  cholecalciferol 1000 Unit(s) Oral daily  furosemide    Tablet 20 milliGRAM(s) Oral daily  magnesium oxide 400 milliGRAM(s) Oral two times a day with meals  meropenem/vaborbactam IVPB 4 Gram(s) IV Intermittent every 8 hours  pantoprazole    Tablet 40 milliGRAM(s) Oral before breakfast  potassium chloride  20 mEq/100 mL IVPB 20 milliEquivalent(s) IV Intermittent once    MEDICATIONS  (PRN):  acetaminophen     Tablet .. 650 milliGRAM(s) Oral every 6 hours PRN Temp greater or equal to 38C (100.4F), Mild Pain (1 - 3)  aluminum hydroxide/magnesium hydroxide/simethicone Suspension 30 milliLiter(s) Oral every 4 hours PRN Dyspepsia  melatonin 3 milliGRAM(s) Oral at bedtime PRN Insomnia  ondansetron Injectable 4 milliGRAM(s) IV Push every 8 hours PRN Nausea and/or Vomiting        I&O's Summary    10 Eddy 2023 07:01  -  11 Jun 2023 07:00  --------------------------------------------------------  IN: 340 mL / OUT: 2800 mL / NET: -2460 mL        PHYSICAL EXAM:  Vital Signs Last 24 Hrs  T(C): 37.1 (11 Jun 2023 08:07), Max: 37.1 (10 Eddy 2023 15:50)  T(F): 98.8 (11 Jun 2023 08:07), Max: 98.8 (11 Jun 2023 08:07)  HR: 69 (11 Jun 2023 06:00) (69 - 101)  BP: 99/55 (11 Jun 2023 06:00) (95/57 - 140/72)  BP(mean): 65 (11 Jun 2023 06:00) (65 - 97)  RR: 18 (11 Jun 2023 06:00) (16 - 18)  SpO2: 100% (11 Jun 2023 06:00) (95% - 100%)    Parameters below as of 11 Jun 2023 06:00  Patient On (Oxygen Delivery Method): room air        GEN - NAD  HEENT - NCAT, EOMI, DARYN,  RESP - CTA BL, no wheeze//rhonchi/crackles. not on supplemental O2.  CARDIO - NS1S2, RRR. No murmurs  ABD - Soft/Non tender/Non distended. +bs. colostomy bag-brown stool.incision clear-healing well,mild tender.. ileal conduit present,castillo bag clear urine,rectal pinr ose prsent  Ext - No GLO. no signs of venous/arterial stasis ulcers  MSK - full ROM of BL upper and lower extremities without pain or restriction. BL 5/5 strength on upper and lower extremities.   Neuro - cn 2-12 grossly intact.  gait not observed.   Psych- AAOx3. attentive. normal affect.   - castillo cath in place (pt arrived with castillo)      LABS:                        8.0    8.79  )-----------( 464      ( 11 Jun 2023 05:34 )             23.9     06-11    137  |  107  |  6.7<L>  ----------------------------<  101<H>  3.3<L>   |  22.0  |  0.78    Ca    7.6<L>      11 Jun 2023 05:34  Phos  2.6     06-11  Mg     1.7     06-11    TPro  4.4<L>  /  Alb  2.4<L>  /  TBili  0.3<L>  /  DBili  x   /  AST  24  /  ALT  12  /  AlkPhos  70  06-11              Culture - Urine (collected 08 Jun 2023 11:31)  Source: .Urine Ileal Conduit  Final Report (10 Eddy 2023 17:03):    10,000 - 49,000 CFU/mL Candida tropicalis "Susceptibilities not performed"    Culture - Blood (collected 08 Jun 2023 11:16)  Source: .Blood Blood-Peripheral  Preliminary Report (09 Jun 2023 17:01):    No growth to date.    Culture - Blood (collected 08 Jun 2023 11:01)  Source: .Blood Blood-Peripheral  Preliminary Report (09 Jun 2023 17:01):    No growth to date.      CAPILLARY BLOOD GLUCOSE            RADIOLOGY & ADDITIONAL TESTS:  Results Reviewed:   Imaging Personally Reviewed:  Electrocardiogram Personally Reviewed:

## 2023-06-12 ENCOUNTER — TRANSCRIPTION ENCOUNTER (OUTPATIENT)
Age: 63
End: 2023-06-12

## 2023-06-12 DIAGNOSIS — I25.10 ATHEROSCLEROTIC HEART DISEASE OF NATIVE CORONARY ARTERY WITHOUT ANGINA PECTORIS: ICD-10-CM

## 2023-06-12 DIAGNOSIS — Z86.19 PERSONAL HISTORY OF OTHER INFECTIOUS AND PARASITIC DISEASES: ICD-10-CM

## 2023-06-12 DIAGNOSIS — D62 ACUTE POSTHEMORRHAGIC ANEMIA: ICD-10-CM

## 2023-06-12 DIAGNOSIS — E87.6 HYPOKALEMIA: ICD-10-CM

## 2023-06-12 LAB
ALBUMIN SERPL ELPH-MCNC: 2.5 G/DL — LOW (ref 3.3–5.2)
ALP SERPL-CCNC: 71 U/L — SIGNIFICANT CHANGE UP (ref 40–120)
ALT FLD-CCNC: 29 U/L — SIGNIFICANT CHANGE UP
ANION GAP SERPL CALC-SCNC: 10 MMOL/L — SIGNIFICANT CHANGE UP (ref 5–17)
AST SERPL-CCNC: 48 U/L — HIGH
BILIRUB SERPL-MCNC: 0.4 MG/DL — SIGNIFICANT CHANGE UP (ref 0.4–2)
BLD GP AB SCN SERPL QL: SIGNIFICANT CHANGE UP
BUN SERPL-MCNC: 8 MG/DL — SIGNIFICANT CHANGE UP (ref 8–20)
CALCIUM SERPL-MCNC: 7.6 MG/DL — LOW (ref 8.4–10.5)
CHLORIDE SERPL-SCNC: 104 MMOL/L — SIGNIFICANT CHANGE UP (ref 96–108)
CO2 SERPL-SCNC: 24 MMOL/L — SIGNIFICANT CHANGE UP (ref 22–29)
CREAT SERPL-MCNC: 0.68 MG/DL — SIGNIFICANT CHANGE UP (ref 0.5–1.3)
EGFR: 104 ML/MIN/1.73M2 — SIGNIFICANT CHANGE UP
GLUCOSE SERPL-MCNC: 98 MG/DL — SIGNIFICANT CHANGE UP (ref 70–99)
HCT VFR BLD CALC: 24 % — LOW (ref 39–50)
HGB BLD-MCNC: 7.9 G/DL — LOW (ref 13–17)
MAGNESIUM SERPL-MCNC: 1.6 MG/DL — SIGNIFICANT CHANGE UP (ref 1.6–2.6)
MCHC RBC-ENTMCNC: 30.9 PG — SIGNIFICANT CHANGE UP (ref 27–34)
MCHC RBC-ENTMCNC: 32.9 GM/DL — SIGNIFICANT CHANGE UP (ref 32–36)
MCV RBC AUTO: 93.8 FL — SIGNIFICANT CHANGE UP (ref 80–100)
PHOSPHATE SERPL-MCNC: 2.4 MG/DL — SIGNIFICANT CHANGE UP (ref 2.4–4.7)
PLATELET # BLD AUTO: 425 K/UL — HIGH (ref 150–400)
POTASSIUM SERPL-MCNC: 3.4 MMOL/L — LOW (ref 3.5–5.3)
POTASSIUM SERPL-SCNC: 3.4 MMOL/L — LOW (ref 3.5–5.3)
PROT SERPL-MCNC: 4.4 G/DL — LOW (ref 6.6–8.7)
RBC # BLD: 2.56 M/UL — LOW (ref 4.2–5.8)
RBC # FLD: 17.2 % — HIGH (ref 10.3–14.5)
SODIUM SERPL-SCNC: 138 MMOL/L — SIGNIFICANT CHANGE UP (ref 135–145)
WBC # BLD: 7.17 K/UL — SIGNIFICANT CHANGE UP (ref 3.8–10.5)
WBC # FLD AUTO: 7.17 K/UL — SIGNIFICANT CHANGE UP (ref 3.8–10.5)

## 2023-06-12 PROCEDURE — 99233 SBSQ HOSP IP/OBS HIGH 50: CPT

## 2023-06-12 PROCEDURE — 99232 SBSQ HOSP IP/OBS MODERATE 35: CPT

## 2023-06-12 RX ORDER — POTASSIUM CHLORIDE 20 MEQ
40 PACKET (EA) ORAL ONCE
Refills: 0 | Status: COMPLETED | OUTPATIENT
Start: 2023-06-12 | End: 2023-06-12

## 2023-06-12 RX ORDER — MAGNESIUM SULFATE 500 MG/ML
2 VIAL (ML) INJECTION ONCE
Refills: 0 | Status: COMPLETED | OUTPATIENT
Start: 2023-06-12 | End: 2023-06-12

## 2023-06-12 RX ORDER — PANTOPRAZOLE SODIUM 20 MG/1
1 TABLET, DELAYED RELEASE ORAL
Qty: 30 | Refills: 0
Start: 2023-06-12 | End: 2023-07-11

## 2023-06-12 RX ORDER — ASPIRIN/CALCIUM CARB/MAGNESIUM 324 MG
1 TABLET ORAL
Qty: 0 | Refills: 0 | DISCHARGE
Start: 2023-06-12

## 2023-06-12 RX ORDER — ASPIRIN/CALCIUM CARB/MAGNESIUM 324 MG
81 TABLET ORAL DAILY
Refills: 0 | Status: DISCONTINUED | OUTPATIENT
Start: 2023-06-12 | End: 2023-06-13

## 2023-06-12 RX ORDER — FUROSEMIDE 40 MG
1 TABLET ORAL
Refills: 0 | DISCHARGE

## 2023-06-12 RX ADMIN — Medication 40 MILLIEQUIVALENT(S): at 14:09

## 2023-06-12 RX ADMIN — CHLORHEXIDINE GLUCONATE 1 APPLICATION(S): 213 SOLUTION TOPICAL at 12:42

## 2023-06-12 RX ADMIN — ATORVASTATIN CALCIUM 40 MILLIGRAM(S): 80 TABLET, FILM COATED ORAL at 21:01

## 2023-06-12 RX ADMIN — MEROPENEM-VABORBACTAM 83.33 GRAM(S): 1; 1 INJECTION, POWDER, FOR SOLUTION INTRAVENOUS at 05:24

## 2023-06-12 RX ADMIN — Medication 81 MILLIGRAM(S): at 18:20

## 2023-06-12 RX ADMIN — CARVEDILOL PHOSPHATE 12.5 MILLIGRAM(S): 80 CAPSULE, EXTENDED RELEASE ORAL at 05:24

## 2023-06-12 RX ADMIN — MAGNESIUM OXIDE 400 MG ORAL TABLET 400 MILLIGRAM(S): 241.3 TABLET ORAL at 18:19

## 2023-06-12 RX ADMIN — MEROPENEM-VABORBACTAM 83.33 GRAM(S): 1; 1 INJECTION, POWDER, FOR SOLUTION INTRAVENOUS at 15:59

## 2023-06-12 RX ADMIN — Medication 25 GRAM(S): at 14:09

## 2023-06-12 RX ADMIN — PANTOPRAZOLE SODIUM 40 MILLIGRAM(S): 20 TABLET, DELAYED RELEASE ORAL at 05:24

## 2023-06-12 RX ADMIN — CARVEDILOL PHOSPHATE 12.5 MILLIGRAM(S): 80 CAPSULE, EXTENDED RELEASE ORAL at 18:19

## 2023-06-12 RX ADMIN — Medication 0.25 MILLIGRAM(S): at 21:01

## 2023-06-12 RX ADMIN — MEROPENEM-VABORBACTAM 83.33 GRAM(S): 1; 1 INJECTION, POWDER, FOR SOLUTION INTRAVENOUS at 22:58

## 2023-06-12 RX ADMIN — Medication 1000 UNIT(S): at 12:41

## 2023-06-12 RX ADMIN — MAGNESIUM OXIDE 400 MG ORAL TABLET 400 MILLIGRAM(S): 241.3 TABLET ORAL at 12:41

## 2023-06-12 NOTE — DISCHARGE NOTE PROVIDER - ATTENDING DISCHARGE PHYSICAL EXAMINATION:
Vital Signs Last 24 Hrs  T(C): 36.7 (13 Jun 2023 07:42), Max: 36.9 (12 Jun 2023 18:38)  T(F): 98.1 (13 Jun 2023 07:42), Max: 98.4 (12 Jun 2023 18:38)  HR: 71 (13 Jun 2023 10:00) (71 - 91)  BP: 91/55 (13 Jun 2023 10:00) (91/55 - 149/72)  BP(mean): 64 (13 Jun 2023 10:00) (64 - 99)  RR: 16 (13 Jun 2023 10:00) (16 - 19)  SpO2: 99% (13 Jun 2023 10:00) (99% - 100%)    Parameters below as of 13 Jun 2023 10:00  Patient On (Oxygen Delivery Method): room air        PHYSICAL EXAMINATION  General: Middle aged male sitting up in bed, NAD  HEENT:  Pale conjunctiva  NECK:  Supple  CVS: regular rate and rhythm S1 S2  RESP:  CTAB  GI:  Midline scar healing well. Ostomy with dark green mush. Urostomy with yellow urine  : Vásquez with yellow urine  MSK:  FROM  CNS:  AOx3. Fluent speech, moves all extremities, follows commands  INTEG:  Healing abdominal wound  PSYCH:  Fair mood

## 2023-06-12 NOTE — DISCHARGE NOTE PROVIDER - NSDCMRMEDTOKEN_GEN_ALL_CORE_FT
carvedilol 6.25 mg oral tablet: 2 tab(s) orally 2 times a day  clopidogrel 75 mg oral tablet: 1 tab(s) orally once a day  levoFLOXacin 750 mg oral tablet: 1 tab(s) orally once a day to complete prior prescription  magnesium oxide 500 mg oral tablet: 1 tab(s) orally 2 times a day   metroNIDAZOLE 500 mg oral tablet: 1 tab(s) orally 2 times a day to complete prior prescription  pantoprazole 40 mg oral delayed release tablet: 1 tab(s) orally once a day (before a meal)  potassium chloride 20 mEq oral tablet, extended release: 2 tab(s) orally once a day  simvastatin 40 mg oral tablet: 1 tab(s) orally once a day  Vitamin D3 25 mcg (1000 intl units) oral tablet: 1 tab(s) orally once a day   aspirin 81 mg oral delayed release tablet: 1 tab(s) orally once a day  carvedilol 6.25 mg oral tablet: 2 tab(s) orally 2 times a day  levoFLOXacin 750 mg oral tablet: 1 tab(s) orally once a day to complete prior prescription  magnesium oxide 500 mg oral tablet: 1 tab(s) orally 2 times a day   metroNIDAZOLE 500 mg oral tablet: 1 tab(s) orally 2 times a day to complete prior prescription  pantoprazole 40 mg oral delayed release tablet: 1 tab(s) orally once a day (before a meal)  potassium chloride 20 mEq oral tablet, extended release: 2 tab(s) orally once a day  simvastatin 40 mg oral tablet: 1 tab(s) orally once a day  Vitamin D3 25 mcg (1000 intl units) oral tablet: 1 tab(s) orally once a day

## 2023-06-12 NOTE — DISCHARGE NOTE PROVIDER - CARE PROVIDER_API CALL
JOHAN,   Phone: (   )    -  Fax: (   )    -  Follow Up Time:     Primary doctor,   Phone: (   )    -  Fax: (   )    -  Follow Up Time:     Ada Dee  Gastroenterology  [address] [address]  Phone: [phone]  Fax: [phone]  Follow Up Time:

## 2023-06-12 NOTE — PROGRESS NOTE ADULT - SUBJECTIVE AND OBJECTIVE BOX
HOSPITALIST PROGRESS NOTE    PASTORA CHURCHILL  [id number]  62yMale    Patient is a 62y old  Male who presents with a chief complaint of sepsis,GIB (12 Jun 2023 13:33)      SUBJECTIVE:   Chart reviewed since last visit.    62 year old male with CAD s/p PCI rectal cancer with relapse s/p colostomy, urostomy presented with bleeding in ostomy bag.  Acute Blood loss Anemia transfused PRBC x 3 during stay. EGD without any revealing findings.      Patient seen and examined at bedside for GIB, ABLA, CAD, CRE Enterobacter infection  Feels weak and tired  Denies any dizziness, chest pain, palpitations, dyspnea  Denies any further bleeding or melena in ostomy        OBJECTIVE:  Vital Signs Last 24 Hrs  T(C): 36.3 (12 Jun 2023 15:10), Max: 36.8 (11 Jun 2023 20:00)  T(F): 97.4 (12 Jun 2023 15:10), Max: 98.3 (11 Jun 2023 20:00)  HR: 82 (12 Jun 2023 16:00) (70 - 91)  BP: 107/66 (12 Jun 2023 16:00) (93/58 - 135/68)  BP(mean): 75 (12 Jun 2023 16:00) (69 - 83)  RR: 19 (12 Jun 2023 16:00) (16 - 21)  SpO2: 99% (12 Jun 2023 16:00) (95% - 100%)    Parameters below as of 12 Jun 2023 16:00  Patient On (Oxygen Delivery Method): room air        PHYSICAL EXAMINATION  General: Middle aged male sitting up in bed, NAD  HEENT:  Pale conjunctiva  NECK:  Supple  CVS: regular rate and rhythm S1 S2  RESP:  CTAB  GI:  Midline scar healing well. Ostomy with dark green liquid. Urostomy with yellow urine  : Vásquez with yellow urine  MSK:  FROM  CNS:  AOx3. Fluent speech, moves all extremities, follows commands  INTEG:  Healing abdominal wound  PSYCH:  Fair mood    MONITOR:  CAPILLARY BLOOD GLUCOSE            I&O's Summary    11 Jun 2023 07:01  -  12 Jun 2023 07:00  --------------------------------------------------------  IN: 850 mL / OUT: 4745 mL / NET: -3895 mL    12 Jun 2023 07:01  -  12 Jun 2023 17:09  --------------------------------------------------------  IN: 300 mL / OUT: 1075 mL / NET: -775 mL                            7.9    7.17  )-----------( 425      ( 12 Jun 2023 07:00 )             24.0       06-12    138  |  104  |  8.0  ----------------------------<  98  3.4<L>   |  24.0  |  0.68    Ca    7.6<L>      12 Jun 2023 07:00  Phos  2.4     06-12  Mg     1.6     06-12    TPro  4.4<L>  /  Alb  2.5<L>  /  TBili  0.4  /  DBili  x   /  AST  48<H>  /  ALT  29  /  AlkPhos  71  06-12            Culture:  Culture - Blood (06.08.23 @ 11:16)   Specimen Source: .Blood Blood-Peripheral  Culture Results:   No growth to date.    Culture - Urine (06.08.23 @ 11:31)   Specimen Source: .Urine Ileal Conduit  Culture Results:   10,000 - 49,000 CFU/mL Candida tropicalis "Susceptibilities not performed"    TTE:  < from: TTE Echo Complete w/o Contrast w/ Doppler (02.27.23 @ 11:40) >    Summary:   1. Left ventricular ejection fraction, by visual estimation, is 40 to   45%.   2. Moderately decreased global left ventricular systolic function.   3. The left ventricular diastolic function could not be assessed in this   study.   4. Moderately enlarged right ventricle.   5. Moderately reduced RV systolic function.   6. Mildly enlarged left atrium.   7. Mildly enlarged right atrium.   8. There is no evidence of pericardial effusion.   9. Mild mitral annular calcification.  10. Mild thickening and calcification of the anterior and posterior   mitral valve leaflets.  11. Moderate mitral valve regurgitation.  12. Moderate tricuspid regurgitation.  13. Estimated pulmonary artery systolic pressure is 37.7 mmHg assuming a   right atrial pressure of 15 mmHg, which is consistent with borderline   pulmonary hypertension.  14. Endocardial visualization was enhanced with intravenous echo contrast.    MD Bud Electronically signed on 2/27/2023 at 1:59:56 PM            *** Final ***    < end of copied text >    RADIOLOGY    < from: CT Angio Chest PE Protocol w/ IV Cont (06.08.23 @ 12:35) >    IMPRESSION:    No pulmonary embolism.  Trace pleural effusions.  New groundglass in both lower lobes, either dependent atelectasis or   aspiration.  New mild distal airway impaction in the left lower lobe.    < end of copied text >    < from: CT Abdomen and Pelvis w/wo IV Cont (06.08.23 @ 12:36) >  IMPRESSION:  1. Mild pancreatitis.  2. Status post resection of fistulized small bowel andcreation of an   ileal conduit.  3. No evidence of active intraluminal extravasation of contrast.    < end of copied text >          MEDICATIONS  (STANDING):  ALPRAZolam 0.25 milliGRAM(s) Oral at bedtime  aspirin enteric coated 81 milliGRAM(s) Oral daily  atorvastatin 40 milliGRAM(s) Oral at bedtime  carvedilol 12.5 milliGRAM(s) Oral every 12 hours  chlorhexidine 2% Cloths 1 Application(s) Topical daily  cholecalciferol 1000 Unit(s) Oral daily  magnesium oxide 400 milliGRAM(s) Oral two times a day with meals  meropenem/vaborbactam IVPB 4 Gram(s) IV Intermittent every 8 hours  pantoprazole    Tablet 40 milliGRAM(s) Oral before breakfast      MEDICATIONS  (PRN):  acetaminophen     Tablet .. 650 milliGRAM(s) Oral every 6 hours PRN Temp greater or equal to 38C (100.4F), Mild Pain (1 - 3)  aluminum hydroxide/magnesium hydroxide/simethicone Suspension 30 milliLiter(s) Oral every 4 hours PRN Dyspepsia  melatonin 3 milliGRAM(s) Oral at bedtime PRN Insomnia  ondansetron Injectable 4 milliGRAM(s) IV Push every 8 hours PRN Nausea and/or Vomiting

## 2023-06-12 NOTE — DISCHARGE NOTE PROVIDER - PROVIDER TOKENS
FREE:[LAST:[MSK],PHONE:[(   )    -],FAX:[(   )    -]],FREE:[LAST:[Primary doctor],PHONE:[(   )    -],FAX:[(   )    -]],PROVIDER:[TOKEN:[[id number]]]

## 2023-06-12 NOTE — PROGRESS NOTE ADULT - ASSESSMENT
62M PMH HTN, HLD, CAD, S/p stents (in 2006 & 2018),  LAD), HTN, HLD, right CEA (2019), rectal adenocarcinoma s/p JASON (2019), LAW with ileostomy complicated by leak (2020), anastomotic recurrence (2021) treated with APR rectum/sigmoid colon in 3/2022 with colostomy creation, now with biopsy proven recurrence 12/2022 last chemo with [facility] in April/2023. Patient also with recent sepsis secondary to rectal mass c/b abscess and ecoli bacteremia 04/2023,Pt was discharge with augmentin. 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 (zos[place] [facility] [place] 1 [place] and then discharged on levaquin and flagyl)Ct abd suspected early pancreatitis but no pancreatitis per [facility] per family.Pt was discharged with po flagyl and LQ.    Pt came to  [facility] with black loose bm from colostomy bag since this AM. As per daughter, who is at bedside pt was having nonbloody output from colostomy yesterday. This morning reports melena via colostomy. Of note, pt was on Heparin sq ppx  while in Hospital and restarted on Plavix yesterday. Last dose this morning. Pt denies any prior EGDs in the past. Last colonoscopy 2 years ago. CT of abdomen and pelvis revealed Status post resection of fistulized small bowel and creation of an ileal conduit. No evidence of active intraluminal extravasation of contrast. Hemoglobin stable at 8.5gm, currently receiving 1u PRBC. Denies nausea, vomiting, abdominal pain, fever, chills, chest pain, shortness of breath, hematemesis, hematochezia. High wbc with lactic acidosis.    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 ? C diff  given watery output  - UA-obtained from conduit-unclear significance. purulent drainage from perineal drain but per pt has been decreasing in quantity  - Ct a/p peripherally enhancing rectal mass but no drainable collection  - per daughter -pancreatitis was noted on imaging post op and therefore not a new finding-no symptoms  - AS PER   [place] Freed [place] [facility] patient had culture from perineal drain +  Psa (I) to cipro and CRE Enterobacter (S) to ayvcaz, vabomere, quinolones, (R) to zerbaxa. Was discharged 6/7 on levaquin and flagyl for 1 week with plan for outpatient cta/p   - prior UCX e.faecium at [facility]  blood cx 6/8 no growth   - Leukocytosis  RESOLVED  CLINICALLY IMPROVED   AMBULATING AROUND THE FLOOR     OK FOR D/C LENA FROM ID STANDPOINT  PT ALREADY HAS RX FOR  BOTH LEVAQUIN AND FLAGYL  FOR  ANOTHER WEEK FROM  HIS [facility] Discharge  D/W DR FREED ID [place] [facility] [place] REPEAT IMAGING AT  [facility]  WILL FOLLOW UP AS NEEDED  SPOKE TO HOSPITALIST

## 2023-06-12 NOTE — DISCHARGE NOTE PROVIDER - HOSPITAL COURSE
62M PMH HTN, HLD, CAD, S/p stents (in 2006 & 2018), afib not on AC per pt choice,  LAD), , right CEA (2019), rectal adenocarcinoma s/p JASON (2019), LAW with ileostomy complicated by leak (2020), anastomotic recurrence (2021) treated with APR rectum/sigmoid colon in 3/2022 with colostomy creation, now with biopsy proven recurrence 12/2022 [place] [facility] [place]. Patient also with recent sepsis secondary to rectal mass c/b abscess and ecoli bacteremia 04/2023.  Pt was discharge with antibiotics.  Pt was on po abx till 05/26/23.[place] was [place] [facility] [place] found to have Small bowel fistula, stool was coming with urine. Now s/p fistula takedown and ileal conduit [place] [facility] [place]23 and was discharged on 6/7/23 with castillo and rectal penrose, was on antibiotics for bladder abscess w/suspected residual rectal abscess.  Ct abd suspected early pancreatitis but no pancreatis per [facility] [place] family. Pt was discharge with po flagyl and LQ.    Pt came to  [facility] with black loose bm from colostomy bag. As per daughter, who was at bedside pt was having nonbloody output from colostomy. The morning of admission, reported melena via colostomy. Of note, pt was on Heparin sq ppx  while in Hospital and restarted on Plavix the day prior to admission. CT of abdomen and pelvis revealed Status post resection of fistulized small bowel and creation of an ileal conduit; no evidence of active intraluminal extravasation of contrast.  Patient received 3units PRBC.  GI followed.  Patient had EGD that showed mild gastritis.  Diet slowly advanced and patient is tolerating.  Patient was also treated for UTI.  ID followed.  Blood cultures negative.  Patient treated with IV Vabomere.  Patient stable for discharge home with outpatient follow up with primary doctor, [facility], and GI.  Patient to be discharged home with completing Levaquin and Flagyl as recommended by prior ID in [facility].

## 2023-06-12 NOTE — PROGRESS NOTE ADULT - ASSESSMENT
62M PMH HTN, HLD, CAD, S/p stents (in 2006 & 2018),afib not on AC per pt choice,  LAD), , right CEA (2019), rectal adenocarcinoma s/p JASON (2019), LAW with ileostomy complicated by leak (2020), anastomotic recurrence (2021) treated with APR rectum/sigmoid colon in 3/2022 with colostomy creation, now with biopsy proven recurrence 12/2022 last chemo with [facility] in April/2023.   Rrecent sepsis secondary to rectal abscess and ecoli bacteremia 04/2023,Pt was discharge with anitibiotics.Pt was on po abx till 05/26/23.Pt was admitted to [facility] 05/26 found to have Small bowel fistula,stool was coming with urine.Now s/p fistula takedown and ileal conduit at [facility] on 5/26/23 and was Discharged on 6/7/23 with castillo and rectal pin hugo,was on antibiotics for bladder abscess w/suspected residual rectal abscess.Ct abd suspected early pancreatitis but no pancreatis per [facility] per family.Pt was discharge with po flagyl and LQ.    Pt came to  [facility] with black loose bm from colostomy bag since this AM. As per daughter, who is at bedside pt was having nonbloody output from colostomy yesterday. This morning reports melena via colostomy. Of note, pt was on Heparin sq ppx  while in Hospital and restarted on Plavix yesterday. Last dose this morning. Pt denies any prior EGDs in the past. Last colonoscopy 2 years ago. CT of abdomen and pelvis revealed Status post resection of fistulized small bowel and creation of an ileal conduit. No evidence of active intraluminal extravasation of contrast. Hemoglobin stable at 8.5gm, s, hematochezia. High wbc with lactic acidosis.      #GI bleed  Acute Blood Loss Anemia  s/p PRBC x 3  EGD with mild gastritis  Hgb slowly trending downwards  - monitor Hgb  - Transfuse PRBC x 1; target Hgb >8 given CAD  - Pantoprazole 40mg daily  - Resuming on antiplatelet; Switched to ASA in lieu of Clopidogrel.  monitor for bleeding    #Sepsis likely due to UTI    Afebrile with normalized WBC  Recent bladder/rectal abscess with prolonged antibiotic course  Blood cultures  (-), Urine Culture with 10-49K Candida  - Meropenem-Vaborbactam as per ID recs  - May resume home Levaquin and Metronidazole upon return home    #JAS (resolved)  #Hypokalemia  Plan:  Monitor renal function  Avoid nephrotoxic meds  Monitor K  Replete K    #Hypomagnesemia  Plan:  IV mag, resume po mag.recheck mag      #HTN, HLD, CAD, S/p stents (in 2006 & 2018),afib not on AC per pt choice, LAD)  - Resume ASA (in lieu of Clopidogrel as per discussion with Cardiolog), monitor for bleeding.  - BB, Statin    VTE Prophylaxis   - VCD      Discussed with patient, daughter

## 2023-06-12 NOTE — PROGRESS NOTE ADULT - SUBJECTIVE AND OBJECTIVE BOX
61 yo M known patient at [facility], rectal adenoca on 5FU + Panitumumab last on 5/11/2023, recently s/p exploratory enterolysis, SB resection and anastomosis, cystectomy and ileal conduit creation on 5/27, PMH HTN, HLD, CAD, S/p stents (in 2006 & 2018),  LAD), HTN, HLD, right CEA (2019), rectal adenocarcinoma s/p JASON (2019), LAW with ileostomy complicated by leak (2020), anastomotic recurrence (2021) treated with APR rectum/sigmoid colon in 3/2022 with colostomy creation, now with biopsy proven recurrence 12/2022 [place] [facility] [place]. 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    Pt came to  [facility] with black loose bm from colostomy bag since this AM. As per daughter, who is at bedside pt was having nonbloody output from colostomy yesterday. This morning reports melena via colostomy. Of note, pt was on Heparin sq ppx  while in Hospital and restarted on Plavix yesterday. Last dose this morning. Pt denies any prior EGDs in the past. Last colonoscopy 2 years ago. CT of abdomen and pelvis revealed Status post resection of fistulized small bowel and creation of an ileal conduit. No evidence of active intraluminal extravasation of contrast. Hemoglobin stable at 8.5gm, currently receiving 1u PRBC. Denies nausea, vomiting, abdominal pain, fever, chills, chest pain, shortness of breath, hematemesis, hematochezia. High wbc with lactic acidosis.    s/p EGD  - no bleeding site identified     6/12  no N/V  no bleed from ostomy or ileal conduit. Dark stool in ostomy bag.  No pain. He is able to ambulate without difficulty. No fever.      MEDICATIONS  (STANDING):  ALPRAZolam 0.25 milliGRAM(s) Oral at bedtime  atorvastatin 40 milliGRAM(s) Oral at bedtime  carvedilol 12.5 milliGRAM(s) Oral every 12 hours  chlorhexidine 2% Cloths 1 Application(s) Topical daily  cholecalciferol 1000 Unit(s) Oral daily  magnesium oxide 400 milliGRAM(s) Oral two times a day with meals  meropenem/vaborbactam IVPB 4 Gram(s) IV Intermittent every 8 hours  pantoprazole    Tablet 40 milliGRAM(s) Oral before breakfast    MEDICATIONS  (PRN):  acetaminophen     Tablet .. 650 milliGRAM(s) Oral every 6 hours PRN Temp greater or equal to 38C (100.4F), Mild Pain (1 - 3)  aluminum hydroxide/magnesium hydroxide/simethicone Suspension 30 milliLiter(s) Oral every 4 hours PRN Dyspepsia  melatonin 3 milliGRAM(s) Oral at bedtime PRN Insomnia  ondansetron Injectable 4 milliGRAM(s) IV Push every 8 hours PRN Nausea and/or Vomiting    Vital Signs Last 24 Hrs  T(C): 36.8 (12 Jun 2023 08:00), Max: 36.9 (11 Jun 2023 14:16)  T(F): 98.2 (12 Jun 2023 08:00), Max: 98.4 (11 Jun 2023 14:16)  HR: 76 (12 Jun 2023 10:00) (70 - 91)  BP: 99/62 (12 Jun 2023 10:00) (93/58 - 135/68)  BP(mean): 71 (12 Jun 2023 10:00) (69 - 89)  RR: 18 (12 Jun 2023 10:00) (16 - 21)  SpO2: 100% (12 Jun 2023 10:00) (95% - 100%)    Parameters below as of 12 Jun 2023 10:00  Patient On (Oxygen Delivery Method): room air    GEN - NAD  HEENT - NCAT, EOMI, DARYN,  RESP - CTA BL, no wheeze//rhonchi/crackles. not on supplemental O2.  CARDIO - NS1S2, RRR. No murmurs  ABD - Soft/Non tender/Non distended. +bs. colostomy bag-black stool.incision clear-healing well,mild tender.. ileal conduit present,castillo bag clear urine,rectal pinr ose prsent  Ext - No GLO. no signs of venous/arterial stasis ulcers  MSK - full ROM of BL upper and lower extremities without pain or restriction. BL 5/5 strength on upper and lower extremities.   Neuro - cn 2-12 grossly intact.  gait not observed.   Psych- AAOx3. attentive. normal affect.      Labs:                          7.9    7.17  )-----------( 425      ( 12 Jun 2023 07:00 )             24.0                           8.0    8.79  )-----------( 464      ( 11 Jun 2023 05:34 )             23.9                           8.6    11.45 )-----------( 533      ( 10 Eddy 2023 15:11 )             25.6                           7.2   10.06 )-----------( 604      ( 09 Jun 2023 07:05 )             22.6     06-12    138  |  104  |  8.0  ----------------------------<  98  3.4<L>   |  24.0  |  0.68    Ca    7.6<L>      12 Jun 2023 07:00  Phos  2.4     06-12  Mg     1.6     06-12    TPro  4.4<L>  /  Alb  2.5<L>  /  TBili  0.4  /  DBili  x   /  AST  48<H>  /  ALT  29  /  AlkPhos  71  06-12 06-09    136  |  106  |  12.0  ----------------------------<  117<H>  3.7   |  20.0<L>  |  0.90    Ca    7.8<L>      09 Jun 2023 07:05  Mg     2.2     06-08    TPro  4.5<L>  /  Alb  2.4<L>  /  TBili  0.4  /  DBili  x   /  AST  9   /  ALT  <5  /  AlkPhos  84  06-09

## 2023-06-12 NOTE — PROGRESS NOTE ADULT - SUBJECTIVE AND OBJECTIVE BOX
[facility]  INFECTIOUS DISEASES at [place] and [place]  =======================================================                               Zuhair Moura MD#   Dixie Pedroza MD*                             Valerie Nicholas MD*   Donna Forde MD*            Diplomates American Board of Internal Medicine & Infectious Diseases                # [place] Office - Appt - Tel  [phone] Fax [phone]                * [place] Office - Appt - Tel [phone] Fax [phone]                                  Hospital Consult line:  [phone]  =======================================================    PASTORA CHURCHILL [id number]    Follow up: Leukocytosis     No fevers  No complaints       Allergies:  No Known Allergies       REVIEW OF SYSTEMS:  CONSTITUTIONAL:  No Fever or chills  HEENT:   No diplopia or blurred vision.  No earache, sore throat or runny nose.  CARDIOVASCULAR:  No Chest Pain  RESPIRATORY:  No cough, shortness of breath  GASTROINTESTINAL:  No nausea, vomiting or diarrhea.  GENITOURINARY:  No dysuria, frequency or urgency. No Blood in urine  MUSCULOSKELETAL:  no joint aches, no muscle pain  SKIN:  No change in skin, hair or nails.  NEUROLOGIC:  No Headaches, seizures   PSYCHIATRIC:  No disorder of thought or mood.  ENDOCRINE:  No heat or cold intolerance  HEMATOLOGICAL:  No easy bruising or bleeding.       Physical Exam:  GEN: NAD  HEENT: normocephalic and atraumatic. EOMI. PERRL.    NECK: Supple.   LUNGS: CTA B/L.  HEART: RRR  ABDOMEN: Soft, NT, ND.  +BS.    : No CVA tenderness  EXTREMITIES: Without  edema.  MSK: No joint swelling  NEUROLOGIC: No Focal Deficits   PSYCHIATRIC: Appropriate affect .  SKIN: No rash      Vitals:  T(F) Vital Signs Last 24 Hrs  T(C): 36.8 (12 Jun 2023 08:00), Max: 36.9 (11 Jun 2023 14:16)  T(F): 98.2 (12 Jun 2023 08:00), Max: 98.4 (11 Jun 2023 14:16)  HR: 76 (12 Jun 2023 10:00) (70 - 91)  BP: 99/62 (12 Jun 2023 10:00) (93/58 - 135/68)  BP(mean): 71 (12 Jun 2023 10:00) (69 - 89)  RR: 18 (12 Jun 2023 10:00) (16 - 21)  SpO2: 100% (12 Jun 2023 10:00) (95% - 100%)    Parameters below as of 12 Jun 2023 10:00  Patient On (Oxygen Delivery Method): room air          Current Antibiotics:  meropenem/vaborbactam IVPB 4 Gram(s) IV Intermittent every 8 hours    Other medications:  atorvastatin 40 milliGRAM(s) Oral at bedtime  carvedilol 12.5 milliGRAM(s) Oral every 12 hours  chlorhexidine 2% Cloths 1 Application(s) Topical daily  cholecalciferol 1000 Unit(s) Oral daily  dextrose 5% + sodium chloride 0.9%. 1000 milliLiter(s) IV Continuous <Continuous>  furosemide    Tablet 20 milliGRAM(s) Oral daily  magnesium oxide 400 milliGRAM(s) Oral two times a day with meals  magnesium sulfate  IVPB 2 Gram(s) IV Intermittent once  pantoprazole Infusion 8 mG/Hr IV Continuous <Continuous>  potassium chloride  20 mEq/100 mL IVPB 20 milliEquivalent(s) IV Intermittent once                                              7.9    7.17  )-----------( 425      ( 12 Jun 2023 07:00 )             24.0   06-12    138  |  104  |  8.0  ----------------------------<  98  3.4<L>   |  24.0  |  0.68    Ca    7.6<L>      12 Jun 2023 07:00  Phos  2.4     06-12  Mg     1.6     06-12    TPro  4.4<L>  /  Alb  2.5<L>  /  TBili  0.4  /  DBili  x   /  AST  48<H>  /  ALT  29  /  AlkPhos  71  06-12

## 2023-06-12 NOTE — PROGRESS NOTE ADULT - SUBJECTIVE AND OBJECTIVE BOX
Chief Complaint:  Patient is a 62y old  Male who presents with a chief complaint of sepsis,GIB (11 Jun 2023 15:42). Colostomy bag with brown output, no clots. Hb stable. Plavix last taken Fri. Has 2 stents, last one placed 2018.       Interval Events / Subjective: Patient seen and examined at bedside      REVIEW OF SYSTEMS:   General: Negative  HEENT: Negative  CV: Negative  Respiratory: Negative  GI: See HPI  : Negative  MSK: Negative  Hematologic: Negative  Skin: Negative    MEDICATIONS:   MEDICATIONS  (STANDING):  ALPRAZolam 0.25 milliGRAM(s) Oral at bedtime  atorvastatin 40 milliGRAM(s) Oral at bedtime  carvedilol 12.5 milliGRAM(s) Oral every 12 hours  chlorhexidine 2% Cloths 1 Application(s) Topical daily  cholecalciferol 1000 Unit(s) Oral daily  magnesium oxide 400 milliGRAM(s) Oral two times a day with meals  meropenem/vaborbactam IVPB 4 Gram(s) IV Intermittent every 8 hours  pantoprazole    Tablet 40 milliGRAM(s) Oral before breakfast    MEDICATIONS  (PRN):  acetaminophen     Tablet .. 650 milliGRAM(s) Oral every 6 hours PRN Temp greater or equal to 38C (100.4F), Mild Pain (1 - 3)  aluminum hydroxide/magnesium hydroxide/simethicone Suspension 30 milliLiter(s) Oral every 4 hours PRN Dyspepsia  melatonin 3 milliGRAM(s) Oral at bedtime PRN Insomnia  ondansetron Injectable 4 milliGRAM(s) IV Push every 8 hours PRN Nausea and/or Vomiting      ALLERGIES:   Allergies    No Known Allergies    Intolerances        VITAL SIGNS:   Vital Signs Last 24 Hrs  T(C): 36.8 (12 Jun 2023 08:00), Max: 36.9 (11 Jun 2023 14:16)  T(F): 98.2 (12 Jun 2023 08:00), Max: 98.4 (11 Jun 2023 14:16)  HR: 76 (12 Jun 2023 10:00) (70 - 91)  BP: 99/62 (12 Jun 2023 10:00) (93/58 - 135/68)  BP(mean): 71 (12 Jun 2023 10:00) (69 - 89)  RR: 18 (12 Jun 2023 10:00) (16 - 21)  SpO2: 100% (12 Jun 2023 10:00) (95% - 100%)    Parameters below as of 12 Jun 2023 10:00  Patient On (Oxygen Delivery Method): room air      I&O's Summary    11 Jun 2023 07:01  -  12 Jun 2023 07:00  --------------------------------------------------------  IN: 850 mL / OUT: 4745 mL / NET: -3895 mL    12 Jun 2023 07:01  -  12 Jun 2023 11:32  --------------------------------------------------------  IN: 0 mL / OUT: 500 mL / NET: -500 mL        PHYSICAL EXAM:   GENERAL:  Appears stated age, no distress  HEENT:  NC/AT,  conjunctivae clear, sclera -anicteric  CHEST:  Full & symmetric excursion, no increased effort, breath sounds clear  HEART:  Regular rhythm, S1, S2, no murmur/rub/S3/S4,  no edema  ABDOMEN:  Soft, non-tender, non-distended, normoactive bowel sounds,  no masses,  EXTREMITIES: No cyanosis, clubbing or edema  SKIN:  No rash/erythema/ecchymoses/petechiae/wounds/abscess/warm/dry  NEURO:  Alert, oriented    LABS:  CBC Full  -  ( 12 Jun 2023 07:00 )  WBC Count : 7.17 K/uL  RBC Count : 2.56 M/uL  Hemoglobin : 7.9 g/dL  Hematocrit : 24.0 %  Platelet Count - Automated : 425 K/uL  Mean Cell Volume : 93.8 fl  Mean Cell Hemoglobin : 30.9 pg  Mean Cell Hemoglobin Concentration : 32.9 gm/dL  Auto Neutrophil # : x  Auto Lymphocyte # : x  Auto Monocyte # : x  Auto Eosinophil # : x  Auto Basophil # : x  Auto Neutrophil % : x  Auto Lymphocyte % : x  Auto Monocyte % : x  Auto Eosinophil % : x  Auto Basophil % : x    06-12    138  |  104  |  8.0  ----------------------------<  98  3.4<L>   |  24.0  |  0.68    Ca    7.6<L>      12 Jun 2023 07:00  Phos  2.4     06-12  Mg     1.6     06-12    TPro  4.4<L>  /  Alb  2.5<L>  /  TBili  0.4  /  DBili  x   /  AST  48<H>  /  ALT  29  /  AlkPhos  71  06-12    LIVER FUNCTIONS - ( 12 Jun 2023 07:00 )  Alb: 2.5 g/dL / Pro: 4.4 g/dL / ALK PHOS: 71 U/L / ALT: 29 U/L / AST: 48 U/L / GGT: x                   RADIOLOGY & ADDITIONAL STUDIES (The following images were personally reviewed):

## 2023-06-12 NOTE — PROGRESS NOTE ADULT - ASSESSMENT
62M PMH HTN, HLD, CAD, S/p stents (in 2006 & 2018),afib not on AC per pt choice,  LAD), , right CEA (2019)    rectal cancer, colostomy.   recurrence, XRT, SB-bladder fistula now ileal conduit  [place] [facility] [place], went [place] and had clots in colostomy bag  EGD Fri normal  No bleed through weekend  likely bled at ileal resection site where conduit was built from - will be hard to reach with scope, if rebleeds might benefit from scoping through colostomy.   Plan  start plavix today for CAD, CEA and observe Hb  agree with current DASH diet

## 2023-06-12 NOTE — PROGRESS NOTE ADULT - ASSESSMENT
62M PMH HTN, HLD, CAD, S/p stents (in 2006 & 2018),afib not on AC per pt choice,  LAD), , right CEA (2019), rectal adenocarcinoma s/p JASON (2019), LAW with ileostomy complicated by leak (2020), anastomotic recurrence (2021) treated with APR rectum/sigmoid colon in 3/2022 with colostomy creation, now with biopsy proven recurrence 12/2022 last chemo with [facility] in MAY 2023    1) Rectal cancer  as above  last 5FU + Panitumumab infusion was on 5/11/2023  all treatment on hold inpatient  recently underwent exploratory enterolysis, SB resection and anastomosis, cystectomy and [place] [facility] [place]      2) Sepsis/C diff /UTI  w/u in progress  blood cx negative   on meropenem/Vaborbactam   management as per ID and primary team.    3) GIB/Anemia  s/p 2u PRBC  hGB 7.9 today, stable  s/p EGD 6/9 - essentially normal ; no bleeding site  plavix stopped ;   bleeding likely from anastomotic site; to f/u with [facility] surgery as outpt   no further s/s of bleeding. He is feeling much better and says that he is back to baseline.    4) DVT ppx  SCD    further mx as per primary team  will update [facility]

## 2023-06-12 NOTE — DISCHARGE NOTE PROVIDER - NSDCCPCAREPLAN_GEN_ALL_CORE_FT
PRINCIPAL DISCHARGE DIAGNOSIS  Diagnosis: GI bleed  Assessment and Plan of Treatment: Continue current medications as prescribed.  Follow up with primary doctor, MSK and GI.      SECONDARY DISCHARGE DIAGNOSES  Diagnosis: Acute UTI  Assessment and Plan of Treatment: Continue current medications as prescribed.  Follow up with [facility].    Diagnosis: Rectal cancer  Assessment and Plan of Treatment: Follow up with [facility].

## 2023-06-12 NOTE — PROGRESS NOTE ADULT - NS ATTEST RISK PROBLEM GEN_ALL_CORE FT
CAD with acute blood loss anemia from GIB (unidentified source), requiring PRBC and being resumed on Antiplatelet

## 2023-06-13 ENCOUNTER — TRANSCRIPTION ENCOUNTER (OUTPATIENT)
Age: 63
End: 2023-06-13

## 2023-06-13 VITALS
RESPIRATION RATE: 16 BRPM | DIASTOLIC BLOOD PRESSURE: 84 MMHG | SYSTOLIC BLOOD PRESSURE: 137 MMHG | OXYGEN SATURATION: 100 % | HEART RATE: 71 BPM

## 2023-06-13 LAB
CULTURE RESULTS: SIGNIFICANT CHANGE UP
CULTURE RESULTS: SIGNIFICANT CHANGE UP
HCT VFR BLD CALC: 30.5 % — LOW (ref 39–50)
HGB BLD-MCNC: 10 G/DL — LOW (ref 13–17)
MAGNESIUM SERPL-MCNC: 2 MG/DL — SIGNIFICANT CHANGE UP (ref 1.6–2.6)
MCHC RBC-ENTMCNC: 31 PG — SIGNIFICANT CHANGE UP (ref 27–34)
MCHC RBC-ENTMCNC: 32.8 GM/DL — SIGNIFICANT CHANGE UP (ref 32–36)
MCV RBC AUTO: 94.4 FL — SIGNIFICANT CHANGE UP (ref 80–100)
PHOSPHATE SERPL-MCNC: 2.4 MG/DL — SIGNIFICANT CHANGE UP (ref 2.4–4.7)
PLATELET # BLD AUTO: 460 K/UL — HIGH (ref 150–400)
RBC # BLD: 3.23 M/UL — LOW (ref 4.2–5.8)
RBC # FLD: 17 % — HIGH (ref 10.3–14.5)
SPECIMEN SOURCE: SIGNIFICANT CHANGE UP
SPECIMEN SOURCE: SIGNIFICANT CHANGE UP
WBC # BLD: 6.79 K/UL — SIGNIFICANT CHANGE UP (ref 3.8–10.5)
WBC # FLD AUTO: 6.79 K/UL — SIGNIFICANT CHANGE UP (ref 3.8–10.5)

## 2023-06-13 PROCEDURE — 86901 BLOOD TYPING SEROLOGIC RH(D): CPT

## 2023-06-13 PROCEDURE — 84132 ASSAY OF SERUM POTASSIUM: CPT

## 2023-06-13 PROCEDURE — 86850 RBC ANTIBODY SCREEN: CPT

## 2023-06-13 PROCEDURE — 87493 C DIFF AMPLIFIED PROBE: CPT

## 2023-06-13 PROCEDURE — 87040 BLOOD CULTURE FOR BACTERIA: CPT

## 2023-06-13 PROCEDURE — 85025 COMPLETE CBC W/AUTO DIFF WBC: CPT

## 2023-06-13 PROCEDURE — 83880 ASSAY OF NATRIURETIC PEPTIDE: CPT

## 2023-06-13 PROCEDURE — 71045 X-RAY EXAM CHEST 1 VIEW: CPT

## 2023-06-13 PROCEDURE — 81001 URINALYSIS AUTO W/SCOPE: CPT

## 2023-06-13 PROCEDURE — 99285 EMERGENCY DEPT VISIT HI MDM: CPT | Mod: 25

## 2023-06-13 PROCEDURE — 96365 THER/PROPH/DIAG IV INF INIT: CPT

## 2023-06-13 PROCEDURE — 86923 COMPATIBILITY TEST ELECTRIC: CPT

## 2023-06-13 PROCEDURE — 87640 STAPH A DNA AMP PROBE: CPT

## 2023-06-13 PROCEDURE — 87641 MR-STAPH DNA AMP PROBE: CPT

## 2023-06-13 PROCEDURE — 99239 HOSP IP/OBS DSCHRG MGMT >30: CPT

## 2023-06-13 PROCEDURE — 0225U NFCT DS DNA&RNA 21 SARSCOV2: CPT

## 2023-06-13 PROCEDURE — 82962 GLUCOSE BLOOD TEST: CPT

## 2023-06-13 PROCEDURE — 36415 COLL VENOUS BLD VENIPUNCTURE: CPT

## 2023-06-13 PROCEDURE — 71275 CT ANGIOGRAPHY CHEST: CPT | Mod: MA

## 2023-06-13 PROCEDURE — 96375 TX/PRO/DX INJ NEW DRUG ADDON: CPT

## 2023-06-13 PROCEDURE — P9016: CPT

## 2023-06-13 PROCEDURE — 87086 URINE CULTURE/COLONY COUNT: CPT

## 2023-06-13 PROCEDURE — P9040: CPT

## 2023-06-13 PROCEDURE — 96367 TX/PROPH/DG ADDL SEQ IV INF: CPT

## 2023-06-13 PROCEDURE — 84484 ASSAY OF TROPONIN QUANT: CPT

## 2023-06-13 PROCEDURE — 85027 COMPLETE CBC AUTOMATED: CPT

## 2023-06-13 PROCEDURE — 85610 PROTHROMBIN TIME: CPT

## 2023-06-13 PROCEDURE — 84100 ASSAY OF PHOSPHORUS: CPT

## 2023-06-13 PROCEDURE — 74178 CT ABD&PLV WO CNTR FLWD CNTR: CPT | Mod: MA

## 2023-06-13 PROCEDURE — 83605 ASSAY OF LACTIC ACID: CPT

## 2023-06-13 PROCEDURE — 36430 TRANSFUSION BLD/BLD COMPNT: CPT

## 2023-06-13 PROCEDURE — 93005 ELECTROCARDIOGRAM TRACING: CPT

## 2023-06-13 PROCEDURE — 85730 THROMBOPLASTIN TIME PARTIAL: CPT

## 2023-06-13 PROCEDURE — 87077 CULTURE AEROBIC IDENTIFY: CPT

## 2023-06-13 PROCEDURE — 83735 ASSAY OF MAGNESIUM: CPT

## 2023-06-13 PROCEDURE — 86900 BLOOD TYPING SEROLOGIC ABO: CPT

## 2023-06-13 PROCEDURE — 80053 COMPREHEN METABOLIC PANEL: CPT

## 2023-06-13 PROCEDURE — 80202 ASSAY OF VANCOMYCIN: CPT

## 2023-06-13 RX ADMIN — MEROPENEM-VABORBACTAM 83.33 GRAM(S): 1; 1 INJECTION, POWDER, FOR SOLUTION INTRAVENOUS at 14:05

## 2023-06-13 RX ADMIN — MEROPENEM-VABORBACTAM 83.33 GRAM(S): 1; 1 INJECTION, POWDER, FOR SOLUTION INTRAVENOUS at 05:47

## 2023-06-13 RX ADMIN — CHLORHEXIDINE GLUCONATE 1 APPLICATION(S): 213 SOLUTION TOPICAL at 14:05

## 2023-06-13 RX ADMIN — CARVEDILOL PHOSPHATE 12.5 MILLIGRAM(S): 80 CAPSULE, EXTENDED RELEASE ORAL at 17:14

## 2023-06-13 RX ADMIN — PANTOPRAZOLE SODIUM 40 MILLIGRAM(S): 20 TABLET, DELAYED RELEASE ORAL at 05:47

## 2023-06-13 RX ADMIN — Medication 300 UNIT(S): at 14:05

## 2023-06-13 RX ADMIN — Medication 81 MILLIGRAM(S): at 12:15

## 2023-06-13 RX ADMIN — CARVEDILOL PHOSPHATE 12.5 MILLIGRAM(S): 80 CAPSULE, EXTENDED RELEASE ORAL at 05:48

## 2023-06-13 RX ADMIN — MAGNESIUM OXIDE 400 MG ORAL TABLET 400 MILLIGRAM(S): 241.3 TABLET ORAL at 17:14

## 2023-06-13 RX ADMIN — Medication 1000 UNIT(S): at 12:15

## 2023-06-13 RX ADMIN — MAGNESIUM OXIDE 400 MG ORAL TABLET 400 MILLIGRAM(S): 241.3 TABLET ORAL at 08:18

## 2023-06-13 NOTE — PROGRESS NOTE ADULT - REASON FOR ADMISSION
sepsis,GIB

## 2023-06-13 NOTE — DISCHARGE NOTE NURSING/CASE MANAGEMENT/SOCIAL WORK - NSDCPEFALRISK_GEN_ALL_CORE
For information on Fall & Injury Prevention, visit: [email or web address] OR  [email or web address] OR  https://www.cdc.gov/steadi/patient.html

## 2023-06-13 NOTE — PROGRESS NOTE ADULT - PROBLEM SELECTOR PROBLEM 4
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

## 2023-06-13 NOTE — PROGRESS NOTE ADULT - ASSESSMENT
62M PMH HTN, HLD, CAD, S/p stents (in 2006 & 2018),afib not on AC per pt choice,  LAD), , right CEA (2019), rectal adenocarcinoma s/p JASON (2019), LAW with ileostomy complicated by leak (2020), anastomotic recurrence (2021) treated with APR rectum/sigmoid colon in 3/2022 with colostomy creation, now with biopsy proven recurrence 12/2022 last chemo with [facility] [place] in April/2023.   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    Pt came to  [facility] with black loose bm from colostomy bag since this AM. As per daughter, who is at bedside pt was having nonbloody output from colostomy yesterday. This morning reports melena via colostomy. Of note, pt was on Heparin sq ppx  while in Hospital and restarted on Plavix yesterday. Last dose this morning. Pt denies any prior EGDs in the past. Last colonoscopy 2 years ago. CT of abdomen and pelvis revealed Status post resection of fistulized small bowel and creation of an ileal conduit. No evidence of active intraluminal extravasation of contrast. Hemoglobin stable at 8.5gm, s, hematochezia. High wbc with lactic acidosis.      #GI bleed  Acute Blood Loss Anemia  s/p PRBC x 4  EGD with mild gastritis  Hgb improved, no bleeding  - monitor Hgb  - Transfuse PRBC x 1; target Hgb >8 given CAD  - Pantoprazole 40mg daily  - Resumed on antiplatelet; Switched to ASA in lieu of Clopidogrel.  monitor for bleeding    #Sepsis likely due to UTI    Afebrile with normalized WBC  Recent bladder/rectal abscess with prolonged antibiotic course  Blood cultures  (-), Urine Culture with 10-49K Candida  - Meropenem-Vaborbactam as per ID recs  - May resume home Levaquin and Metronidazole upon return home    #JAS (resolved)  #Hypokalemia  Plan:  Monitor renal function  Avoid nephrotoxic meds  Monitor K  Replete K    #Hypomagnesemia  Plan:  IV mag, resume po mag.recheck mag      #HTN, HLD, CAD, S/p stents (in 2006 & 2018),afib not on AC per pt choice, LAD)  - Resume ASA (in lieu of Clopidogrel as per discussion with Cardiolog), monitor for bleeding.  - BB, Statin    VTE Prophylaxis   - VCD      Discharge home

## 2023-06-13 NOTE — PROGRESS NOTE ADULT - SUBJECTIVE AND OBJECTIVE BOX
HOSPITALIST PROGRESS NOTE    PASTORA CHURCHILL  [id number]  62yMale    Patient is a 62y old  Male who presents with a chief complaint of sepsis,GIB (13 Jun 2023 09:25)      SUBJECTIVE:   Chart reviewed since last visit.  Patient seen and examined at bedside for GIB, ABLA  No further bleeding in ostomy      OBJECTIVE:  Vital Signs Last 24 Hrs  T(C): 36.7 (13 Jun 2023 07:42), Max: 36.9 (12 Jun 2023 18:38)  T(F): 98.1 (13 Jun 2023 07:42), Max: 98.4 (12 Jun 2023 18:38)  HR: 71 (13 Jun 2023 10:00) (71 - 91)  BP: 91/55 (13 Jun 2023 10:00) (91/55 - 149/72)  BP(mean): 64 (13 Jun 2023 10:00) (64 - 99)  RR: 16 (13 Jun 2023 10:00) (16 - 19)  SpO2: 99% (13 Jun 2023 10:00) (99% - 100%)    Parameters below as of 13 Jun 2023 10:00  Patient On (Oxygen Delivery Method): room air        PHYSICAL EXAMINATION  General: Middle aged male sitting up in bed, NAD  HEENT:  Pale conjunctiva  NECK:  Supple  CVS: regular rate and rhythm S1 S2  RESP:  CTAB  GI:  Midline scar healing well. Ostomy with dark green mush. Urostomy with yellow urine  : Vásquez with yellow urine  MSK:  FROM  CNS:  AOx3. Fluent speech, moves all extremities, follows commands  INTEG:  Healing abdominal wound  PSYCH:  Fair mood      MONITOR:  CAPILLARY BLOOD GLUCOSE            I&O's Summary    12 Jun 2023 07:01  -  13 Jun 2023 07:00  --------------------------------------------------------  IN: 877 mL / OUT: 2875 mL / NET: -1998 mL                            10.0   6.79  )-----------( 460      ( 13 Jun 2023 07:00 )             30.5       06-12    138  |  104  |  8.0  ----------------------------<  98  3.4<L>   |  24.0  |  0.68    Ca    7.6<L>      12 Jun 2023 07:00  Phos  2.4     06-13  Mg     2.0     06-13    TPro  4.4<L>  /  Alb  2.5<L>  /  TBili  0.4  /  DBili  x   /  AST  48<H>  /  ALT  29  /  AlkPhos  71  06-12            Culture:    TTE:    RADIOLOGY        MEDICATIONS  (STANDING):  ALPRAZolam 0.25 milliGRAM(s) Oral at bedtime  aspirin enteric coated 81 milliGRAM(s) Oral daily  atorvastatin 40 milliGRAM(s) Oral at bedtime  carvedilol 12.5 milliGRAM(s) Oral every 12 hours  chlorhexidine 2% Cloths 1 Application(s) Topical daily  cholecalciferol 1000 Unit(s) Oral daily  magnesium oxide 400 milliGRAM(s) Oral two times a day with meals  meropenem/vaborbactam IVPB 4 Gram(s) IV Intermittent every 8 hours  pantoprazole    Tablet 40 milliGRAM(s) Oral before breakfast      MEDICATIONS  (PRN):  acetaminophen     Tablet .. 650 milliGRAM(s) Oral every 6 hours PRN Temp greater or equal to 38C (100.4F), Mild Pain (1 - 3)  aluminum hydroxide/magnesium hydroxide/simethicone Suspension 30 milliLiter(s) Oral every 4 hours PRN Dyspepsia  melatonin 3 milliGRAM(s) Oral at bedtime PRN Insomnia  ondansetron Injectable 4 milliGRAM(s) IV Push every 8 hours PRN Nausea and/or Vomiting

## 2023-06-13 NOTE — PROGRESS NOTE ADULT - PROVIDER SPECIALTY LIST ADULT
Heme/Onc
Heme/Onc
Hospitalist
Infectious Disease
Gastroenterology
Heme/Onc
Heme/Onc
Infectious Disease
Hospitalist
Hospitalist
Infectious Disease
Infectious Disease
Gastroenterology
Hospitalist
Hospitalist
Gastroenterology

## 2023-06-13 NOTE — DISCHARGE NOTE NURSING/CASE MANAGEMENT/SOCIAL WORK - PATIENT PORTAL LINK FT
You can access the FollowMyHealth Patient Portal offered by [facility] by registering at the following website: [email or web address]. By joining [employer]’s FollowMyHealth portal, you will also be able to view your health information using other applications (apps) compatible with our system.

## 2023-06-13 NOTE — PROGRESS NOTE ADULT - ASSESSMENT
62M PMH HTN, HLD, CAD, S/p stents (in 2006 & 2018),afib not on AC per pt choice,  LAD), , right CEA (2019), rectal adenocarcinoma s/p JASON (2019), LAW with ileostomy complicated by leak (2020), anastomotic recurrence (2021) treated with APR rectum/sigmoid colon in 3/2022 with colostomy creation, now with biopsy proven recurrence 12/2022 last chemo with [facility] in MAY 2023    1) Rectal cancer  as above  last 5FU + Panitumumab infusion was on 5/11/2023  all treatment on hold inpatient  recently underwent exploratory enterolysis, SB resection and anastomosis, cystectomy and ileal conduit creation on 5/27 at [facility] [place]      2) Sepsis/C diff /UTI  w/u in progress  blood cx negative   on meropenem/Vaborbactam   management as per ID and primary team. PLAN FOR po ABX UPON dc    3) GIB/Anemia  s/p 2u PRBC  hGB 7.9 today, stable  s/p EGD 6/9 - essentially normal ; no bleeding site  plavix stopped ; now on ASA  bleeding likely from anastomotic site; to f/u with [facility] surgery as outpt   no further s/s of bleeding. He is feeling much better and says that he is back to baseline.    4) DVT ppx  SCD    further mx as per primary team  will update [facility] 62M PMH HTN, HLD, CAD, S/p stents (in 2006 & 2018),afib not on AC per pt choice,  LAD), , right CEA (2019), rectal adenocarcinoma s/p JASON (2019), LAW with ileostomy complicated by leak (2020), anastomotic recurrence (2021) treated with APR rectum/sigmoid colon in 3/2022 with colostomy creation, now with biopsy proven recurrence 12/2022 last chemo with [facility] in MAY 2023    1) Rectal cancer  as above  last 5FU + Panitumumab infusion was on 5/11/2023  all treatment on hold inpatient  recently underwent exploratory enterolysis, SB resection and anastomosis, cystectomy and ileal conduit creation on 5/27 at [facility] [place]      2) Sepsis/C diff /UTI  w/u in progress  blood cx negative   on meropenem/Vaborbactam   management as per ID and primary team. PLAN FOR po ABX UPON dc    3) GIB/Anemia  s/p 2u PRBC  hGB 7.9 today, stable  s/p EGD 6/9 - essentially normal ; no bleeding site  plavix stopped ; now on ASA  bleeding likely from anastomotic site; to f/u with [facility] surgery as outpt   no further s/s of bleeding. He is feeling much better and says that he is back to baseline.  s/p 1u PRBC, hgb 10 this am    4) DVT ppx  SCD    further mx as per primary team, possibLe DC today  will update [facility]

## 2023-06-13 NOTE — PROGRESS NOTE ADULT - SUBJECTIVE AND OBJECTIVE BOX
Preventive Health Recommendations  Female Ages 26 - 39  Yearly exam:   See your health care provider every year in order to    Review health changes.     Discuss preventive care.      Review your medicines if you your doctor has prescribed any.    Until age 30: Get a Pap test every three years (more often if you have had an abnormal result).    After age 30: Talk to your doctor about whether you should have a Pap test every 3 years or have a Pap test with HPV screening every 5 years.   You do not need a Pap test if your uterus was removed (hysterectomy) and you have not had cancer.  You should be tested each year for STDs (sexually transmitted diseases), if you're at risk.   Talk to your provider about how often to have your cholesterol checked.  If you are at risk for diabetes, you should have a diabetes test (fasting glucose).  Shots: Get a flu shot each year. Get a tetanus shot every 10 years.   Nutrition:     Eat at least 5 servings of fruits and vegetables each day.    Eat whole-grain bread, whole-wheat pasta and brown rice instead of white grains and rice.    Get adequate Calcium and Vitamin D.     Lifestyle    Exercise at least 150 minutes a week (30 minutes a day, 5 days of the week). This will help you control your weight and prevent disease.    Limit alcohol to one drink per day.    No smoking.     Wear sunscreen to prevent skin cancer.    See your dentist every six months for an exam and cleaning.     63 [place] [facility] [place] 5FU + Panitumumab last on 5/11/2023, recently s/p exploratory enterolysis, SB resection and anastomosis, cystectomy and ileal conduit creation on 5/27, PMH HTN, HLD, CAD, S/p stents (in 2006 & 2018),  LAD), HTN, HLD, right CEA (2019), rectal adenocarcinoma s/p JASON (2019), LAW with ileostomy complicated by leak (2020), anastomotic recurrence (2021) treated with APR rectum/sigmoid colon in 3/2022 with colostomy creation, now with biopsy proven recurrence 12/2022 last chemo with [facility] [place] in April/2023. 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    Pt came to  [facility] with black loose bm from colostomy bag since this AM. As per daughter, who is at bedside pt was having nonbloody output from colostomy yesterday. This morning reports melena via colostomy. Of note, pt was on Heparin sq ppx  while in Hospital and restarted on Plavix yesterday. Last dose this morning. Pt denies any prior EGDs in the past. Last colonoscopy 2 years ago. CT of abdomen and pelvis revealed Status post resection of fistulized small bowel and creation of an ileal conduit. No evidence of active intraluminal extravasation of contrast. Hemoglobin stable at 8.5gm, currently receiving 1u PRBC. Denies nausea, vomiting, abdominal pain, fever, chills, chest pain, shortness of breath, hematemesis, hematochezia. High wbc with lactic acidosis.    s/p EGD  - no bleeding site identified     6/13  no N/V  no bleed from ostomy or ileal conduit. Dark stool in ostomy bag.  No pain. He is able to ambulate without difficulty. No fever.      MEDICATIONS  (STANDING):  ALPRAZolam 0.25 milliGRAM(s) Oral at bedtime  aspirin enteric coated 81 milliGRAM(s) Oral daily  atorvastatin 40 milliGRAM(s) Oral at bedtime  carvedilol 12.5 milliGRAM(s) Oral every 12 hours  chlorhexidine 2% Cloths 1 Application(s) Topical daily  cholecalciferol 1000 Unit(s) Oral daily  magnesium oxide 400 milliGRAM(s) Oral two times a day with meals  meropenem/vaborbactam IVPB 4 Gram(s) IV Intermittent every 8 hours  pantoprazole    Tablet 40 milliGRAM(s) Oral before breakfast    MEDICATIONS  (PRN):  acetaminophen     Tablet .. 650 milliGRAM(s) Oral every 6 hours PRN Temp greater or equal to 38C (100.4F), Mild Pain (1 - 3)  aluminum hydroxide/magnesium hydroxide/simethicone Suspension 30 milliLiter(s) Oral every 4 hours PRN Dyspepsia  melatonin 3 milliGRAM(s) Oral at bedtime PRN Insomnia  ondansetron Injectable 4 milliGRAM(s) IV Push every 8 hours PRN Nausea and/or Vomiting      ICU Vital Signs Last 24 Hrs  T(C): 36.7 (13 Jun 2023 07:42), Max: 36.9 (12 Jun 2023 18:38)  T(F): 98.1 (13 Jun 2023 07:42), Max: 98.4 (12 Jun 2023 18:38)  HR: 77 (13 Jun 2023 06:00) (71 - 91)  BP: 149/72 (13 Jun 2023 06:00) (99/62 - 149/72)  BP(mean): 93 (13 Jun 2023 06:00) (71 - 99)  ABP: --  ABP(mean): --  RR: 16 (13 Jun 2023 06:00) (16 - 19)  SpO2: 100% (13 Jun 2023 06:00) (99% - 100%)    O2 Parameters below as of 13 Jun 2023 06:00  Patient On (Oxygen Delivery Method): room air            GEN - NAD  HEENT - NCAT, EOMI, DARYN,  RESP - CTA BL, no wheeze//rhonchi/crackles. not on supplemental O2.  CARDIO - NS1S2, RRR. No murmurs  ABD - Soft/Non tender/Non distended. +bs. colostomy bag-black stool.incision clear-healing well,mild tender.. ileal conduit present,castillo bag clear urine,rectal pinr ose prsent  Ext - No GLO. no signs of venous/arterial stasis ulcers  MSK - full ROM of BL upper and lower extremities without pain or restriction. BL 5/5 strength on upper and lower extremities.   Neuro - cn 2-12 grossly intact.  gait not observed.   Psych- AAOx3. attentive. normal affect.      Labs:                        10.0   6.79  )-----------( 460      ( 13 Jun 2023 07:00 )             30.5                           7.9    7.17  )-----------( 425      ( 12 Jun 2023 07:00 )             24.0                           8.0    8.79  )-----------( 464      ( 11 Jun 2023 05:34 )             23.9                           8.6    11.45 )-----------( 533      ( 10 Eddy 2023 15:11 )             25.6                           7.2   10.06 )-----------( 604      ( 09 Jun 2023 07:05 )             22.6     06-12    138  |  104  |  8.0  ----------------------------<  98  3.4<L>   |  24.0  |  0.68    Ca    7.6<L>      12 Jun 2023 07:00  Phos  2.4     06-13  Mg     2.0     06-13    TPro  4.4<L>  /  Alb  2.5<L>  /  TBili  0.4  /  DBili  x   /  AST  48<H>  /  ALT  29  /  AlkPhos  71  06-12    06-12    138  |  104  |  8.0  ----------------------------<  98  3.4<L>   |  24.0  |  0.68    Ca    7.6<L>      12 Jun 2023 07:00  Phos  2.4     06-12  Mg     1.6     06-12    TPro  4.4<L>  /  Alb  2.5<L>  /  TBili  0.4  /  DBili  x   /  AST  48<H>  /  ALT  29  /  AlkPhos  71  06-12 06-09    136  |  106  |  12.0  ----------------------------<  117<H>  3.7   |  20.0<L>  |  0.90    Ca    7.8<L>      09 Jun 2023 07:05  Mg     2.2     06-08    TPro  4.5<L>  /  Alb  2.4<L>  /  TBili  0.4  /  DBili  x   /  AST  9   /  ALT  <5  /  AlkPhos  84  06-09

## 2023-06-14 ENCOUNTER — NON-APPOINTMENT (OUTPATIENT)
Age: 63
End: 2023-06-14

## 2023-06-21 ENCOUNTER — NON-APPOINTMENT (OUTPATIENT)
Age: 63
End: 2023-06-21

## 2023-06-22 NOTE — CDI QUERY NOTE - NSCDIOTHERTXTBX_GEN_ALL_CORE_HH
Patient with diagnosis of sepsis and UTI, documentation noted castillo catheter in place "( Pt arrived with castillo)".    Can you clarify if the there is a cause and effect relationship between the Sepsis and UTI and castillo catheter.  - Sepsis and UTI related to/due to castillo catheter, present on admission  - Sepsis and UTI not related to/not due to castillo catheter  - Other, please specify  - Not clinically significant     Supporting Documentation:  H&P Adult [Charted Location: [place] [id number]] [Authored: 08-Jun-2023 15:27]-   Sepsis unclear etiology r/o cdif,UTI  Recent bladder/rectal absces with prolong abx cource  hx rectal adenocarcinoma on chemo,last chemo April  -leukocytosis,thrombocytosis likely from infection  -Blood c/s,urine c/s  -stool cdiff  -iv zosyn,vanco per ID.F/trough adjust dose  -repeat LA. S/P 2.5 L BOLUS ED,continue ivf  -f/u cbc,lactic acid  -reviewed ct abd/pelvis/chest    Progress Note Adult-Hospitalist Attending [Charted Location: [address] [address]] [Authored: 09-Jun-2023 08:35]-    - castillo cath in place (pt arrived with castillo)

## 2023-06-29 NOTE — CHART NOTE - NSCHARTNOTEFT_GEN_A_CORE
RESPONSE TO CDI QUERY       CDI Clarification 1:  Patient with diagnosis of sepsis and UTI, documentation noted castillo catheter in place "( Pt arrived with castillo)".    Can you clarify if the there is a cause and effect relationship between the Sepsis and UTI and castillo catheter.  - Sepsis and UTI related to/due to castillo catheter, present on admission  - Sepsis and UTI not related to/not due to castillo catheter  - Other, please specify  - Not clinically significant             CLARIFICATION    Patient with complex medical and surgical history.   Sepsis present at admission.    CT < from: CT Abdomen and Pelvis w/wo IV Cont (06.08.23 @ 12:36) >    There is a residual peripherally enhancing   rectal mass with a pigtail drainage catheter extending to the anal canal.   There are minimal bubbles of gas in the pelvis at the resection site. No   drainable collection is identified within the peritoneal cavity.    < end of copied text >            Most likely the sepsis is due to rectal origin; however it cannot be established with certainty if the sepsis is not from CAUTI from Castillo (which was also present at admission) or UTI from Ileal Conduit.

## 2023-07-02 ENCOUNTER — NON-APPOINTMENT (OUTPATIENT)
Age: 63
End: 2023-07-02

## 2023-07-04 ENCOUNTER — NON-APPOINTMENT (OUTPATIENT)
Age: 63
End: 2023-07-04

## 2023-07-16 ENCOUNTER — NON-APPOINTMENT (OUTPATIENT)
Age: 63
End: 2023-07-16

## 2023-07-19 NOTE — ED ADULT TRIAGE NOTE - GLASGOW COMA SCALE: BEST MOTOR RESPONSE, MLM
Ok to update order to STAT and inform pt, please. Thank you for taking care of this!    (M6) obeys commands

## 2023-07-30 ENCOUNTER — NON-APPOINTMENT (OUTPATIENT)
Age: 63
End: 2023-07-30

## 2023-08-10 NOTE — ED ADULT TRIAGE NOTE - ARRIVAL FROM
Patient: Ashia Bass    Procedure: Procedure(s):  ROBOTIC GUIDED CARBON DIOXIDE LASER EXCISION AND VAPORIZATION OF ENDOMETRIOSIS, EXCISION OF LESIONS FROM SIGMOID COLON AND RECTUM  BILATERAL OVARIAN CYSTECTOMIES  LYSIS OF ADHESIONS, ENTEROLYSIS, LEFT URETEROLYSIS, ADHESION PREVENTION MEASURES INCLUDING LEFT OVARIAN SUSPENSION       Anesthesia Type:  General    Note:  Disposition: Outpatient   Postop Pain Control: Uneventful            Sign Out: Well controlled pain   PONV: No   Neuro/Psych: Uneventful            Sign Out: Acceptable/Baseline neuro status   Airway/Respiratory: Uneventful            Sign Out: Acceptable/Baseline resp. status   CV/Hemodynamics: Uneventful            Sign Out: Acceptable CV status; No obvious hypovolemia; No obvious fluid overload   Other NRE: NONE   DID A NON-ROUTINE EVENT OCCUR? No           Last vitals:  Vitals Value Taken Time   /58 08/10/23 1515   Temp 36.6  C (97.8  F) 08/10/23 1435   Pulse 71 08/10/23 1527   Resp 12 08/10/23 1527   SpO2 100 % 08/10/23 1527   Vitals shown include unvalidated device data.    Electronically Signed By: SHANTEL MENA MD  August 10, 2023  3:29 PM  
Home

## 2023-08-16 ENCOUNTER — NON-APPOINTMENT (OUTPATIENT)
Age: 63
End: 2023-08-16

## 2023-08-18 ENCOUNTER — INPATIENT (INPATIENT)
Facility: HOSPITAL | Age: 63
LOS: 2 days | Discharge: HOME | DRG: 809 | End: 2023-08-21
Attending: STUDENT IN AN ORGANIZED HEALTH CARE EDUCATION/TRAINING PROGRAM | Admitting: STUDENT IN AN ORGANIZED HEALTH CARE EDUCATION/TRAINING PROGRAM
Payer: COMMERCIAL

## 2023-08-18 VITALS
TEMPERATURE: 99 F | RESPIRATION RATE: 20 BRPM | OXYGEN SATURATION: 99 % | HEART RATE: 102 BPM | WEIGHT: 193.12 LBS | DIASTOLIC BLOOD PRESSURE: 79 MMHG | HEIGHT: 74 IN | SYSTOLIC BLOOD PRESSURE: 145 MMHG

## 2023-08-18 DIAGNOSIS — Z98.890 OTHER SPECIFIED POSTPROCEDURAL STATES: Chronic | ICD-10-CM

## 2023-08-18 DIAGNOSIS — D70.9 NEUTROPENIA, UNSPECIFIED: ICD-10-CM

## 2023-08-18 LAB
ALBUMIN SERPL ELPH-MCNC: 2.8 G/DL — LOW (ref 3.3–5.2)
ALP SERPL-CCNC: 98 U/L — SIGNIFICANT CHANGE UP (ref 40–120)
ALT FLD-CCNC: 21 U/L — SIGNIFICANT CHANGE UP
ANION GAP SERPL CALC-SCNC: 14 MMOL/L — SIGNIFICANT CHANGE UP (ref 5–17)
APPEARANCE UR: ABNORMAL
AST SERPL-CCNC: 31 U/L — SIGNIFICANT CHANGE UP
BACTERIA # UR AUTO: ABNORMAL
BASE EXCESS BLDV CALC-SCNC: -2 MMOL/L — SIGNIFICANT CHANGE UP (ref -2–3)
BILIRUB SERPL-MCNC: 1.3 MG/DL — SIGNIFICANT CHANGE UP (ref 0.4–2)
BILIRUB UR-MCNC: NEGATIVE — SIGNIFICANT CHANGE UP
BUN SERPL-MCNC: 11.4 MG/DL — SIGNIFICANT CHANGE UP (ref 8–20)
CA-I SERPL-SCNC: 1.06 MMOL/L — LOW (ref 1.15–1.33)
CALCIUM SERPL-MCNC: 7.9 MG/DL — LOW (ref 8.4–10.5)
CHLORIDE BLDV-SCNC: 101 MMOL/L — SIGNIFICANT CHANGE UP (ref 96–108)
CHLORIDE SERPL-SCNC: 97 MMOL/L — SIGNIFICANT CHANGE UP (ref 96–108)
CO2 SERPL-SCNC: 21 MMOL/L — LOW (ref 22–29)
COLOR SPEC: YELLOW — SIGNIFICANT CHANGE UP
CREAT SERPL-MCNC: 0.96 MG/DL — SIGNIFICANT CHANGE UP (ref 0.5–1.3)
DIFF PNL FLD: ABNORMAL
EGFR: 88 ML/MIN/1.73M2 — SIGNIFICANT CHANGE UP
EPI CELLS # UR: SIGNIFICANT CHANGE UP
GAS PNL BLDV: 129 MMOL/L — LOW (ref 136–145)
GAS PNL BLDV: SIGNIFICANT CHANGE UP
GLUCOSE BLDV-MCNC: 119 MG/DL — HIGH (ref 70–99)
GLUCOSE SERPL-MCNC: 118 MG/DL — HIGH (ref 70–99)
GLUCOSE UR QL: NEGATIVE — SIGNIFICANT CHANGE UP
HCO3 BLDV-SCNC: 23 MMOL/L — SIGNIFICANT CHANGE UP (ref 22–29)
HCT VFR BLD CALC: 24.1 % — LOW (ref 39–50)
HCT VFR BLDA CALC: 26 % — SIGNIFICANT CHANGE UP
HGB BLD CALC-MCNC: 8.7 G/DL — LOW (ref 12.6–17.4)
HGB BLD-MCNC: 8.3 G/DL — LOW (ref 13–17)
KETONES UR-MCNC: NEGATIVE — SIGNIFICANT CHANGE UP
LACTATE BLDV-MCNC: 1.2 MMOL/L — SIGNIFICANT CHANGE UP (ref 0.5–2)
LEUKOCYTE ESTERASE UR-ACNC: ABNORMAL
MAGNESIUM SERPL-MCNC: 1.3 MG/DL — LOW (ref 1.6–2.6)
MCHC RBC-ENTMCNC: 32.5 PG — SIGNIFICANT CHANGE UP (ref 27–34)
MCHC RBC-ENTMCNC: 34.4 GM/DL — SIGNIFICANT CHANGE UP (ref 32–36)
MCV RBC AUTO: 94.5 FL — SIGNIFICANT CHANGE UP (ref 80–100)
NITRITE UR-MCNC: POSITIVE
PCO2 BLDV: 38 MMHG — LOW (ref 42–55)
PH BLDV: 7.39 — SIGNIFICANT CHANGE UP (ref 7.32–7.43)
PH UR: 9 — HIGH (ref 5–8)
PLATELET # BLD AUTO: 185 K/UL — SIGNIFICANT CHANGE UP (ref 150–400)
PO2 BLDV: 70 MMHG — HIGH (ref 25–45)
POTASSIUM BLDV-SCNC: 3.5 MMOL/L — SIGNIFICANT CHANGE UP (ref 3.5–5.1)
POTASSIUM SERPL-MCNC: 3.6 MMOL/L — SIGNIFICANT CHANGE UP (ref 3.5–5.3)
POTASSIUM SERPL-SCNC: 3.6 MMOL/L — SIGNIFICANT CHANGE UP (ref 3.5–5.3)
PROT SERPL-MCNC: 5.4 G/DL — LOW (ref 6.6–8.7)
PROT UR-MCNC: 500 MG/DL
RBC # BLD: 2.55 M/UL — LOW (ref 4.2–5.8)
RBC # FLD: 15.2 % — HIGH (ref 10.3–14.5)
RBC CASTS # UR COMP ASSIST: ABNORMAL /HPF (ref 0–4)
SAO2 % BLDV: 97 % — SIGNIFICANT CHANGE UP
SODIUM SERPL-SCNC: 131 MMOL/L — LOW (ref 135–145)
SP GR SPEC: 1.01 — SIGNIFICANT CHANGE UP (ref 1.01–1.02)
UROBILINOGEN FLD QL: NEGATIVE — SIGNIFICANT CHANGE UP
WBC # BLD: 1.35 K/UL — LOW (ref 3.8–10.5)
WBC # FLD AUTO: 1.35 K/UL — LOW (ref 3.8–10.5)
WBC UR QL: SIGNIFICANT CHANGE UP /HPF (ref 0–5)

## 2023-08-18 PROCEDURE — 93010 ELECTROCARDIOGRAM REPORT: CPT

## 2023-08-18 PROCEDURE — 99497 ADVNCD CARE PLAN 30 MIN: CPT | Mod: 25

## 2023-08-18 PROCEDURE — 99223 1ST HOSP IP/OBS HIGH 75: CPT

## 2023-08-18 PROCEDURE — 99285 EMERGENCY DEPT VISIT HI MDM: CPT

## 2023-08-18 RX ORDER — CHOLECALCIFEROL (VITAMIN D3) 125 MCG
2000 CAPSULE ORAL DAILY
Refills: 0 | Status: DISCONTINUED | OUTPATIENT
Start: 2023-08-18 | End: 2023-08-21

## 2023-08-18 RX ORDER — MINOCYCLINE HYDROCHLORIDE 45 MG/1
100 TABLET, EXTENDED RELEASE ORAL
Refills: 0 | Status: DISCONTINUED | OUTPATIENT
Start: 2023-08-18 | End: 2023-08-21

## 2023-08-18 RX ORDER — SIMVASTATIN 20 MG/1
40 TABLET, FILM COATED ORAL AT BEDTIME
Refills: 0 | Status: DISCONTINUED | OUTPATIENT
Start: 2023-08-18 | End: 2023-08-21

## 2023-08-18 RX ORDER — VANCOMYCIN HCL 1 G
VIAL (EA) INTRAVENOUS
Refills: 0 | Status: DISCONTINUED | OUTPATIENT
Start: 2023-08-18 | End: 2023-08-19

## 2023-08-18 RX ORDER — ONDANSETRON 8 MG/1
4 TABLET, FILM COATED ORAL EVERY 8 HOURS
Refills: 0 | Status: DISCONTINUED | OUTPATIENT
Start: 2023-08-18 | End: 2023-08-21

## 2023-08-18 RX ORDER — METRONIDAZOLE 500 MG
1 TABLET ORAL
Qty: 0 | Refills: 0 | DISCHARGE

## 2023-08-18 RX ORDER — ALPRAZOLAM 0.25 MG
0.25 TABLET ORAL AT BEDTIME
Refills: 0 | Status: DISCONTINUED | OUTPATIENT
Start: 2023-08-18 | End: 2023-08-21

## 2023-08-18 RX ORDER — POTASSIUM CHLORIDE 20 MEQ
2 PACKET (EA) ORAL
Refills: 0 | DISCHARGE

## 2023-08-18 RX ORDER — LEVOFLOXACIN 5 MG/ML
1 INJECTION, SOLUTION INTRAVENOUS
Qty: 0 | Refills: 0 | DISCHARGE

## 2023-08-18 RX ORDER — CHOLECALCIFEROL (VITAMIN D3) 125 MCG
1 CAPSULE ORAL
Qty: 0 | Refills: 0 | DISCHARGE

## 2023-08-18 RX ORDER — POTASSIUM CHLORIDE 20 MEQ
10 PACKET (EA) ORAL
Refills: 0 | Status: COMPLETED | OUTPATIENT
Start: 2023-08-18 | End: 2023-08-18

## 2023-08-18 RX ORDER — FUROSEMIDE 40 MG
40 TABLET ORAL DAILY
Refills: 0 | Status: DISCONTINUED | OUTPATIENT
Start: 2023-08-18 | End: 2023-08-20

## 2023-08-18 RX ORDER — PIPERACILLIN AND TAZOBACTAM 4; .5 G/20ML; G/20ML
3.38 INJECTION, POWDER, LYOPHILIZED, FOR SOLUTION INTRAVENOUS EVERY 8 HOURS
Refills: 0 | Status: DISCONTINUED | OUTPATIENT
Start: 2023-08-18 | End: 2023-08-19

## 2023-08-18 RX ORDER — CARVEDILOL PHOSPHATE 80 MG/1
6.25 CAPSULE, EXTENDED RELEASE ORAL EVERY 12 HOURS
Refills: 0 | Status: DISCONTINUED | OUTPATIENT
Start: 2023-08-18 | End: 2023-08-21

## 2023-08-18 RX ORDER — CARVEDILOL PHOSPHATE 80 MG/1
2 CAPSULE, EXTENDED RELEASE ORAL
Refills: 0 | DISCHARGE

## 2023-08-18 RX ORDER — LANOLIN ALCOHOL/MO/W.PET/CERES
3 CREAM (GRAM) TOPICAL AT BEDTIME
Refills: 0 | Status: DISCONTINUED | OUTPATIENT
Start: 2023-08-18 | End: 2023-08-21

## 2023-08-18 RX ORDER — SIMVASTATIN 20 MG/1
1 TABLET, FILM COATED ORAL
Refills: 0 | DISCHARGE

## 2023-08-18 RX ORDER — ENOXAPARIN SODIUM 100 MG/ML
40 INJECTION SUBCUTANEOUS EVERY 24 HOURS
Refills: 0 | Status: DISCONTINUED | OUTPATIENT
Start: 2023-08-18 | End: 2023-08-21

## 2023-08-18 RX ORDER — ACETAMINOPHEN 500 MG
650 TABLET ORAL EVERY 6 HOURS
Refills: 0 | Status: DISCONTINUED | OUTPATIENT
Start: 2023-08-18 | End: 2023-08-21

## 2023-08-18 RX ADMIN — Medication 100 MILLIEQUIVALENT(S): at 23:13

## 2023-08-18 RX ADMIN — Medication 100 MILLIEQUIVALENT(S): at 22:14

## 2023-08-18 RX ADMIN — Medication 100 MILLIEQUIVALENT(S): at 21:10

## 2023-08-18 NOTE — ED PROVIDER NOTE - NSICDXPASTMEDICALHX_GEN_ALL_CORE_FT
PAST MEDICAL HISTORY:  CAD (coronary artery disease)     Depression with rectal cancer    H/O carotid stenosis     H/O renal calculi     HLD (hyperlipidemia)     HTN (hypertension)     DANO (iron deficiency anemia)     Mitral regurgitation     Neuropathy feet s/p chemo    Rectal cancer 2/2019 s/p chemo and radiation; recurrence of cancer 2022    Splenic artery aneurysm     Stented coronary artery circumflex 2005 LAD 2017 x4    Umbilical hernia      no JVD/supple

## 2023-08-18 NOTE — CONSULT NOTE ADULT - ASSESSMENT
62 year old M, PMH of HTN, HLD, CAD, Rectal Adenocarcinoma status post LAR in 2020 and APR in 2022, locally recurrent and progressive rectal cancer to bladder and prostate, last seen by CRS at [facility] for neutropenic sepsis status post salvage chemo with FOLFIRI and Avastin at [facility], last chemotherapy on Thursday, was sent in from [facility]'s office at [facility] with suspected neutropenic fevers, surgery reconsulted to evaluate as outpatient imaging at [facility] showed a known presacral mass, thick rim enhancement and central hypoattenuation, with interval development of internal gas foci, measuring 6.9 x 5.5 cm, previously 6.7 x 5.4 cm. As on prior study, mass is difficult to separate from the seminal vesicles, posterior aspects of the prostate gland and bladder wall. Unchanged drain position within the mass. Unchanged diffuse thickening of the bladder wall with pericystic fat stranding, probably cystitis. Trace amount of air within the non-dependent portions of the bladder lumen, possibly due to recent instrumentation versus result of fistulization with posteriorly adjacent mass.  He was also noted to have a white count of 1.4.  Surgery at [facility] was asked to evaluate.      Recommendations:   Medicine evaluation for neutropenic fevers   No acute surgical intervention at this time

## 2023-08-18 NOTE — H&P ADULT - NSICDXFAMILYHX_GEN_ALL_CORE_FT
FAMILY HISTORY:  FH: heart disease, father    Mother  Still living? No  FH: ALS (amyotrophic lateral sclerosis), Age at diagnosis: Age Unknown

## 2023-08-18 NOTE — H&P ADULT - REASON FOR ADMISSION
Patient presented from [facility] for admission and further workup of suggested neutropenic fever. Neutropenic Fever

## 2023-08-18 NOTE — ED PROVIDER NOTE - PHYSICAL EXAMINATION
General: Well appearing in no acute distress, alert and cooperative  Head: Normocephalic, atraumatic  Eyes: PERRLA, no conjunctival injection, no scleral icterus, EOMI  ENMT: Atraumatic external nose and ears, moist mucous membranes, oropharynx clear, no sores visualized  Neck: Soft and supple, full ROM without pain, no midline tenderness, no thyromegaly,  no lymphadenopathy  Cardiac: Regular rate and regular rhythm, no murmurs, peripheral pulses 2+ and symmetric in all extremities, no LE edema.  Resp: Unlabored respiratory effort, lungs CTAB, speaking in full sentences, no wheezes  Abd: Soft, non-tender, non-distended, no guarding or rebound. Colostomy intact in LLQ and urostomy bag intact in RLQ, no erythema or ttp, draining  MSK: Spine midline and non-tender, no CVA tenderness   Skin: Warm and dry, chemoport is intact with no erythema, no ttp.   Neuro: AO x 3, moves all extremities symmetrically,

## 2023-08-18 NOTE — ED ADULT NURSE NOTE - CHIEF COMPLAINT QUOTE
Pt sent in from [place] for neutropenia for IV antibiotics. Pt's daughter states that they found free air around the tumor.

## 2023-08-18 NOTE — ED ADULT TRIAGE NOTE - CHIEF COMPLAINT QUOTE
Pt sent in from [place] for neutropenia for IV antibiotics. Pt's daughter states that they found free air around the tumor.

## 2023-08-18 NOTE — ED ADULT TRIAGE NOTE - GLASGOW COMA SCALE: BEST VERBAL RESPONSE, MLM
Per RN patient with temp of 102.6. Will obtain repeat blood cultures. Change Rocephin to IV cefepime for now. Prior sputum cultures reviewed and they have grown mixed respiratory jaylen. No evidence of pseudomonal growth. If clinically not improving & continues to have fevers may need CT chest for better evaluation. (V5) oriented

## 2023-08-18 NOTE — H&P ADULT - NSHPREVIEWOFSYSTEMS_GEN_ALL_CORE
REVIEW OF SYSTEMS:  CONSTITUTIONAL:  No weakness, fevers or chills  HEENT:  No diplopia or blurred vision.  No vertigo. No earache, sore throat or runny nose.  No neck pain or stiffness  CARDIOVASCULAR:  No pressure, chest pain about the chest, neck, axilla or epigastrium. No palpitations.  RESPIRATORY:  No cough, wheezing, hemoptysis. No shortness of breath  GASTROINTESTINAL:  +diarrhea. No nausea, vomiting,  GENITOURINARY:  No dysuria, frequency or hematuria.  MUSCULOSKELETAL:  no joint aches, no muscle pain  SKIN: No itching, burning, rashes, or lesions. No change in skin, hair or nails.  NEUROLOGIC:  No headaches, seizures, numbness or weakness.  PSYCHIATRIC:  No disorder of thought or mood.  ENDOCRINE:  No heat or cold intolerance  HEMATOLOGICAL:  No easy bruising or bleeding.  All other review of systems is negative unless indicated above.

## 2023-08-18 NOTE — ED ADULT NURSE NOTE - OBJECTIVE STATEMENT
pt presents to ed from [place] for iv abx.  pt with his rectal ca with mets.  on active chemo.  last treatment last thursday.  pt supposed to receive next treatment on thursday.  on an every other week schedule.  ileostomy and colostomy in place.  pt reports finding free air around tumor and was advised to come to ed for abx.  pt wife reports decreased po intake.  NAD at this time.

## 2023-08-18 NOTE — ED PROVIDER NOTE - OBJECTIVE STATEMENT
Patient is 63 y/o male with sig pmh of rectal carcinoma presenting with concern for neutropenic fever. Patient was at [facility] [place] but had an episode of fever 100.6 and labs showed WBC 1.4 and ANC 0.5. Given a dose of zosyn and sent to ED here. Per family and patient, he has been feeling unwell since chemo session last week. endorses chills, fevers, diarrhea, loss of appetite, and nausea. typically tolerates chemo well so this was abnormal. restarted chemo last month after a surgical procedure for fistula development.

## 2023-08-18 NOTE — H&P ADULT - NSHPLABSRESULTS_GEN_ALL_CORE
8/18/23 CT abd/pelvis with contrast obtained at [facility]  IMPRESSION: Since July 24, 2023,                 1.  Unchanged size of presacral mass with development of internal gas                        foci, possibly from necrosis or superimposed infection. Unchanged drain  	position within the mass.                  2.  Unchanged circumferential wall thickening of the urinary bladder with  	surrounding fat stranding, probably cystitis. In addition, new trace  	amount of air within the non-dependent portions of the bladder lumen,  	possibly due to recent instrumentation versus result of fistulization with  	posteriorly adjacent mass.                          8.3    1.35  )-----------( 185      ( 18 Aug 2023 21:11 )             24.1   08-18    131<L>  |  97  |  11.4  ----------------------------<  118<H>  3.6   |  21.0<L>  |  0.96    Ca    7.9<L>      18 Aug 2023 21:11  Mg     1.3     08-18    TPro  5.4<L>  /  Alb  2.8<L>  /  TBili  1.3  /  DBili  x   /  AST  31  /  ALT  21  /  AlkPhos  98  08-18

## 2023-08-18 NOTE — H&P ADULT - NSHPPHYSICALEXAM_GEN_ALL_CORE
CONSTITUTIONAL: lying in bed, NAD,  EYES: PERRLA and symmetric, EOMI, No conjunctival or scleral injection  ENMT: Oral mucosa with moist membranes  NECK: Supple, symmetric and without tracheal deviation   RESP: No respiratory distress, no use of accessory muscles. CTA B/L. No wheezing, rhonchi, rales.  CV: RRR, +S1/S2, no JVD. +3 pitting edema B/L lower extremities  GI:  +colostomy LLQ. Soft, nontender, no rebound, no guarding; no palpable masses; no hepatosplenomegaly.  : RLQ ileal conduit in place draining clear yellow urine.  MSK: No digital clubbing or cyanosis  SKIN: No rashes or ulcers noted  NEURO: CN II-XII intact; normal reflexes in upper and lower extremities, sensation intact in upper and lower extremities b/l to light touch   PSYCH:  A+O x 3, mood and affect appropriate, answers questions appropriately CONSTITUTIONAL: lying in bed, NAD,  EYES: PERRLA and symmetric, EOMI, No conjunctival or scleral injection  ENMT: Oral mucosa with moist membranes  NECK: Supple, symmetric and without tracheal deviation   LUNG/CHEST: No respiratory distress, no use of accessory muscles. CTA B/L. No wheezing, rhonchi, rales. +right chest wall port-dressing clean dry and intact, no erythema, no drainage, area nontender.  CV: RRR, +S1/S2, no JVD. +3 pitting edema B/L lower extremities  GI:  +colostomy LLQ. Soft, nontender, no rebound, no guarding; no palpable masses; no hepatosplenomegaly.  : RLQ ileal conduit in place draining clear yellow urine.  MSK: No digital clubbing or cyanosis  SKIN: No rashes or ulcers noted  NEURO: CN II-XII intact; normal reflexes in upper and lower extremities, sensation intact in upper and lower extremities b/l to light touch   PSYCH:  A+O x 3, mood and affect appropriate, answers questions appropriately CONSTITUTIONAL: lying in bed, NAD,  EYES: PERRLA and symmetric, EOMI, No conjunctival or scleral injection  ENMT: Oral mucosa with moist membranes  NECK: Supple, symmetric and without tracheal deviation   LUNG/CHEST: No respiratory distress, no use of accessory muscles. CTA B/L. No wheezing, rhonchi, rales. +right chest wall port-dressing clean dry and intact, no erythema, no drainage, area nontender.  CV: RRR, +S1/S2, no JVD. +3 pitting edema B/L lower extremities  GI:  +colostomy LLQ. Soft, nontender, no rebound, no guarding; no palpable masses; no hepatosplenomegaly.  : +RLQ ileal conduit in place draining clear yellow urine.  MSK: No digital clubbing or cyanosis  SKIN: No rashes or ulcers noted  NEURO: CN II-XII intact; normal reflexes in upper and lower extremities, sensation intact in upper and lower extremities b/l to light touch   PSYCH:  A+O x 3, mood and affect appropriate, answers questions appropriately

## 2023-08-18 NOTE — ED PROVIDER NOTE - ATTENDING CONTRIBUTION TO CARE
pt with uti and neutropenic fever, abx given pta, ct done pta surg onc consulted no acute intervention, k riders, admit for further management

## 2023-08-18 NOTE — ED PROVIDER NOTE - NS ED ROS FT
Constitutional: no fever, no chills  Head: NC, AT   Eyes: no redness   ENMT: no nasal congestion/drainage, no sore throat   CV: no chest pain, no edema  Resp: no cough, no dyspnea  GI: no abdominal pain, + nausea, no vomiting, + diarrhea  : no dysuria, no hematuria   Skin: no lesions, no rashes   Neuro: no LOC, no headache, no sensory deficits, no weakness

## 2023-08-18 NOTE — ED ADULT NURSE NOTE - NSICDXPASTMEDICALHX_GEN_ALL_CORE_FT
PAST MEDICAL HISTORY:  CAD (coronary artery disease)     Depression with rectal cancer    H/O carotid stenosis     H/O renal calculi     HLD (hyperlipidemia)     HTN (hypertension)     DANO (iron deficiency anemia)     Mitral regurgitation     Neuropathy feet s/p chemo    Rectal cancer 2/2019 s/p chemo and radiation; recurrence of cancer 2022    Splenic artery aneurysm     Stented coronary artery circumflex 2005 LAD 2017 x4    Umbilical hernia

## 2023-08-18 NOTE — ED ADULT NURSE NOTE - NSFALLUNIVINTERV_ED_ALL_ED
Bed/Stretcher in lowest position, wheels locked, appropriate side rails in place/Call bell, personal items and telephone in reach/Instruct patient to call for assistance before getting out of bed/chair/stretcher/Non-slip footwear applied when patient is off stretcher/[place] to call system/Physically safe environment - no spills, clutter or unnecessary equipment/Purposeful proactive rounding/Room/bathroom lighting operational, light cord in reach

## 2023-08-18 NOTE — H&P ADULT - ASSESSMENT
63 y/o M PMH rectal adenocarcinoma mets to bladder and prostate (Last chemo 8/10 FOLFRI/panitumamab), status post LAR in 2020 and APR in 2022, S/P fistula repair between bowel and bladder, HTN, HLD, CAD was sent in from [facility]'s office at [place] for further workup of neutropenic fever and findings on CT abd/pelvis.     #Neutropenic fever 2/2 complicated UTI vs infected presacral mass with necrosis  CBC with diff, CMP, lactate, BC x2, U/A, U/C, RVP, MRSA swab  CXR  Start  Zosyn IVPB and Vancomycin IVPB for greater gram negative and positive coverage.  Monitor CBC.  Monitor temps.  Tylenol PRN  Surgical consult appreciated-no surgical intervention    #Hypomagnesemia  Serum Mg 1.3. Replete-Mg sulfate 2gm IVPB x1 dose. Repeat labs in AM.     #Diarrhea  Stool GI PCR.  Monitor BMs  Monitor electrolytes  Hold home Imodium pending infectious workup    # Chemotherapy Induced B/L Lower Extremity Edema  Pt reports h/o chronic B/L LE swelling secondary to chemo. Uses Lasix 20mg PRN leg swelling. Continue Lasix 40mg IVP daily inpatient.     #DVT Prophylaxis  Lovenox 40mg daily  SCD    #Advanced Directives  DNR/DNI  Prior 6/10/23 MOLST scanned in patient chart (Under patient window advanced care planning). Reviewed with patient.    61 y/o M PMH rectal adenocarcinoma mets to bladder and prostate (Last chemo 8/10 FOLFRI/panitumamab), status post LAR in 2020 and APR in 2022, S/P fistula repair between bowel and bladder, HTN, HLD, CAD was sent in from [facility] office at [place] for further workup of neutropenic fever and findings on CT abd/pelvis.     #Neutropenic fever 2/2 complicated UTI vs infected presacral mass with necrosis  CBC with diff, CMP, lactate, BC x2, U/A, U/C, RVP, MRSA swab  CXR  Start  Zosyn IVPB and Vancomycin IVPB for greater gram negative and positive coverage.  Monitor CBC.  Monitor temps.  Tylenol PRN  Surgical consult appreciated-no surgical intervention    #Hypomagnesemia  Serum Mg 1.3. Replete-Mg sulfate 2gm IVPB x1 dose. Repeat labs in AM.     #Diarrhea  Stool GI PCR.  Monitor BMs  Monitor electrolytes  Hold home Imodium pending infectious workup    # Chemotherapy Induced B/L Lower Extremity Edema  Pt reports h/o chronic B/L LE swelling secondary to chemo. Uses Lasix 20mg PRN leg swelling. Continue Lasix 40mg IVP daily inpatient.     #DVT Prophylaxis  Lovenox 40mg daily  SCD    #Advanced Directives    Prior 6/10/23 MOLST scanned in patient chart (Under patient window advanced care planning)   ASSESSMENT:  63 y/o M PMH rectal adenocarcinoma mets to bladder and prostate (Last chemo 8/10 FOLFRI/panitumamab), status post LAR in 2020 and APR in 2022, S/P fistula repair between bowel and bladder, HTN, HLD, CAD was sent in from [facility] [place] at [place] for Neutropenic Fever 2/2 Acute Complicated UTI and infected presacral mass with necrosis, chronic diarrhea, hypomagnesemia, and BL LE Pitting Edema.     PLAN:  #Neutropenic fever 2/2 complicated UTI vs infected presacral mass with necrosis  See CT imaging findings from [facility] above  Admit to Tele  Repeat Labs  Trend ANC   CBC with diff, CMP, lactate,   BCX x2 done at [facility] and repeated x2 in ED   UA positive at [facility] and [facility]. UCX Pending  Check RVP,   Check MRSA swab  Check CXR r/o resp source  Empiric IV ABX for UTI and Presacral necrotic mass  Start  Zosyn IVPB and Vancomycin IVPB for greater gram negative and positive coverage.  Monitor temps  Tylenol PRN  Surgical consult appreciated-no surgical intervention  ID Consulted  [facility] Consulted covering [facility]    #Hypomagnesemia  Serum Mg 1.3. Replete-Mg sulfate 2gm IVPB x1 dose. Repeat labs in AM.     #Chronic Diarrhea  Stool GI PCR.  Monitor BMs  Monitor electrolytes  Hold home Imodium pending infectious workup    # B/L Lower Extremity Edema  Pt reports h/o chronic B/L LE swelling secondary to chemo.   Uses Lasix 20mg PRN leg swelling. Continue Lasix 40mg IVP daily inpatient.     #DVT Prophylaxis  Lovenox 40mg daily  SCD    #Advanced Directives  SEE GOC NOTE   ASSESSMENT:  63 y/o M PMH rectal adenocarcinoma mets to bladder and prostate (Last chemo 8/10 FOLFRI/panitumamab), status post LAR in 2020 and APR in 2022, S/P fistula repair between bowel and bladder, HTN, HLD, CAD was sent in from [facility] [place] for Neutropenic Fever 2/2 Acute Complicated UTI and infected presacral mass with necrosis, chronic diarrhea, hypomagnesemia, and BL LE Pitting Edema.     PLAN:  #Neutropenic fever 2/2 complicated UTI vs infected presacral mass with necrosis  See CT imaging findings from [facility] above  Admit to Tele  Repeat Labs  Trend ANC   CBC with diff, CMP, lactate,   BCX x2 done at [facility] and repeated x2 in ED   UA positive at [facility]. UCX Pending  Check RVP,   Check MRSA swab  Check CXR r/o resp source  Empiric IV ABX for UTI and Presacral necrotic mass  Start  Zosyn IVPB and Vancomycin IVPB for greater gram negative and positive coverage.  Monitor temps  Tylenol PRN  Surgical consult appreciated-no surgical intervention  ID Consulted  [facility] Consulted covering [facility]    #HTN  Coreg  #Hypomagnesemia  Serum Mg 1.3. Replete-Mg sulfate 2gm IVPB x1 dose. Repeat labs in AM.     #Chronic Diarrhea  Stool GI PCR.  Monitor BMs  Monitor electrolytes  Hold home Imodium pending infectious workup    # B/L Lower Extremity Edema  Pt reports h/o chronic B/L LE swelling secondary to chemo.   Uses Lasix 20mg PRN leg swelling. Continue Lasix 40mg IVP daily inpatient.     #DVT Prophylaxis  Lovenox 40mg daily  SCD    #Advanced Directives  SEE GOC NOTE   ASSESSMENT:  63 y/o M PMH rectal adenocarcinoma mets to bladder and prostate (Last chemo 8/10 FOLFRI/panitumamab), status post LAR in 2020 and APR in 2022, S/P fistula repair between bowel and bladder, HTN, HLD, CAD was sent in from [facility]'s office at [place] for Neutropenic Fever 2/2 Acute Complicated UTI and infected presacral mass with necrosis, chronic diarrhea, hypomagnesemia, and BL LE Pitting Edema.     PLAN:  #Neutropenic fever 2/2 complicated UTI vs infected presacral mass with necrosis  See CT imaging findings from [facility] above  Admit to Tele  Repeat Labs  Trend ANC   CBC with diff, CMP, lactate,   BCX x2 done at [facility] and repeated x2 in ED   UA positive at [facility] and [facility]. UCX Pending  Check RVP,   Check MRSA swab  Check CXR r/o resp source  Empiric IV ABX for UTI and Presacral necrotic mass  Start  Zosyn IVPB and Vancomycin IVPB for greater gram negative and positive coverage.  Monitor temps  Tylenol PRN  Surgical consult appreciated-no surgical intervention  ID Consulted  [facility] Consulted covering [facility]    #HTN  Continue Coreg    #Hypomagnesemia  Serum Mg 1.3. Replete-Mg sulfate 2gm IVPB x1 dose. Repeat labs in AM.     #Chronic Diarrhea  Stool GI PCR.  Monitor BMs  Monitor electrolytes  Hold home Imodium pending infectious workup    # B/L Lower Extremity Edema  Pt reports h/o chronic B/L LE swelling secondary to chemo.   Uses Lasix 20mg PRN leg swelling. Continue Lasix 40mg IVP daily inpatient.     #DVT Prophylaxis  Lovenox 40mg daily  SCD    #Advanced Directives  SEE GOC NOTE

## 2023-08-18 NOTE — H&P ADULT - HISTORY OF PRESENT ILLNESS
HPI: Patient is a 63 y/o M PMH rectal adenocarcinoma mets to bladder and prostate (Last chemo 8/10 FOLFRI/panitumamab). status post LAR in 2020 and APR in 2022, S/P fistula repair between bowel and bladder, HTN, HLD, CAD was sent in from [facility]'s office at [place] with suspected neutropenic fevers. Per pt daughter Erma, patient chemo was held for 8 weeks prior to fistula repair 7/2023 and since resumption of chemo pt has had diarrhea, Stool production 3-4x/day out of colostomy, chills and malaise x4 days prior to bringing patient to [facility]. Patient was seen at [facility], found to have fever of 100.6, Fever workup performed, +UTI, WBC 1.4, ANC 0.5, CT abd/pelvis showed unchanged presacral mass with development of internal gas focci, possible necrosis or infection, thickening of urinary bladder possible cystitis, new trace amount of air within the nondependent portions of the bladder lumen, possible instrumentation or fistulization. Patient admitted to medicine for further workup and treatment of neutropenic fever. Surgical consult placed by ED for further recommendations based on CT findings.               HPI: Patient is a 63 y/o M PMH rectal adenocarcinoma mets to bladder and prostate (Last chemo 8/10 FOLFRI/panitumamab). status post LAR in 2020 and APR in 2022, S/P fistula repair between bowel and bladder, HTN, HLD, CAD was sent in from [facility]'s office at [place] with suspected neutropenic fevers. Per pt daughter Erma, patient chemo was held for 8 weeks prior to fistula repair 7/2023 and since resumption of chemo pt has had diarrhea, Stool production 3-4x/day out of colostomy, chills and malaise x4 days prior to bringing patient to [facility]. Patient was seen at [facility], found to have fever of 100.6, Fever workup performed, +UTI, WBC 1.4, ANC 0.5, CT abd/pelvis showed unchanged presacral mass with development of internal gas focci, possible necrosis or infection, thickening of urinary bladder possible cystitis, new trace amount of air within the nondependent portions of the bladder lumen, possible instrumentation or fistulization. Patient admitted to medicine for further workup and treatment of neutropenic fever. Surgical consult placed by ED for further recommendations based on CT findings.  Patient has not travelled within the past 30 days.  Patient has no sick contacts.                   62M with PMHX rectal adenocarcinoma c/b mets to bladder and prostate on current chemo FOLFIRI/Panitumamab (Last dose 8/10), s/p Lower Abd Resection/Abdominoperineal Resection (2020, 2022), S/P fistula repair between bowel and bladder, HTN, HLD, CAD s/p PCI x5 was sent in from [facility]'s office at [place] with suspected neutropenic fever. Patient was seen at [facility], found to have fever of 100.6, Fever workup performed, +UTI, WBC 1.4, ANC 0.5, CT abd/pelvis showed unchanged presacral mass with development of internal gas focci, possible necrosis or infection, thickening of urinary bladder possible cystitis, new trace amount of air within the nondependent portions of the bladder lumen, possible instrumentation or fistulization. Patient admitted to medicine for further workup and treatment of neutropenic fever. Surgical consult placed by ED for further recommendations based on CT findings. Patient has not travelled within the past 30 days. Patient has no sick contacts. Per pt daughter Erma, patient chemo was held for 8 weeks prior to fistula repair 7/2023 and since resumption of chemo pt has had diarrhea, Stool production 3-4x/day out of colostomy, chills and malaise x4 days prior to bringing patient to [facility]. Denies any other complaints

## 2023-08-18 NOTE — ED PROVIDER NOTE - CLINICAL SUMMARY MEDICAL DECISION MAKING FREE TEXT BOX
Patient is a 63 y/o patient with rectal carcinoma presenting for concern for neutropenic fever. Outpatient labs a few hours prior to presentation showed WBC 1.4 and ANC 0.5 and an episode of fever up to 100.6. Given one dose of zosyn outpatient and sent to ED.    Afebrile on presentation. Will obtain labs, blood cultures and admit

## 2023-08-19 LAB
ALBUMIN SERPL ELPH-MCNC: 2.8 G/DL — LOW (ref 3.3–5.2)
ALP SERPL-CCNC: 116 U/L — SIGNIFICANT CHANGE UP (ref 40–120)
ALT FLD-CCNC: 30 U/L — SIGNIFICANT CHANGE UP
ANION GAP SERPL CALC-SCNC: 12 MMOL/L — SIGNIFICANT CHANGE UP (ref 5–17)
ANISOCYTOSIS BLD QL: SLIGHT — SIGNIFICANT CHANGE UP
AST SERPL-CCNC: 42 U/L — HIGH
BASOPHILS # BLD AUTO: 0.01 K/UL — SIGNIFICANT CHANGE UP (ref 0–0.2)
BASOPHILS NFR BLD AUTO: 0.9 % — SIGNIFICANT CHANGE UP (ref 0–2)
BILIRUB SERPL-MCNC: 1.7 MG/DL — SIGNIFICANT CHANGE UP (ref 0.4–2)
BUN SERPL-MCNC: 7.7 MG/DL — LOW (ref 8–20)
CALCIUM SERPL-MCNC: 8 MG/DL — LOW (ref 8.4–10.5)
CHLORIDE SERPL-SCNC: 95 MMOL/L — LOW (ref 96–108)
CO2 SERPL-SCNC: 22 MMOL/L — SIGNIFICANT CHANGE UP (ref 22–29)
CREAT SERPL-MCNC: 0.9 MG/DL — SIGNIFICANT CHANGE UP (ref 0.5–1.3)
EGFR: 95 ML/MIN/1.73M2 — SIGNIFICANT CHANGE UP
EOSINOPHIL # BLD AUTO: 0.05 K/UL — SIGNIFICANT CHANGE UP (ref 0–0.5)
EOSINOPHIL NFR BLD AUTO: 4.4 % — SIGNIFICANT CHANGE UP (ref 0–6)
GIANT PLATELETS BLD QL SMEAR: PRESENT — SIGNIFICANT CHANGE UP
GLUCOSE SERPL-MCNC: 129 MG/DL — HIGH (ref 70–99)
HCT VFR BLD CALC: 24.5 % — LOW (ref 39–50)
HGB BLD-MCNC: 8.3 G/DL — LOW (ref 13–17)
LYMPHOCYTES # BLD AUTO: 0.39 K/UL — LOW (ref 1–3.3)
LYMPHOCYTES # BLD AUTO: 31.9 % — SIGNIFICANT CHANGE UP (ref 13–44)
MAGNESIUM SERPL-MCNC: 1.7 MG/DL — SIGNIFICANT CHANGE UP (ref 1.6–2.6)
MANUAL SMEAR VERIFICATION: SIGNIFICANT CHANGE UP
MCHC RBC-ENTMCNC: 31.8 PG — SIGNIFICANT CHANGE UP (ref 27–34)
MCHC RBC-ENTMCNC: 33.9 GM/DL — SIGNIFICANT CHANGE UP (ref 32–36)
MCV RBC AUTO: 93.9 FL — SIGNIFICANT CHANGE UP (ref 80–100)
MICROCYTES BLD QL: SLIGHT — SIGNIFICANT CHANGE UP
MONOCYTES # BLD AUTO: 0.2 K/UL — SIGNIFICANT CHANGE UP (ref 0–0.9)
MONOCYTES NFR BLD AUTO: 16.8 % — HIGH (ref 2–14)
NEUTROPHILS # BLD AUTO: 0.51 K/UL — LOW (ref 1.8–7.4)
NEUTROPHILS NFR BLD AUTO: 41.6 % — LOW (ref 43–77)
NEUTS BAND # BLD: 0.9 % — SIGNIFICANT CHANGE UP (ref 0–8)
NRBC # BLD: 2 /100 — HIGH (ref 0–0)
OVALOCYTES BLD QL SMEAR: SLIGHT — SIGNIFICANT CHANGE UP
PHOSPHATE SERPL-MCNC: 2.6 MG/DL — SIGNIFICANT CHANGE UP (ref 2.4–4.7)
PLAT MORPH BLD: NORMAL — SIGNIFICANT CHANGE UP
PLATELET # BLD AUTO: 222 K/UL — SIGNIFICANT CHANGE UP (ref 150–400)
POIKILOCYTOSIS BLD QL AUTO: SLIGHT — SIGNIFICANT CHANGE UP
POLYCHROMASIA BLD QL SMEAR: SLIGHT — SIGNIFICANT CHANGE UP
POTASSIUM SERPL-MCNC: 3 MMOL/L — LOW (ref 3.5–5.3)
POTASSIUM SERPL-SCNC: 3 MMOL/L — LOW (ref 3.5–5.3)
PROT SERPL-MCNC: 5.3 G/DL — LOW (ref 6.6–8.7)
RBC # BLD: 2.61 M/UL — LOW (ref 4.2–5.8)
RBC # FLD: 15.2 % — HIGH (ref 10.3–14.5)
RBC BLD AUTO: ABNORMAL
SODIUM SERPL-SCNC: 129 MMOL/L — LOW (ref 135–145)
VARIANT LYMPHS # BLD: 3.5 % — SIGNIFICANT CHANGE UP (ref 0–6)
WBC # BLD: 1.21 K/UL — LOW (ref 3.8–10.5)
WBC # FLD AUTO: 1.21 K/UL — LOW (ref 3.8–10.5)

## 2023-08-19 PROCEDURE — 99223 1ST HOSP IP/OBS HIGH 75: CPT

## 2023-08-19 PROCEDURE — 99222 1ST HOSP IP/OBS MODERATE 55: CPT

## 2023-08-19 PROCEDURE — 99233 SBSQ HOSP IP/OBS HIGH 50: CPT

## 2023-08-19 RX ORDER — ASPIRIN/CALCIUM CARB/MAGNESIUM 324 MG
81 TABLET ORAL DAILY
Refills: 0 | Status: DISCONTINUED | OUTPATIENT
Start: 2023-08-19 | End: 2023-08-21

## 2023-08-19 RX ORDER — DRONABINOL 2.5 MG
2.5 CAPSULE ORAL
Refills: 0 | Status: DISCONTINUED | OUTPATIENT
Start: 2023-08-19 | End: 2023-08-21

## 2023-08-19 RX ORDER — PIPERACILLIN AND TAZOBACTAM 4; .5 G/20ML; G/20ML
3.38 INJECTION, POWDER, LYOPHILIZED, FOR SOLUTION INTRAVENOUS ONCE
Refills: 0 | Status: COMPLETED | OUTPATIENT
Start: 2023-08-19 | End: 2023-08-19

## 2023-08-19 RX ORDER — MAGNESIUM SULFATE 500 MG/ML
2 VIAL (ML) INJECTION ONCE
Refills: 0 | Status: COMPLETED | OUTPATIENT
Start: 2023-08-19 | End: 2023-08-19

## 2023-08-19 RX ORDER — VANCOMYCIN HCL 1 G
1000 VIAL (EA) INTRAVENOUS EVERY 12 HOURS
Refills: 0 | Status: DISCONTINUED | OUTPATIENT
Start: 2023-08-19 | End: 2023-08-20

## 2023-08-19 RX ORDER — POTASSIUM CHLORIDE 20 MEQ
40 PACKET (EA) ORAL EVERY 4 HOURS
Refills: 0 | Status: COMPLETED | OUTPATIENT
Start: 2023-08-19 | End: 2023-08-19

## 2023-08-19 RX ORDER — PIPERACILLIN AND TAZOBACTAM 4; .5 G/20ML; G/20ML
3.38 INJECTION, POWDER, LYOPHILIZED, FOR SOLUTION INTRAVENOUS EVERY 8 HOURS
Refills: 0 | Status: DISCONTINUED | OUTPATIENT
Start: 2023-08-19 | End: 2023-08-21

## 2023-08-19 RX ORDER — FILGRASTIM 480MCG/1.6
480 VIAL (ML) INJECTION DAILY
Refills: 0 | Status: DISCONTINUED | OUTPATIENT
Start: 2023-08-19 | End: 2023-08-20

## 2023-08-19 RX ORDER — LOPERAMIDE HCL 2 MG
2 TABLET ORAL
Refills: 0 | Status: DISCONTINUED | OUTPATIENT
Start: 2023-08-19 | End: 2023-08-21

## 2023-08-19 RX ORDER — VANCOMYCIN HCL 1 G
1000 VIAL (EA) INTRAVENOUS ONCE
Refills: 0 | Status: COMPLETED | OUTPATIENT
Start: 2023-08-19 | End: 2023-08-19

## 2023-08-19 RX ADMIN — Medication 40 MILLIGRAM(S): at 05:36

## 2023-08-19 RX ADMIN — PIPERACILLIN AND TAZOBACTAM 200 GRAM(S): 4; .5 INJECTION, POWDER, LYOPHILIZED, FOR SOLUTION INTRAVENOUS at 01:43

## 2023-08-19 RX ADMIN — PIPERACILLIN AND TAZOBACTAM 25 GRAM(S): 4; .5 INJECTION, POWDER, LYOPHILIZED, FOR SOLUTION INTRAVENOUS at 14:21

## 2023-08-19 RX ADMIN — Medication 250 MILLIGRAM(S): at 03:25

## 2023-08-19 RX ADMIN — Medication 2 MILLIGRAM(S): at 20:55

## 2023-08-19 RX ADMIN — Medication 81 MILLIGRAM(S): at 18:05

## 2023-08-19 RX ADMIN — CARVEDILOL PHOSPHATE 6.25 MILLIGRAM(S): 80 CAPSULE, EXTENDED RELEASE ORAL at 18:05

## 2023-08-19 RX ADMIN — Medication 2000 UNIT(S): at 11:48

## 2023-08-19 RX ADMIN — MINOCYCLINE HYDROCHLORIDE 100 MILLIGRAM(S): 45 TABLET, EXTENDED RELEASE ORAL at 17:05

## 2023-08-19 RX ADMIN — Medication 650 MILLIGRAM(S): at 09:05

## 2023-08-19 RX ADMIN — Medication 40 MILLIEQUIVALENT(S): at 14:21

## 2023-08-19 RX ADMIN — SIMVASTATIN 40 MILLIGRAM(S): 20 TABLET, FILM COATED ORAL at 21:09

## 2023-08-19 RX ADMIN — PIPERACILLIN AND TAZOBACTAM 25 GRAM(S): 4; .5 INJECTION, POWDER, LYOPHILIZED, FOR SOLUTION INTRAVENOUS at 06:09

## 2023-08-19 RX ADMIN — Medication 250 MILLIGRAM(S): at 20:53

## 2023-08-19 RX ADMIN — ENOXAPARIN SODIUM 40 MILLIGRAM(S): 100 INJECTION SUBCUTANEOUS at 01:43

## 2023-08-19 RX ADMIN — Medication 480 MICROGRAM(S): at 14:21

## 2023-08-19 RX ADMIN — Medication 650 MILLIGRAM(S): at 08:05

## 2023-08-19 RX ADMIN — CARVEDILOL PHOSPHATE 6.25 MILLIGRAM(S): 80 CAPSULE, EXTENDED RELEASE ORAL at 05:37

## 2023-08-19 RX ADMIN — Medication 0.25 MILLIGRAM(S): at 20:53

## 2023-08-19 RX ADMIN — Medication 25 GRAM(S): at 04:34

## 2023-08-19 RX ADMIN — Medication 40 MILLIEQUIVALENT(S): at 17:05

## 2023-08-19 RX ADMIN — MINOCYCLINE HYDROCHLORIDE 100 MILLIGRAM(S): 45 TABLET, EXTENDED RELEASE ORAL at 05:36

## 2023-08-19 RX ADMIN — Medication 2.5 MILLIGRAM(S): at 17:05

## 2023-08-19 RX ADMIN — PIPERACILLIN AND TAZOBACTAM 25 GRAM(S): 4; .5 INJECTION, POWDER, LYOPHILIZED, FOR SOLUTION INTRAVENOUS at 21:10

## 2023-08-19 NOTE — CONSULT NOTE ADULT - SUBJECTIVE AND OBJECTIVE BOX
Patient is a 62y old  Male who presents with a chief complaint of Neutropenic Fever (19 Aug 2023 07:22)      HPI:  62M with PMHX rectal adenocarcinoma c/b mets to bladder and prostate on current chemo FOLFIRI/Panitumamab (Last dose 8/10), s/p Lower Abd Resection/Abdominoperineal Resection (2020, 2022), S/P fistula repair between bowel and bladder, HTN, HLD, CAD s/p PCI x5 was sent in from [facility]'s office at [place] with suspected neutropenic fever. Patient was seen at [facility], found to have fever of 100.6, Fever workup performed, +UTI, WBC 1.4, ANC 0.5, CT abd/pelvis showed unchanged presacral mass with development of internal gas focci, possible necrosis or infection, thickening of urinary bladder possible cystitis, new trace amount of air within the nondependent portions of the bladder lumen, possible instrumentation or fistulization. Patient admitted to medicine for further workup and treatment of neutropenic fever. Surgical consult placed by ED for further recommendations based on CT findings. Patient has not travelled within the past 30 days. Patient has no sick contacts. Per pt daughter Erma, patient chemo was held for 8 weeks prior to fistula repair 7/2023 and since resumption of chemo pt has had diarrhea, Stool production 3-4x/day out of colostomy, chills and malaise x4 days prior to bringing patient to [facility]. Denies any other complaints       REVIEW OF SYSTEMS:    CONSTITUTIONAL: No weakness, fevers or chills  EYES/ENT: No visual changes;  No vertigo or throat pain   NECK: No pain or stiffness  RESPIRATORY: No cough, wheezing, hemoptysis; No shortness of breath  CARDIOVASCULAR: No chest pain or palpitations  GASTROINTESTINAL: diarrhea   GENITOURINARY: No dysuria, frequency or hematuria  NEUROLOGICAL: No numbness or weakness  SKIN: No itching, burning, rashes, or lesions   All other review of systems is negative unless indicated above.    PAST MEDICAL & SURGICAL HISTORY:  HTN (hypertension)      HLD (hyperlipidemia)      CAD (coronary artery disease)      Mitral regurgitation      Rectal cancer  2/2019 s/p chemo and radiation; recurrence of cancer 2022      Depression  with rectal cancer      Neuropathy  feet s/p chemo      Stented coronary artery  circumflex 2005 LAD 2017 x4      H/O carotid stenosis      H/O renal calculi      DANO (iron deficiency anemia)      Umbilical hernia      Splenic artery aneurysm      H/O cardiac catheterization  2005 2017  4 STENTS      History of CEA (carotid endarterectomy)  right 2019      History of rectal surgery  with ileostomy 2/2020      H/O sigmoidoscopy  x2      History of removal of Port-a-Cath          SOCIAL HISTORY:    FAMILY HISTORY:  FH: heart disease  father    FH: ALS (amyotrophic lateral sclerosis) (Mother)        MEDICATIONS  (STANDING):  aspirin enteric coated 81 milliGRAM(s) Oral daily  carvedilol 6.25 milliGRAM(s) Oral every 12 hours  cholecalciferol 2000 Unit(s) Oral daily  dronabinol 2.5 milliGRAM(s) Oral <User Schedule>  enoxaparin Injectable 40 milliGRAM(s) SubCutaneous every 24 hours  filgrastim-sndz (ZARXIO) Injectable 480 MICROGram(s) SubCutaneous daily  furosemide   Injectable 40 milliGRAM(s) IV Push daily  minocycline 100 milliGRAM(s) Oral two times a day  piperacillin/tazobactam IVPB.. 3.375 Gram(s) IV Intermittent every 8 hours  potassium chloride    Tablet ER 40 milliEquivalent(s) Oral every 4 hours  simvastatin 40 milliGRAM(s) Oral at bedtime  vancomycin  IVPB 1000 milliGRAM(s) IV Intermittent every 12 hours    MEDICATIONS  (PRN):  acetaminophen     Tablet .. 650 milliGRAM(s) Oral every 6 hours PRN Temp greater or equal to 38C (100.4F), Mild Pain (1 - 3)  ALPRAZolam 0.25 milliGRAM(s) Oral at bedtime PRN for insomnia/anxiety  aluminum hydroxide/magnesium hydroxide/simethicone Suspension 30 milliLiter(s) Oral every 4 hours PRN Dyspepsia  melatonin 3 milliGRAM(s) Oral at bedtime PRN Insomnia  ondansetron Injectable 4 milliGRAM(s) IV Push every 8 hours PRN Nausea and/or Vomiting      Allergies    No Known Allergies    Intolerances        Vital Signs Last 24 Hrs  T(C): 36.8 (19 Aug 2023 09:05), Max: 37.9 (19 Aug 2023 04:24)  T(F): 98.2 (19 Aug 2023 09:05), Max: 100.3 (19 Aug 2023 04:24)  HR: 108 (19 Aug 2023 07:35) (102 - 122)  BP: 107/69 (19 Aug 2023 07:35) (107/69 - 171/85)  BP(mean): --  RR: 18 (19 Aug 2023 07:35) (18 - 20)  SpO2: 98% (19 Aug 2023 07:35) (98% - 99%)    Parameters below as of 19 Aug 2023 07:35  Patient On (Oxygen Delivery Method): room air        PHYSICAL EXAM  General: adult in NAD  HEENT: clear oropharynx, anicteric sclera, pink conjunctiva  Neck: supple  CV: normal S1/S2 with no murmur rubs or gallops  Lungs: positive air movement b/l ant lungs,clear to auscultation, no wheezes, no rales  Abdomen: soft non-tender non-distended, no hepatosplenomegaly  Ext: no clubbing cyanosis or edema  Skin: no rashes and no petechiae  Neuro: alert and oriented X 4, no focal deficits      LABS:                          8.3    1.21  )-----------( 222      ( 19 Aug 2023 06:11 )             24.5         Mean Cell Volume : 93.9 fl  Mean Cell Hemoglobin : 31.8 pg  Mean Cell Hemoglobin Concentration : 33.9 gm/dL  Auto Neutrophil # : 0.51 K/uL  Auto Lymphocyte # : 0.39 K/uL  Auto Monocyte # : 0.20 K/uL  Auto Eosinophil # : 0.05 K/uL  Auto Basophil # : 0.01 K/uL  Auto Neutrophil % : 41.6 %  Auto Lymphocyte % : 31.9 %  Auto Monocyte % : 16.8 %  Auto Eosinophil % : 4.4 %  Auto Basophil % : 0.9 %      Serial CBC's  08-19 @ 06:11  Hct-24.5 / Hgb-8.3 / Plat-222 / RBC-2.61 / WBC-1.21  Serial CBC's  08-18 @ 21:11  Hct-24.1 / Hgb-8.3 / Plat-185 / RBC-2.55 / WBC-1.35      08-19    129<L>  |  95<L>  |  7.7<L>  ----------------------------<  129<H>  3.0<L>   |  22.0  |  0.90    Ca    8.0<L>      19 Aug 2023 06:11  Phos  2.6     08-19  Mg     1.7     08-19    TPro  5.3<L>  /  Alb  2.8<L>  /  TBili  1.7  /  DBili  x   /  AST  42<H>  /  ALT  30  /  AlkPhos  116  08-19                      BLOOD SMEAR INTERPRETATION:       RADIOLOGY & ADDITIONAL STUDIES:        
SURGERY CONSULT    HPI:  This is a 62 year old M, PMH of HTN, HLD, CAD, Rectal Adenocarcinoma status post LAR in 2020 and APR in 2022, locally recurrent and progressive rectal cancer to bladder and prostate, last seen by CRS at [facility] for neutropenic sepsis status post salvage chemo with FOLFIRI and Avastin at [facility], last chemotherapy on Thursday, was sent in from [facility]'s office at [facility] with suspected neutropenic fevers, surgery reconsulted to evaluate as outpatient imaging at [facility] showed a known presacral mass, thick rim enhancement and central hypoattenuation, with interval development of internal gas foci, measuring 6.9 x 5.5 cm, previously 6.7 x 5.4 cm. As on prior study, mass is difficult to separate from the seminal vesicles, posterior aspects of the prostate gland and bladder wall. Unchanged drain position within the mass. Unchanged diffuse thickening of the bladder wall with pericystic fat stranding, probably cystitis. Trace amount of air within the non-dependent portions of the bladder lumen, possibly due to recent instrumentation versus result of fistulization with posteriorly adjacent mass.  He was also noted to have a white count of 1.4.  Surgery at [facility] was asked to evaluate.        PAST MEDICAL HISTORY:  Chest pain    HTN (hypertension)    HLD (hyperlipidemia)    CAD (coronary artery disease)    Mitral regurgitation    Rectal cancer    Depression    Neuropathy    Stented coronary artery    H/O carotid stenosis    H/O renal calculi    DANO (iron deficiency anemia)    Umbilical hernia    Splenic artery aneurysm    PAST SURGICAL HISTORY:  H/O cardiac catheterization    Cardiac disorder    History of CEA (carotid endarterectomy)    History of rectal surgery    H/O sigmoidoscopy    History of removal of Port-a-Cath    ALLERGIES:  No Known Allergies    FAMILY HISTORY: Noncontributory    SOCIAL HISTORY: Denies tobacco, EtOH, illicit substance use.     HOME MEDICATIONS:    MEDICATIONS  (STANDING):  potassium chloride  10 mEq/100 mL IVPB 10 milliEquivalent(s) IV Intermittent every 1 hour    MEDICATIONS  (PRN):    VITALS & I/Os:  Vital Signs Last 24 Hrs  T(C): 37 (18 Aug 2023 19:18), Max: 37 (18 Aug 2023 19:18)  T(F): 98.6 (18 Aug 2023 19:18), Max: 98.6 (18 Aug 2023 19:18)  HR: 102 (18 Aug 2023 19:18) (102 - 102)  BP: 145/79 (18 Aug 2023 19:18) (145/79 - 145/79)  BP(mean): --  RR: 20 (18 Aug 2023 19:18) (20 - 20)  SpO2: 99% (18 Aug 2023 19:18) (99% - 99%)    Parameters below as of 18 Aug 2023 19:18  Patient On (Oxygen Delivery Method): room air    GENERAL: Alert, well developed, in no acute distress.  RESPIRATORY: Nonlabored   CARDIOVASCULAR: Normotensive   Abdominal:  Abdomen soft, NT, ND, RLQ ileostomy (ileal conduit), colostomy, drain at APR site draining clear serous fluid    
INFECTIOUS DISEASES AND INTERNAL MEDICINE at [place]  =======================================================  Andrea Forde MD  Diplomates American Board of Internal Medicine and Infectious Diseases  Telephone [phone]  Fax            [phone]  =======================================================    PASTORA [id number]      HPI:  62M with PMHX rectal adenocarcinoma c/b mets to bladder and prostate on current chemo FOLFIRI/Panitumamab (Last dose 8/10), s/p Lower Abd Resection/Abdominoperineal Resection (, ), S/P fistula repair between bowel and bladder, HTN, HLD, CAD s/p PCI x5 was sent in from [facility]'s office at [place] [employer] with suspected neutropenic fever. Patient was seen at [facility], found to have fever of 100.6, Fever workup performed, +UTI, WBC 1.4, ANC 0.5, CT abd/pelvis showed unchanged presacral mass with development of internal gas focci, possible necrosis or infection, thickening of urinary bladder possible cystitis, new trace amount of air within the nondependent portions of the bladder lumen, possible instrumentation or fistulization. Patient admitted to medicine for further workup and treatment of neutropenic fever. Surgical consult placed by ED for further recommendations based on CT findings. Patient has not travelled within the past 30 days. Patient has no sick contacts. Per pt daughter Erma, patient chemo was held for 8 weeks prior to fistula repair 2023 and since resumption of chemo pt has had diarrhea, Stool production 3-4x/day out of colostomy, chills and malaise x4 days prior to bringing patient to [facility]. Denies any other complaints   AS ABOVE  LAST CHEMO LAST THURSDAY NOW WITH FEVERS  AND NEUTROPENIA  PLACED ON ABX  ASKED TO EVALUATE FROM ID STANDPOINT                      (18 Aug 2023 23:41)      PAST MEDICAL & SURGICAL HISTORY:  HTN (hypertension)      HLD (hyperlipidemia)      CAD (coronary artery disease)      Mitral regurgitation      Rectal cancer  2019 s/p chemo and radiation; recurrence of cancer       Depression  with rectal cancer      Neuropathy  feet s/p chemo      Stented coronary artery  circumflex  LAD 2017 x4      H/O carotid stenosis      H/O renal calculi      DANO (iron deficiency anemia)      Umbilical hernia      Splenic artery aneurysm      H/O cardiac catheterization   2017  4 STENTS      History of CEA (carotid endarterectomy)  right 2019      History of rectal surgery  with ileostomy 2020      H/O sigmoidoscopy  x2      History of removal of Port-a-Cath          ANTIBIOTICS  minocycline 100 milliGRAM(s) Oral two times a day  piperacillin/tazobactam IVPB.. 3.375 Gram(s) IV Intermittent every 8 hours  vancomycin  IVPB 1000 milliGRAM(s) IV Intermittent every 12 hours      Allergies    No Known Allergies    Intolerances        SOCIAL HISTORY:     FAMILY HX   FAMILY HISTORY:  FH: heart disease  father    FH: ALS (amyotrophic lateral sclerosis) (Mother)        Vital Signs Last 24 Hrs  T(C): 37.9 (19 Aug 2023 04:24), Max: 37.9 (19 Aug 2023 04:24)  T(F): 100.3 (19 Aug 2023 04:24), Max: 100.3 (19 Aug 2023 04:24)  HR: 122 (19 Aug 2023 04:24) (102 - 122)  BP: 129/66 (19 Aug 2023 04:24) (129/66 - 171/85)  BP(mean): --  RR: 18 (19 Aug 2023 04:24) (18 - 20)  SpO2: 98% (19 Aug 2023 04:24) (98% - 99%)    Parameters below as of 19 Aug 2023 04:24  Patient On (Oxygen Delivery Method): room air      Drug Dosing Weight  Height (cm): 188 (18 Aug 2023 19:18)  Weight (kg): 87.6 (18 Aug 2023 19:18)  BMI (kg/m2): 24.8 (18 Aug 2023 19:18)  BSA (m2): 2.14 (18 Aug 2023 19:18)      REVIEW OF SYSTEMS:    CONSTITUTIONAL:  As per HPI.    HEENT:  Eyes:  No diplopia or blurred vision. ENT:  No earache, sore throat or runny nose.    CARDIOVASCULAR:  No pressure, squeezing, strangling, tightness, heaviness or aching about the chest, neck, axilla or epigastrium.    RESPIRATORY:  No cough, shortness of breath, PND or orthopnea.    GASTROINTESTINAL:  No nausea, vomiting or diarrhea.    GENITOURINARY:  No dysuria, frequency or urgency.    MUSCULOSKELETAL:  As per HPI.    SKIN:  No change in skin, hair or nails.    NEUROLOGIC:  No paresthesias, fasciculations, seizures or weakness.                  PHYSICAL EXAMINATION:    GENERAL: The patient is a _____in no apparent distress. ___     VITAL SIGNS: T(C): 37.9 (23 @ 04:24), Max: 37.9 (23 @ 04:24)  HR: 122 (23 @ 04:24) (102 - 122)  BP: 129/66 (23 @ 04:24) (129/66 - 171/85)  RR: 18 (23 @ 04:24) (18 - 20)  SpO2: 98% (23 @ 04:24) (98% - 99%)  Wt(kg): --    HEENT: Head is normocephalic and atraumatic.  ANICTERIC  NECK: Supple. No carotid bruits.  No lymphadenopathy or thyromegaly.    LUNGS:COARSE BREATH SOUNDS    HEART: Regular rate and rhythm without murmur.    ABDOMEN: Soft, nontender, and nondistended.  Positive bowel sounds.  No hepatosplenomegaly was noted. NO REBOUND NO GUARDING  LEFT OSTOMY  RIGHT OSTOMY ILEAL CONDUIT    EXTREMITIES: NO EDEMA NO ERYTHEMA    NEUROLOGIC: NON FOCAL      SKIN: No ulceration or induration present. NO RASH        BLOOD CULTURES       URINE CX          LABS:                        8.3    x     )-----------( 222      ( 19 Aug 2023 06:11 )             24.5     08-18    131<L>  |  97  |  11.4  ----------------------------<  118<H>  3.6   |  21.0<L>  |  0.96    Ca    7.9<L>      18 Aug 2023 21:11  Mg     1.3     08-    TPro  5.4<L>  /  Alb  2.8<L>  /  TBili  1.3  /  DBili  x   /  AST  31  /  ALT  21  /  AlkPhos  98  -      Urinalysis Basic - ( 18 Aug 2023 22:11 )    Color: Yellow / Appearance: Slightly Turbid / S.015 / pH: x  Gluc: x / Ketone: Negative  / Bili: Negative / Urobili: Negative   Blood: x / Protein: 500 mg/dL / Nitrite: Positive   Leuk Esterase: Trace / RBC: 6-10 /HPF / WBC 3-5 /HPF   Sq Epi: x / Non Sq Epi: x / Bacteria: Moderate        RADIOLOGY & ADDITIONAL STUDIES:      ASSESSMENT/PLAN  62M with PMHX rectal adenocarcinoma c/b mets to bladder and prostate on current chemo FOLFIRI/Panitumamab (Last dose 8/10), s/p Lower Abd Resection/Abdominoperineal Resection (, ), S/P fistula repair between bowel and bladder, HTN, HLD, CAD s/p PCI x5 was sent in from [facility]'s office at [employer] with suspected neutropenic fever. Patient was seen at [facility], found to have fever of 100.6, Fever workup performed, +UTI, WBC 1.4, ANC 0.5, CT abd/pelvis showed unchanged presacral mass with development of internal gas focci, possible necrosis or infection, thickening of urinary bladder possible cystitis, new trace amount of air within the nondependent portions of the bladder lumen, possible instrumentation or fistulization. Patient admitted to medicine for further workup and treatment of neutropenic fever. Surgical consult placed by ED for further recommendations based on CT findings. Patient has not travelled within the past 30 days. Patient has no sick contacts. Per pt daughter Erma, patient chemo was held for 8 weeks prior to fistula repair 2023 and since resumption of chemo pt has had diarrhea, Stool production 3-4x/day out of colostomy, chills and malaise x4 days prior to bringing patient to [facility]. Denies any other complaints   AS ABOVE  LAST CHEMO LAST THURSDAY NOW WITH FEVERS  AND NEUTROPENIA  PLACED ON ABX  WILL FOLLOW UP BLOOD AND URINE CX  AWAIT WBC RECOVERY  CONTINUE PRESENT ABX  SUGGEST ONC FOLLOW UP   WILL F/W HSOPITALIST                IVETTE BLOOD MD

## 2023-08-19 NOTE — PROGRESS NOTE ADULT - ASSESSMENT
61 y/o stage IV rectal adenocarcinoma with mets to the bladder and prostate s/p LAR in 2020 and APR in 2022 was admitted for neutropenic sepsis had a fever of 100.3 overnight and was tachycardic. His WBC was 1.35 today.Discussed with patient about following up with oncologist and primary team about low white count and treatment options that may be offered. No surgical intervention at this time.     PLAN  - No surgical intervention at this time.   - Surgery will sign out at this time.   - Please reconsult if patient status changes that may need surgical intervention.

## 2023-08-19 NOTE — CONSULT NOTE ADULT - ASSESSMENT
62M with PMHX rectal adenocarcinoma c/b mets to bladder and prostate on current chemo FOLFIRI/Panitumamab (Last dose 8/10), s/p Lower Abd Resection/Abdominoperineal Resection (2020, 2022), S/P fistula repair between bowel and bladder, HTN, HLD, CAD s/p PCI x5 was sent in from [facility]'s office at [place] with suspected neutropenic fever and diarrhea.     Met Rectal ca   - on active chemo FOLFIRI + Panitumamab ; last dose 8/10  - pt to resume care with [facility] after d/c     Neutropenic Fever   - WBC 1.2;   - BCx pending  - Tmax 100.3  - pt on IV Zosyn , Vanco  - Resp panel MIRIAM with Covid - negative ([facility])  - ID following   - CT abd/pel ([facility]) - unchanged presacral mass with development of internal gas focci, possible necrosis or infection, thickening of urinary bladder possible cystitis, new trace amount of air within the nondependent portions of the bladder lumen, possible instrumentation or fistulization.  - Surgery on board.  no surgical intervention advised.   - start Zarxio today     chronic diarrhea  - GI PCR panel ([facility]) - negative   - pt has rectal tube - watery yellow stool  - likely secondary to chemo.  pt on immodium prn - may cont as inpt       will follow   if cultures neg and no longer neutropenic may consider d/c planning     Monroe Albert MD  [facility]  [phone]               Patient was seen at [facility], found to have fever of 100.6, Fever workup performed, +UTI, WBC 1.4, ANC 0.5, CT abd/pelvis showed unchanged presacral mass with development of internal gas focci, possible necrosis or infection, thickening of urinary bladder possible cystitis, new trace amount of air within the nondependent portions of the bladder lumen, possible instrumentation or fistulization. Patient admitted to medicine for further workup and treatment of neutropenic fever. Surgical consult placed by ED for further recommendations based on CT findings. Patient has not travelled within the past 30 days. Patient has no sick contacts. Per pt daughter Erma, patient chemo was held for 8 weeks prior to fistula repair 7/2023 and since resumption of chemo pt has had diarrhea, Stool production 3-4x/day out of colostomy, chills and malaise x4 days prior to bringing patient to [facility]. Denies any other complaints          62M with PMHX rectal adenocarcinoma c/b mets to bladder and prostate on current chemo FOLFIRI/Panitumamab (Last dose 8/10), s/p Lower Abd Resection/Abdominoperineal Resection (2020, 2022), S/P fistula repair between bowel and bladder, HTN, HLD, CAD s/p PCI x5 was sent in from [facility]'s office at [place] with suspected neutropenic fever and diarrhea.     Met Rectal ca   - on active chemo FOLFIRI + Panitumamab ; last dose 8/10  - pt received Neulasta 8/12  - pt to resume care with [facility] after d/c     Neutropenic Fever   - WBC 1.2;   - BCx pending  - Tmax 100.3  - pt on IV Zosyn , Vanco  - Resp panel MIRIAM with Covid - negative ([facility])  - ID following   - CT abd/pel ([facility]) - unchanged presacral mass with development of internal gas focci, possible necrosis or infection, thickening of urinary bladder possible cystitis, new trace amount of air within the nondependent portions of the bladder lumen, possible instrumentation or fistulization.  - Surgery on board.  no surgical intervention advised.   - start Zarxio today  will need for 1 -2 days     chronic diarrhea  - GI PCR panel ([facility]) - negative   - pt has rectal tube - watery yellow stool  - likely secondary to chemo.  pt on immodium prn - may cont as inpt       will follow   if cultures neg and no longer neutropenic may consider d/c planning     Monroe Albert MD  [facility]  [phone]               Patient was seen at [facility], found to have fever of 100.6, Fever workup performed, +UTI, WBC 1.4, ANC 0.5, CT abd/pelvis showed unchanged presacral mass with development of internal gas focci, possible necrosis or infection, thickening of urinary bladder possible cystitis, new trace amount of air within the nondependent portions of the bladder lumen, possible instrumentation or fistulization. Patient admitted to medicine for further workup and treatment of neutropenic fever. Surgical consult placed by ED for further recommendations based on CT findings. Patient has not travelled within the past 30 days. Patient has no sick contacts. Per pt daughter Erma, patient chemo was held for 8 weeks prior to fistula repair 7/2023 and since resumption of chemo pt has had diarrhea, Stool production 3-4x/day out of colostomy, chills and malaise x4 days prior to bringing patient to [facility]. Denies any other complaints

## 2023-08-19 NOTE — PROGRESS NOTE ADULT - SUBJECTIVE AND OBJECTIVE BOX
Patient seen and examined at bedside. Patient was sitting comfortably at bedside with no complaints. Denies fever, chills, night sweats, rectal pain, nausea, and vomiting.    Vitals:  Vital Signs Last 24 Hrs  T(C): 37 (19 Aug 2023 17:42), Max: 37.9 (19 Aug 2023 04:24)  T(F): 98.6 (19 Aug 2023 17:42), Max: 100.3 (19 Aug 2023 04:24)  HR: 101 (19 Aug 2023 17:42) (101 - 122)  BP: 126/71 (19 Aug 2023 17:42) (107/69 - 171/85)  BP(mean): --  RR: 18 (19 Aug 2023 17:42) (18 - 18)  SpO2: 99% (19 Aug 2023 17:42) (98% - 99%)    Parameters below as of 19 Aug 2023 07:35  Patient On (Oxygen Delivery Method): room air    Labs:  08-19    129<L>  |  95<L>  |  7.7<L>  ----------------------------<  129<H>  3.0<L>   |  22.0  |  0.90    Ca    8.0<L>      19 Aug 2023 06:11  Phos  2.6     08-19  Mg     1.7     08-19    TPro  5.3<L>  /  Alb  2.8<L>  /  TBili  1.7  /  DBili  x   /  AST  42<H>  /  ALT  30  /  AlkPhos  116  08-19                            8.3    1.21  )-----------( 222      ( 19 Aug 2023 06:11 )             24.5     Exam:  Gen: pt lying in bed, alert, in NAD  Resp: unlabored  CVS: RRR  Abd: soft, NT, ND  Ext: moving all extremities spontaneously, sensation intact, pulses 2+   Patient seen and examined at bedside. Patient was sitting comfortably at bedside with no complaints. Denies fever, chills, night sweats, rectal pain, nausea, and vomiting.    Vitals:  Vital Signs Last 24 Hrs  T(C): 37 (19 Aug 2023 17:42), Max: 37.9 (19 Aug 2023 04:24)  T(F): 98.6 (19 Aug 2023 17:42), Max: 100.3 (19 Aug 2023 04:24)  HR: 101 (19 Aug 2023 17:42) (101 - 122)  BP: 126/71 (19 Aug 2023 17:42) (107/69 - 171/85)  BP(mean): --  RR: 18 (19 Aug 2023 17:42) (18 - 18)  SpO2: 99% (19 Aug 2023 17:42) (98% - 99%)    Parameters below as of 19 Aug 2023 07:35  Patient On (Oxygen Delivery Method): room air    Labs:  08-19    129<L>  |  95<L>  |  7.7<L>  ----------------------------<  129<H>  3.0<L>   |  22.0  |  0.90    Ca    8.0<L>      19 Aug 2023 06:11  Phos  2.6     08-19  Mg     1.7     08-19    TPro  5.3<L>  /  Alb  2.8<L>  /  TBili  1.7  /  DBili  x   /  AST  42<H>  /  ALT  30  /  AlkPhos  116  08-19                            8.3    1.21  )-----------( 222      ( 19 Aug 2023 06:11 )             24.5     Exam:  Gen: pt lying in bed, alert, in NAD  Resp: unlabored  CVS: RRR  Abd: soft, NT, ND  Ext: moving all extremities spontaneously, sensation intact, pulses 2+  : Dressing clean and intact with drain in place

## 2023-08-19 NOTE — GOALS OF CARE CONVERSATION - ADVANCED CARE PLANNING - CONVERSATION DETAILS
Goals of care discussed with patient. Reviewed with patient prior MOLST dated 6/10/23 DNR/I status. DNR/I discussed in detail with patient. Patient rescinding DNR/I status and wishes to be full code at this time. Patient stated he would like to discuss with family in the morning and he will inform staff of his decision thereafter. Will consult palliative care for further discussion.

## 2023-08-19 NOTE — PROGRESS NOTE ADULT - SUBJECTIVE AND OBJECTIVE BOX
[facility] Division of Hospital Medicine    Chief Complaint:  Neutropenic Fever     SUBJECTIVE / OVERNIGHT EVENTS:   Pt reports he feels well, walking around the [place]   Family at bedside     Patient denies chest pain, SOB, abd pain, N/V, fever, chills, dysuria or any other complaints. All remainder ROS negative.     MEDICATIONS  (STANDING):  aspirin enteric coated 81 milliGRAM(s) Oral daily  carvedilol 6.25 milliGRAM(s) Oral every 12 hours  cholecalciferol 2000 Unit(s) Oral daily  dronabinol 2.5 milliGRAM(s) Oral <User Schedule>  enoxaparin Injectable 40 milliGRAM(s) SubCutaneous every 24 hours  filgrastim-sndz (ZARXIO) Injectable 480 MICROGram(s) SubCutaneous daily  furosemide   Injectable 40 milliGRAM(s) IV Push daily  minocycline 100 milliGRAM(s) Oral two times a day  piperacillin/tazobactam IVPB.. 3.375 Gram(s) IV Intermittent every 8 hours  potassium chloride    Tablet ER 40 milliEquivalent(s) Oral every 4 hours  simvastatin 40 milliGRAM(s) Oral at bedtime  vancomycin  IVPB 1000 milliGRAM(s) IV Intermittent every 12 hours    MEDICATIONS  (PRN):  acetaminophen     Tablet .. 650 milliGRAM(s) Oral every 6 hours PRN Temp greater or equal to 38C (100.4F), Mild Pain (1 - 3)  ALPRAZolam 0.25 milliGRAM(s) Oral at bedtime PRN for insomnia/anxiety  aluminum hydroxide/magnesium hydroxide/simethicone Suspension 30 milliLiter(s) Oral every 4 hours PRN Dyspepsia  melatonin 3 milliGRAM(s) Oral at bedtime PRN Insomnia  ondansetron Injectable 4 milliGRAM(s) IV Push every 8 hours PRN Nausea and/or Vomiting        I&O's Summary      PHYSICAL EXAM:  Vital Signs Last 24 Hrs  T(C): 36.8 (19 Aug 2023 09:05), Max: 37.9 (19 Aug 2023 04:24)  T(F): 98.2 (19 Aug 2023 09:05), Max: 100.3 (19 Aug 2023 04:24)  HR: 108 (19 Aug 2023 07:35) (102 - 122)  BP: 107/69 (19 Aug 2023 07:35) (107/69 - 171/85)  BP(mean): --  RR: 18 (19 Aug 2023 07:35) (18 - 20)  SpO2: 98% (19 Aug 2023 07:35) (98% - 99%)    Parameters below as of 19 Aug 2023 07:35  Patient On (Oxygen Delivery Method): room air        CONSTITUTIONAL: NAD, sitting up in bed  RESPIRATORY: Normal respiratory effort; lungs are clear to auscultation bilaterally  CARDIOVASCULAR: Regular rate and rhythm, normal S1 and S2   ABDOMEN: +Ileal conduit, +Colostomy, Nontender to palpation   MUSCULOSKELETAL:  No clubbing or cyanosis of digits   PSYCH: A+O to person, place, and time; affect appropriate  NEUROLOGY: No gross sensory or motor deficits       LABS:                        8.3    1.21  )-----------( 222      ( 19 Aug 2023 06:11 )             24.5     08-19    129<L>  |  95<L>  |  7.7<L>  ----------------------------<  129<H>  3.0<L>   |  22.0  |  0.90    Ca    8.0<L>      19 Aug 2023 06:11  Phos  2.6     08-19  Mg     1.7     08-19    TPro  5.3<L>  /  Alb  2.8<L>  /  TBili  1.7  /  DBili  x   /  AST  42<H>  /  ALT  30  /  AlkPhos  116  08-19          Urinalysis Basic - ( 19 Aug 2023 06:11 )    Color: x / Appearance: x / SG: x / pH: x  Gluc: 129 mg/dL / Ketone: x  / Bili: x / Urobili: x   Blood: x / Protein: x / Nitrite: x   Leuk Esterase: x / RBC: x / WBC x   Sq Epi: x / Non Sq Epi: x / Bacteria: x

## 2023-08-19 NOTE — PATIENT PROFILE ADULT - FALL HARM RISK - HARM RISK INTERVENTIONS

## 2023-08-19 NOTE — PROGRESS NOTE ADULT - ASSESSMENT
63 y/o M PMH rectal adenocarcinoma mets to bladder and prostate (Last chemo 8/10 FOLFRI/panitumamab), status post LAR in 2020 and APR in 2022, S/P fistula repair between bowel and bladder, HTN, HLD, CAD was sent in from [facility]'s office at [place] for Neutropenic Fever 2/2 Acute Complicated UTI and infected presacral mass with necrosis, chronic diarrhea, hypomagnesemia, and BL LE Pitting Edema.   8/18/23 CT abd/pelvis with contrast obtained at [facility] IMPRESSION: Since July 24, 2023, 1.  Unchanged size of presacral mass with development of internal gas foci, possibly from necrosis or superimposed infection. Unchanged drain position within the mass. 2.  Unchanged circumferential wall thickening of the urinary bladder with surrounding fat stranding, probably cystitis. In addition, new trace amount of air within the non-dependent portions of the bladder lumen, possibly due to recent instrumentation versus result of fistulization with posteriorly adjacent mass.    Neutropenic fever 2/2 complicated UTI vs infected presacral mass with necrosis   - CT imaging findings from [facility] above   - ANC worsening, plan for Filgrastim today per Heme/Onc   - Follow up BCX x2 done at [facility] and repeated x2 in ED   - UA positive at [facility] and [facility] (taken from Ileal conduit). UCX Pending   - Empiric IV ABX with Zosyn and Vanco   - Surgical consult appreciated-no surgical intervention   - Appreciate ID Consult   - Appreciate Heme/Onc     Metastatic Rectal ca   - on active chemo, last dose 8/10  - Follows with [facility]      HTN  - Continue Coreg    Hypomagnesemia  Hypokalemia   - replete PRN  - Monitor     Chronic Diarrhea  - Stool GI PCR  - Hold home Imodium pending infectious workup    B/L Lower Extremity Edema  - Pt reports h/o chronic B/L LE swelling secondary to chemo  - Uses Lasix 20mg PRN leg swelling. Continue Lasix 40mg IVP daily for now     CAD, S/p stents (in 2006 & 2018)  A fib   - Resume ASA, BB, statin   - No AC for A fib per pt choice and hx of GI bleed     HTN, HLD  - c/w Coreg and statin     DVT Prophylaxis - Lovenox 40mg daily    Advanced Directives - See GOC Note

## 2023-08-20 LAB
ANION GAP SERPL CALC-SCNC: 13 MMOL/L — SIGNIFICANT CHANGE UP (ref 5–17)
ANISOCYTOSIS BLD QL: SLIGHT — SIGNIFICANT CHANGE UP
BASOPHILS # BLD AUTO: 0.06 K/UL — SIGNIFICANT CHANGE UP (ref 0–0.2)
BASOPHILS NFR BLD AUTO: 0.9 % — SIGNIFICANT CHANGE UP (ref 0–2)
BASOPHILS NFR BLD AUTO: 1.8 % — SIGNIFICANT CHANGE UP (ref 0–2)
BLASTS # FLD: 0.9 % — HIGH (ref 0–0)
BLASTS # FLD: 3.5 % — HIGH (ref 0–0)
BUN SERPL-MCNC: 7 MG/DL — LOW (ref 8–20)
CALCIUM SERPL-MCNC: 8.5 MG/DL — SIGNIFICANT CHANGE UP (ref 8.4–10.5)
CHLORIDE SERPL-SCNC: 99 MMOL/L — SIGNIFICANT CHANGE UP (ref 96–108)
CO2 SERPL-SCNC: 23 MMOL/L — SIGNIFICANT CHANGE UP (ref 22–29)
CREAT SERPL-MCNC: 0.95 MG/DL — SIGNIFICANT CHANGE UP (ref 0.5–1.3)
CULTURE RESULTS: SIGNIFICANT CHANGE UP
EGFR: 89 ML/MIN/1.73M2 — SIGNIFICANT CHANGE UP
EOSINOPHIL # BLD AUTO: 0.11 K/UL — SIGNIFICANT CHANGE UP (ref 0–0.5)
EOSINOPHIL NFR BLD AUTO: 1.7 % — SIGNIFICANT CHANGE UP (ref 0–6)
EOSINOPHIL NFR BLD AUTO: 11.5 % — HIGH (ref 0–6)
GI PCR PANEL: SIGNIFICANT CHANGE UP
GIANT PLATELETS BLD QL SMEAR: PRESENT — SIGNIFICANT CHANGE UP
GLUCOSE SERPL-MCNC: 98 MG/DL — SIGNIFICANT CHANGE UP (ref 70–99)
HCT VFR BLD CALC: 26.1 % — LOW (ref 39–50)
HGB BLD-MCNC: 8.8 G/DL — LOW (ref 13–17)
LYMPHOCYTES # BLD AUTO: 0.97 K/UL — LOW (ref 1–3.3)
LYMPHOCYTES # BLD AUTO: 15.7 % — SIGNIFICANT CHANGE UP (ref 13–44)
LYMPHOCYTES # BLD AUTO: 40.7 % — SIGNIFICANT CHANGE UP (ref 13–44)
MACROCYTES BLD QL: SLIGHT — SIGNIFICANT CHANGE UP
MAGNESIUM SERPL-MCNC: 1.4 MG/DL — LOW (ref 1.6–2.6)
MANUAL SMEAR VERIFICATION: SIGNIFICANT CHANGE UP
MCHC RBC-ENTMCNC: 31.7 PG — SIGNIFICANT CHANGE UP (ref 27–34)
MCHC RBC-ENTMCNC: 33.7 GM/DL — SIGNIFICANT CHANGE UP (ref 32–36)
MCV RBC AUTO: 93.9 FL — SIGNIFICANT CHANGE UP (ref 80–100)
METAMYELOCYTES # FLD: 0.9 % — HIGH (ref 0–0)
MICROCYTES BLD QL: SLIGHT — SIGNIFICANT CHANGE UP
MONOCYTES # BLD AUTO: 0.27 K/UL — SIGNIFICANT CHANGE UP (ref 0–0.9)
MONOCYTES NFR BLD AUTO: 4.3 % — SIGNIFICANT CHANGE UP (ref 2–14)
MONOCYTES NFR BLD AUTO: 9.7 % — SIGNIFICANT CHANGE UP (ref 2–14)
MYELOCYTES NFR BLD: 2.6 % — HIGH (ref 0–0)
NEUTROPHILS # BLD AUTO: 4.53 K/UL — SIGNIFICANT CHANGE UP (ref 1.8–7.4)
NEUTROPHILS NFR BLD AUTO: 31.9 % — LOW (ref 43–77)
NEUTROPHILS NFR BLD AUTO: 68.7 % — SIGNIFICANT CHANGE UP (ref 43–77)
NEUTS BAND # BLD: 4.3 % — SIGNIFICANT CHANGE UP (ref 0–8)
NRBC # BLD: 2 /100 — HIGH (ref 0–0)
OVALOCYTES BLD QL SMEAR: SLIGHT — SIGNIFICANT CHANGE UP
PLAT MORPH BLD: NORMAL — SIGNIFICANT CHANGE UP
PLAT MORPH BLD: NORMAL — SIGNIFICANT CHANGE UP
PLATELET # BLD AUTO: 258 K/UL — SIGNIFICANT CHANGE UP (ref 150–400)
POIKILOCYTOSIS BLD QL AUTO: SLIGHT — SIGNIFICANT CHANGE UP
POLYCHROMASIA BLD QL SMEAR: SIGNIFICANT CHANGE UP
POLYCHROMASIA BLD QL SMEAR: SIGNIFICANT CHANGE UP
POTASSIUM SERPL-MCNC: 3.2 MMOL/L — LOW (ref 3.5–5.3)
POTASSIUM SERPL-SCNC: 3.2 MMOL/L — LOW (ref 3.5–5.3)
PROMYELOCYTES # FLD: 0.9 % — HIGH (ref 0–0)
RBC # BLD: 2.78 M/UL — LOW (ref 4.2–5.8)
RBC # FLD: 15.5 % — HIGH (ref 10.3–14.5)
RBC BLD AUTO: ABNORMAL
RBC BLD AUTO: ABNORMAL
SODIUM SERPL-SCNC: 135 MMOL/L — SIGNIFICANT CHANGE UP (ref 135–145)
SPECIMEN SOURCE: SIGNIFICANT CHANGE UP
VANCOMYCIN TROUGH SERPL-MCNC: 15.7 UG/ML — SIGNIFICANT CHANGE UP (ref 10–20)
WBC # BLD: 6.21 K/UL — SIGNIFICANT CHANGE UP (ref 3.8–10.5)
WBC # FLD AUTO: 6.21 K/UL — SIGNIFICANT CHANGE UP (ref 3.8–10.5)

## 2023-08-20 PROCEDURE — 99233 SBSQ HOSP IP/OBS HIGH 50: CPT

## 2023-08-20 RX ORDER — MAGNESIUM SULFATE 500 MG/ML
2 VIAL (ML) INJECTION ONCE
Refills: 0 | Status: COMPLETED | OUTPATIENT
Start: 2023-08-20 | End: 2023-08-20

## 2023-08-20 RX ORDER — FUROSEMIDE 40 MG
20 TABLET ORAL DAILY
Refills: 0 | Status: DISCONTINUED | OUTPATIENT
Start: 2023-08-20 | End: 2023-08-21

## 2023-08-20 RX ORDER — POTASSIUM CHLORIDE 20 MEQ
40 PACKET (EA) ORAL EVERY 4 HOURS
Refills: 0 | Status: COMPLETED | OUTPATIENT
Start: 2023-08-20 | End: 2023-08-20

## 2023-08-20 RX ADMIN — Medication 250 MILLIGRAM(S): at 17:55

## 2023-08-20 RX ADMIN — Medication 40 MILLIEQUIVALENT(S): at 08:11

## 2023-08-20 RX ADMIN — PIPERACILLIN AND TAZOBACTAM 25 GRAM(S): 4; .5 INJECTION, POWDER, LYOPHILIZED, FOR SOLUTION INTRAVENOUS at 13:38

## 2023-08-20 RX ADMIN — Medication 40 MILLIEQUIVALENT(S): at 11:41

## 2023-08-20 RX ADMIN — MINOCYCLINE HYDROCHLORIDE 100 MILLIGRAM(S): 45 TABLET, EXTENDED RELEASE ORAL at 05:22

## 2023-08-20 RX ADMIN — Medication 480 MICROGRAM(S): at 11:37

## 2023-08-20 RX ADMIN — Medication 2 MILLIGRAM(S): at 05:22

## 2023-08-20 RX ADMIN — MINOCYCLINE HYDROCHLORIDE 100 MILLIGRAM(S): 45 TABLET, EXTENDED RELEASE ORAL at 17:55

## 2023-08-20 RX ADMIN — Medication 250 MILLIGRAM(S): at 05:21

## 2023-08-20 RX ADMIN — Medication 2.5 MILLIGRAM(S): at 15:08

## 2023-08-20 RX ADMIN — PIPERACILLIN AND TAZOBACTAM 25 GRAM(S): 4; .5 INJECTION, POWDER, LYOPHILIZED, FOR SOLUTION INTRAVENOUS at 21:10

## 2023-08-20 RX ADMIN — ENOXAPARIN SODIUM 40 MILLIGRAM(S): 100 INJECTION SUBCUTANEOUS at 05:22

## 2023-08-20 RX ADMIN — Medication 40 MILLIGRAM(S): at 05:21

## 2023-08-20 RX ADMIN — CARVEDILOL PHOSPHATE 6.25 MILLIGRAM(S): 80 CAPSULE, EXTENDED RELEASE ORAL at 05:22

## 2023-08-20 RX ADMIN — Medication 650 MILLIGRAM(S): at 14:30

## 2023-08-20 RX ADMIN — Medication 2000 UNIT(S): at 11:37

## 2023-08-20 RX ADMIN — PIPERACILLIN AND TAZOBACTAM 25 GRAM(S): 4; .5 INJECTION, POWDER, LYOPHILIZED, FOR SOLUTION INTRAVENOUS at 05:22

## 2023-08-20 RX ADMIN — Medication 0.25 MILLIGRAM(S): at 21:08

## 2023-08-20 RX ADMIN — Medication 81 MILLIGRAM(S): at 11:37

## 2023-08-20 RX ADMIN — CARVEDILOL PHOSPHATE 6.25 MILLIGRAM(S): 80 CAPSULE, EXTENDED RELEASE ORAL at 17:55

## 2023-08-20 RX ADMIN — SIMVASTATIN 40 MILLIGRAM(S): 20 TABLET, FILM COATED ORAL at 21:08

## 2023-08-20 RX ADMIN — Medication 25 GRAM(S): at 20:46

## 2023-08-20 RX ADMIN — Medication 650 MILLIGRAM(S): at 13:38

## 2023-08-20 NOTE — PROGRESS NOTE ADULT - ASSESSMENT
62M with PMHX rectal adenocarcinoma c/b mets to bladder and prostate on current chemo FOLFIRI/Panitumamab (Last dose 8/10), s/p Lower Abd Resection/Abdominoperineal Resection (2020, 2022), S/P fistula repair between bowel and bladder, HTN, HLD, CAD s/p PCI x5 was sent in from [facility]'s office at [place] with suspected neutropenic fever. Patient was seen at [facility], found to have fever of 100.6, Fever workup performed, +UTI, WBC 1.4, ANC 0.5, CT abd/pelvis showed unchanged presacral mass with development of internal gas focci, possible necrosis or infection, thickening of urinary bladder possible cystitis, new trace amount of air within the nondependent portions of the bladder lumen, possible instrumentation or fistulization. Patient admitted to medicine for further workup and treatment of neutropenic fever. Surgical consult placed by ED for further recommendations based on CT findings. Patient has not travelled within the past 30 days. Patient has no sick contacts. Per pt daughter Erma, patient chemo was held for 8 weeks prior to fistula repair 7/2023 and since resumption of chemo pt has had diarrhea, Stool production 3-4x/day out of colostomy, chills and malaise x4 days prior to bringing patient to [facility]. Denies any other complaints   AS ABOVE  LAST CHEMO LAST THURSDAY HAD FEVERS AT HOME     NEUTROPENIA ON ADMISSION  PLACED ON ABX   BLOOD   CX NEG  URINE CX PENDING  HOWEVER TAKEN FROM ILEAL CONDUIT MAY ALWAYS HAVE SOME BACTERIA   WBC UP TO 6 TODAY        ONC FOLLOW UP NOTED   AS NEUTROPENIA RESOLVED  POSSIBLE D/C SOON  WILL D/W Hospitalist

## 2023-08-20 NOTE — PROGRESS NOTE ADULT - ASSESSMENT
62M with PMHX rectal adenocarcinoma c/b mets to bladder and prostate on current chemo FOLFIRI/Panitumamab (Last dose 8/10), s/p Lower Abd Resection/Abdominoperineal Resection (2020, 2022), S/P fistula repair between bowel and bladder, HTN, HLD, CAD s/p PCI x5 was sent in from [facility]'s office at [place] with suspected neutropenic fever and diarrhea.     Met Rectal ca   - on active chemo FOLFIRI + Panitumamab ; last dose 8/10  - pt received Neulasta 8/12  - pt to resume care with [facility] after d/c     Neutropenic Fever   - WBC 1.2;  on admit;  WBC today 6.2 after Zarxio   - BCx pending  - Tmax 99  - pt on IV Zosyn , Vanco  - Resp panel MIRIAM with Covid - negative ([facility])  - ID following   - CT abd/pel ([facility]) - unchanged presacral mass with development of internal gas focci, possible necrosis or infection, thickening of urinary bladder possible cystitis, new trace amount of air within the nondependent portions of the bladder lumen, possible instrumentation or fistulization.  - Surgery on board.  no surgical intervention advised.   - d/c Zarxio   - BCx NTD     chronic diarrhea  - GI PCR panel ([facility]) - negative   - pt has rectal tube - watery yellow stool  - likely secondary to chemo.  pt on immodium prn - may cont as inpt   - replace K     d/c planning     Monroe Albert MD  [facility]  [phone]               Patient was seen at [facility], found to have fever of 100.6, Fever workup performed, +UTI, WBC 1.4, ANC 0.5, CT abd/pelvis showed unchanged presacral mass with development of internal gas focci, possible necrosis or infection, thickening of urinary bladder possible cystitis, new trace amount of air within the nondependent portions of the bladder lumen, possible instrumentation or fistulization. Patient admitted to medicine for further workup and treatment of neutropenic fever. Surgical consult placed by ED for further recommendations based on CT findings. Patient has not travelled within the past 30 days. Patient has no sick contacts. Per pt daughter Erma, patient chemo was held for 8 weeks prior to fistula repair 7/2023 and since resumption of chemo pt has had diarrhea, Stool production 3-4x/day out of colostomy, chills and malaise x4 days prior to bringing patient to [facility]. Denies any other complaints          62M with PMHX rectal adenocarcinoma c/b mets to bladder and prostate on current chemo FOLFIRI/Panitumamab (Last dose 8/10), s/p Lower Abd Resection/Abdominoperineal Resection (2020, 2022), S/P fistula repair between bowel and bladder, HTN, HLD, CAD s/p PCI x5 was sent in from [facility]'s office at [place] with suspected neutropenic fever and diarrhea.     Met Rectal ca   - on active chemo FOLFIRI + Panitumamab ; last dose 8/10  - pt received Neulasta 8/12  - pt to resume care with [facility] after d/c     Neutropenic Fever   - WBC 1.2;  on admit;  WBC today 6.2 after Zarxio   - BCx pending  - Tmax 99  - pt on IV Zosyn , Vanco  - Resp panel MIRIAM with Covid - negative ([facility])  - ID following   - CT abd/pel ([facility]) - unchanged presacral mass with development of internal gas focci, possible necrosis or infection, thickening of urinary bladder possible cystitis, new trace amount of air within the nondependent portions of the bladder lumen, possible instrumentation or fistulization.  - Surgery on board.  no surgical intervention advised.   - d/c Zarxio   - BCx NTD     chronic diarrhea  - GI PCR panel ([facility]) - negative   - pt has rectal tube - watery yellow stool  - likely secondary to chemo.  pt on immodium prn - may cont as inpt   - replace K     d/c planning ; likely for am     Monroe Albert MD  [facility]  [phone]               Patient was seen at [facility], found to have fever of 100.6, Fever workup performed, +UTI, WBC 1.4, ANC 0.5, CT abd/pelvis showed unchanged presacral mass with development of internal gas focci, possible necrosis or infection, thickening of urinary bladder possible cystitis, new trace amount of air within the nondependent portions of the bladder lumen, possible instrumentation or fistulization. Patient admitted to medicine for further workup and treatment of neutropenic fever. Surgical consult placed by ED for further recommendations based on CT findings. Patient has not travelled within the past 30 days. Patient has no sick contacts. Per pt daughter Erma, patient chemo was held for 8 weeks prior to fistula repair 7/2023 and since resumption of chemo pt has had diarrhea, Stool production 3-4x/day out of colostomy, chills and malaise x4 days prior to bringing patient to [facility]. Denies any other complaints          62M with PMHX rectal adenocarcinoma c/b mets to bladder and prostate on current chemo FOLFIRI/Panitumamab (Last dose 8/10), s/p Lower Abd Resection/Abdominoperineal Resection (2020, 2022), S/P fistula repair between bowel and bladder, HTN, HLD, CAD s/p PCI x5 was sent in from [facility]'s office at [place] with suspected neutropenic fever and diarrhea.     Met Rectal ca   - on active chemo FOLFIRI + Panitumamab ; last dose 8/10  - pt received Neulasta 8/12  - pt to resume care with [facility] after d/c     Neutropenic Fever   - WBC 1.2;  on admit;  WBC today 6.2 after Zarxio   - BCx pending  - Tmax 99  - pt on IV Zosyn   - Resp panel MIRIAM with Covid - negative ([facility])  - ID following   - CT abd/pel ([facility]) - unchanged presacral mass with development of internal gas focci, possible necrosis or infection, thickening of urinary bladder possible cystitis, new trace amount of air within the nondependent portions of the bladder lumen, possible instrumentation or fistulization.  - Surgery on board.  no surgical intervention advised.   - d/c Zarxio   - BCx NTD   - UCx from [facility] 8/18 - >100,000 E faecium and P mirabilis.   ID notified.  likely taken from ileal conduit which has less relevance.     chronic diarrhea  - GI PCR panel ([facility]) - negative   - pt has rectal tube - watery yellow stool  - likely secondary to chemo.  pt on immodium prn - may cont as inpt   - replace K     d/c planning ; likely for am     Monroe Albert MD  [facility]  [phone]               Patient was seen at [facility], found to have fever of 100.6, Fever workup performed, +UTI, WBC 1.4, ANC 0.5, CT abd/pelvis showed unchanged presacral mass with development of internal gas focci, possible necrosis or infection, thickening of urinary bladder possible cystitis, new trace amount of air within the nondependent portions of the bladder lumen, possible instrumentation or fistulization. Patient admitted to medicine for further workup and treatment of neutropenic fever. Surgical consult placed by ED for further recommendations based on CT findings. Patient has not travelled within the past 30 days. Patient has no sick contacts. Per pt daughter Erma, patient chemo was held for 8 weeks prior to fistula repair 7/2023 and since resumption of chemo pt has had diarrhea, Stool production 3-4x/day out of colostomy, chills and malaise x4 days prior to bringing patient to [facility]. Denies any other complaints

## 2023-08-20 NOTE — PROGRESS NOTE ADULT - ASSESSMENT
61 y/o F w/ PMH of rectal adenocarcinoma mets to bladder and prostate (Last chemo 8/10 FOLFRI/panitumamab), status post LAR in 2020 and APR in 2022, s/p fistula repair between bowel and bladder, HTN, HLD, CAD was sent in from [facility]'s office at [place] for Neutropenic Fever 2/2 Acute Complicated UTI and infected presacral mass with necrosis, chronic diarrhea, hypomagnesemia, and chronic b/l LE Pitting Edema.  8/18/23 CT a/p w/ contrast obtained at [facility] w/ impression reporting unchanged presecral mass but w/ development of internal gas foci, possibly from necrosis or superimposed infection.  Unchanged drain position w/ the mass.  Also reported unchanged circumferential wall thickening of the urinary bladder w/ surrounding fat stranding, probably cystitis.  In addition, new trace amount of air within the non-dependent portions of the bladder lumen, possibly due to recent instrumentation versus result of fistulization with posteriorly adjacent mass.  ID following and on antibiotics.        Neutropenic fever 2/2 complicated UTI vs infected presacral mass with necrosis   - CT imaging findings from [facility] above   - CXR pending but clinically benign exam and asymptomtic  - RVP w/ covid at [facility] (-)   - UA + at [facility] and Ucxs from 8/18 growing >100,000 E faecium and P mirabilis; ID aware and likely also taken from ilieal conduit therefore likely contaminant   - ANC improved s/p dose of filgrastrim (>4k today)  - Noted to have blasts (0.9%) and bands 4.3% on differential today   - Bcxs 08/18 NGTD and Ucx 08/08 w/ >3 organisms suspected to be contaminant (UA collected from ileal conduit)  - Discussed w/ ID and will d/c vanco but c/w zosyn for now per recs  - No surg intervention at this time as per surgery   - Heme/Onc following and recs noted       Chronic diarrhea   Hypokalemia / Hypomagnesemia   - Likely chemo induced   - GI pcr at [facility] (-); repeated at [facility] and pending  - Hypokalemia and hypomagnesemia likely 2/2 diarrhea and IV lasix further exacerbation  - Mag and K to be supplemented    - Clinically euvolemic therefore will switch to po lasix   - Started on imodium during hospital course and diarrhea improving   - Monitor lytes and maintain K>4 and Mg>2  - Monitor stool counts       Normocytic anemia 2/2 malignancy on chemo  - Baseline Hb appears to range btw 8-9  - Hemodynamically stable and no active bleeding reported at this time  - Monitor CBC and transfuse for Hb<8 given hx of cad       Metastatic Rectal CA  - On active chemo, last dose 8/10  - Follows with [facility]    - Heme/onc recs noted       Chronic B/L Lower Extremity Edema  Chronic HFrEF  - LE edema improving   - Clinically euvolemic at [facility]; low suspicion for CHF exacerbation   - TTE 02/2023 reporting LVEF 40-45%  - More likely third spacing given hypoalbumenemia   - Will deescalate IV lasix to po   - Uses Lasix 20mg PRN leg swelling  - Follows w/ Dr. Amos outpt      CAD s/p stents (in 2006 & 2018)  A fib, currently rate controlled  - c/w ASA, BB, statin   - No AC for A fib per pt choice and hx of GI bleed   - Maintain K>4 and Mg>2      HTN /  HLD  - c/w Coreg and statin         VTE ppx: Lovenox 40mg daily      Dispo: Anticipate d/c in AM if remains stable.

## 2023-08-20 NOTE — PROGRESS NOTE ADULT - SUBJECTIVE AND OBJECTIVE BOX
Patient is a 62y old  Male who presents with a chief complaint of Neutropenic Fever (19 Aug 2023 07:22)      HPI:  62M with PMHX rectal adenocarcinoma c/b mets to bladder and prostate on current chemo FOLFIRI/Panitumamab (Last dose 8/10), s/p Lower Abd Resection/Abdominoperineal Resection (2020, 2022), S/P fistula repair between bowel and bladder, HTN, HLD, CAD s/p PCI x5 was sent in from [facility]'s office at [place] with suspected neutropenic fever. Patient was seen at [facility], found to have fever of 100.6, Fever workup performed, +UTI, WBC 1.4, ANC 0.5, CT abd/pelvis showed unchanged presacral mass with development of internal gas focci, possible necrosis or infection, thickening of urinary bladder possible cystitis, new trace amount of air within the nondependent portions of the bladder lumen, possible instrumentation or fistulization. Patient admitted to medicine for further workup and treatment of neutropenic fever. Surgical consult placed by ED for further recommendations based on CT findings. Patient has not travelled within the past 30 days. Patient has no sick contacts. Per pt daughter Erma, patient chemo was held for 8 weeks prior to fistula repair 7/2023 and since resumption of chemo pt has had diarrhea, Stool production 3-4x/day out of colostomy, chills and malaise x4 days prior to bringing patient to [facility]. Denies any other complaints     pt feels well today   no acute complaints  wants to go home   K 3.2        REVIEW OF SYSTEMS:    CONSTITUTIONAL: No weakness, fevers or chills  EYES/ENT: No visual changes;  No vertigo or throat pain   NECK: No pain or stiffness  RESPIRATORY: No cough, wheezing, hemoptysis; No shortness of breath  CARDIOVASCULAR: No chest pain or palpitations  GASTROINTESTINAL: diarrhea   GENITOURINARY: No dysuria, frequency or hematuria  NEUROLOGICAL: No numbness or weakness  SKIN: No itching, burning, rashes, or lesions   All other review of systems is negative unless indicated above.    PAST MEDICAL & SURGICAL HISTORY:  HTN (hypertension)      HLD (hyperlipidemia)      CAD (coronary artery disease)      Mitral regurgitation      Rectal cancer  2/2019 s/p chemo and radiation; recurrence of cancer 2022      Depression  with rectal cancer      Neuropathy  feet s/p chemo      Stented coronary artery  circumflex 2005 LAD 2017 x4      H/O carotid stenosis      H/O renal calculi      DANO (iron deficiency anemia)      Umbilical hernia      Splenic artery aneurysm      H/O cardiac catheterization  2005 2017  4 STENTS      History of CEA (carotid endarterectomy)  right 2019      History of rectal surgery  with ileostomy 2/2020      H/O sigmoidoscopy  x2      History of removal of Port-a-Cath          SOCIAL HISTORY:    FAMILY HISTORY:  FH: heart disease  father    FH: ALS (amyotrophic lateral sclerosis) (Mother)        MEDICATIONS  (STANDING):  aspirin enteric coated 81 milliGRAM(s) Oral daily  carvedilol 6.25 milliGRAM(s) Oral every 12 hours  cholecalciferol 2000 Unit(s) Oral daily  dronabinol 2.5 milliGRAM(s) Oral <User Schedule>  enoxaparin Injectable 40 milliGRAM(s) SubCutaneous every 24 hours  filgrastim-sndz (ZARXIO) Injectable 480 MICROGram(s) SubCutaneous daily  furosemide   Injectable 40 milliGRAM(s) IV Push daily  minocycline 100 milliGRAM(s) Oral two times a day  piperacillin/tazobactam IVPB.. 3.375 Gram(s) IV Intermittent every 8 hours  potassium chloride    Tablet ER 40 milliEquivalent(s) Oral every 4 hours  simvastatin 40 milliGRAM(s) Oral at bedtime  vancomycin  IVPB 1000 milliGRAM(s) IV Intermittent every 12 hours    MEDICATIONS  (PRN):  acetaminophen     Tablet .. 650 milliGRAM(s) Oral every 6 hours PRN Temp greater or equal to 38C (100.4F), Mild Pain (1 - 3)  ALPRAZolam 0.25 milliGRAM(s) Oral at bedtime PRN for insomnia/anxiety  aluminum hydroxide/magnesium hydroxide/simethicone Suspension 30 milliLiter(s) Oral every 4 hours PRN Dyspepsia  melatonin 3 milliGRAM(s) Oral at bedtime PRN Insomnia  ondansetron Injectable 4 milliGRAM(s) IV Push every 8 hours PRN Nausea and/or Vomiting      Allergies    No Known Allergies    Intolerances    Vital Signs Last 24 Hrs  T(C): 37.2 (20 Aug 2023 11:34), Max: 37.3 (20 Aug 2023 08:40)  T(F): 99 (20 Aug 2023 11:34), Max: 99.1 (20 Aug 2023 08:40)  HR: 104 (20 Aug 2023 11:34) (88 - 104)  BP: 108/69 (20 Aug 2023 11:34) (105/67 - 126/71)  BP(mean): --  RR: 18 (20 Aug 2023 11:34) (17 - 18)  SpO2: 99% (20 Aug 2023 11:34) (97% - 99%)    Parameters below as of 20 Aug 2023 11:34  Patient On (Oxygen Delivery Method): room air        PHYSICAL EXAM  General: adult in NAD  HEENT: clear oropharynx, anicteric sclera, pink conjunctiva  Neck: supple  CV: normal S1/S2 with no murmur rubs or gallops  Lungs: positive air movement b/l ant lungs,clear to auscultation, no wheezes, no rales  Abdomen: soft non-tender non-distended, no hepatosplenomegaly  Ext: no clubbing cyanosis or edema  Skin: no rashes and no petechiae  Neuro: alert and oriented X 4, no focal deficits      LABS:                          8.8    6.21  )-----------( 258      ( 20 Aug 2023 05:24 )             26.1                             8.3    1.21  )-----------( 222      ( 19 Aug 2023 06:11 )             24.5         Mean Cell Volume : 93.9 fl  Mean Cell Hemoglobin : 31.8 pg  Mean Cell Hemoglobin Concentration : 33.9 gm/dL  Auto Neutrophil # : 0.51 K/uL  Auto Lymphocyte # : 0.39 K/uL  Auto Monocyte # : 0.20 K/uL  Auto Eosinophil # : 0.05 K/uL  Auto Basophil # : 0.01 K/uL  Auto Neutrophil % : 41.6 %  Auto Lymphocyte % : 31.9 %  Auto Monocyte % : 16.8 %  Auto Eosinophil % : 4.4 %  Auto Basophil % : 0.9 %      Serial CBC's  08-19 @ 06:11  Hct-24.5 / Hgb-8.3 / Plat-222 / RBC-2.61 / WBC-1.21  Serial CBC's  08-18 @ 21:11  Hct-24.1 / Hgb-8.3 / Plat-185 / RBC-2.55 / WBC-1.35      08-19    129<L>  |  95<L>  |  7.7<L>  ----------------------------<  129<H>  3.0<L>   |  22.0  |  0.90    Ca    8.0<L>      19 Aug 2023 06:11  Phos  2.6     08-19  Mg     1.7     08-19    TPro  5.3<L>  /  Alb  2.8<L>  /  TBili  1.7  /  DBili  x   /  AST  42<H>  /  ALT  30  /  AlkPhos  116  08-19                      BLOOD SMEAR INTERPRETATION:       RADIOLOGY & ADDITIONAL STUDIES:         Patient is a 62y old  Male who presents with a chief complaint of Neutropenic Fever (19 Aug 2023 07:22)      HPI:  62M with PMHX rectal adenocarcinoma c/b mets to bladder and prostate on current chemo FOLFIRI/Panitumamab (Last dose 8/10), s/p Lower Abd Resection/Abdominoperineal Resection (2020, 2022), S/P fistula repair between bowel and bladder, HTN, HLD, CAD s/p PCI x5 was sent in from [facility]'s office at [place] with suspected neutropenic fever. Patient was seen at [facility], found to have fever of 100.6, Fever workup performed, +UTI, WBC 1.4, ANC 0.5, CT abd/pelvis showed unchanged presacral mass with development of internal gas focci, possible necrosis or infection, thickening of urinary bladder possible cystitis, new trace amount of air within the nondependent portions of the bladder lumen, possible instrumentation or fistulization. Patient admitted to medicine for further workup and treatment of neutropenic fever. Surgical consult placed by ED for further recommendations based on CT findings. Patient has not travelled within the past 30 days. Patient has no sick contacts. Per pt daughter Erma, patient chemo was held for 8 weeks prior to fistula repair 7/2023 and since resumption of chemo pt has had diarrhea, Stool production 3-4x/day out of colostomy, chills and malaise x4 days prior to bringing patient to [facility]. Denies any other complaints     pt feels well today   no acute complaints  wants to go home   still with watery stool   K 3.2        REVIEW OF SYSTEMS:    CONSTITUTIONAL: No weakness, fevers or chills  EYES/ENT: No visual changes;  No vertigo or throat pain   NECK: No pain or stiffness  RESPIRATORY: No cough, wheezing, hemoptysis; No shortness of breath  CARDIOVASCULAR: No chest pain or palpitations  GASTROINTESTINAL: diarrhea   GENITOURINARY: No dysuria, frequency or hematuria  NEUROLOGICAL: No numbness or weakness  SKIN: No itching, burning, rashes, or lesions   All other review of systems is negative unless indicated above.    PAST MEDICAL & SURGICAL HISTORY:  HTN (hypertension)      HLD (hyperlipidemia)      CAD (coronary artery disease)      Mitral regurgitation      Rectal cancer  2/2019 s/p chemo and radiation; recurrence of cancer 2022      Depression  with rectal cancer      Neuropathy  feet s/p chemo      Stented coronary artery  circumflex 2005 LAD 2017 x4      H/O carotid stenosis      H/O renal calculi      DANO (iron deficiency anemia)      Umbilical hernia      Splenic artery aneurysm      H/O cardiac catheterization  2005 2017  4 STENTS      History of CEA (carotid endarterectomy)  right 2019      History of rectal surgery  with ileostomy 2/2020      H/O sigmoidoscopy  x2      History of removal of Port-a-Cath          SOCIAL HISTORY:    FAMILY HISTORY:  FH: heart disease  father    FH: ALS (amyotrophic lateral sclerosis) (Mother)        MEDICATIONS  (STANDING):  aspirin enteric coated 81 milliGRAM(s) Oral daily  carvedilol 6.25 milliGRAM(s) Oral every 12 hours  cholecalciferol 2000 Unit(s) Oral daily  dronabinol 2.5 milliGRAM(s) Oral <User Schedule>  enoxaparin Injectable 40 milliGRAM(s) SubCutaneous every 24 hours  filgrastim-sndz (ZARXIO) Injectable 480 MICROGram(s) SubCutaneous daily  furosemide   Injectable 40 milliGRAM(s) IV Push daily  minocycline 100 milliGRAM(s) Oral two times a day  piperacillin/tazobactam IVPB.. 3.375 Gram(s) IV Intermittent every 8 hours  potassium chloride    Tablet ER 40 milliEquivalent(s) Oral every 4 hours  simvastatin 40 milliGRAM(s) Oral at bedtime  vancomycin  IVPB 1000 milliGRAM(s) IV Intermittent every 12 hours    MEDICATIONS  (PRN):  acetaminophen     Tablet .. 650 milliGRAM(s) Oral every 6 hours PRN Temp greater or equal to 38C (100.4F), Mild Pain (1 - 3)  ALPRAZolam 0.25 milliGRAM(s) Oral at bedtime PRN for insomnia/anxiety  aluminum hydroxide/magnesium hydroxide/simethicone Suspension 30 milliLiter(s) Oral every 4 hours PRN Dyspepsia  melatonin 3 milliGRAM(s) Oral at bedtime PRN Insomnia  ondansetron Injectable 4 milliGRAM(s) IV Push every 8 hours PRN Nausea and/or Vomiting      Allergies    No Known Allergies    Intolerances    Vital Signs Last 24 Hrs  T(C): 37.2 (20 Aug 2023 11:34), Max: 37.3 (20 Aug 2023 08:40)  T(F): 99 (20 Aug 2023 11:34), Max: 99.1 (20 Aug 2023 08:40)  HR: 104 (20 Aug 2023 11:34) (88 - 104)  BP: 108/69 (20 Aug 2023 11:34) (105/67 - 126/71)  BP(mean): --  RR: 18 (20 Aug 2023 11:34) (17 - 18)  SpO2: 99% (20 Aug 2023 11:34) (97% - 99%)    Parameters below as of 20 Aug 2023 11:34  Patient On (Oxygen Delivery Method): room air        PHYSICAL EXAM  General: adult in NAD  HEENT: clear oropharynx, anicteric sclera, pink conjunctiva  Neck: supple  CV: normal S1/S2 with no murmur rubs or gallops  Lungs: positive air movement b/l ant lungs,clear to auscultation, no wheezes, no rales  Abdomen: soft non-tender non-distended, no hepatosplenomegaly  Ext: no clubbing cyanosis or edema  Skin: no rashes and no petechiae  Neuro: alert and oriented X 4, no focal deficits      LABS:                          8.8    6.21  )-----------( 258      ( 20 Aug 2023 05:24 )             26.1                             8.3    1.21  )-----------( 222      ( 19 Aug 2023 06:11 )             24.5         Mean Cell Volume : 93.9 fl  Mean Cell Hemoglobin : 31.8 pg  Mean Cell Hemoglobin Concentration : 33.9 gm/dL  Auto Neutrophil # : 0.51 K/uL  Auto Lymphocyte # : 0.39 K/uL  Auto Monocyte # : 0.20 K/uL  Auto Eosinophil # : 0.05 K/uL  Auto Basophil # : 0.01 K/uL  Auto Neutrophil % : 41.6 %  Auto Lymphocyte % : 31.9 %  Auto Monocyte % : 16.8 %  Auto Eosinophil % : 4.4 %  Auto Basophil % : 0.9 %      Serial CBC's  08-19 @ 06:11  Hct-24.5 / Hgb-8.3 / Plat-222 / RBC-2.61 / WBC-1.21  Serial CBC's  08-18 @ 21:11  Hct-24.1 / Hgb-8.3 / Plat-185 / RBC-2.55 / WBC-1.35      08-19    129<L>  |  95<L>  |  7.7<L>  ----------------------------<  129<H>  3.0<L>   |  22.0  |  0.90    Ca    8.0<L>      19 Aug 2023 06:11  Phos  2.6     08-19  Mg     1.7     08-19    TPro  5.3<L>  /  Alb  2.8<L>  /  TBili  1.7  /  DBili  x   /  AST  42<H>  /  ALT  30  /  AlkPhos  116  08-19                      BLOOD SMEAR INTERPRETATION:       RADIOLOGY & ADDITIONAL STUDIES:

## 2023-08-20 NOTE — PROGRESS NOTE ADULT - SUBJECTIVE AND OBJECTIVE BOX
INFECTIOUS DISEASES AND INTERNAL MEDICINE at [place]  =======================================================  Andrea Forde MD  Diplomates American Board of Internal Medicine and Infectious Diseases  Telephone [phone]  Fax            [phone]  =======================================================    [place] [id number]    Follow up:    Allergies:  No Known Allergies      Medications:  acetaminophen     Tablet .. 650 milliGRAM(s) Oral every 6 hours PRN  ALPRAZolam 0.25 milliGRAM(s) Oral at bedtime PRN  aluminum hydroxide/magnesium hydroxide/simethicone Suspension 30 milliLiter(s) Oral every 4 hours PRN  aspirin enteric coated 81 milliGRAM(s) Oral daily  carvedilol 6.25 milliGRAM(s) Oral every 12 hours  cholecalciferol 2000 Unit(s) Oral daily  dronabinol 2.5 milliGRAM(s) Oral <User Schedule>  enoxaparin Injectable 40 milliGRAM(s) SubCutaneous every 24 hours  filgrastim-sndz (ZARXIO) Injectable 480 MICROGram(s) SubCutaneous daily  furosemide   Injectable 40 milliGRAM(s) IV Push daily  loperamide 2 milliGRAM(s) Oral four times a day PRN  melatonin 3 milliGRAM(s) Oral at bedtime PRN  minocycline 100 milliGRAM(s) Oral two times a day  ondansetron Injectable 4 milliGRAM(s) IV Push every 8 hours PRN  piperacillin/tazobactam IVPB.. 3.375 Gram(s) IV Intermittent every 8 hours  potassium chloride    Tablet ER 40 milliEquivalent(s) Oral every 4 hours  simvastatin 40 milliGRAM(s) Oral at bedtime  vancomycin  IVPB 1000 milliGRAM(s) IV Intermittent every 12 hours    SOCIAL       FAMILY   FAMILY HISTORY:  FH: heart disease  father    FH: ALS (amyotrophic lateral sclerosis) (Mother)      REVIEW OF SYSTEMS:  CONSTITUTIONAL:  No Fever or chills  HEENT:   No diplopia or blurred vision.  No earache, sore throat or runny nose.  CARDIOVASCULAR:  No pressure, squeezing, strangling, tightness, heaviness or aching about the chest, neck, axilla or epigastrium.  RESPIRATORY:  No cough, shortness of breath, PND or orthopnea.  GASTROINTESTINAL:  No nausea, vomiting or diarrhea.  GENITOURINARY:  No dysuria, frequency or urgency. No Blood in urine  MUSCULOSKELETAL:   moves all joints  SKIN:  No change in skin, hair or nails.  NEUROLOGIC:  No paresthesias, fasciculations, seizures or weakness.  PSYCHIATRIC:  No disorder of thought or mood.  ENDOCRINE:  No heat or cold intolerance, polyuria or polydipsia.  HEMATOLOGICAL:  No easy bruising or bleeding.            Physical Exam:  ICU Vital Signs Last 24 Hrs  T(C): 36.6 (20 Aug 2023 04:06), Max: 37 (19 Aug 2023 17:42)  T(F): 97.8 (20 Aug 2023 04:06), Max: 98.6 (19 Aug 2023 17:42)  HR: 88 (20 Aug 2023 04:06) (88 - 102)  BP: 108/64 (20 Aug 2023 04:06) (105/67 - 126/71)  BP(mean): --  ABP: --  ABP(mean): --  RR: 18 (20 Aug 2023 04:06) (17 - 18)  SpO2: 98% (20 Aug 2023 04:06) (97% - 99%)    O2 Parameters below as of 20 Aug 2023 04:06  Patient On (Oxygen Delivery Method): room air          GEN: NAD,   HEENT: normocephalic and atraumatic. EOMI. JOLEEN.    NECK: Supple. No carotid bruits.  No lymphadenopathy or thyromegaly.  LUNGS: Clear to auscultation.  HEART: Regular rate and rhythm without murmur.  ABDOMEN: Soft, nontender, and nondistended.  Positive bowel sounds.    : No CVA tenderness  EXTREMITIES: Without any cyanosis, clubbing, rash, lesions or edema.  MSK: no joint swelling  NEUROLOGIC: Cranial nerves II through XII are grossly intact.  PSYCHIATRIC: Appropriate affect .  SKIN: No ulceration or induration present.        Labs:  Vitals:  ============  T(F): 97.8 (20 Aug 2023 04:06), Max: 98.6 (19 Aug 2023 17:42)  HR: 88 (20 Aug 2023 04:06)  BP: 108/64 (20 Aug 2023 04:06)  RR: 18 (20 Aug 2023 04:06)  SpO2: 98% (20 Aug 2023 04:06) (97% - 99%)  temp max in last 48H T(F): , Max: 100.3 (08-19-23 @ 04:24)    =======================================================  Current Antibiotics:  minocycline 100 milliGRAM(s) Oral two times a day  piperacillin/tazobactam IVPB.. 3.375 Gram(s) IV Intermittent every 8 hours  vancomycin  IVPB 1000 milliGRAM(s) IV Intermittent every 12 hours    Other medications:  aspirin enteric coated 81 milliGRAM(s) Oral daily  carvedilol 6.25 milliGRAM(s) Oral every 12 hours  cholecalciferol 2000 Unit(s) Oral daily  dronabinol 2.5 milliGRAM(s) Oral <User Schedule>  enoxaparin Injectable 40 milliGRAM(s) SubCutaneous every 24 hours  filgrastim-sndz (ZARXIO) Injectable 480 MICROGram(s) SubCutaneous daily  furosemide   Injectable 40 milliGRAM(s) IV Push daily  potassium chloride    Tablet ER 40 milliEquivalent(s) Oral every 4 hours  simvastatin 40 milliGRAM(s) Oral at bedtime      =======================================================  Labs:                        8.8    6.21  )-----------( 258      ( 20 Aug 2023 05:24 )             26.1     08-20    135  |  99  |  7.0<L>  ----------------------------<  98  3.2<L>   |  23.0  |  0.95    Ca    8.5      20 Aug 2023 05:24  Phos  2.6     08-19  Mg     1.7     08-19    TPro  5.3<L>  /  Alb  2.8<L>  /  TBili  1.7  /  DBili  x   /  AST  42<H>  /  ALT  30  /  AlkPhos  116  08-19      Culture - Blood (collected 08-18-23 @ 21:11)  Source: .Blood Blood    Culture - Blood (collected 08-18-23 @ 21:10)  Source: .Blood Blood      Creatinine: 0.95 mg/dL (08-20-23 @ 05:24)  Creatinine: 0.90 mg/dL (08-19-23 @ 06:11)  Creatinine: 0.96 mg/dL (08-18-23 @ 21:11)            WBC Count: 6.21 K/uL (08-20-23 @ 05:24)  WBC Count: 1.21 K/uL (08-19-23 @ 06:11)  WBC Count: 1.35 K/uL (08-18-23 @ 21:11)        Alkaline Phosphatase: 116 U/L (08-19-23 @ 06:11)  Alkaline Phosphatase: 98 U/L (08-18-23 @ 21:11)  Alanine Aminotransferase (ALT/SGPT): 30 U/L (08-19-23 @ 06:11)  Alanine Aminotransferase (ALT/SGPT): 21 U/L (08-18-23 @ 21:11)  Aspartate Aminotransferase (AST/SGOT): 42 U/L (08-19-23 @ 06:11)  Aspartate Aminotransferase (AST/SGOT): 31 U/L (08-18-23 @ 21:11)  Bilirubin Total: 1.7 mg/dL (08-19-23 @ 06:11)  Bilirubin Total: 1.3 mg/dL (08-18-23 @ 21:11)

## 2023-08-20 NOTE — PROGRESS NOTE ADULT - SUBJECTIVE AND OBJECTIVE BOX
Chief Complaint:  neutropenic fever    SUBJECTIVE / OVERNIGHT EVENTS: no acute events reported overnight.  Pt offers no acute complaints at this time and reports improving diarrhea w/ stool now more formed.         I&O's Summary    19 Aug 2023 07:01  -  20 Aug 2023 07:00  --------------------------------------------------------  IN: 2000 mL / OUT: 0 mL / NET: 2000 mL          PHYSICAL EXAM:  Vital Signs Last 24 Hrs  T(C): 36.9 (20 Aug 2023 17:20), Max: 37.3 (20 Aug 2023 08:40)  T(F): 98.5 (20 Aug 2023 17:20), Max: 99.1 (20 Aug 2023 08:40)  HR: 96 (20 Aug 2023 17:20) (88 - 104)  BP: 133/77 (20 Aug 2023 17:20) (105/67 - 133/77)  BP(mean): --  RR: 19 (20 Aug 2023 17:20) (17 - 19)  SpO2: 98% (20 Aug 2023 17:20) (97% - 99%)    Parameters below as of 20 Aug 2023 17:20  Patient On (Oxygen Delivery Method): room air      GENERAL: pt examined bedside, laying comfortably in bed in NAD  HEENT: NC/AT, moist oral mucosa, clear conjunctiva, sclera nonicteric  RESPIRATORY: Normal respiratory effort, no wheezing, rhonchi, rales  CARDIOVASCULAR: RRR, normal S1 and S2  ABDOMEN: soft, NT/ND, +BS, +rectal drain  EXTREMITIES: No cynaosis, no clubbing, trace pretibial edema  NEUROLOGY: A+O to person, place, and time, no focal neurologic deficits appreciated   SKIN: pallor         LABS:                        8.8    6.21  )-----------( 258      ( 20 Aug 2023 05:24 )             26.1     08-20    135  |  99  |  7.0<L>  ----------------------------<  98  3.2<L>   |  23.0  |  0.95    Ca    8.5      20 Aug 2023 05:24  Phos  2.6     08-19  Mg     1.4     08-20    TPro  5.3<L>  /  Alb  2.8<L>  /  TBili  1.7  /  DBili  x   /  AST  42<H>  /  ALT  30  /  AlkPhos  116  08-19          Urinalysis Basic - ( 20 Aug 2023 05:24 )    Color: x / Appearance: x / SG: x / pH: x  Gluc: 98 mg/dL / Ketone: x  / Bili: x / Urobili: x   Blood: x / Protein: x / Nitrite: x   Leuk Esterase: x / RBC: x / WBC x   Sq Epi: x / Non Sq Epi: x / Bacteria: x        Culture - Urine (collected 18 Aug 2023 22:11)  Source: Clean Catch Clean Catch (Midstream)  Final Report (20 Aug 2023 16:07):    >=3 organisms. Probable collection contamination.    Culture - Blood (collected 18 Aug 2023 21:11)  Source: .Blood Blood  Preliminary Report (20 Aug 2023 03:01):    No growth at 24 hours    Culture - Blood (collected 18 Aug 2023 21:10)  Source: .Blood Blood  Preliminary Report (20 Aug 2023 03:01):    No growth at 24 hours      CAPILLARY BLOOD GLUCOSE            RADIOLOGY & ADDITIONAL TESTS:    < from: TTE Echo Complete w/o Contrast w/ Doppler (02.27.23 @ 11:40) >  Summary:   1. Left ventricular ejection fraction, by visual estimation, is 40 to   45%.   2. Moderately decreased global left ventricular systolic function.   3. The left ventricular diastolic function could not be assessed in this   study.   4. Moderately enlarged right ventricle.   5. Moderately reduced RV systolic function.   6. Mildly enlarged left atrium.   7. Mildly enlarged right atrium.   8. There is no evidence of pericardial effusion.   9. Mild mitral annular calcification.  10. Mild thickening and calcification of the anterior and posterior   mitral valve leaflets.  11. Moderate mitral valve regurgitation.  12. Moderate tricuspid regurgitation.  13. Estimated pulmonary artery systolic pressure is 37.7 mmHg assuming a   right atrial pressure of 15 mmHg, which is consistent with borderline   pulmonary hypertension.  14. Endocardial visualization was enhanced with intravenous echo contrast.    < end of copied text >            MEDICATIONS  (STANDING):  aspirin enteric coated 81 milliGRAM(s) Oral daily  carvedilol 6.25 milliGRAM(s) Oral every 12 hours  cholecalciferol 2000 Unit(s) Oral daily  dronabinol 2.5 milliGRAM(s) Oral <User Schedule>  enoxaparin Injectable 40 milliGRAM(s) SubCutaneous every 24 hours  minocycline 100 milliGRAM(s) Oral two times a day  piperacillin/tazobactam IVPB.. 3.375 Gram(s) IV Intermittent every 8 hours  simvastatin 40 milliGRAM(s) Oral at bedtime    MEDICATIONS  (PRN):  acetaminophen     Tablet .. 650 milliGRAM(s) Oral every 6 hours PRN Temp greater or equal to 38C (100.4F), Mild Pain (1 - 3)  ALPRAZolam 0.25 milliGRAM(s) Oral at bedtime PRN for insomnia/anxiety  aluminum hydroxide/magnesium hydroxide/simethicone Suspension 30 milliLiter(s) Oral every 4 hours PRN Dyspepsia  loperamide 2 milliGRAM(s) Oral four times a day PRN Diarrhea  melatonin 3 milliGRAM(s) Oral at bedtime PRN Insomnia  ondansetron Injectable 4 milliGRAM(s) IV Push every 8 hours PRN Nausea and/or Vomiting

## 2023-08-21 ENCOUNTER — TRANSCRIPTION ENCOUNTER (OUTPATIENT)
Age: 63
End: 2023-08-21

## 2023-08-21 VITALS
TEMPERATURE: 98 F | HEART RATE: 97 BPM | OXYGEN SATURATION: 91 % | RESPIRATION RATE: 18 BRPM | DIASTOLIC BLOOD PRESSURE: 67 MMHG | SYSTOLIC BLOOD PRESSURE: 116 MMHG

## 2023-08-21 LAB
ALBUMIN SERPL ELPH-MCNC: 2.7 G/DL — LOW (ref 3.3–5.2)
ALP SERPL-CCNC: 134 U/L — HIGH (ref 40–120)
ALT FLD-CCNC: 32 U/L — SIGNIFICANT CHANGE UP
ANION GAP SERPL CALC-SCNC: 13 MMOL/L — SIGNIFICANT CHANGE UP (ref 5–17)
ANISOCYTOSIS BLD QL: SLIGHT — SIGNIFICANT CHANGE UP
AST SERPL-CCNC: 29 U/L — SIGNIFICANT CHANGE UP
BASOPHILS # BLD AUTO: 0 K/UL — SIGNIFICANT CHANGE UP (ref 0–0.2)
BASOPHILS NFR BLD AUTO: 0 % — SIGNIFICANT CHANGE UP (ref 0–2)
BILIRUB SERPL-MCNC: 0.9 MG/DL — SIGNIFICANT CHANGE UP (ref 0.4–2)
BLASTS # FLD: 0.9 % — HIGH (ref 0–0)
BUN SERPL-MCNC: 7.4 MG/DL — LOW (ref 8–20)
CALCIUM SERPL-MCNC: 8.7 MG/DL — SIGNIFICANT CHANGE UP (ref 8.4–10.5)
CHLORIDE SERPL-SCNC: 96 MMOL/L — SIGNIFICANT CHANGE UP (ref 96–108)
CO2 SERPL-SCNC: 24 MMOL/L — SIGNIFICANT CHANGE UP (ref 22–29)
CREAT SERPL-MCNC: 1.12 MG/DL — SIGNIFICANT CHANGE UP (ref 0.5–1.3)
DACRYOCYTES BLD QL SMEAR: SLIGHT — SIGNIFICANT CHANGE UP
EGFR: 73 ML/MIN/1.73M2 — SIGNIFICANT CHANGE UP
EOSINOPHIL # BLD AUTO: 0 K/UL — SIGNIFICANT CHANGE UP (ref 0–0.5)
EOSINOPHIL NFR BLD AUTO: 0 % — SIGNIFICANT CHANGE UP (ref 0–6)
GIANT PLATELETS BLD QL SMEAR: PRESENT — SIGNIFICANT CHANGE UP
GLUCOSE SERPL-MCNC: 102 MG/DL — HIGH (ref 70–99)
HCT VFR BLD CALC: 26.9 % — LOW (ref 39–50)
HGB BLD-MCNC: 9 G/DL — LOW (ref 13–17)
LYMPHOCYTES # BLD AUTO: 1.11 K/UL — SIGNIFICANT CHANGE UP (ref 1–3.3)
LYMPHOCYTES # BLD AUTO: 6.1 % — LOW (ref 13–44)
MAGNESIUM SERPL-MCNC: 1.7 MG/DL — SIGNIFICANT CHANGE UP (ref 1.6–2.6)
MANUAL SMEAR VERIFICATION: SIGNIFICANT CHANGE UP
MCHC RBC-ENTMCNC: 31.8 PG — SIGNIFICANT CHANGE UP (ref 27–34)
MCHC RBC-ENTMCNC: 33.5 GM/DL — SIGNIFICANT CHANGE UP (ref 32–36)
MCV RBC AUTO: 95.1 FL — SIGNIFICANT CHANGE UP (ref 80–100)
METAMYELOCYTES # FLD: 0.9 % — HIGH (ref 0–0)
MONOCYTES # BLD AUTO: 1.42 K/UL — HIGH (ref 0–0.9)
MONOCYTES NFR BLD AUTO: 7.8 % — SIGNIFICANT CHANGE UP (ref 2–14)
MYELOCYTES NFR BLD: 0.9 % — HIGH (ref 0–0)
NEUTROPHILS # BLD AUTO: 15.03 K/UL — HIGH (ref 1.8–7.4)
NEUTROPHILS NFR BLD AUTO: 75.6 % — SIGNIFICANT CHANGE UP (ref 43–77)
NEUTS BAND # BLD: 6.9 % — SIGNIFICANT CHANGE UP (ref 0–8)
NRBC # BLD: 1 /100 — HIGH (ref 0–0)
PHOSPHATE SERPL-MCNC: 3.8 MG/DL — SIGNIFICANT CHANGE UP (ref 2.4–4.7)
PLAT MORPH BLD: NORMAL — SIGNIFICANT CHANGE UP
PLATELET # BLD AUTO: 290 K/UL — SIGNIFICANT CHANGE UP (ref 150–400)
POLYCHROMASIA BLD QL SMEAR: SIGNIFICANT CHANGE UP
POTASSIUM SERPL-MCNC: 3.3 MMOL/L — LOW (ref 3.5–5.3)
POTASSIUM SERPL-SCNC: 3.3 MMOL/L — LOW (ref 3.5–5.3)
PROMYELOCYTES # FLD: 0.9 % — HIGH (ref 0–0)
PROT SERPL-MCNC: 5.5 G/DL — LOW (ref 6.6–8.7)
RBC # BLD: 2.83 M/UL — LOW (ref 4.2–5.8)
RBC # FLD: 16 % — HIGH (ref 10.3–14.5)
RBC BLD AUTO: ABNORMAL
SODIUM SERPL-SCNC: 133 MMOL/L — LOW (ref 135–145)
WBC # BLD: 18.22 K/UL — HIGH (ref 3.8–10.5)
WBC # FLD AUTO: 18.22 K/UL — HIGH (ref 3.8–10.5)

## 2023-08-21 PROCEDURE — 96374 THER/PROPH/DIAG INJ IV PUSH: CPT

## 2023-08-21 PROCEDURE — 80048 BASIC METABOLIC PNL TOTAL CA: CPT

## 2023-08-21 PROCEDURE — 85018 HEMOGLOBIN: CPT

## 2023-08-21 PROCEDURE — 84132 ASSAY OF SERUM POTASSIUM: CPT

## 2023-08-21 PROCEDURE — 80202 ASSAY OF VANCOMYCIN: CPT

## 2023-08-21 PROCEDURE — 84100 ASSAY OF PHOSPHORUS: CPT

## 2023-08-21 PROCEDURE — 82330 ASSAY OF CALCIUM: CPT

## 2023-08-21 PROCEDURE — 93005 ELECTROCARDIOGRAM TRACING: CPT

## 2023-08-21 PROCEDURE — 82947 ASSAY GLUCOSE BLOOD QUANT: CPT

## 2023-08-21 PROCEDURE — 80053 COMPREHEN METABOLIC PANEL: CPT

## 2023-08-21 PROCEDURE — 83605 ASSAY OF LACTIC ACID: CPT

## 2023-08-21 PROCEDURE — 99285 EMERGENCY DEPT VISIT HI MDM: CPT | Mod: 25

## 2023-08-21 PROCEDURE — 85014 HEMATOCRIT: CPT

## 2023-08-21 PROCEDURE — 87507 IADNA-DNA/RNA PROBE TQ 12-25: CPT

## 2023-08-21 PROCEDURE — 99239 HOSP IP/OBS DSCHRG MGMT >30: CPT

## 2023-08-21 PROCEDURE — 87086 URINE CULTURE/COLONY COUNT: CPT

## 2023-08-21 PROCEDURE — 99232 SBSQ HOSP IP/OBS MODERATE 35: CPT

## 2023-08-21 PROCEDURE — 87040 BLOOD CULTURE FOR BACTERIA: CPT

## 2023-08-21 PROCEDURE — 82803 BLOOD GASES ANY COMBINATION: CPT

## 2023-08-21 PROCEDURE — 81001 URINALYSIS AUTO W/SCOPE: CPT

## 2023-08-21 PROCEDURE — 85025 COMPLETE CBC W/AUTO DIFF WBC: CPT

## 2023-08-21 PROCEDURE — 84295 ASSAY OF SERUM SODIUM: CPT

## 2023-08-21 PROCEDURE — 82435 ASSAY OF BLOOD CHLORIDE: CPT

## 2023-08-21 PROCEDURE — 83735 ASSAY OF MAGNESIUM: CPT

## 2023-08-21 PROCEDURE — 36415 COLL VENOUS BLD VENIPUNCTURE: CPT

## 2023-08-21 RX ORDER — POTASSIUM CHLORIDE 20 MEQ
20 PACKET (EA) ORAL
Refills: 0 | Status: COMPLETED | OUTPATIENT
Start: 2023-08-21 | End: 2023-08-21

## 2023-08-21 RX ORDER — ASPIRIN/CALCIUM CARB/MAGNESIUM 324 MG
1 TABLET ORAL
Qty: 0 | Refills: 0 | DISCHARGE
Start: 2023-08-21

## 2023-08-21 RX ORDER — MAGNESIUM SULFATE 500 MG/ML
1 VIAL (ML) INJECTION ONCE
Refills: 0 | Status: COMPLETED | OUTPATIENT
Start: 2023-08-21 | End: 2023-08-21

## 2023-08-21 RX ORDER — LOPERAMIDE HCL 2 MG
1 TABLET ORAL
Qty: 28 | Refills: 0
Start: 2023-08-21 | End: 2023-08-27

## 2023-08-21 RX ADMIN — Medication 20 MILLIEQUIVALENT(S): at 13:06

## 2023-08-21 RX ADMIN — PIPERACILLIN AND TAZOBACTAM 25 GRAM(S): 4; .5 INJECTION, POWDER, LYOPHILIZED, FOR SOLUTION INTRAVENOUS at 06:00

## 2023-08-21 RX ADMIN — Medication 20 MILLIGRAM(S): at 05:56

## 2023-08-21 RX ADMIN — ENOXAPARIN SODIUM 40 MILLIGRAM(S): 100 INJECTION SUBCUTANEOUS at 05:56

## 2023-08-21 RX ADMIN — Medication 2000 UNIT(S): at 13:06

## 2023-08-21 RX ADMIN — CARVEDILOL PHOSPHATE 6.25 MILLIGRAM(S): 80 CAPSULE, EXTENDED RELEASE ORAL at 05:55

## 2023-08-21 RX ADMIN — MINOCYCLINE HYDROCHLORIDE 100 MILLIGRAM(S): 45 TABLET, EXTENDED RELEASE ORAL at 05:55

## 2023-08-21 RX ADMIN — Medication 81 MILLIGRAM(S): at 13:06

## 2023-08-21 RX ADMIN — Medication 100 GRAM(S): at 13:06

## 2023-08-21 RX ADMIN — Medication 20 MILLIEQUIVALENT(S): at 14:56

## 2023-08-21 NOTE — DISCHARGE NOTE PROVIDER - NSDCMRMEDTOKEN_GEN_ALL_CORE_FT
ALPRAZolam 0.25 mg oral tablet: 1 tab(s) orally once a day (at bedtime) as needed for  insomnia  cholecalciferol 50 mcg (2000 intl units) oral tablet: 1 orally once a day  Coreg 6.25 mg oral tablet: 1 tab(s) orally 2 times a day  Lasix 20 mg oral tablet: 1 tab(s) orally once a day as needed for leg swelling  magnesium oxide 400 mg oral tablet: 1 orally once a day  minocycline 100 mg oral capsule: 1 cap(s) orally 2 times a day  potassium chloride 20 mEq/15 mL oral liquid: 15 milliliter(s) orally 2 times a day  simvastatin 40 mg oral tablet: 1 tab(s) orally once a day (at bedtime)   ALPRAZolam 0.25 mg oral tablet: 1 tab(s) orally once a day (at bedtime) as needed for  insomnia  aspirin 81 mg oral delayed release tablet: 1 tab(s) orally once a day  cholecalciferol 50 mcg (2000 intl units) oral tablet: 1 orally once a day  Coreg 6.25 mg oral tablet: 1 tab(s) orally 2 times a day  Lasix 20 mg oral tablet: 1 tab(s) orally once a day as needed for leg swelling  loperamide 2 mg oral capsule: 1 cap(s) orally 4 times a day as needed for Diarrhea  magnesium oxide 400 mg oral tablet: 1 orally once a day  minocycline 100 mg oral capsule: 1 cap(s) orally 2 times a day  potassium chloride 20 mEq/15 mL oral liquid: 15 milliliter(s) orally 2 times a day  simvastatin 40 mg oral tablet: 1 tab(s) orally once a day (at bedtime)

## 2023-08-21 NOTE — PROGRESS NOTE ADULT - ASSESSMENT
62M with PMHX rectal adenocarcinoma c/b mets to bladder and prostate on current chemo FOLFIRI/Panitumamab (Last dose 8/10), s/p Lower Abd Resection/Abdominoperineal Resection (2020, 2022), S/P fistula repair between bowel and bladder, HTN, HLD, CAD s/p PCI x5 was sent in from [facility]'s office at [place] with suspected neutropenic fever. Patient was seen at [facility], found to have fever of 100.6, Fever workup performed, +UTI, WBC 1.4, ANC 0.5, CT abd/pelvis showed unchanged presacral mass with development of internal gas focci, possible necrosis or infection, thickening of urinary bladder possible cystitis, new trace amount of air within the nondependent portions of the bladder lumen, possible instrumentation or fistulization. Patient admitted to medicine for further workup and treatment of neutropenic fever. Surgical consult placed by ED for further recommendations based on CT findings. Patient has not travelled within the past 30 days. Patient has no sick contacts. Per pt daughter Erma, patient chemo was held for 8 weeks prior to fistula repair 7/2023 and since resumption of chemo pt has had diarrhea, Stool production 3-4x/day out of colostomy, chills and malaise x4 days prior to bringing patient to [facility]. Denies any other complaints   AS ABOVE  LAST CHEMO LAST THURSDAY HAD FEVERS AT HOME     NEUTROPENIA ON ADMISSION  PLACED ON ABX   BLOOD   CX NEG  URINE CX PENDING  HOWEVER TAKEN FROM ILEAL CONDUIT MAY ALWAYS HAVE SOME BACTERIA   WBC UP TODAY       ONC FOLLOW UP NOTED   AS NEUTROPENIA RESOLVED    BLOOD CX NEG URINE CX FORM ILEAL CONDUIT MAY ALWAYS HAVE BACTERIA   OK FOR D/C ON NO ABX   FOLLOW UP AS [facility]

## 2023-08-21 NOTE — DISCHARGE NOTE PROVIDER - CARE PROVIDERS DIRECT ADDRESSES
,[email or web address]

## 2023-08-21 NOTE — PROGRESS NOTE ADULT - SUBJECTIVE AND OBJECTIVE BOX
INFECTIOUS DISEASES AND INTERNAL MEDICINE at [facility]  =======================================================  Andrea Forde MD  Diplomates American Board of Internal Medicine and Infectious Diseases  Telephone [phone]  Fax            [phone]  =======================================================    [place] [id number]    Follow up:  NEUTROPENIC FEVERS    CX NEG AFEBIRLE    Allergies:  No Known Allergies      Medications:  acetaminophen     Tablet .. 650 milliGRAM(s) Oral every 6 hours PRN  ALPRAZolam 0.25 milliGRAM(s) Oral at bedtime PRN  aluminum hydroxide/magnesium hydroxide/simethicone Suspension 30 milliLiter(s) Oral every 4 hours PRN  aspirin enteric coated 81 milliGRAM(s) Oral daily  carvedilol 6.25 milliGRAM(s) Oral every 12 hours  cholecalciferol 2000 Unit(s) Oral daily  dronabinol 2.5 milliGRAM(s) Oral <User Schedule>  enoxaparin Injectable 40 milliGRAM(s) SubCutaneous every 24 hours  filgrastim-sndz (ZARXIO) Injectable 480 MICROGram(s) SubCutaneous daily  furosemide   Injectable 40 milliGRAM(s) IV Push daily  loperamide 2 milliGRAM(s) Oral four times a day PRN  melatonin 3 milliGRAM(s) Oral at bedtime PRN  minocycline 100 milliGRAM(s) Oral two times a day  ondansetron Injectable 4 milliGRAM(s) IV Push every 8 hours PRN  piperacillin/tazobactam IVPB.. 3.375 Gram(s) IV Intermittent every 8 hours  potassium chloride    Tablet ER 40 milliEquivalent(s) Oral every 4 hours  simvastatin 40 milliGRAM(s) Oral at bedtime  vancomycin  IVPB 1000 milliGRAM(s) IV Intermittent every 12 hours    SOCIAL       FAMILY   FAMILY HISTORY:  FH: heart disease  father    FH: ALS (amyotrophic lateral sclerosis) (Mother)      REVIEW OF SYSTEMS:  CONSTITUTIONAL:  No Fever or chills  HEENT:   No diplopia or blurred vision.  No earache, sore throat or runny nose.  CARDIOVASCULAR:  No pressure, squeezing, strangling, tightness, heaviness or aching about the chest, neck, axilla or epigastrium.  RESPIRATORY:  No cough, shortness of breath, PND or orthopnea.  GASTROINTESTINAL:  No nausea, vomiting or diarrhea.  GENITOURINARY:  No dysuria, frequency or urgency. No Blood in urine  MUSCULOSKELETAL:   moves all joints  SKIN:  No change in skin, hair or nails.  NEUROLOGIC:  No paresthesias, fasciculations, seizures or weakness.  PSYCHIATRIC:  No disorder of thought or mood.  ENDOCRINE:  No heat or cold intolerance, polyuria or polydipsia.  HEMATOLOGICAL:  No easy bruising or bleeding.            Physical Exam:  ICU Vital Signs Last 24 Hrs  T(C): 36.6 (20 Aug 2023 04:06), Max: 37 (19 Aug 2023 17:42)  T(F): 97.8 (20 Aug 2023 04:06), Max: 98.6 (19 Aug 2023 17:42)  HR: 88 (20 Aug 2023 04:06) (88 - 102)  BP: 108/64 (20 Aug 2023 04:06) (105/67 - 126/71)  BP(mean): --  ABP: --  ABP(mean): --  RR: 18 (20 Aug 2023 04:06) (17 - 18)  SpO2: 98% (20 Aug 2023 04:06) (97% - 99%)    O2 Parameters below as of 20 Aug 2023 04:06  Patient On (Oxygen Delivery Method): room air          GEN: NAD,   HEENT: normocephalic and atraumatic. EOMI. JOLEEN.    NECK: Supple. No carotid bruits.  No lymphadenopathy or thyromegaly.  LUNGS: Clear to auscultation.  HEART: Regular rate and rhythm without murmur.  ABDOMEN: Soft, nontender, and nondistended.  Positive bowel sounds.    : No CVA tenderness  EXTREMITIES: Without any cyanosis, clubbing, rash, lesions or edema.  MSK: no joint swelling  NEUROLOGIC: Cranial nerves II through XII are grossly intact.  PSYCHIATRIC: Appropriate affect .  SKIN: No ulceration or induration present.        Labs:  Vitals:  ============  T(F): 97.8 (20 Aug 2023 04:06), Max: 98.6 (19 Aug 2023 17:42)  HR: 88 (20 Aug 2023 04:06)  BP: 108/64 (20 Aug 2023 04:06)  RR: 18 (20 Aug 2023 04:06)  SpO2: 98% (20 Aug 2023 04:06) (97% - 99%)  temp max in last 48H T(F): , Max: 100.3 (08-19-23 @ 04:24)    =======================================================  Current Antibiotics:  minocycline 100 milliGRAM(s) Oral two times a day  piperacillin/tazobactam IVPB.. 3.375 Gram(s) IV Intermittent every 8 hours  vancomycin  IVPB 1000 milliGRAM(s) IV Intermittent every 12 hours    Other medications:  aspirin enteric coated 81 milliGRAM(s) Oral daily  carvedilol 6.25 milliGRAM(s) Oral every 12 hours  cholecalciferol 2000 Unit(s) Oral daily  dronabinol 2.5 milliGRAM(s) Oral <User Schedule>  enoxaparin Injectable 40 milliGRAM(s) SubCutaneous every 24 hours  filgrastim-sndz (ZARXIO) Injectable 480 MICROGram(s) SubCutaneous daily  furosemide   Injectable 40 milliGRAM(s) IV Push daily  potassium chloride    Tablet ER 40 milliEquivalent(s) Oral every 4 hours  simvastatin 40 milliGRAM(s) Oral at bedtime      =======================================================  Labs:                        8.8    6.21  )-----------( 258      ( 20 Aug 2023 05:24 )             26.1     08-20    135  |  99  |  7.0<L>  ----------------------------<  98  3.2<L>   |  23.0  |  0.95    Ca    8.5      20 Aug 2023 05:24  Phos  2.6     08-19  Mg     1.7     08-19    TPro  5.3<L>  /  Alb  2.8<L>  /  TBili  1.7  /  DBili  x   /  AST  42<H>  /  ALT  30  /  AlkPhos  116  08-19      Culture - Blood (collected 08-18-23 @ 21:11)  Source: .Blood Blood    Culture - Blood (collected 08-18-23 @ 21:10)  Source: .Blood Blood      Creatinine: 0.95 mg/dL (08-20-23 @ 05:24)  Creatinine: 0.90 mg/dL (08-19-23 @ 06:11)  Creatinine: 0.96 mg/dL (08-18-23 @ 21:11)            WBC Count: 6.21 K/uL (08-20-23 @ 05:24)  WBC Count: 1.21 K/uL (08-19-23 @ 06:11)  WBC Count: 1.35 K/uL (08-18-23 @ 21:11)        Alkaline Phosphatase: 116 U/L (08-19-23 @ 06:11)  Alkaline Phosphatase: 98 U/L (08-18-23 @ 21:11)  Alanine Aminotransferase (ALT/SGPT): 30 U/L (08-19-23 @ 06:11)  Alanine Aminotransferase (ALT/SGPT): 21 U/L (08-18-23 @ 21:11)  Aspartate Aminotransferase (AST/SGOT): 42 U/L (08-19-23 @ 06:11)  Aspartate Aminotransferase (AST/SGOT): 31 U/L (08-18-23 @ 21:11)  Bilirubin Total: 1.7 mg/dL (08-19-23 @ 06:11)  Bilirubin Total: 1.3 mg/dL (08-18-23 @ 21:11)

## 2023-08-21 NOTE — PROGRESS NOTE ADULT - ASSESSMENT
63 y/o F w/ PMH of rectal adenocarcinoma mets to bladder and prostate (Last chemo 8/10 FOLFRI/panitumamab), status post LAR in 2020 and APR in 2022, s/p fistula repair between bowel and bladder, HTN, HLD, CAD was sent in from [facility]'s office at [place] for Neutropenic Fever 2/2 Acute Complicated UTI and infected presacral mass with necrosis, chronic diarrhea, hypomagnesemia, and chronic b/l LE Pitting Edema.  8/18/23 CT a/p w/ contrast obtained at [facility] w/ impression reporting unchanged presecral mass but w/ development of internal gas foci, possibly from necrosis or superimposed infection.  Unchanged drain position w/ the mass.  Also reported unchanged circumferential wall thickening of the urinary bladder w/ surrounding fat stranding, probably cystitis.  In addition, new trace amount of air within the non-dependent portions of the bladder lumen, possibly due to recent instrumentation versus result of fistulization with posteriorly adjacent mass.  ID following and on antibiotics.  Will likely d/c today if cleared by oncology and pending final decision regarding abx by ID.        Neutropenic fever 2/2 complicated UTI vs infected presacral mass with necrosis   - CT imaging findings from [facility] above   - CXR pending but clinically benign exam and asymptomtic  - RVP w/ covid at [facility] (-)   - UA+ at [facility] and Ucxs from 8/18 growing >100,000 E faecium and P mirabilis; ID aware and likely also taken from ilieal conduit therefore likely contaminant   - Neutorpenia resolved s/p Zarxio   - Leukocytosis likely reactive 2/2 mx doses of Zarxio as pt is clinically pt nontoxic appearing and afebrile w/ infectious w/u during hospital course negative; will discuss w/ oncology    - Bcxs 08/18 NGTD and Ucx 08/08 w/ >3 organisms suspected to be contaminant (UA collected from ileal conduit)  - Off vanco but on zosyn; will discuss w/ ID if will c/w abx or d/c   - No surg intervention at this time as per surgery   - Heme/Onc following and recs noted       Chronic diarrhea, improving    Hypokalemia / Hypomagnesemia   - Likely chemo induced diarrhea which is precipitating electrolyte abnormalities   - GI pcr at [facility] (-) and at [facility] 8/20 (-)  - K and Mg supplemented again   - Started on imodium during hospital course and diarrhea improving   - Monitor lytes and maintain K>4 and Mg>2  - Monitor stool counts       Normocytic anemia 2/2 malignancy on chemo  - Baseline Hb appears to range btw 8-9  - Hemodynamically stable and no active bleeding reported at this time  - Monitor CBC and transfuse for Hb<8 given hx of cad       Metastatic Rectal CA  - On active chemo, last dose 8/10  - Follows with [facility]    - Heme/onc recs noted       Chronic B/L Lower Extremity Edema  Chronic HFrEF  - LE edema improving   - Clinically euvolemic at [facility]; low suspicion for CHF exacerbation   - TTE 02/2023 reporting LVEF 40-45%  - More likely third spacing given hypoalbumenemia   - Deescalated IV lasix to po yesterday   - Uses Lasix 20mg PRN leg swelling  - Follows w/ Dr. Amos outpt      CAD s/p stents (in 2006 & 2018)  A fib, currently rate controlled  - c/w ASA, BB, statin   - No AC for A fib per pt choice and hx of GI bleed   - Maintain K>4 and Mg>2      HTN /  HLD  - c/w Coreg and statin         VTE ppx: Lovenox 40mg daily      Dispo: Medically stable for d/c.  Anticipate d/c home today pending ID final recs on abxs and heme/onc.

## 2023-08-21 NOTE — DISCHARGE NOTE PROVIDER - NSDCCPCAREPLAN_GEN_ALL_CORE_FT
PRINCIPAL DISCHARGE DIAGNOSIS  Diagnosis: Neutropenic fever  Assessment and Plan of Treatment: - Due to UTI and infected presacral mass wiht necrosis as noted on imaging.   - Evaluated by ID, Heme/Onc, and Surgical teams. Patient improving with IV Antibiotics. Recieved 2 doses of Filgrastrim.   - Blood cultures returned negative for bacterial growth, Urine cultures likely contaminated.   - Continue medications as directed and follow up with your PCP, Heme/Onc team, and ID.      SECONDARY DISCHARGE DIAGNOSES  Diagnosis: Chronic diarrhea  Assessment and Plan of Treatment: - GI PCR negative for infection. Treated and improving with Imodium.  - Continue medications as directed and follow up with your PCP.    Diagnosis: Hypomagnesemia  Assessment and Plan of Treatment: - Repleted. Follow up outpatient with your PCP.    Diagnosis: Hypokalemia  Assessment and Plan of Treatment: - Repleted. Follow up outpatient with your PCP.    Diagnosis: Anemia  Assessment and Plan of Treatment: - Noted to have low blood levels, remained stable with no episodes of acute bleeding.   - Follow up outpatient with your PCP for monitoring.    Diagnosis: Rectal cancer  Assessment and Plan of Treatment: - Continue medications as directed and follow up with your Heme/On team and PCP.    Diagnosis: Leg edema  Assessment and Plan of Treatment: - Treated and improving with diuretics. Recent TTE with preserved EF.   - Continue medications as directed and follow up with your PCP.     PRINCIPAL DISCHARGE DIAGNOSIS  Diagnosis: Neutropenic fever  Assessment and Plan of Treatment: - Possibly UTI however unlikely as per infectious disease given the way urine was collected .  Recieved short course of broad spec IV antibiotics during hospital course but as per ID recs no further antibiotics required.    - Evaluated by ID, Heme/Onc, and Surgical teams. Neutropenia resolved after 2 doses of Filgrastrim.   - Blood cultures returned negative for bacterial growth, Urine cultures likely contaminated.   - Continue medications as directed and follow up with your PCP and heme/onc out patient      SECONDARY DISCHARGE DIAGNOSES  Diagnosis: Chronic diarrhea  Assessment and Plan of Treatment: - GI PCR negative for infection. Treated and improving with Imodium.  - Continue medications as directed and follow up with your PCP.    Diagnosis: Hypomagnesemia  Assessment and Plan of Treatment: - Repleted. Follow up outpatient with your PCP.    Diagnosis: Hypokalemia  Assessment and Plan of Treatment: - Repleted. Follow up outpatient with your PCP.    Diagnosis: Leg edema  Assessment and Plan of Treatment: - Treated and improving with diuretics. Recent TTE with preserved EF.   - Continue medications as directed and follow up with your PCP and cardiology.

## 2023-08-21 NOTE — DISCHARGE NOTE PROVIDER - HOSPITAL COURSE
62 year old male with a PMHx of rectal adenocarcinoma mets to bladder and prostate on chemo, s/p LAR in 2020 and APR in 2022, s/p fistula repair between bowel and bladder, HTN, HLD, CAD, chronic diarrhea, who presented to [facility] from outpatient [facility] office after being found to have neutropenic fever with positive UA, WBC of 1.4, & ANC of 0.5. Outpatient CT abdomen/pelvis with contrast revealed unchanged presacral mass with new internal gas foci concerning for necrosis vs. infection, unchanged drain position, and unchanged findings consistent with cystitis c/b new trace air within bladder lumen. CXR obtained, without acute pathology. RVP obtained at [facility] was negative. Patient was placed on IV ABX, and ID, Heme/Onc, & surgical consults were placed. Patient was evaluated by the surgical team, no acute surgical intervention was recommended. Patient was admitted for management of neutropenic fever secondary to acute complicated UTI and infected presacral mass. UCx returned positive for > 3 organisms including E faecium and P mirabilis; likely taken from ileal conduit therefore likely contaminant. BCx returned NGTD. ABX regimen was guided by ID. Patient was treated with 2 doses of Filgrastim with improvement in ANC. Patient with chronic diarrhea and labs noted hypokalemia and hypomagnesemia. Electrolytes were repleted as needed. GI PCR was negative, patient was started on imodium with improvement in diarrhea. CBC was monitored closely given anemia in the setting of chemo, hemoglobin remained stable with no episodes of acute bleeding. Patient with chronic bilateral LE edema, and was treated with IV Lasix. Recent TTE with EF 40-45%. Patient was transitioned to PO lasix which was well tolerated with improvement in edema.     - Pending Heme/Onc recs 62 year old male with a PMHx of rectal adenocarcinoma mets to bladder and prostate on chemo, s/p LAR in 2020 and APR in 2022, s/p fistula repair between bowel and bladder, HTN, HLD, CAD, chronic diarrhea, who presented to [facility] from outpatient [facility] office after being found to have neutropenic fever with positive UA, WBC of 1.4, & ANC of 0.5. Outpatient CT abdomen/pelvis with contrast revealed unchanged presacral mass with new internal gas foci concerning for necrosis vs. infection, unchanged drain position, and unchanged findings consistent with cystitis c/b new trace air within bladder lumen. CXR obtained, without acute pathology. RVP obtained at [facility] was negative. Patient was placed on IV ABX, and ID, Heme/Onc, & surgical consults were placed. Patient was evaluated by the surgical team, no acute surgical intervention was recommended. Patient was admitted for management of neutropenic fever secondary to acute complicated UTI and infected presacral mass. UCx returned positive for > 3 organisms including E faecium and P mirabilis; likely taken from ileal conduit therefore likely contaminant. BCx returned NGTD. ABX regimen was guided by ID. Patient was treated with 2 doses of Filgrastim with improvement in ANC.  leukocytosis developed but likely reactive from filgrastim; clinically nontoxic appearing and afebrile. Patient with chronic diarrhea and labs noted hypokalemia and hypomagnesemia. Electrolytes were repleted as needed. GI PCR was negative, patient was started on imodium with improvement in diarrhea. CBC was monitored closely given anemia in the setting of chemo, hemoglobin remained stable with no episodes of acute bleeding. Patient with chronic bilateral LE edema, and was treated with IV Lasix. Recent TTE with EF 40-45%. Patient was transitioned to PO lasix which was well tolerated with improvement in edema.  No abxs needed per ID as urine cx from outpt contaminant given how it was collected.  Pt medically stable for d/c.

## 2023-08-21 NOTE — SBIRT NOTE ADULT - NSSBIRTDRGNOACTINTDET_GEN_A_CORE
Pt reports he will occasionally smoke a cigar recreationally, however less frequently since starting chemo. Resources for smoking cessation offered, pt declined.

## 2023-08-21 NOTE — SBIRT NOTE ADULT - NSSBIRTALCPOSREINDET_GEN_A_CORE
Pt reports he will have a glass of wine with dinner 3-4 times a week. Pt feels comfortable with alcohol use level, not interested in resources.

## 2023-08-21 NOTE — PROGRESS NOTE ADULT - ASSESSMENT
62M with PMHX rectal adenocarcinoma c/b mets to bladder and prostate on current chemo FOLFIRI/Panitumamab (Last dose 8/10), s/p Lower Abd Resection/Abdominoperineal Resection (2020, 2022), S/P fistula repair between bowel and bladder, HTN, HLD, CAD s/p PCI x5 was sent in from [facility]'s office at [place] with suspected neutropenic fever and diarrhea.     Met Rectal ca   - on active chemo FOLFIRI + Panitumamab ; last dose 8/10  - pt received Neulasta 8/12  - pt to resume care with [facility] after d/c     Neutropenic Fever   - WBC 1.2;  on admit;  WBC today 18.22k after Zarxio   - BCx NTD  - no fever  - pt on IV Zosyn   - Resp panel MIRIAM with Covid - negative ([facility])  - ID following   - CT abd/pel ([facility]) - unchanged presacral mass with development of internal gas focci, possible necrosis or infection, thickening of urinary bladder possible cystitis, new trace amount of air within the nondependent portions of the bladder lumen, possible instrumentation or fistulization.  - Surgery on board.  no surgical intervention advised.   - d/c Zarxio   - BCx NTD   - UCx from [facility] 8/18 - >100,000 E faecium and P mirabilis.   ID notified.  likely was taken from ileal conduit which has less relevance.     chronic diarrhea  - GI PCR panel ([facility]) - negative   - pt has rectal tube - watery yellow stool  - likely secondary to chemo.  pt on immodium prn - may cont as inpt   - replace K     d/c planning   Patient is eager to go home.    [facility]  [phone]

## 2023-08-21 NOTE — DISCHARGE NOTE PROVIDER - ATTENDING DISCHARGE PHYSICAL EXAMINATION:
Vital Signs Last 24 Hrs  T(C): 36.7 (21 Aug 2023 00:27), Max: 37.2 (20 Aug 2023 11:34)  T(F): 98 (21 Aug 2023 00:27), Max: 99 (20 Aug 2023 11:34)  HR: 91 (21 Aug 2023 05:49) (91 - 104)  BP: 117/74 (21 Aug 2023 05:49) (108/69 - 133/77)  BP(mean): --  RR: 18 (21 Aug 2023 05:49) (18 - 19)  SpO2: 97% (21 Aug 2023 00:27) (97% - 99%)    Parameters below as of 21 Aug 2023 00:27  Patient On (Oxygen Delivery Method): room air        GENERAL: pt examined bedside, laying comfortably in bed in NAD  HEENT: NC/AT, moist oral mucosa, clear conjunctiva, sclera nonicteric  RESPIRATORY: Normal respiratory effort, no wheezing, rhonchi, rales  CARDIOVASCULAR: RRR, normal S1 and S2  ABDOMEN: soft, NT/ND, +BS, +rectal drain  EXTREMITIES: No cynaosis, no clubbing, trace pretibial edema  NEUROLOGY: A+O to person, place, and time, no focal neurologic deficits appreciated

## 2023-08-21 NOTE — PROGRESS NOTE ADULT - SUBJECTIVE AND OBJECTIVE BOX
Chief Complaint:  neutropenic fever    SUBJECTIVE / OVERNIGHT EVENTS: no acute events reported overnight.  Pt offers no acute complaints at this time and reports continued improving diarrhea although still soft no longer watery.         PHYSICAL EXAM:  Vital Signs Last 24 Hrs  T(C): 36.7 (21 Aug 2023 00:27), Max: 37.2 (20 Aug 2023 11:34)  T(F): 98 (21 Aug 2023 00:27), Max: 99 (20 Aug 2023 11:34)  HR: 91 (21 Aug 2023 05:49) (91 - 104)  BP: 117/74 (21 Aug 2023 05:49) (108/69 - 133/77)  BP(mean): --  RR: 18 (21 Aug 2023 05:49) (18 - 19)  SpO2: 97% (21 Aug 2023 00:27) (97% - 99%)    Parameters below as of 21 Aug 2023 00:27  Patient On (Oxygen Delivery Method): room air        GENERAL: pt examined bedside, laying comfortably in bed in NAD  HEENT: NC/AT, moist oral mucosa, clear conjunctiva, sclera nonicteric  RESPIRATORY: Normal respiratory effort, no wheezing, rhonchi, rales  CARDIOVASCULAR: RRR, normal S1 and S2  ABDOMEN: soft, NT/ND, +BS, +rectal drain  EXTREMITIES: No cynaosis, no clubbing, trace pretibial edema  NEUROLOGY: A+O to person, place, and time, no focal neurologic deficits appreciated         LABS:                                        9.0    18.22 )-----------( 290      ( 21 Aug 2023 07:16 )             26.9       08-21    133<L>  |  96  |  7.4<L>  ----------------------------<  102<H>  3.3<L>   |  24.0  |  1.12    Ca    8.7      21 Aug 2023 07:16  Phos  3.8     08-21  Mg     1.7     08-21    TPro  5.5<L>  /  Alb  2.7<L>  /  TBili  0.9  /  DBili  x   /  AST  29  /  ALT  32  /  AlkPhos  134<H>  08-21      Urinalysis Basic - ( 20 Aug 2023 05:24 )    Color: x / Appearance: x / SG: x / pH: x  Gluc: 98 mg/dL / Ketone: x  / Bili: x / Urobili: x   Blood: x / Protein: x / Nitrite: x   Leuk Esterase: x / RBC: x / WBC x   Sq Epi: x / Non Sq Epi: x / Bacteria: x        Culture - Urine (collected 18 Aug 2023 22:11)  Source: Clean Catch Clean Catch (Midstream)  Final Report (20 Aug 2023 16:07):    >=3 organisms. Probable collection contamination.    Culture - Blood (collected 18 Aug 2023 21:11)  Source: .Blood Blood  Preliminary Report (20 Aug 2023 03:01):    No growth at 24 hours    Culture - Blood (collected 18 Aug 2023 21:10)  Source: .Blood Blood  Preliminary Report (20 Aug 2023 03:01):    No growth at 24 hours      CAPILLARY BLOOD GLUCOSE            RADIOLOGY & ADDITIONAL TESTS:    < from: TTE Echo Complete w/o Contrast w/ Doppler (02.27.23 @ 11:40) >  Summary:   1. Left ventricular ejection fraction, by visual estimation, is 40 to   45%.   2. Moderately decreased global left ventricular systolic function.   3. The left ventricular diastolic function could not be assessed in this   study.   4. Moderately enlarged right ventricle.   5. Moderately reduced RV systolic function.   6. Mildly enlarged left atrium.   7. Mildly enlarged right atrium.   8. There is no evidence of pericardial effusion.   9. Mild mitral annular calcification.  10. Mild thickening and calcification of the anterior and posterior   mitral valve leaflets.  11. Moderate mitral valve regurgitation.  12. Moderate tricuspid regurgitation.  13. Estimated pulmonary artery systolic pressure is 37.7 mmHg assuming a   right atrial pressure of 15 mmHg, which is consistent with borderline   pulmonary hypertension.  14. Endocardial visualization was enhanced with intravenous echo contrast.    < end of copied text >            MEDICATIONS  (STANDING):  aspirin enteric coated 81 milliGRAM(s) Oral daily  carvedilol 6.25 milliGRAM(s) Oral every 12 hours  cholecalciferol 2000 Unit(s) Oral daily  dronabinol 2.5 milliGRAM(s) Oral <User Schedule>  enoxaparin Injectable 40 milliGRAM(s) SubCutaneous every 24 hours  minocycline 100 milliGRAM(s) Oral two times a day  piperacillin/tazobactam IVPB.. 3.375 Gram(s) IV Intermittent every 8 hours  simvastatin 40 milliGRAM(s) Oral at bedtime    MEDICATIONS  (PRN):  acetaminophen     Tablet .. 650 milliGRAM(s) Oral every 6 hours PRN Temp greater or equal to 38C (100.4F), Mild Pain (1 - 3)  ALPRAZolam 0.25 milliGRAM(s) Oral at bedtime PRN for insomnia/anxiety  aluminum hydroxide/magnesium hydroxide/simethicone Suspension 30 milliLiter(s) Oral every 4 hours PRN Dyspepsia  loperamide 2 milliGRAM(s) Oral four times a day PRN Diarrhea  melatonin 3 milliGRAM(s) Oral at bedtime PRN Insomnia  ondansetron Injectable 4 milliGRAM(s) IV Push every 8 hours PRN Nausea and/or Vomiting

## 2023-08-21 NOTE — DISCHARGE NOTE NURSING/CASE MANAGEMENT/SOCIAL WORK - PATIENT PORTAL LINK FT
You can access the FollowMyHealth Patient Portal offered by [facility] by registering at the following website: [email or web address]. By joining [employer]’s FollowMyHealth portal, you will also be able to view your health information using other applications (apps) compatible with our system.

## 2023-08-21 NOTE — PROGRESS NOTE ADULT - REASON FOR ADMISSION
Neutropenic Fever

## 2023-08-21 NOTE — PROGRESS NOTE ADULT - PROVIDER SPECIALTY LIST ADULT
Hospitalist
Heme/Onc
Heme/Onc
Hospitalist
Infectious Disease
Colorectal Surgery
Hospitalist
Infectious Disease

## 2023-08-21 NOTE — DISCHARGE NOTE PROVIDER - PROVIDER TOKENS
PROVIDER:[TOKEN:[[id number]],FOLLOWUP:[1-3 days]],FREE:[LAST:[[facility]],PHONE:[(   )    -],FAX:[(   )    -],FOLLOWUP:[1-3 days]],PROVIDER:[TOKEN:[[id number]],FOLLOWUP:[1-3 days]]

## 2023-08-21 NOTE — DISCHARGE NOTE PROVIDER - CARE PROVIDER_API CALL
Prakash Mckeon.  Internal Medicine  [address] [place] [address]  Phone: [phone]  Fax: [phone]  Follow Up Time: 1-3 days    [facility],   Phone: (   )    -  Fax: (   )    -  Follow Up Time: 1-3 days    Zuhair Moura  Infectious Disease  [address] [address]  [place] [address]  Phone: [phone]  Fax: [phone]  Follow Up Time: 1-3 days

## 2023-08-21 NOTE — ADVANCED PRACTICE NURSE CONSULT - RECOMMEDATIONS
·	NS, pat dry, Cavilon liquid skin barrier with folded 4x4 gauze or ABD pad - change PRN with saturation       Plan of Care: Primary RN made aware of above recs. Spoke w/ provider, Dr. Justice in regards to above. No further needs/recs from WOCN service at this time. Staff RN to perform routine skin/wound assessment and manage wound care. Questions or concerns or if wound worsens and reconsult needed, please contact WOCN

## 2023-08-21 NOTE — PROGRESS NOTE ADULT - SUBJECTIVE AND OBJECTIVE BOX
Patient is a 62y old  Male who presents with a chief complaint of Neutropenic Fever (19 Aug 2023 07:22)      HPI:  62M with PMHX rectal adenocarcinoma c/b mets to bladder and prostate on current chemo FOLFIRI/Panitumamab (Last dose 8/10), s/p Lower Abd Resection/Abdominoperineal Resection (2020, 2022), S/P fistula repair between bowel and bladder, HTN, HLD, CAD s/p PCI x5 was sent in from [facility]'s office at [place] with suspected neutropenic fever. Patient was seen at [facility], found to have fever of 100.6, Fever workup performed, +UTI, WBC 1.4, ANC 0.5, CT abd/pelvis showed unchanged presacral mass with development of internal gas focci, possible necrosis or infection, thickening of urinary bladder possible cystitis, new trace amount of air within the nondependent portions of the bladder lumen, possible instrumentation or fistulization. Patient admitted to medicine for further workup and treatment of neutropenic fever. Surgical   consult placed by ED for further recommendations based on CT findings. Patient has not travelled within the past 30 days. Patient has no sick contacts. Per pt daughter Erma, patient chemo was held for 8 weeks prior to fistula repair 7/2023 and since resumption of chemo pt has had diarrhea, Stool production 3-4x/day out of colostomy, chills and malaise x4 days prior to bringing patient to [facility]. Denies any other complaints     8/21/23  pt feels well today and is eager to go home today.  no acute complaints  BM better.  No fever. No pain.         Vital Signs Last 24 Hrs  T(C): 36.7 (21 Aug 2023 00:27), Max: 37.2 (20 Aug 2023 11:34)  T(F): 98 (21 Aug 2023 00:27), Max: 99 (20 Aug 2023 11:34)  HR: 91 (21 Aug 2023 05:49) (91 - 104)  BP: 117/74 (21 Aug 2023 05:49) (108/69 - 133/77)  BP(mean): --  RR: 18 (21 Aug 2023 05:49) (18 - 19)  SpO2: 97% (21 Aug 2023 00:27) (97% - 99%)    Parameters below as of 21 Aug 2023 00:27  Patient On (Oxygen Delivery Method): room air    PHYSICAL EXAM  General: adult in NAD  HEENT: clear oropharynx, anicteric sclera, pink conjunctiva  Neck: supple  CV: normal S1/S2 with no murmur rubs or gallops  Lungs: positive air movement b/l ant lungs,clear to auscultation, no wheezes, no rales  Abdomen: soft non-tender non-distended, no hepatosplenomegaly  Ext: no clubbing cyanosis or edema  Skin: no rashes and no petechiae  Neuro: alert and oriented X 4, no focal deficits      MEDICATIONS  (STANDING):  aspirin enteric coated 81 milliGRAM(s) Oral daily  carvedilol 6.25 milliGRAM(s) Oral every 12 hours  cholecalciferol 2000 Unit(s) Oral daily  dronabinol 2.5 milliGRAM(s) Oral <User Schedule>  enoxaparin Injectable 40 milliGRAM(s) SubCutaneous every 24 hours  furosemide    Tablet 20 milliGRAM(s) Oral daily  magnesium sulfate  IVPB 1 Gram(s) IV Intermittent once  minocycline 100 milliGRAM(s) Oral two times a day  piperacillin/tazobactam IVPB.. 3.375 Gram(s) IV Intermittent every 8 hours  potassium chloride    Tablet ER 20 milliEquivalent(s) Oral every 2 hours  simvastatin 40 milliGRAM(s) Oral at bedtime    MEDICATIONS  (PRN):  acetaminophen     Tablet .. 650 milliGRAM(s) Oral every 6 hours PRN Temp greater or equal to 38C (100.4F), Mild Pain (1 - 3)  ALPRAZolam 0.25 milliGRAM(s) Oral at bedtime PRN for insomnia/anxiety  aluminum hydroxide/magnesium hydroxide/simethicone Suspension 30 milliLiter(s) Oral every 4 hours PRN Dyspepsia  loperamide 2 milliGRAM(s) Oral four times a day PRN Diarrhea  melatonin 3 milliGRAM(s) Oral at bedtime PRN Insomnia  ondansetron Injectable 4 milliGRAM(s) IV Push every 8 hours PRN Nausea and/or Vomiting          LABS:                          9.0    18.22 )-----------( 290      ( 21 Aug 2023 07:16 )             26.9                             8.8    6.21  )-----------( 258      ( 20 Aug 2023 05:24 )             26.1                             8.3    1.21  )-----------( 222      ( 19 Aug 2023 06:11 )             24.5     08-21    133<L>  |  96  |  7.4<L>  ----------------------------<  102<H>  3.3<L>   |  24.0  |  1.12    Ca    8.7      21 Aug 2023 07:16  Phos  3.8     08-21  Mg     1.7     08-21    TPro  5.5<L>  /  Alb  2.7<L>  /  TBili  0.9  /  DBili  x   /  AST  29  /  ALT  32  /  AlkPhos  134<H>  08-21 08-19    129<L>  |  95<L>  |  7.7<L>  ----------------------------<  129<H>  3.0<L>   |  22.0  |  0.90    Ca    8.0<L>      19 Aug 2023 06:11  Phos  2.6     08-19  Mg     1.7     08-19    TPro  5.3<L>  /  Alb  2.8<L>  /  TBili  1.7  /  DBili  x   /  AST  42<H>  /  ALT  30  /  AlkPhos  116  08-19                      BLOOD SMEAR INTERPRETATION:       RADIOLOGY & ADDITIONAL STUDIES:

## 2023-08-21 NOTE — ADVANCED PRACTICE NURSE CONSULT - ASSESSMENT
Patient sitting on side of bed, A&Ox3, independently ambulates made aware of purpose of WOCN visit, agreeable to consult.  Patient with drainage to surgical site for closed anus for ostomy creation with moderate serous drainage.  Patient states he is going to provider this week for closure of drainage site.   Ordered for DASH/TLC diet, current Vargas score of 18. With assistance, patient turned patient to side for skin assessment. Skin assessment as below:    Gluteal fold and anus with surgical closure; small opening (1.5x1cm) with serous drainage.  Periwound skin moist intact clean.

## 2023-08-24 LAB
CULTURE RESULTS: SIGNIFICANT CHANGE UP
CULTURE RESULTS: SIGNIFICANT CHANGE UP
SPECIMEN SOURCE: SIGNIFICANT CHANGE UP
SPECIMEN SOURCE: SIGNIFICANT CHANGE UP

## 2023-08-25 ENCOUNTER — NON-APPOINTMENT (OUTPATIENT)
Age: 63
End: 2023-08-25

## 2023-08-26 ENCOUNTER — INPATIENT (INPATIENT)
Facility: HOSPITAL | Age: 63
LOS: 11 days | Discharge: NURSING, REHAB OR LONG-TERM CARE | DRG: 260 | End: 2023-09-07
Attending: STUDENT IN AN ORGANIZED HEALTH CARE EDUCATION/TRAINING PROGRAM | Admitting: HOSPITALIST
Payer: COMMERCIAL

## 2023-08-26 VITALS
TEMPERATURE: 98 F | HEIGHT: 74 IN | HEART RATE: 102 BPM | SYSTOLIC BLOOD PRESSURE: 109 MMHG | OXYGEN SATURATION: 99 % | RESPIRATION RATE: 16 BRPM | WEIGHT: 179.9 LBS | DIASTOLIC BLOOD PRESSURE: 71 MMHG

## 2023-08-26 DIAGNOSIS — Z98.890 OTHER SPECIFIED POSTPROCEDURAL STATES: Chronic | ICD-10-CM

## 2023-08-26 DIAGNOSIS — R55 SYNCOPE AND COLLAPSE: ICD-10-CM

## 2023-08-26 LAB
ALBUMIN SERPL ELPH-MCNC: 3 G/DL — LOW (ref 3.3–5.2)
ALP SERPL-CCNC: 131 U/L — HIGH (ref 40–120)
ALT FLD-CCNC: 31 U/L — SIGNIFICANT CHANGE UP
AMMONIA BLD-MCNC: 15 UMOL/L — SIGNIFICANT CHANGE UP (ref 11–55)
ANION GAP SERPL CALC-SCNC: 14 MMOL/L — SIGNIFICANT CHANGE UP (ref 5–17)
APPEARANCE UR: CLEAR — SIGNIFICANT CHANGE UP
AST SERPL-CCNC: 43 U/L — HIGH
BACTERIA # UR AUTO: ABNORMAL
BASE EXCESS BLDV CALC-SCNC: -2 MMOL/L — SIGNIFICANT CHANGE UP (ref -2–3)
BASOPHILS # BLD AUTO: 0 K/UL — SIGNIFICANT CHANGE UP (ref 0–0.2)
BASOPHILS NFR BLD AUTO: 0 % — SIGNIFICANT CHANGE UP (ref 0–2)
BILIRUB SERPL-MCNC: 0.7 MG/DL — SIGNIFICANT CHANGE UP (ref 0.4–2)
BILIRUB UR-MCNC: NEGATIVE — SIGNIFICANT CHANGE UP
BUN SERPL-MCNC: 15.1 MG/DL — SIGNIFICANT CHANGE UP (ref 8–20)
CA-I SERPL-SCNC: 1.12 MMOL/L — LOW (ref 1.15–1.33)
CALCIUM SERPL-MCNC: 8.6 MG/DL — SIGNIFICANT CHANGE UP (ref 8.4–10.5)
CHLORIDE BLDV-SCNC: 104 MMOL/L — SIGNIFICANT CHANGE UP (ref 96–108)
CHLORIDE SERPL-SCNC: 101 MMOL/L — SIGNIFICANT CHANGE UP (ref 96–108)
CO2 SERPL-SCNC: 21 MMOL/L — LOW (ref 22–29)
COLOR SPEC: YELLOW — SIGNIFICANT CHANGE UP
COMMENT - URINE: SIGNIFICANT CHANGE UP
CREAT SERPL-MCNC: 1.13 MG/DL — SIGNIFICANT CHANGE UP (ref 0.5–1.3)
DACRYOCYTES BLD QL SMEAR: SLIGHT — SIGNIFICANT CHANGE UP
DIFF PNL FLD: ABNORMAL
EGFR: 73 ML/MIN/1.73M2 — SIGNIFICANT CHANGE UP
ELLIPTOCYTES BLD QL SMEAR: SLIGHT — SIGNIFICANT CHANGE UP
EOSINOPHIL # BLD AUTO: 0 K/UL — SIGNIFICANT CHANGE UP (ref 0–0.5)
EOSINOPHIL NFR BLD AUTO: 0 % — SIGNIFICANT CHANGE UP (ref 0–6)
EPI CELLS # UR: SIGNIFICANT CHANGE UP
GAS PNL BLDV: 132 MMOL/L — LOW (ref 136–145)
GAS PNL BLDV: SIGNIFICANT CHANGE UP
GLUCOSE BLDV-MCNC: 117 MG/DL — HIGH (ref 70–99)
GLUCOSE SERPL-MCNC: 119 MG/DL — HIGH (ref 70–99)
GLUCOSE UR QL: NEGATIVE MG/DL — SIGNIFICANT CHANGE UP
HCO3 BLDV-SCNC: 23 MMOL/L — SIGNIFICANT CHANGE UP (ref 22–29)
HCT VFR BLD CALC: 27.6 % — LOW (ref 39–50)
HCT VFR BLD CALC: 28.2 % — LOW (ref 39–50)
HCT VFR BLDA CALC: 31 % — SIGNIFICANT CHANGE UP
HGB BLD CALC-MCNC: 10.2 G/DL — LOW (ref 12.6–17.4)
HGB BLD-MCNC: 9.2 G/DL — LOW (ref 13–17)
HGB BLD-MCNC: 9.5 G/DL — LOW (ref 13–17)
KETONES UR-MCNC: NEGATIVE — SIGNIFICANT CHANGE UP
LACTATE BLDV-MCNC: 2.8 MMOL/L — HIGH (ref 0.5–2)
LACTATE SERPL-SCNC: 1.9 MMOL/L — SIGNIFICANT CHANGE UP (ref 0.5–2)
LEUKOCYTE ESTERASE UR-ACNC: ABNORMAL
LYMPHOCYTES # BLD AUTO: 1.09 K/UL — SIGNIFICANT CHANGE UP (ref 1–3.3)
LYMPHOCYTES # BLD AUTO: 8.8 % — LOW (ref 13–44)
MANUAL SMEAR VERIFICATION: SIGNIFICANT CHANGE UP
MCHC RBC-ENTMCNC: 31.8 PG — SIGNIFICANT CHANGE UP (ref 27–34)
MCHC RBC-ENTMCNC: 32.6 PG — SIGNIFICANT CHANGE UP (ref 27–34)
MCHC RBC-ENTMCNC: 33.3 GM/DL — SIGNIFICANT CHANGE UP (ref 32–36)
MCHC RBC-ENTMCNC: 33.7 GM/DL — SIGNIFICANT CHANGE UP (ref 32–36)
MCV RBC AUTO: 95.5 FL — SIGNIFICANT CHANGE UP (ref 80–100)
MCV RBC AUTO: 96.9 FL — SIGNIFICANT CHANGE UP (ref 80–100)
MONOCYTES # BLD AUTO: 0.43 K/UL — SIGNIFICANT CHANGE UP (ref 0–0.9)
MONOCYTES NFR BLD AUTO: 3.5 % — SIGNIFICANT CHANGE UP (ref 2–14)
NEUTROPHILS # BLD AUTO: 10.84 K/UL — HIGH (ref 1.8–7.4)
NEUTROPHILS NFR BLD AUTO: 85 % — HIGH (ref 43–77)
NEUTS BAND # BLD: 2.7 % — SIGNIFICANT CHANGE UP (ref 0–8)
NITRITE UR-MCNC: NEGATIVE — SIGNIFICANT CHANGE UP
NRBC # BLD: 1 /100 — HIGH (ref 0–0)
PCO2 BLDV: 38 MMHG — LOW (ref 42–55)
PH BLDV: 7.39 — SIGNIFICANT CHANGE UP (ref 7.32–7.43)
PH UR: 6.5 — SIGNIFICANT CHANGE UP (ref 5–8)
PLAT MORPH BLD: NORMAL — SIGNIFICANT CHANGE UP
PLATELET # BLD AUTO: 253 K/UL — SIGNIFICANT CHANGE UP (ref 150–400)
PLATELET # BLD AUTO: 282 K/UL — SIGNIFICANT CHANGE UP (ref 150–400)
PO2 BLDV: <42 MMHG — SIGNIFICANT CHANGE UP (ref 25–45)
POLYCHROMASIA BLD QL SMEAR: SLIGHT — SIGNIFICANT CHANGE UP
POTASSIUM BLDV-SCNC: 4.6 MMOL/L — SIGNIFICANT CHANGE UP (ref 3.5–5.1)
POTASSIUM SERPL-MCNC: 4.7 MMOL/L — SIGNIFICANT CHANGE UP (ref 3.5–5.3)
POTASSIUM SERPL-SCNC: 4.7 MMOL/L — SIGNIFICANT CHANGE UP (ref 3.5–5.3)
PROT SERPL-MCNC: 5.6 G/DL — LOW (ref 6.6–8.7)
PROT UR-MCNC: NEGATIVE — SIGNIFICANT CHANGE UP
RAPID RVP RESULT: SIGNIFICANT CHANGE UP
RBC # BLD: 2.89 M/UL — LOW (ref 4.2–5.8)
RBC # BLD: 2.91 M/UL — LOW (ref 4.2–5.8)
RBC # FLD: 15.3 % — HIGH (ref 10.3–14.5)
RBC # FLD: 15.4 % — HIGH (ref 10.3–14.5)
RBC BLD AUTO: ABNORMAL
RBC CASTS # UR COMP ASSIST: ABNORMAL /HPF (ref 0–4)
SAO2 % BLDV: 65.3 % — SIGNIFICANT CHANGE UP
SARS-COV-2 RNA SPEC QL NAA+PROBE: SIGNIFICANT CHANGE UP
SODIUM SERPL-SCNC: 136 MMOL/L — SIGNIFICANT CHANGE UP (ref 135–145)
SP GR SPEC: 1.01 — SIGNIFICANT CHANGE UP (ref 1.01–1.02)
TROPONIN T SERPL-MCNC: <0.01 NG/ML — SIGNIFICANT CHANGE UP (ref 0–0.06)
UROBILINOGEN FLD QL: NEGATIVE MG/DL — SIGNIFICANT CHANGE UP
WBC # BLD: 10.05 K/UL — SIGNIFICANT CHANGE UP (ref 3.8–10.5)
WBC # BLD: 12.36 K/UL — HIGH (ref 3.8–10.5)
WBC # FLD AUTO: 10.05 K/UL — SIGNIFICANT CHANGE UP (ref 3.8–10.5)
WBC # FLD AUTO: 12.36 K/UL — HIGH (ref 3.8–10.5)
WBC UR QL: ABNORMAL /HPF (ref 0–5)

## 2023-08-26 PROCEDURE — 99223 1ST HOSP IP/OBS HIGH 75: CPT

## 2023-08-26 PROCEDURE — 70486 CT MAXILLOFACIAL W/O DYE: CPT | Mod: 26

## 2023-08-26 PROCEDURE — 70450 CT HEAD/BRAIN W/O DYE: CPT | Mod: 26,MA

## 2023-08-26 PROCEDURE — 71045 X-RAY EXAM CHEST 1 VIEW: CPT | Mod: 26

## 2023-08-26 PROCEDURE — 72125 CT NECK SPINE W/O DYE: CPT | Mod: 26,MA

## 2023-08-26 PROCEDURE — 93970 EXTREMITY STUDY: CPT | Mod: 26

## 2023-08-26 PROCEDURE — 71250 CT THORAX DX C-: CPT | Mod: 26,MA

## 2023-08-26 PROCEDURE — 99285 EMERGENCY DEPT VISIT HI MDM: CPT

## 2023-08-26 RX ORDER — DRONABINOL 2.5 MG
2.5 CAPSULE ORAL
Refills: 0 | Status: DISCONTINUED | OUTPATIENT
Start: 2023-08-26 | End: 2023-09-02

## 2023-08-26 RX ORDER — CARVEDILOL PHOSPHATE 80 MG/1
6.25 CAPSULE, EXTENDED RELEASE ORAL EVERY 12 HOURS
Refills: 0 | Status: DISCONTINUED | OUTPATIENT
Start: 2023-08-26 | End: 2023-08-31

## 2023-08-26 RX ORDER — SODIUM CHLORIDE 9 MG/ML
1000 INJECTION, SOLUTION INTRAVENOUS
Refills: 0 | Status: DISCONTINUED | OUTPATIENT
Start: 2023-08-26 | End: 2023-08-29

## 2023-08-26 RX ORDER — CHOLECALCIFEROL (VITAMIN D3) 125 MCG
2000 CAPSULE ORAL DAILY
Refills: 0 | Status: DISCONTINUED | OUTPATIENT
Start: 2023-08-26 | End: 2023-09-07

## 2023-08-26 RX ORDER — SIMVASTATIN 20 MG/1
40 TABLET, FILM COATED ORAL AT BEDTIME
Refills: 0 | Status: DISCONTINUED | OUTPATIENT
Start: 2023-08-26 | End: 2023-09-01

## 2023-08-26 RX ORDER — OLANZAPINE 15 MG/1
5 TABLET, FILM COATED ORAL ONCE
Refills: 0 | Status: COMPLETED | OUTPATIENT
Start: 2023-08-26 | End: 2023-08-26

## 2023-08-26 RX ORDER — ASPIRIN/CALCIUM CARB/MAGNESIUM 324 MG
81 TABLET ORAL DAILY
Refills: 0 | Status: DISCONTINUED | OUTPATIENT
Start: 2023-08-26 | End: 2023-08-27

## 2023-08-26 RX ORDER — MAGNESIUM SULFATE 500 MG/ML
2 VIAL (ML) INJECTION ONCE
Refills: 0 | Status: COMPLETED | OUTPATIENT
Start: 2023-08-26 | End: 2023-08-26

## 2023-08-26 RX ORDER — ACETAMINOPHEN 500 MG
975 TABLET ORAL EVERY 8 HOURS
Refills: 0 | Status: DISCONTINUED | OUTPATIENT
Start: 2023-08-26 | End: 2023-09-07

## 2023-08-26 RX ORDER — LOPERAMIDE HCL 2 MG
2 TABLET ORAL
Refills: 0 | Status: DISCONTINUED | OUTPATIENT
Start: 2023-08-26 | End: 2023-09-07

## 2023-08-26 RX ORDER — HEPARIN SODIUM 5000 [USP'U]/ML
5000 INJECTION INTRAVENOUS; SUBCUTANEOUS EVERY 12 HOURS
Refills: 0 | Status: DISCONTINUED | OUTPATIENT
Start: 2023-08-26 | End: 2023-08-27

## 2023-08-26 RX ORDER — SODIUM CHLORIDE 9 MG/ML
1000 INJECTION, SOLUTION INTRAVENOUS ONCE
Refills: 0 | Status: COMPLETED | OUTPATIENT
Start: 2023-08-26 | End: 2023-08-26

## 2023-08-26 RX ORDER — MINOCYCLINE HYDROCHLORIDE 45 MG/1
100 TABLET, EXTENDED RELEASE ORAL
Refills: 0 | Status: DISCONTINUED | OUTPATIENT
Start: 2023-08-26 | End: 2023-09-07

## 2023-08-26 RX ADMIN — MINOCYCLINE HYDROCHLORIDE 100 MILLIGRAM(S): 45 TABLET, EXTENDED RELEASE ORAL at 18:54

## 2023-08-26 RX ADMIN — Medication 975 MILLIGRAM(S): at 22:10

## 2023-08-26 RX ADMIN — SODIUM CHLORIDE 1000 MILLILITER(S): 9 INJECTION, SOLUTION INTRAVENOUS at 10:40

## 2023-08-26 RX ADMIN — HEPARIN SODIUM 5000 UNIT(S): 5000 INJECTION INTRAVENOUS; SUBCUTANEOUS at 18:51

## 2023-08-26 RX ADMIN — CARVEDILOL PHOSPHATE 6.25 MILLIGRAM(S): 80 CAPSULE, EXTENDED RELEASE ORAL at 18:54

## 2023-08-26 RX ADMIN — Medication 25 GRAM(S): at 18:48

## 2023-08-26 RX ADMIN — Medication 975 MILLIGRAM(S): at 21:17

## 2023-08-26 RX ADMIN — SIMVASTATIN 40 MILLIGRAM(S): 20 TABLET, FILM COATED ORAL at 21:17

## 2023-08-26 RX ADMIN — Medication 2.5 MILLIGRAM(S): at 18:53

## 2023-08-26 RX ADMIN — SODIUM CHLORIDE 80 MILLILITER(S): 9 INJECTION, SOLUTION INTRAVENOUS at 15:11

## 2023-08-26 NOTE — ED ADULT NURSE NOTE - NSFALLHARMRISKINTERV_ED_ALL_ED
Assistance OOB with selected safe patient handling equipment if applicable/Assistance with ambulation/Communicate risk of Fall with Harm to all staff, patient, and family/Monitor gait and stability/Monitor for mental status changes and reorient to person, place, and time, as needed/Provide visual cue: red socks, yellow wristband, yellow gown, etc/Reinforce activity limits and safety measures with patient and family/Toileting schedule using arm’s reach rule for commode and bathroom/Use of alarms - bed, stretcher, chair and/or video monitoring/Bed in lowest position, wheels locked, appropriate side rails in place/Call bell, personal items and telephone in reach/Instruct patient to call for assistance before getting out of bed/chair/stretcher/Non-slip footwear applied when patient is off stretcher/[place] to call system/Physically safe environment - no spills, clutter or unnecessary equipment/Purposeful Proactive Rounding/Room/bathroom lighting operational, light cord in reach

## 2023-08-26 NOTE — H&P ADULT - NSHPPHYSICALEXAM_GEN_ALL_CORE
Vital Signs Last 24 Hrs  T(C): 36.9 (26 Aug 2023 14:04), Max: 36.9 (26 Aug 2023 14:00)  T(F): 98.4 (26 Aug 2023 14:04), Max: 98.4 (26 Aug 2023 14:00)  HR: 111 (26 Aug 2023 14:04) (102 - 111)  BP: 146/83 (26 Aug 2023 14:04) (109/71 - 164/85)  BP(mean): --  RR: 16 (26 Aug 2023 14:04) (16 - 20)  SpO2: 98% (26 Aug 2023 14:04) (98% - 100%)    Parameters below as of 26 Aug 2023 14:04  Patient On (Oxygen Delivery Method): room air

## 2023-08-26 NOTE — ED PROVIDER NOTE - PHYSICAL EXAMINATION
Gen: Well appearing. IN c-collar.   Head: NC/AT  Neck: trachea midline  Resp:  No distress, lungs clear w/ no crackles, wheezing.   Abd: soft, nd, nt +UROSTOMY +COLOSTOMY  Ext: +RIGHT KNEE ABRASION but w/ full ROM.   Neuro:  A&Ox1. moving all four extremities spontaneously.   Skin:  Warm and dry as visualized

## 2023-08-26 NOTE — CONSULT NOTE ADULT - ASSESSMENT
62 year old male with a PMHx of rectal adenocarcinoma with mets to bladder and prostate on chemo, s/p LAR in 2020 and APR in 2022, s/p fistula repair between bowel and bladder, HTN, HLD, CAD, chronic diarrhea, who presented to [facility] after a unwitnessed fall, with headstrike, there is a possibility he may have had a syncopal event prior to the fall, complaining of facial pain, he was found to have fractures involving the anterior and lateral wall of the right maxillary sinus, there are fracture fragments identified in the maxillary sinus region, they also mentioned evidence of a depressed fracture involving the right zygomatic arch as well as a mildly displaced fracture involving the lateral wall of the right orbit.    Face surgery was contacted for recommendations  Patient needs dedicated CT max face, further recommendations pending review of CT max face   Patient should be evaluated by medicine for a syncope work up

## 2023-08-26 NOTE — ED ADULT NURSE REASSESSMENT NOTE - SKIN INTEGRITY
bruising I saw and evaluated Mr. Hood at bedside while in the ER. He exhibited profound peritonitis with increasingly painful shake tenderness and tachycardia. Along with his CT scan, the diagnosis of an intra-abdominal uncontrolled infection was made and I discussed operative management with him. I discussed the need for a partial colectomy and colostomy and outlined risks and benefits. He was reluctant to receive a colostomy however he understood the need to divert the fecal stream initially and possibly reverse him in future. We discussed non-operative management with IV antibiotics however I did state due to his immunocompromised state that this may yield inferior results. He opted for surgical management and was brought to the OR for a sigmoidectomy and end colostomy.

## 2023-08-26 NOTE — CHART NOTE - NSCHARTNOTEFT_GEN_A_CORE
Post Fall Assessment    CHIEF COMPLAINT   Patient is a 62y old  Male who presents with a chief complaint of syncope.       EVENT SUMMARY   Notified by Rn for patient s/p  fall. Pt seen and examined at bedside. Patient is unsure if he hit head. Patient has no complaints at this time. Denies SOB, Chest Pain, palpitations, dizziness, LOC, hip / back pain, headache.     MEDICATIONS  (STANDING):  aspirin enteric coated 81 milliGRAM(s) Oral daily  carvedilol 6.25 milliGRAM(s) Oral every 12 hours  cholecalciferol 2000 Unit(s) Oral daily  dronabinol 2.5 milliGRAM(s) Oral two times a day  heparin   Injectable 5000 Unit(s) SubCutaneous every 12 hours  lactated ringers. 1000 milliLiter(s) (80 mL/Hr) IV Continuous <Continuous>  minocycline 100 milliGRAM(s) Oral two times a day  OLANZapine Injectable 5 milliGRAM(s) IntraMuscular once  simvastatin 40 milliGRAM(s) Oral at bedtime    MEDICATIONS  (PRN):  acetaminophen     Tablet .. 975 milliGRAM(s) Oral every 8 hours PRN Moderate Pain (4 - 6)  loperamide 2 milliGRAM(s) Oral four times a day PRN Diarrhea      Vital Signs Last 24 Hrs  T(F):   HR:   BP:   RR:   SpO2:       Physical Exam  General: NAD, resting comfortably in bed  Mental Status: A&O x 2-3 (oriented to person, place, able to tell month but not year)  Skin: + swelling and mild ecchymosis to right side of face (present on admission), Warm, dry, no rashes, lesions.    Head: NCAT  Neuro: Non focal  Cardiac: S1/S2. No murmurs appreciated  Lungs: CTA b/l. No rales, wheezes, rhonchi appreciated b/l.   Abdomen: Scant amount of blood from rectal drain site, Abd Soft, +BS, non distended, urostomy and colostomy in place without bloody drainage.  Extremities/ MSK: No edema. Full ROM all 4 extremities. No spinal tenderness.          A&P  HPI:   61yo Male with PMH HTN, HLD, CAD Rectal cancer s/p urostomy and  colostomy who presents after syncope and collapse at home . Admitted for syncope work up, right facial bone fractures, weakness, unsteady gait, poor oral intake.     1) s/p fall  - CT head ordered to r/o acute bleed., CT Abd/ pelvis r/o acute bleed and/or fx  - CBC/ BMP  - Constant observation, Bed alarm, fall precautions  - case discussed with Post Fall Assessment    CHIEF COMPLAINT   Patient is a 62y old  Male who presents with a chief complaint of syncope.       EVENT SUMMARY   Notified by Rn for patient s/p fall. Pt seen and examined at bedside. Patient said he was reaching and trying to find his jewelry and that is what caused him to fall out of bed. Patient is unsure if he hit head. Patient has no complaints at this time. Denies SOB, Chest Pain, palpitations, dizziness, LOC, hip / back pain, headache.     MEDICATIONS  (STANDING):  aspirin enteric coated 81 milliGRAM(s) Oral daily  carvedilol 6.25 milliGRAM(s) Oral every 12 hours  cholecalciferol 2000 Unit(s) Oral daily  dronabinol 2.5 milliGRAM(s) Oral two times a day  heparin   Injectable 5000 Unit(s) SubCutaneous every 12 hours  lactated ringers. 1000 milliLiter(s) (80 mL/Hr) IV Continuous <Continuous>  minocycline 100 milliGRAM(s) Oral two times a day  OLANZapine Injectable 5 milliGRAM(s) IntraMuscular once  simvastatin 40 milliGRAM(s) Oral at bedtime    MEDICATIONS  (PRN):  acetaminophen     Tablet .. 975 milliGRAM(s) Oral every 8 hours PRN Moderate Pain (4 - 6)  loperamide 2 milliGRAM(s) Oral four times a day PRN Diarrhea      Vital Signs Last 24 Hrs  T(F): 97.9  HR: 114  BP: 108/67  RR: 18  SpO2: 97%      Physical Exam  General: NAD, resting comfortably in bed  Mental Status: A&O x 2-3 (oriented to person, place, able to tell month but not year)  Skin: + swelling and mild ecchymosis to right side of face (present on admission), Warm, dry, no rashes, lesions.    Head: NCAT  Neuro: Non focal  Cardiac: S1/S2. No murmurs appreciated  Lungs: CTA b/l. No rales, wheezes, rhonchi appreciated b/l.   Abdomen: Scant amount of blood from rectal drain site, Abd Soft, +BS, non distended, urostomy and colostomy in place without bloody drainage.  Extremities/ MSK: No edema. Full ROM all 4 extremities. No spinal tenderness.          A&P  HPI:   63yo Male with PMH HTN, HLD, CAD Rectal cancer s/p urostomy and  colostomy who presents after syncope and collapse at home . Admitted for syncope work up, right facial bone fractures, weakness, unsteady gait, poor oral intake.     1) s/p fall  - CT head ordered to r/o acute bleed., CT Abd/ pelvis r/o acute bleed and/or fx  - CBC/ BMP  - Constant observation, Bed alarm, fall precautions  - case discussed with attending  - Message left for patient's wife, notified patient's daughter Erma Post Fall Assessment    CHIEF COMPLAINT   Patient is a 62y old  Male who presents with a chief complaint of syncope.       EVENT SUMMARY   Notified by Rn for patient s/p fall. Pt seen and examined at bedside. Patient said he was reaching and trying to find his jewelry and that is what caused him to fall out of bed. Patient is unsure if he hit head. Patient has no complaints at this time. Denies SOB, Chest Pain, palpitations, dizziness, LOC, hip / back pain, headache.     MEDICATIONS  (STANDING):  aspirin enteric coated 81 milliGRAM(s) Oral daily  carvedilol 6.25 milliGRAM(s) Oral every 12 hours  cholecalciferol 2000 Unit(s) Oral daily  dronabinol 2.5 milliGRAM(s) Oral two times a day  heparin   Injectable 5000 Unit(s) SubCutaneous every 12 hours  lactated ringers. 1000 milliLiter(s) (80 mL/Hr) IV Continuous <Continuous>  minocycline 100 milliGRAM(s) Oral two times a day  OLANZapine Injectable 5 milliGRAM(s) IntraMuscular once  simvastatin 40 milliGRAM(s) Oral at bedtime    MEDICATIONS  (PRN):  acetaminophen     Tablet .. 975 milliGRAM(s) Oral every 8 hours PRN Moderate Pain (4 - 6)  loperamide 2 milliGRAM(s) Oral four times a day PRN Diarrhea      Vital Signs Last 24 Hrs  T(F): 97.9  HR: 114  BP: 108/67  RR: 18  SpO2: 97%      Physical Exam  General: NAD, resting comfortably in bed  Mental Status: A&O x 2-3 (oriented to person, place, able to tell month but not year)  Skin: + swelling and mild ecchymosis to right side of face (present on admission), Warm, dry, no rashes, lesions.    Head: NCAT  Neuro: Non focal  Cardiac: S1/S2. No murmurs appreciated  Lungs: CTA b/l. No rales, wheezes, rhonchi appreciated b/l.   Abdomen: Scant amount of blood from rectal drain site, Abd Soft, +BS, non distended, urostomy and colostomy in place without bloody drainage.  Extremities/ MSK: No edema. Full ROM all 4 extremities. No spinal tenderness.          A&P  HPI:   61yo Male with PMH HTN, HLD, CAD Rectal cancer s/p urostomy and  colostomy who presents after syncope and collapse at home . Admitted for syncope work up, right facial bone fractures, weakness, unsteady gait, poor oral intake.     1) s/p fall  - CT head ordered to r/o acute bleed., CT Abd/ pelvis r/o acute bleed and/or fx  - CBC/ BMP  - heparin sub Q d/c'd pending results  - Constant observation, Bed alarm, fall precautions  - case discussed with attending  - Message left for patient's wife, notified patient's daughter Erma    **Addendum  - Discussed imaging results with [facility] radiology    -CT head: 2 new tiny hyperdense foci right frontal white matter concerning for tiny   parenchymal hemorrhages. Slightly increased subarachnoid hemorrhage in   the left posterior fossa. Questionable trace subdural along left   tentorial leaflet. No acute calvarial fracture. Right-sided maxillofacial   fractures as recently described.    -CT abd/ pelvis: Acute L1 compression fx with mild retropulsion of bone   into the epidural space.  No acute post traumatic injury otherwise.    -labs benign    -Neuro surgery consulted  -Repeat CT head in 4 hours, recommended by radiology  -Neuro check Q4  *Discussed with attending Post Fall Assessment    CHIEF COMPLAINT   Patient is a 62y old  Male who presents with a chief complaint of syncope.       EVENT SUMMARY   Notified by Rn for patient s/p fall. Pt seen and examined at bedside. Patient said he was reaching and trying to find his jewelry and that is what caused him to fall out of bed. Patient is unsure if he hit head. Patient has no complaints at this time. Denies SOB, Chest Pain, palpitations, dizziness, LOC, hip / back pain, headache.     MEDICATIONS  (STANDING):  aspirin enteric coated 81 milliGRAM(s) Oral daily  carvedilol 6.25 milliGRAM(s) Oral every 12 hours  cholecalciferol 2000 Unit(s) Oral daily  dronabinol 2.5 milliGRAM(s) Oral two times a day  heparin   Injectable 5000 Unit(s) SubCutaneous every 12 hours  lactated ringers. 1000 milliLiter(s) (80 mL/Hr) IV Continuous <Continuous>  minocycline 100 milliGRAM(s) Oral two times a day  OLANZapine Injectable 5 milliGRAM(s) IntraMuscular once  simvastatin 40 milliGRAM(s) Oral at bedtime    MEDICATIONS  (PRN):  acetaminophen     Tablet .. 975 milliGRAM(s) Oral every 8 hours PRN Moderate Pain (4 - 6)  loperamide 2 milliGRAM(s) Oral four times a day PRN Diarrhea      Vital Signs Last 24 Hrs  T(F): 97.9  HR: 114  BP: 108/67  RR: 18  SpO2: 97%      Physical Exam  General: NAD, resting comfortably in bed  Mental Status: A&O x 2-3 (oriented to person, place, able to tell month but not year)  Skin: + swelling and mild ecchymosis to right side of face (present on admission), Warm, dry, no rashes, lesions.    Head: NCAT  Neuro: Non focal  Cardiac: S1/S2. No murmurs appreciated  Lungs: CTA b/l. No rales, wheezes, rhonchi appreciated b/l.   Abdomen: Scant amount of blood from rectal drain site, Abd Soft, +BS, non distended, urostomy and colostomy in place without bloody drainage.  Extremities/ MSK: No edema. Full ROM all 4 extremities. No spinal tenderness.          A&P  HPI:   63yo Male with PMH HTN, HLD, CAD Rectal cancer s/p urostomy and  colostomy who presents after syncope and collapse at home . Admitted for syncope work up, right facial bone fractures, weakness, unsteady gait, poor oral intake.     1) s/p fall  - CT head ordered to r/o acute bleed., CT Abd/ pelvis r/o acute bleed and/or fx  - CBC/ BMP  - heparin sub Q d/c'd pending results  - Constant observation, Bed alarm, fall precautions  - case discussed with attending  - Message left for patient's wife, notified patient's daughter Erma    **Addendum  - [facility]    -CT head: 2 new tiny hyperdense foci right frontal white matter concerning for tiny   parenchymal hemorrhages. Slightly increased subarachnoid hemorrhage in   the left posterior fossa. Questionable trace subdural along left   tentorial leaflet. No acute calvarial fracture. Right-sided maxillofacial   fractures as recently described.    -CT abd/ pelvis: Acute L1 compression fx with mild retropulsion of bone   into the epidural space.  No acute post traumatic injury otherwise.    -labs benign    -Neuro surgery consulted  -Repeat CT head in 4 hours, recommended by radiology  -Neuro check Q2h, upgrade to SD  -D/C ASA  *Discussed with attending and neurosx  *Patient's daughter updated Post Fall Assessment    CHIEF COMPLAINT   Patient is a 62y old  Male who presents with a chief complaint of syncope.       EVENT SUMMARY   Notified by Rn for patient s/p fall. Pt seen and examined at bedside. Patient said he was reaching and trying to find his jewelry and that is what caused him to fall out of bed. Patient is unsure if he hit head. Patient has no complaints at this time. Denies SOB, Chest Pain, palpitations, dizziness, LOC, hip / back pain, headache.     MEDICATIONS  (STANDING):  aspirin enteric coated 81 milliGRAM(s) Oral daily  carvedilol 6.25 milliGRAM(s) Oral every 12 hours  cholecalciferol 2000 Unit(s) Oral daily  dronabinol 2.5 milliGRAM(s) Oral two times a day  heparin   Injectable 5000 Unit(s) SubCutaneous every 12 hours  lactated ringers. 1000 milliLiter(s) (80 mL/Hr) IV Continuous <Continuous>  minocycline 100 milliGRAM(s) Oral two times a day  OLANZapine Injectable 5 milliGRAM(s) IntraMuscular once  simvastatin 40 milliGRAM(s) Oral at bedtime    MEDICATIONS  (PRN):  acetaminophen     Tablet .. 975 milliGRAM(s) Oral every 8 hours PRN Moderate Pain (4 - 6)  loperamide 2 milliGRAM(s) Oral four times a day PRN Diarrhea      Vital Signs Last 24 Hrs  T(F): 97.9  HR: 114  BP: 108/67  RR: 18  SpO2: 97%      Physical Exam  General: NAD, resting comfortably in bed  Mental Status: A&O x 2-3 (oriented to person, place, able to tell month but not year)  Skin: + swelling and mild ecchymosis to right side of face (present on admission), Warm, dry, no rashes, lesions.    Head: NCAT  Neuro: Non focal  Cardiac: S1/S2. No murmurs appreciated  Lungs: CTA b/l. No rales, wheezes, rhonchi appreciated b/l.   Abdomen: Scant amount of blood from rectal drain site, Abd Soft, +BS, non distended, urostomy and colostomy in place without bloody drainage.  Extremities/ MSK: No edema. Full ROM all 4 extremities. No spinal tenderness.          A&P  HPI:   61yo Male with PMH HTN, HLD, CAD Rectal cancer s/p urostomy and  colostomy who presents after syncope and collapse at home . Admitted for syncope work up, right facial bone fractures, weakness, unsteady gait, poor oral intake.     1) s/p fall  - CT head ordered to r/o acute bleed., CT Abd/ pelvis r/o acute bleed and/or fx  - CBC/ BMP  - heparin sub Q d/c'd pending results  - Constant observation, Bed alarm, fall precautions  - case discussed with attending  - Message left for patient's wife, notified patient's daughter Erma    **Addendum  - [facility]    -CT head: 2 new tiny hyperdense foci right frontal white matter concerning for tiny   parenchymal hemorrhages. Slightly increased subarachnoid hemorrhage in   the left posterior fossa. Questionable trace subdural along left   tentorial leaflet. No acute calvarial fracture. Right-sided maxillofacial   fractures as recently described.    -CT abd/ pelvis: Acute L1 compression fx with mild retropulsion of bone   into the epidural space.  No acute post traumatic injury otherwise.    -labs benign    -Neuro surgery consulted  -Repeat CT head in 6 hours (8AM), recommended by neurosx  -Neuro check Q2h, upgrade to SD  -D/C ASA  *Discussed with attending and neurosx  *Patient's daughter updated

## 2023-08-26 NOTE — CONSULT NOTE ADULT - SUBJECTIVE AND OBJECTIVE BOX
[facility], THE HEART CENTER                                   [address] [place] [address]                                                      PHONE: [phone]                                                         FAX: [phone]  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 coli bacteremia and septic shock, new onset atrial fibrillation at that time with tachycardia mediated LV dysfunction on echo who presents with a syncopal event.     Patient denies any chest pain, SOB, palpitations, diaphoresis, fevers.    PAST MEDICAL & SURGICAL HISTORY:  HTN (hypertension)      HLD (hyperlipidemia)      CAD (coronary artery disease)      Mitral regurgitation      Rectal cancer  2019 s/p chemo and radiation; recurrence of cancer       Depression  with rectal cancer      Neuropathy  feet s/p chemo      Stented coronary artery  circumflex  LAD 2017 x4      H/O carotid stenosis      H/O renal calculi      DANO (iron deficiency anemia)      Umbilical hernia      Splenic artery aneurysm      H/O cardiac catheterization   2017  4 STENTS      History of CEA (carotid endarterectomy)  right 2019      History of rectal surgery  with ileostomy 2020      H/O sigmoidoscopy  x2      History of removal of Port-a-Cath          No Known Allergies      MEDICATIONS  (STANDING):  aspirin enteric coated 81 milliGRAM(s) Oral daily  carvedilol 6.25 milliGRAM(s) Oral every 12 hours  cholecalciferol 2000 Unit(s) Oral daily  dronabinol 2.5 milliGRAM(s) Oral two times a day  lactated ringers. 1000 milliLiter(s) (80 mL/Hr) IV Continuous <Continuous>  magnesium sulfate  IVPB 2 Gram(s) IV Intermittent once  minocycline 100 milliGRAM(s) Oral two times a day  simvastatin 40 milliGRAM(s) Oral at bedtime    MEDICATIONS  (PRN):  acetaminophen     Tablet .. 975 milliGRAM(s) Oral every 8 hours PRN Moderate Pain (4 - 6)  loperamide 2 milliGRAM(s) Oral four times a day PRN Diarrhea      Social History:  Cigarettes:  Denies current tobacco use                  Alcohol:  Denies daily etoh            Illicit Drug Abuse:  Denies    Family History:  denies CAD, MI, CVA, or sudden death.    ROS: Negative other than as mentioned in HPI.    Vital Signs Last 24 Hrs  T(C): 36.9 (26 Aug 2023 14:04), Max: 36.9 (26 Aug 2023 14:00)  T(F): 98.4 (26 Aug 2023 14:04), Max: 98.4 (26 Aug 2023 14:00)  HR: 111 (26 Aug 2023 14:04) (102 - 111)  BP: 146/83 (26 Aug 2023 14:04) (109/71 - 164/85)  BP(mean): --  RR: 16 (26 Aug 2023 14:04) (16 - 20)  SpO2: 98% (26 Aug 2023 14:04) (98% - 100%)    Parameters below as of 26 Aug 2023 14:04  Patient On (Oxygen Delivery Method): room air      ICU Vital Signs Last 24 Hrs  PASTORA CHURCHILL  I&O's Detail    I&O's Summary    Drug Dosing Weight  PASTORA CHURCHILL      PHYSICAL EXAM:  General: NAD  HEENT: Head; normocephalic, atraumatic.  Eyes: EOMI, conjunctiva normal  Neck: Supple, no JVD noted  CARDIOVASCULAR: RRR, S1 S2, No murmurs or gallops  LUNGS: Clear to auscultation b/l, No rales, rhonchi or wheeze, normal inspiratory effort  ABDOMEN: Soft, nontender, non-distended, +bowel sounds  EXTREMITIES: No edema b/l, no cyanosis   SKIN: warm and dry  NEURO: Alert/oriented x 3  PSYCH: normal affect.        LABS:                        9.5    12.36 )-----------( 253      ( 26 Aug 2023 09:16 )             28.2         136  |  101  |  15.1  ----------------------------<  119<H>  4.7   |  21.0<L>  |  1.13    Ca    8.6      26 Aug 2023 09:16  Mg     1.7         TPro  5.6<L>  /  Alb  3.0<L>  /  TBili  0.7  /  DBili  x   /  AST  43<H>  /  ALT  31  /  AlkPhos  131<H>      PASTORA CHURCHILL  CARDIAC MARKERS ( 26 Aug 2023 09:16 )  x     / <0.01 ng/mL / x     / x     / x            Urinalysis Basic - ( 26 Aug 2023 14:02 )    Color: Yellow / Appearance: Clear / S.010 / pH: x  Gluc: x / Ketone: Negative  / Bili: Negative / Urobili: Negative mg/dL   Blood: x / Protein: Negative / Nitrite: Negative   Leuk Esterase: Moderate / RBC: 6-10 /HPF / WBC 26-50 /HPF   Sq Epi: x / Non Sq Epi: x / Bacteria: Occasional        RADIOLOGY & ADDITIONAL STUDIES:    INTERPRETATION OF TELEMETRY (personally reviewed):    ECG (personally reviewed): Sinus tachycardia, no acute ischemic changes    ECHO: 2023  Summary:  1. Left ventricle: The cavity size is normal. Normal thickness is present. There are no regional wall motion abnormalities  present. Left ventricular ejection fraction is low normal at 50% by Hicks's.  2. Right ventricle: The cavity size is mildly dilated. Right ventricular systolic function is normal.  3. Mitral valve: The leaflets are mildly calcified on leaflet tips. There is trace mitral valve regurgitation.  4. Aortic valve: The valve is trileaflet. The leaflets are mildly thickened. There is no valvular aortic regurgitation.  5. Tricuspid valve: There is mild (1+) tricuspid valve regurgitation.  6. Aorta: The aortic root internal diameter is 3.8 cm.  7. Pericardium, extracardiac: There is no pericardial effusion.  8. Pulmonary arteries: The peak pressure during systole by Doppler is 30 mm Hg.    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 coli bacteremia and septic shock, new onset atrial fibrillation at that time with tachycardia mediated LV dysfunction on echo who presents with a syncopal event.     Syncope  -recent echo as above  -troponin negative  -EKG unremarkable    HTN/HLD  -c/w home coreg 6.25mg BID  -c/w home simvastatin 40mg daily    +U/A  -as per primary team    Thank you for letting [facility] to assist in the management of this patient. Please call with any questions.                 [facility], THE HEART CENTER                                   [address] [place] [address]                                                      PHONE: [phone]                                                         FAX: [phone]  [email or web address]  ---------------------------------------------------------------------------------------------------------------------------------    Reason for Consult: Syncope    HPI:  PASTORA CHURCHILL is an 62y Male with a PMHx of CAD status post multivessel stenting (LAD/Cx) in the past most recently , 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 coli bacteremia and septic shock, new onset atrial fibrillation at that time with tachycardia mediated LV dysfunction on echo who presents with a syncopal event. The patient states that he is not sure what happened. He states that he is in the hospital and felt tired. He is not able to give any history of what happened today. As per EMR review the patient was at home and fell. His wife heard him fall and was found down on his stomach unresponsive. CPR was initiated but upon EMS arrival the patient had a pulse and it is unsure if the patient ever lost a pulse. The patient came to the ER and was found to have maxillofacial fractures. The patient denies any chest pain, SOB, palpitations, diaphoresis, fevers. Cardiology consulted for syncope.    PAST MEDICAL & SURGICAL HISTORY:  HTN (hypertension)      HLD (hyperlipidemia)      CAD (coronary artery disease)      Mitral regurgitation      Rectal cancer  2019 s/p chemo and radiation; recurrence of cancer       Depression  with rectal cancer      Neuropathy  feet s/p chemo      Stented coronary artery  circumflex  LAD 2017 x4      H/O carotid stenosis      H/O renal calculi      DANO (iron deficiency anemia)      Umbilical hernia      Splenic artery aneurysm      H/O cardiac catheterization   2017  4 STENTS      History of CEA (carotid endarterectomy)  right 2019      History of rectal surgery  with ileostomy 2020      H/O sigmoidoscopy  x2      History of removal of Port-a-Cath          No Known Allergies      MEDICATIONS  (STANDING):  aspirin enteric coated 81 milliGRAM(s) Oral daily  carvedilol 6.25 milliGRAM(s) Oral every 12 hours  cholecalciferol 2000 Unit(s) Oral daily  dronabinol 2.5 milliGRAM(s) Oral two times a day  lactated ringers. 1000 milliLiter(s) (80 mL/Hr) IV Continuous <Continuous>  magnesium sulfate  IVPB 2 Gram(s) IV Intermittent once  minocycline 100 milliGRAM(s) Oral two times a day  simvastatin 40 milliGRAM(s) Oral at bedtime    MEDICATIONS  (PRN):  acetaminophen     Tablet .. 975 milliGRAM(s) Oral every 8 hours PRN Moderate Pain (4 - 6)  loperamide 2 milliGRAM(s) Oral four times a day PRN Diarrhea      Social History:  Cigarettes:  Denies current tobacco use                  Alcohol:  Denies daily etoh            Illicit Drug Abuse:  Denies    Family History:  denies sudden cardiac death.    ROS: Negative other than as mentioned in HPI.    Vital Signs Last 24 Hrs  T(C): 36.9 (26 Aug 2023 14:04), Max: 36.9 (26 Aug 2023 14:00)  T(F): 98.4 (26 Aug 2023 14:04), Max: 98.4 (26 Aug 2023 14:00)  HR: 111 (26 Aug 2023 14:04) (102 - 111)  BP: 146/83 (26 Aug 2023 14:04) (109/71 - 164/85)  BP(mean): --  RR: 16 (26 Aug 2023 14:04) (16 - 20)  SpO2: 98% (26 Aug 2023 14:04) (98% - 100%)    Parameters below as of 26 Aug 2023 14:04  Patient On (Oxygen Delivery Method): room air      ICU Vital Signs Last 24 Hrs  PASTORA CHURCHILL  I&O's Detail    I&O's Summary    Drug Dosing Weight  PASTORA CHURCHILL      PHYSICAL EXAM:  General: NAD  Eyes: Conjunctiva normal  Neck: Supple, no JVD noted  CARDIOVASCULAR: Mildly tachycardic, regular rhythm, S1 S2, No murmurs or gallops  LUNGS: Clear to auscultation b/l, No rales, rhonchi or wheeze, normal inspiratory effort  ABDOMEN: Soft, nontender, non-distended  EXTREMITIES: No edema b/l, no cyanosis   SKIN: warm and dry  NEURO: Alert      LABS:                        9.5    12.36 )-----------( 253      ( 26 Aug 2023 09:16 )             28.2         136  |  101  |  15.1  ----------------------------<  119<H>  4.7   |  21.0<L>  |  1.13    Ca    8.6      26 Aug 2023 09:16  Mg     1.7         TPro  5.6<L>  /  Alb  3.0<L>  /  TBili  0.7  /  DBili  x   /  AST  43<H>  /  ALT  31  /  AlkPhos  131<H>      [facility]  CARDIAC MARKERS ( 26 Aug 2023 09:16 )  x     / <0.01 ng/mL / x     / x     / x            Urinalysis Basic - ( 26 Aug 2023 14:02 )    Color: Yellow / Appearance: Clear / S.010 / pH: x  Gluc: x / Ketone: Negative  / Bili: Negative / Urobili: Negative mg/dL   Blood: x / Protein: Negative / Nitrite: Negative   Leuk Esterase: Moderate / RBC: 6-10 /HPF / WBC 26-50 /HPF   Sq Epi: x / Non Sq Epi: x / Bacteria: Occasional        RADIOLOGY & ADDITIONAL STUDIES:    INTERPRETATION OF TELEMETRY (personally reviewed): Sinus tachycardia    ECG (personally reviewed): Sinus tachycardia, no acute ischemic changes    ECHO: 2023  Summary:  1. Left ventricle: The cavity size is normal. Normal thickness is present. There are no regional wall motion abnormalities  present. Left ventricular ejection fraction is low normal at 50% by Hicks's.  2. Right ventricle: The cavity size is mildly dilated. Right ventricular systolic function is normal.  3. Mitral valve: The leaflets are mildly calcified on leaflet tips. There is trace mitral valve regurgitation.  4. Aortic valve: The valve is trileaflet. The leaflets are mildly thickened. There is no valvular aortic regurgitation.  5. Tricuspid valve: There is mild (1+) tricuspid valve regurgitation.  6. Aorta: The aortic root internal diameter is 3.8 cm.  7. Pericardium, extracardiac: There is no pericardial effusion.  8. Pulmonary arteries: The peak pressure during systole by Doppler is 30 mm Hg.    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 coli bacteremia and septic shock, new onset atrial fibrillation at that time with tachycardia mediated LV dysfunction on echo who presents with a syncopal event.     Syncope  -recent echo as above  -troponin negative  -EKG unremarkable  -tele unremarkable. Continue to monitor on tele     HTN/HLD  -c/w home coreg 6.25mg BID  -c/w home simvastatin 40mg daily    +U/A  -as per primary team    Thank you for letting [facility] to assist in the management of this patient. Please call with any questions.

## 2023-08-26 NOTE — H&P ADULT - HISTORY OF PRESENT ILLNESS
61yo Male with PMH HTN, HLD, CAD  Rectal cancer s/p urostomy and  colostomy who presents after syncope and collapse at home .  Per wife around 9am she heard him fall. He was found down on stomach and was unresponsive mouning when wife came to check on him ,CPR was innidiated, unsure if PT was ever pulseless. Per  ems upon arrival pt w/ pulse and spontaneous respirations but confused. ON arrival to ED pt w/ stable vitals, alert but only oriented to person.He does remember any related to events or this am    PT otherwise neurological intact.

## 2023-08-26 NOTE — CONSULT NOTE ADULT - SUBJECTIVE AND OBJECTIVE BOX
SURGERY CONSULT    HPI:  62 year old male with a PMHx of rectal adenocarcinoma with mets to bladder and prostate on chemo, s/p LAR in 2020 and APR in 2022, s/p fistula repair between bowel and bladder, HTN, HLD, CAD, chronic diarrhea, who presented to [facility] after a unwitnessed fall, with headstrike, there is a possibility he may have had a syncopal event prior to the fall, complaining of facial pain, denies chest pain or abdominal pain, denies back pain or pelvic pain, his wife said that he has been having difficulties with balance secondary to peripheral neuropathy which may have been induced by the chemotherapy agents.  Denies additional complaints.  Face surgery contacted for facial bone fractures.      PAST MEDICAL HISTORY:  Chest pain    HTN (hypertension)    HLD (hyperlipidemia)    CAD (coronary artery disease)    Mitral regurgitation    Rectal cancer    Depression    Neuropathy    Stented coronary artery    H/O carotid stenosis    H/O renal calculi    DANO (iron deficiency anemia)    Umbilical hernia    Splenic artery aneurysm        PAST SURGICAL HISTORY:  H/O cardiac catheterization    Cardiac disorder    History of CEA (carotid endarterectomy)    History of rectal surgery    H/O sigmoidoscopy    History of removal of Port-a-Cath        ALLERGIES:  No Known Allergies      FAMILY HISTORY: Noncontributory    SOCIAL HISTORY: Denies tobacco, EtOH, illicit substance use.     HOME MEDICATIONS:    MEDICATIONS  (STANDING):    MEDICATIONS  (PRN):      VITALS & I/Os:  Vital Signs Last 24 Hrs  T(C): 36.4 (26 Aug 2023 09:44), Max: 36.4 (26 Aug 2023 09:44)  T(F): 97.6 (26 Aug 2023 09:44), Max: 97.6 (26 Aug 2023 09:44)  HR: 104 (26 Aug 2023 12:03) (102 - 104)  BP: 164/85 (26 Aug 2023 12:03) (109/71 - 164/85)  BP(mean): --  RR: 16 (26 Aug 2023 12:03) (16 - 20)  SpO2: 100% (26 Aug 2023 12:03) (99% - 100%)    Parameters below as of 26 Aug 2023 12:03  Patient On (Oxygen Delivery Method): room air      CAPILLARY BLOOD GLUCOSE      POCT Blood Glucose.: 117 mg/dL (26 Aug 2023 09:45)      I&O's Summary      GENERAL: Confused, orientated to person.    HEENT: R Facial Bruising, tenderness over R face, no midface instability.    RESPIRATORY: CTAB. No wheezing, rales or rhonchi.  CARDIOVASCULAR: RRR. No audible murmurs, rubs or gallops.   GASTROINTESTINAL: Abdomen soft, NT, ND, -R/-G.  No pulsatile mass, no flank tenderness or suprapubic tenderness. No hepatosplenomegaly.  Urostomy and colostomy in place.      LABS:                        9.5    12.36 )-----------( 253      ( 26 Aug 2023 09:16 )             28.2     08-26    136  |  101  |  15.1  ----------------------------<  119<H>  4.7   |  21.0<L>  |  1.13    Ca    8.6      26 Aug 2023 09:16  Mg     1.7     08-26    TPro  5.6<L>  /  Alb  3.0<L>  /  TBili  0.7  /  DBili  x   /  AST  43<H>  /  ALT  31  /  AlkPhos  131<H>  08-26    Lactate:        CARDIAC MARKERS ( 26 Aug 2023 09:16 )  x     / <0.01 ng/mL / x     / x     / x          08-26 @ 09:59  2.80    Urinalysis Basic - ( 26 Aug 2023 09:16 )    Color: x / Appearance: x / SG: x / pH: x  Gluc: 119 mg/dL / Ketone: x  / Bili: x / Urobili: x   Blood: x / Protein: x / Nitrite: x   Leuk Esterase: x / RBC: x / WBC x   Sq Epi: x / Non Sq Epi: x / Bacteria: x        IMAGING:  INTERPRETATION: Clinical indication: Active CVA.    Multiple axial sections were performed from the base of the vertex without contrast enhancement. Coronal and sagittal reconstructions performed    Parenchymal volume loss and chronic microvascular ischemic changes identified.    There is no acute hemorrhage mass mass effect seen.    Evaluation of the osseous structures with the appropriate window demonstrates fractures involving the anterior and lateral wall of the right maxillary sinus with air-fluid level seen. There are fracture fragments identified in the maxillary sinus region. There is evidence of a depressed fracture involving the right zygomatic arch as well as a mildly displaced fracture involving the lateral wall of the right orbit. Dedicated imaging maxillofacial region with CT scan can be done for further evaluation if clinically indicated.    Both mastoid regions appear clear.    Cervical spine:    Loss of the normal cervical lordosis is seen    Disc space narrowing and endplate sclerotic changes and osteophytes are seen involving the C3-4 C5-6 and C6-7 levels secondary to chronic degenerative changes.    Bilateral hypertrophic facet joint changes are seen involving multiple levels. These findings are secondary to chronic degenerative changes.    No acute fractures or dislocations are seen    Evaluation of the paraspinal soft tissues is limited by lack of the contrast though grossly unremarkable    The visualized portions of both lung apices appear clear.    There is evidence of a partially demonstrated catheter entering the right subclavian vein.    IMPRESSION: No acute hemorrhage mass or mass effect    Maxillofacial fractures as described above.    Degenerative changes involving cervical spine.

## 2023-08-26 NOTE — ED ADULT NURSE NOTE - OBJECTIVE STATEMENT
Patient presents to ER for medical evaluation, as per family patient sustained unwitnessed fall, patient currently disoriented, HX of colon cancer with mets, VSS, resp even/unlabored, bruise noted to right knee, unknown LOC. C-collar in place.

## 2023-08-26 NOTE — H&P ADULT - ASSESSMENT
61 yo Male with PMHx of HTN , rectal cancer on chemo admitted for syncope, reported weakness poor oral intake diarrhea due to recent chemo       1- Syncope   tachycardic   check D dimer   lower ext doppler  orthostatic BP   IVF for possible dehydration   cardiology consult requested , Dr Owen   cont coreg     2- R facial bone fracture due to fall at home   surgery team consulted ; Dedicated imaging maxillofacial region with CT scan   pain meds   cold compress     3- weakness gait disturbances / peripheral neuropathy   due to chemo   check orthostatic , owen stockings   may benefit from using device at home   PT evaluation   OOb with assistance only     4- Poor oral inatke possible dehydration   add IVF   marinol bid   ensure max  with meals     5- Rectal cancer mets to bladder   with urostomy and colostomy bag   under [facility] getting chemo   diarrhea improving per wife     d/w wife at the bedside and   daughter Erma , MICU nurse

## 2023-08-26 NOTE — ED ADULT NURSE NOTE - CHIEF COMPLAINT QUOTE
patient with unwitnessed fall/syncopal episode. hx of metastatic cancer. arrives altered, unable to answer questions appropriately. dr. ellis called to bedside for eval.

## 2023-08-26 NOTE — ED PROVIDER NOTE - CLINICAL SUMMARY MEDICAL DECISION MAKING FREE TEXT BOX
ASSESSMENT:   PASTORA CHURCHILL is a 61yo M who presented with SYNCOPE about 1 hour ago. PT s/p CPR but unsure if ever pulseless. Vitals stable on arrival. Physical exam significant for confused man.     PLAN: CT head, neck, chest. Check cardiac enzymes. Screen for infection, electrolyte disturbances.

## 2023-08-26 NOTE — ED ADULT NURSE REASSESSMENT NOTE - NSFALLRISKINTERV_ED_ALL_ED

## 2023-08-26 NOTE — ED ADULT NURSE NOTE - CHIEF COMPLAINT
[Normal Growth] : growth [Normal Development] : development  [No Elimination Concerns] : elimination [Continue Regimen] : feeding [No Skin Concerns] : skin [Normal Sleep Pattern] : sleep [None] : no medical problems [Anticipatory Guidance Given] : Anticipatory guidance addressed as per the history of present illness section [Parental (Maternal) Well-Being] : parental (maternal) well-being [Infant-Family Synchrony] : infant-family synchrony [Nutritional Adequacy] : nutritional adequacy [Infant Behavior] : infant behavior [Safety] : safety [Age Approp Vaccines] : DTaP, Hib, IPV, Hepatitis B, Rotavirus, and Pneumococcal administered [No Medications] : ~He/She~ is not on any medications [Parent/Guardian] : Parent/Guardian [] : The components of the vaccine(s) to be administered today are listed in the plan of care. The disease(s) for which the vaccine(s) are intended to prevent and the risks have been discussed with the caretaker.  The risks are also included in the appropriate vaccination information statements which have been provided to the patient's caregiver.  The caregiver has given consent to vaccinate. The patient is a 62y Male complaining of fall.

## 2023-08-26 NOTE — ED ADULT NURSE REASSESSMENT NOTE - NS ED NURSE REASSESS COMMENT FT1
Assumed care of pt at this time. Pt A&O x 2 hx of rectal cancer presents to ED s/p fall. Pt denies complaints at this time. R face bruised. GCS 14. BL hand  WNL. Sensory intact. PERRL. Presents with colostomy and urostomy. Pt remains on telemonitor with pulse oximetry. Bed locked and in lowest position. Call bell within reach. Frequent checks made.

## 2023-08-26 NOTE — ED PROVIDER NOTE - ATTENDING CONTRIBUTION TO CARE
63yo Male with PMH HTN, HLD, CAD s/p stent, Rectal cancer s/p urostomy, colostomy who presents after syncope and collapse. Per wife around 9am she heard him fall. PT was found down on stomach and was unresponsive. CPR was innidiated, unsure if PT was ever pulseless. PER ems upon arrival pt w/ pulse and spontaneous respirations but confused. ON arrival to ED pt w/ stable vitals, alert but only oriented to person. PT otherwise neurological intact. PT denies any complaints at this time and does not remember the event.    Ct's note facial fx though no other injuries noted. will admit for syncope workup in the setting of chronic illness. pt is DNR/DNI per family. stable for tele admit

## 2023-08-26 NOTE — ED PROVIDER NOTE - OBJECTIVE STATEMENT
PASTORA CHURCHILL is a 61yo Male with PMH HTN, HLD, CAD s/p stent, Rectal cancer s/p urostomy, colostomy who presents after syncope and collapse. Per wife around 9am she heard him fall. PT was found down on stomach and was unresponsive. CPR was innidiated, unsure if PT was ever pulseless. PER ems upon arrival pt w/ pulse and spontaneous respirations but confused. ON arrival to ED pt w/ stable vitals, alert but only oriented to person. PT otherwise neurological intact. PT denies any complaints at this time and does not remember the event.

## 2023-08-27 LAB
24R-OH-CALCIDIOL SERPL-MCNC: 24.3 NG/ML — LOW (ref 30–80)
ALBUMIN SERPL ELPH-MCNC: 3 G/DL — LOW (ref 3.3–5.2)
ALP SERPL-CCNC: 121 U/L — HIGH (ref 40–120)
ALT FLD-CCNC: 28 U/L — SIGNIFICANT CHANGE UP
ANION GAP SERPL CALC-SCNC: 15 MMOL/L — SIGNIFICANT CHANGE UP (ref 5–17)
ANION GAP SERPL CALC-SCNC: 16 MMOL/L — SIGNIFICANT CHANGE UP (ref 5–17)
APTT BLD: 26.9 SEC — SIGNIFICANT CHANGE UP (ref 24.5–35.6)
AST SERPL-CCNC: 39 U/L — SIGNIFICANT CHANGE UP
BASOPHILS # BLD AUTO: 0.08 K/UL — SIGNIFICANT CHANGE UP (ref 0–0.2)
BASOPHILS NFR BLD AUTO: 0.6 % — SIGNIFICANT CHANGE UP (ref 0–2)
BILIRUB SERPL-MCNC: 0.9 MG/DL — SIGNIFICANT CHANGE UP (ref 0.4–2)
BLD GP AB SCN SERPL QL: SIGNIFICANT CHANGE UP
BUN SERPL-MCNC: 13.4 MG/DL — SIGNIFICANT CHANGE UP (ref 8–20)
BUN SERPL-MCNC: 14 MG/DL — SIGNIFICANT CHANGE UP (ref 8–20)
CALCIUM SERPL-MCNC: 8.4 MG/DL — SIGNIFICANT CHANGE UP (ref 8.4–10.5)
CALCIUM SERPL-MCNC: 8.8 MG/DL — SIGNIFICANT CHANGE UP (ref 8.4–10.5)
CHLORIDE SERPL-SCNC: 98 MMOL/L — SIGNIFICANT CHANGE UP (ref 96–108)
CHLORIDE SERPL-SCNC: 99 MMOL/L — SIGNIFICANT CHANGE UP (ref 96–108)
CO2 SERPL-SCNC: 20 MMOL/L — LOW (ref 22–29)
CO2 SERPL-SCNC: 23 MMOL/L — SIGNIFICANT CHANGE UP (ref 22–29)
CREAT SERPL-MCNC: 1.16 MG/DL — SIGNIFICANT CHANGE UP (ref 0.5–1.3)
CREAT SERPL-MCNC: 1.29 MG/DL — SIGNIFICANT CHANGE UP (ref 0.5–1.3)
EGFR: 62 ML/MIN/1.73M2 — SIGNIFICANT CHANGE UP
EGFR: 70 ML/MIN/1.73M2 — SIGNIFICANT CHANGE UP
EOSINOPHIL # BLD AUTO: 0.03 K/UL — SIGNIFICANT CHANGE UP (ref 0–0.5)
EOSINOPHIL NFR BLD AUTO: 0.2 % — SIGNIFICANT CHANGE UP (ref 0–6)
FOLATE SERPL-MCNC: >20 NG/ML — SIGNIFICANT CHANGE UP
GLUCOSE SERPL-MCNC: 120 MG/DL — HIGH (ref 70–99)
GLUCOSE SERPL-MCNC: 151 MG/DL — HIGH (ref 70–99)
HCT VFR BLD CALC: 28 % — LOW (ref 39–50)
HGB BLD-MCNC: 9.6 G/DL — LOW (ref 13–17)
IMM GRANULOCYTES NFR BLD AUTO: 4.4 % — HIGH (ref 0–0.9)
INR BLD: 1 RATIO — SIGNIFICANT CHANGE UP (ref 0.85–1.18)
LYMPHOCYTES # BLD AUTO: 0.94 K/UL — LOW (ref 1–3.3)
LYMPHOCYTES # BLD AUTO: 6.6 % — LOW (ref 13–44)
MAGNESIUM SERPL-MCNC: 2.1 MG/DL — SIGNIFICANT CHANGE UP (ref 1.6–2.6)
MAGNESIUM SERPL-MCNC: 2.1 MG/DL — SIGNIFICANT CHANGE UP (ref 1.6–2.6)
MCHC RBC-ENTMCNC: 32.2 PG — SIGNIFICANT CHANGE UP (ref 27–34)
MCHC RBC-ENTMCNC: 34.3 GM/DL — SIGNIFICANT CHANGE UP (ref 32–36)
MCV RBC AUTO: 94 FL — SIGNIFICANT CHANGE UP (ref 80–100)
MONOCYTES # BLD AUTO: 0.85 K/UL — SIGNIFICANT CHANGE UP (ref 0–0.9)
MONOCYTES NFR BLD AUTO: 6 % — SIGNIFICANT CHANGE UP (ref 2–14)
NEUTROPHILS # BLD AUTO: 11.72 K/UL — HIGH (ref 1.8–7.4)
NEUTROPHILS NFR BLD AUTO: 82.2 % — HIGH (ref 43–77)
PHOSPHATE SERPL-MCNC: 3.5 MG/DL — SIGNIFICANT CHANGE UP (ref 2.4–4.7)
PHOSPHATE SERPL-MCNC: 3.5 MG/DL — SIGNIFICANT CHANGE UP (ref 2.4–4.7)
PLATELET # BLD AUTO: 310 K/UL — SIGNIFICANT CHANGE UP (ref 150–400)
POTASSIUM SERPL-MCNC: 4.2 MMOL/L — SIGNIFICANT CHANGE UP (ref 3.5–5.3)
POTASSIUM SERPL-MCNC: 4.3 MMOL/L — SIGNIFICANT CHANGE UP (ref 3.5–5.3)
POTASSIUM SERPL-SCNC: 4.2 MMOL/L — SIGNIFICANT CHANGE UP (ref 3.5–5.3)
POTASSIUM SERPL-SCNC: 4.3 MMOL/L — SIGNIFICANT CHANGE UP (ref 3.5–5.3)
PROT SERPL-MCNC: 5.5 G/DL — LOW (ref 6.6–8.7)
PROTHROM AB SERPL-ACNC: 11.1 SEC — SIGNIFICANT CHANGE UP (ref 9.5–13)
RBC # BLD: 2.98 M/UL — LOW (ref 4.2–5.8)
RBC # FLD: 15.6 % — HIGH (ref 10.3–14.5)
SODIUM SERPL-SCNC: 134 MMOL/L — LOW (ref 135–145)
SODIUM SERPL-SCNC: 136 MMOL/L — SIGNIFICANT CHANGE UP (ref 135–145)
VIT B12 SERPL-MCNC: >2000 PG/ML — HIGH (ref 232–1245)
WBC # BLD: 14.25 K/UL — HIGH (ref 3.8–10.5)
WBC # FLD AUTO: 14.25 K/UL — HIGH (ref 3.8–10.5)

## 2023-08-27 PROCEDURE — 72158 MRI LUMBAR SPINE W/O & W/DYE: CPT | Mod: 26

## 2023-08-27 PROCEDURE — 99221 1ST HOSP IP/OBS SF/LOW 40: CPT

## 2023-08-27 PROCEDURE — 70450 CT HEAD/BRAIN W/O DYE: CPT | Mod: 26,77

## 2023-08-27 PROCEDURE — 70450 CT HEAD/BRAIN W/O DYE: CPT | Mod: 26

## 2023-08-27 PROCEDURE — 74177 CT ABD & PELVIS W/CONTRAST: CPT | Mod: 26

## 2023-08-27 PROCEDURE — 72131 CT LUMBAR SPINE W/O DYE: CPT | Mod: 26

## 2023-08-27 PROCEDURE — 99233 SBSQ HOSP IP/OBS HIGH 50: CPT

## 2023-08-27 PROCEDURE — 99223 1ST HOSP IP/OBS HIGH 75: CPT

## 2023-08-27 RX ADMIN — Medication 975 MILLIGRAM(S): at 12:45

## 2023-08-27 RX ADMIN — Medication 2.5 MILLIGRAM(S): at 05:35

## 2023-08-27 RX ADMIN — CARVEDILOL PHOSPHATE 6.25 MILLIGRAM(S): 80 CAPSULE, EXTENDED RELEASE ORAL at 05:35

## 2023-08-27 RX ADMIN — SODIUM CHLORIDE 80 MILLILITER(S): 9 INJECTION, SOLUTION INTRAVENOUS at 23:29

## 2023-08-27 RX ADMIN — Medication 975 MILLIGRAM(S): at 11:45

## 2023-08-27 RX ADMIN — Medication 2000 UNIT(S): at 11:40

## 2023-08-27 RX ADMIN — SIMVASTATIN 40 MILLIGRAM(S): 20 TABLET, FILM COATED ORAL at 23:24

## 2023-08-27 RX ADMIN — MINOCYCLINE HYDROCHLORIDE 100 MILLIGRAM(S): 45 TABLET, EXTENDED RELEASE ORAL at 17:24

## 2023-08-27 RX ADMIN — MINOCYCLINE HYDROCHLORIDE 100 MILLIGRAM(S): 45 TABLET, EXTENDED RELEASE ORAL at 05:36

## 2023-08-27 RX ADMIN — CARVEDILOL PHOSPHATE 6.25 MILLIGRAM(S): 80 CAPSULE, EXTENDED RELEASE ORAL at 17:23

## 2023-08-27 RX ADMIN — Medication 2.5 MILLIGRAM(S): at 17:23

## 2023-08-27 NOTE — PROGRESS NOTE ADULT - SUBJECTIVE AND OBJECTIVE BOX
Patient is a 62y old  Male who presents with a chief complaint of syncope (27 Aug 2023 03:27)      pt denies headache,facial/eyes pain,weakness,numbness,fever,chill,cp,sob,n/v/d/dysuria  REVIEW OF SYSTEMS: All systems are reviewed and found to be negative except above    MEDICATIONS  (STANDING):  carvedilol 6.25 milliGRAM(s) Oral every 12 hours  cholecalciferol 2000 Unit(s) Oral daily  dronabinol 2.5 milliGRAM(s) Oral two times a day  lactated ringers. 1000 milliLiter(s) (80 mL/Hr) IV Continuous <Continuous>  minocycline 100 milliGRAM(s) Oral two times a day  simvastatin 40 milliGRAM(s) Oral at bedtime    MEDICATIONS  (PRN):  acetaminophen     Tablet .. 975 milliGRAM(s) Oral every 8 hours PRN Moderate Pain (4 - 6)  loperamide 2 milliGRAM(s) Oral four times a day PRN Diarrhea      CAPILLARY BLOOD GLUCOSE        I&O's Summary    26 Aug 2023 07:01  -  27 Aug 2023 07:00  --------------------------------------------------------  IN: 1040 mL / OUT: 500 mL / NET: 540 mL    27 Aug 2023 07:01  -  27 Aug 2023 11:29  --------------------------------------------------------  IN: 240 mL / OUT: 0 mL / NET: 240 mL        PHYSICAL EXAM:  Vital Signs Last 24 Hrs  T(C): 36.6 (27 Aug 2023 08:00), Max: 37.2 (26 Aug 2023 21:13)  T(F): 97.9 (27 Aug 2023 08:00), Max: 99 (26 Aug 2023 21:13)  HR: 107 (27 Aug 2023 10:00) (104 - 117)  BP: 153/93 (27 Aug 2023 10:00) (106/95 - 164/85)  BP(mean): 112 (27 Aug 2023 10:00) (100 - 112)  RR: 21 (27 Aug 2023 10:00) (16 - 23)  SpO2: 100% (27 Aug 2023 10:00) (97% - 100%)    Parameters below as of 27 Aug 2023 08:00  Patient On (Oxygen Delivery Method): room air        CONSTITUTIONAL: NAD,  EYES: PERRLA; lt eye-conjunctival bleed lt corner,non tender.eomi  ENMT: Moist oral mucosa, lt facial swelling,druise,mild tender,  RESPIRATORY: Normal respiratory effort; lungs are clear to auscultation bilaterally  CARDIOVASCULAR: Regular rate and rhythm, normal S1 and S2, no murmur   EXTS: No lower extremity edema; Peripheral pulses are 2+ bilaterally  ABDOMEN: Nontender to palpation, normoactive bowel sounds, no rebound/guarding;   MUSCLOSKELETAL:   no joint swelling or tenderness to palpation  PSYCH: affect appropriate  NEUROLOGY: A+O to person, place, and time; CN 2-12 are intact and symmetric; no gross sensory deficits;       LABS:                        9.6    14.25 )-----------( 310      ( 27 Aug 2023 04:20 )             28.0     08-27    136  |  98  |  13.4  ----------------------------<  120<H>  4.2   |  23.0  |  1.16    Ca    8.8      27 Aug 2023 04:20  Phos  3.5     08-27  Mg     2.1     08-27    TPro  5.5<L>  /  Alb  3.0<L>  /  TBili  0.9  /  DBili  x   /  AST  39  /  ALT  28  /  AlkPhos  121<H>  08-27    PT/INR - ( 26 Aug 2023 23:35 )   PT: 11.1 sec;   INR: 1.00 ratio         PTT - ( 26 Aug 2023 23:35 )  PTT:26.9 sec  CARDIAC MARKERS ( 26 Aug 2023 09:16 )  x     / <0.01 ng/mL / x     / x     / x          Urinalysis Basic - ( 27 Aug 2023 04:20 )    Color: x / Appearance: x / SG: x / pH: x  Gluc: 120 mg/dL / Ketone: x  / Bili: x / Urobili: x   Blood: x / Protein: x / Nitrite: x   Leuk Esterase: x / RBC: x / WBC x   Sq Epi: x / Non Sq Epi: x / Bacteria: x          RADIOLOGY & ADDITIONAL TESTS:  Results Reviewed:   < from: CT Head No Cont (08.27.23 @ 02:01) >  IMPRESSION:  2 new tiny hyperdense foci right frontal white matter concerning for tiny   parenchymal hemorrhages. Slightly increased subarachnoid hemorrhage in   the left posterior fossa. Questionable trace subdural along left   tentorial leaflet. No acute calvarial fracture. Right-sided maxillofacial   fractures as recently described.    Findings were discussed with IBRAHIMA Barrera at 2:40 am on 8/27/2023 with read   back verification.    < end of copied text >  < from: CT Abdomen and Pelvis w/ IV Cont (08.27.23 @ 02:06) >  INTERPRETATION:  Acute L1 compression frx with mild retropulsion of bone   into the epidural space.  No acute post traumatic injury otherwise.    < end of copied text >  < from: CT Maxillofacial No Cont (08.26.23 @ 17:06) >  IMPRESSION: Right zygomaticomaxillary complexfractures with malar   depression. Fracture line through the right infraorbital foramen with   complex comminuted fractures through the walls of the right maxillary   sinus. No orbital hematoma. No impingement on optic canal    < end of copied text >  < from: CT Chest No Cont (08.26.23 @ 10:13) >  IMPRESSION: Essentially clear lungs.    < end of copied text >  < from: CT Head No Cont (08.26.23 @ 10:12) >  IMPRESSION: No acute hemorrhage mass or mass effect    Maxillofacial fractures as described above.    Degenerative changes involving cervical spine.    < end of copied text >

## 2023-08-27 NOTE — CONSULT NOTE ADULT - ASSESSMENT
ASSESSMENT:   62yM w/ PMHx of HTN, HLD, CAD s/p stents, Rectal cancer w/ recurrence s/p urostomy and colostomy w/ syncopal fall yesterday morning and additional fall this evening. Initial CTH from yesterday morning was negative for intracranial hemorrhage but did show R sided maxillofacial fxs for which surgery was consulted and pt was admitted for syncopal workup. CTH s/p fall this evening showed 2 areas of very small R frontal IPH, likely L posterior fossa SAH, and possible trace L tentorial SDH. CT A/P also showed acute L1 compression fx w/ mild bony retropulsion. Neurosurgery was consulted for both of these findings.    PLAN:    -Q2h neuro checks until repeat CTH  -Repeat CTH in 6 hrs  -MRI L-spine w/wo   -STAT CTH for any changes in neuro checks  -Pain control prn, avoid oversedation  -Hold all AC/AP to include ASA 81  -SBP < 160  -TLSO brace when OOB; will have brace delivered to bedside  -Bedrest until brace delivered and MRI completed/reviewed  -VTE prophylaxis: SCDs, hold chemoprophylaxis due to: acute ICH  -Further medical management per primary team  -Will further discuss case with attending in AM     ASSESSMENT:   62yM w/ PMHx of HTN, HLD, CAD s/p stents, Rectal cancer w/ recurrence s/p urostomy and colostomy w/ syncopal fall yesterday morning and additional fall this evening. Initial CTH from yesterday morning was negative for intracranial hemorrhage but did show R sided maxillofacial fxs for which surgery was consulted and pt was admitted for syncopal workup. CTH s/p fall this evening showed 2 areas of very small R frontal IPH, likely L posterior fossa SAH, and possible trace L tentorial SDH. CT A/P also showed acute L1 compression fx w/ mild bony retropulsion. Neurosurgery was consulted for both of these findings.    PLAN:    -Q2h neuro checks until repeat CTH  -Repeat CTH in 6 hrs (~8am)  -MRI L-spine w/wo   -STAT CTH for any changes in neuro checks  -Pain control prn, avoid oversedation  -Hold all AC/AP to include ASA 81  -SBP < 160  -TLSO brace when OOB; will have brace delivered to bedside  -Bedrest until brace delivered and MRI completed/reviewed  -VTE prophylaxis: SCDs, hold chemoprophylaxis due to: acute ICH  -Further medical management per primary team  -Will further discuss case with attending in AM

## 2023-08-27 NOTE — PROGRESS NOTE ADULT - ASSESSMENT
63 yo Male with PMHx of HTN , rectal cancer on chemo admitted for syncope, reported weakness poor oral intake diarrhea due to recent chemo s/p syncope,fall at home found to have rt facial fx. admitted to medicine. Pt had another fall last night. . Patient said he was trying to get up from bed and  that is what caused him to fall out of bed. Patient is unsure if he hit head. CT head: 2 new tiny hyperdense foci right frontal white matter concerning for tiny parenchymal hemorrhages. Slightly increased subarachnoid hemorrhage in   the left posterior fossa. Questionable trace subdural along left tentorial leaflet. No acute calvarial fracture. Right-sided maxillofacial   fractures as recently described.CT abd/ pelvis: Acute L1 compression fx with mild retropulsion of bone into the epidural space.No acute post traumatic injury otherwise.      [facility] sec trauma/fall  -CT head: 2 new tiny hyperdense foci right frontal white matter concerning for tiny parenchymal hemorrhages. Slightly increased subarachnoid hemorrhage in the left posterior fossa. Questionable trace subdural along left tentorial leaflet. No acute calvarial fracture.  -Repeat CTH order  -neurocheck q2hr  -dc ASA,sq heparin  -f/u neurosurgery rec      Acute L1 compression fx with mild retropulsion of bone into the epidural space s/p fall  -MRI Spine  -f/u neurosurgery rec    Right-sided maxillofacial fractures s/p fall  -f/u facial surgery rec  -pain meds   - cold compress       Unresponsiveness vs syncope/fall  hx PAF  -concern for orthostatic hypotension likely from hypovolemia and has had decreased PO intake while on chemotherapy and additionally with known neuropathy/unsteady gait  post chemotherapy,   - neurocheck  -orth bp  when safe   - given traumatic SAH/SDH, hold antiplatelet therapy  - eventual orthostatic vitals  - tele  - ?ILR per cardiology  - poor chronic AC candidate (hx gib)    HFrEF without acute decompensation/CAD  - hold off on ACEi/ARB/ARNI therapy for now  - continue coreg   - no antiplatelet 2/2 ICH    Rectal cancer mets to bladder   with urostomy and colostomy bag   under [facility] getting chemo   diarrhea improving per wife     dw pt   61 yo Male with PMHx of HTN , rectal cancer on chemo admitted for syncope, reported weakness poor oral intake diarrhea due to recent chemo s/p syncope,fall at home found to have rt facial fx. admitted to medicine. Pt had another fall last night. . Patient said he was trying to get up from bed and  that is what caused him to fall out of bed. Patient is unsure if he hit head. CT head: 2 new tiny hyperdense foci right frontal white matter concerning for tiny parenchymal hemorrhages. Slightly increased subarachnoid hemorrhage in   the left posterior fossa. Questionable trace subdural along left tentorial leaflet. No acute calvarial fracture. Right-sided maxillofacial   fractures as recently described.CT abd/ pelvis: Acute L1 compression fx with mild retropulsion of bone into the epidural space.No acute post traumatic injury otherwise.      [facility] sec trauma/fall  -CT head: 2 new tiny hyperdense foci right frontal white matter concerning for tiny parenchymal hemorrhages. Slightly increased subarachnoid hemorrhage in the left posterior fossa. Questionable trace subdural along left tentorial leaflet. No acute calvarial fracture.  -Repeat CTH order  -neurocheck q2hr  -dc ASA,sq heparin  -f/u neurosurgery rec      Acute L1 compression fx with mild retropulsion of bone into the epidural space s/p fall  -MRI Spine  -f/u neurosurgery rec    Right-sided maxillofacial fractures s/p fall  -f/u facial surgery rec  -pain meds   - cold compress       Unresponsiveness vs syncope/fall  hx PAF  -concern for orthostatic hypotension likely from hypovolemia and has had decreased PO intake while on chemotherapy and additionally with known neuropathy/unsteady gait  post chemotherapy,   - neurocheck  -orth bp  when safe   - given traumatic SAH/SDH, hold antiplatelet therapy  - eventual orthostatic vitals  - tele  - ?ILR per cardiology  - poor chronic AC candidate (hx gib)    HFrEF without acute decompensation/CAD  - hold off on ACEi/ARB/ARNI therapy for now  - continue coreg   - no antiplatelet 2/2 ICH    Rectal cancer mets to bladder   with urostomy and colostomy bag   under [facility] getting chemo   diarrhea improving per wife     dw pt  dw in detailed with Erma (she is MIC[employer] RN [facility])   63 yo Male with PMHx of HTN , rectal cancer on chemo admitted for syncope, reported weakness poor oral intake diarrhea due to recent chemo s/p syncope,fall at home found to have rt facial fx. admitted to medicine. Pt had another fall last night. . Patient said he was trying to get up from bed and  that is what caused him to fall out of bed. Patient is unsure if he hit head. CT head: 2 new tiny hyperdense foci right frontal white matter concerning for tiny parenchymal hemorrhages. Slightly increased subarachnoid hemorrhage in   the left posterior fossa. Questionable trace subdural along left tentorial leaflet. No acute calvarial fracture. Right-sided maxillofacial   fractures as recently described.CT abd/ pelvis: Acute L1 compression fx with mild retropulsion of bone into the epidural space.No acute post traumatic injury otherwise.      ICH sec trauma/fall  -CT head: 2 new tiny hyperdense foci right frontal white matter concerning for tiny parenchymal hemorrhages. Slightly increased subarachnoid hemorrhage in the left posterior fossa. Questionable trace subdural along left tentorial leaflet. No acute calvarial fracture.  -Repeat CTH order  -neurocheck q2hr  -dc ASA,sq heparin  -f/u neurosurgery rec      Acute L1 compression fx with mild retropulsion of bone into the epidural space s/p fall  -MRI Spine  -f/u neurosurgery rec    Right-sided maxillofacial fractures s/p fall  -f/u facial surgery rec  -pain meds   - cold compress       Unresponsiveness vs syncope/fall  hx PAF  -concern for orthostatic hypotension likely from hypovolemia and has had decreased PO intake while on chemotherapy and additionally with known neuropathy/unsteady gait  post chemotherapy,   - neurocheck  -orth bp  when safe   - given traumatic SAH/SDH, hold antiplatelet therapy  - eventual orthostatic vitals  - tele  - ?ILR per cardiology  - poor chronic AC candidate (hx gib)    HFrEF without acute decompensation/CAD  - hold off on ACEi/ARB/ARNI therapy for now  - continue coreg   - no antiplatelet 2/2 ICH    Rectal cancer mets to bladder   with urostomy and colostomy bag   under [facility] getting chemo   diarrhea improving per wife     dw pt  dw in detailed with Erma (she is [employer] RN [employer] [facility])  spoke with neurosurgery team-no sedative today,can hold MRI Spine, repeat ct head am. will need Back brace,will get the measurement am.... up date Erma

## 2023-08-27 NOTE — CONSULT NOTE ADULT - SUBJECTIVE AND OBJECTIVE BOX
Patient is a 62y old  Male who presents with a chief complaint of syncope (26 Aug 2023 16:20)    HPI:   61yo Male with PMH HTN, HLD, CAD  Rectal cancer s/p urostomy and  colostomy who presents after syncope and collapse at home .  Per wife around 9am she heard him fall. He was found down on stomach and was unresponsive mouning when wife came to check on him ,CPR was innidiated, unsure if PT was ever pulseless. Per  ems upon arrival pt w/ pulse and spontaneous respirations but confused. ON arrival to ED pt w/ stable vitals, alert but only oriented to person.He does remember any related to events or this am    PT otherwise neurological intact.  (26 Aug 2023 14:44)      HISTORY OF PRESENT ILLNESS:   62yM w/ PMHx of HTN, HLD, CAD s/p stents, Rectal cancer w/ recurrence s/p urostomy and colostomy, who per medicine NP and chart review, had initial syncopal fall yesterday morning and was found unresponsive. Initial CTH was negative for intracranial hemorrhage but did show R sided maxillofacial fxs for which surgery was consulted and pt was admitted for syncopal workup. Pt remains amnesic to the events this morning. This evening, pt reports he was looking for his jewelry and leaned out of bed and fell. Denies any head strike or LOC. CTH s/p fall this evening showed 2 areas of very small R frontal IPH, likely L posterior fossa SAH, and possible trace L tentorial SDH. CT A/P also showed acute L1 compression fx w/ mild bony retropulsion. Neurosurgery was consulted for both of these findings. Pt denies any HA, vision changes, dizziness, N/V, back pain, leg weakness, new numbness of tingling in lower extremities.     PAST MEDICAL & SURGICAL HISTORY:  HTN (hypertension)      HLD (hyperlipidemia)      CAD (coronary artery disease)      Mitral regurgitation      Rectal cancer  2/2019 s/p chemo and radiation; recurrence of cancer 2022      Depression  with rectal cancer      Neuropathy  feet s/p chemo      Stented coronary artery  circumflex 2005 LAD 2017 x4      H/O carotid stenosis      H/O renal calculi      DANO (iron deficiency anemia)      Umbilical hernia      Splenic artery aneurysm      H/O cardiac catheterization  2005 2017  4 STENTS      History of CEA (carotid endarterectomy)  right 2019      History of rectal surgery  with ileostomy 2/2020      H/O sigmoidoscopy  x2      History of removal of Port-a-Cath        FAMILY HISTORY:  FH: heart disease  father    FH: ALS (amyotrophic lateral sclerosis) (Mother)        SOCIAL HISTORY:  Tobacco Use:  EtOH use:   Substance:    Allergies    No Known Allergies    Intolerances        REVIEW OF SYSTEMS  Negative except as noted in HPI      HOME MEDICATIONS:  Home Medications:  ALPRAZolam 0.25 mg oral tablet: 1 tab(s) orally once a day (at bedtime) as needed for  insomnia (18 Aug 2023 23:17)  aspirin 81 mg oral delayed release tablet: 1 tab(s) orally once a day (21 Aug 2023 14:09)  cholecalciferol 50 mcg (2000 intl units) oral tablet: 1 orally once a day (18 Aug 2023 23:17)  Coreg 6.25 mg oral tablet: 1 tab(s) orally 2 times a day (18 Aug 2023 23:17)  Lasix 20 mg oral tablet: 1 tab(s) orally once a day as needed for leg swelling (18 Aug 2023 23:17)  magnesium oxide 400 mg oral tablet: 1 orally once a day (18 Aug 2023 23:17)  minocycline 100 mg oral capsule: 1 cap(s) orally 2 times a day (18 Aug 2023 23:17)  potassium chloride 20 mEq/15 mL oral liquid: 15 milliliter(s) orally 2 times a day (18 Aug 2023 23:17)  simvastatin 40 mg oral tablet: 1 tab(s) orally once a day (at bedtime) (18 Aug 2023 23:17)      MEDICATIONS:  Antibiotics:  minocycline 100 milliGRAM(s) Oral two times a day    Neuro:  acetaminophen     Tablet .. 975 milliGRAM(s) Oral every 8 hours PRN  dronabinol 2.5 milliGRAM(s) Oral two times a day    Anticoagulation:    OTHER:  carvedilol 6.25 milliGRAM(s) Oral every 12 hours  loperamide 2 milliGRAM(s) Oral four times a day PRN  simvastatin 40 milliGRAM(s) Oral at bedtime    IVF:  cholecalciferol 2000 Unit(s) Oral daily  lactated ringers. 1000 milliLiter(s) IV Continuous <Continuous>      Vital Signs Last 24 Hrs  T(C): 36.6 (26 Aug 2023 23:21), Max: 37.2 (26 Aug 2023 21:13)  T(F): 97.9 (26 Aug 2023 23:21), Max: 99 (26 Aug 2023 21:13)  HR: 114 (26 Aug 2023 23:21) (102 - 116)  BP: 108/67 (26 Aug 2023 23:21) (108/67 - 164/85)  BP(mean): --  RR: 18 (26 Aug 2023 23:21) (16 - 20)  SpO2: 97% (26 Aug 2023 23:21) (97% - 100%)    Parameters below as of 26 Aug 2023 23:21  Patient On (Oxygen Delivery Method): room air          PHYSICAL EXAM:  GENERAL: NAD, very mild confusion  HEAD:  Swelling and ecchymosis of R side of face w/ mild R periorbital ecchymosis  EYES: Conjunctiva and sclera clear  ENMT: Good dentition  NECK: Supple, nontender to palpation  STEAFNIA COMA SCORE: E- V- M- = 14       E: 4= opens eyes spontaneously 3= to voice 2= to noxious 1= no opening       V: 5= oriented 4= confused 3= inappropriate words 2= incomprehensible sounds 1= nonverbal 1T= intubated       M: 6= follows commands 5= localizes 4= withdraws 3= flexor posturing 2= extensor posturing 1= no movement  MENTAL STATUS: AAO x3/4 (correct month but wrong year); Awake; Opens eyes spontaneously; Appropriately conversant without aphasia; following simple commands  CRANIAL NERVES: Visual acuity normal for age, visual fields full to confrontation, PERRL. EOMI without nystagmus. Facial sensation intact V1-3 distribution b/l. Normal eye closure and smile, tongue midline. Hearing grossly intact. Speech clear. Head turning and shoulder shrug intact.   REFLEXES: PERRL.   MOTOR: strength 5/5 b/l upper and lower extremities  SENSATION: grossly intact to light touch all extremities  COORDINATION: Gait testing deferred; no upper extremity dysmetria  CHEST/LUNG: Nonlabored on room air  SKIN: Warm, dry    LABS:                        9.2    10.05 )-----------( 282      ( 26 Aug 2023 23:35 )             27.6     08-26    134<L>  |  99  |  14.0  ----------------------------<  151<H>  4.3   |  20.0<L>  |  1.29    Ca    8.4      26 Aug 2023 23:35  Phos  3.5     08-26  Mg     2.1     08-26    TPro  5.6<L>  /  Alb  3.0<L>  /  TBili  0.7  /  DBili  x   /  AST  43<H>  /  ALT  31  /  AlkPhos  131<H>  08-26    PT/INR - ( 26 Aug 2023 23:35 )   PT: 11.1 sec;   INR: 1.00 ratio         PTT - ( 26 Aug 2023 23:35 )  PTT:26.9 sec  Urinalysis Basic - ( 26 Aug 2023 23:35 )    Color: x / Appearance: x / SG: x / pH: x  Gluc: 151 mg/dL / Ketone: x  / Bili: x / Urobili: x   Blood: x / Protein: x / Nitrite: x   Leuk Esterase: x / RBC: x / WBC x   Sq Epi: x / Non Sq Epi: x / Bacteria: x        CULTURES:  Culture Results:   >=3 organisms. Probable collection contamination. (08-18 @ 22:11)  Culture Results:   No growth at 5 days (08-18 @ 21:11)      RADIOLOGY & ADDITIONAL STUDIES:    CT Head No Cont (08.27.23 @ 02:01)   IMPRESSION:  2 new tiny hyperdense foci right frontal white matter concerning for tiny   parenchymal hemorrhages. Slightly increased subarachnoid hemorrhage in   the left posterior fossa. Questionable trace subdural along left   tentorial leaflet. No acute calvarial fracture. Right-sided maxillofacial   fractures as recently described.    CT Abdomen and Pelvis w/ IV Cont (08.27.23 @ 02:06)   INTERPRETATION:  Acute L1 compression frx with mild retropulsion of bone   into the epidural space.  No acute post traumatic injury otherwise.       Patient is a 62y old  Male who presents with a chief complaint of syncope (26 Aug 2023 16:20)    HPI:   63yo Male with PMH HTN, HLD, CAD  Rectal cancer s/p urostomy and  colostomy who presents after syncope and collapse at home .  Per wife around 9am she heard him fall. He was found down on stomach and was unresponsive mouning when wife came to check on him ,CPR was innidiated, unsure if PT was ever pulseless. Per  ems upon arrival pt w/ pulse and spontaneous respirations but confused. ON arrival to ED pt w/ stable vitals, alert but only oriented to person.He does remember any related to events or this am    PT otherwise neurological intact.  (26 Aug 2023 14:44)      HISTORY OF PRESENT ILLNESS:   62yM w/ PMHx of HTN, HLD, CAD s/p stents, Rectal cancer w/ recurrence s/p urostomy and colostomy, who per medicine NP and chart review, had initial syncopal fall yesterday morning and was found unresponsive. Initial CTH was negative for intracranial hemorrhage but did show R sided maxillofacial fxs for which surgery was consulted and pt was admitted for syncopal workup. Pt remains amnesic to the events this morning. This evening, pt reports he was looking for his jewelry and leaned out of bed and fell. Denies any head strike or LOC. CTH s/p fall this evening showed 2 areas of very small R frontal IPH, likely L posterior fossa SAH, and possible trace L tentorial SDH. CT A/P also showed acute L1 compression fx w/ mild bony retropulsion. Neurosurgery was consulted for both of these findings. Pt denies any HA, vision changes, dizziness, N/V, back pain, leg weakness, new numbness of tingling in lower extremities.     PAST MEDICAL & SURGICAL HISTORY:  HTN (hypertension)      HLD (hyperlipidemia)      CAD (coronary artery disease)      Mitral regurgitation      Rectal cancer  2/2019 s/p chemo and radiation; recurrence of cancer 2022      Depression  with rectal cancer      Neuropathy  feet s/p chemo      Stented coronary artery  circumflex 2005 LAD 2017 x4      H/O carotid stenosis      H/O renal calculi      DANO (iron deficiency anemia)      Umbilical hernia      Splenic artery aneurysm      H/O cardiac catheterization  2005 2017  4 STENTS      History of CEA (carotid endarterectomy)  right 2019      History of rectal surgery  with ileostomy 2/2020      H/O sigmoidoscopy  x2      History of removal of Port-a-Cath        FAMILY HISTORY:  FH: heart disease  father    FH: ALS (amyotrophic lateral sclerosis) (Mother)        SOCIAL HISTORY:  Tobacco Use:  EtOH use:   Substance:    Allergies    No Known Allergies    Intolerances        REVIEW OF SYSTEMS  Negative except as noted in HPI      HOME MEDICATIONS:  Home Medications:  ALPRAZolam 0.25 mg oral tablet: 1 tab(s) orally once a day (at bedtime) as needed for  insomnia (18 Aug 2023 23:17)  aspirin 81 mg oral delayed release tablet: 1 tab(s) orally once a day (21 Aug 2023 14:09)  cholecalciferol 50 mcg (2000 intl units) oral tablet: 1 orally once a day (18 Aug 2023 23:17)  Coreg 6.25 mg oral tablet: 1 tab(s) orally 2 times a day (18 Aug 2023 23:17)  Lasix 20 mg oral tablet: 1 tab(s) orally once a day as needed for leg swelling (18 Aug 2023 23:17)  magnesium oxide 400 mg oral tablet: 1 orally once a day (18 Aug 2023 23:17)  minocycline 100 mg oral capsule: 1 cap(s) orally 2 times a day (18 Aug 2023 23:17)  potassium chloride 20 mEq/15 mL oral liquid: 15 milliliter(s) orally 2 times a day (18 Aug 2023 23:17)  simvastatin 40 mg oral tablet: 1 tab(s) orally once a day (at bedtime) (18 Aug 2023 23:17)      MEDICATIONS:  Antibiotics:  minocycline 100 milliGRAM(s) Oral two times a day    Neuro:  acetaminophen     Tablet .. 975 milliGRAM(s) Oral every 8 hours PRN  dronabinol 2.5 milliGRAM(s) Oral two times a day    Anticoagulation:    OTHER:  carvedilol 6.25 milliGRAM(s) Oral every 12 hours  loperamide 2 milliGRAM(s) Oral four times a day PRN  simvastatin 40 milliGRAM(s) Oral at bedtime    IVF:  cholecalciferol 2000 Unit(s) Oral daily  lactated ringers. 1000 milliLiter(s) IV Continuous <Continuous>      Vital Signs Last 24 Hrs  T(C): 36.6 (26 Aug 2023 23:21), Max: 37.2 (26 Aug 2023 21:13)  T(F): 97.9 (26 Aug 2023 23:21), Max: 99 (26 Aug 2023 21:13)  HR: 114 (26 Aug 2023 23:21) (102 - 116)  BP: 108/67 (26 Aug 2023 23:21) (108/67 - 164/85)  BP(mean): --  RR: 18 (26 Aug 2023 23:21) (16 - 20)  SpO2: 97% (26 Aug 2023 23:21) (97% - 100%)    Parameters below as of 26 Aug 2023 23:21  Patient On (Oxygen Delivery Method): room air          PHYSICAL EXAM:  GENERAL: NAD, very mild confusion  HEAD:  Swelling and ecchymosis of R side of face w/ mild R periorbital ecchymosis  EYES: Conjunctiva and sclera clear  ENMT: Good dentition  NECK: Supple, nontender to palpation  STEFANIA COMA SCORE: E- V- M- = 14       E: 4= opens eyes spontaneously 3= to voice 2= to noxious 1= no opening       V: 5= oriented 4= confused 3= inappropriate words 2= incomprehensible sounds 1= nonverbal 1T= intubated       M: 6= follows commands 5= localizes 4= withdraws 3= flexor posturing 2= extensor posturing 1= no movement  MENTAL STATUS: AAO x3/4 (correct month but wrong year); Awake; Opens eyes spontaneously; Appropriately conversant without aphasia; following simple commands  CRANIAL NERVES: Visual acuity normal for age, visual fields full to confrontation, PERRL. EOMI without nystagmus. Facial sensation intact V1-3 distribution b/l. Normal eye closure and smile, tongue midline. Hearing grossly intact. Speech clear. Head turning and shoulder shrug intact.   REFLEXES: PERRL.   MOTOR: strength 5/5 b/l upper and lower extremities; no midline TTP throughout C/T/L-spine, no stepoffs or other deformities  SENSATION: grossly intact to light touch all extremities  COORDINATION: Gait testing deferred; no upper extremity dysmetria  CHEST/LUNG: Nonlabored on room air  SKIN: Warm, dry    LABS:                        9.2    10.05 )-----------( 282      ( 26 Aug 2023 23:35 )             27.6     08-26    134<L>  |  99  |  14.0  ----------------------------<  151<H>  4.3   |  20.0<L>  |  1.29    Ca    8.4      26 Aug 2023 23:35  Phos  3.5     08-26  Mg     2.1     08-26    TPro  5.6<L>  /  Alb  3.0<L>  /  TBili  0.7  /  DBili  x   /  AST  43<H>  /  ALT  31  /  AlkPhos  131<H>  08-26    PT/INR - ( 26 Aug 2023 23:35 )   PT: 11.1 sec;   INR: 1.00 ratio         PTT - ( 26 Aug 2023 23:35 )  PTT:26.9 sec  Urinalysis Basic - ( 26 Aug 2023 23:35 )    Color: x / Appearance: x / SG: x / pH: x  Gluc: 151 mg/dL / Ketone: x  / Bili: x / Urobili: x   Blood: x / Protein: x / Nitrite: x   Leuk Esterase: x / RBC: x / WBC x   Sq Epi: x / Non Sq Epi: x / Bacteria: x        CULTURES:  Culture Results:   >=3 organisms. Probable collection contamination. (08-18 @ 22:11)  Culture Results:   No growth at 5 days (08-18 @ 21:11)      RADIOLOGY & ADDITIONAL STUDIES:    CT Head No Cont (08.27.23 @ 02:01)   IMPRESSION:  2 new tiny hyperdense foci right frontal white matter concerning for tiny   parenchymal hemorrhages. Slightly increased subarachnoid hemorrhage in   the left posterior fossa. Questionable trace subdural along left   tentorial leaflet. No acute calvarial fracture. Right-sided maxillofacial   fractures as recently described.    CT Abdomen and Pelvis w/ IV Cont (08.27.23 @ 02:06)   INTERPRETATION:  Acute L1 compression frx with mild retropulsion of bone   into the epidural space.  No acute post traumatic injury otherwise.

## 2023-08-27 NOTE — CONSULT NOTE ADULT - NS ATTEND AMEND GEN_ALL_CORE FT
62M s/p fall with R C fx. Denies vision change. Denies malocclusion. Not concerned with cosmesis. R perirobital edema and ecchymosis. EOMI. Edema, unable to palpate stepoffs at this time. Mild R eye chemosis.   - Extensively discussed options including surgical ORIF vs observation. At this time, observation is preferred which I think is appropriate given the minimal displacement, medical comorbidites, and lack of vision changes or malocclusion.   - OK for artificial tears/lacrilube PRN   - OK for steroids/abx  - Fu ENT outpatient
Agree with above. I spent 80 minutes.

## 2023-08-27 NOTE — PATIENT PROFILE ADULT - FALL HARM RISK - HARM RISK INTERVENTIONS
No Assistance with ambulation/Assistance OOB with selected safe patient handling equipment/Communicate Risk of Fall with Harm to all staff/Reinforce activity limits and safety measures with patient and family/Tailored Fall Risk Interventions/Visual Cue: Yellow wristband and red socks/Bed in lowest position, wheels locked, appropriate side rails in place/Call bell, personal items and telephone in reach/Instruct patient to call for assistance before getting out of bed or chair/Non-slip footwear when patient is out of bed/[place] to call system/Physically safe environment - no spills, clutter or unnecessary equipment/Purposeful Proactive Rounding/Room/bathroom lighting operational, light cord in reach

## 2023-08-27 NOTE — PATIENT PROFILE ADULT - HAVE YOU BEEN EATING POORLY BECAUSE OF A DECREASED APPETITE?
Nursing Transfer Note      6/21/2017     Transfer To: [id number]    Transfer via bed    Transfer with cervical collar in place    Transported by pct    Medicines sent: yes    Chart send with patient: Yes    Notified: friend  Chu      
Yes (1)

## 2023-08-27 NOTE — PROGRESS NOTE ADULT - SUBJECTIVE AND OBJECTIVE BOX
Formerly [facility], [facility]                                   [address] [place] [address]                                                      PHONE: [phone]                                                         FAX: [phone]  [email or web address]  ---------------------------------------------------------------------------------------------------------------------------------    Reason for follow-up: syncope     Overnight events/patient complaints:     INTERPRETATION OF TELEMETRY (personally reviewed): sinus tachycardia     < from: TTE Echo Complete w/o Contrast w/ Doppler (02.27.23 @ 11:40) >  Summary:   1. Left ventricular ejection fraction, by visual estimation, is 40 to 45%.   2. Moderately decreased global left ventricular systolic function.   3. The left ventricular diastolic function could not be assessed in this study.   4. Moderately enlarged right ventricle.   5. Moderately reduced RV systolic function.   6. Mildly enlarged left atrium.   7. Mildly enlarged right atrium.   8. There is no evidence of pericardial effusion.   9. Mild mitral annular calcification.  10. Mild thickening and calcification of the anterior and posterior mitral valve leaflets.  11. Moderate mitral valve regurgitation.  12. Moderate tricuspid regurgitation.  13. Estimated pulmonary artery systolic pressure is 37.7 mmHg assuming a   right atrial pressure of 15 mmHg, which is consistent with borderline pulmonary hypertension.  14. Endocardial visualization was enhanced with intravenous echo contrast.    ECHO: 4/2023  Summary:  1. Left ventricle: The cavity size is normal. Normal thickness is present. There are no regional wall motion abnormalities  present. Left ventricular ejection fraction is low normal at 50% by Hicks's.  2. Right ventricle: The cavity size is mildly dilated. Right ventricular systolic function is normal.  3. Mitral valve: The leaflets are mildly calcified on leaflet tips. There is trace mitral valve regurgitation.  4. Aortic valve: The valve is trileaflet. The leaflets are mildly thickened. There is no valvular aortic regurgitation.  5. Tricuspid valve: There is mild (1+) tricuspid valve regurgitation.  6. Aorta: The aortic root internal diameter is 3.8 cm.  7. Pericardium, extracardiac: There is no pericardial effusion.  8. Pulmonary arteries: The peak pressure during systole by Doppler is 30 mm Hg.        I&O's Summary    26 Aug 2023 07:01  -  27 Aug 2023 07:00  --------------------------------------------------------  IN: 1040 mL / OUT: 500 mL / NET: 540 mL    27 Aug 2023 07:01  -  27 Aug 2023 11:10  --------------------------------------------------------  IN: 240 mL / OUT: 0 mL / NET: 240 mL      PAST MEDICAL & SURGICAL HISTORY:  HTN (hypertension)  HLD (hyperlipidemia)  CAD (coronary artery disease)  Mitral regurgitation  Rectal cancer  2/2019 s/p chemo and radiation; recurrence of cancer 2022  Depression  with rectal cancer  Neuropathy  feet s/p chemo  Stented coronary artery  circumflex 2005 LAD 2017 x4  H/O carotid stenosis  H/O renal calculi  DANO (iron deficiency anemia)  Umbilical hernia  Splenic artery aneurysm  H/O cardiac catheterization  2005 2017  4 STENTS  History of CEA (carotid endarterectomy)  right 2019  History of rectal surgery  with ileostomy 2/2020  H/O sigmoidoscop  History of removal of Port-a-Cat    No Known Allergies  chocolate (Vomiting)    MEDICATIONS  (STANDING):  carvedilol 6.25 milliGRAM(s) Oral every 12 hours  cholecalciferol 2000 Unit(s) Oral daily  dronabinol 2.5 milliGRAM(s) Oral two times a day  lactated ringers. 1000 milliLiter(s) (80 mL/Hr) IV Continuous <Continuous>  minocycline 100 milliGRAM(s) Oral two times a day  simvastatin 40 milliGRAM(s) Oral at bedtime    MEDICATIONS  (PRN):  acetaminophen     Tablet .. 975 milliGRAM(s) Oral every 8 hours PRN Moderate Pain (4 - 6)  loperamide 2 milliGRAM(s) Oral four times a day PRN Diarrhea      Vital Signs Last 24 Hrs  T(C): 36.6 (27 Aug 2023 08:00), Max: 37.2 (26 Aug 2023 21:13)  T(F): 97.9 (27 Aug 2023 08:00), Max: 99 (26 Aug 2023 21:13)  HR: 107 (27 Aug 2023 10:00) (104 - 117)  BP: 153/93 (27 Aug 2023 10:00) (106/95 - 164/85)  BP(mean): 112 (27 Aug 2023 10:00) (100 - 112)  RR: 21 (27 Aug 2023 10:00) (16 - 23)  SpO2: 100% (27 Aug 2023 10:00) (97% - 100%)    Parameters below as of 27 Aug 2023 08:00  Patient On (Oxygen Delivery Method): room air      ICU Vital Signs Last 24 Hrs    PHYSICAL EXAM:  General: Appears chronically ill   HEENT: MM dry, multiple facial ecchymoses   CARDIOVASCULAR: tachycardic Normal S1 and S2, 1/6 SHEILA, no rub, gallop or lift.   LUNGS: No rales, rhonchi or wheeze. Normal breath sounds bilaterally.  ABDOMEN: Soft, nontender without mass or organomegaly. bowel sounds normoactive.  EXTREMITIES: No clubbing, cyanosis or edema.   SKIN: warm and dry with normal turgor.  NEURO: Alert/oriented x 2/normal motor exam.   PSYCH: normal affect.        LABS:                        9.6    14.25 )-----------( 310      ( 27 Aug 2023 04:20 )             28.0     08-27    136  |  98  |  13.4  ----------------------------<  120<H>  4.2   |  23.0  |  1.16    Ca    8.8      27 Aug 2023 04:20  Phos  3.5     08-27  Mg     2.1     08-27    TPro  5.5<L>  /  Alb  3.0<L>  /  TBili  0.9  /  DBili  x   /  AST  39  /  ALT  28  /  AlkPhos  121<H>  08-27    PASTORA CHURCHILL  CARDIAC MARKERS ( 26 Aug 2023 09:16 )  x     / <0.01 ng/mL / x     / x     / x          PT/INR - ( 26 Aug 2023 23:35 )   PT: 11.1 sec;   INR: 1.00 ratio         PTT - ( 26 Aug 2023 23:35 )  PTT:26.9 sec      RADIOLOGY & ADDITIONAL STUDIES:    ASSESSMENT AND PLAN:  In summary, PASTORA CHURCHILL is a medically complex 62y Male with past medical history significant for  rectal adenocarcinomas/p JASON, LAW with ileostomy complicated by leak (2020), anastomotic recurrence (2021) treated with APR rectum/sigmoid colon in 3/2022 with colostomy creation, now with biopsy proven recurrence 12/22, CAD status post multivessel stenting (LAD/Cx) in the past most recently 2017, HFrecEF, hypertension, hyperlipidemia, PAD status post right carotid endarterectomy (2019), new paxorsymal onset atrial fibrillation associated with tachycardia mediated LV dysfunction in the setting of bacteremia who has had multiple recent admissions for a sundry of concerns most recently 8/18/23 for neutropenic fever who presents after being unresponsive and possibly pulseless according to his partner, however per EMS with pulse and spontaneous respirations with course further complicated by unwitnessed fall out of bed s/p repeat neuroimaging which revealed small R frontal IPH, likely L posterior fossa SAH, and possible trace L tentorial SDH as well as acute L1 compression fx w/ mild bony retropulsion.    Unresponsiveness vs syncope/fall/PAF  - patient clinically hypovolemic and has had decreased PO intake while on chemotherapy and additionally with known neuropathy post chemotherapy, concern for orthostatic hypotension   - serial neuro exam and neuroimaging   - given traumatic SAH/SDH, hold antiplatelet therapy  - eventual orthostatic vitals when safe   - tele  - ?ILR  - poor chronic AC candidate     HFrEF without acute decompensation/CAD  - hold off on ACEi/ARB/ARNI therapy for now  - continue coreg   - no antiplatelet 2/2 ICH       Thank you for allowing [facility] to participate in the care of this patient.  Please feel free to call with any questions or concerns.

## 2023-08-28 LAB
ALBUMIN SERPL ELPH-MCNC: 2.8 G/DL — LOW (ref 3.3–5.2)
ALP SERPL-CCNC: 94 U/L — SIGNIFICANT CHANGE UP (ref 40–120)
ALT FLD-CCNC: 19 U/L — SIGNIFICANT CHANGE UP
ANION GAP SERPL CALC-SCNC: 12 MMOL/L — SIGNIFICANT CHANGE UP (ref 5–17)
AST SERPL-CCNC: 24 U/L — SIGNIFICANT CHANGE UP
BASOPHILS # BLD AUTO: 0.03 K/UL — SIGNIFICANT CHANGE UP (ref 0–0.2)
BASOPHILS NFR BLD AUTO: 0.3 % — SIGNIFICANT CHANGE UP (ref 0–2)
BILIRUB SERPL-MCNC: 0.6 MG/DL — SIGNIFICANT CHANGE UP (ref 0.4–2)
BUN SERPL-MCNC: 13.6 MG/DL — SIGNIFICANT CHANGE UP (ref 8–20)
CALCIUM SERPL-MCNC: 8 MG/DL — LOW (ref 8.4–10.5)
CHLORIDE SERPL-SCNC: 101 MMOL/L — SIGNIFICANT CHANGE UP (ref 96–108)
CO2 SERPL-SCNC: 23 MMOL/L — SIGNIFICANT CHANGE UP (ref 22–29)
CREAT SERPL-MCNC: 0.92 MG/DL — SIGNIFICANT CHANGE UP (ref 0.5–1.3)
CULTURE RESULTS: SIGNIFICANT CHANGE UP
EGFR: 93 ML/MIN/1.73M2 — SIGNIFICANT CHANGE UP
EOSINOPHIL # BLD AUTO: 0.1 K/UL — SIGNIFICANT CHANGE UP (ref 0–0.5)
EOSINOPHIL NFR BLD AUTO: 1.1 % — SIGNIFICANT CHANGE UP (ref 0–6)
GLUCOSE SERPL-MCNC: 128 MG/DL — HIGH (ref 70–99)
HCT VFR BLD CALC: 24.1 % — LOW (ref 39–50)
HCT VFR BLD CALC: 24.7 % — LOW (ref 39–50)
HGB BLD-MCNC: 8.2 G/DL — LOW (ref 13–17)
HGB BLD-MCNC: 8.4 G/DL — LOW (ref 13–17)
IMM GRANULOCYTES NFR BLD AUTO: 4.6 % — HIGH (ref 0–0.9)
LYMPHOCYTES # BLD AUTO: 0.7 K/UL — LOW (ref 1–3.3)
LYMPHOCYTES # BLD AUTO: 7.9 % — LOW (ref 13–44)
MCHC RBC-ENTMCNC: 32.3 PG — SIGNIFICANT CHANGE UP (ref 27–34)
MCHC RBC-ENTMCNC: 32.3 PG — SIGNIFICANT CHANGE UP (ref 27–34)
MCHC RBC-ENTMCNC: 34 GM/DL — SIGNIFICANT CHANGE UP (ref 32–36)
MCHC RBC-ENTMCNC: 34 GM/DL — SIGNIFICANT CHANGE UP (ref 32–36)
MCV RBC AUTO: 94.9 FL — SIGNIFICANT CHANGE UP (ref 80–100)
MCV RBC AUTO: 95 FL — SIGNIFICANT CHANGE UP (ref 80–100)
MONOCYTES # BLD AUTO: 0.55 K/UL — SIGNIFICANT CHANGE UP (ref 0–0.9)
MONOCYTES NFR BLD AUTO: 6.2 % — SIGNIFICANT CHANGE UP (ref 2–14)
NEUTROPHILS # BLD AUTO: 7.06 K/UL — SIGNIFICANT CHANGE UP (ref 1.8–7.4)
NEUTROPHILS NFR BLD AUTO: 79.9 % — HIGH (ref 43–77)
PLATELET # BLD AUTO: 254 K/UL — SIGNIFICANT CHANGE UP (ref 150–400)
PLATELET # BLD AUTO: 285 K/UL — SIGNIFICANT CHANGE UP (ref 150–400)
POTASSIUM SERPL-MCNC: 3.7 MMOL/L — SIGNIFICANT CHANGE UP (ref 3.5–5.3)
POTASSIUM SERPL-SCNC: 3.7 MMOL/L — SIGNIFICANT CHANGE UP (ref 3.5–5.3)
PROT SERPL-MCNC: 4.7 G/DL — LOW (ref 6.6–8.7)
RBC # BLD: 2.54 M/UL — LOW (ref 4.2–5.8)
RBC # BLD: 2.6 M/UL — LOW (ref 4.2–5.8)
RBC # FLD: 15.5 % — HIGH (ref 10.3–14.5)
RBC # FLD: 15.6 % — HIGH (ref 10.3–14.5)
SODIUM SERPL-SCNC: 136 MMOL/L — SIGNIFICANT CHANGE UP (ref 135–145)
SPECIMEN SOURCE: SIGNIFICANT CHANGE UP
WBC # BLD: 8.85 K/UL — SIGNIFICANT CHANGE UP (ref 3.8–10.5)
WBC # BLD: 9.07 K/UL — SIGNIFICANT CHANGE UP (ref 3.8–10.5)
WBC # FLD AUTO: 8.85 K/UL — SIGNIFICANT CHANGE UP (ref 3.8–10.5)
WBC # FLD AUTO: 9.07 K/UL — SIGNIFICANT CHANGE UP (ref 3.8–10.5)

## 2023-08-28 PROCEDURE — 99233 SBSQ HOSP IP/OBS HIGH 50: CPT

## 2023-08-28 PROCEDURE — 70450 CT HEAD/BRAIN W/O DYE: CPT | Mod: 26

## 2023-08-28 PROCEDURE — 99221 1ST HOSP IP/OBS SF/LOW 40: CPT

## 2023-08-28 RX ORDER — DIAZEPAM 5 MG
5 TABLET ORAL EVERY 12 HOURS
Refills: 0 | Status: DISCONTINUED | OUTPATIENT
Start: 2023-08-28 | End: 2023-08-28

## 2023-08-28 RX ADMIN — SIMVASTATIN 40 MILLIGRAM(S): 20 TABLET, FILM COATED ORAL at 21:46

## 2023-08-28 RX ADMIN — CARVEDILOL PHOSPHATE 6.25 MILLIGRAM(S): 80 CAPSULE, EXTENDED RELEASE ORAL at 16:32

## 2023-08-28 RX ADMIN — Medication 975 MILLIGRAM(S): at 16:48

## 2023-08-28 RX ADMIN — Medication 2.5 MILLIGRAM(S): at 06:26

## 2023-08-28 RX ADMIN — CARVEDILOL PHOSPHATE 6.25 MILLIGRAM(S): 80 CAPSULE, EXTENDED RELEASE ORAL at 06:26

## 2023-08-28 RX ADMIN — MINOCYCLINE HYDROCHLORIDE 100 MILLIGRAM(S): 45 TABLET, EXTENDED RELEASE ORAL at 06:26

## 2023-08-28 RX ADMIN — Medication 2000 UNIT(S): at 11:35

## 2023-08-28 RX ADMIN — MINOCYCLINE HYDROCHLORIDE 100 MILLIGRAM(S): 45 TABLET, EXTENDED RELEASE ORAL at 16:32

## 2023-08-28 RX ADMIN — Medication 2.5 MILLIGRAM(S): at 16:32

## 2023-08-28 RX ADMIN — Medication 975 MILLIGRAM(S): at 16:33

## 2023-08-28 NOTE — CONSULT NOTE ADULT - ASSESSMENT
Patient is a 62y old  Male who presents with a chief complaint of syncope. Seen in consultation for L1 bone biopsy. Imaging have been reviewed by Dr. Malik, recommending further imaging work up to evaluate if concern is for metastatic disease/malignancy as L1 bx would likely be low yield and high risk given location.     Please call extension [phone] with any questions, concerns or issues regarding above.

## 2023-08-28 NOTE — PROGRESS NOTE ADULT - SUBJECTIVE AND OBJECTIVE BOX
Patient is a 62y old  Male who presents with a chief complaint of syncope (28 Aug 2023 10:12)    pt denies any back pain,headache,facial pain.  denies chest pain,headache,n/v/d  REVIEW OF SYSTEMS: All systems are reviewed and found to be negative except above    MEDICATIONS  (STANDING):  carvedilol 6.25 milliGRAM(s) Oral every 12 hours  cholecalciferol 2000 Unit(s) Oral daily  dronabinol 2.5 milliGRAM(s) Oral two times a day  lactated ringers. 1000 milliLiter(s) (80 mL/Hr) IV Continuous <Continuous>  minocycline 100 milliGRAM(s) Oral two times a day  simvastatin 40 milliGRAM(s) Oral at bedtime    MEDICATIONS  (PRN):  acetaminophen     Tablet .. 975 milliGRAM(s) Oral every 8 hours PRN Moderate Pain (4 - 6)  diazepam    Tablet 5 milliGRAM(s) Oral every 12 hours PRN MRI/ CT imaging  loperamide 2 milliGRAM(s) Oral four times a day PRN Diarrhea      CAPILLARY BLOOD GLUCOSE        I&O's Summary    27 Aug 2023 07:01  -  28 Aug 2023 07:00  --------------------------------------------------------  IN: 2060 mL / OUT: 2075 mL / NET: -15 mL    28 Aug 2023 07:01  -  28 Aug 2023 14:38  --------------------------------------------------------  IN: 480 mL / OUT: 0 mL / NET: 480 mL        PHYSICAL EXAM:  Vital Signs Last 24 Hrs  T(C): 36.7 (28 Aug 2023 12:30), Max: 36.7 (27 Aug 2023 16:00)  T(F): 98 (28 Aug 2023 12:30), Max: 98.1 (28 Aug 2023 07:54)  HR: 119 (28 Aug 2023 12:00) (82 - 119)  BP: 143/93 (28 Aug 2023 12:00) (106/68 - 144/91)  BP(mean): 108 (28 Aug 2023 12:00) (79 - 108)  RR: 18 (28 Aug 2023 12:00) (11 - 20)  SpO2: 100% (28 Aug 2023 12:00) (96% - 100%)    Parameters below as of 28 Aug 2023 12:00  Patient On (Oxygen Delivery Method): room air        CONSTITUTIONAL: NAD,  EYES: PERRLA; conjunctiva and sclera clear  ENMT: Moist oral mucosa, rt facial bruise/swelling,rt eye: llat sub conjunctival bleed  RESPIRATORY: Normal respiratory effort; lungs are clear to auscultation bilaterally  CARDIOVASCULAR: Regular rate and rhythm, normal S1 and S2, no murmur   EXTS: No lower extremity edema; Peripheral pulses are 2+ bilaterally  ABDOMEN: Nontender to palpation, normoactive bowel sounds, no rebound/guarding; colostomy, ileoconduit present   MUSCLOSKELETAL:  no joint swelling or tenderness to palpation  PSYCH: coop,calm  NEUROLOGY: A+O to person, place, and time; has on/off confusion. CN 2-12 are intact and symmetric; no gross sensory deficits;       LABS:                        8.4    9.07  )-----------( 285      ( 28 Aug 2023 09:20 )             24.7     08-28    136  |  101  |  13.6  ----------------------------<  128<H>  3.7   |  23.0  |  0.92    Ca    8.0<L>      28 Aug 2023 02:11  Phos  3.5     08-27  Mg     2.1     08-27    TPro  4.7<L>  /  Alb  2.8<L>  /  TBili  0.6  /  DBili  x   /  AST  24  /  ALT  19  /  AlkPhos  94  08-28    PT/INR - ( 26 Aug 2023 23:35 )   PT: 11.1 sec;   INR: 1.00 ratio         PTT - ( 26 Aug 2023 23:35 )  PTT:26.9 sec      Urinalysis Basic - ( 28 Aug 2023 02:11 )    Color: x / Appearance: x / SG: x / pH: x  Gluc: 128 mg/dL / Ketone: x  / Bili: x / Urobili: x   Blood: x / Protein: x / Nitrite: x   Leuk Esterase: x / RBC: x / WBC x   Sq Epi: x / Non Sq Epi: x / Bacteria: x        Culture - Blood (collected 26 Aug 2023 09:20)  Source: .Blood Blood-Peripheral  Preliminary Report (27 Aug 2023 16:01):    No growth at 24 hours    Culture - Blood (collected 26 Aug 2023 09:16)  Source: .Blood Blood-Peripheral  Preliminary Report (27 Aug 2023 16:01):    No growth at 24 hours        RADIOLOGY & ADDITIONAL TESTS:  Results Reviewed:

## 2023-08-28 NOTE — DIETITIAN INITIAL EVALUATION ADULT - PERTINENT MEDS FT
MEDICATIONS  (STANDING):  carvedilol 6.25 milliGRAM(s) Oral every 12 hours  cholecalciferol 2000 Unit(s) Oral daily  dronabinol 2.5 milliGRAM(s) Oral two times a day  lactated ringers. 1000 milliLiter(s) (80 mL/Hr) IV Continuous <Continuous>  minocycline 100 milliGRAM(s) Oral two times a day  simvastatin 40 milliGRAM(s) Oral at bedtime    MEDICATIONS  (PRN):  acetaminophen     Tablet .. 975 milliGRAM(s) Oral every 8 hours PRN Moderate Pain (4 - 6)  loperamide 2 milliGRAM(s) Oral four times a day PRN Diarrhea

## 2023-08-28 NOTE — DIETITIAN INITIAL EVALUATION ADULT - PERTINENT LABORATORY DATA
08-28    136  |  101  |  13.6  ----------------------------<  128<H>  3.7   |  23.0  |  0.92    Ca    8.0<L>      28 Aug 2023 02:11  Phos  3.5     08-27  Mg     2.1     08-27    TPro  4.7<L>  /  Alb  2.8<L>  /  TBili  0.6  /  DBili  x   /  AST  24  /  ALT  19  /  AlkPhos  94  08-28  A1C with Estimated Average Glucose Result: 5.7 % (03-01-23 @ 02:45)

## 2023-08-28 NOTE — PROGRESS NOTE ADULT - SUBJECTIVE AND OBJECTIVE BOX
NEUROSURGERY PROGRESS NOTE:    HPI: 63yo Male with PMH HTN, HLD, CAD  Rectal cancer s/p urostomy and  colostomy who presents after syncope and collapse at home .  Per wife around 9am she heard him fall. He was found down on stomach and was unresponsive mouning when wife came to check on him, CPR was innidiated, unsure if PT was ever pulseless. Per  ems upon arrival pt w/ pulse and spontaneous respirations but confused. ON arrival to ED pt w/ stable vitals, alert but only oriented to person.He does remember any related to events or this am. PT otherwise neurological intact.  (26 Aug 2023 14:44)      HISTORY OF PRESENT ILLNESS:   62yM w/ PMHx of HTN, HLD, CAD s/p stents, Rectal cancer w/ recurrence s/p urostomy and colostomy, who per medicine NP and chart review, had initial syncopal fall yesterday morning and was found unresponsive. Initial CTH was negative for intracranial hemorrhage but did show R sided maxillofacial fxs for which surgery was consulted and pt was admitted for syncopal workup. Pt remains amnesic to the events this morning. This evening, pt reports he was looking for his jewelry and leaned out of bed and fell. Denies any head strike or LOC. CTH s/p fall this evening showed 2 areas of very small R frontal IPH, likely L posterior fossa SAH, and possible trace L tentorial SDH. CT A/P also showed acute L1 compression fx w/ mild bony retropulsion. Neurosurgery was consulted for both of these findings. Pt denies any HA, vision changes, dizziness, N/V, back pain, leg weakness, new numbness of tingling in lower extremities.       pt seen and states is at baseline after pt was seen by Dr Aguilar,   pt denied any new or worsening sensorimotor changes  -headache  - Nausea / - Vomiting        MEDICATIONS  (STANDING):  carvedilol 6.25 milliGRAM(s) Oral every 12 hours  cholecalciferol 2000 Unit(s) Oral daily  dronabinol 2.5 milliGRAM(s) Oral two times a day  lactated ringers. 1000 milliLiter(s) (80 mL/Hr) IV Continuous <Continuous>  minocycline 100 milliGRAM(s) Oral two times a day  simvastatin 40 milliGRAM(s) Oral at bedtime    MEDICATIONS  (PRN):  acetaminophen     Tablet .. 975 milliGRAM(s) Oral every 8 hours PRN Moderate Pain (4 - 6)  diazepam    Tablet 5 milliGRAM(s) Oral every 12 hours PRN MRI/ CT imaging  loperamide 2 milliGRAM(s) Oral four times a day PRN Diarrhea    Allergies    No Known Allergies    Intolerances    chocolate (Vomiting)    Vital Signs Last 24 Hrs  T(C): 36.9 (28 Aug 2023 16:00), Max: 36.9 (28 Aug 2023 16:00)  T(F): 98.5 (28 Aug 2023 16:00), Max: 98.5 (28 Aug 2023 16:00)  HR: 115 (28 Aug 2023 16:00) (82 - 119)  BP: 152/91 (28 Aug 2023 16:00) (106/68 - 152/91)  BP(mean): 111 (28 Aug 2023 16:00) (79 - 111)  RR: 20 (28 Aug 2023 16:00) (11 - 20)  SpO2: 98% (28 Aug 2023 16:00) (96% - 100%)    Parameters below as of 28 Aug 2023 16:00  Patient On (Oxygen Delivery Method): room air          PHYSICAL EXAM:  GENERAL: NAD, mild confusion, Alert to self and place at times/ partial date   HEAD:  Swelling and ecchymosis of R side of face w/ mild R periorbital ecchymosis  EYES: Conjunctiva and sclera clear  ENMT: Good dentition  NECK: Supple, nontender to palpation  STEFANIA COMA SCORE: E- V- M- = 14       E: 4= opens eyes spontaneously 3= to voice 2= to noxious 1= no opening       V: 5= oriented 4= confused 3= inappropriate words 2= incomprehensible sounds 1= nonverbal 1T= intubated       M: 6= follows commands 5= localizes 4= withdraws 3= flexor posturing 2= extensor posturing 1= no movement  MENTAL STATUS: AAO x3 (correct month but wrong year); Awake; Opens eyes spontaneously; Appropriately conversant without aphasia; following simple commands  CRANIAL NERVES: b/l PERRL  MOTOR: strength 5/5 b/l upper and lower extremities; no midline TTP throughout C/T/L-spine, no stepoffs or other deformities  SENSATION: grossly intact to light touch all extremities  COORDINATION: Gait testing deferred; no upper extremity dysmetria  SKIN: Warm, dry          LABS:                        8.4    9.07  )-----------( 285      ( 28 Aug 2023 09:20 )             24.7     08-28    136  |  101  |  13.6  ----------------------------<  128<H>  3.7   |  23.0  |  0.92    Ca    8.0<L>      28 Aug 2023 02:11  Phos  3.5     08-27  Mg     2.1     08-27    TPro  4.7<L>  /  Alb  2.8<L>  /  TBili  0.6  /  DBili  x   /  AST  24  /  ALT  19  /  AlkPhos  94  08-28    PT/INR - ( 26 Aug 2023 23:35 )   PT: 11.1 sec;   INR: 1.00 ratio    PTT - ( 26 Aug 2023 23:35 )  PTT:26.9 sec      Urinalysis Basic - ( 28 Aug 2023 02:11 )    Color: x / Appearance: x / SG: x / pH: x  Gluc: 128 mg/dL / Ketone: x  / Bili: x / Urobili: x   Blood: x / Protein: x / Nitrite: x   Leuk Esterase: x / RBC: x / WBC x   Sq Epi: x / Non Sq Epi: x / Bacteria: x        RADIOLOGY & ADDITIONAL TESTS:  no new NSx images available for review       CT Head No Cont (08.27.23 @ 02:01)   IMPRESSION: 2 new tiny hyperdense foci right frontal white matter concerning for tiny parenchymal hemorrhages. Slightly increased subarachnoid hemorrhage in the left posterior fossa. Questionable trace subdural along left tentorial leaflet. No acute calvarial fracture. Right-sided maxillofacial fractures as recently described.    CT Abdomen and Pelvis w/ IV Cont (08.27.23 @ 02:06)   INTERPRETATION:  Acute L1 compression frx with mild retropulsion of bone into the epidural space. No acute post traumatic injury otherwise.    MR Lumbar Spine w/wo IV Cont (08.27.23 @ 16:08)  IMPRESSION: Fracture involving L1 with associated edema with associated enhancement as described above. There is evidence of an epidural process seen ventrally in the region of this fracture.    CT Head No Cont (08.27.23 @ 10:57)  IMPRESSION: Overall no significant change when allowing for differences in technique.    I spent a total time of 25 mins with the patient at bedside of which more than 50% of time was spent on counseling/coordination of care NEUROSURGERY PROGRESS NOTE:    HPI: 61yo Male with PMH HTN, HLD, CAD  Rectal cancer s/p urostomy and  colostomy who presents after syncope and collapse at home .  Per wife around 9am she heard him fall. He was found down on stomach and was unresponsive mouning when wife came to check on him, CPR was innidiated, unsure if PT was ever pulseless. Per  ems upon arrival pt w/ pulse and spontaneous respirations but confused. ON arrival to ED pt w/ stable vitals, alert but only oriented to person.He does remember any related to events or this am. PT otherwise neurological intact.  (26 Aug 2023 14:44)      HISTORY OF PRESENT ILLNESS:   62yM w/ PMHx of HTN, HLD, CAD s/p stents, Rectal cancer w/ recurrence s/p urostomy and colostomy, who per medicine NP and chart review, had initial syncopal fall yesterday morning and was found unresponsive. Initial CTH was negative for intracranial hemorrhage but did show R sided maxillofacial fxs for which surgery was consulted and pt was admitted for syncopal workup. Pt remains amnesic to the events this morning. This evening, pt reports he was looking for his jewelry and leaned out of bed and fell. Denies any head strike or LOC. CTH s/p fall this evening showed 2 areas of very small R frontal IPH, likely L posterior fossa SAH, and possible trace L tentorial SDH. CT A/P also showed acute L1 compression fx w/ mild bony retropulsion. Neurosurgery was consulted for both of these findings. Pt denies any HA, vision changes, dizziness, N/V, back pain, leg weakness, new numbness of tingling in lower extremities.       pt seen and states is at baseline after pt was seen by Dr Aguilar,   pt denied any new or worsening sensorimotor changes  -headache  -Nausea / -Vomiting        MEDICATIONS  (STANDING):  carvedilol 6.25 milliGRAM(s) Oral every 12 hours  cholecalciferol 2000 Unit(s) Oral daily  dronabinol 2.5 milliGRAM(s) Oral two times a day  lactated ringers. 1000 milliLiter(s) (80 mL/Hr) IV Continuous <Continuous>  minocycline 100 milliGRAM(s) Oral two times a day  simvastatin 40 milliGRAM(s) Oral at bedtime    MEDICATIONS  (PRN):  acetaminophen     Tablet .. 975 milliGRAM(s) Oral every 8 hours PRN Moderate Pain (4 - 6)  diazepam    Tablet 5 milliGRAM(s) Oral every 12 hours PRN MRI/ CT imaging  loperamide 2 milliGRAM(s) Oral four times a day PRN Diarrhea    Allergies    No Known Allergies    Intolerances    chocolate (Vomiting)    Vital Signs Last 24 Hrs  T(C): 36.9 (28 Aug 2023 16:00), Max: 36.9 (28 Aug 2023 16:00)  T(F): 98.5 (28 Aug 2023 16:00), Max: 98.5 (28 Aug 2023 16:00)  HR: 115 (28 Aug 2023 16:00) (82 - 119)  BP: 152/91 (28 Aug 2023 16:00) (106/68 - 152/91)  BP(mean): 111 (28 Aug 2023 16:00) (79 - 111)  RR: 20 (28 Aug 2023 16:00) (11 - 20)  SpO2: 98% (28 Aug 2023 16:00) (96% - 100%)    Parameters below as of 28 Aug 2023 16:00  Patient On (Oxygen Delivery Method): room air          PHYSICAL EXAM:  GENERAL: NAD, mild confusion, Alert to self and place at times/ partial date   HEAD:  Swelling and ecchymosis of R side of face w/ mild R periorbital ecchymosis  EYES: Conjunctiva and sclera clear  ENMT: Good dentition  NECK: Supple, nontender to palpation  STEFANIA COMA SCORE: E- V- M- = 14       E: 4= opens eyes spontaneously 3= to voice 2= to noxious 1= no opening       V: 5= oriented 4= confused 3= inappropriate words 2= incomprehensible sounds 1= nonverbal 1T= intubated       M: 6= follows commands 5= localizes 4= withdraws 3= flexor posturing 2= extensor posturing 1= no movement  MENTAL STATUS: AAO x3 (correct month but wrong year); Awake; Opens eyes spontaneously; Appropriately conversant without aphasia; following simple commands  CRANIAL NERVES: b/l PERRL  MOTOR: strength 5/5 b/l upper and lower extremities; no midline TTP throughout C/T/L-spine, no stepoffs or other deformities  SENSATION: grossly intact to light touch all extremities  COORDINATION: Gait testing deferred; no upper extremity dysmetria  SKIN: Warm, dry          LABS:                        8.4    9.07  )-----------( 285      ( 28 Aug 2023 09:20 )             24.7     08-28    136  |  101  |  13.6  ----------------------------<  128<H>  3.7   |  23.0  |  0.92    Ca    8.0<L>      28 Aug 2023 02:11  Phos  3.5     08-27  Mg     2.1     08-27    TPro  4.7<L>  /  Alb  2.8<L>  /  TBili  0.6  /  DBili  x   /  AST  24  /  ALT  19  /  AlkPhos  94  08-28    PT/INR - ( 26 Aug 2023 23:35 )   PT: 11.1 sec;   INR: 1.00 ratio    PTT - ( 26 Aug 2023 23:35 )  PTT:26.9 sec      Urinalysis Basic - ( 28 Aug 2023 02:11 )    Color: x / Appearance: x / SG: x / pH: x  Gluc: 128 mg/dL / Ketone: x  / Bili: x / Urobili: x   Blood: x / Protein: x / Nitrite: x   Leuk Esterase: x / RBC: x / WBC x   Sq Epi: x / Non Sq Epi: x / Bacteria: x        RADIOLOGY & ADDITIONAL TESTS:  no new NSx images available for review       CT Head No Cont (08.27.23 @ 02:01)   IMPRESSION: 2 new tiny hyperdense foci right frontal white matter concerning for tiny parenchymal hemorrhages. Slightly increased subarachnoid hemorrhage in the left posterior fossa. Questionable trace subdural along left tentorial leaflet. No acute calvarial fracture. Right-sided maxillofacial fractures as recently described.    CT Abdomen and Pelvis w/ IV Cont (08.27.23 @ 02:06)   INTERPRETATION:  Acute L1 compression frx with mild retropulsion of bone into the epidural space. No acute post traumatic injury otherwise.    MR Lumbar Spine w/wo IV Cont (08.27.23 @ 16:08)  IMPRESSION: Fracture involving L1 with associated edema with associated enhancement as described above. There is evidence of an epidural process seen ventrally in the region of this fracture.    CT Head No Cont (08.27.23 @ 10:57)  IMPRESSION: Overall no significant change when allowing for differences in technique.    I spent a total time of 25 mins with the patient at bedside of which more than 50% of time was spent on counseling/coordination of care

## 2023-08-28 NOTE — PROGRESS NOTE ADULT - SUBJECTIVE AND OBJECTIVE BOX
Thomas was awake in bed as I measured him for a custom TLSO as requested. I expect brace should be ready for fitting sometime tomorrow 8/29/23.    [facility]

## 2023-08-28 NOTE — CONSULT NOTE ADULT - SUBJECTIVE AND OBJECTIVE BOX
HPI:   62 year-old male with a history of HTN, HLD, CAD  Rectal cancer s/p urostomy and  colostomy presented to [facility] with syncope and fall at home. hospital course c/b fall with CT significant for 2 new tiny hyperdense foci right frontal white matter concerning for tiny parenchymal hemorrhages. Slightly increased subarachnoid hemorrhage in the left posterior fossa. Questionable trace subdural along left tentorial leaflet. No acute calvarial fracture. Right-sided maxillofacial fractures as recently described. CT abd/ pelvis: Acute L1 compression fx with mild retropulsion of bone into the epidural space. MRI obtained, which was noted for abnormal enhancement of L1 fx. Neurosurgery following with concerns for possible pathologic fx/malignancy given patient's history. IR consulted for biopsy.     ============================================================================  Medications:  Home Medications:  ALPRAZolam 0.25 mg oral tablet: 1 tab(s) orally once a day (at bedtime) as needed for  insomnia (18 Aug 2023 23:17)  aspirin 81 mg oral delayed release tablet: 1 tab(s) orally once a day (21 Aug 2023 14:09)  cholecalciferol 50 mcg (2000 intl units) oral tablet: 1 orally once a day (18 Aug 2023 23:17)  Coreg 6.25 mg oral tablet: 1 tab(s) orally 2 times a day (18 Aug 2023 23:17)  Lasix 20 mg oral tablet: 1 tab(s) orally once a day as needed for leg swelling (18 Aug 2023 23:17)  magnesium oxide 400 mg oral tablet: 1 orally once a day (18 Aug 2023 23:17)  minocycline 100 mg oral capsule: 1 cap(s) orally 2 times a day (18 Aug 2023 23:17)  potassium chloride 20 mEq/15 mL oral liquid: 15 milliliter(s) orally 2 times a day (18 Aug 2023 23:17)  simvastatin 40 mg oral tablet: 1 tab(s) orally once a day (at bedtime) (18 Aug 2023 23:17)    MEDICATIONS  (STANDING):  carvedilol 6.25 milliGRAM(s) Oral every 12 hours  cholecalciferol 2000 Unit(s) Oral daily  dronabinol 2.5 milliGRAM(s) Oral two times a day  lactated ringers. 1000 milliLiter(s) (80 mL/Hr) IV Continuous <Continuous>  minocycline 100 milliGRAM(s) Oral two times a day  simvastatin 40 milliGRAM(s) Oral at bedtime    MEDICATIONS  (PRN):  acetaminophen     Tablet .. 975 milliGRAM(s) Oral every 8 hours PRN Moderate Pain (4 - 6)  diazepam    Tablet 5 milliGRAM(s) Oral every 12 hours PRN MRI/ CT imaging  loperamide 2 milliGRAM(s) Oral four times a day PRN Diarrhea      Allergies:   No Known Allergies  chocolate (Vomiting)    ============================================================================  PAST MEDICAL & SURGICAL HISTORY:  HTN (hypertension)      HLD (hyperlipidemia)      CAD (coronary artery disease)      Mitral regurgitation      Rectal cancer  2/2019 s/p chemo and radiation; recurrence of cancer 2022      Depression  with rectal cancer      Neuropathy  feet s/p chemo      Stented coronary artery  circumflex 2005 LAD 2017 x4      H/O carotid stenosis      H/O renal calculi      DANO (iron deficiency anemia)      Umbilical hernia      Splenic artery aneurysm      H/O cardiac catheterization  2005 2017  4 STENTS      History of CEA (carotid endarterectomy)  right 2019      History of rectal surgery  with ileostomy 2/2020      H/O sigmoidoscopy  x2      History of removal of Port-a-Cath          FAMILY HISTORY:  FH: heart disease  father    FH: ALS (amyotrophic lateral sclerosis) (Mother)        Social History:   lives with wife , no smoking (26 Aug 2023 14:44)      ============================================================================  Vitals:  Vital Signs Last 24 Hrs  T(C): 36.7 (28 Aug 2023 12:30), Max: 36.7 (27 Aug 2023 16:00)  T(F): 98 (28 Aug 2023 12:30), Max: 98.1 (28 Aug 2023 07:54)  HR: 119 (28 Aug 2023 12:00) (82 - 119)  BP: 143/93 (28 Aug 2023 12:00) (106/68 - 144/91)  BP(mean): 108 (28 Aug 2023 12:00) (79 - 108)  RR: 18 (28 Aug 2023 12:00) (11 - 20)  SpO2: 100% (28 Aug 2023 12:00) (96% - 100%)    Parameters below as of 28 Aug 2023 12:00  Patient On (Oxygen Delivery Method): room air    Labs:                        8.4    9.07  )-----------( 285      ( 28 Aug 2023 09:20 )             24.7     08-28    136  |  101  |  13.6  ----------------------------<  128<H>  3.7   |  23.0  |  0.92    Ca    8.0<L>      28 Aug 2023 02:11  Phos  3.5     08-27  Mg     2.1     08-27    TPro  4.7<L>  /  Alb  2.8<L>  /  TBili  0.6  /  DBili  x   /  AST  24  /  ALT  19  /  AlkPhos  94  08-28    PT/INR - ( 26 Aug 2023 23:35 )   PT: 11.1 sec;   INR: 1.00 ratio         PTT - ( 26 Aug 2023 23:35 )  PTT:26.9 sec    Imaging:   Pertinent Imaging Reviewed.    ============================================================================

## 2023-08-28 NOTE — DIETITIAN INITIAL EVALUATION ADULT - ETIOLOGY
Related to inadequate protein-energy intake with increased needs and altered GI function in setting of rectal cancer on chemo

## 2023-08-28 NOTE — DIETITIAN INITIAL EVALUATION ADULT - NSFNSGIIOFT_GEN_A_CORE
08-27-23 @ 07:01  -  08-28-23 @ 07:00  --------------------------------------------------------  OUT:    Colostomy (mL): 625 mL  Total OUT: 625 mL    Total NET: -625 mL

## 2023-08-28 NOTE — DIETITIAN NUTRITION RISK NOTIFICATION - TREATMENT: THE FOLLOWING DIET HAS BEEN RECOMMENDED
Diet, DASH/TLC:   Sodium & Cholesterol Restricted  Supplement Feeding Modality:  Oral  Ensure Max Cans or Servings Per Day:  3       Frequency:  Three Times a day (08-27-23 @ 16:59) [Active]

## 2023-08-28 NOTE — PROGRESS NOTE ADULT - ASSESSMENT
62yM w/ PMHx of HTN, HLD, CAD s/p stents, Rectal cancer w/ recurrence s/p urostomy and colostomy w/ syncopal fall yesterday morning and additional fall this evening. Initial CTH from yesterday morning was negative for intracranial hemorrhage but did show R sided maxillofacial fxs for which surgery was consulted and pt was admitted for syncopal workup. CTH s/p fall this evening showed 2 areas of very small R frontal IPH, likely L posterior fossa SAH, and possible trace L tentorial SDH. CT A/P also showed acute L1 compression fx w/ mild bony retropulsion. Neurosurgery was consulted for both of these findings.  8/27 CTB: stable IPH/ SAH/ SDH  8/27 MRI LS: L1 Fx/ ? mets from colon CA       PLAN:  -cont w freq neuro checks  -Repeat CTH pending to document stability of ICH   -STAT CTH for any changes in neuro checks  -Pain control prn, avoid oversedation  -Hold all AC/AP to include ASA 81  if f/u CTB stable and ASA 81 is required given cardio/ vascular status considering R/B/A of rehemorrhage/ ICH, can resume 81mg ASA 24 hrs after stable/ documented CTB otherwise pt would from holding ASA x 2 weeks post bleed (9/10/23)  -goal SBP < 160  -TLSO brace when OOB; orthotist aware to fit/ deliver to accommodate for urostomy and colostomy   -Bedrest until brace delivered & log roll   -VTE prophylaxis: SCDs, hold chemoprophylaxis due to: acute ICH  -Further medical management per primary team  -case and plan d/w Dr Reid and daughter Erma who requests that all medical decisions are reviewed w her as she is the HCP, [place] [facility] to review their recent imaging and imaging from here to plan for further work-up as appropriate   -Dr Aguilar recommended MRI C/T Spine w w/wo renea to r/o additional bony involvement/ lesions as well as CT Scott w IV contrast; IR bx of L1 as consulted on hold until further notice per daughter/ primary team and [place] [facility]. 62yM w/ PMHx of HTN, HLD, CAD s/p stents, Rectal cancer w/ recurrence s/p urostomy and colostomy w/ syncopal fall yesterday morning and additional fall this evening. Initial CTH from yesterday morning was negative for intracranial hemorrhage but did show R sided maxillofacial fxs for which surgery was consulted and pt was admitted for syncopal workup. CTH s/p fall this evening showed 2 areas of very small R frontal IPH, likely L posterior fossa SAH, and possible trace L tentorial SDH. CT A/P also showed acute L1 compression fx w/ mild bony retropulsion. Neurosurgery was consulted for both of these findings.  8/27 CTB: stable IPH/ SAH/ SDH  8/27 MRI LS: L1 Fx/ ? mets from colon CA       PLAN: per Dr Aguilar  -cont w freq neuro checks  -Repeat CTH pending to document stability of ICH   -STAT CTH for any changes in neuro checks  -Pain control prn, avoid oversedation  -Hold all AC/AP to include ASA 81  if f/u CTB stable and ASA 81 is required given cardio/ vascular status considering R/B/A of rehemorrhage/ ICH, can resume 81mg ASA 24 hrs after stable/ documented CTB otherwise pt would from holding ASA x 2 weeks post bleed (9/10/23)  -goal SBP < 160  -TLSO brace when OOB; orthotist aware to fit/ deliver to accommodate for urostomy and colostomy   -Bedrest until brace delivered & log roll   -VTE prophylaxis: SCDs & ok to start Chemical DVT PPX at this time   -Further medical management per primary team  -case and plan d/w Dr Reid and daughter Erma who requests that all medical decisions are reviewed w her as she is the HCP, [place] [facility] to review their recent imaging and imaging from here to plan for further work-up as appropriate   -Dr Aguilar recommended MRI C/T Spine w w/wo renea to r/o additional bony involvement/ lesions as well as CT Scott w IV contrast; IR bx of L1 as consulted on hold until further notice per daughter/ primary team and [place] [facility].

## 2023-08-28 NOTE — PROGRESS NOTE ADULT - ASSESSMENT
63 yo Male with PMHx of HTN , rectal cancer on chemo,hx colostomy,ileoconduit  admitted for syncope, reported weakness poor oral intake diarrhea due to recent chemo s/p syncope,fall at home found to have rt facial fx. admitted to medicine. Pt had another fall last night. . Patient said he was trying to get up from bed and  that is what caused him to fall out of bed. Patient is unsure if he hit head. CT head: 2 new tiny hyperdense foci right frontal white matter concerning for tiny parenchymal hemorrhages. Slightly increased subarachnoid hemorrhage in   the left posterior fossa. Questionable trace subdural along left tentorial leaflet. No acute calvarial fracture. Right-sided maxillofacial   fractures as recently described.CT abd/ pelvis: Acute L1 compression fx with mild retropulsion of bone into the epidural space.No acute post traumatic injury otherwise.      ICH sec trauma/fall  -CT head: 2 new tiny hyperdense foci right frontal white matter concerning for tiny parenchymal hemorrhages. Slightly increased subarachnoid hemorrhage in the left posterior fossa. Questionable trace subdural along left tentorial leaflet. No acute calvarial fracture.  -cth 08/27 am stable bleed size  -Repeat CTH order  -neurocheck q2hr  -dc ASA,sq heparin  -if repeat imaging stbale neurosurgery rec to resume asa  -f/u neurosurgery rec      Acute L1 compression fx with mild retropulsion of bone into the epidural space s/p fall  -MRI Spine  -Chest CT w con and MRI C/T w/wo con ordered for additional w/u.  -L1 IR bx to r/o met/path fx. per IR Complicated bx rec imaging for now  -f/u neurosurgery rec    Right-sided maxillofacial fractures s/p fall  -f/u facial surgery rec  -pain meds   - cold compress       Unresponsiveness vs syncope/fall  hx PAF  -concern for orthostatic hypotension likely from hypovolemia and has had decreased PO intake while on chemotherapy and additionally with known neuropathy/unsteady gait  post chemotherapy,   - neurocheck  -orth bp  when safe   - given traumatic SAH/SDH, hold antiplatelet therapy  - eventual orthostatic vitals  - tele  - ?ILR per cardiology  - poor chronic AC candidate (hx gib)    HFrEF without acute decompensation/CAD  - hold off on ACEi/ARB/ARNI therapy for now  - continue coreg   - no antiplatelet 2/2 ICH    Rectal cancer mets to bladder   with urostomy and colostomy bag   under [facility] getting chemo   diarrhea improving per wife   c/s hem/onc [place] [facility]  dw pt  dw in detailed with Erma (she is [employer] RN [employer])  spoke with neurosurgery team,they will talk to daughter Erma

## 2023-08-28 NOTE — DIETITIAN INITIAL EVALUATION ADULT - ORAL INTAKE PTA/DIET HISTORY
Pt reports having persistent lack of appetite over past month. Pt also endorses persistent diarrhea. Pt reports weight loss of 15lbs in this time. Pt reports drinking ensure supplements and Premiere shakes daily. Food preferences obtained. HBV protein foods encouraged.

## 2023-08-28 NOTE — PROGRESS NOTE ADULT - SUBJECTIVE AND OBJECTIVE BOX
[facility] - [facility], THE [facility]                                   [address] [place] [address]                                                      PHONE: [phone]                                                         FAX: [phone]  [email or web address]  ---------------------------------------------------------------------------------------------------------------------------------    Overnight events/patient complaints:  no events     PAST MEDICAL & SURGICAL HISTORY:  HTN (hypertension)      HLD (hyperlipidemia)      CAD (coronary artery disease)      Mitral regurgitation      Rectal cancer  2/2019 s/p chemo and radiation; recurrence of cancer 2022      Depression  with rectal cancer      Neuropathy  feet s/p chemo      Stented coronary artery  circumflex 2005 LAD 2017 x4      H/O carotid stenosis      H/O renal calculi      DANO (iron deficiency anemia)      Umbilical hernia      Splenic artery aneurysm      H/O cardiac catheterization  2005 2017  4 STENTS      History of CEA (carotid endarterectomy)  right 2019      History of rectal surgery  with ileostomy 2/2020      H/O sigmoidoscopy  x2      History of removal of Port-a-Cath          No Known Allergies  chocolate (Vomiting)    MEDICATIONS  (STANDING):  carvedilol 6.25 milliGRAM(s) Oral every 12 hours  cholecalciferol 2000 Unit(s) Oral daily  dronabinol 2.5 milliGRAM(s) Oral two times a day  lactated ringers. 1000 milliLiter(s) (80 mL/Hr) IV Continuous <Continuous>  minocycline 100 milliGRAM(s) Oral two times a day  simvastatin 40 milliGRAM(s) Oral at bedtime    MEDICATIONS  (PRN):  acetaminophen     Tablet .. 975 milliGRAM(s) Oral every 8 hours PRN Moderate Pain (4 - 6)  loperamide 2 milliGRAM(s) Oral four times a day PRN Diarrhea      Vital Signs Last 24 Hrs  T(C): 36.7 (28 Aug 2023 07:54), Max: 36.7 (27 Aug 2023 12:00)  T(F): 98.1 (28 Aug 2023 07:54), Max: 98.1 (27 Aug 2023 12:00)  HR: 85 (28 Aug 2023 08:00) (82 - 118)  BP: 107/71 (28 Aug 2023 08:00) (106/68 - 153/93)  BP(mean): 82 (28 Aug 2023 08:00) (79 - 112)  RR: 17 (28 Aug 2023 08:00) (15 - 21)  SpO2: 96% (28 Aug 2023 08:00) (96% - 100%)    Parameters below as of 28 Aug 2023 08:00  Patient On (Oxygen Delivery Method): room air      ICU Vital Signs Last 24 Hrs  PASTORA CHURCHILL  I&O's Detail    27 Aug 2023 07:01  -  28 Aug 2023 07:00  --------------------------------------------------------  IN:    Lactated Ringers: 1840 mL    Oral Fluid: 220 mL  Total IN: 2060 mL    OUT:    Colostomy (mL): 625 mL    Urostomy (mL): 1450 mL  Total OUT: 2075 mL    Total NET: -15 mL      28 Aug 2023 07:01  -  28 Aug 2023 08:48  --------------------------------------------------------  IN:    Lactated Ringers: 80 mL  Total IN: 80 mL    OUT:  Total OUT: 0 mL    Total NET: 80 mL        I&O's Summary    27 Aug 2023 07:01  -  28 Aug 2023 07:00  --------------------------------------------------------  IN: 2060 mL / OUT: 2075 mL / NET: -15 mL    28 Aug 2023 07:01  -  28 Aug 2023 08:48  --------------------------------------------------------  IN: 80 mL / OUT: 0 mL / NET: 80 mL      Drug Dosing Weight  PASTORA CHURCHILL      PHYSICAL EXAM:  General: Appears well developed, well nourished alert and cooperative.  HEENT: Head; normocephalic, atraumatic.  Eyes: Pupils reactive, cornea wnl.  Neck: Supple, no nodes adenopathy, no NVD or carotid bruit or thyromegaly.  CARDIOVASCULAR: Normal S1 and S2, No murmur, rub, gallop or lift.   LUNGS: No rales, rhonchi or wheeze. Normal breath sounds bilaterally.  ABDOMEN: Soft, nontender without mass or organomegaly. bowel sounds normoactive.  EXTREMITIES: No clubbing, cyanosis or edema. Distal pulses wnl.   SKIN: warm and dry with normal turgor.  NEURO: Alert/oriented x 3/normal motor exam. No pathologic reflexes.    PSYCH: normal affect.        LABS:                        8.2    8.85  )-----------( 254      ( 28 Aug 2023 02:11 )             24.1     08-28    136  |  101  |  13.6  ----------------------------<  128<H>  3.7   |  23.0  |  0.92    Ca    8.0<L>      28 Aug 2023 02:11  Phos  3.5     08-27  Mg     2.1     08-27    TPro  4.7<L>  /  Alb  2.8<L>  /  TBili  0.6  /  DBili  x   /  AST  24  /  ALT  19  /  AlkPhos  94  08-28    PASTORA CHURCHILL  CARDIAC MARKERS ( 26 Aug 2023 09:16 )  x     / <0.01 ng/mL / x     / x     / x          PT/INR - ( 26 Aug 2023 23:35 )   PT: 11.1 sec;   INR: 1.00 ratio         PTT - ( 26 Aug 2023 23:35 )  PTT:26.9 sec  Urinalysis Basic - ( 28 Aug 2023 02:11 )    Color: x / Appearance: x / SG: x / pH: x  Gluc: 128 mg/dL / Ketone: x  / Bili: x / Urobili: x   Blood: x / Protein: x / Nitrite: x   Leuk Esterase: x / RBC: x / WBC x   Sq Epi: x / Non Sq Epi: x / Bacteria: x        RADIOLOGY & ADDITIONAL STUDIES:    ASSESSMENT AND PLAN:  In summary, PASTORA CHURCHILL is a medically complex 62y Male with past medical history significant for  rectal adenocarcinomas/p JASON, LAW with ileostomy complicated by leak (2020), anastomotic recurrence (2021) treated with APR rectum/sigmoid colon in 3/2022 with colostomy creation, now with biopsy proven recurrence 12/22, CAD status post multivessel stenting (LAD/Cx) in the past most recently 2017, HFrecEF, hypertension, hyperlipidemia, PAD status post right carotid endarterectomy (2019), new paxorsymal onset atrial fibrillation associated with tachycardia mediated LV dysfunction in the setting of bacteremia who has had multiple recent admissions for a sundry of concerns most recently 8/18/23 for neutropenic fever who presents after being unresponsive and possibly pulseless according to his partner, however per EMS with pulse and spontaneous respirations with course further complicated by unwitnessed fall out of bed s/p repeat neuroimaging which revealed small R frontal IPH, likely L posterior fossa SAH, and possible trace L tentorial SDH as well as acute L1 compression fx w/ mild bony retropulsion.    Unresponsiveness vs syncope/fall/PAF  - patient clinically hypovolemic and has had decreased PO intake while on chemotherapy and additionally with known neuropathy post chemotherapy, concern for orthostatic hypotension   - serial neuro exam and neuroimaging   - given traumatic SAH/SDH, hold antiplatelet therapy  - eventual orthostatic vitals when safe   - tele  - possible ILR prior to discharge   - poor chronic AC candidate     HFrEF without acute decompensation/CAD  - hold off on ACEi/ARB/ARNI therapy for now  - continue coreg   - no antiplatelet 2/2 ICH    Thank you for allowing [facility] to participate in the care of this patient.  Please feel free to call with any questions or concerns.

## 2023-08-28 NOTE — DIETITIAN INITIAL EVALUATION ADULT - OTHER INFO
Pt is a 61 yo Male with PMHx of HTN , rectal cancer on chemo admitted for syncope, reported weakness poor oral intake diarrhea due to recent chemo s/p syncope, fall at home found to have rt facial fx. admitted to medicine. Pt had another fall last night. . Patient said he was trying to get up from bed and  that is what caused him to fall out of bed. Patient is unsure if he hit head. CT head: 2 new tiny hyperdense foci right frontal white matter concerning for tiny parenchymal hemorrhages. Slightly increased subarachnoid hemorrhage in   the left posterior fossa. Questionable trace subdural along left tentorial leaflet. No acute calvarial fracture. Right-sided maxillofacial   fractures as recently described. CT abd/ pelvis: Acute L1 compression fx with mild retropulsion of bone into the epidural space.

## 2023-08-28 NOTE — DIETITIAN INITIAL EVALUATION ADULT - NS FNS DIET ORDER
Diet, DASH/TLC:   Sodium & Cholesterol Restricted  Supplement Feeding Modality:  Oral  Ensure Max Cans or Servings Per Day:  3       Frequency:  Three Times a day (08-27-23 @ 16:59)

## 2023-08-29 LAB
ANION GAP SERPL CALC-SCNC: 9 MMOL/L — SIGNIFICANT CHANGE UP (ref 5–17)
BLD GP AB SCN SERPL QL: SIGNIFICANT CHANGE UP
BUN SERPL-MCNC: 13.4 MG/DL — SIGNIFICANT CHANGE UP (ref 8–20)
CALCIUM SERPL-MCNC: 8.2 MG/DL — LOW (ref 8.4–10.5)
CHLORIDE SERPL-SCNC: 102 MMOL/L — SIGNIFICANT CHANGE UP (ref 96–108)
CO2 SERPL-SCNC: 23 MMOL/L — SIGNIFICANT CHANGE UP (ref 22–29)
CREAT SERPL-MCNC: 0.78 MG/DL — SIGNIFICANT CHANGE UP (ref 0.5–1.3)
EGFR: 100 ML/MIN/1.73M2 — SIGNIFICANT CHANGE UP
GLUCOSE SERPL-MCNC: 103 MG/DL — HIGH (ref 70–99)
HCT VFR BLD CALC: 21.8 % — LOW (ref 39–50)
HCT VFR BLD CALC: 23.1 % — LOW (ref 39–50)
HGB BLD-MCNC: 7.3 G/DL — LOW (ref 13–17)
HGB BLD-MCNC: 7.7 G/DL — LOW (ref 13–17)
MAGNESIUM SERPL-MCNC: 1.6 MG/DL — SIGNIFICANT CHANGE UP (ref 1.6–2.6)
MCHC RBC-ENTMCNC: 31.8 PG — SIGNIFICANT CHANGE UP (ref 27–34)
MCHC RBC-ENTMCNC: 32.3 PG — SIGNIFICANT CHANGE UP (ref 27–34)
MCHC RBC-ENTMCNC: 33.3 GM/DL — SIGNIFICANT CHANGE UP (ref 32–36)
MCHC RBC-ENTMCNC: 33.5 GM/DL — SIGNIFICANT CHANGE UP (ref 32–36)
MCV RBC AUTO: 95.5 FL — SIGNIFICANT CHANGE UP (ref 80–100)
MCV RBC AUTO: 96.5 FL — SIGNIFICANT CHANGE UP (ref 80–100)
PHOSPHATE SERPL-MCNC: 4.1 MG/DL — SIGNIFICANT CHANGE UP (ref 2.4–4.7)
PLATELET # BLD AUTO: 279 K/UL — SIGNIFICANT CHANGE UP (ref 150–400)
PLATELET # BLD AUTO: 288 K/UL — SIGNIFICANT CHANGE UP (ref 150–400)
POTASSIUM SERPL-MCNC: 3.8 MMOL/L — SIGNIFICANT CHANGE UP (ref 3.5–5.3)
POTASSIUM SERPL-SCNC: 3.8 MMOL/L — SIGNIFICANT CHANGE UP (ref 3.5–5.3)
RBC # BLD: 2.26 M/UL — LOW (ref 4.2–5.8)
RBC # BLD: 2.42 M/UL — LOW (ref 4.2–5.8)
RBC # FLD: 15.6 % — HIGH (ref 10.3–14.5)
RBC # FLD: 15.7 % — HIGH (ref 10.3–14.5)
SODIUM SERPL-SCNC: 134 MMOL/L — LOW (ref 135–145)
WBC # BLD: 8 K/UL — SIGNIFICANT CHANGE UP (ref 3.8–10.5)
WBC # BLD: 9.16 K/UL — SIGNIFICANT CHANGE UP (ref 3.8–10.5)
WBC # FLD AUTO: 8 K/UL — SIGNIFICANT CHANGE UP (ref 3.8–10.5)
WBC # FLD AUTO: 9.16 K/UL — SIGNIFICANT CHANGE UP (ref 3.8–10.5)

## 2023-08-29 PROCEDURE — 99233 SBSQ HOSP IP/OBS HIGH 50: CPT

## 2023-08-29 RX ORDER — PANTOPRAZOLE SODIUM 20 MG/1
40 TABLET, DELAYED RELEASE ORAL EVERY 12 HOURS
Refills: 0 | Status: DISCONTINUED | OUTPATIENT
Start: 2023-08-29 | End: 2023-09-07

## 2023-08-29 RX ORDER — MAGNESIUM SULFATE 500 MG/ML
1 VIAL (ML) INJECTION ONCE
Refills: 0 | Status: COMPLETED | OUTPATIENT
Start: 2023-08-29 | End: 2023-08-29

## 2023-08-29 RX ADMIN — Medication 975 MILLIGRAM(S): at 09:53

## 2023-08-29 RX ADMIN — PANTOPRAZOLE SODIUM 40 MILLIGRAM(S): 20 TABLET, DELAYED RELEASE ORAL at 14:05

## 2023-08-29 RX ADMIN — Medication 100 GRAM(S): at 14:06

## 2023-08-29 RX ADMIN — CARVEDILOL PHOSPHATE 6.25 MILLIGRAM(S): 80 CAPSULE, EXTENDED RELEASE ORAL at 05:42

## 2023-08-29 RX ADMIN — MINOCYCLINE HYDROCHLORIDE 100 MILLIGRAM(S): 45 TABLET, EXTENDED RELEASE ORAL at 05:42

## 2023-08-29 RX ADMIN — Medication 975 MILLIGRAM(S): at 18:42

## 2023-08-29 RX ADMIN — Medication 975 MILLIGRAM(S): at 19:00

## 2023-08-29 RX ADMIN — SIMVASTATIN 40 MILLIGRAM(S): 20 TABLET, FILM COATED ORAL at 21:15

## 2023-08-29 RX ADMIN — Medication 975 MILLIGRAM(S): at 10:08

## 2023-08-29 RX ADMIN — CARVEDILOL PHOSPHATE 6.25 MILLIGRAM(S): 80 CAPSULE, EXTENDED RELEASE ORAL at 17:10

## 2023-08-29 RX ADMIN — Medication 2.5 MILLIGRAM(S): at 05:42

## 2023-08-29 RX ADMIN — Medication 2.5 MILLIGRAM(S): at 17:10

## 2023-08-29 RX ADMIN — MINOCYCLINE HYDROCHLORIDE 100 MILLIGRAM(S): 45 TABLET, EXTENDED RELEASE ORAL at 17:10

## 2023-08-29 RX ADMIN — Medication 2000 UNIT(S): at 11:13

## 2023-08-29 NOTE — PROGRESS NOTE ADULT - SUBJECTIVE AND OBJECTIVE BOX
Patient is a 62y old  Male who presents with a chief complaint of syncope (29 Aug 2023 10:46)    c/o mild back discomfort. denies headache,cp,sob,n/v/d    REVIEW OF SYSTEMS: All systems are reviewed and found to be negative except above    MEDICATIONS  (STANDING):  carvedilol 6.25 milliGRAM(s) Oral every 12 hours  cholecalciferol 2000 Unit(s) Oral daily  dronabinol 2.5 milliGRAM(s) Oral two times a day  minocycline 100 milliGRAM(s) Oral two times a day  simvastatin 40 milliGRAM(s) Oral at bedtime    MEDICATIONS  (PRN):  acetaminophen     Tablet .. 975 milliGRAM(s) Oral every 8 hours PRN Moderate Pain (4 - 6)  loperamide 2 milliGRAM(s) Oral four times a day PRN Diarrhea      CAPILLARY BLOOD GLUCOSE        I&O's Summary    28 Aug 2023 07:01  -  29 Aug 2023 07:00  --------------------------------------------------------  IN: 1840 mL / OUT: 2400 mL / NET: -560 mL    29 Aug 2023 07:01  -  29 Aug 2023 13:22  --------------------------------------------------------  IN: 160 mL / OUT: 0 mL / NET: 160 mL        PHYSICAL EXAM:  Vital Signs Last 24 Hrs  T(C): 36.8 (29 Aug 2023 07:46), Max: 36.9 (28 Aug 2023 16:00)  T(F): 98.2 (29 Aug 2023 07:46), Max: 98.5 (28 Aug 2023 16:00)  HR: 84 (29 Aug 2023 12:00) (76 - 124)  BP: 102/63 (29 Aug 2023 12:00) (96/53 - 152/91)  BP(mean): 76 (29 Aug 2023 12:00) (66 - 111)  RR: 16 (29 Aug 2023 12:00) (10 - 26)  SpO2: 96% (29 Aug 2023 12:00) (95% - 100%)    Parameters below as of 29 Aug 2023 12:00  Patient On (Oxygen Delivery Method): room air        CONSTITUTIONAL: NAD,  EYES: PERRLA;rt sub conjunctiva bleed  ENMT: Moist oral mucosa, rt facial swelling reduce, decrease erythema  RESPIRATORY: Normal respiratory effort; lungs are clear to auscultation bilaterally  CARDIOVASCULAR: Regular rate and rhythm, normal S1 and S2, no murmur   EXTS: No lower extremity edema; Peripheral pulses are 2+ bilaterally  ABDOMEN: Nontender to palpation, normoactive bowel sounds, no rebound/guarding;   MUSCLOSKELETAL:   no joint swelling or tenderness to palpation. unable to check back  PSYCH: affect appropriate  NEUROLOGY: A+O to person, place, and time; CN 2-12 are intact and symmetric; no gross sensory deficits;       LABS:                        7.3    9.16  )-----------( 279      ( 29 Aug 2023 09:30 )             21.8     08-29    134<L>  |  102  |  13.4  ----------------------------<  103<H>  3.8   |  23.0  |  0.78    Ca    8.2<L>      29 Aug 2023 02:45  Phos  4.1     08-29  Mg     1.6     08-29    TPro  4.7<L>  /  Alb  2.8<L>  /  TBili  0.6  /  DBili  x   /  AST  24  /  ALT  19  /  AlkPhos  94  08-28          Urinalysis Basic - ( 29 Aug 2023 02:45 )    Color: x / Appearance: x / SG: x / pH: x  Gluc: 103 mg/dL / Ketone: x  / Bili: x / Urobili: x   Blood: x / Protein: x / Nitrite: x   Leuk Esterase: x / RBC: x / WBC x   Sq Epi: x / Non Sq Epi: x / Bacteria: x        Culture - Urine (collected 26 Aug 2023 14:03)  Source: Clean Catch Clean Catch (Midstream)  Final Report (28 Aug 2023 21:17):    10,000 - 49,000 CFU/mL Candida tropicalis "Susceptibilities not performed"        RADIOLOGY & ADDITIONAL TESTS:  Results Reviewed:   < from: CT Head No Cont (08.28.23 @ 17:19) >  IMPRESSION:  Stable extra-axial hemorrhage as described above. No new or worsening   intracranial hemorrhage.    Multiple facial bone fractures again seen.    < end of copied text >

## 2023-08-29 NOTE — CONSULT NOTE ADULT - ASSESSMENT
62M with PMHX rectal adenocarcinoma c/b mets to bladder and prostate on current chemo FOLFIRI/Panitumamab (Last dose 8/10) with neulasta support on 8/12, s/p Lower Abd Resection/Abdominoperineal Resection (2020, 2022), S/P fistula repair between bowel and bladder, HTN, HLD, CAD s/p PCI x5 presenting after found down at home by family s/p syncopal event     [facility] CT 8/18/2023 CT of abdomen and pelvis W/WO IV CONTRAST: BONES/SOFT TISSUES: No suspicious osseous lesion. Degenerative changes of the vertebral spine. Redemonstration of post median laparotomy changes  IMPRESSION: Since July 24, 2023, 1. Unchanged size of presacral mass with development of internal gas foci, possibly from necrosis or superimposed infection. Unchanged drain position within the mass. 2. Unchanged circumferential wall thickening of the urinary bladder with surrounding fat stranding, probably cystitis. In addition, new trace amount of air within the non-dependent portions of the bladder lumen, possibly due to recent instrumentation versus result of fistulization with posteriorly adjacent mass.    Metastatic Rectal ca   - on active chemo FOLFIRI + Panitumamab ; last dose 8/10 with neulasta support 8/12  - pt to resume care with [facility] after d/c     Syncopal episode  L1 compression fx  SAH/SDH  - CTH shown small right frontal intraparenchymal hemorrhage likely left posterior fossa subarachnoid hemorrhage and possible Trace left tentorial SDH  - MRI L spine w/wo reviewed, [facility] to review imaging   - recent CT w/ IV con [facility] 8/18/23 w/o suspicious osseous lesions  - hold IR biopsy until [facility] reviews recent imaging for further recommendations   - fitted for TLSO brace   - further management per neurosurgery   - cardiology on board, considering ILR prior to DC  - follow cardio recs   - antiplatelet therapy on hold, resume per neurosurgery recs   - continue neuro checks as ordered     Anemia  multifactorial  Malignancy/Chemo  - prn PRBC transfusion for hgb < 7  - can check iron studies/b12/retic/ldh     chronic diarrhea  - likely secondary to chemo.    - pt on immodium prn - may cont as inpt   - prn electrolyte repletion     thank you  Will update [facility] daily     [facility]  [phone]

## 2023-08-29 NOTE — PROGRESS NOTE ADULT - SUBJECTIVE AND OBJECTIVE BOX
Karl was awake in bed and his wife Maricel, and daughter Erma were present as I fit him with the custom TLSO I measured for yesterday for use OOB only. Accommodations were made for his colostomy and urostomy  bags, and trimmed enough to allow him to sit at a desk at work. Anterior shell fits inside of the posterior shell and was marked at current position. Two body socks were provided. The best fit was with the bags placed through the circular holes of the anterior shell, and required cutting his gown. Brace should be wiped clean regularly. Will follow as needed.   [facility]  [phone]

## 2023-08-29 NOTE — PROGRESS NOTE ADULT - ASSESSMENT
61 yo Male with PMHx of HTN , rectal cancer on chemo,hx colostomy,ileoconduit  admitted for syncope, reported weakness poor oral intake diarrhea due to recent chemo s/p syncope,fall at home found to have rt facial fx. admitted to medicine. Pt had another fall last night. . Patient said he was trying to get up from bed and  that is what caused him to fall out of bed. Patient is unsure if he hit head. CT head: 2 new tiny hyperdense foci right frontal white matter concerning for tiny parenchymal hemorrhages. Slightly increased subarachnoid hemorrhage in   the left posterior fossa. Questionable trace subdural along left tentorial leaflet. No acute calvarial fracture. Right-sided maxillofacial   fractures as recently described.CT abd/ pelvis: Acute L1 compression fx with mild retropulsion of bone into the epidural space.No acute post traumatic injury otherwise.      [facility] sec trauma/fall  -CT head: 2 new tiny hyperdense foci right frontal white matter concerning for tiny parenchymal hemorrhages. Slightly increased subarachnoid hemorrhage in the left posterior fossa. Questionable trace subdural along left tentorial leaflet. No acute calvarial fracture.  -cth 08/27 am stable bleed size  -Repeat CTH order  -neurocheck q2hr  -dc ASA,sq heparin  -if repeat imaging [facility] neurosurgery rec to resume asa  -f/u neurosurgery rec    anemia  -hb 7.3  -stool occult  -type and cross  -1 u prbc  -ppi  -no active bleed present      Acute L1 compression fx with mild retropulsion of bone into the epidural space s/p fall  -MRI Spine  -Chest CT w con and MRI C/T w/wo con ordered for additional w/u.  -L1 IR bx to r/o met/path fx. per IR Complicated bx rec imaging for now  -f/u neurosurgery rec    Right-sided maxillofacial fractures s/p fall  -f/u facial surgery rec  -pain meds   - cold compress       Unresponsiveness vs syncope/fall  hx PAF  -concern for orthostatic hypotension likely from hypovolemia and has had decreased PO intake while on chemotherapy and additionally with known neuropathy/unsteady gait  post chemotherapy,   - neurocheck  -orth bp  when safe   - given traumatic SAH/SDH, hold antiplatelet therapy  - eventual orthostatic vitals  - tele  - ?ILR per cardiology  - poor chronic AC candidate (hx gib)    HFrEF without acute decompensation/CAD  - hold off on ACEi/ARB/ARNI therapy for now  - continue coreg   - no antiplatelet 2/2 ICH    Rectal cancer mets to bladder   with urostomy and colostomy bag   under [facility] getting chemo   diarrhea improving per wife   c/s hem/onc [facility]  dw pt  dw in detailed with Erma (she is MICU RN [employer])  spoke with neurosurgery team,they will talk to daughter Erma

## 2023-08-29 NOTE — CHART NOTE - NSCHARTNOTEFT_GEN_A_CORE
Family requesting to hold off on imaging and continue care at [facility]. Bedrest until TLSO delivered, may mobile with PT when delivered.  Permitted for chemical DVT prophylaxis from a neurosurgical standpoint. If ASA81 is required, risk/benefit ratio per primary team, recommend holding for 2 full weeks with a repeat CTH. If imaging stable, may resume. May follow up with Dr. Jeff IBARRA but family wishing to have further work up at [facility]. Medical management/supportive care per primary team

## 2023-08-29 NOTE — CONSULT NOTE ADULT - SUBJECTIVE AND OBJECTIVE BOX
62M with PMHX rectal adenocarcinoma c/b mets to bladder and prostate on current chemo FOLFIRI/Panitumamab (Last dose 8/10) with neulasta support on 8/12, s/p Lower Abd Resection/Abdominoperineal Resection (2020, 2022), S/P fistula repair between bowel and bladder, HTN, HLD, CAD s/p PCI x5 presenting s/p syncope event at home 8/26.   CTH shown small right frontal intraparenchymal hemorrhage likely left posterior fossa subarachnoid hemorrhage and possible Trace left tentorial SDH as well as acute L1 compression fracture with mild bony retropulsion.   MRI L spine w/wo con 8/27: Fracture involving the superior aspect of the L1 vertebral body, There is evidence of abnormal enhancement component seen in this vertebral body as well. While this could be compatible with a posttraumatic fracture, given patient's history of trauma the possibility of an osteoporotic or pathologic fracture must be considered as well. Clinical correlation and continued close interval follow-up is recommended. There is evidence of abnormal signal in the ventral epidural space extending from the bottom of T12 to bottom of L2. This is more prominent on the right side than left and does appear to involve the right spinal canal is well. This could be compatible with an epidural hematoma possibility of epidural extension of tumor cannot be entirely excluded.   Right sided maxillofacial fractures. Seen by ENT for facial fractures  Neuro surgery, cardiology on board    Alert this AM  Confused, no complaints   Repeat head CT 8/28 stable   Discussed with daughter at bedside        MEDICATIONS  (STANDING):  carvedilol 6.25 milliGRAM(s) Oral every 12 hours  cholecalciferol 2000 Unit(s) Oral daily  dronabinol 2.5 milliGRAM(s) Oral two times a day  minocycline 100 milliGRAM(s) Oral two times a day  simvastatin 40 milliGRAM(s) Oral at bedtime    MEDICATIONS  (PRN):  acetaminophen     Tablet .. 975 milliGRAM(s) Oral every 8 hours PRN Moderate Pain (4 - 6)  loperamide 2 milliGRAM(s) Oral four times a day PRN Diarrhea     Vital Signs Last 24 Hrs  T(C): 36.8 (29 Aug 2023 07:46), Max: 36.9 (28 Aug 2023 16:00)  T(F): 98.2 (29 Aug 2023 07:46), Max: 98.5 (28 Aug 2023 16:00)  HR: 76 (29 Aug 2023 08:00) (76 - 124)  BP: 130/55 (29 Aug 2023 08:00) (96/53 - 152/91)  BP(mean): 75 (29 Aug 2023 08:00) (66 - 111)  RR: 16 (29 Aug 2023 08:00) (10 - 26)  SpO2: 100% (29 Aug 2023 08:00) (95% - 100%)    Parameters below as of 29 Aug 2023 08:00  Patient On (Oxygen Delivery Method): room air    T(C): 36.8 (08-29-23 @ 07:46), Max: 36.9 (08-28-23 @ 16:00)  HR: 76 (08-29-23 @ 08:00) (76 - 124)  BP: 130/55 (08-29-23 @ 08:00) (96/53 - 152/91)  RR: 16 (08-29-23 @ 08:00) (10 - 26)  SpO2: 100% (08-29-23 @ 08:00) (95% - 100%)    · Constitutional: in no acute distress, resting comfortably   · Eyes:	PERRL   · Respiratory:	clear to auscultation bilaterally; no wheezes; no rhonchi; airway patent  · Cardiovascular: regular rate and rhythm  · Gastrointestinal:  no guarding  · Neurological: responds to verbal commands  · Mental Status	confused slow responses  · Skin	warm and dry; color normal; rash  · Musculoskeletal	ROM intact; strength 5/5 bilateral upper extremities  · Additional PE	right facial swelling and tender     ICU Vital Signs Last 24 Hrs  T(C): 36.8 (29 Aug 2023 07:46), Max: 36.9 (28 Aug 2023 16:00)  T(F): 98.2 (29 Aug 2023 07:46), Max: 98.5 (28 Aug 2023 16:00)  HR: 76 (29 Aug 2023 08:00) (76 - 124)  BP: 130/55 (29 Aug 2023 08:00) (96/53 - 152/91)  BP(mean): 75 (29 Aug 2023 08:00) (66 - 111)  ABP: --  ABP(mean): --  RR: 16 (29 Aug 2023 08:00) (10 - 26)  SpO2: 100% (29 Aug 2023 08:00) (95% - 100%)    O2 Parameters below as of 29 Aug 2023 08:00  Patient On (Oxygen Delivery Method): room air        CBC Full  -  ( 29 Aug 2023 02:45 )  WBC Count : 8.00 K/uL  RBC Count : 2.42 M/uL  Hemoglobin : 7.7 g/dL  Hematocrit : 23.1 %  Platelet Count - Automated : 288 K/uL  Mean Cell Volume : 95.5 fl  Mean Cell Hemoglobin : 31.8 pg  Mean Cell Hemoglobin Concentration : 33.3 gm/dL

## 2023-08-29 NOTE — PROGRESS NOTE ADULT - SUBJECTIVE AND OBJECTIVE BOX
[facility], [facility]                                   [address] [place] [address]                                                      PHONE: [phone]                                                         FAX: [phone]  [email or web address]  ---------------------------------------------------------------------------------------------------------------------------------    Reason for follow-up: syncope     Overnight events/patient complaints: no acute concerns     INTERPRETATION OF TELEMETRY (personally reviewed): intermittent sinus tachycardia     CHO: 4/2023  Summary:  1. Left ventricle: The cavity size is normal. Normal thickness is present. There are no regional wall motion abnormalities  present. Left ventricular ejection fraction is low normal at 50% by Hicks's.  2. Right ventricle: The cavity size is mildly dilated. Right ventricular systolic function is normal.  3. Mitral valve: The leaflets are mildly calcified on leaflet tips. There is trace mitral valve regurgitation.  4. Aortic valve: The valve is trileaflet. The leaflets are mildly thickened. There is no valvular aortic regurgitation.  5. Tricuspid valve: There is mild (1+) tricuspid valve regurgitation.  6. Aorta: The aortic root internal diameter is 3.8 cm.  7. Pericardium, extracardiac: There is no pericardial effusion.  8. Pulmonary arteries: The peak pressure during systole by Doppler is 30 mm Hg.    < from: TTE Echo Complete w/o Contrast w/ Doppler (02.27.23 @ 11:40) >  Summary:   1. Left ventricular ejection fraction, by visual estimation, is 40 to 45%.   2. Moderately decreased global left ventricular systolic function.   3. The left ventricular diastolic function could not be assessed in this study.   4. Moderately enlarged right ventricle.   5. Moderately reduced RV systolic function.   6. Mildly enlarged left atrium.   7. Mildly enlarged right atrium.   8. There is no evidence of pericardial effusion.   9. Mild mitral annular calcification.  10. Mild thickening and calcification of the anterior and posterior mitral valve leaflets.  11. Moderate mitral valve regurgitation.  12. Moderate tricuspid regurgitation.  13. Estimated pulmonary artery systolic pressure is 37.7 mmHg assuming a   right atrial pressure of 15 mmHg, which is consistent with borderline pulmonary hypertension.  14. Endocardial visualization was enhanced with intravenous echo contrast.    MEDICATIONS  (STANDING):  carvedilol 6.25 milliGRAM(s) Oral every 12 hours  cholecalciferol 2000 Unit(s) Oral daily  dronabinol 2.5 milliGRAM(s) Oral two times a day  minocycline 100 milliGRAM(s) Oral two times a day  simvastatin 40 milliGRAM(s) Oral at bedtime    MEDICATIONS  (PRN):  acetaminophen     Tablet .. 975 milliGRAM(s) Oral every 8 hours PRN Moderate Pain (4 - 6)  loperamide 2 milliGRAM(s) Oral four times a day PRN Diarrhea      Vital Signs Last 24 Hrs  T(C): 36.8 (29 Aug 2023 07:46), Max: 36.9 (28 Aug 2023 16:00)  T(F): 98.2 (29 Aug 2023 07:46), Max: 98.5 (28 Aug 2023 16:00)  HR: 109 (29 Aug 2023 10:00) (76 - 124)  BP: 132/75 (29 Aug 2023 10:00) (96/53 - 152/91)  BP(mean): 92 (29 Aug 2023 10:00) (66 - 111)  RR: 20 (29 Aug 2023 10:00) (10 - 26)  SpO2: 98% (29 Aug 2023 10:00) (95% - 100%)    Parameters below as of 29 Aug 2023 08:00  Patient On (Oxygen Delivery Method): room air      ICU Vital Signs Last 24 Hrs    PHYSICAL EXAM:  General: Appears chronically ill   HEENT: MM dry, multiple facial ecchymoses   CARDIOVASCULAR: tachycardic Normal S1 and S2, 1/6 SHEILA, no rub, gallop or lift.   LUNGS: No rales, rhonchi or wheeze. Normal breath sounds bilaterally.  ABDOMEN: Soft, nontender without mass or organomegaly. bowel sounds normoactive.  EXTREMITIES: No clubbing, cyanosis or edema.   SKIN: warm and dry with normal turgor.  NEURO: Alert/oriented x 2/normal motor exam.   PSYCH: normal affect.        LABS:                        7.3    9.16  )-----------( 279      ( 29 Aug 2023 09:30 )             21.8     08-29    134<L>  |  102  |  13.4  ----------------------------<  103<H>  3.8   |  23.0  |  0.78    Ca    8.2<L>      29 Aug 2023 02:45  Phos  4.1     08-29  Mg     1.6     08-29    TPro  4.7<L>  /  Alb  2.8<L>  /  TBili  0.6  /  DBili  x   /  AST  24  /  ALT  19  /  AlkPhos  94  08-28      ASSESSMENT AND PLAN:  In summary, PASTORA CHURCHILL is a medically complex 62y Male with past medical history significant for  rectal adenocarcinomas/p JASON, LAW with ileostomy complicated by leak (2020), anastomotic recurrence (2021) treated with APR rectum/sigmoid colon in 3/2022 with colostomy creation, now with biopsy proven recurrence 12/22, CAD status post multivessel stenting (LAD/Cx) in the past most recently 2017, HFrecEF, hypertension, hyperlipidemia, PAD status post right carotid endarterectomy (2019), new paxorsymal onset atrial fibrillation associated with tachycardia mediated LV dysfunction in the setting of bacteremia who has had multiple recent admissions for a sundry of concerns most recently 8/18/23 for neutropenic fever who presents after being unresponsive and possibly pulseless according to his partner, however per EMS with pulse and spontaneous respirations with course further complicated by unwitnessed fall out of bed s/p repeat neuroimaging which revealed small R frontal IPH, likely L posterior fossa SAH, and possible trace L tentorial SDH as well as acute L1 compression fx w/ mild bony retropulsion.    Unresponsiveness vs syncope/fall/PAF  - patient clinically hypovolemic and has had decreased PO intake while on chemotherapy and additionally with known neuropathy post chemotherapy, concern for orthostatic hypotension   - neurosurgery recommendations noted   - given traumatic SAH/SDH, hold antiplatelet therapy  - eventual orthostatic vitals   - tele  - ?ILR prior to discharge   - poor chronic AC candidate     HFrEF without acute decompensation/CAD  - hold off on ACEi/ARB/ARNI therapy for now  - continue coreg, however consider increasing to 12.5mg Q12  - prior to regimen adjustment will plan for limited echo for LVEF reassessment   - no antiplatelet 2/2 ICH    Thank you for allowing [facility] to participate in the care of this patient.  Please feel free to call with any questions or concerns.

## 2023-08-30 LAB
ANION GAP SERPL CALC-SCNC: 11 MMOL/L — SIGNIFICANT CHANGE UP (ref 5–17)
BUN SERPL-MCNC: 9.6 MG/DL — SIGNIFICANT CHANGE UP (ref 8–20)
CALCIUM SERPL-MCNC: 8.7 MG/DL — SIGNIFICANT CHANGE UP (ref 8.4–10.5)
CHLORIDE SERPL-SCNC: 101 MMOL/L — SIGNIFICANT CHANGE UP (ref 96–108)
CO2 SERPL-SCNC: 24 MMOL/L — SIGNIFICANT CHANGE UP (ref 22–29)
CREAT SERPL-MCNC: 0.89 MG/DL — SIGNIFICANT CHANGE UP (ref 0.5–1.3)
EGFR: 96 ML/MIN/1.73M2 — SIGNIFICANT CHANGE UP
GLUCOSE SERPL-MCNC: 99 MG/DL — SIGNIFICANT CHANGE UP (ref 70–99)
HCT VFR BLD CALC: 28.9 % — LOW (ref 39–50)
HGB BLD-MCNC: 9.9 G/DL — LOW (ref 13–17)
MAGNESIUM SERPL-MCNC: 1.6 MG/DL — SIGNIFICANT CHANGE UP (ref 1.6–2.6)
MCHC RBC-ENTMCNC: 32.5 PG — SIGNIFICANT CHANGE UP (ref 27–34)
MCHC RBC-ENTMCNC: 34.3 GM/DL — SIGNIFICANT CHANGE UP (ref 32–36)
MCV RBC AUTO: 94.8 FL — SIGNIFICANT CHANGE UP (ref 80–100)
OB PNL STL: NEGATIVE — SIGNIFICANT CHANGE UP
PLATELET # BLD AUTO: 339 K/UL — SIGNIFICANT CHANGE UP (ref 150–400)
POTASSIUM SERPL-MCNC: 4 MMOL/L — SIGNIFICANT CHANGE UP (ref 3.5–5.3)
POTASSIUM SERPL-SCNC: 4 MMOL/L — SIGNIFICANT CHANGE UP (ref 3.5–5.3)
RBC # BLD: 3.05 M/UL — LOW (ref 4.2–5.8)
RBC # FLD: 15.8 % — HIGH (ref 10.3–14.5)
SODIUM SERPL-SCNC: 136 MMOL/L — SIGNIFICANT CHANGE UP (ref 135–145)
WBC # BLD: 9.85 K/UL — SIGNIFICANT CHANGE UP (ref 3.8–10.5)
WBC # FLD AUTO: 9.85 K/UL — SIGNIFICANT CHANGE UP (ref 3.8–10.5)

## 2023-08-30 PROCEDURE — 99233 SBSQ HOSP IP/OBS HIGH 50: CPT

## 2023-08-30 RX ORDER — OLANZAPINE 15 MG/1
2.5 TABLET, FILM COATED ORAL ONCE
Refills: 0 | Status: COMPLETED | OUTPATIENT
Start: 2023-08-30 | End: 2023-08-30

## 2023-08-30 RX ORDER — LANOLIN ALCOHOL/MO/W.PET/CERES
5 CREAM (GRAM) TOPICAL AT BEDTIME
Refills: 0 | Status: DISCONTINUED | OUTPATIENT
Start: 2023-08-30 | End: 2023-09-07

## 2023-08-30 RX ADMIN — Medication 2.5 MILLIGRAM(S): at 18:07

## 2023-08-30 RX ADMIN — CARVEDILOL PHOSPHATE 6.25 MILLIGRAM(S): 80 CAPSULE, EXTENDED RELEASE ORAL at 18:07

## 2023-08-30 RX ADMIN — Medication 975 MILLIGRAM(S): at 05:59

## 2023-08-30 RX ADMIN — OLANZAPINE 2.5 MILLIGRAM(S): 15 TABLET, FILM COATED ORAL at 18:25

## 2023-08-30 RX ADMIN — CARVEDILOL PHOSPHATE 6.25 MILLIGRAM(S): 80 CAPSULE, EXTENDED RELEASE ORAL at 05:59

## 2023-08-30 RX ADMIN — Medication 5 MILLIGRAM(S): at 21:27

## 2023-08-30 RX ADMIN — Medication 2000 UNIT(S): at 11:43

## 2023-08-30 RX ADMIN — PANTOPRAZOLE SODIUM 40 MILLIGRAM(S): 20 TABLET, DELAYED RELEASE ORAL at 18:07

## 2023-08-30 RX ADMIN — SIMVASTATIN 40 MILLIGRAM(S): 20 TABLET, FILM COATED ORAL at 21:27

## 2023-08-30 RX ADMIN — MINOCYCLINE HYDROCHLORIDE 100 MILLIGRAM(S): 45 TABLET, EXTENDED RELEASE ORAL at 18:06

## 2023-08-30 RX ADMIN — MINOCYCLINE HYDROCHLORIDE 100 MILLIGRAM(S): 45 TABLET, EXTENDED RELEASE ORAL at 05:59

## 2023-08-30 RX ADMIN — Medication 975 MILLIGRAM(S): at 07:00

## 2023-08-30 RX ADMIN — PANTOPRAZOLE SODIUM 40 MILLIGRAM(S): 20 TABLET, DELAYED RELEASE ORAL at 05:59

## 2023-08-30 RX ADMIN — Medication 2.5 MILLIGRAM(S): at 05:59

## 2023-08-30 NOTE — PROGRESS NOTE ADULT - SUBJECTIVE AND OBJECTIVE BOX
Patient is a 62y old  Male who presents with a chief complaint of syncope (30 Aug 2023 09:44)    Pt denies cp,sob,back pain,facial pain.  on/off confusion per rn  aaox3 am  REVIEW OF SYSTEMS: All systems are reviewed and found to be negative except above    MEDICATIONS  (STANDING):  carvedilol 6.25 milliGRAM(s) Oral every 12 hours  cholecalciferol 2000 Unit(s) Oral daily  dronabinol 2.5 milliGRAM(s) Oral two times a day  minocycline 100 milliGRAM(s) Oral two times a day  pantoprazole    Tablet 40 milliGRAM(s) Oral every 12 hours  simvastatin 40 milliGRAM(s) Oral at bedtime    MEDICATIONS  (PRN):  acetaminophen     Tablet .. 975 milliGRAM(s) Oral every 8 hours PRN Moderate Pain (4 - 6)  loperamide 2 milliGRAM(s) Oral four times a day PRN Diarrhea      CAPILLARY BLOOD GLUCOSE        I&O's Summary    29 Aug 2023 07:01  -  30 Aug 2023 07:00  --------------------------------------------------------  IN: 468 mL / OUT: 1410 mL / NET: -942 mL    30 Aug 2023 07:01  -  30 Aug 2023 13:35  --------------------------------------------------------  IN: 520 mL / OUT: 0 mL / NET: 520 mL        PHYSICAL EXAM:  Vital Signs Last 24 Hrs  T(C): 36.9 (30 Aug 2023 11:15), Max: 37.6 (29 Aug 2023 18:01)  T(F): 98.4 (30 Aug 2023 11:15), Max: 99.7 (29 Aug 2023 18:01)  HR: 101 (30 Aug 2023 12:00) (77 - 115)  BP: 145/84 (30 Aug 2023 12:00) (93/65 - 150/81)  BP(mean): 104 (30 Aug 2023 12:00) (75 - 110)  RR: 14 (30 Aug 2023 12:00) (11 - 22)  SpO2: 98% (30 Aug 2023 12:00) (94% - 100%)    Parameters below as of 30 Aug 2023 12:00  Patient On (Oxygen Delivery Method): room air        CONSTITUTIONAL: NAD,  EYES: PERRLA; conjunctiva and sclera clear  ENMT: Moist oral mucosa,   RESPIRATORY: Normal respiratory effort; lungs are clear to auscultation bilaterally  CARDIOVASCULAR: Regular rate and rhythm, normal S1 and S2, no murmur   EXTS: No lower extremity edema; Peripheral pulses are 2+ bilaterally  ABDOMEN: Nontender to palpation, normoactive bowel sounds, no rebound/guarding; colostomy,ileoconduit present  MUSCLOSKELETAL:   no joint swelling or tenderness to palpation  PSYCH:calm.coop  NEUROLOGY: A+O to person, place, and time; CN 2-12 are intact and symmetric; no gross sensory deficits;       LABS:                        9.9    9.85  )-----------( 339      ( 30 Aug 2023 03:30 )             28.9     08-30    136  |  101  |  9.6  ----------------------------<  99  4.0   |  24.0  |  0.89    Ca    8.7      30 Aug 2023 03:30  Phos  4.1     08-29  Mg     1.6     08-29            Urinalysis Basic - ( 30 Aug 2023 03:30 )    Color: x / Appearance: x / SG: x / pH: x  Gluc: 99 mg/dL / Ketone: x  / Bili: x / Urobili: x   Blood: x / Protein: x / Nitrite: x   Leuk Esterase: x / RBC: x / WBC x   Sq Epi: x / Non Sq Epi: x / Bacteria: x          RADIOLOGY & ADDITIONAL TESTS:  Results Reviewed:

## 2023-08-30 NOTE — PROGRESS NOTE ADULT - SUBJECTIVE AND OBJECTIVE BOX
62M with PMHX rectal adenocarcinoma c/b mets to bladder and prostate on current chemo FOLFIRI/Panitumamab (Last dose 8/10) with neulasta support on 8/12, s/p Lower Abd Resection/Abdominoperineal Resection (2020, 2022), S/P fistula repair between bowel and bladder, HTN, HLD, CAD s/p PCI x5 presenting s/p syncope event at home 8/26.   CTH shown small right frontal intraparenchymal hemorrhage likely left posterior fossa subarachnoid hemorrhage and possible Trace left tentorial SDH as well as acute L1 compression fracture with mild bony retropulsion.   MRI L spine w/wo con 8/27: Fracture involving the superior aspect of the L1 vertebral body, There is evidence of abnormal enhancement component seen in this vertebral body as well. While this could be compatible with a posttraumatic fracture, given patient's history of trauma the possibility of an osteoporotic or pathologic fracture must be considered as well. Clinical correlation and continued close interval follow-up is recommended. There is evidence of abnormal signal in the ventral epidural space extending from the bottom of T12 to bottom of L2. This is more prominent on the right side than left and does appear to involve the right spinal canal is well. This could be compatible with an epidural hematoma possibility of epidural extension of tumor cannot be entirely excluded.   Right sided maxillofacial fractures. Seen by ENT for facial fractures  Neuro surgery, cardiology on board    Alert this AM  Offers  no complaints ;   Repeat head CT 8/28 stable         MEDICATIONS  (STANDING):  carvedilol 6.25 milliGRAM(s) Oral every 12 hours  cholecalciferol 2000 Unit(s) Oral daily  dronabinol 2.5 milliGRAM(s) Oral two times a day  minocycline 100 milliGRAM(s) Oral two times a day  simvastatin 40 milliGRAM(s) Oral at bedtime    MEDICATIONS  (PRN):  acetaminophen     Tablet .. 975 milliGRAM(s) Oral every 8 hours PRN Moderate Pain (4 - 6)  loperamide 2 milliGRAM(s) Oral four times a day PRN Diarrhea     Vital Signs Last 24 Hrs  T(C): 36.8 (29 Aug 2023 07:46), Max: 36.9 (28 Aug 2023 16:00)  T(F): 98.2 (29 Aug 2023 07:46), Max: 98.5 (28 Aug 2023 16:00)  HR: 76 (29 Aug 2023 08:00) (76 - 124)  BP: 130/55 (29 Aug 2023 08:00) (96/53 - 152/91)  BP(mean): 75 (29 Aug 2023 08:00) (66 - 111)  RR: 16 (29 Aug 2023 08:00) (10 - 26)  SpO2: 100% (29 Aug 2023 08:00) (95% - 100%)    Parameters below as of 29 Aug 2023 08:00  Patient On (Oxygen Delivery Method): room air    T(C): 36.8 (08-29-23 @ 07:46), Max: 36.9 (08-28-23 @ 16:00)  HR: 76 (08-29-23 @ 08:00) (76 - 124)  BP: 130/55 (08-29-23 @ 08:00) (96/53 - 152/91)  RR: 16 (08-29-23 @ 08:00) (10 - 26)  SpO2: 100% (08-29-23 @ 08:00) (95% - 100%)    · Constitutional: in no acute distress, resting comfortably ;   · Eyes:	PERRL   · Respiratory:	clear to auscultation bilaterally; no wheezes; no rhonchi; airway patent  · Cardiovascular: regular rate and rhythm  · Gastrointestinal:  no guarding  · Neurological: responds to verbal commands  · Mental Status	awake, alert  · Skin	warm and dry; color normal; rash  · Musculoskeletal	ROM intact; strength 5/5 bilateral upper extremities  · Additional PE	right facial swelling and tender     ICU Vital Signs Last 24 Hrs  T(C): 36.8 (29 Aug 2023 07:46), Max: 36.9 (28 Aug 2023 16:00)  T(F): 98.2 (29 Aug 2023 07:46), Max: 98.5 (28 Aug 2023 16:00)  HR: 76 (29 Aug 2023 08:00) (76 - 124)  BP: 130/55 (29 Aug 2023 08:00) (96/53 - 152/91)  BP(mean): 75 (29 Aug 2023 08:00) (66 - 111)  ABP: --  ABP(mean): --  RR: 16 (29 Aug 2023 08:00) (10 - 26)  SpO2: 100% (29 Aug 2023 08:00) (95% - 100%)    O2 Parameters below as of 29 Aug 2023 08:00  Patient On (Oxygen Delivery Method): room air        CBC Full  -  ( 29 Aug 2023 02:45 )  WBC Count : 8.00 K/uL  RBC Count : 2.42 M/uL  Hemoglobin : 7.7 g/dL  Hematocrit : 23.1 %  Platelet Count - Automated : 288 K/uL  Mean Cell Volume : 95.5 fl  Mean Cell Hemoglobin : 31.8 pg  Mean Cell Hemoglobin Concentration : 33.3 gm/dL

## 2023-08-30 NOTE — PHYSICAL THERAPY INITIAL EVALUATION ADULT - ADDITIONAL COMMENTS
Pt reports living in private home with wife who is able to assist. Pt has 3 STEPH with handrail and no stairs inside. Independent prior to admission. Owns no DME.

## 2023-08-30 NOTE — OCCUPATIONAL THERAPY INITIAL EVALUATION ADULT - DIAGNOSIS, OT EVAL
62 year old Male 62 year old Male PMHx of HTN , rectal cancer on chemo,hx colostomy, ileoconduit  admitted for syncope, reported weakness poor oral intake diarrhea due to recent chemo s/p syncope, fall at home found to have R facial fx. Pt admitted to medicine. Pt had another fall. Pt said he was trying to get up from bed and that is what caused him to fall out of bed. Pt is unsure if he hit head. CT head: 2 new tiny hyperdense foci R frontal white matter concerning for tiny parenchymal hemorrhages. Slightly increased subarachnoid hemorrhage in the L posterior fossa. Questionable trace subdural along L tentorial leaflet. No acute calvarial fracture. R-sided maxillofacial fractures as recently described. CT abd/ pelvis: Acute L1 compression fx with mild retropulsion of bone into the epidural space. No acute post traumatic injury otherwise.

## 2023-08-30 NOTE — PROGRESS NOTE ADULT - ASSESSMENT
63 yo Male with PMHx of HTN , rectal cancer on chemo,hx colostomy,ileoconduit  admitted for syncope, reported weakness poor oral intake diarrhea due to recent chemo s/p syncope,fall at home found to have rt facial fx. admitted to medicine. Pt had another fall last night. . Patient said he was trying to get up from bed and  that is what caused him to fall out of bed. Patient is unsure if he hit head. CT head: 2 new tiny hyperdense foci right frontal white matter concerning for tiny parenchymal hemorrhages. Slightly increased subarachnoid hemorrhage in   the left posterior fossa. Questionable trace subdural along left tentorial leaflet. No acute calvarial fracture. Right-sided maxillofacial   fractures as recently described.CT abd/ pelvis: Acute L1 compression fx with mild retropulsion of bone into the epidural space.No acute post traumatic injury otherwise.      ICH sec trauma/fall  -CT head: 2 new tiny hyperdense foci right frontal white matter concerning for tiny parenchymal hemorrhages. Slightly increased subarachnoid hemorrhage in the left posterior fossa. Questionable trace subdural along left tentorial leaflet. No acute calvarial fracture.  -cth 08/27 am stable bleed size  -Repeat CTH order  -neurocheck q2hr  -dc ASA,sq heparin  -if repeat imaging [facility] neurosurgery rec to resume asa  -f/u neurosurgery rec    anemia likely from malignancy,chemo  -s/p 1 u prbc 08/29  -hb 9.9  -stool occult  -ppi  -no active bleed present  -hem/onc following  -will resume asa if hb stable am      Acute L1 compression fx with mild retropulsion of bone into the epidural space s/p fall  -MRI Spine  -Chest CT w con and MRI C/T w/wo con ordered for additional w/u.  -L1 IR bx to r/o met/path fx. per IR Complicated bx rec imaging for now  -f/u neurosurgery rec--family does not want any biopsy or further imaging until rec by [facility] hem/onc  -pending [facility] opinion, will review imaging. f/u hem/onc rec    Right-sided maxillofacial fractures s/p fall  -f/u facial surgery rec,no intervention  -pain meds   - cold compress       Unresponsiveness vs syncope/fall  hx PAF  -concern for orthostatic hypotension likely from hypovolemia and has had decreased PO intake while on chemotherapy and additionally with known neuropathy/unsteady gait  post chemotherapy,   - neurocheck  -orth bp  when safe   - given traumatic SAH/SDH, hold antiplatelet therapy  - eventual orthostatic vitals  - tele  - ?ILR per cardiology prior dc  - poor chronic AC candidate (hx gib)    HFrEF without acute decompensation/CAD  - hold off on ACEi/ARB/ARNI therapy for now  - continue coreg   - no antiplatelet 2/2 ICH    Rectal cancer mets to bladder   with urostomy and colostomy bag   under [facility] getting chemo   f/u hem/onc [facility]  dw pt  dw in detailed with Erma daughter (she is MICU RN [facility] [facility] [employer])  fall precaution  yusuf rn

## 2023-08-30 NOTE — PHYSICAL THERAPY INITIAL EVALUATION ADULT - GENERAL OBSERVATIONS, REHAB EVAL
Pt received in chair, + IV + tele//BP + tlso brace at bedside + 1:1, breathing on RA in NAD, in 0/10 pain, agreeable to PT eval

## 2023-08-30 NOTE — OCCUPATIONAL THERAPY INITIAL EVALUATION ADULT - GENERAL OBSERVATIONS, REHAB EVAL
Received pt OOB in recliner +b/l LE elevated, NAD, 1:1 present, +IV lock, +Tele//BP, +colostomy bag/urostomy bag, on RA with TLSO bedside and not donned, pt refusing to wear. Pre/post pain 0/10. Therapist spent additional time educating pt on spinal precautions and importance of TLSO brace including donning/doffing adjusting and need to wear whenever OOB. Pt agreeable to don and maintain while OOB. Pt refused RW for functional mobility. Pt agreeable to OT evaluation. Left pt OOB in recliner, NAD CBWR, +b/l LE elevated, 1:1 present, +IV lock, +Tele//BP, +colostomy bag/urostomy bag, on RA +TLSO. Pre/post pain 0/10. Pt agreeable to OT evaluation

## 2023-08-30 NOTE — PHYSICAL THERAPY INITIAL EVALUATION ADULT - PERTINENT HX OF CURRENT PROBLEM, REHAB EVAL
61 yo Male with PMHx of HTN , rectal cancer on chemo,hx colostomy,ileoconduit  admitted for syncope, reported weakness poor oral intake diarrhea due to recent chemo s/p syncope,fall at home found to have rt facial fx. admitted to medicine. Pt had another fall last night. . Patient said he was trying to get up from bed and  that is what caused him to fall out of bed. Patient is unsure if he hit head. CT head: 2 new tiny hyperdense foci right frontal white matter concerning for tiny parenchymal hemorrhages. Slightly increased subarachnoid hemorrhage in the left posterior fossa. Questionable trace subdural along left tentorial leaflet. No acute calvarial fracture. Right-sided maxillofacial fractures as recently described.CT abd/ pelvis: Acute L1 compression fx with mild retropulsion of bone into the epidural space.No acute post traumatic injury otherwise.

## 2023-08-30 NOTE — PROGRESS NOTE ADULT - ASSESSMENT
62M with PMHX rectal adenocarcinoma c/b mets to bladder and prostate on current chemo FOLFIRI/Panitumamab (Last dose 8/10) with neulasta support on 8/12, s/p Lower Abd Resection/Abdominoperineal Resection (2020, 2022), S/P fistula repair between bowel and bladder, HTN, HLD, CAD s/p PCI x5 presenting after found down at home by family s/p syncopal event     [facility] CT 8/18/2023 CT of abdomen and pelvis W/WO IV CONTRAST: BONES/SOFT TISSUES: No suspicious osseous lesion. Degenerative changes of the vertebral spine. Redemonstration of post median laparotomy changes  IMPRESSION: Since July 24, 2023, 1. Unchanged size of presacral mass with development of internal gas foci, possibly from necrosis or superimposed infection. Unchanged drain position within the mass. 2. Unchanged circumferential wall thickening of the urinary bladder with surrounding fat stranding, probably cystitis. In addition, new trace amount of air within the non-dependent portions of the bladder lumen, possibly due to recent instrumentation versus result of fistulization with posteriorly adjacent mass.    Metastatic Rectal ca   - on active chemo FOLFIRI + Panitumamab ; last dose 8/10 with neulasta support 8/12  - pt to resume care with [facility] after d/c     Syncopal episode  L1 compression fx  SAH/SDH  - CTH shown small right frontal intraparenchymal hemorrhage likely left posterior fossa subarachnoid hemorrhage and possible Trace left tentorial SDH  - MRI L spine w/wo reviewed, [facility] to review imaging   - recent CT w/ IV con [facility] 8/18/23 w/o suspicious osseous lesions  - hold IR biopsy until [facility] reviews recent imaging for further recommendations   - fitted for TLSO brace   - further management per neurosurgery   - cardiology on board, considering ILR prior to DC  - follow cardio recs   - antiplatelet therapy on hold, resume per neurosurgery recs   - continue neuro checks as ordered     Anemia  multifactorial  Malignancy/Chemo  - prn PRBC transfusion for hgb < 7; Hb today 9.9      chronic diarrhea  - likely secondary to chemo.    - pt on immodium prn - may cont as inpt   - prn electrolyte repletion     thank you  Will update [facility] daily     [facility]  [phone]

## 2023-08-31 ENCOUNTER — NON-APPOINTMENT (OUTPATIENT)
Age: 63
End: 2023-08-31

## 2023-08-31 ENCOUNTER — TRANSCRIPTION ENCOUNTER (OUTPATIENT)
Age: 63
End: 2023-08-31

## 2023-08-31 LAB
ANION GAP SERPL CALC-SCNC: 13 MMOL/L — SIGNIFICANT CHANGE UP (ref 5–17)
BUN SERPL-MCNC: 12.1 MG/DL — SIGNIFICANT CHANGE UP (ref 8–20)
CALCIUM SERPL-MCNC: 8.1 MG/DL — LOW (ref 8.4–10.5)
CHLORIDE SERPL-SCNC: 99 MMOL/L — SIGNIFICANT CHANGE UP (ref 96–108)
CO2 SERPL-SCNC: 23 MMOL/L — SIGNIFICANT CHANGE UP (ref 22–29)
CREAT SERPL-MCNC: 0.9 MG/DL — SIGNIFICANT CHANGE UP (ref 0.5–1.3)
CULTURE RESULTS: SIGNIFICANT CHANGE UP
CULTURE RESULTS: SIGNIFICANT CHANGE UP
EGFR: 95 ML/MIN/1.73M2 — SIGNIFICANT CHANGE UP
GLUCOSE SERPL-MCNC: 107 MG/DL — HIGH (ref 70–99)
HCT VFR BLD CALC: 26.9 % — LOW (ref 39–50)
HGB BLD-MCNC: 9.3 G/DL — LOW (ref 13–17)
MAGNESIUM SERPL-MCNC: 1.4 MG/DL — LOW (ref 1.6–2.6)
MAGNESIUM SERPL-MCNC: 1.9 MG/DL — SIGNIFICANT CHANGE UP (ref 1.6–2.6)
MCHC RBC-ENTMCNC: 32.3 PG — SIGNIFICANT CHANGE UP (ref 27–34)
MCHC RBC-ENTMCNC: 34.6 GM/DL — SIGNIFICANT CHANGE UP (ref 32–36)
MCV RBC AUTO: 93.4 FL — SIGNIFICANT CHANGE UP (ref 80–100)
PLATELET # BLD AUTO: 385 K/UL — SIGNIFICANT CHANGE UP (ref 150–400)
POTASSIUM SERPL-MCNC: 3.4 MMOL/L — LOW (ref 3.5–5.3)
POTASSIUM SERPL-MCNC: 3.9 MMOL/L — SIGNIFICANT CHANGE UP (ref 3.5–5.3)
POTASSIUM SERPL-SCNC: 3.4 MMOL/L — LOW (ref 3.5–5.3)
POTASSIUM SERPL-SCNC: 3.9 MMOL/L — SIGNIFICANT CHANGE UP (ref 3.5–5.3)
RBC # BLD: 2.88 M/UL — LOW (ref 4.2–5.8)
RBC # FLD: 15.8 % — HIGH (ref 10.3–14.5)
SODIUM SERPL-SCNC: 134 MMOL/L — LOW (ref 135–145)
SPECIMEN SOURCE: SIGNIFICANT CHANGE UP
SPECIMEN SOURCE: SIGNIFICANT CHANGE UP
WBC # BLD: 12.63 K/UL — HIGH (ref 3.8–10.5)
WBC # FLD AUTO: 12.63 K/UL — HIGH (ref 3.8–10.5)

## 2023-08-31 PROCEDURE — 99223 1ST HOSP IP/OBS HIGH 75: CPT

## 2023-08-31 PROCEDURE — 99233 SBSQ HOSP IP/OBS HIGH 50: CPT

## 2023-08-31 RX ORDER — CEFAZOLIN SODIUM 1 G
2000 VIAL (EA) INJECTION ONCE
Refills: 0 | Status: DISCONTINUED | OUTPATIENT
Start: 2023-08-31 | End: 2023-08-31

## 2023-08-31 RX ORDER — OLANZAPINE 15 MG/1
2.5 TABLET, FILM COATED ORAL EVERY 6 HOURS
Refills: 0 | Status: DISCONTINUED | OUTPATIENT
Start: 2023-08-31 | End: 2023-09-06

## 2023-08-31 RX ORDER — METOPROLOL TARTRATE 50 MG
50 TABLET ORAL DAILY
Refills: 0 | Status: DISCONTINUED | OUTPATIENT
Start: 2023-08-31 | End: 2023-09-07

## 2023-08-31 RX ORDER — MAGNESIUM SULFATE 500 MG/ML
2 VIAL (ML) INJECTION ONCE
Refills: 0 | Status: COMPLETED | OUTPATIENT
Start: 2023-08-31 | End: 2023-08-31

## 2023-08-31 RX ORDER — GABAPENTIN 400 MG/1
100 CAPSULE ORAL THREE TIMES A DAY
Refills: 0 | Status: DISCONTINUED | OUTPATIENT
Start: 2023-08-31 | End: 2023-09-07

## 2023-08-31 RX ORDER — POTASSIUM CHLORIDE 20 MEQ
40 PACKET (EA) ORAL ONCE
Refills: 0 | Status: COMPLETED | OUTPATIENT
Start: 2023-08-31 | End: 2023-08-31

## 2023-08-31 RX ORDER — ASPIRIN/CALCIUM CARB/MAGNESIUM 324 MG
81 TABLET ORAL DAILY
Refills: 0 | Status: DISCONTINUED | OUTPATIENT
Start: 2023-08-31 | End: 2023-09-07

## 2023-08-31 RX ORDER — CEFAZOLIN SODIUM 1 G
2000 VIAL (EA) INJECTION ONCE
Refills: 0 | Status: COMPLETED | OUTPATIENT
Start: 2023-08-31 | End: 2023-08-31

## 2023-08-31 RX ORDER — MAGNESIUM OXIDE 400 MG ORAL TABLET 241.3 MG
400 TABLET ORAL
Refills: 0 | Status: DISCONTINUED | OUTPATIENT
Start: 2023-08-31 | End: 2023-09-07

## 2023-08-31 RX ORDER — OLANZAPINE 15 MG/1
2.5 TABLET, FILM COATED ORAL AT BEDTIME
Refills: 0 | Status: DISCONTINUED | OUTPATIENT
Start: 2023-08-31 | End: 2023-09-07

## 2023-08-31 RX ADMIN — GABAPENTIN 100 MILLIGRAM(S): 400 CAPSULE ORAL at 22:19

## 2023-08-31 RX ADMIN — SIMVASTATIN 40 MILLIGRAM(S): 20 TABLET, FILM COATED ORAL at 22:19

## 2023-08-31 RX ADMIN — Medication 2.5 MILLIGRAM(S): at 18:09

## 2023-08-31 RX ADMIN — Medication 2.5 MILLIGRAM(S): at 05:40

## 2023-08-31 RX ADMIN — Medication 2000 UNIT(S): at 12:53

## 2023-08-31 RX ADMIN — Medication 40 MILLIEQUIVALENT(S): at 09:05

## 2023-08-31 RX ADMIN — Medication 81 MILLIGRAM(S): at 12:52

## 2023-08-31 RX ADMIN — OLANZAPINE 2.5 MILLIGRAM(S): 15 TABLET, FILM COATED ORAL at 22:19

## 2023-08-31 RX ADMIN — MINOCYCLINE HYDROCHLORIDE 100 MILLIGRAM(S): 45 TABLET, EXTENDED RELEASE ORAL at 22:20

## 2023-08-31 RX ADMIN — Medication 2000 MILLIGRAM(S): at 16:29

## 2023-08-31 RX ADMIN — Medication 25 GRAM(S): at 09:37

## 2023-08-31 RX ADMIN — CARVEDILOL PHOSPHATE 6.25 MILLIGRAM(S): 80 CAPSULE, EXTENDED RELEASE ORAL at 05:40

## 2023-08-31 RX ADMIN — PANTOPRAZOLE SODIUM 40 MILLIGRAM(S): 20 TABLET, DELAYED RELEASE ORAL at 18:09

## 2023-08-31 RX ADMIN — Medication 50 MILLIGRAM(S): at 18:09

## 2023-08-31 RX ADMIN — MINOCYCLINE HYDROCHLORIDE 100 MILLIGRAM(S): 45 TABLET, EXTENDED RELEASE ORAL at 05:40

## 2023-08-31 RX ADMIN — Medication 5 MILLIGRAM(S): at 22:19

## 2023-08-31 RX ADMIN — PANTOPRAZOLE SODIUM 40 MILLIGRAM(S): 20 TABLET, DELAYED RELEASE ORAL at 05:40

## 2023-08-31 RX ADMIN — OLANZAPINE 2.5 MILLIGRAM(S): 15 TABLET, FILM COATED ORAL at 09:05

## 2023-08-31 RX ADMIN — MAGNESIUM OXIDE 400 MG ORAL TABLET 400 MILLIGRAM(S): 241.3 TABLET ORAL at 18:08

## 2023-08-31 NOTE — PROGRESS NOTE ADULT - ASSESSMENT
62M with PMHX rectal adenocarcinoma c/b mets to bladder and prostate on current chemo FOLFIRI/Panitumamab (Last dose 8/10) with neulasta support on 8/12, s/p Lower Abd Resection/Abdominoperineal Resection (2020, 2022), S/P fistula repair between bowel and bladder, HTN, HLD, CAD s/p PCI x5 presenting after found down at home by family s/p syncopal event     [facility] CT 8/18/2023 CT of abdomen and pelvis W/WO IV CONTRAST: BONES/SOFT TISSUES: No suspicious osseous lesion. Degenerative changes of the vertebral spine. Redemonstration of post median laparotomy changes  IMPRESSION: Since July 24, 2023, 1. Unchanged size of presacral mass with development of internal gas foci, possibly from necrosis or superimposed infection. Unchanged drain position within the mass. 2. Unchanged circumferential wall thickening of the urinary bladder with surrounding fat stranding, probably cystitis. In addition, new trace amount of air within the non-dependent portions of the bladder lumen, possibly due to recent instrumentation versus result of fistulization with posteriorly adjacent mass.    Metastatic Rectal ca   - on active chemo FOLFIRI + Panitumamab ; last dose 8/10 with neulasta support 8/12  - pt to resume care with [facility] after d/c     Syncopal episode  L1 compression fx  SAH/SDH  - CTH shown small right frontal intraparenchymal hemorrhage likely left posterior fossa subarachnoid hemorrhage and possible Trace left tentorial SDH  - MRI L spine w/wo reviewed, [facility] to review imaging   - recent CT w/ IV con [facility] 8/18/23 w/o suspicious osseous lesions  - hold IR biopsy until [facility] reviews recent imaging for further recommendations   - fitted for TLSO brace   - further management per neurosurgery   - cardiology on board, considering ILR prior to DC  - follow cardio recs   - antiplatelet therapy on hold, resume per neurosurgery recs   - continue neuro checks as ordered     Anemia  multifactorial  Malignancy/Chemo  - prn PRBC transfusion for hgb < 7; Hb today 9.9      chronic diarrhea  - likely secondary to chemo.    - pt on immodium prn - may cont as inpt   - prn electrolyte repletion     thank you  Will update [facility] daily     [facility]  [phone] 62M with PMHX rectal adenocarcinoma c/b mets to bladder and prostate on current chemo FOLFIRI/Panitumamab (Last dose 8/10) with neulasta support on 8/12, s/p Lower Abd Resection/Abdominoperineal Resection (2020, 2022), S/P fistula repair between bowel and bladder, HTN, HLD, CAD s/p PCI x5 presenting after found down at home by family s/p syncopal event     [facility] CT 8/18/2023 CT of abdomen and pelvis W/WO IV CONTRAST: BONES/SOFT TISSUES: No suspicious osseous lesion. Degenerative changes of the vertebral spine. Redemonstration of post median laparotomy changes  IMPRESSION: Since July 24, 2023, 1. Unchanged size of presacral mass with development of internal gas foci, possibly from necrosis or superimposed infection. Unchanged drain position within the mass. 2. Unchanged circumferential wall thickening of the urinary bladder with surrounding fat stranding, probably cystitis. In addition, new trace amount of air within the non-dependent portions of the bladder lumen, possibly due to recent instrumentation versus result of fistulization with posteriorly adjacent mass.    Metastatic Rectal ca   - on active chemo FOLFIRI + Panitumamab ; last dose 8/10 with neulasta support 8/12  - pt to resume care with [facility] after d/c     Syncopal episode  L1 compression fx  SAH/SDH  - CTH shown small right frontal intraparenchymal hemorrhage likely left posterior fossa subarachnoid hemorrhage and possible Trace left tentorial SDH  - MRI L spine w/wo reviewed, [facility] to review imaging   - recent CT w/ IV con [facility] 8/18/23 w/o suspicious osseous lesions  - hold IR biopsy until [facility] reviews recent imaging for further recommendations   - fitted for TLSO brace   - further management per neurosurgery   - cardiology on board, considering ILR prior to DC  - follow cardio recs   - antiplatelet therapy on hold, resume per neurosurgery recs   - continue neuro checks as ordered   - plan for ILR today   - pt one to one for agitation     Anemia  multifactorial  Malignancy/Chemo  - prn PRBC transfusion for hgb < 7; Hb today 9.3      chronic diarrhea  - likely secondary to chemo.    - pt on immodium prn - may cont as inpt   - prn electrolyte repletion   - no blood in stool reported     thank you  Will update [facility] daily     [facility]  [phone]

## 2023-08-31 NOTE — DISCHARGE NOTE PROVIDER - NSDCCPCAREPLAN_GEN_ALL_CORE_FT
PRINCIPAL DISCHARGE DIAGNOSIS  Diagnosis: Syncope and collapse  Assessment and Plan of Treatment: Continue current medications as prescribed.  Follow up with primary doctor and cardiology.      SECONDARY DISCHARGE DIAGNOSES  Diagnosis: SAH (subarachnoid hemorrhage)  Assessment and Plan of Treatment: Follow up with primary doctor and neurosurgery.  Continue current medications as prescribed.    Diagnosis: Rectal cancer  Assessment and Plan of Treatment: Follow up with [facility] for further treatment.

## 2023-08-31 NOTE — DISCHARGE NOTE PROVIDER - PROVIDER TOKENS
PROVIDER:[TOKEN:[[id number]]] PROVIDER:[TOKEN:[[id number]]],FREE:[LAST:[Primary oncology],PHONE:[(   )    -],FAX:[(   )    -]],FREE:[LAST:[Primary doctor],PHONE:[(   )    -],FAX:[(   )    -]],PROVIDER:[TOKEN:[[id number]]],PROVIDER:[TOKEN:[[id number]]] PROVIDER:[TOKEN:[[id number]]],PROVIDER:[TOKEN:[[id number]]],PROVIDER:[TOKEN:[[id number]]],FREE:[LAST:[Primary oncology],PHONE:[(   )    -],FAX:[(   )    -]],FREE:[LAST:[Primary doctor],PHONE:[(   )    -],FAX:[(   )    -]],PROVIDER:[TOKEN:[[id number]],FOLLOWUP:[2 weeks]]

## 2023-08-31 NOTE — BH CONSULTATION LIAISON ASSESSMENT NOTE - NSBHCHARTREVIEWVS_PSY_A_CORE FT
Vital Signs Last 24 Hrs  T(C): 37.1 (31 Aug 2023 16:15), Max: 37.3 (30 Aug 2023 19:01)  T(F): 98.7 (31 Aug 2023 16:15), Max: 99.2 (30 Aug 2023 19:01)  HR: 107 (31 Aug 2023 16:15) (91 - 121)  BP: 152/77 (31 Aug 2023 16:15) (107/60 - 168/81)  BP(mean): 102 (31 Aug 2023 08:00) (75 - 102)  RR: 17 (31 Aug 2023 16:15) (16 - 23)  SpO2: 99% (31 Aug 2023 16:15) (91% - 100%)    Parameters below as of 31 Aug 2023 16:15  Patient On (Oxygen Delivery Method): room air

## 2023-08-31 NOTE — BH CONSULTATION LIAISON ASSESSMENT NOTE - SUMMARY
Patient is a 62 year old  male who is employed, lives with his wife, has 2 adult children, with no past psychiatric history and a PMH of  HTN , rectal cancer on chemo, hx colostomy, ileoconduit  admitted for syncope, reported weakness poor oral intake diarrhea due to recent chemo s/p syncope, fall at home found to have rt facial fx. psychiatry consulted for agitation/ confusion.     Dx.   Delirium     Recs   -Maintain delirium precautions   -Avoid anticholinergic agents, benzos, opioid as they can further perpetuate confusion   -Frequent re orientation, Hydration, try to avoid restraints and if possible, mobilize patient and PT involvement   -Agree with Zyprexa 2.5 QHS and additional 2.5 Q6 PRN for agitation   -Recommend Gabapentin 100mg TID off label for anxiety. if tolerating well, can increase to 200mg   -Will follow as needed for behavioral management of delirium

## 2023-08-31 NOTE — PROGRESS NOTE ADULT - SUBJECTIVE AND OBJECTIVE BOX
Patient is a 62y old  Male who presents with a chief complaint of syncope (31 Aug 2023 09:33)    pt is aaox3 am. pt wants to walk.denies back pain.cp,sob  REVIEW OF SYSTEMS: All systems are reviewed and found to be negative except above    MEDICATIONS  (STANDING):  aspirin enteric coated 81 milliGRAM(s) Oral daily  cholecalciferol 2000 Unit(s) Oral daily  dronabinol 2.5 milliGRAM(s) Oral two times a day  melatonin 5 milliGRAM(s) Oral at bedtime  metoprolol succinate ER 50 milliGRAM(s) Oral daily  minocycline 100 milliGRAM(s) Oral two times a day  OLANZapine 2.5 milliGRAM(s) Oral at bedtime  pantoprazole    Tablet 40 milliGRAM(s) Oral every 12 hours  simvastatin 40 milliGRAM(s) Oral at bedtime    MEDICATIONS  (PRN):  acetaminophen     Tablet .. 975 milliGRAM(s) Oral every 8 hours PRN Moderate Pain (4 - 6)  loperamide 2 milliGRAM(s) Oral four times a day PRN Diarrhea  OLANZapine Injectable 2.5 milliGRAM(s) IntraMuscular every 6 hours PRN agitation      CAPILLARY BLOOD GLUCOSE        I&O's Summary    30 Aug 2023 07:01  -  31 Aug 2023 07:00  --------------------------------------------------------  IN: 697 mL / OUT: 1630 mL / NET: -933 mL    31 Aug 2023 07:01  -  31 Aug 2023 11:36  --------------------------------------------------------  IN: 475 mL / OUT: 300 mL / NET: 175 mL        PHYSICAL EXAM:  Vital Signs Last 24 Hrs  T(C): 37 (31 Aug 2023 10:44), Max: 37.3 (30 Aug 2023 19:01)  T(F): 98.6 (31 Aug 2023 10:44), Max: 99.2 (30 Aug 2023 19:01)  HR: 121 (31 Aug 2023 10:50) (98 - 121)  BP: 128/89 (31 Aug 2023 08:00) (107/60 - 159/79)  BP(mean): 102 (31 Aug 2023 08:00) (75 - 104)  RR: 18 (31 Aug 2023 10:50) (14 - 23)  SpO2: 91% (31 Aug 2023 10:50) (91% - 100%)    Parameters below as of 31 Aug 2023 08:00  Patient On (Oxygen Delivery Method): room air        CONSTITUTIONAL: NAD,  EYES: PERRLA; conjunctiva and sclera clear  ENMT: Moist oral mucosa,   RESPIRATORY: Normal respiratory effort; lungs are clear to auscultation bilaterally  CARDIOVASCULAR: Regular rate and rhythm, normal S1 and S2, no murmur   EXTS: No lower extremity edema; Peripheral pulses are 2+ bilaterally  ABDOMEN: Nontender to palpation, normoactive bowel sounds, no rebound/guarding;   MUSCLOSKELETAL:  no joint swelling or tenderness to palpation  PSYCH: affect appropriate  NEUROLOGY: A+O to person, place, and time; CN 2-12 are intact and symmetric; no gross sensory deficits;       LABS:                        9.3    12.63 )-----------( 385      ( 31 Aug 2023 03:00 )             26.9     08-31    134<L>  |  99  |  12.1  ----------------------------<  107<H>  3.4<L>   |  23.0  |  0.90    Ca    8.1<L>      31 Aug 2023 03:00  Mg     1.4     08-31            Urinalysis Basic - ( 31 Aug 2023 03:00 )    Color: x / Appearance: x / SG: x / pH: x  Gluc: 107 mg/dL / Ketone: x  / Bili: x / Urobili: x   Blood: x / Protein: x / Nitrite: x   Leuk Esterase: x / RBC: x / WBC x   Sq Epi: x / Non Sq Epi: x / Bacteria: x          RADIOLOGY & ADDITIONAL TESTS:  Results Reviewed:

## 2023-08-31 NOTE — DISCHARGE NOTE PROVIDER - ATTENDING DISCHARGE PHYSICAL EXAMINATION:
VITALS:   T(C): 37.2 (09-07-23 @ 05:30), Max: 37.5 (09-06-23 @ 15:30)  HR: 100 (09-07-23 @ 05:30) (86 - 101)  BP: 135/80 (09-07-23 @ 05:30) (120/79 - 163/89)  RR: 18 (09-07-23 @ 05:30) (18 - 18)  SpO2: 93% (09-07-23 @ 05:30) (93% - 98%)    GENERAL: NAD  HEAD:  Atraumatic, Normocephalic  NECK: Supple, No JVD, Normal thyroid  NERVOUS SYSTEM:  Alert, Good concentration; Motor Strength 5/5 B/L upper and lower extremities  CHEST/LUNG: Clear to auscultation bilaterally  HEART: Regular rate and rhythm; No murmurs, rubs, or gallops  ABDOMEN: Soft, Nontender, Nondistended; Bowel sounds present  EXTREMITIES:  2+ Peripheral Pulses, No clubbing, cyanosis, or edema  SKIN: No rashes or lesions

## 2023-08-31 NOTE — PROGRESS NOTE ADULT - ASSESSMENT
61 yo Male with PMHx of HTN , rectal cancer on chemo,hx colostomy,ileoconduit  admitted for syncope, reported weakness poor oral intake diarrhea due to recent chemo s/p syncope,fall at home found to have rt facial fx. admitted to medicine. Pt had another fall last night. . Patient said he was trying to get up from bed and  that is what caused him to fall out of bed. Patient is unsure if he hit head. CT head: 2 new tiny hyperdense foci right frontal white matter concerning for tiny parenchymal hemorrhages. Slightly increased subarachnoid hemorrhage in   the left posterior fossa. Questionable trace subdural along left tentorial leaflet. No acute calvarial fracture. Right-sided maxillofacial   fractures as recently described.CT abd/ pelvis: Acute L1 compression fx with mild retropulsion of bone into the epidural space.No acute post traumatic injury otherwise.      [facility] sec trauma/fall  -CT head: 2 new tiny hyperdense foci right frontal white matter concerning for tiny parenchymal hemorrhages. Slightly increased subarachnoid hemorrhage in the left posterior fossa. Questionable trace subdural along left tentorial leaflet. No acute calvarial fracture.  -cth 08/27 stable bleed size  -Repeat CTH stable  -neurocheck q4hr  -resume asa  -f/u neurosurgery rec    anemia likely from malignancy/chemo  -s/p 1 u prbc 08/29  -hb 9.3  -stool occult neg  -ppi  -no active bleed present  -hem/onc following        Acute L1 compression fx with mild retropulsion of bone into the epidural space s/p fall  -MRI Spine  -Chest CT w con and MRI C/T w/wo con ordered for additional w/u.  -L1 IR bx to r/o met/path fx. per IR Complicated bx rec imaging for now  -f/u neurosurgery rec--family does not want any biopsy or further imaging until rec by [facility] hem/onc  -pending [facility] opinion, will review imaging. f/u hem/onc rec    Right-sided maxillofacial fractures s/p fall  -f/u facial surgery rec,no intervention  -pain meds   - cold compress       Unresponsiveness vs syncope/fall  hx PAF  -concern for orthostatic hypotension likely from hypovolemia and has had decreased PO intake while on chemotherapy and additionally with known neuropathy/unsteady gait  post chemotherapy,   - neuro check  -orth bp  -resume asa  - eventual orthostatic vitals  - tele  - ILR today per cardiology  -toprol xl 50 mg qd per cardiology  - poor chronic AC candidate (hx gib)    HFrEF without acute decompensation/CAD  - hold off on ACEi/ARB/ARNI therapy for now  - continue toprol xl  - no antiplatelet 2/2 ICH    Rectal cancer mets to bladder   with urostomy and colostomy bag   under [facility] getting chemo   f/u hem/onc [facility]      Agitation  sundowning  -zyprexa 2.5 mg given am.   -zyprexa bedtime  -spoke with Dr.Ali henning pt  dw in detailed with Erma daughter (she is MICU RN [facility])  Daughter is requesting 2nd opinion for neurosurgery eval By Dr. Karlo Boyd called me-he will see the patient   fall precaution  sw rn  juvencio sw

## 2023-08-31 NOTE — DISCHARGE NOTE PROVIDER - NSDCMRMEDTOKEN_GEN_ALL_CORE_FT
ALPRAZolam 0.25 mg oral tablet: 1 tab(s) orally once a day (at bedtime) as needed for  insomnia  aspirin 81 mg oral delayed release tablet: 1 tab(s) orally once a day  cholecalciferol 50 mcg (2000 intl units) oral tablet: 1 orally once a day  Coreg 6.25 mg oral tablet: 1 tab(s) orally 2 times a day  doctor note: Mr. Thomas Bay who is the father of  Miss. Erma Bay is admitted to [facility]. He is actively ill. Discharge date is unknown currently.  Lasix 20 mg oral tablet: 1 tab(s) orally once a day as needed for leg swelling  loperamide 2 mg oral capsule: 1 cap(s) orally 4 times a day as needed for Diarrhea  magnesium oxide 400 mg oral tablet: 1 orally once a day  minocycline 100 mg oral capsule: 1 cap(s) orally 2 times a day  potassium chloride 20 mEq/15 mL oral liquid: 15 milliliter(s) orally 2 times a day  simvastatin 40 mg oral tablet: 1 tab(s) orally once a day (at bedtime)   aspirin 81 mg oral delayed release tablet: 1 tab(s) orally once a day  atorvastatin 80 mg oral tablet: 1 tab(s) orally once a day (at bedtime)  cholecalciferol 50 mcg (2000 intl units) oral tablet: 1 orally once a day  clopidogrel 75 mg oral tablet: 1 tab(s) orally once a day  droNABinol 2.5 mg oral capsule: 1 cap(s) orally 2 times a day at 0600 and 1600  gabapentin 100 mg oral capsule: 1 cap(s) orally 3 times a day  loperamide 2 mg oral capsule: 1 cap(s) orally 4 times a day as needed for Diarrhea  magnesium oxide 400 mg oral tablet: 1 tab(s) orally 3 times a day (with meals)  melatonin 5 mg oral tablet: 1 tab(s) orally once a day (at bedtime)  metoprolol succinate 50 mg oral tablet, extended release: 1 tab(s) orally once a day  minocycline 100 mg oral capsule: 1 cap(s) orally 2 times a day  OLANZapine 2.5 mg oral tablet: 1 tab(s) orally once a day (at bedtime)  OLANZapine 2.5 mg oral tablet: 1 tab(s) orally 4 times a day As needed agitation  pantoprazole 40 mg oral delayed release tablet: 1 tab(s) orally every 12 hours   aspirin 81 mg oral delayed release tablet: 1 tab(s) orally once a day  atorvastatin 80 mg oral tablet: 1 tab(s) orally once a day (at bedtime)  cholecalciferol 50 mcg (2000 intl units) oral tablet: 1 orally once a day  clopidogrel 75 mg oral tablet: 1 tab(s) orally once a day until 9/24 and then stop  droNABinol 2.5 mg oral capsule: 1 cap(s) orally 2 times a day at 0600 and 1600  gabapentin 100 mg oral capsule: 1 cap(s) orally 3 times a day  loperamide 2 mg oral capsule: 1 cap(s) orally 4 times a day as needed for Diarrhea  magnesium oxide 400 mg oral tablet: 1 tab(s) orally 3 times a day (with meals)  melatonin 5 mg oral tablet: 1 tab(s) orally once a day (at bedtime)  metoprolol succinate 50 mg oral tablet, extended release: 1 tab(s) orally once a day  minocycline 100 mg oral capsule: 1 cap(s) orally 2 times a day  OLANZapine 2.5 mg oral tablet: 1 tab(s) orally once a day (at bedtime)  OLANZapine 2.5 mg oral tablet: 1 tab(s) orally 4 times a day As needed agitation  pantoprazole 40 mg oral delayed release tablet: 1 tab(s) orally every 12 hours

## 2023-08-31 NOTE — BH CONSULTATION LIAISON ASSESSMENT NOTE - NSBHCHARTREVIEWLAB_PSY_A_CORE FT
Basic Metabolic Panel in AM (08.31.23 @ 03:00)    Sodium: 134 mmol/L    Potassium: 3.4 mmol/L    Chloride: 99: Chloride reference range changed from ..10/26/2022 mmol/L    Carbon Dioxide: 23.0 mmol/L    Anion Gap: 13 mmol/L    Blood Urea Nitrogen: 12.1 mg/dL    Creatinine: 0.90 mg/dL    Glucose: 107 mg/dL    Calcium: 8.1 mg/dL    eGFR: 95: The estimated glomerular filtration rate (eGFR) is calculated using the  2021 CKD-EPI creatinine equation, which does not have a coefficient for  race and is validated in individuals 18 years of age and older (N Engl J  Med 2021; 385:9860-3870). Creatinine-based eGFR may be inaccurate in  various situations including but not limited to extremes of muscle mass,  altered dietary protein intake, or medications that affect renal tubular  creatinine secretion. mL/min/1.73m2

## 2023-08-31 NOTE — DISCHARGE NOTE NURSING/CASE MANAGEMENT/SOCIAL WORK - NSDCPEFALRISK_GEN_ALL_CORE
For information on Fall & Injury Prevention, visit: [email or web address] OR  [email or web address] OR  https://www.cdc.gov/steadi/patient.html

## 2023-08-31 NOTE — BH CONSULTATION LIAISON ASSESSMENT NOTE - HPI (INCLUDE ILLNESS QUALITY, SEVERITY, DURATION, TIMING, CONTEXT, MODIFYING FACTORS, ASSOCIATED SIGNS AND SYMPTOMS)
Patient is a 62 year old  male who is employed, lives with his wife, has 2 adult children, with no past psychiatric history and a PMH of  HTN , rectal cancer on chemo, hx colostomy, ileoconduit  admitted for syncope, reported weakness poor oral intake diarrhea due to recent chemo s/p syncope, fall at home found to have rt facial fx. psychiatry consulted for agitation/ confusion.     patient seen and evaluated and found to be calm and cooperative. Patient oriented x3 but inattentive at times. Patient stating he wants to go  home and feels fine and things his family is overreacting. patient was able to acknowledge being admitted after a fall but had some difficulty describing why he is still in the hospital and what his status is. Denies any difficulty with depression, does report anxiety, poor sleep, and appetite denying any s/h ideation, symptoms of kayla or AVH.     Collateral info taken from patients daughter at bedside who reports that at baseline patient works, drives, no memory difficulties and currently has been having periods of confusion.

## 2023-08-31 NOTE — DISCHARGE NOTE NURSING/CASE MANAGEMENT/SOCIAL WORK - PATIENT PORTAL LINK FT
You can access the FollowMyHealth Patient Portal offered by [facility] by registering at the following website: [email or web address]. By joining [facility] FollowMyHealth portal, you will also be able to view your health information using other applications (apps) compatible with our system.

## 2023-08-31 NOTE — BH CONSULTATION LIAISON ASSESSMENT NOTE - CURRENT MEDICATION
MEDICATIONS  (STANDING):  aspirin enteric coated 81 milliGRAM(s) Oral daily  cholecalciferol 2000 Unit(s) Oral daily  dronabinol 2.5 milliGRAM(s) Oral two times a day  magnesium oxide 400 milliGRAM(s) Oral three times a day with meals  melatonin 5 milliGRAM(s) Oral at bedtime  metoprolol succinate ER 50 milliGRAM(s) Oral daily  minocycline 100 milliGRAM(s) Oral two times a day  OLANZapine 2.5 milliGRAM(s) Oral at bedtime  pantoprazole    Tablet 40 milliGRAM(s) Oral every 12 hours  simvastatin 40 milliGRAM(s) Oral at bedtime    MEDICATIONS  (PRN):  acetaminophen     Tablet .. 975 milliGRAM(s) Oral every 8 hours PRN Moderate Pain (4 - 6)  loperamide 2 milliGRAM(s) Oral four times a day PRN Diarrhea  OLANZapine Injectable 2.5 milliGRAM(s) IntraMuscular every 6 hours PRN agitation

## 2023-08-31 NOTE — DISCHARGE NOTE PROVIDER - HOSPITAL COURSE
61 yo Male with PMHx of HTN , rectal cancer on chemo,hx colostomy,ileoconduit  admitted for syncope, reported weakness poor oral intake diarrhea due to recent chemo s/p syncope,fall at home found to have rt facial fx. admitted to medicine. Pt had another fall at hospital. Patient said he was trying to get up from bed and  that is what caused him to fall out of bed. Patient is unsure if he hit head. CT head: 2 new tiny hyperdense foci right frontal white matter concerning for tiny parenchymal hemorrhages. Slightly increased subarachnoid hemorrhage in the left posterior fossa. Questionable trace subdural along left tentorial leaflet. No acute calvarial fracture. Right-sided maxillofacial   fractures as recently described.CT abd/ pelvis: Acute L1 compression fx with mild retropulsion of bone into the epidural space.No acute post traumatic injury otherwise. Pt was seen by neurosurgery team.Repeat cth x2 was stable and unchanged. Resume asa. Neurosurgery rec further imaging to r/o mets /pat fracture but per family pt had mltiple recent imaging with [facility] and refused further imaging. hem/onc is following. Spoke with hem/onc, they are       [facility] sec trauma/fall  -CT head: 2 new tiny hyperdense foci right frontal white matter concerning for tiny parenchymal hemorrhages. Slightly increased subarachnoid hemorrhage in the left posterior fossa. Questionable trace subdural along left tentorial leaflet. No acute calvarial fracture.  -cth 08/27 stable bleed size  -Repeat CTH stable  -resume asa.lovenox per neurosurgery  -family request second opinion from Dr.kevin Boyd rec mri brain  -f/u neurosurgery rec    anemia likely from malignancy/chemo  -s/p 1 u prbc 08/29  -hb 9.4  -stool occult neg  -ppi  -no active bleed present  -hem/onc following        Acute L1 compression fx with mild retropulsion of bone into the epidural space s/p fall  -MRI Spine  -Chest CT w con and MRI C/T w/wo con ordered for additional w/u.  -L1 IR bx to r/o met/path fx. per IR Complicated bx rec imaging for now  -f/u neurosurgery rec--family does not want any biopsy or further imaging until rec by [facility] hem/onc  -pending [facility] opinion, will review imaging. f/u hem/onc rec    Right-sided maxillofacial fractures s/p fall  -f/u facial surgery rec,no intervention  -pain meds   - cold compress       Unresponsiveness vs syncope/fall  hx PAF  -concern for orthostatic hypotension likely from hypovolemia and has had decreased PO intake while on chemotherapy and additionally with known neuropathy/unsteady gait  post chemotherapy,   - neuro check  -orth bp  -resume asa  - eventual orthostatic vitals  - tele  - ILR 08/31   -toprol xl 50 mg qd per cardiology  - poor chronic AC candidate (hx gib)    HFrEF without acute decompensation/CAD  - hold off on ACEi/ARB/ARNI therapy for now  - continue toprol xl  - no antiplatelet 2/2 ICH    Rectal cancer mets to bladder   with urostomy and colostomy bag   under [facility] getting chemo   f/u hem/onc [facility] rec      Agitation  sundowning  Neurontin 100 mg tid   -zyprexa bedtime  -spoke with    63 yo Male with PMHx of HTN , rectal cancer on chemo,hx colostomy,ileoconduit  admitted for syncope, reported weakness poor oral intake diarrhea due to recent chemo s/p syncope,fall at home found to have rt facial fx. admitted to medicine. Pt had another fall at hospital. Patient said he was trying to get up from bed and  that is what caused him to fall out of bed. Patient is unsure if he hit head. CT head: 2 new tiny hyperdense foci right frontal white matter concerning for tiny parenchymal hemorrhages. Slightly increased subarachnoid hemorrhage in the left posterior fossa. Questionable trace subdural along left tentorial leaflet. No acute calvarial fracture. Right-sided maxillofacial   fractures as recently described.CT abd/ pelvis: Acute L1 compression fx with mild retropulsion of bone into the epidural space.No acute post traumatic injury otherwise. Pt was seen by neurosurgery team.Repeat cth x2 was stable and unchanged. Resume asa. Neurosurgery rec further imaging to r/o mets /pat fracture but per family pt had multiple recent imaging with [facility] and refused further imaging. hem/onc is following. Spoke with hem/onc, reviewing imaging with [facility].per hem/onc if they rec biopsy which can done out pt. f/u with [facility]. s/p 1 u prbc for anemia likely from chemo/malignancy. s/p ILR.adjusted meds by cardiology. Hold arb/diurietics for hypotension. PT Rec home with back brace.               61 yo Male with PMHx of HTN , rectal cancer on chemo,hx colostomy,ileoconduit  admitted for syncope, reported weakness poor oral intake diarrhea due to recent chemo s/p syncope,fall at home found to have rt facial fx. admitted to medicine. Pt had another fall at hospital. Patient said he was trying to get up from bed and  that is what caused him to fall out of bed. Patient is unsure if he hit head. CT head: 2 new tiny hyperdense foci right frontal white matter concerning for tiny parenchymal hemorrhages. Slightly increased subarachnoid hemorrhage in the left posterior fossa. Questionable trace subdural along left tentorial leaflet. No acute calvarial fracture. Right-sided maxillofacial   fractures as recently described.CT abd/ pelvis: Acute L1 compression fx with mild retropulsion of bone into the epidural space.No acute post traumatic injury otherwise. Pt was seen by neurosurgery team.Repeat cth x2 was stable and unchanged. Resume asa. Neurosurgery rec further imaging to r/o mets /pat fracture but per family pt had multiple recent imaging with [facility] and refused further imaging. hem/onc is following. Spoke with hem/onc, reviewing imaging with [facility].per hem/onc if they rec biopsy which can done out pt. f/u with [facility]. s/p 1 u prbc for anemia likely from chemo/malignancy. s/p ILR.adjusted meds by cardiology. PT Rec home with back brace.               61 yo Male with PMHx of HTN , rectal cancer on chemo,hx colostomy,ileoconduit  admitted for syncope, reported weakness poor oral intake diarrhea due to recent chemo s/p syncope,fall at home found to have rt facial fx. admitted to medicine. Pt had another fall at hospital. Patient said he was trying to get up from bed and  that is what caused him to fall out of bed. Patient is unsure if he hit head. CT head: 2 new tiny hyperdense foci right frontal white matter concerning for tiny parenchymal hemorrhages. Slightly increased subarachnoid hemorrhage in the left posterior fossa. Questionable trace subdural along left tentorial leaflet. No acute calvarial fracture. Right-sided maxillofacial   fractures as recently described.CT abd/ pelvis: Acute L1 compression fx with mild retropulsion of bone into the epidural space.No acute post traumatic injury otherwise. Pt was seen by neurosurgery team.Repeat cth x2 was stable and unchanged. Resume asa. Neurosurgery rec further imaging to r/o mets /pat fracture but per family pt had multiple recent imaging with [facility] and refused further imaging. hem/onc is following. Spoke with hem/onc, reviewing imaging with [facility].per hem/onc if they rec biopsy which can done out pt. f/u with [facility]. s/p 1 u prbc for anemia likely from chemo/malignancy. s/p ILR.adjusted meds by cardiology. PT Rec home with back brace.  Disposition:Acute rehab             61 yo Male with PMHx of HTN , rectal cancer on chemo,hx colostomy,ileoconduit  admitted for syncope, reported weakness, poor oral intake diarrhea due to recent chemo s/p syncope, fall at home, found to have rt facial fx. Patient admitted to medicine. Pt had another fall at hospital. Patient said he was trying to get up from bed and that is what caused him to fall out of bed. Patient was unsure if he hit head. CT head: 2 new tiny hyperdense foci right frontal white matter concerning for tiny parenchymal hemorrhages. Slightly increased subarachnoid hemorrhage in the left posterior fossa. Questionable trace subdural along left tentorial leaflet. No acute calvarial fracture. Right-sided maxillofacial fractures.  CT abd/ pelvis: Acute L1 compression fx with mild retropulsion of bone into the epidural space.  No acute post traumatic injury otherwise. Pt was seen by neurosurgery team.  Repeat cth x2 was stable and unchanged. Resumed asa. Neurosurgery rec further imaging to r/o mets /pat fracture but per family pt had multiple recent imaging with [facility] and refused further imaging. Heme/onc was following. Spoke with hem/onc, reviewed imaging with [facility]. Per hem/onc if they rec biopsy, it can done out pt. Patient will need f/u with [facility]. Patient s/p 1 u prbc for anemia likely from chemo/malignancy. Patient also s/p ILR and adjusted meds by cardiology. PT following with back brace, recommending acute rehab.

## 2023-08-31 NOTE — DISCHARGE NOTE PROVIDER - CARE PROVIDER_API CALL
Muriel Parson Hoskins  Cardiac Electrophysiology  [address] [place] [address]  Phone: [phone]  Follow Up Time:    Muriel Parson  Cardiac Electrophysiology  [address] [place] [address]  Phone: [phone]  Follow Up Time:     Primary oncology,   Phone: (   )    -  Fax: (   )    -  Follow Up Time:     Primary doctor,   Phone: (   )    -  Fax: (   )    -  Follow Up Time:     Karlo Aguilar  Neurosurgery  [address] [place] [address]  Phone: [phone]  Fax: [phone]  Follow Up Time:     Karlo Boyd  Neurosurgery  [address] [facility], [address]  [place] [address]  Phone: [phone]  Fax: [phone]  Follow Up Time:    Muriel Parson  Cardiac Electrophysiology  [address] [place] [address]  Phone: [phone]  Follow Up Time:     Karlo Aguilar  Neurosurgery  [address] [place] [id number]  Phone: [phone]  Fax: [phone]  Follow Up Time:     Karlo Boyd  Neurosurgery  [address] [facility], [address]  [place] [address]  Phone: [phone]  Fax: [phone]  Follow Up Time:     Primary oncology,   Phone: (   )    -  Fax: (   )    -  Follow Up Time:     Primary doctor,   Phone: (   )    -  Fax: (   )    -  Follow Up Time:     Rakan Blunt  Neurology  [address] [place] [address]  Phone: [phone]  Fax: [phone]  Follow Up Time: 2 weeks

## 2023-08-31 NOTE — DISCHARGE NOTE PROVIDER - NSDCCPTREATMENT_GEN_ALL_CORE_FT
PRINCIPAL PROCEDURE  Procedure: Insertion, loop recorder  Findings and Treatment: Loop Recorder Incision Care:     - Do not touch the incision until it is completely healed.   - There is Dermabond (skin glue) and/or Steristrips on your incision, which will start to flake off on its own over the next 2-3 weeks. Do not pick at or peel off the Dermabond and /or Steristrips.    - Do not apply soaps, creams, lotions, ointments or powders to the incision until it is completely healed.  - You should call the doctor if you notice redness, drainage, swelling, increased tenderness, hot sensation around the  incision, bleeding or incision edges pulling apart.

## 2023-08-31 NOTE — PROGRESS NOTE ADULT - SUBJECTIVE AND OBJECTIVE BOX
[facility]                                   [address] [place] [address]                                                      PHONE: [phone]                                                         FAX: [phone]  [email or web address]  ---------------------------------------------------------------------------------------------------------------------------------    Overnight events/patient complaints: feels well, tele NSR/sinus tach no arrhythmias,      No Known Allergies  chocolate (Vomiting)    MEDICATIONS  (STANDING):  aspirin enteric coated 81 milliGRAM(s) Oral daily  carvedilol 6.25 milliGRAM(s) Oral every 12 hours  cholecalciferol 2000 Unit(s) Oral daily  dronabinol 2.5 milliGRAM(s) Oral two times a day  magnesium sulfate  IVPB 2 Gram(s) IV Intermittent once  melatonin 5 milliGRAM(s) Oral at bedtime  minocycline 100 milliGRAM(s) Oral two times a day  OLANZapine 2.5 milliGRAM(s) Oral at bedtime  pantoprazole    Tablet 40 milliGRAM(s) Oral every 12 hours  potassium chloride    Tablet ER 40 milliEquivalent(s) Oral once  simvastatin 40 milliGRAM(s) Oral at bedtime    MEDICATIONS  (PRN):  acetaminophen     Tablet .. 975 milliGRAM(s) Oral every 8 hours PRN Moderate Pain (4 - 6)  loperamide 2 milliGRAM(s) Oral four times a day PRN Diarrhea  OLANZapine Injectable 2.5 milliGRAM(s) IntraMuscular every 6 hours PRN agitation      Vital Signs Last 24 Hrs  T(C): 37.2 (30 Aug 2023 23:51), Max: 37.3 (30 Aug 2023 19:01)  T(F): 98.9 (30 Aug 2023 23:51), Max: 99.2 (30 Aug 2023 19:01)  HR: 98 (31 Aug 2023 08:00) (79 - 112)  BP: 128/89 (31 Aug 2023 08:00) (107/60 - 159/79)  BP(mean): 102 (31 Aug 2023 08:00) (75 - 110)  RR: 16 (31 Aug 2023 08:00) (14 - 23)  SpO2: 97% (31 Aug 2023 08:00) (94% - 100%)    Parameters below as of 31 Aug 2023 04:00  Patient On (Oxygen Delivery Method): room air      Daily     Daily   ICU Vital Signs Last 24 Hrs  PASTORA CHURCHILL  I&O's Detail    30 Aug 2023 07:01  -  31 Aug 2023 07:00  --------------------------------------------------------  IN:    Oral Fluid: 697 mL  Total IN: 697 mL    OUT:    Colostomy (mL): 205 mL    Urostomy (mL): 1425 mL  Total OUT: 1630 mL    Total NET: -933 mL        I&O's Summary    30 Aug 2023 07:01  -  31 Aug 2023 07:00  --------------------------------------------------------  IN: 697 mL / OUT: 1630 mL / NET: -933 mL      Drug Dosing Weight  PASTORA CHURCHILL      PHYSICAL EXAM:  General: Appears alert and cooperative.  HEENT: Head; normocephalic, facial ecchymoses  Eyes: Pupils reactive, cornea wnl.  Neck: Supple, no nodes adenopathy, no NVD or carotid bruit or thyromegaly.  CARDIOVASCULAR: Normal S1 and S2, No murmur, rub, gallop or lift.   LUNGS: No rales, rhonchi or wheeze. Normal breath sounds bilaterally.  ABDOMEN: Soft, nontender without mass or organomegaly. bowel sounds normoactive.  EXTREMITIES: No clubbing, cyanosis or edema. Distal pulses wnl.   SKIN: warm and dry with normal turgor.  NEURO: Alert/oriented x 3/normal motor exam. No pathologic reflexes.    PSYCH: normal affect.        LABS:                        9.3    12.63 )-----------( 385      ( 31 Aug 2023 03:00 )             26.9     08-31    134<L>  |  99  |  12.1  ----------------------------<  107<H>  3.4<L>   |  23.0  |  0.90    Ca    8.1<L>      31 Aug 2023 03:00  Mg     1.4     08-31      PASTORA CHURCHILL        Urinalysis Basic - ( 31 Aug 2023 03:00 )    Color: x / Appearance: x / SG: x / pH: x  Gluc: 107 mg/dL / Ketone: x  / Bili: x / Urobili: x   Blood: x / Protein: x / Nitrite: x   Leuk Esterase: x / RBC: x / WBC x   Sq Epi: x / Non Sq Epi: x / Bacteria: x        RADIOLOGY & ADDITIONAL STUDIES:    INTERPRETATION OF TELEMETRY (personally reviewed):    ECG:< from: 12 Lead ECG (08.26.23 @ 09:56) >  Diagnosis Line *** Poor data quality, interpretation may be adversely affected  Sinus tachycardia  Otherwise normal ECG    Confirmed by CAROL LOPEZ ([id number]) on 8/26/2023 10:38:46 AM    < end of copied text >      ECHO:< from: TTE Echo Complete w/o Contrast w/ Doppler (02.27.23 @ 11:40) >    Summary:   1. Left ventricular ejection fraction, by visual estimation, is 40 to   45%.   2. Moderately decreased global left ventricular systolic function.   3. The left ventricular diastolic function could not be assessed in this   study.   4. Moderately enlarged right ventricle.   5. Moderately reduced RV systolic function.   6. Mildly enlarged left atrium.   7. Mildly enlarged right atrium.   8. There is no evidence of pericardial effusion.   9. Mild mitral annular calcification.  10. Mild thickening and calcification of the anterior and posterior   mitral valve leaflets.  11. Moderate mitral valve regurgitation.  12. Moderate tricuspid regurgitation.  13. Estimated pulmonary artery systolic pressure is 37.7 mmHg assuming a   right atrial pressure of 15 mmHg, which is consistent with borderline   pulmonary hypertension.  14. Endocardial visualization was enhanced with intravenous echo contrast.    MD Bud Electronically signed on 2/27/2023 at 1:59:56 PM              < end of copied text >      STRESS TEST:    CARDIAC CATHETERIZATION:      < from: CT Head No Cont (08.28.23 @ 17:19) >    ACC: [id number] EXAM:  CT BRAIN   ORDERED BY: VALERIE KEARNEY     PROCEDURE DATE:  08/28/2023          INTERPRETATION:  CLINICAL INDICATION: Intracranial hemorrhage follow-up.    TECHNIQUE: CT of the head was performed without the administration of   intravenous contrast.    COMPARISON: CT head 8/27/2023.    FINDINGS:  Stable trace subarachnoid/subdural hemorrhage along the left medial   tentorial leaflet. Stable hypodense subdural collections along the   bilateral anterior frontal convexities.    White matter hypoattenuating foci are noted, compatible with   age-indeterminate small vessel disease.    No hydrocephalus. No midline shift.    The visualized intraorbital contents are unremarkable. Right maxillary   sinus wall, orbital floor, and lateral orbit fractures partially   visualized. Fluid within the right maxillary sinus again seen. Right   zygomatic arch fracture again seen. The mastoid air cells are clear.    IMPRESSION:  Stable extra-axial hemorrhage as described above. No new or worsening   intracranial hemorrhage.    Multiple facial bone fractures again seen.    --- End of Report ---          < end of copied text >

## 2023-08-31 NOTE — DISCHARGE NOTE PROVIDER - DETAILS OF MALNUTRITION DIAGNOSIS/DIAGNOSES
This patient has been assessed with a concern for Malnutrition and was treated during this hospitalization for the following Nutrition diagnosis/diagnoses:     -  08/28/2023: Severe protein-calorie malnutrition

## 2023-08-31 NOTE — PROGRESS NOTE ADULT - SUBJECTIVE AND OBJECTIVE BOX
Procedure: Loop Recorder Implant   Electrophysiologist: Zac Parson MD     Pt doing well s/p loop recorder implant. Denies complaint post procedure.     Incision: Dermabond & Steristrips C/D/I; no bleeding, hematoma, erythema, exudate or edema    Plan:   Ambulate w/ assist, then progress as tolerated.     Pain control with PO analgesia PRN.   Outpatient follow up with Dr. Parson in 3-4 weeks.   Dispo per [facility]     Loop Recorder Incision Care:     - Do not touch the incision until it is completely healed.   - There is Dermabond (skin glue) and/or Steristrips on your incision, which will start to flake off on its own over the next 2-3 weeks. Do not pick at or peel off the Dermabond and /or Steristrips.    - Do not apply soaps, creams, lotions, ointments or powders to the incision until it is completely healed.  - You should call the doctor if you notice redness, drainage, swelling, increased tenderness, hot sensation around the  incision, bleeding or incision edges pulling apart.     Procedure: [employer] Loop Recorder Implant   Electrophysiologist: Zac Parson MD     Pt doing well s/p loop recorder implant. Denies complaint post procedure.     Incision: Dermabond & Steristrips C/D/I; no bleeding, hematoma, erythema, exudate or edema    Plan:   Ambulate w/ assist, then progress as tolerated.     Pain control with PO analgesia PRN.   Outpatient follow up with Dr. Parson in 3-4 weeks.   Dispo per [facility]     Loop Recorder Incision Care:     - Do not touch the incision until it is completely healed.   - There is Dermabond (skin glue) and/or Steristrips on your incision, which will start to flake off on its own over the next 2-3 weeks. Do not pick at or peel off the Dermabond and /or Steristrips.    - Do not apply soaps, creams, lotions, ointments or powders to the incision until it is completely healed.  - You should call the doctor if you notice redness, drainage, swelling, increased tenderness, hot sensation around the  incision, bleeding or incision edges pulling apart.

## 2023-08-31 NOTE — BH CONSULTATION LIAISON ASSESSMENT NOTE - NSBHCHARTREVIEWINVESTIGATE_PSY_A_CORE FT
< from: 12 Lead ECG (08.26.23 @ 09:56) >      Ventricular Rate 103 BPM    Atrial Rate 103 BPM    P-R Interval 114 ms    QRS Duration 64 ms    Q-T Interval 330 ms    QTC Calculation(Bazett) 432 ms    P Axis 14 degrees    R Axis 42 degrees    T Axis 53 degrees    Diagnosis Line *** Poor data quality, interpretation may be adversely affected  Sinus tachycardia  Otherwise normal ECG    Confirmed by CAROL LOPEZ ([id number]) on 8/26/2023 10:38:46 AM    < end of copied text >

## 2023-08-31 NOTE — PROGRESS NOTE ADULT - SUBJECTIVE AND OBJECTIVE BOX
62M with PMHX rectal adenocarcinoma c/b mets to bladder and prostate on current chemo FOLFIRI/Panitumamab (Last dose 8/10) with neulasta support on 8/12, s/p Lower Abd Resection/Abdominoperineal Resection (2020, 2022), S/P fistula repair between bowel and bladder, HTN, HLD, CAD s/p PCI x5 presenting s/p syncope event at home 8/26.   CTH shown small right frontal intraparenchymal hemorrhage likely left posterior fossa subarachnoid hemorrhage and possible Trace left tentorial SDH as well as acute L1 compression fracture with mild bony retropulsion.   MRI L spine w/wo con 8/27: Fracture involving the superior aspect of the L1 vertebral body, There is evidence of abnormal enhancement component seen in this vertebral body as well. While this could be compatible with a posttraumatic fracture, given patient's history of trauma the possibility of an osteoporotic or pathologic fracture must be considered as well. Clinical correlation and continued close interval follow-up is recommended. There is evidence of abnormal signal in the ventral epidural space extending from the bottom of T12 to bottom of L2. This is more prominent on the right side than left and does appear to involve the right spinal canal is well. This could be compatible with an epidural hematoma possibility of epidural extension of tumor cannot be entirely excluded.   Right sided maxillofacial fractures. Seen by ENT for facial fractures  Neuro surgery, cardiology on board    Alert this AM  Offers  no complaints ;   Repeat head CT 8/28 stable         MEDICATIONS  (STANDING):  aspirin enteric coated 81 milliGRAM(s) Oral daily  carvedilol 6.25 milliGRAM(s) Oral every 12 hours  cholecalciferol 2000 Unit(s) Oral daily  dronabinol 2.5 milliGRAM(s) Oral two times a day  magnesium sulfate  IVPB 2 Gram(s) IV Intermittent once  melatonin 5 milliGRAM(s) Oral at bedtime  minocycline 100 milliGRAM(s) Oral two times a day  OLANZapine 2.5 milliGRAM(s) Oral at bedtime  pantoprazole    Tablet 40 milliGRAM(s) Oral every 12 hours  simvastatin 40 milliGRAM(s) Oral at bedtime    MEDICATIONS  (PRN):  acetaminophen     Tablet .. 975 milliGRAM(s) Oral every 8 hours PRN Moderate Pain (4 - 6)  loperamide 2 milliGRAM(s) Oral four times a day PRN Diarrhea  OLANZapine Injectable 2.5 milliGRAM(s) IntraMuscular every 6 hours PRN agitation          T(C): 36.8 (08-29-23 @ 07:46), Max: 36.9 (08-28-23 @ 16:00)  HR: 76 (08-29-23 @ 08:00) (76 - 124)  BP: 130/55 (08-29-23 @ 08:00) (96/53 - 152/91)  RR: 16 (08-29-23 @ 08:00) (10 - 26)  SpO2: 100% (08-29-23 @ 08:00) (95% - 100%)    · Constitutional: in no acute distress, resting comfortably ;   · Eyes:	PERRL   · Respiratory:	clear to auscultation bilaterally; no wheezes; no rhonchi; airway patent  · Cardiovascular: regular rate and rhythm  · Gastrointestinal:  no guarding  · Neurological: responds to verbal commands  · Mental Status	awake, alert  · Skin	warm and dry; color normal; rash  · Musculoskeletal	ROM intact; strength 5/5 bilateral upper extremities  · Additional PE	right facial swelling and tender     ICU Vital Signs Last 24 Hrs  T(C): 37.2 (30 Aug 2023 23:51), Max: 37.3 (30 Aug 2023 19:01)  T(F): 98.9 (30 Aug 2023 23:51), Max: 99.2 (30 Aug 2023 19:01)  HR: 98 (31 Aug 2023 08:00) (98 - 112)  BP: 128/89 (31 Aug 2023 08:00) (107/60 - 159/79)  BP(mean): 102 (31 Aug 2023 08:00) (75 - 110)  ABP: --  ABP(mean): --  RR: 16 (31 Aug 2023 08:00) (14 - 23)  SpO2: 97% (31 Aug 2023 08:00) (95% - 100%)    O2 Parameters below as of 31 Aug 2023 04:00  Patient On (Oxygen Delivery Method): room air    CBC:                       9.3    12.63 )-----------( 385      ( 31 Aug 2023 03:00 )             26.9              62M with PMHX rectal adenocarcinoma c/b mets to bladder and prostate on current chemo FOLFIRI/Panitumamab (Last dose 8/10) with neulasta support on 8/12, s/p Lower Abd Resection/Abdominoperineal Resection (2020, 2022), S/P fistula repair between bowel and bladder, HTN, HLD, CAD s/p PCI x5 presenting s/p syncope event at home 8/26.   CTH shown small right frontal intraparenchymal hemorrhage likely left posterior fossa subarachnoid hemorrhage and possible Trace left tentorial SDH as well as acute L1 compression fracture with mild bony retropulsion.   MRI L spine w/wo con 8/27: Fracture involving the superior aspect of the L1 vertebral body, There is evidence of abnormal enhancement component seen in this vertebral body as well. While this could be compatible with a posttraumatic fracture, given patient's history of trauma the possibility of an osteoporotic or pathologic fracture must be considered as well. Clinical correlation and continued close interval follow-up is recommended. There is evidence of abnormal signal in the ventral epidural space extending from the bottom of T12 to bottom of L2. This is more prominent on the right side than left and does appear to involve the right spinal canal is well. This could be compatible with an epidural hematoma possibility of epidural extension of tumor cannot be entirely excluded.   Right sided maxillofacial fractures. Seen by ENT for facial fractures  Neuro surgery, cardiology on board    Alert this AM  Offers  no complaints ;   no diarrhea or bleeding reported   pt on one to one  to have ILR place today         MEDICATIONS  (STANDING):  aspirin enteric coated 81 milliGRAM(s) Oral daily  carvedilol 6.25 milliGRAM(s) Oral every 12 hours  cholecalciferol 2000 Unit(s) Oral daily  dronabinol 2.5 milliGRAM(s) Oral two times a day  magnesium sulfate  IVPB 2 Gram(s) IV Intermittent once  melatonin 5 milliGRAM(s) Oral at bedtime  minocycline 100 milliGRAM(s) Oral two times a day  OLANZapine 2.5 milliGRAM(s) Oral at bedtime  pantoprazole    Tablet 40 milliGRAM(s) Oral every 12 hours  simvastatin 40 milliGRAM(s) Oral at bedtime    MEDICATIONS  (PRN):  acetaminophen     Tablet .. 975 milliGRAM(s) Oral every 8 hours PRN Moderate Pain (4 - 6)  loperamide 2 milliGRAM(s) Oral four times a day PRN Diarrhea  OLANZapine Injectable 2.5 milliGRAM(s) IntraMuscular every 6 hours PRN agitation          T(C): 36.8 (08-29-23 @ 07:46), Max: 36.9 (08-28-23 @ 16:00)  HR: 76 (08-29-23 @ 08:00) (76 - 124)  BP: 130/55 (08-29-23 @ 08:00) (96/53 - 152/91)  RR: 16 (08-29-23 @ 08:00) (10 - 26)  SpO2: 100% (08-29-23 @ 08:00) (95% - 100%)    · Constitutional: in no acute distress, resting comfortably ;   · Eyes:	PERRL   · Respiratory:	clear to auscultation bilaterally; no wheezes; no rhonchi; airway patent  · Cardiovascular: regular rate and rhythm  · Gastrointestinal:  no guarding  · Neurological: responds to verbal commands  · Mental Status	awake, alert  · Skin	warm and dry; color normal; rash  · Musculoskeletal	ROM intact; strength 5/5 bilateral upper extremities  · Additional PE	right facial swelling and tender     ICU Vital Signs Last 24 Hrs  T(C): 37.2 (30 Aug 2023 23:51), Max: 37.3 (30 Aug 2023 19:01)  T(F): 98.9 (30 Aug 2023 23:51), Max: 99.2 (30 Aug 2023 19:01)  HR: 98 (31 Aug 2023 08:00) (98 - 112)  BP: 128/89 (31 Aug 2023 08:00) (107/60 - 159/79)  BP(mean): 102 (31 Aug 2023 08:00) (75 - 110)  ABP: --  ABP(mean): --  RR: 16 (31 Aug 2023 08:00) (14 - 23)  SpO2: 97% (31 Aug 2023 08:00) (95% - 100%)    O2 Parameters below as of 31 Aug 2023 04:00  Patient On (Oxygen Delivery Method): room air    CBC:                       9.3    12.63 )-----------( 385      ( 31 Aug 2023 03:00 )             26.9

## 2023-08-31 NOTE — DISCHARGE NOTE PROVIDER - NPI NUMBER (FOR [employer] USE ONLY) :
[[id number]] [[id number]],[UNKNOWN],[UNKNOWN],[[id number]],[[id number]] [[id number]],[[id number]],[[id number]],[UNKNOWN],[UNKNOWN],[[id number]]

## 2023-09-01 LAB
A1C WITH ESTIMATED AVERAGE GLUCOSE RESULT: 5.4 % — SIGNIFICANT CHANGE UP (ref 4–5.6)
ANION GAP SERPL CALC-SCNC: 12 MMOL/L — SIGNIFICANT CHANGE UP (ref 5–17)
BUN SERPL-MCNC: 14.4 MG/DL — SIGNIFICANT CHANGE UP (ref 8–20)
CALCIUM SERPL-MCNC: 8.4 MG/DL — SIGNIFICANT CHANGE UP (ref 8.4–10.5)
CHLORIDE SERPL-SCNC: 103 MMOL/L — SIGNIFICANT CHANGE UP (ref 96–108)
CO2 SERPL-SCNC: 23 MMOL/L — SIGNIFICANT CHANGE UP (ref 22–29)
CREAT SERPL-MCNC: 0.84 MG/DL — SIGNIFICANT CHANGE UP (ref 0.5–1.3)
EGFR: 97 ML/MIN/1.73M2 — SIGNIFICANT CHANGE UP
ESTIMATED AVERAGE GLUCOSE: 108 MG/DL — SIGNIFICANT CHANGE UP (ref 68–114)
GLUCOSE SERPL-MCNC: 97 MG/DL — SIGNIFICANT CHANGE UP (ref 70–99)
HCT VFR BLD CALC: 29.4 % — LOW (ref 39–50)
HGB BLD-MCNC: 9.4 G/DL — LOW (ref 13–17)
MAGNESIUM SERPL-MCNC: 1.9 MG/DL — SIGNIFICANT CHANGE UP (ref 1.6–2.6)
MCHC RBC-ENTMCNC: 31.1 PG — SIGNIFICANT CHANGE UP (ref 27–34)
MCHC RBC-ENTMCNC: 32 GM/DL — SIGNIFICANT CHANGE UP (ref 32–36)
MCV RBC AUTO: 97.4 FL — SIGNIFICANT CHANGE UP (ref 80–100)
PLATELET # BLD AUTO: 415 K/UL — HIGH (ref 150–400)
POTASSIUM SERPL-MCNC: 3.8 MMOL/L — SIGNIFICANT CHANGE UP (ref 3.5–5.3)
POTASSIUM SERPL-SCNC: 3.8 MMOL/L — SIGNIFICANT CHANGE UP (ref 3.5–5.3)
RBC # BLD: 3.02 M/UL — LOW (ref 4.2–5.8)
RBC # FLD: 15.8 % — HIGH (ref 10.3–14.5)
SODIUM SERPL-SCNC: 138 MMOL/L — SIGNIFICANT CHANGE UP (ref 135–145)
WBC # BLD: 9.13 K/UL — SIGNIFICANT CHANGE UP (ref 3.8–10.5)
WBC # FLD AUTO: 9.13 K/UL — SIGNIFICANT CHANGE UP (ref 3.8–10.5)

## 2023-09-01 PROCEDURE — 93306 TTE W/DOPPLER COMPLETE: CPT | Mod: 26

## 2023-09-01 PROCEDURE — 93880 EXTRACRANIAL BILAT STUDY: CPT | Mod: 26

## 2023-09-01 PROCEDURE — 99233 SBSQ HOSP IP/OBS HIGH 50: CPT

## 2023-09-01 PROCEDURE — 99222 1ST HOSP IP/OBS MODERATE 55: CPT

## 2023-09-01 PROCEDURE — 70551 MRI BRAIN STEM W/O DYE: CPT | Mod: 26

## 2023-09-01 RX ORDER — ENOXAPARIN SODIUM 100 MG/ML
40 INJECTION SUBCUTANEOUS EVERY 24 HOURS
Refills: 0 | Status: DISCONTINUED | OUTPATIENT
Start: 2023-09-01 | End: 2023-09-07

## 2023-09-01 RX ORDER — ATORVASTATIN CALCIUM 80 MG/1
80 TABLET, FILM COATED ORAL AT BEDTIME
Refills: 0 | Status: DISCONTINUED | OUTPATIENT
Start: 2023-09-01 | End: 2023-09-07

## 2023-09-01 RX ADMIN — Medication 50 MILLIGRAM(S): at 05:55

## 2023-09-01 RX ADMIN — MAGNESIUM OXIDE 400 MG ORAL TABLET 400 MILLIGRAM(S): 241.3 TABLET ORAL at 17:10

## 2023-09-01 RX ADMIN — GABAPENTIN 100 MILLIGRAM(S): 400 CAPSULE ORAL at 22:02

## 2023-09-01 RX ADMIN — Medication 2.5 MILLIGRAM(S): at 17:10

## 2023-09-01 RX ADMIN — MAGNESIUM OXIDE 400 MG ORAL TABLET 400 MILLIGRAM(S): 241.3 TABLET ORAL at 12:22

## 2023-09-01 RX ADMIN — ATORVASTATIN CALCIUM 80 MILLIGRAM(S): 80 TABLET, FILM COATED ORAL at 22:02

## 2023-09-01 RX ADMIN — OLANZAPINE 2.5 MILLIGRAM(S): 15 TABLET, FILM COATED ORAL at 22:03

## 2023-09-01 RX ADMIN — GABAPENTIN 100 MILLIGRAM(S): 400 CAPSULE ORAL at 05:54

## 2023-09-01 RX ADMIN — PANTOPRAZOLE SODIUM 40 MILLIGRAM(S): 20 TABLET, DELAYED RELEASE ORAL at 05:55

## 2023-09-01 RX ADMIN — MINOCYCLINE HYDROCHLORIDE 100 MILLIGRAM(S): 45 TABLET, EXTENDED RELEASE ORAL at 17:11

## 2023-09-01 RX ADMIN — Medication 5 MILLIGRAM(S): at 22:01

## 2023-09-01 RX ADMIN — ENOXAPARIN SODIUM 40 MILLIGRAM(S): 100 INJECTION SUBCUTANEOUS at 22:01

## 2023-09-01 RX ADMIN — MINOCYCLINE HYDROCHLORIDE 100 MILLIGRAM(S): 45 TABLET, EXTENDED RELEASE ORAL at 05:54

## 2023-09-01 RX ADMIN — Medication 2.5 MILLIGRAM(S): at 05:54

## 2023-09-01 RX ADMIN — PANTOPRAZOLE SODIUM 40 MILLIGRAM(S): 20 TABLET, DELAYED RELEASE ORAL at 17:10

## 2023-09-01 RX ADMIN — Medication 2000 UNIT(S): at 12:20

## 2023-09-01 RX ADMIN — Medication 81 MILLIGRAM(S): at 15:55

## 2023-09-01 RX ADMIN — GABAPENTIN 100 MILLIGRAM(S): 400 CAPSULE ORAL at 17:11

## 2023-09-01 NOTE — CONSULT NOTE ADULT - SUBJECTIVE AND OBJECTIVE BOX
HPI:  63yo Male with PMH HTN, HLD, CAD  Rectal cancer s/p urostomy and  colostomy who presents after syncope and collapse at home on 8/26/23. As per chart review, around 9am his wife heard him fall. He was found down on stomach and was unresponsive. CPR was initiated. He was brought to [facility] by EMS. CTH showed right frontal parenchymal hemorrhages with SAH in left posterior fossa. CT Lumbar L 1 compression fracture. The patient was admitted for further care and management. NSGY consulted for traumatic IPH/SAH and L1 compression fracture. Recommendations included MR lumbar and repeat CTH. rCTH unchanged. TLSO brace given. PM&R was consulted for disposition recommendations.     PM&R evaluated patient by bedside. Spoke with wife and son. Patient currently with no significant complaints. Impulsive during examination and expresses difficulty in following multistep commands and overall cognition. Spoke with wife regarding disposition and that the patient would benefit from an ACUTE inpatient rehabilitation stay, so as to improve his functional status for safe disposition home. Intermittent tachycardia, afebrile.     Imaging Reviewed Today:  CTH 8/27  IMPRESSION:  2 new tiny hyperdense foci right frontal white matter concerning for tiny parenchymal hemorrhages. Slightly increased subarachnoid hemorrhage in the left posterior fossa. Questionable trace subdural along left tentorial leaflet. No acute calvarial fracture. Right-sided maxillofacial fractures as recently described    CT ABD 8/27  IMPRESSION:  * Acute L1 compression fracture with minimal loss of height and bony retropulsion.  * Postsurgical changes after rectal cancer resection with decreased size of presacral collection/mass with pigtail catheter in place.  * Slightly heterogeneous right nephrogram. Correlate for pyelonephritis.    CT LUMBAR 8/27   IMPRESSION: Acute mild L1 compression fracture with 15% height loss. Minimal fracture fragment retropulsion. Consider MRI as clinically warranted    MR LUMBAR 8/28  IMPRESSION: Fracture involving L1 with associated edema with associated enhancement as described above. There is evidence of an epidural process seen ventrally in the region of this fracture as described above.    CT HEAD 8/28  IMPRESSION:  Stable extra-axial hemorrhage as described above. No new or worsening intracranial hemorrhage.  ----------------------------  VITALS  T(C): 37 (09-01-23 @ 05:45), Max: 37.2 (08-31-23 @ 15:00)  HR: 136 (09-01-23 @ 10:31) (87 - 136)  BP: 139/77 (09-01-23 @ 05:45) (133/66 - 168/81)  RR: 18 (09-01-23 @ 05:45) (17 - 19)  SpO2: 98% (09-01-23 @ 05:45) (96% - 100%)  Wt(kg): --    PAST MEDICAL & SURGICAL HISTORY  Chest pain  HTN (hypertension)  HLD (hyperlipidemia)  CAD (coronary artery disease)  Mitral regurgitation  Rectal cancer  Depression  Neuropathy  Stented coronary artery  H/O carotid stenosis  H/O renal calculi  DANO (iron deficiency anemia)  Umbilical hernia  Splenic artery aneurysm  H/O cardiac catheterization  Cardiac disorder  History of CEA (carotid endarterectomy)  History of rectal surgery  H/O sigmoidoscopy  History of removal of Port-a-Cath    SOCIA/FUNCTION HISTORY   Independent prior to hospitalization   Lives with wife, ranch style home, 3 steps to enter in the garage     CURRENT FUNCTIONAL STATUS  9/1 PT   Bed Mobility  Bed Mobility Training Rehab Potential: good, to achieve stated therapy goals  Bed Mobility Training Sit-to-Supine: supervsion  Bed Mobility Training Supine-to-Sit: supervsion  Bed Mobility Training Limitations: decreased ability to use arms for pushing/pulling;  decreased ability to use legs for bridging/pushing;  impaired ability to control trunk for mobility;  decreased strength;  impaired balance    Sit-Stand Transfer Training  Sit-to-Stand Transfer Training Rehab Potential: good, to achieve stated therapy goals  Transfer Training Sit-to-Stand Transfer: minimum assist (75% patient effort);  1 person assist;  rolling walker  Transfer Training Stand-to-Sit Transfer: minimum assist (75% patient effort);  1 person assist;  rolling walker  Sit-to-Stand Transfer Training Transfer Safety Analysis: decreased weight-shifting ability;  decreased strength;  impaired balance    Gait Training  Gait Training Rehab Potential: good, to achieve stated therapy goals  Gait Training: minimum assist (75% patient effort);  1 person assist;  rolling walker;  40ft, TLSO  Gait Analysis: decreased step length;  decreased stride length;  decreased weight-shifting ability;  decreased strength;  impaired balance;  cognitive, decreased safety awareness;  LOB to the right x3    Stair Training  Physical Assist/Nonphysical Assist: min assistance, modified steps, Kelechi HRs - step to, LOB to the right - VCs for safety as pt is impulsive. ;  verbal cues  Number of Stairs: 2     Therapeutic Exercise  Therapeutic Exercise Rehab Effort: good  Therapeutic Exercise Detail: Pt performs Kelechi UE/LE AROM therex in functional planes     8/30 OT   Bed Mobility Analysis:     · Bed Mobility Limitations	Pt OOB    Transfer: Sit to Stand:     · Level of [place]	minimum assist (75% patients effort)  · Physical Assist/Nonphysical Assist	1 person assist; verbal cues  · Weight-Bearing Restrictions	weight-bearing as tolerated; +TLSO  · Assistive Device	pt refusing RW    Transfer: Stand to Sit:     · Level of [place]	contact guard  · Physical Assist/Nonphysical Assist	1 person assist; verbal cues  · Weight-Bearing Restrictions	+TLSO  · Assistive Device	pt refused RW    Sit/Stand Transfer Safety Analysis:     · Impairments Contributing to Impaired Transfers	impaired balance; decreased strength; impaired coordination    Transfer: Toilet Transfer:     · Level of [place]	minimum assist (75% patients effort); simulated transfer onto chair  · Physical Assist/Nonphysical Assist	1 person assist; verbal cues  · Weight-Bearing Restrictions	weight-bearing as tolerated; +TLSO  · Assistive Device	Pt refusing RW, use of arm supports    Toilet Transfer Safety Analysis:     · Impairments Contributing to Impaired Transfers	impaired balance; decreased strength; impaired coordination      RECENT LABS/IMAGING  REVIEWED    CBC Full  -  ( 01 Sep 2023 06:40 )  WBC Count : 9.13 K/uL  RBC Count : 3.02 M/uL  Hemoglobin : 9.4 g/dL  Hematocrit : 29.4 %  Platelet Count - Automated : 415 K/uL  Mean Cell Volume : 97.4 fl  Mean Cell Hemoglobin : 31.1 pg  Mean Cell Hemoglobin Concentration : 32.0 gm/dL  Auto Neutrophil # : x  Auto Lymphocyte # : x  Auto Monocyte # : x  Auto Eosinophil # : x  Auto Basophil # : x  Auto Neutrophil % : x  Auto Lymphocyte % : x  Auto Monocyte % : x  Auto Eosinophil % : x  Auto Basophil % : x    09-01    138  |  103  |  14.4  ----------------------------<  97  3.8   |  23.0  |  0.84    Ca    8.4      01 Sep 2023 06:40  Mg     1.9     09-01      Urinalysis Basic - ( 01 Sep 2023 06:40 )    Color: x / Appearance: x / SG: x / pH: x  Gluc: 97 mg/dL / Ketone: x  / Bili: x / Urobili: x   Blood: x / Protein: x / Nitrite: x   Leuk Esterase: x / RBC: x / WBC x   Sq Epi: x / Non Sq Epi: x / Bacteria: x    ALLERGIES  No Known Allergies  chocolate (Vomiting)    MEDICATIONS   acetaminophen     Tablet .. 975 milliGRAM(s) Oral every 8 hours PRN  aspirin enteric coated 81 milliGRAM(s) Oral daily  cholecalciferol 2000 Unit(s) Oral daily  dronabinol 2.5 milliGRAM(s) Oral two times a day  enoxaparin Injectable 40 milliGRAM(s) SubCutaneous every 24 hours  gabapentin 100 milliGRAM(s) Oral three times a day  loperamide 2 milliGRAM(s) Oral four times a day PRN  magnesium oxide 400 milliGRAM(s) Oral three times a day with meals  melatonin 5 milliGRAM(s) Oral at bedtime  metoprolol succinate ER 50 milliGRAM(s) Oral daily  minocycline 100 milliGRAM(s) Oral two times a day  OLANZapine 2.5 milliGRAM(s) Oral at bedtime  OLANZapine Injectable 2.5 milliGRAM(s) IntraMuscular every 6 hours PRN  pantoprazole    Tablet 40 milliGRAM(s) Oral every 12 hours  simvastatin 40 milliGRAM(s) Oral at bedtime    ----------------------------------------------------------------------------------------  PHYSICAL EXAM  Constitutional - NAD, impulsive   HEENT - orbital ecchymosis   Neck - Supple, No limited ROM  Chest - Breathing comfortably on RA   Cardiovascular - RRR   Abdomen - Soft   Extremities - No C/C/E, No calf tenderness   Neurologic Exam -                    Cognitive - AAO to self and year      Communication - Fluent, No dysarthria     Cranial Nerves - CN 2-12 intact     FUNCTIONAL MOTOR EXAM - No focal deficits                    LEFT    UE - ShAB 4/5, EF 4/5, EE 4/5, WE 4/5,  4/5                    RIGHT UE - ShAB 4/5, EF 4/5, EE 4/5, WE 4/5,  4/5                    LEFT    LE - HF 4/5, KE 4/5, DF 4/5, PF 4/5                    RIGHT LE - HF 4/5, KE 4/5, DF 4/5, PF 4/5        Sensory - Intact to LT     Reflexes - DTR Intact, No primitive reflexive     Coordination - Slowed FTN bilaterally      OculoVestibular - No saccades, No nystagmus, VOR         Balance - WNL Static  Psychiatric - Impulsive  ----------------------------------------------------------------------------------------  ASSESSMENT/PLAN  62yMale with functional deficits after a fall with resultant traumatic IPH/SAH and L1 compression fracture.   Traumatic IPH/SAH - NSGY following, rCTH unchanged   L1 compression fracture - TLSO   Anemia likely 2/2 malignancy - CBC   CAD/HLD - ASA, statin   R Maxillofacial fractures - Cold compress   Unresponsive/Syncopal fall - Vital checks   HFrEF - Toprolol   Rectal CA - Heme/Onc   Agitation - Zyprexa qhs   Pain - Tylenol, gabapentin   Diet - DASH   DVT PPX - SCDs, lovenox   Rehab/Impaired mobility and function - Continuous hospitalization is crucial for managing the patient's acute medical issues, which have significantly impacted their mobility, quality of life, and function. Rehabilitation recommendations will be based on the patient's functional progress and their ability to participate in and tolerate therapeutic interventions, which may change over time. Maintaining ongoing mobilization by the staff is imperative to prevent secondary medical complications and associated health issues related to debility.    The patient would benefit from ACUTE inpatient rehabilitation where they will receive 3H/daily of PT/OT/Speech to maximize their functional outcomes. They will also be followed by a Rehabilitation specialist for medical management of ongoing comorbidities and safe discharge.     The patient will continue to actively engage in Physical Therapy (PT), Occupational Therapy (OT), and Speech Therapy (SLP) to optimize functional outcomes. The Physical Medicine and Rehabilitation (PM&R) team will closely monitor their progress, and discharge recommendations will depend on their functional improvement and overall medical stability. HPI:  61yo Male with PMH HTN, HLD, CAD  Rectal cancer s/p urostomy and  colostomy who presents after syncope and collapse at home on 8/26/23. As per chart review, around 9am his wife heard him fall. He was found down on stomach and was unresponsive. CPR was initiated. He was brought to [facility] by EMS. CTH showed right frontal parenchymal hemorrhages with SAH in left posterior fossa. CT Lumbar L 1 compression fracture. The patient was admitted for further care and management. NSGY consulted for traumatic IPH/SAH and L1 compression fracture. Recommendations included MR lumbar and repeat CTH. rCTH unchanged. TLSO brace given. PM&R was consulted for disposition recommendations.     PM&R evaluated patient by bedside. Spoke with wife and son. Patient currently with no significant complaints. Impulsive during examination and expresses difficulty in following multistep commands and overall cognition.      Imaging Reviewed Today:  CTH 8/27  IMPRESSION:  2 new tiny hyperdense foci right frontal white matter concerning for tiny parenchymal hemorrhages. Slightly increased subarachnoid hemorrhage in the left posterior fossa. Questionable trace subdural along left tentorial leaflet. No acute calvarial fracture. Right-sided maxillofacial fractures as recently described    CT ABD 8/27  IMPRESSION:  * Acute L1 compression fracture with minimal loss of height and bony retropulsion.  * Postsurgical changes after rectal cancer resection with decreased size of presacral collection/mass with pigtail catheter in place.  * Slightly heterogeneous right nephrogram. Correlate for pyelonephritis.    CT LUMBAR 8/27   IMPRESSION: Acute mild L1 compression fracture with 15% height loss. Minimal fracture fragment retropulsion. Consider MRI as clinically warranted    MR LUMBAR 8/28  IMPRESSION: Fracture involving L1 with associated edema with associated enhancement as described above. There is evidence of an epidural process seen ventrally in the region of this fracture as described above.    CT HEAD 8/28  IMPRESSION:  Stable extra-axial hemorrhage as described above. No new or worsening intracranial hemorrhage.  ----------------------------  VITALS  T(C): 37 (09-01-23 @ 05:45), Max: 37.2 (08-31-23 @ 15:00)  HR: 136 (09-01-23 @ 10:31) (87 - 136)  BP: 139/77 (09-01-23 @ 05:45) (133/66 - 168/81)  RR: 18 (09-01-23 @ 05:45) (17 - 19)  SpO2: 98% (09-01-23 @ 05:45) (96% - 100%)  Wt(kg): --    PAST MEDICAL & SURGICAL HISTORY  Chest pain  HTN (hypertension)  HLD (hyperlipidemia)  CAD (coronary artery disease)  Mitral regurgitation  Rectal cancer  Depression  Neuropathy  Stented coronary artery  H/O carotid stenosis  H/O renal calculi  DANO (iron deficiency anemia)  Umbilical hernia  Splenic artery aneurysm  H/O cardiac catheterization  Cardiac disorder  History of CEA (carotid endarterectomy)  History of rectal surgery  H/O sigmoidoscopy  History of removal of Port-a-Cath    SOCIA/FUNCTION HISTORY   Independent prior to hospitalization   Lives with wife, ranch style home, 3 steps to enter in the garage     CURRENT FUNCTIONAL STATUS  9/1 PT   Bed Mobility  Bed Mobility Training Rehab Potential: good, to achieve stated therapy goals  Bed Mobility Training Sit-to-Supine: supervsion  Bed Mobility Training Supine-to-Sit: supervsion  Bed Mobility Training Limitations: decreased ability to use arms for pushing/pulling;  decreased ability to use legs for bridging/pushing;  impaired ability to control trunk for mobility;  decreased strength;  impaired balance    Sit-Stand Transfer Training  Sit-to-Stand Transfer Training Rehab Potential: good, to achieve stated therapy goals  Transfer Training Sit-to-Stand Transfer: minimum assist (75% patient effort);  1 person assist;  rolling walker  Transfer Training Stand-to-Sit Transfer: minimum assist (75% patient effort);  1 person assist;  rolling walker  Sit-to-Stand Transfer Training Transfer Safety Analysis: decreased weight-shifting ability;  decreased strength;  impaired balance    Gait Training  Gait Training Rehab Potential: good, to achieve stated therapy goals  Gait Training: minimum assist (75% patient effort);  1 person assist;  rolling walker;  40ft, TLSO  Gait Analysis: decreased step length;  decreased stride length;  decreased weight-shifting ability;  decreased strength;  impaired balance;  cognitive, decreased safety awareness;  LOB to the right x3    Stair Training  Physical Assist/Nonphysical Assist: min assistance, modified steps, Kelechi HRs - step to, LOB to the right - VCs for safety as pt is impulsive. ;  verbal cues  Number of Stairs: 2     Therapeutic Exercise  Therapeutic Exercise Rehab Effort: good  Therapeutic Exercise Detail: Pt performs Kelechi UE/LE AROM therex in functional planes     8/30 OT   Bed Mobility Analysis:     · Bed Mobility Limitations	Pt OOB    Transfer: Sit to Stand:     · Level of [place]	minimum assist (75% patients effort)  · Physical Assist/Nonphysical Assist	1 person assist; verbal cues  · Weight-Bearing Restrictions	weight-bearing as tolerated; +TLSO  · Assistive Device	pt refusing RW    Transfer: Stand to Sit:     · Level of [place]	contact guard  · Physical Assist/Nonphysical Assist	1 person assist; verbal cues  · Weight-Bearing Restrictions	+TLSO  · Assistive Device	pt refused RW    Sit/Stand Transfer Safety Analysis:     · Impairments Contributing to Impaired Transfers	impaired balance; decreased strength; impaired coordination    Transfer: Toilet Transfer:     · Level of [place]	minimum assist (75% patients effort); simulated transfer onto chair  · Physical Assist/Nonphysical Assist	1 person assist; verbal cues  · Weight-Bearing Restrictions	weight-bearing as tolerated; +TLSO  · Assistive Device	Pt refusing RW, use of arm supports    Toilet Transfer Safety Analysis:     · Impairments Contributing to Impaired Transfers	impaired balance; decreased strength; impaired coordination      RECENT LABS/IMAGING  REVIEWED    CBC Full  -  ( 01 Sep 2023 06:40 )  WBC Count : 9.13 K/uL  RBC Count : 3.02 M/uL  Hemoglobin : 9.4 g/dL  Hematocrit : 29.4 %  Platelet Count - Automated : 415 K/uL  Mean Cell Volume : 97.4 fl  Mean Cell Hemoglobin : 31.1 pg  Mean Cell Hemoglobin Concentration : 32.0 gm/dL  Auto Neutrophil # : x  Auto Lymphocyte # : x  Auto Monocyte # : x  Auto Eosinophil # : x  Auto Basophil # : x  Auto Neutrophil % : x  Auto Lymphocyte % : x  Auto Monocyte % : x  Auto Eosinophil % : x  Auto Basophil % : x    09-01    138  |  103  |  14.4  ----------------------------<  97  3.8   |  23.0  |  0.84    Ca    8.4      01 Sep 2023 06:40  Mg     1.9     09-01      Urinalysis Basic - ( 01 Sep 2023 06:40 )    Color: x / Appearance: x / SG: x / pH: x  Gluc: 97 mg/dL / Ketone: x  / Bili: x / Urobili: x   Blood: x / Protein: x / Nitrite: x   Leuk Esterase: x / RBC: x / WBC x   Sq Epi: x / Non Sq Epi: x / Bacteria: x    ALLERGIES  No Known Allergies  chocolate (Vomiting)    MEDICATIONS   acetaminophen     Tablet .. 975 milliGRAM(s) Oral every 8 hours PRN  aspirin enteric coated 81 milliGRAM(s) Oral daily  cholecalciferol 2000 Unit(s) Oral daily  dronabinol 2.5 milliGRAM(s) Oral two times a day  enoxaparin Injectable 40 milliGRAM(s) SubCutaneous every 24 hours  gabapentin 100 milliGRAM(s) Oral three times a day  loperamide 2 milliGRAM(s) Oral four times a day PRN  magnesium oxide 400 milliGRAM(s) Oral three times a day with meals  melatonin 5 milliGRAM(s) Oral at bedtime  metoprolol succinate ER 50 milliGRAM(s) Oral daily  minocycline 100 milliGRAM(s) Oral two times a day  OLANZapine 2.5 milliGRAM(s) Oral at bedtime  OLANZapine Injectable 2.5 milliGRAM(s) IntraMuscular every 6 hours PRN  pantoprazole    Tablet 40 milliGRAM(s) Oral every 12 hours  simvastatin 40 milliGRAM(s) Oral at bedtime    ----------------------------------------------------------------------------------------  PHYSICAL EXAM  Constitutional - NAD, impulsive   HEENT - orbital ecchymosis   Neck - Supple  Chest - Breathing comfortably on RA   Cardiovascular - No cyanosis   Abdomen - Soft   Extremities - No C/C/E, No calf tenderness   Neurologic Exam -                    Cognitive - AAO to self and year      Communication - Fluent, No dysarthria     FUNCTIONAL MOTOR EXAM -                     LEFT    UE - ShAB 4/5, EF 4/5, EE 4/5, WE 4/5,  4/5                    RIGHT UE - ShAB 4/5, EF 4/5, EE 4/5, WE 4/5,  4/5                    LEFT    LE - HF 4/5, KE 4/5, DF 4/5, PF 4/5                    RIGHT LE - HF 4/5, KE 4/5, DF 4/5, PF 4/5        Sensory - Intact to LT     Reflexes - No clonus      Coordination - Slowed FTN bilaterally      OculoVestibular - No saccades, No nystagmus, VOR         Balance - WNL Static  Psychiatric - Impulsive  ----------------------------------------------------------------------------------------  ASSESSMENT/PLAN  62yMale with functional deficits after a fall with resultant traumatic IPH/SAH and L1 compression fracture.   Traumatic IPH/SAH - NSGY following  L1 compression fracture - TLSO   CAD/HLD - ASA, statin   HFrEF - Toprolol   Rectal CA - Heme/Onc following   Pain - Tylenol, gabapentin   Diet - DASH   DVT PPX - SCDs, lovenox   Rehab/Impaired mobility and function - Continue PT/OT and SLP.  The patient would benefit from ACUTE inpatient rehabilitation where they will receive 3H/daily of PT/OT/Speech to maximize their functional outcomes. They will also be followed by a Rehabilitation specialist for medical management of ongoing comorbidities and safe discharge.

## 2023-09-01 NOTE — PROGRESS NOTE ADULT - ASSESSMENT
62M with PMHX rectal adenocarcinoma c/b mets to bladder and prostate on current chemo FOLFIRI/Panitumamab (Last dose 8/10) with neulasta support on 8/12, s/p Lower Abd Resection/Abdominoperineal Resection (2020, 2022), S/P fistula repair between bowel and bladder, HTN, HLD, CAD s/p PCI x5 presenting after found down at home by family s/p syncopal event     [facility] CT 8/18/2023 CT of abdomen and pelvis W/WO IV CONTRAST: BONES/SOFT TISSUES: No suspicious osseous lesion. Degenerative changes of the vertebral spine. Redemonstration of post median laparotomy changes  IMPRESSION: Since July 24, 2023, 1. Unchanged size of presacral mass with development of internal gas foci, possibly from necrosis or superimposed infection. Unchanged drain position within the mass. 2. Unchanged circumferential wall thickening of the urinary bladder with surrounding fat stranding, probably cystitis. In addition, new trace amount of air within the non-dependent portions of the bladder lumen, possibly due to recent instrumentation versus result of fistulization with posteriorly adjacent mass.    Metastatic Rectal ca   - on active chemo FOLFIRI + Panitumamab ; last dose 8/10 with neulasta support 8/12  - pt to resume care with [facility] after d/c     Syncopal episode  L1 compression fx  SAH/SDH  - CTH shown small right frontal intraparenchymal hemorrhage likely left posterior fossa subarachnoid hemorrhage and possible Trace left tentorial SDH  - MRI L spine w/wo reviewed, [facility] to review imaging   - recent CT w/ IV con [facility] 8/18/23 w/o suspicious osseous lesions  - hold IR biopsy until [facility] reviews recent imaging for further recommendations   - fitted for TLSO brace   - further management per neurosurgery   - cardiology on board, considering ILR prior to DC  - follow cardio recs   - antiplatelet therapy on hold, resume per neurosurgery recs /poor canddidate for A/C  - s/p ILR  - 2nd neurosx opinion as per daughter request    Anemia  multifactorial  Malignancy/Chemo  - prn PRBC transfusion for hgb < 7; Hb today 9.4      chronic diarrhea  - likely secondary to chemo.    - pt on immodium prn - may cont as inpt   - prn electrolyte repletion   - no blood in stool reported     thank you  Will update [facility] daily     [facility]  [phone]

## 2023-09-01 NOTE — PROGRESS NOTE ADULT - ASSESSMENT
61 yo Male with PMHx of HTN , rectal cancer on chemo,hx colostomy,ileoconduit  admitted for syncope, reported weakness poor oral intake diarrhea due to recent chemo s/p syncope,fall at home found to have rt facial fx. admitted to medicine. Pt had another fall last night. . Patient said he was trying to get up from bed and  that is what caused him to fall out of bed. Patient is unsure if he hit head. CT head: 2 new tiny hyperdense foci right frontal white matter concerning for tiny parenchymal hemorrhages. Slightly increased subarachnoid hemorrhage in   the left posterior fossa. Questionable trace subdural along left tentorial leaflet. No acute calvarial fracture. Right-sided maxillofacial   fractures as recently described.CT abd/ pelvis: Acute L1 compression fx with mild retropulsion of bone into the epidural space.No acute post traumatic injury otherwise.      [facility] sec trauma/fall  -CT head: 2 new tiny hyperdense foci right frontal white matter concerning for tiny parenchymal hemorrhages. Slightly increased subarachnoid hemorrhage in the left posterior fossa. Questionable trace subdural along left tentorial leaflet. No acute calvarial fracture.  -cth 08/27 stable bleed size  -Repeat CTH stable  -resume asa.lovenox per neurosurgery  -family request second opinion from Dr.kevin Boyd rec mri brain  -f/u neurosurgery rec    anemia likely from malignancy/chemo  -s/p 1 u prbc 08/29  -hb 9.4  -stool occult neg  -ppi  -no active bleed present  -hem/onc following        Acute L1 compression fx with mild retropulsion of bone into the epidural space s/p fall  -MRI Spine  -Chest CT w con and MRI C/T w/wo con ordered for additional w/u.  -L1 IR bx to r/o met/path fx. per IR Complicated bx rec imaging for now  -f/u neurosurgery rec--family does not want any biopsy or further imaging until rec by [facility] hem/onc  -pending [facility] opinion, will review imaging. f/u hem/onc rec    Right-sided maxillofacial fractures s/p fall  -f/u facial surgery rec,no intervention  -pain meds   - cold compress       Unresponsiveness vs syncope/fall  hx PAF  -concern for orthostatic hypotension likely from hypovolemia and has had decreased PO intake while on chemotherapy and additionally with known neuropathy/unsteady gait  post chemotherapy,   - neuro check  -orth bp  -resume asa  - eventual orthostatic vitals  - tele  - ILR 08/31   -toprol xl 50 mg qd per cardiology  - poor chronic AC candidate (hx gib)    HFrEF without acute decompensation/CAD  - hold off on ACEi/ARB/ARNI therapy for now  - continue toprol xl  - no antiplatelet 2/2 ICH    Rectal cancer mets to bladder   with urostomy and colostomy bag   under [facility] getting chemo   f/u hem/onc [facility] rec      Agitation  sundowning  Neurontin 100 mg tid   -zyprexa bedtime  -spoke with Dr.Ali henning pt  dw in detailed with Erma daughter (she is MICU RN [facility] [employer])  Daughter is requesting 2nd opinion for neurosurgery eval By Dr. Karlo Boyd called me-he saw the pt and rec mri brain.no note in chart  fall precaution  juvencio rn  PT rec home

## 2023-09-01 NOTE — CONSULT NOTE ADULT - ATTENDING COMMENTS
62yMale with functional deficits after a fall with resultant traumatic IPH/SAH and L1 compression fracture.   Demonstrating functional mobility and cognitive deficits  Continue OT/PT/SLP  Recommend acute inpatient rehabilitation  Plan of care as above  Case discussed with family at bedside.

## 2023-09-01 NOTE — PROGRESS NOTE ADULT - SUBJECTIVE AND OBJECTIVE BOX
62M with PMHX rectal adenocarcinoma c/b mets to bladder and prostate on current chemo FOLFIRI/Panitumamab (Last dose 8/10) with neulasta support on 8/12, s/p Lower Abd Resection/Abdominoperineal Resection (2020, 2022), S/P fistula repair between bowel and bladder, HTN, HLD, CAD s/p PCI x5 presenting s/p syncope event at home 8/26.   CTH shown small right frontal intraparenchymal hemorrhage likely left posterior fossa subarachnoid hemorrhage and possible Trace left tentorial SDH as well as acute L1 compression fracture with mild bony retropulsion.   MRI L spine w/wo con 8/27: Fracture involving the superior aspect of the L1 vertebral body, There is evidence of abnormal enhancement component seen in this vertebral body as well. While this could be compatible with a posttraumatic fracture, given patient's history of trauma the possibility of an osteoporotic or pathologic fracture must be considered as well. Clinical correlation and continued close interval follow-up is recommended. There is evidence of abnormal signal in the ventral epidural space extending from the bottom of T12 to bottom of L2. This is more prominent on the right side than left and does appear to involve the right spinal canal is well. This could be compatible with an epidural hematoma possibility of epidural extension of tumor cannot be entirely excluded.   Right sided maxillofacial fractures. Seen by ENT for facial fractures  Neuro surgery, cardiology on board    downgraded to floor  s/p ILR placement  afebrile      MEDICATIONS  (STANDING):  aspirin enteric coated 81 milliGRAM(s) Oral daily  cholecalciferol 2000 Unit(s) Oral daily  dronabinol 2.5 milliGRAM(s) Oral two times a day  gabapentin 100 milliGRAM(s) Oral three times a day  magnesium oxide 400 milliGRAM(s) Oral three times a day with meals  melatonin 5 milliGRAM(s) Oral at bedtime  metoprolol succinate ER 50 milliGRAM(s) Oral daily  minocycline 100 milliGRAM(s) Oral two times a day  OLANZapine 2.5 milliGRAM(s) Oral at bedtime  pantoprazole    Tablet 40 milliGRAM(s) Oral every 12 hours  simvastatin 40 milliGRAM(s) Oral at bedtime      ICU Vital Signs Last 24 Hrs  T(C): 37 (01 Sep 2023 05:45), Max: 37.2 (31 Aug 2023 15:00)  T(F): 98.6 (01 Sep 2023 05:45), Max: 99 (31 Aug 2023 15:00)  HR: 99 (01 Sep 2023 05:45) (87 - 121)  BP: 139/77 (01 Sep 2023 05:45) (133/66 - 168/81)  BP(mean): --  ABP: --  ABP(mean): --  RR: 18 (01 Sep 2023 05:45) (17 - 19)  SpO2: 98% (01 Sep 2023 05:45) (91% - 100%)    O2 Parameters below as of 01 Sep 2023 05:45  Patient On (Oxygen Delivery Method): room air        · Constitutional: in no acute distress, resting comfortably ;   · Eyes:	PERRL   · Respiratory:	clear to auscultation bilaterally; no wheezes; no rhonchi; airway patent  · Cardiovascular: regular rate and rhythm  · Gastrointestinal:  no guarding  · Neurological: responds to verbal commands  · Mental Status	awake, alert  · Skin	warm and dry; color normal; rash  · Musculoskeletal	ROM intact; strength 5/5 bilateral upper extremities  · Additional PE	right facial swelling and tender                             9.4    9.13  )-----------( 415      ( 01 Sep 2023 06:40 )             29.4     CBC:                       9.3    12.63 )-----------( 385      ( 31 Aug 2023 03:00 )             26.9           09-01    138  |  103  |  14.4  ----------------------------<  97  3.8   |  23.0  |  0.84    Ca    8.4      01 Sep 2023 06:40  Mg     1.9     09-01

## 2023-09-01 NOTE — CONSULT NOTE ADULT - ASSESSMENT
IMPRESSION:  - Head trauma with intracranial bleed.  - R centrum semiovale  punctate infarct is likley from small vessel disease.     - L ICA occlusion is probably chronic per patient.      ASSESSMENT/ PLAN:     - Stroke Neuro checks and vital signs Q 4 hours.  - SBP goal keep normotensive.  - ASA 81 mg PO or 300 CA QD.  - Lipitor for LDL goal of < 70 .  - Telemetry monitoring.  - CT -images and reports were reviewed.   - MRI Brain  -images and reports were reviewed.   - Check fasting Lipid panel and HbA1c  - TTE with bubble study.  - Carotid duplex  - Vascular surgery is following as well.  - PT OT SLP evaluation.  - SCD/ SQ Lovenox for DVT prophylaxis.       IMPRESSION:  - Head trauma with intracranial bleed.  - R centrum semiovale  punctate infarct is likley from small vessel disease.     - L ICA occlusion is probably chronic per patient.      ASSESSMENT/ PLAN:     - Stroke Neuro checks and vital signs Q 4 hours.  - SBP goal keep normotensive.  - ASA 81 mg PO or 300 NJ QD.  - Lipitor for LDL goal of < 70 .  - Telemetry monitoring.  - CT -images and reports were reviewed.   - MRI Brain  -images and reports were reviewed.   - Check fasting Lipid panel and HbA1c  - TTE with bubble study.  - Carotid duplex  - Vascular surgery is following as well.  - Neurosurgery is following for L1 fracture.  - PT OT SLP evaluation.  - SCD/ SQ Lovenox for DVT prophylaxis.

## 2023-09-01 NOTE — PROGRESS NOTE ADULT - SUBJECTIVE AND OBJECTIVE BOX
Patient is a 62y old  Male who presents with a chief complaint of syncope (01 Sep 2023 09:00)    Pt is aaox3. denies back pain,headache,chest pain,n/v/d  REVIEW OF SYSTEMS: All systems are reviewed and found to be negative except above    MEDICATIONS  (STANDING):  aspirin enteric coated 81 milliGRAM(s) Oral daily  cholecalciferol 2000 Unit(s) Oral daily  dronabinol 2.5 milliGRAM(s) Oral two times a day  enoxaparin Injectable 40 milliGRAM(s) SubCutaneous every 24 hours  gabapentin 100 milliGRAM(s) Oral three times a day  magnesium oxide 400 milliGRAM(s) Oral three times a day with meals  melatonin 5 milliGRAM(s) Oral at bedtime  metoprolol succinate ER 50 milliGRAM(s) Oral daily  minocycline 100 milliGRAM(s) Oral two times a day  OLANZapine 2.5 milliGRAM(s) Oral at bedtime  pantoprazole    Tablet 40 milliGRAM(s) Oral every 12 hours  simvastatin 40 milliGRAM(s) Oral at bedtime    MEDICATIONS  (PRN):  acetaminophen     Tablet .. 975 milliGRAM(s) Oral every 8 hours PRN Moderate Pain (4 - 6)  loperamide 2 milliGRAM(s) Oral four times a day PRN Diarrhea  OLANZapine Injectable 2.5 milliGRAM(s) IntraMuscular every 6 hours PRN agitation      CAPILLARY BLOOD GLUCOSE        I&O's Summary    31 Aug 2023 07:01  -  01 Sep 2023 07:00  --------------------------------------------------------  IN: 475 mL / OUT: 2100 mL / NET: -1625 mL        PHYSICAL EXAM:  Vital Signs Last 24 Hrs  T(C): 37 (01 Sep 2023 05:45), Max: 37.2 (31 Aug 2023 15:00)  T(F): 98.6 (01 Sep 2023 05:45), Max: 99 (31 Aug 2023 15:00)  HR: 99 (01 Sep 2023 05:45) (87 - 121)  BP: 139/77 (01 Sep 2023 05:45) (133/66 - 168/81)  BP(mean): --  RR: 18 (01 Sep 2023 05:45) (17 - 19)  SpO2: 98% (01 Sep 2023 05:45) (91% - 100%)    Parameters below as of 01 Sep 2023 05:45  Patient On (Oxygen Delivery Method): room air        CONSTITUTIONAL: NAD,  EYES: PERRLA; conjunctiva and sclera clear  ENMT: Moist oral mucosa,   RESPIRATORY: Normal respiratory effort; lungs are clear to auscultation bilaterally  CARDIOVASCULAR: Regular rate and rhythm, normal S1 and S2, no murmur   EXTS: No lower extremity edema; Peripheral pulses are 2+ bilaterally  ABDOMEN: Nontender to palpation, normoactive bowel sounds, no rebound/guarding;   MUSCLOSKELETAL:   no joint swelling or tenderness to palpation  PSYCH: affect appropriate  NEUROLOGY: A+O to person, place, and time; CN 2-12 are intact and symmetric; no gross sensory deficits;       LABS:                        9.4    9.13  )-----------( 415      ( 01 Sep 2023 06:40 )             29.4     09-01    138  |  103  |  14.4  ----------------------------<  97  3.8   |  23.0  |  0.84    Ca    8.4      01 Sep 2023 06:40  Mg     1.9     09-01            Urinalysis Basic - ( 01 Sep 2023 06:40 )    Color: x / Appearance: x / SG: x / pH: x  Gluc: 97 mg/dL / Ketone: x  / Bili: x / Urobili: x   Blood: x / Protein: x / Nitrite: x   Leuk Esterase: x / RBC: x / WBC x   Sq Epi: x / Non Sq Epi: x / Bacteria: x          RADIOLOGY & ADDITIONAL TESTS:  Results Reviewed:

## 2023-09-01 NOTE — CONSULT NOTE ADULT - SUBJECTIVE AND OBJECTIVE BOX
Surgery Consult    Consulting attending: Dr. Gill       HPI:   63yo Male with PMH HTN, HLD, CAD, R carotid stenosis s/p TCAR ( 10 /17/19), rectal ca (s/p urostomy and colostomy w/ concern for mets) on chemotx who presents after syncope and collapse at home. He was noted to have trace subarachnoid/subdural hemorrhage along the left medial tentorial leaflet and bilateral  anterior frontal hypodense subdural lesions, in addition to multiple facial fractures. Patient was admitted to medical service, for workup. He  is currently being followed by neuro  team who recommended MRI head. MR head demonstrates loss of flow void within L ICA ( at cervical, petrous and cavernous segments), R acute infarct ( centrum ovale), and multiple chronic areas of microhemorrhages. Vascular Surgery consulted for imaging findings.    Patient assessed at bedside he denies any       PAST MEDICAL HISTORY:      HTN (hypertension)    HLD (hyperlipidemia)    CAD (coronary artery disease)    Mitral regurgitation    Rectal cancer    Depression    Neuropathy    Stented coronary artery    H/O carotid stenosis    H/O renal calculi    DANO (iron deficiency anemia)    Umbilical hernia    Splenic artery aneurysm          PAST SURGICAL HISTORY:  H/O cardiac catheterization    Cardiac disorder    History of CEA (carotid endarterectomy)    History of rectal surgery    H/O sigmoidoscopy    History of removal of Port-a-Cath          MEDICATIONS:  acetaminophen     Tablet .. 975 milliGRAM(s) Oral every 8 hours PRN  aspirin enteric coated 81 milliGRAM(s) Oral daily  atorvastatin 80 milliGRAM(s) Oral at bedtime  cholecalciferol 2000 Unit(s) Oral daily  dronabinol 2.5 milliGRAM(s) Oral two times a day  enoxaparin Injectable 40 milliGRAM(s) SubCutaneous every 24 hours  gabapentin 100 milliGRAM(s) Oral three times a day  loperamide 2 milliGRAM(s) Oral four times a day PRN  magnesium oxide 400 milliGRAM(s) Oral three times a day with meals  melatonin 5 milliGRAM(s) Oral at bedtime  metoprolol succinate ER 50 milliGRAM(s) Oral daily  minocycline 100 milliGRAM(s) Oral two times a day  OLANZapine 2.5 milliGRAM(s) Oral at bedtime  OLANZapine Injectable 2.5 milliGRAM(s) IntraMuscular every 6 hours PRN  pantoprazole    Tablet 40 milliGRAM(s) Oral every 12 hours        ALLERGIES:  No Known Allergies  chocolate (Vomiting)        VITALS & I/Os:  Vital Signs Last 24 Hrs  T(C): 37.3 (01 Sep 2023 17:11), Max: 37.3 (01 Sep 2023 17:11)  T(F): 99.1 (01 Sep 2023 17:11), Max: 99.1 (01 Sep 2023 17:11)  HR: 97 (01 Sep 2023 17:11) (87 - 136)  BP: 153/80 (01 Sep 2023 17:11) (133/66 - 159/78)  BP(mean): --  RR: 18 (01 Sep 2023 17:11) (17 - 18)  SpO2: 97% (01 Sep 2023 17:11) (96% - 99%)    Parameters below as of 01 Sep 2023 17:11  Patient On (Oxygen Delivery Method): room air        I&O's Summary    31 Aug 2023 07:01  -  01 Sep 2023 07:00  --------------------------------------------------------  IN: 475 mL / OUT: 2100 mL / NET: -1625 mL    01 Sep 2023 07:01  -  01 Sep 2023 17:15  --------------------------------------------------------  IN: 0 mL / OUT: 300 mL / NET: -300 mL          PHYSICAL EXAM:  Constitutional: patient resting comfortably in bed, in no acute distress  HEENT: EOMI / PERRL b/l, no active drainage or redness  Neck: No JVD, full ROM without pain  Respiratory: respirations are unlabored, no accessory muscle use, no conversational dyspnea  Cardiovascular: regular rate & rhythm  Gastrointestinal: Abdomen soft, non-tender, non-distended, no rebound tenderness / guarding  Neurological: GCS: 15 (4/5/6). A&O x 3; no gross sensory / motor / coordination deficits  Psychiatric: Normal mood, normal affect  Musculoskeletal: No joint pain, swelling or deformity; no limitation of movement  Vascular:        LABS:                        9.4    9.13  )-----------( 415      ( 01 Sep 2023 06:40 )             29.4     09-01    138  |  103  |  14.4  ----------------------------<  97  3.8   |  23.0  |  0.84    Ca    8.4      01 Sep 2023 06:40  Mg     1.9     09-01      Lactate:              Urinalysis Basic - ( 01 Sep 2023 06:40 )    Color: x / Appearance: x / SG: x / pH: x  Gluc: 97 mg/dL / Ketone: x  / Bili: x / Urobili: x   Blood: x / Protein: x / Nitrite: x   Leuk Esterase: x / RBC: x / WBC x   Sq Epi: x / Non Sq Epi: x / Bacteria: x          IMAGING:  < from: MR Head No Cont (09.01.23 @ 15:30) >  FINDINGS:    The ventricles and sulci are age appropriate . There are mild patchy   areas of T2  hyperintensity within the periventricular and subcortical   white matter whichare non specific and may be related to chronic   microvascular ischemic changes.There is no evidence of mass. Innumerable   punctate foci of susceptibility in the right frontal and parietal centrum   semiovale  which are aligned in a linear configuration with superimposed   FLAIR hyperintense signal abnormality likely consistent with chronic   microhemorrhages. There is tiny superimposed focus of T1 hyperintensity   which may represent a subacute hemorrhagic component which is seen on the   corresponding head CT from 8/28/2023. There is a superimposed right   frontal punctate focus of restricted diffusion which may represent an   acute lacunar infarct. There is susceptibility within the left paramedian   cerebellar folia and quadrigeminal plate cistern consistent with known   small subarachnoid hemorrhage. No midline shift or other significant mass   effect is noted.    There is loss of normal flow-void within the imaged cervical, petrous and   cavernous left ICA segments.    The orbital contents are grossly unremarkable. Moderate mucosal   thickening and moderate secretions in the right maxillary sinus. The   mastoid air cells are predominantly clear.    IMPRESSION:    Loss of flow void within the imaged cervical, petrous and cavernous left   ICA segment concerning for high-grade flow limiting stenosis versus   occlusion. Recommend further evaluation with CT angiogram of the head.  Punctate acute infarction in the right centrum semiovale.  Innumerable chronic microhemorrhages aligned in a linear configuration in   the right frontal and parietal centrum semiovale may be related to a   remote hemorrhagic ischemic event. There is tiny superimposed focus of T1   hyperintensity which may represent a subacute hemo                                                                                  Surgery Consult    Consulting attending: Dr. Gill       HPI:   61yo Male with PMH HTN, HLD, CAD, R carotid stenosis s/p TCAR ( 10 /17/19), rectal ca (s/p urostomy and colostomy w/ concern for mets) on chemotx who presents after syncope and collapse at home. He was noted to have trace subarachnoid/subdural hemorrhage along the left medial tentorial leaflet and bilateral  anterior frontal hypodense subdural lesions, in addition to multiple facial fractures. Patient was admitted to medical service, for workup. He  is currently being followed by neuro  team who recommended MRI head. MR head demonstrates loss of flow void within L ICA ( at cervical, petrous and cavernous segments), R acute infarct ( centrum ovale), and multiple chronic areas of microhemorrhages. Vascular Surgery consulted for imaging findings.    Patient endorses dizziness and confusion after fall (on 8/26). He denies any HA, changes in vision, numbness/weakness of ext, and/or aphasia. Patient states that he has been following Dr. Medina on outpatient basis, and reports that last duplex (~ 8months ago), demonstrated complete L ICA occlusion, and patent R ICA.       PAST MEDICAL HISTORY:      HTN (hypertension)    HLD (hyperlipidemia)    CAD (coronary artery disease)    Mitral regurgitation    Rectal cancer    Depression    Neuropathy    Stented coronary artery    H/O carotid stenosis    H/O renal calculi    DANO (iron deficiency anemia)    Umbilical hernia    Splenic artery aneurysm          PAST SURGICAL HISTORY:  H/O cardiac catheterization    Cardiac disorder    History of CEA (carotid endarterectomy)    History of rectal surgery    H/O sigmoidoscopy    History of removal of Port-a-Cath          MEDICATIONS:  acetaminophen     Tablet .. 975 milliGRAM(s) Oral every 8 hours PRN  aspirin enteric coated 81 milliGRAM(s) Oral daily  atorvastatin 80 milliGRAM(s) Oral at bedtime  cholecalciferol 2000 Unit(s) Oral daily  dronabinol 2.5 milliGRAM(s) Oral two times a day  enoxaparin Injectable 40 milliGRAM(s) SubCutaneous every 24 hours  gabapentin 100 milliGRAM(s) Oral three times a day  loperamide 2 milliGRAM(s) Oral four times a day PRN  magnesium oxide 400 milliGRAM(s) Oral three times a day with meals  melatonin 5 milliGRAM(s) Oral at bedtime  metoprolol succinate ER 50 milliGRAM(s) Oral daily  minocycline 100 milliGRAM(s) Oral two times a day  OLANZapine 2.5 milliGRAM(s) Oral at bedtime  OLANZapine Injectable 2.5 milliGRAM(s) IntraMuscular every 6 hours PRN  pantoprazole    Tablet 40 milliGRAM(s) Oral every 12 hours        ALLERGIES:  No Known Allergies  chocolate (Vomiting)        VITALS & I/Os:  Vital Signs Last 24 Hrs  T(C): 37.3 (01 Sep 2023 17:11), Max: 37.3 (01 Sep 2023 17:11)  T(F): 99.1 (01 Sep 2023 17:11), Max: 99.1 (01 Sep 2023 17:11)  HR: 97 (01 Sep 2023 17:11) (87 - 136)  BP: 153/80 (01 Sep 2023 17:11) (133/66 - 159/78)  BP(mean): --  RR: 18 (01 Sep 2023 17:11) (17 - 18)  SpO2: 97% (01 Sep 2023 17:11) (96% - 99%)    Parameters below as of 01 Sep 2023 17:11  Patient On (Oxygen Delivery Method): room air        I&O's Summary    31 Aug 2023 07:01  -  01 Sep 2023 07:00  --------------------------------------------------------  IN: 475 mL / OUT: 2100 mL / NET: -1625 mL    01 Sep 2023 07:01  -  01 Sep 2023 17:15  --------------------------------------------------------  IN: 0 mL / OUT: 300 mL / NET: -300 mL          PHYSICAL EXAM:  Constitutional: patient resting comfortably in bed, in no acute distress  HEENT: EOMI, R periorbital ecchymosis   Respiratory: respirations are unlabored, no accessory muscle use, no conversational dyspnea  Gastrointestinal: Abdomen soft, non-tender, non-distended, no rebound tenderness / guarding  Neurological: GCS: 15 (4/5/6). A&O x 3; no gross sensory / motor / coordination deficits, CN II- XII grossly intact   Musculoskeletal: No joint pain, swelling or deformity; no limitation of movement, 5/5 strength in all ext          LABS:                        9.4    9.13  )-----------( 415      ( 01 Sep 2023 06:40 )             29.4     09-01    138  |  103  |  14.4  ----------------------------<  97  3.8   |  23.0  |  0.84    Ca    8.4      01 Sep 2023 06:40  Mg     1.9     09-01      Lactate:              Urinalysis Basic - ( 01 Sep 2023 06:40 )    Color: x / Appearance: x / SG: x / pH: x  Gluc: 97 mg/dL / Ketone: x  / Bili: x / Urobili: x   Blood: x / Protein: x / Nitrite: x   Leuk Esterase: x / RBC: x / WBC x   Sq Epi: x / Non Sq Epi: x / Bacteria: x          IMAGING:  < from: MR Head No Cont (09.01.23 @ 15:30) >  FINDINGS:    The ventricles and sulci are age appropriate . There are mild patchy   areas of T2  hyperintensity within the periventricular and subcortical   white matter whichare non specific and may be related to chronic   microvascular ischemic changes.There is no evidence of mass. Innumerable   punctate foci of susceptibility in the right frontal and parietal centrum   semiovale  which are aligned in a linear configuration with superimposed   FLAIR hyperintense signal abnormality likely consistent with chronic   microhemorrhages. There is tiny superimposed focus of T1 hyperintensity   which may represent a subacute hemorrhagic component which is seen on the   corresponding head CT from 8/28/2023. There is a superimposed right   frontal punctate focus of restricted diffusion which may represent an   acute lacunar infarct. There is susceptibility within the left paramedian   cerebellar folia and quadrigeminal plate cistern consistent with known   small subarachnoid hemorrhage. No midline shift or other significant mass   effect is noted.    There is loss of normal flow-void within the imaged cervical, petrous and   cavernous left ICA segments.    The orbital contents are grossly unremarkable. Moderate mucosal   thickening and moderate secretions in the right maxillary sinus. The   mastoid air cells are predominantly clear.    IMPRESSION:    Loss of flow void within the imaged cervical, petrous and cavernous left   ICA segment concerning for high-grade flow limiting stenosis versus   occlusion. Recommend further evaluation with CT angiogram of the head.  Punctate acute infarction in the right centrum semiovale.  Innumerable chronic microhemorrhages aligned in a linear configuration in   the right frontal and parietal centrum semiovale may be related to a   remote hemorrhagic ischemic event. There is tiny superimposed focus of T1   hyperintensity which may represent a subacute hemo

## 2023-09-01 NOTE — CONSULT NOTE ADULT - CONSULT REQUESTED DATE/TIME
01-Sep-2023 17:15
01-Sep-2023 19:51
26-Aug-2023 16:20
29-Aug-2023 08:00
01-Sep-2023 12:45
27-Aug-2023 03:27
26-Aug-2023 12:40
28-Aug-2023 14:38

## 2023-09-01 NOTE — CONSULT NOTE ADULT - ASSESSMENT
A: 61yo Male with PMH HTN, HLD, CAD, R carotid stenosis s/p TCAR ( 10 /17/19), rectal ca (s/p urostomy and colostomy w/ concern for mets) on chemotx admitted to medicine for syncope, with working differential of chemo -related syncope, orthostatic event and stroke. MR head demonstrating R acute infarct, chronic microhemorrhages and    L distal ICA lesions ( at cervical, petrous and cavernous segments). No neurodeficits.     Recommendations:   - Recommend bilateral carotid duplex        A: 63yo Male with PMH HTN, HLD, CAD, R carotid stenosis s/p TCAR ( 10 /17/19), rectal ca (s/p urostomy and colostomy w/ concern for mets) on chemotx admitted to medicine for syncope, with working differential of chemo -related syncope, orthostatic event and stroke. MR head demonstrating R acute infarct, chronic microhemorrhages and  L distal ICA lesions ( at cervical, petrous and cavernous segments). Likely chronic, reports known complete ICA occlusion.  No neurodeficits.     Recommendations:   - Recommend bilateral carotid duplex        Including assessment of vertebral artery  - Vascular will follow pending results   - Remainder of care per primary team           Plan discussed with Vascular Attending - Dr. Gill.

## 2023-09-01 NOTE — CHART NOTE - NSCHARTNOTEFT_GEN_A_CORE
Source: Patient [ ]  Family [ ]   other [ x]    Current Diet: Dash    Patient reports [ ] nausea  [ ] vomiting [ ] diarrhea [ ] constipation  [ ]chewing problems [ ] swallowing issues  [ ] other:     PO intake:  < 50% [ ]   50-75%  [ x ]   %  [ ]  other : EMR    Source for PO intake [ ] Patient [ ] family [x ] chart [ ] staff [ ] other      Enteral /Parenteral Nutrition:     Current Weight: 198.8lb   no edema    % Weight Change     Pertinent Medications: MEDICATIONS  (STANDING):  aspirin enteric coated 81 milliGRAM(s) Oral daily  cholecalciferol 2000 Unit(s) Oral daily  dronabinol 2.5 milliGRAM(s) Oral two times a day  enoxaparin Injectable 40 milliGRAM(s) SubCutaneous every 24 hours  gabapentin 100 milliGRAM(s) Oral three times a day  magnesium oxide 400 milliGRAM(s) Oral three times a day with meals  melatonin 5 milliGRAM(s) Oral at bedtime  metoprolol succinate ER 50 milliGRAM(s) Oral daily  minocycline 100 milliGRAM(s) Oral two times a day  OLANZapine 2.5 milliGRAM(s) Oral at bedtime  pantoprazole    Tablet 40 milliGRAM(s) Oral every 12 hours  simvastatin 40 milliGRAM(s) Oral at bedtime    MEDICATIONS  (PRN):  acetaminophen     Tablet .. 975 milliGRAM(s) Oral every 8 hours PRN Moderate Pain (4 - 6)  loperamide 2 milliGRAM(s) Oral four times a day PRN Diarrhea  OLANZapine Injectable 2.5 milliGRAM(s) IntraMuscular every 6 hours PRN agitation    Pertinent Labs: CBC Full  -  ( 01 Sep 2023 06:40 )  WBC Count : 9.13 K/uL  RBC Count : 3.02 M/uL  Hemoglobin : 9.4 g/dL  Hematocrit : 29.4 %  Platelet Count - Automated : 415 K/uL  Mean Cell Volume : 97.4 fl  Mean Cell Hemoglobin : 31.1 pg  Mean Cell Hemoglobin Concentration : 32.0 gm/dL  Auto Neutrophil # : x  Auto Lymphocyte # : x  Auto Monocyte # : x  Auto Eosinophil # : x  Auto Basophil # : x  Auto Neutrophil % : x  Auto Lymphocyte % : x  Auto Monocyte % : x  Auto Eosinophil % : x  Auto Basophil % : x      09-01 Na138 mmol/L Glu 97 mg/dL K+ 3.8 mmol/L Cr  0.84 mg/dL BUN 14.4 mg/dL Phos n/a   Alb n/a   PAB n/a           Skin: none noted    Nutrition focused physical exam conducted - found signs of malnutrition [x ]absent [ ]present    Subcutaneous fat loss: [x ] Orbital fat pads region, [x ]Buccal fat region, [x ]Triceps region,  [ ]Ribs region    Muscle wasting: [x ]Temples region, [x ]Clavicle region, [x ]Shoulder region, [ ]Scapula region, [ ]Interosseous region,  [ ]thigh region, [ ]Calf region    Estimated Needs:   [x ] no change since previous assessment  [ ] recalculated:     Current Nutrition Diagnosis:  Malnutrition Severe (chronic) Related to inadequate protein-energy intake with increased needs and altered GI function in setting of rectal cancer on chemo  as evidenced by meeting < 75% est needs > 1 mo, 15lb(7.7%) weight loss x 1month, NFPE. Pt tolerating PO intake fair. Last BM 8/31 as per flowsheets.       Recommendations:   Continue MVI and Vit. C daily (500mg)  Continue Ensure TID to optimize po intake and provide an additional 350 kcal, 20g protein per serving     Monitoring and Evaluation:   [x ] PO intake [x ] Tolerance to diet prescription [X] Weights  [X] Follow up per protocol [X] Labs:

## 2023-09-01 NOTE — CONSULT NOTE ADULT - PROVIDER SPECIALTY LIST ADULT
Cardiology
Vascular Surgery
Intervent Radiology
Heme/Onc
Neurosurgery
Rehab Medicine
Surgery
Neurology

## 2023-09-01 NOTE — CONSULT NOTE ADULT - SUBJECTIVE AND OBJECTIVE BOX
Neurology consult    PASTORA CHURCHILL 62y Male     Patient is a 62y old  Male who presents with a chief complaint of syncope (01 Sep 2023 19:31)      HPI:   63yo Male with PMH HTN, HLD, CAD  Rectal cancer s/p urostomy and  colostomy who presents after syncope and collapse at home .  Per wife around 9am she heard him fall. He was found down on stomach and was unresponsive mouning when wife came to check on him ,CPR was innidiated, unsure if PT was ever pulseless. Per  ems upon arrival pt w/ pulse and spontaneous respirations but confused. ON arrival to ED pt w/ stable vitals, alert but only oriented to person.He does remember any related to events or this am    PT otherwise neurological intact.  (26 Aug 2023 14:44)    PMH: Chest pain    HTN (hypertension)    HLD (hyperlipidemia)    CAD (coronary artery disease)    Mitral regurgitation    Rectal cancer    Depression    Neuropathy    Stented coronary artery    H/O carotid stenosis    H/O renal calculi    DANO (iron deficiency anemia)    Umbilical hernia    Splenic artery aneurysm       PSH: H/O cardiac catheterization    Cardiac disorder    History of CEA (carotid endarterectomy)    History of rectal surgery    H/O sigmoidoscopy    History of removal of Port-a-Cath      FAMILY HISTORY:  FH: heart disease  father    FH: ALS (amyotrophic lateral sclerosis) (Mother)      SOCIAL HISTORY:  No history of tobacco or alcohol use     Allergies    No Known Allergies    Intolerances    chocolate (Vomiting)        Vital Signs Last 24 Hrs  T(C): 37.3 (01 Sep 2023 17:11), Max: 37.3 (01 Sep 2023 17:11)  T(F): 99.1 (01 Sep 2023 17:11), Max: 99.1 (01 Sep 2023 17:11)  HR: 97 (01 Sep 2023 17:11) (87 - 136)  BP: 153/80 (01 Sep 2023 17:11) (133/66 - 153/80)  BP(mean): --  RR: 18 (01 Sep 2023 17:11) (18 - 18)  SpO2: 97% (01 Sep 2023 17:11) (96% - 98%)    Parameters below as of 01 Sep 2023 17:11  Patient On (Oxygen Delivery Method): room air      MEDICATIONS    acetaminophen     Tablet .. 975 milliGRAM(s) Oral every 8 hours PRN  aspirin enteric coated 81 milliGRAM(s) Oral daily  atorvastatin 80 milliGRAM(s) Oral at bedtime  cholecalciferol 2000 Unit(s) Oral daily  dronabinol 2.5 milliGRAM(s) Oral two times a day  enoxaparin Injectable 40 milliGRAM(s) SubCutaneous every 24 hours  gabapentin 100 milliGRAM(s) Oral three times a day  loperamide 2 milliGRAM(s) Oral four times a day PRN  magnesium oxide 400 milliGRAM(s) Oral three times a day with meals  melatonin 5 milliGRAM(s) Oral at bedtime  metoprolol succinate ER 50 milliGRAM(s) Oral daily  minocycline 100 milliGRAM(s) Oral two times a day  OLANZapine 2.5 milliGRAM(s) Oral at bedtime  OLANZapine Injectable 2.5 milliGRAM(s) IntraMuscular every 6 hours PRN  pantoprazole    Tablet 40 milliGRAM(s) Oral every 12 hours        LABS:  CBC Full  -  ( 01 Sep 2023 06:40 )  WBC Count : 9.13 K/uL  RBC Count : 3.02 M/uL  Hemoglobin : 9.4 g/dL  Hematocrit : 29.4 %  Platelet Count - Automated : 415 K/uL  Mean Cell Volume : 97.4 fl  Mean Cell Hemoglobin : 31.1 pg  Mean Cell Hemoglobin Concentration : 32.0 gm/dL  Auto Neutrophil # : x  Auto Lymphocyte # : x  Auto Monocyte # : x  Auto Eosinophil # : x  Auto Basophil # : x  Auto Neutrophil % : x  Auto Lymphocyte % : x  Auto Monocyte % : x  Auto Eosinophil % : x  Auto Basophil % : x    Urinalysis Basic - ( 01 Sep 2023 06:40 )    Color: x / Appearance: x / SG: x / pH: x  Gluc: 97 mg/dL / Ketone: x  / Bili: x / Urobili: x   Blood: x / Protein: x / Nitrite: x   Leuk Esterase: x / RBC: x / WBC x   Sq Epi: x / Non Sq Epi: x / Bacteria: x      09-01    138  |  103  |  14.4  ----------------------------<  97  3.8   |  23.0  |  0.84    Ca    8.4      01 Sep 2023 06:40  Mg     1.9     09-01        Hemoglobin A1C:       On Neurological Examination:    Mental Status - Patient is  awake, slow in responding but is oriented X3 Although at some point he said he thought he was in [place].  Speech is fluent. Patient can name, repeat and follow commands correctly  There is no dysarthria.    Cranial Nerves - PERRL, EOMI,  Visual fields are full to finger counting, no gross facial asymmetry, tongue/uvula midline    Motor Exam -   Right upper ---5/5 No drift  Left upper ---5/5 No drift  Right lower ---5/5 No drift  Left lower  ---5/5 No drift     nml bulk/tone    Sensory    Intact to light touch and pinprick bilaterally    Coord: FTN intact bilaterally     Gait -  Not assessed.       RADIOLOGY ( All neurological imaging studies were independently reviewed and interpreted by me)  CTH     CT Head No Cont (08.28.23 @ 17:19) >  IMPRESSION:  Stable extra-axial hemorrhage as described above. No new or worsening   intracranial hemorrhage.    Multiple facial bone fractures again seen.    CHARLI ALFORD MD; Attending Radiologist     CT Head No Cont (08.27.23 @ 02:01) >  IMPRESSION:  2 new tiny hyperdense foci right frontal white matter concerning for tiny   parenchymal hemorrhages. Slightly increased subarachnoid hemorrhage in   the left posterior fossa. Questionable trace subdural along left   tentorial leaflet. No acute calvarial fracture. Right-sided maxillofacial   fractures as recently described.    Findings were discussed with IBRAHIMA Barrera at 2:40 am on 8/27/2023 with read   back verification.      MANISHA RAGLAND MD; Attending Radiologist    CTA  CTP  MRI:  MR Head No Cont (09.01.23 @ 15:30) >    IMPRESSION:    Loss of flow void within the imaged cervical, petrous and cavernous left   ICA segment concerning for high-grade flow limiting stenosis versus   occlusion. Recommend further evaluation with CT angiogram of the head.  Punctate acute infarction in the right centrum semiovale.  Innumerable chronic microhemorrhages aligned in a linear configuration in   the right frontal and parietal centrum semiovale may be related to a   remote hemorrhagic ischemic event. There is tiny superimposed focus of T1   hyperintensity which may represent a subacute hemorrhagic component which   is also seen on the corresponding head CT from 8/28/2023.  Stable small subarachnoid hemorrhage.      Notification to clinician of alert:  Dr. LESA ROJAS was notified about the above findings at 4:10   PM on 9/1/2023 with readback confirmation. The opportunity for questions   was provided and all questions asked were answered.    DIANELYS STEWART MD; Attending Radiologist    MR Lumbar Spine w/wo IV Cont (08.27.23 @ 16:08) >  IMPRESSION: Fracture involving L1 with associated edema with associated   enhancement as described above. There is evidence of an epidural process   seen ventrally in the region of this fracture as described above.      YNES MARTINES MD; Attending Radiologist    CT Lumbar Spine Reform No Cont (08.27.23 @ 03:30) >  IMPRESSION: Acute mild L1 compression fracture with 15% height loss.   Minimal fracture fragment retropulsion. Consider MRI as clinically   warranted.    GAYLA TERRAZAS MD; Attending Radiologist      TTE

## 2023-09-02 LAB
ANION GAP SERPL CALC-SCNC: 14 MMOL/L — SIGNIFICANT CHANGE UP (ref 5–17)
BUN SERPL-MCNC: 14 MG/DL — SIGNIFICANT CHANGE UP (ref 8–20)
CALCIUM SERPL-MCNC: 8.7 MG/DL — SIGNIFICANT CHANGE UP (ref 8.4–10.5)
CHLORIDE SERPL-SCNC: 103 MMOL/L — SIGNIFICANT CHANGE UP (ref 96–108)
CHOLEST SERPL-MCNC: 96 MG/DL — SIGNIFICANT CHANGE UP
CO2 SERPL-SCNC: 21 MMOL/L — LOW (ref 22–29)
CREAT SERPL-MCNC: 0.78 MG/DL — SIGNIFICANT CHANGE UP (ref 0.5–1.3)
EGFR: 100 ML/MIN/1.73M2 — SIGNIFICANT CHANGE UP
GLUCOSE SERPL-MCNC: 98 MG/DL — SIGNIFICANT CHANGE UP (ref 70–99)
HCT VFR BLD CALC: 28.9 % — LOW (ref 39–50)
HDLC SERPL-MCNC: 29 MG/DL — LOW
HGB BLD-MCNC: 9.3 G/DL — LOW (ref 13–17)
LIPID PNL WITH DIRECT LDL SERPL: 41 MG/DL — SIGNIFICANT CHANGE UP
MCHC RBC-ENTMCNC: 31.5 PG — SIGNIFICANT CHANGE UP (ref 27–34)
MCHC RBC-ENTMCNC: 32.2 GM/DL — SIGNIFICANT CHANGE UP (ref 32–36)
MCV RBC AUTO: 98 FL — SIGNIFICANT CHANGE UP (ref 80–100)
NON HDL CHOLESTEROL: 67 MG/DL — SIGNIFICANT CHANGE UP
PLATELET # BLD AUTO: 375 K/UL — SIGNIFICANT CHANGE UP (ref 150–400)
POTASSIUM SERPL-MCNC: 3.7 MMOL/L — SIGNIFICANT CHANGE UP (ref 3.5–5.3)
POTASSIUM SERPL-SCNC: 3.7 MMOL/L — SIGNIFICANT CHANGE UP (ref 3.5–5.3)
RBC # BLD: 2.95 M/UL — LOW (ref 4.2–5.8)
RBC # FLD: 15.8 % — HIGH (ref 10.3–14.5)
SODIUM SERPL-SCNC: 138 MMOL/L — SIGNIFICANT CHANGE UP (ref 135–145)
TRIGL SERPL-MCNC: 130 MG/DL — SIGNIFICANT CHANGE UP
WBC # BLD: 7.55 K/UL — SIGNIFICANT CHANGE UP (ref 3.8–10.5)
WBC # FLD AUTO: 7.55 K/UL — SIGNIFICANT CHANGE UP (ref 3.8–10.5)

## 2023-09-02 PROCEDURE — 99233 SBSQ HOSP IP/OBS HIGH 50: CPT

## 2023-09-02 RX ADMIN — GABAPENTIN 100 MILLIGRAM(S): 400 CAPSULE ORAL at 21:07

## 2023-09-02 RX ADMIN — GABAPENTIN 100 MILLIGRAM(S): 400 CAPSULE ORAL at 14:07

## 2023-09-02 RX ADMIN — Medication 81 MILLIGRAM(S): at 14:08

## 2023-09-02 RX ADMIN — Medication 2000 UNIT(S): at 14:07

## 2023-09-02 RX ADMIN — GABAPENTIN 100 MILLIGRAM(S): 400 CAPSULE ORAL at 06:12

## 2023-09-02 RX ADMIN — PANTOPRAZOLE SODIUM 40 MILLIGRAM(S): 20 TABLET, DELAYED RELEASE ORAL at 17:53

## 2023-09-02 RX ADMIN — PANTOPRAZOLE SODIUM 40 MILLIGRAM(S): 20 TABLET, DELAYED RELEASE ORAL at 06:12

## 2023-09-02 RX ADMIN — Medication 50 MILLIGRAM(S): at 06:12

## 2023-09-02 RX ADMIN — ENOXAPARIN SODIUM 40 MILLIGRAM(S): 100 INJECTION SUBCUTANEOUS at 21:07

## 2023-09-02 RX ADMIN — OLANZAPINE 2.5 MILLIGRAM(S): 15 TABLET, FILM COATED ORAL at 21:07

## 2023-09-02 RX ADMIN — MINOCYCLINE HYDROCHLORIDE 100 MILLIGRAM(S): 45 TABLET, EXTENDED RELEASE ORAL at 17:53

## 2023-09-02 RX ADMIN — Medication 5 MILLIGRAM(S): at 21:07

## 2023-09-02 RX ADMIN — Medication 2.5 MILLIGRAM(S): at 06:12

## 2023-09-02 RX ADMIN — MAGNESIUM OXIDE 400 MG ORAL TABLET 400 MILLIGRAM(S): 241.3 TABLET ORAL at 17:53

## 2023-09-02 RX ADMIN — ATORVASTATIN CALCIUM 80 MILLIGRAM(S): 80 TABLET, FILM COATED ORAL at 21:07

## 2023-09-02 RX ADMIN — MAGNESIUM OXIDE 400 MG ORAL TABLET 400 MILLIGRAM(S): 241.3 TABLET ORAL at 14:07

## 2023-09-02 RX ADMIN — MINOCYCLINE HYDROCHLORIDE 100 MILLIGRAM(S): 45 TABLET, EXTENDED RELEASE ORAL at 06:12

## 2023-09-02 RX ADMIN — Medication 2.5 MILLIGRAM(S): at 17:53

## 2023-09-02 NOTE — PROGRESS NOTE ADULT - ASSESSMENT
A: 61yo Male with PMH HTN, HLD, CAD, R carotid stenosis s/p TCAR ( 10 /17/19), rectal ca (s/p urostomy and colostomy w/ concern for mets) on chemotx admitted to medicine for syncope, with working differential of chemo -related syncope, orthostatic event and stroke. MR head demonstrating R acute infarct, chronic microhemorrhages and  L distal ICA lesions ( at cervical, petrous and cavernous segments). Likely chronic, reports known complete ICA occlusion.  No neurodeficits.      A: 61yo Male with PMH HTN, HLD, CAD, R carotid stenosis s/p TCAR ( 10 /17/19), rectal ca (s/p urostomy and colostomy w/ concern for mets) on chemotx admitted to medicine for syncope, with working differential of chemo -related syncope, orthostatic event and stroke. MR head demonstrating R acute infarct, chronic microhemorrhages and  L distal ICA lesions ( at cervical, petrous and cavernous segments). Likely chronic, reports known complete ICA occlusion.  No neurodeficits.     Plan:  -Known L carotid occlusion  -Syncope unlikely to be related to carotid disease  -Will f/u formal carotid duplex  -Vascular will continue to follow  -Remainder of care per primary

## 2023-09-02 NOTE — PROGRESS NOTE ADULT - SUBJECTIVE AND OBJECTIVE BOX
Chauncey Goodwin M.D.    Patient is a 62y old  Male who presents with a chief complaint of syncope (02 Sep 2023 08:16)      SUBJECTIVE / OVERNIGHT EVENTS: No event overnight.     Patient denies chest pain, SOB, abd pain, N/V, fever, chills, dysuria or any other complaints. All remainder ROS negative.     MEDICATIONS  (STANDING):  aspirin enteric coated 81 milliGRAM(s) Oral daily  atorvastatin 80 milliGRAM(s) Oral at bedtime  cholecalciferol 2000 Unit(s) Oral daily  dronabinol 2.5 milliGRAM(s) Oral two times a day  enoxaparin Injectable 40 milliGRAM(s) SubCutaneous every 24 hours  gabapentin 100 milliGRAM(s) Oral three times a day  magnesium oxide 400 milliGRAM(s) Oral three times a day with meals  melatonin 5 milliGRAM(s) Oral at bedtime  metoprolol succinate ER 50 milliGRAM(s) Oral daily  minocycline 100 milliGRAM(s) Oral two times a day  OLANZapine 2.5 milliGRAM(s) Oral at bedtime  pantoprazole    Tablet 40 milliGRAM(s) Oral every 12 hours    MEDICATIONS  (PRN):  acetaminophen     Tablet .. 975 milliGRAM(s) Oral every 8 hours PRN Moderate Pain (4 - 6)  loperamide 2 milliGRAM(s) Oral four times a day PRN Diarrhea  OLANZapine Injectable 2.5 milliGRAM(s) IntraMuscular every 6 hours PRN agitation      I&O's Summary    01 Sep 2023 07:01  -  02 Sep 2023 07:00  --------------------------------------------------------  IN: 0 mL / OUT: 1400 mL / NET: -1400 mL    02 Sep 2023 07:01  -  02 Sep 2023 14:09  --------------------------------------------------------  IN: 0 mL / OUT: 1000 mL / NET: -1000 mL        PHYSICAL EXAM:  Vital Signs Last 24 Hrs  T(C): 36.5 (02 Sep 2023 11:02), Max: 37.3 (01 Sep 2023 17:11)  T(F): 97.7 (02 Sep 2023 11:02), Max: 99.1 (01 Sep 2023 17:11)  HR: 91 (02 Sep 2023 11:02) (90 - 100)  BP: 153/77 (02 Sep 2023 11:02) (153/77 - 170/83)  BP(mean): --  RR: 19 (02 Sep 2023 06:01) (18 - 19)  SpO2: 96% (02 Sep 2023 11:02) (96% - 97%)    Parameters below as of 02 Sep 2023 11:02  Patient On (Oxygen Delivery Method): room air      CONSTITUTIONAL: NAD, awake and alert  RESPIRATORY: Normal respiratory effort; lungs are clear to auscultation bilaterally  CARDIOVASCULAR: Regular rate and rhythm, no LE edema  ABDOMEN: Nontender to palpation, normoactive bowel sounds  PSYCH: affect appropriate  NEUROLOGY: A+O to person, place, and time; CN 2-12 are intact and symmetric; no gross sensory deficits;       LABS:                        9.3    7.55  )-----------( 375      ( 02 Sep 2023 06:23 )             28.9     09-02    138  |  103  |  14.0  ----------------------------<  98  3.7   |  21.0<L>  |  0.78    Ca    8.7      02 Sep 2023 06:23  Mg     1.9     09-01            Urinalysis Basic - ( 02 Sep 2023 06:23 )    Color: x / Appearance: x / SG: x / pH: x  Gluc: 98 mg/dL / Ketone: x  / Bili: x / Urobili: x   Blood: x / Protein: x / Nitrite: x   Leuk Esterase: x / RBC: x / WBC x   Sq Epi: x / Non Sq Epi: x / Bacteria: x        CAPILLARY BLOOD GLUCOSE          RADIOLOGY & ADDITIONAL TESTS:  Results Reviewed:   Imaging Personally Reviewed:  Electrocardiogram Personally Reviewed:

## 2023-09-02 NOTE — PROGRESS NOTE ADULT - ASSESSMENT
62M with PMHX rectal adenocarcinoma c/b mets to bladder and prostate on current chemo FOLFIRI/Panitumamab (Last dose 8/10) with neulasta support on 8/12, s/p Lower Abd Resection/Abdominoperineal Resection (2020, 2022), S/P fistula repair between bowel and bladder, HTN, HLD, CAD s/p PCI x5 presenting after found down at home by family s/p syncopal event     [facility] CT 8/18/2023 CT of abdomen dand pelvis W/WO IV CONTRAST: BONES/SOFT TISSUES: No suspicious osseous lesion. Degenerative changes of the vertebral spine. Redemonstration of post median laparotomy changes  IMPRESSION: Since July 24, 2023, 1. Unchanged size of presacral mass with development of internal gas foci, possibly from necrosis or superimposed infection. Unchanged drain position within the mass. 2. Unchanged circumferential wall thickening of the urinary bladder with surrounding fat stranding, probably cystitis. In addition, new trace amount of air within the non-dependent portions of the bladder lumen, possibly due to recent instrumentation versus result of fistulization with posteriorly adjacent mass.    Metastatic Rectal ca   - on active chemo FOLFIRI + Panitumamab ; last dose 8/10 with neulasta support 8/12  - pt to resume care with [facility] after d/c     Syncopal episode  L1 compression fx  SAH/SDH  - CTH shown small right frontal intraparenchymal hemorrhage likely left posterior fossa subarachnoid hemorrhage and possible Trace left tentorial SDH  - MRI L spine w/wo reviewed, [facility] to review imaging   - recent CT w/ IV con [facility] 8/18/23 w/o suspicious osseous lesions  - hold IR biopsy until [facility] reviews recent imaging for further recommendations   - fitted for TLSO brace   - further management per neurosurgery   - cardiology on board, considering ILR prior to DC  - follow cardio recs   - antiplatelet therapy on hold, resume per neurosurgery recs /poor canddidate for A/C  - s/p ILR  - 2nd neurosx opinion as per daughter request. MRI    Anemia  multifactorial  Malignancy/Chemo  - prn PRBC transfusion for hgb < 7; Hb today 9.3      chronic diarrhea  - likely secondary to chemo.    - pt on immodium prn - may cont as inpt   - prn electrolyte repletion   - no blood in stool reported     thank you  Will update [facility] daily     [facility]  [phone]

## 2023-09-02 NOTE — PROGRESS NOTE ADULT - SUBJECTIVE AND OBJECTIVE BOX
62M with PMHX rectal adenocarcinoma c/b mets to bladder and prostate on current chemo FOLFIRI/Panitumamab (Last dose 8/10) with neulasta support on 8/12, s/p Lower Abd Resection/Abdominoperineal Resection (2020, 2022), S/P fistula repair between bowel and bladder, HTN, HLD, CAD s/p PCI x5 presenting s/p syncope event at home 8/26.   CTH shown small right frontal intraparenchymal hemorrhage likely left posterior fossa subarachnoid hemorrhage and possible Trace left tentorial SDH as well as acute L1 compression fracture with mild bony retropulsion.   MRI L spine w/wo con 8/27: Fracture involving the superior aspect of the L1 vertebral body, There is evidence of abnormal enhancement component seen in this vertebral body as well. While this could be compatible with a posttraumatic fracture, given patient's history of trauma the possibility of an osteoporotic or pathologic fracture must be considered as well. Clinical correlation and continued close interval follow-up is recommended. There is evidence of abnormal signal in the ventral epidural space extending from the bottom of T12 to bottom of L2. This is more prominent on the right side than left and does appear to involve the right spinal canal is well. This could be compatible with an epidural hematoma possibility of epidural extension of tumor cannot be entirely excluded.   Right sided maxillofacial fractures. Seen by ENT for facial fractures  Neuro surgery, cardiology on board    9/2:d  s/p ILR placement  afebrile  comfortable. Able to sit out of bed.    MEDICATIONS  (STANDING):  aspirin enteric coated 81 milliGRAM(s) Oral daily  atorvastatin 80 milliGRAM(s) Oral at bedtime  cholecalciferol 2000 Unit(s) Oral daily  dronabinol 2.5 milliGRAM(s) Oral two times a day  enoxaparin Injectable 40 milliGRAM(s) SubCutaneous every 24 hours  gabapentin 100 milliGRAM(s) Oral three times a day  magnesium oxide 400 milliGRAM(s) Oral three times a day with meals  melatonin 5 milliGRAM(s) Oral at bedtime  metoprolol succinate ER 50 milliGRAM(s) Oral daily  minocycline 100 milliGRAM(s) Oral two times a day  OLANZapine 2.5 milliGRAM(s) Oral at bedtime  pantoprazole    Tablet 40 milliGRAM(s) Oral every 12 hours    MEDICATIONS  (PRN):  acetaminophen     Tablet .. 975 milliGRAM(s) Oral every 8 hours PRN Moderate Pain (4 - 6)  loperamide 2 milliGRAM(s) Oral four times a day PRN Diarrhea  OLANZapine Injectable 2.5 milliGRAM(s) IntraMuscular every 6 hours PRN agitation      Vital Signs Last 24 Hrs  T(C): 36.5 (02 Sep 2023 11:02), Max: 37.3 (01 Sep 2023 17:11)  T(F): 97.7 (02 Sep 2023 11:02), Max: 99.1 (01 Sep 2023 17:11)  HR: 91 (02 Sep 2023 11:02) (90 - 100)  BP: 153/77 (02 Sep 2023 11:02) (153/77 - 170/83)  BP(mean): --  RR: 19 (02 Sep 2023 06:01) (18 - 19)  SpO2: 96% (02 Sep 2023 11:02) (96% - 97%)    Parameters below as of 02 Sep 2023 11:02  Patient On (Oxygen Delivery Method): room air    · Constitutional: in no acute distress, resting comfortably ;   · Eyes:	PERRL   · Respiratory:	clear to auscultation bilaterally; no wheezes; no rhonchi; airway patent  · Cardiovascular: regular rate and rhythm  · Gastrointestinal:  no guarding  · Neurological: responds to verbal commands  · Mental Status	awake, alert  · Skin	warm and dry; color normal; rash  · Musculoskeletal	ROM intact; strength 5/5 bilateral upper extremities  · Additional PE	right facial swelling and tender     Labs:                          9.3    7.55  )-----------( 375      ( 02 Sep 2023 06:23 )             28.9                           9.4    9.13  )-----------( 415      ( 01 Sep 2023 06:40 )             29.4     CBC:                       9.3    12.63 )-----------( 385      ( 31 Aug 2023 03:00 )             26.9       09-02    138  |  103  |  14.0  ----------------------------<  98  3.7   |  21.0<L>  |  0.78    Ca    8.7      02 Sep 2023 06:23  Mg     1.9     09-01 09-01    138  |  103  |  14.4  ----------------------------<  97  3.8   |  23.0  |  0.84    Ca    8.4      01 Sep 2023 06:40  Mg     1.9     09-01

## 2023-09-02 NOTE — PROGRESS NOTE ADULT - SUBJECTIVE AND OBJECTIVE BOX
Patient seen and examined at bedside.     Vitals:    Labs:    Exam:  Gen: pt lying in bed, alert, in NAD  Resp: unlabored  CVS: RRR  Abd: soft, NT, ND  Ext: moving all extremities spontaneously, sensation intact, pulses 2+   Patient seen and examined at bedside. No complaints this morning. Denies sob, c/p, dizziness, weakness/numbness and overall is doing well.     MEDICATIONS  (STANDING):  aspirin enteric coated 81 milliGRAM(s) Oral daily  atorvastatin 80 milliGRAM(s) Oral at bedtime  cholecalciferol 2000 Unit(s) Oral daily  dronabinol 2.5 milliGRAM(s) Oral two times a day  enoxaparin Injectable 40 milliGRAM(s) SubCutaneous every 24 hours  gabapentin 100 milliGRAM(s) Oral three times a day  magnesium oxide 400 milliGRAM(s) Oral three times a day with meals  melatonin 5 milliGRAM(s) Oral at bedtime  metoprolol succinate ER 50 milliGRAM(s) Oral daily  minocycline 100 milliGRAM(s) Oral two times a day  OLANZapine 2.5 milliGRAM(s) Oral at bedtime  pantoprazole    Tablet 40 milliGRAM(s) Oral every 12 hours    MEDICATIONS  (PRN):  acetaminophen     Tablet .. 975 milliGRAM(s) Oral every 8 hours PRN Moderate Pain (4 - 6)  loperamide 2 milliGRAM(s) Oral four times a day PRN Diarrhea  OLANZapine Injectable 2.5 milliGRAM(s) IntraMuscular every 6 hours PRN agitation    Vital Signs Last 24 Hrs  T(C): 36.9 (02 Sep 2023 06:01), Max: 37.3 (01 Sep 2023 17:11)  T(F): 98.4 (02 Sep 2023 06:01), Max: 99.1 (01 Sep 2023 17:11)  HR: 90 (02 Sep 2023 06:01) (90 - 136)  BP: 170/83 (02 Sep 2023 06:01) (153/80 - 170/83)  BP(mean): --  RR: 19 (02 Sep 2023 06:01) (18 - 19)  SpO2: 97% (02 Sep 2023 06:01) (97% - 97%)    Parameters below as of 02 Sep 2023 06:01  Patient On (Oxygen Delivery Method): room air                          9.3    7.55  )-----------( 375      ( 02 Sep 2023 06:23 )             28.9   09-02    138  |  103  |  14.0  ----------------------------<  98  3.7   |  21.0<L>  |  0.78    Ca    8.7      02 Sep 2023 06:23  Mg     1.9     09-01    Exam:  Constitutional: patient resting comfortably in bed, in no acute distress  HEENT: R periorbital ecchymosis   Respiratory: unlabored respirations  Gastrointestinal: Abdomen soft, non-tender, non-distended  Neurological: No focal deficits   Musculoskeletal: normal ROM, normal/equal strength in b/l upper and lower extremities

## 2023-09-02 NOTE — PROGRESS NOTE ADULT - ASSESSMENT
61 yo Male with PMHx of HTN , rectal cancer on chemo,hx colostomy,ileoconduit  admitted for syncope, reported weakness poor oral intake diarrhea due to recent chemo s/p syncope,fall at home found to have rt facial fx. admitted to medicine. Pt had another fall last night. . Patient said he was trying to get up from bed and  that is what caused him to fall out of bed. Patient is unsure if he hit head. CT head: 2 new tiny hyperdense foci right frontal white matter concerning for tiny parenchymal hemorrhages. Slightly increased subarachnoid hemorrhage in   the left posterior fossa. Questionable trace subdural along left tentorial leaflet. No acute calvarial fracture. Right-sided maxillofacial   fractures as recently described.CT abd/ pelvis: Acute L1 compression fx with mild retropulsion of bone into the epidural space.No acute post traumatic injury otherwise.    Acute R centrum semiovale infarct  -result noted on MRI  -TTE with normal EF  -Tele  -neuro recs yamilet  -on statin and ASA  -ILR 08/31    [facility] sec trauma/fall  -CT head: 2 new tiny hyperdense foci right frontal white matter concerning for tiny parenchymal hemorrhages. Slightly increased subarachnoid hemorrhage in the left posterior fossa. Questionable trace subdural along left tentorial leaflet. No acute calvarial fracture.  -cth 08/27 stable bleed size  -Repeat CTH stable, ASA started on 8/31  -resume asa, lovenox per neurosurgery  -family request second opinion from Dr. Karlo Boyd rec mri brain, result noted above    anemia likely from malignancy/chemo  -s/p 1 u prbc 08/29  -hb stable at 9.3  -stool occult neg  -ppi  -no active bleed present  -hem/onc following    Acute L1 compression fx with mild retropulsion of bone into the epidural space s/p fall  -MRI Spine with fx and edema noted  -TLSO brace provided by ortho  -f/u neurosurgery rec--family does not want any biopsy or further imaging until rec by [facility] hem/onc  -pending [facility] opinion - f/u hem/onc rec    Right-sided maxillofacial fractures s/p fall  -per facial surgery rec, no intervention  -pain meds   -cold compress     Unresponsiveness vs syncope/fall  hx PAF  -concern for orthostatic hypotension likely from hypovolemia and has had decreased PO intake while on chemotherapy and additionally with known neuropathy/unsteady gait  post chemotherapy,   -neuro check  -orth bp mildly positive, repeat in AM  -resume asa  -tele  -ILR 08/31  -toprol xl 50 mg qd per cardiology  -poor chronic AC candidate (hx gib)    HFrEF without acute decompensation/CAD  -hold off on ACEi/ARB/ARNI therapy for now  -continue toprol xl  -on ASA    Rectal cancer mets to bladder   -with urostomy and colostomy bag   -under [facility] getting chemo   -[facility] hem/[facility]c [facility]    Agitation  sundowning  -Neurontin 100 mg tid   -zyprexa bedtime, increase as needed  -spoke with     DVT ppx: Lovenox  Dispo: once cleared by neuro, PM&R rec acute rehab     61 yo Male with PMHx of HTN , rectal cancer on chemo,hx colostomy,ileoconduit  admitted for syncope, reported weakness poor oral intake diarrhea due to recent chemo s/p syncope,fall at home found to have rt facial fx. admitted to medicine. Pt had another fall last night. . Patient said he was trying to get up from bed and  that is what caused him to fall out of bed. Patient is unsure if he hit head. CT head: 2 new tiny hyperdense foci right frontal white matter concerning for tiny parenchymal hemorrhages. Slightly increased subarachnoid hemorrhage in   the left posterior fossa. Questionable trace subdural along left tentorial leaflet. No acute calvarial fracture. Right-sided maxillofacial   fractures as recently described.CT abd/ pelvis: Acute L1 compression fx with mild retropulsion of bone into the epidural space.No acute post traumatic injury otherwise.    Acute R centrum semiovale infarct  -result noted on MRI  -TTE with normal EF  -Tele  -neuro recs yamilet  -on statin and ASA  -ILR 08/31    [facility] sec trauma/fall  -CT head: 2 new tiny hyperdense foci right frontal white matter concerning for tiny parenchymal hemorrhages. Slightly increased subarachnoid hemorrhage in the left posterior fossa. Questionable trace subdural along left tentorial leaflet. No acute calvarial fracture.  -cth 08/27 stable bleed size  -Repeat CTH stable, ASA started on 8/31  -resume asa, lovenox per neurosurgery  -family request second opinion from Dr. Karlo Boyd rec mri brain, result noted above    anemia likely from malignancy/chemo  -s/p 1 u prbc 08/29  -hb stable at 9.3  -stool occult neg  -ppi  -no active bleed present  -hem/onc following    Acute L1 compression fx with mild retropulsion of bone into the epidural space s/p fall  -MRI Spine with fx and edema noted  -TLSO brace provided by ortho  -per family's discussion with [facility], no plan for biopsy, as they think the compression fx is 2/2 the falls and not the mets  -pending [facility] opinion - f/u hem/onc rec    Right-sided maxillofacial fractures s/p fall  -per facial surgery rec, no intervention  -pain meds   -cold compress     Unresponsiveness vs syncope/fall  hx PAF  -concern for orthostatic hypotension likely from hypovolemia and has had decreased PO intake while on chemotherapy and additionally with known neuropathy/unsteady gait post chemotherapy,   -neuro check  -orth bp mildly positive, repeat in AM  -resume asa  -tele  -ILR 08/31  -toprol xl 50 mg qd per cardiology  -poor chronic AC candidate (hx gib)    HFrEF without acute decompensation/CAD  -hold off on ACEi/ARB/ARNI therapy for now  -continue toprol xl  -on ASA    Rectal cancer mets to bladder   -with urostomy and colostomy bag   -under [facility] getting chemo   -[facility] hem[facility]c [facility]    Agitation  sundowning  -Neurontin 100 mg tid   -zyprexa bedtime, increase as needed  -spoke with     DVT ppx: Lovenox  Dispo: once cleared by neuro, PM&R rec acute rehab     61 yo Male with PMHx of HTN , rectal cancer on chemo,hx colostomy,ileoconduit  admitted for syncope, reported weakness poor oral intake diarrhea due to recent chemo s/p syncope,fall at home found to have rt facial fx. admitted to medicine. Pt had another fall last night. . Patient said he was trying to get up from bed and  that is what caused him to fall out of bed. Patient is unsure if he hit head. CT head: 2 new tiny hyperdense foci right frontal white matter concerning for tiny parenchymal hemorrhages. Slightly increased subarachnoid hemorrhage in   the left posterior fossa. Questionable trace subdural along left tentorial leaflet. No acute calvarial fracture. Right-sided maxillofacial   fractures as recently described.CT abd/ pelvis: Acute L1 compression fx with mild retropulsion of bone into the epidural space.No acute post traumatic injury otherwise.    Acute R centrum semiovale infarct  -result noted on MRI  -TTE with normal EF  -Tele  -neuro recs yamilet  -on statin and ASA  -ILR 08/31    ICH sec trauma/fall  -CT head: 2 new tiny hyperdense foci right frontal white matter concerning for tiny parenchymal hemorrhages. Slightly increased subarachnoid hemorrhage in the left posterior fossa. Questionable trace subdural along left tentorial leaflet. No acute calvarial fracture.  -cth 08/27 stable bleed size  -Repeat CTH stable, ASA started on 8/31  -resume asa, lovenox per neurosurgery  -family request second opinion from Dr. Karlo Boyd rec mri brain, result noted above    anemia likely from malignancy/chemo  -s/p 1 u prbc 08/29  -hb stable at 9.3  -stool occult neg  -ppi  -no active bleed present  -hem/onc following    Acute L1 compression fx with mild retropulsion of bone into the epidural space s/p fall  -MRI Spine with fx and edema noted  -TLSO brace provided by ortho  -per family's discussion with [facility], no plan for biopsy, as they think the compression fx is 2/2 the falls and not the mets    Right-sided maxillofacial fractures s/p fall  -per facial surgery rec, no intervention  -pain meds   -cold compress     Unresponsiveness vs syncope/fall  hx PAF  -concern for orthostatic hypotension likely from hypovolemia and has had decreased PO intake while on chemotherapy and additionally with known neuropathy/unsteady gait post chemotherapy,   -neuro check  -orth bp mildly positive, repeat in AM  -resume asa  -tele  -ILR 08/31  -toprol xl 50 mg qd per cardiology  -poor chronic AC candidate (hx gib)    HFrEF without acute decompensation/CAD  -hold off on ACEi/ARB/ARNI therapy for now  -continue toprol xl  -on ASA    Rectal cancer mets to bladder   -with urostomy and colostomy bag   -under [facility] getting chemo   -[facility] hem[facility]c [facility]    Agitation  sundowning  -Neurontin 100 mg tid   -zyprexa bedtime, increase as needed  -spoke with     DVT ppx: Lovenox  Dispo: once cleared by neuro, PM&R rec acute rehab     61 yo Male with PMHx of HTN , rectal cancer on chemo,hx colostomy,ileoconduit  admitted for syncope, reported weakness poor oral intake diarrhea due to recent chemo s/p syncope,fall at home found to have rt facial fx. admitted to medicine. Pt had another fall last night. . Patient said he was trying to get up from bed and  that is what caused him to fall out of bed. Patient is unsure if he hit head. CT head: 2 new tiny hyperdense foci right frontal white matter concerning for tiny parenchymal hemorrhages. Slightly increased subarachnoid hemorrhage in   the left posterior fossa. Questionable trace subdural along left tentorial leaflet. No acute calvarial fracture. Right-sided maxillofacial   fractures as recently described.CT abd/ pelvis: Acute L1 compression fx with mild retropulsion of bone into the epidural space.No acute post traumatic injury otherwise.    Acute R centrum semiovale infarct  -result noted on MRI  -TTE with normal EF  -Tele  -neuro recs yamilet  -on statin and ASA  -ILR 08/31    [facility] sec trauma/fall  -CT head: 2 new tiny hyperdense foci right frontal white matter concerning for tiny parenchymal hemorrhages. Slightly increased subarachnoid hemorrhage in the left posterior fossa. Questionable trace subdural along left tentorial leaflet. No acute calvarial fracture.  -cth 08/27 stable bleed size  -Repeat CTH stable, ASA started on 8/31  -resume asa, lovenox per neurosurgery  -family request second opinion from Dr. Karlo Boyd rec mri brain, result noted above    anemia likely from malignancy/chemo  -s/p 1 u prbc 08/29  -hb stable at 9.3  -stool occult neg  -ppi  -no active bleed present  -hem/onc following    Acute L1 compression fx with mild retropulsion of bone into the epidural space s/p fall  -MRI Spine with fx and edema noted  -TLSO brace provided by ortho  -per family's discussion with [facility], no plan for biopsy, as they think the compression fx is 2/2 the falls and not the mets    Right-sided maxillofacial fractures s/p fall  -per facial surgery rec, no intervention  -pain meds   -cold compress     Unresponsiveness vs syncope/fall  hx PAF  -concern for orthostatic hypotension likely from hypovolemia and has had decreased PO intake while on chemotherapy and additionally with known neuropathy/unsteady gait post chemotherapy,   -neuro check  -orth bp mildly positive, repeat in AM  -resume asa  -tele  -ILR 08/31  -toprol xl 50 mg qd per cardiology  -poor chronic AC candidate (hx gib)    HFrEF without acute decompensation/CAD  -hold off on ACEi/ARB/ARNI therapy for now  -continue toprol xl  -on ASA    Rectal cancer mets to bladder   -with urostomy and colostomy bag   -under [facility] getting chemo   -[facility] hem/[facility]c [facility]    Agitation  sundowning  -Neurontin 100 mg tid   -zyprexa bedtime, increase as needed  -spoke with     DVT ppx: Lovenox  Dispo: once cleared by neuro, PM&R rec acute rehab, asked SW to sent out referral, likely dispo after labor day weekend

## 2023-09-03 LAB
ANION GAP SERPL CALC-SCNC: 16 MMOL/L — SIGNIFICANT CHANGE UP (ref 5–17)
BUN SERPL-MCNC: 13 MG/DL — SIGNIFICANT CHANGE UP (ref 8–20)
CALCIUM SERPL-MCNC: 9.3 MG/DL — SIGNIFICANT CHANGE UP (ref 8.4–10.5)
CHLORIDE SERPL-SCNC: 101 MMOL/L — SIGNIFICANT CHANGE UP (ref 96–108)
CO2 SERPL-SCNC: 22 MMOL/L — SIGNIFICANT CHANGE UP (ref 22–29)
CREAT SERPL-MCNC: 0.85 MG/DL — SIGNIFICANT CHANGE UP (ref 0.5–1.3)
EGFR: 97 ML/MIN/1.73M2 — SIGNIFICANT CHANGE UP
GLUCOSE SERPL-MCNC: 101 MG/DL — HIGH (ref 70–99)
HCT VFR BLD CALC: 33.1 % — LOW (ref 39–50)
HGB BLD-MCNC: 10.9 G/DL — LOW (ref 13–17)
MCHC RBC-ENTMCNC: 32.1 PG — SIGNIFICANT CHANGE UP (ref 27–34)
MCHC RBC-ENTMCNC: 32.9 GM/DL — SIGNIFICANT CHANGE UP (ref 32–36)
MCV RBC AUTO: 97.4 FL — SIGNIFICANT CHANGE UP (ref 80–100)
PLATELET # BLD AUTO: 538 K/UL — HIGH (ref 150–400)
POTASSIUM SERPL-MCNC: 4 MMOL/L — SIGNIFICANT CHANGE UP (ref 3.5–5.3)
POTASSIUM SERPL-SCNC: 4 MMOL/L — SIGNIFICANT CHANGE UP (ref 3.5–5.3)
RBC # BLD: 3.4 M/UL — LOW (ref 4.2–5.8)
RBC # FLD: 15.5 % — HIGH (ref 10.3–14.5)
SODIUM SERPL-SCNC: 139 MMOL/L — SIGNIFICANT CHANGE UP (ref 135–145)
WBC # BLD: 10.55 K/UL — HIGH (ref 3.8–10.5)
WBC # FLD AUTO: 10.55 K/UL — HIGH (ref 3.8–10.5)

## 2023-09-03 PROCEDURE — 99232 SBSQ HOSP IP/OBS MODERATE 35: CPT

## 2023-09-03 RX ORDER — CLOPIDOGREL BISULFATE 75 MG/1
75 TABLET, FILM COATED ORAL DAILY
Refills: 0 | Status: DISCONTINUED | OUTPATIENT
Start: 2023-09-03 | End: 2023-09-07

## 2023-09-03 RX ORDER — DRONABINOL 2.5 MG
2.5 CAPSULE ORAL
Refills: 0 | Status: DISCONTINUED | OUTPATIENT
Start: 2023-09-03 | End: 2023-09-05

## 2023-09-03 RX ADMIN — Medication 2.5 MILLIGRAM(S): at 17:06

## 2023-09-03 RX ADMIN — PANTOPRAZOLE SODIUM 40 MILLIGRAM(S): 20 TABLET, DELAYED RELEASE ORAL at 05:50

## 2023-09-03 RX ADMIN — MINOCYCLINE HYDROCHLORIDE 100 MILLIGRAM(S): 45 TABLET, EXTENDED RELEASE ORAL at 17:05

## 2023-09-03 RX ADMIN — Medication 81 MILLIGRAM(S): at 11:20

## 2023-09-03 RX ADMIN — Medication 2000 UNIT(S): at 11:20

## 2023-09-03 RX ADMIN — PANTOPRAZOLE SODIUM 40 MILLIGRAM(S): 20 TABLET, DELAYED RELEASE ORAL at 17:06

## 2023-09-03 RX ADMIN — ENOXAPARIN SODIUM 40 MILLIGRAM(S): 100 INJECTION SUBCUTANEOUS at 22:20

## 2023-09-03 RX ADMIN — MAGNESIUM OXIDE 400 MG ORAL TABLET 400 MILLIGRAM(S): 241.3 TABLET ORAL at 13:12

## 2023-09-03 RX ADMIN — OLANZAPINE 2.5 MILLIGRAM(S): 15 TABLET, FILM COATED ORAL at 22:20

## 2023-09-03 RX ADMIN — Medication 5 MILLIGRAM(S): at 22:21

## 2023-09-03 RX ADMIN — MAGNESIUM OXIDE 400 MG ORAL TABLET 400 MILLIGRAM(S): 241.3 TABLET ORAL at 08:36

## 2023-09-03 RX ADMIN — MAGNESIUM OXIDE 400 MG ORAL TABLET 400 MILLIGRAM(S): 241.3 TABLET ORAL at 17:06

## 2023-09-03 RX ADMIN — MINOCYCLINE HYDROCHLORIDE 100 MILLIGRAM(S): 45 TABLET, EXTENDED RELEASE ORAL at 05:50

## 2023-09-03 RX ADMIN — GABAPENTIN 100 MILLIGRAM(S): 400 CAPSULE ORAL at 13:12

## 2023-09-03 RX ADMIN — Medication 50 MILLIGRAM(S): at 05:50

## 2023-09-03 RX ADMIN — ATORVASTATIN CALCIUM 80 MILLIGRAM(S): 80 TABLET, FILM COATED ORAL at 22:20

## 2023-09-03 RX ADMIN — GABAPENTIN 100 MILLIGRAM(S): 400 CAPSULE ORAL at 22:21

## 2023-09-03 RX ADMIN — GABAPENTIN 100 MILLIGRAM(S): 400 CAPSULE ORAL at 05:51

## 2023-09-03 NOTE — PROGRESS NOTE ADULT - SUBJECTIVE AND OBJECTIVE BOX
Patient seen and examined at bedside. ZELALEM, doing well. No complaints this morning.    MEDICATIONS  (STANDING):  aspirin enteric coated 81 milliGRAM(s) Oral daily  atorvastatin 80 milliGRAM(s) Oral at bedtime  cholecalciferol 2000 Unit(s) Oral daily  enoxaparin Injectable 40 milliGRAM(s) SubCutaneous every 24 hours  gabapentin 100 milliGRAM(s) Oral three times a day  magnesium oxide 400 milliGRAM(s) Oral three times a day with meals  melatonin 5 milliGRAM(s) Oral at bedtime  metoprolol succinate ER 50 milliGRAM(s) Oral daily  minocycline 100 milliGRAM(s) Oral two times a day  OLANZapine 2.5 milliGRAM(s) Oral at bedtime  pantoprazole    Tablet 40 milliGRAM(s) Oral every 12 hours    MEDICATIONS  (PRN):  acetaminophen     Tablet .. 975 milliGRAM(s) Oral every 8 hours PRN Moderate Pain (4 - 6)  loperamide 2 milliGRAM(s) Oral four times a day PRN Diarrhea  OLANZapine Injectable 2.5 milliGRAM(s) IntraMuscular every 6 hours PRN agitation    Vital Signs Last 24 Hrs  T(C): 36.7 (03 Sep 2023 04:46), Max: 37.1 (02 Sep 2023 18:30)  T(F): 98.1 (03 Sep 2023 04:46), Max: 98.8 (02 Sep 2023 21:39)  HR: 103 (03 Sep 2023 04:46) (90 - 103)  BP: 153/74 (03 Sep 2023 04:46) (127/69 - 170/83)  BP(mean): --  RR: 18 (03 Sep 2023 04:46) (18 - 19)  SpO2: 98% (03 Sep 2023 04:46) (95% - 98%)    Parameters below as of 03 Sep 2023 04:46  Patient On (Oxygen Delivery Method): room air                          9.3    7.55  )-----------( 375      ( 02 Sep 2023 06:23 )             28.9   09-02    138  |  103  |  14.0  ----------------------------<  98  3.7   |  21.0<L>  |  0.78    Ca    8.7      02 Sep 2023 06:23  Mg     1.9     09-01      Exam:  Gen: pt lying in bed, alert, in NAD  Resp: unlabored  CVS: RRR  Abd: soft, NT, ND  Ext: moving all extremities spontaneously, sensation intact, pulses 2+

## 2023-09-03 NOTE — PROGRESS NOTE ADULT - SUBJECTIVE AND OBJECTIVE BOX
Chauncey Goodwin M.D.    Patient is a 62y old  Male who presents with a chief complaint of syncope (03 Sep 2023 05:52)      SUBJECTIVE / OVERNIGHT EVENTS: no event overnight.     Patient denies chest pain, SOB, abd pain, N/V, fever, chills, dysuria or any other complaints. All remainder ROS negative.     MEDICATIONS  (STANDING):  aspirin enteric coated 81 milliGRAM(s) Oral daily  atorvastatin 80 milliGRAM(s) Oral at bedtime  cholecalciferol 2000 Unit(s) Oral daily  enoxaparin Injectable 40 milliGRAM(s) SubCutaneous every 24 hours  gabapentin 100 milliGRAM(s) Oral three times a day  magnesium oxide 400 milliGRAM(s) Oral three times a day with meals  melatonin 5 milliGRAM(s) Oral at bedtime  metoprolol succinate ER 50 milliGRAM(s) Oral daily  minocycline 100 milliGRAM(s) Oral two times a day  OLANZapine 2.5 milliGRAM(s) Oral at bedtime  pantoprazole    Tablet 40 milliGRAM(s) Oral every 12 hours    MEDICATIONS  (PRN):  acetaminophen     Tablet .. 975 milliGRAM(s) Oral every 8 hours PRN Moderate Pain (4 - 6)  loperamide 2 milliGRAM(s) Oral four times a day PRN Diarrhea  OLANZapine Injectable 2.5 milliGRAM(s) IntraMuscular every 6 hours PRN agitation      I&O's Summary    02 Sep 2023 07:01  -  03 Sep 2023 07:00  --------------------------------------------------------  IN: 0 mL / OUT: 2000 mL / NET: -2000 mL    03 Sep 2023 07:01  -  03 Sep 2023 12:04  --------------------------------------------------------  IN: 0 mL / OUT: 800 mL / NET: -800 mL        PHYSICAL EXAM:  Vital Signs Last 24 Hrs  T(C): 36.9 (03 Sep 2023 08:00), Max: 37.1 (02 Sep 2023 18:30)  T(F): 98.4 (03 Sep 2023 08:00), Max: 98.8 (02 Sep 2023 21:39)  HR: 80 (03 Sep 2023 08:00) (80 - 103)  BP: 147/80 (03 Sep 2023 08:00) (127/69 - 153/74)  BP(mean): --  RR: 18 (03 Sep 2023 04:46) (18 - 18)  SpO2: 98% (03 Sep 2023 08:00) (95% - 98%)    Parameters below as of 03 Sep 2023 08:00  Patient On (Oxygen Delivery Method): room air      CONSTITUTIONAL: NAD, awake and alert  RESPIRATORY: Normal respiratory effort; lungs are clear to auscultation bilaterally  CARDIOVASCULAR: Regular rate and rhythm, no LE edema  ABDOMEN: Nontender to palpation, normoactive bowel sounds  PSYCH: affect appropriate  NEUROLOGY: A+O to person, place, and time; CN 2-12 are intact and symmetric; no gross sensory deficits;     LABS:                        10.9   10.55 )-----------( 538      ( 03 Sep 2023 08:18 )             33.1     09-03    139  |  101  |  13.0  ----------------------------<  101<H>  4.0   |  22.0  |  0.85    Ca    9.3      03 Sep 2023 08:18            Urinalysis Basic - ( 03 Sep 2023 08:18 )    Color: x / Appearance: x / SG: x / pH: x  Gluc: 101 mg/dL / Ketone: x  / Bili: x / Urobili: x   Blood: x / Protein: x / Nitrite: x   Leuk Esterase: x / RBC: x / WBC x   Sq Epi: x / Non Sq Epi: x / Bacteria: x        CAPILLARY BLOOD GLUCOSE          RADIOLOGY & ADDITIONAL TESTS:  Results Reviewed:   Imaging Personally Reviewed:  Electrocardiogram Personally Reviewed:

## 2023-09-03 NOTE — PROGRESS NOTE ADULT - ASSESSMENT
62M with PMHX rectal adenocarcinoma c/b mets to bladder and prostate on current chemo FOLFIRI/Panitumamab (Last dose 8/10) with neulasta support on 8/12, s/p Lower Abd Resection/Abdominoperineal Resection (2020, 2022), S/P fistula repair between bowel and bladder, HTN, HLD, CAD s/p PCI x5 presenting after found down at home by family s/p syncopal event     [facility] CT 8/18/2023 CT of abdomen dand pelvis W/WO IV CONTRAST: BONES/SOFT TISSUES: No suspicious osseous lesion. Degenerative changes of the vertebral spine. Redemonstration of post median laparotomy changes  IMPRESSION: Since July 24, 2023, 1. Unchanged size of presacral mass with development of internal gas foci, possibly from necrosis or superimposed infection. Unchanged drain position within the mass. 2. Unchanged circumferential wall thickening of the urinary bladder with surrounding fat stranding, probably cystitis. In addition, new trace amount of air within the non-dependent portions of the bladder lumen, possibly due to recent instrumentation versus result of fistulization with posteriorly adjacent mass.    Metastatic Rectal ca   - on active chemo FOLFIRI + Panitumamab ; last dose 8/10 with neulasta support 8/12  - pt to resume care with [facility] after d/c     Syncopal episode  L1 compression fx  SAH/SDH  - CTH shown small right frontal intraparenchymal hemorrhage likely left posterior fossa subarachnoid hemorrhage and possible Trace left tentorial SDH  - MRI L spine w/wo reviewed, [facility] to review imaging   - recent CT w/ IV con [facility] 8/18/23 w/o suspicious osseous lesions  - hold IR biopsy until [facility] reviews recent imaging for further recommendations   - fitted for TLSO brace   - further management per neurosurgery   - cardiology on board, considering ILR prior to DC  - follow cardio recs   - antiplatelet therapy on hold, resume per neurosurgery recs /poor canddidate for A/C  - s/p ILR  - 2nd neurosx opinion as per daughter request.     Anemia  multifactorial  Malignancy/Chemo  - prn PRBC transfusion for hgb < 7; Hb today 10.9.      chronic diarrhea  - likely secondary to chemo.    - pt on immodium prn - may cont as inpt   - prn electrolyte repletion   - no blood in stool reported       dispo - rehab    thank you  Will update [facility] daily     [facility]  [phone]

## 2023-09-03 NOTE — PROGRESS NOTE ADULT - ASSESSMENT
A: 61yo Male with PMH HTN, HLD, CAD, R carotid stenosis s/p TCAR ( 10 /17/19), rectal ca (s/p urostomy and colostomy w/ concern for mets) on chemotx admitted to medicine for syncope, with working differential of chemo -related syncope, orthostatic event and stroke. MR head demonstrating R acute infarct, chronic microhemorrhages and  L distal ICA lesions ( at cervical, petrous and cavernous segments). Likely chronic, reports known complete ICA occlusion.  No neurodeficits.     Plan:  -Known L carotid occlusion  -Syncope unlikely to be related to carotid disease  -Duplex did not show any right sided hemodynamically significant lesions  -Vascular will sign off  -Remainder of care per primary

## 2023-09-03 NOTE — PROGRESS NOTE ADULT - SUBJECTIVE AND OBJECTIVE BOX
62M with PMHX rectal adenocarcinoma c/b mets to bladder and prostate on current chemo FOLFIRI/Panitumamab (Last dose 8/10) with neulasta support on 8/12, s/p Lower Abd Resection/Abdominoperineal Resection (2020, 2022), S/P fistula repair between bowel and bladder, HTN, HLD, CAD s/p PCI x5 presenting s/p syncope event at home 8/26.   CTH shown small right frontal intraparenchymal hemorrhage likely left posterior fossa subarachnoid hemorrhage and possible Trace left tentorial SDH as well as acute L1 compression fracture with mild bony retropulsion.   MRI L spine w/wo con 8/27: Fracture involving the superior aspect of the L1 vertebral body, There is evidence of abnormal enhancement component seen in this vertebral body as well. While this could be compatible with a posttraumatic fracture, given patient's history of trauma the possibility of an osteoporotic or pathologic fracture must be considered as well. Clinical correlation and continued close interval follow-up is recommended. There is evidence of abnormal signal in the ventral epidural space extending from the bottom of T12 to bottom of L2. This is more prominent on the right side than left and does appear to involve the right spinal canal is well. This could be compatible with an epidural hematoma possibility of epidural extension of tumor cannot be entirely excluded.   Right sided maxillofacial fractures. Seen by ENT for facial fractures  Neuro surgery, cardiology on board    9/3:  s/p ILR placement  afebrile  comfortable. Able to sit out of bed.  He has started to work with PT and is feeling better.  Eager to go to rehab and then start treatment.   No pain. Eating ok.     MEDICATIONS  (STANDING):  aspirin enteric coated 81 milliGRAM(s) Oral daily  atorvastatin 80 milliGRAM(s) Oral at bedtime  cholecalciferol 2000 Unit(s) Oral daily  clopidogrel Tablet 75 milliGRAM(s) Oral daily  dronabinol 2.5 milliGRAM(s) Oral two times a day  enoxaparin Injectable 40 milliGRAM(s) SubCutaneous every 24 hours  gabapentin 100 milliGRAM(s) Oral three times a day  magnesium oxide 400 milliGRAM(s) Oral three times a day with meals  melatonin 5 milliGRAM(s) Oral at bedtime  metoprolol succinate ER 50 milliGRAM(s) Oral daily  minocycline 100 milliGRAM(s) Oral two times a day  OLANZapine 2.5 milliGRAM(s) Oral at bedtime  pantoprazole    Tablet 40 milliGRAM(s) Oral every 12 hours    MEDICATIONS  (PRN):  acetaminophen     Tablet .. 975 milliGRAM(s) Oral every 8 hours PRN Moderate Pain (4 - 6)  loperamide 2 milliGRAM(s) Oral four times a day PRN Diarrhea  OLANZapine Injectable 2.5 milliGRAM(s) IntraMuscular every 6 hours PRN agitation      Vital Signs Last 24 Hrs  T(C): 36.7 (03 Sep 2023 12:00), Max: 37.1 (02 Sep 2023 18:30)  T(F): 98 (03 Sep 2023 12:00), Max: 98.8 (02 Sep 2023 21:39)  HR: 97 (03 Sep 2023 12:00) (80 - 103)  BP: 144/83 (03 Sep 2023 12:00) (127/69 - 153/74)  BP(mean): --  RR: 18 (03 Sep 2023 12:00) (18 - 18)  SpO2: 95% (03 Sep 2023 12:00) (95% - 98%)    Parameters below as of 03 Sep 2023 12:00  Patient On (Oxygen Delivery Method): room air     Constitutional: in no acute distress, resting comfortably ;   · Eyes:	PERRL   · Respiratory:	clear to auscultation bilaterally; no wheezes; no rhonchi; airway patent  · Cardiovascular: regular rate and rhythm  · Gastrointestinal:  no guarding  · Neurological: responds to verbal commands  · Mental Status	awake, alert  · Skin	warm and dry; color normal; rash  · Musculoskeletal	ROM intact; strength 5/5 bilateral upper extremities  · Additional PE	right facial swelling and tender     Labs:                          10.9   10.55 )-----------( 538      ( 03 Sep 2023 08:18 )             33.1                           9.3    7.55  )-----------( 375      ( 02 Sep 2023 06:23 )             28.9                           9.4    9.13  )-----------( 415      ( 01 Sep 2023 06:40 )             29.4     CBC:                       9.3    12.63 )-----------( 385      ( 31 Aug 2023 03:00 )             26.9     09-03    139  |  101  |  13.0  ----------------------------<  101<H>  4.0   |  22.0  |  0.85    Ca    9.3      03 Sep 2023 08:18        09-02    138  |  103  |  14.0  ----------------------------<  98  3.7   |  21.0<L>  |  0.78    Ca    8.7      02 Sep 2023 06:23  Mg     1.9     09-01 09-01    138  |  103  |  14.4  ----------------------------<  97  3.8   |  23.0  |  0.84    Ca    8.4      01 Sep 2023 06:40  Mg     1.9     09-01

## 2023-09-03 NOTE — PROGRESS NOTE ADULT - ASSESSMENT
61 yo Male with PMHx of HTN , rectal cancer on chemo,hx colostomy,ileoconduit  admitted for syncope, reported weakness poor oral intake diarrhea due to recent chemo s/p syncope,fall at home found to have rt facial fx. admitted to medicine. Pt had another fall last night. . Patient said he was trying to get up from bed and  that is what caused him to fall out of bed. Patient is unsure if he hit head. CT head: 2 new tiny hyperdense foci right frontal white matter concerning for tiny parenchymal hemorrhages. Slightly increased subarachnoid hemorrhage in   the left posterior fossa. Questionable trace subdural along left tentorial leaflet. No acute calvarial fracture. Right-sided maxillofacial   fractures as recently described.CT abd/ pelvis: Acute L1 compression fx with mild retropulsion of bone into the epidural space.No acute post traumatic injury otherwise.    Acute R centrum semiovale infarct  -result noted on MRI  -TTE with normal EF  -Tele  -neuro recs yam[facility]t  -on statin and ASA  -ILR 08/31    ICH sec trauma/fall  -CT head: 2 new tiny hyperdense foci right frontal white matter concerning for tiny parenchymal hemorrhages. Slightly increased subarachnoid hemorrhage in the left posterior fossa. Questionable trace subdural along left tentorial leaflet. No acute calvarial fracture.  -cth 08/27 stable bleed size  -Repeat CTH stable, ASA started on 8/31  -resume asa, lovenox per neurosurgery  -family request second opinion from Dr. Karlo Boyd rec mri brain, result noted above    anemia likely from malignancy/chemo  -s/p 1 u prbc 08/29  -hb stable at 9.3  -stool occult neg  -ppi  -no active bleed present  -hem/onc following    Acute L1 compression fx with mild retropulsion of bone into the epidural space s/p fall  -MRI Spine with fx and edema noted  -TLSO brace provided by ortho  -per family's discussion with [facility], no plan for biopsy, as they think the compression fx is 2/2 the falls and not the mets    Right-sided maxillofacial fractures s/p fall  -per facial surgery rec, no intervention  -pain meds   -cold compress     Unresponsiveness vs syncope/fall  hx PAF  -concern for orthostatic hypotension likely from hypovolemia and has had decreased PO intake while on chemotherapy and additionally with known neuropathy/unsteady gait post chemotherapy,   -neuro check  -orth bp mildly positive, repeat in AM  -resume asa  -tele  -ILR 08/31  -toprol xl 50 mg qd per cardiology  -poor chronic AC candidate (hx gib)    HFrEF without acute decompensation/CAD  -hold off on ACEi/ARB/ARNI therapy for now  -continue toprol xl  -on ASA    Rectal cancer mets to bladder   -with urostomy and colostomy bag   -under [facility] getting chemo   -[facility] hem/onc [facility] cancer rec    Agitation  sundowning  -Neurontin 100 mg tid   -zyprexa bedtime, increase as needed  -spoke with     DVT ppx: Lovenox  Dispo: acute rehab pending - Tuesday or later     63 yo Male with PMHx of HTN , rectal cancer on chemo,hx colostomy,ileoconduit  admitted for syncope, reported weakness poor oral intake diarrhea due to recent chemo s/p syncope,fall at home found to have rt facial fx. admitted to medicine. Pt had another fall last night. . Patient said he was trying to get up from bed and  that is what caused him to fall out of bed. Patient is unsure if he hit head. CT head: 2 new tiny hyperdense foci right frontal white matter concerning for tiny parenchymal hemorrhages. Slightly increased subarachnoid hemorrhage in   the left posterior fossa. Questionable trace subdural along left tentorial leaflet. No acute calvarial fracture. Right-sided maxillofacial   fractures as recently described.CT abd/ pelvis: Acute L1 compression fx with mild retropulsion of bone into the epidural space.No acute post traumatic injury otherwise.    Acute R centrum semiovale infarct  -result noted on MRI  -TTE with normal EF  -Tele  -neuro recs yamilet  -on statin and ASA and Plavix - per neuro, plan for 21 days and outpt f/u in 3 weeks  -ILR 08/31    [facility] sec trauma/fall  -CT head: 2 new tiny hyperdense foci right frontal white matter concerning for tiny parenchymal hemorrhages. Slightly increased subarachnoid hemorrhage in the left posterior fossa. Questionable trace subdural along left tentorial leaflet. No acute calvarial fracture.  -cth 08/27 stable bleed size  -Repeat CTH stable, ASA started on 8/31  -resume asa, lovenox per neurosurgery  -family request second opinion from Dr. Karlo Boyd rec mri brain, result noted above    anemia likely from malignancy/chemo  -s/p 1 u prbc 08/29  -hb stable at 9.3  -stool occult neg  -ppi  -no active bleed present  -hem/onc following    Acute L1 compression fx with mild retropulsion of bone into the epidural space s/p fall  -MRI Spine with fx and edema noted  -TLSO brace provided by ortho  -per family's discussion with [facility], no plan for biopsy, as they think the compression fx is 2/2 the falls and not the mets    Right-sided maxillofacial fractures s/p fall  -per facial surgery rec, no intervention  -pain meds   -cold compress     Unresponsiveness vs syncope/fall  hx PAF  -concern for orthostatic hypotension likely from hypovolemia and has had decreased PO intake while on chemotherapy and additionally with known neuropathy/unsteady gait post chemotherapy,   -neuro check  -orth bp mildly positive, repeat in AM  -resume asa  -tele  -ILR 08/31  -toprol xl 50 mg qd per cardiology  -poor chronic AC candidate (hx gib)    HFrEF without acute decompensation/CAD  -hold off on ACEi/ARB/ARNI therapy for now  -continue toprol xl  -on ASA    Rectal cancer mets to bladder   -with urostomy and colostomy bag   -under [facility] getting chemo   -[facility] hem/[facility]c [place] [facility] rec    Agitation  sundowning  -Neurontin 100 mg tid   -zyprexa bedtime, increase as needed  -spoke with     DVT ppx: Lovenox  Dispo: acute rehab pending - Tuesday or later

## 2023-09-04 LAB — SARS-COV-2 RNA SPEC QL NAA+PROBE: SIGNIFICANT CHANGE UP

## 2023-09-04 PROCEDURE — 99232 SBSQ HOSP IP/OBS MODERATE 35: CPT

## 2023-09-04 RX ADMIN — Medication 81 MILLIGRAM(S): at 12:38

## 2023-09-04 RX ADMIN — MINOCYCLINE HYDROCHLORIDE 100 MILLIGRAM(S): 45 TABLET, EXTENDED RELEASE ORAL at 05:42

## 2023-09-04 RX ADMIN — CLOPIDOGREL BISULFATE 75 MILLIGRAM(S): 75 TABLET, FILM COATED ORAL at 17:18

## 2023-09-04 RX ADMIN — MINOCYCLINE HYDROCHLORIDE 100 MILLIGRAM(S): 45 TABLET, EXTENDED RELEASE ORAL at 17:17

## 2023-09-04 RX ADMIN — ATORVASTATIN CALCIUM 80 MILLIGRAM(S): 80 TABLET, FILM COATED ORAL at 22:46

## 2023-09-04 RX ADMIN — Medication 975 MILLIGRAM(S): at 15:30

## 2023-09-04 RX ADMIN — OLANZAPINE 2.5 MILLIGRAM(S): 15 TABLET, FILM COATED ORAL at 22:46

## 2023-09-04 RX ADMIN — PANTOPRAZOLE SODIUM 40 MILLIGRAM(S): 20 TABLET, DELAYED RELEASE ORAL at 05:43

## 2023-09-04 RX ADMIN — ENOXAPARIN SODIUM 40 MILLIGRAM(S): 100 INJECTION SUBCUTANEOUS at 22:46

## 2023-09-04 RX ADMIN — Medication 975 MILLIGRAM(S): at 14:41

## 2023-09-04 RX ADMIN — Medication 50 MILLIGRAM(S): at 05:43

## 2023-09-04 RX ADMIN — MAGNESIUM OXIDE 400 MG ORAL TABLET 400 MILLIGRAM(S): 241.3 TABLET ORAL at 09:10

## 2023-09-04 RX ADMIN — MAGNESIUM OXIDE 400 MG ORAL TABLET 400 MILLIGRAM(S): 241.3 TABLET ORAL at 17:17

## 2023-09-04 RX ADMIN — Medication 5 MILLIGRAM(S): at 22:46

## 2023-09-04 RX ADMIN — GABAPENTIN 100 MILLIGRAM(S): 400 CAPSULE ORAL at 05:43

## 2023-09-04 RX ADMIN — Medication 2.5 MILLIGRAM(S): at 05:43

## 2023-09-04 RX ADMIN — GABAPENTIN 100 MILLIGRAM(S): 400 CAPSULE ORAL at 22:46

## 2023-09-04 RX ADMIN — Medication 2000 UNIT(S): at 12:38

## 2023-09-04 RX ADMIN — MAGNESIUM OXIDE 400 MG ORAL TABLET 400 MILLIGRAM(S): 241.3 TABLET ORAL at 12:38

## 2023-09-04 RX ADMIN — GABAPENTIN 100 MILLIGRAM(S): 400 CAPSULE ORAL at 14:40

## 2023-09-04 RX ADMIN — PANTOPRAZOLE SODIUM 40 MILLIGRAM(S): 20 TABLET, DELAYED RELEASE ORAL at 17:18

## 2023-09-04 RX ADMIN — Medication 2.5 MILLIGRAM(S): at 17:17

## 2023-09-04 NOTE — PROGRESS NOTE ADULT - SUBJECTIVE AND OBJECTIVE BOX
Chauncey Goodwin M.D.    Patient is a 62y old  Male who presents with a chief complaint of syncope (03 Sep 2023 15:50)      SUBJECTIVE / OVERNIGHT EVENTS: No event overnight.     Patient denies chest pain, SOB, abd pain, N/V, fever, chills, dysuria or any other complaints. All remainder ROS negative.     MEDICATIONS  (STANDING):  aspirin enteric coated 81 milliGRAM(s) Oral daily  atorvastatin 80 milliGRAM(s) Oral at bedtime  cholecalciferol 2000 Unit(s) Oral daily  clopidogrel Tablet 75 milliGRAM(s) Oral daily  dronabinol 2.5 milliGRAM(s) Oral two times a day  enoxaparin Injectable 40 milliGRAM(s) SubCutaneous every 24 hours  gabapentin 100 milliGRAM(s) Oral three times a day  magnesium oxide 400 milliGRAM(s) Oral three times a day with meals  melatonin 5 milliGRAM(s) Oral at bedtime  metoprolol succinate ER 50 milliGRAM(s) Oral daily  minocycline 100 milliGRAM(s) Oral two times a day  OLANZapine 2.5 milliGRAM(s) Oral at bedtime  pantoprazole    Tablet 40 milliGRAM(s) Oral every 12 hours    MEDICATIONS  (PRN):  acetaminophen     Tablet .. 975 milliGRAM(s) Oral every 8 hours PRN Moderate Pain (4 - 6)  loperamide 2 milliGRAM(s) Oral four times a day PRN Diarrhea  OLANZapine Injectable 2.5 milliGRAM(s) IntraMuscular every 6 hours PRN agitation      I&O's Summary    03 Sep 2023 07:01  -  04 Sep 2023 07:00  --------------------------------------------------------  IN: 720 mL / OUT: 2350 mL / NET: -1630 mL    04 Sep 2023 07:01  -  04 Sep 2023 13:06  --------------------------------------------------------  IN: 355 mL / OUT: 350 mL / NET: 5 mL        PHYSICAL EXAM:  Vital Signs Last 24 Hrs  T(C): 36.9 (04 Sep 2023 11:07), Max: 37.2 (03 Sep 2023 16:00)  T(F): 98.5 (04 Sep 2023 11:07), Max: 98.9 (03 Sep 2023 16:00)  HR: 90 (04 Sep 2023 11:07) (87 - 90)  BP: 132/78 (04 Sep 2023 11:07) (132/78 - 151/77)  BP(mean): --  RR: 18 (04 Sep 2023 11:07) (18 - 20)  SpO2: 96% (04 Sep 2023 11:07) (92% - 96%)    Parameters below as of 04 Sep 2023 11:07  Patient On (Oxygen Delivery Method): room air      CONSTITUTIONAL: NAD, awake and alert  RESPIRATORY: Normal respiratory effort; lungs are clear to auscultation bilaterally  CARDIOVASCULAR: Regular rate and rhythm, no LE edema  ABDOMEN: Nontender to palpation, normoactive bowel sounds  PSYCH: affect appropriate  NEUROLOGY: A+O to person, place, and time; CN 2-12 are intact and symmetric; no gross sensory deficits;     LABS:                        10.9   10.55 )-----------( 538      ( 03 Sep 2023 08:18 )             33.1     09-03    139  |  101  |  13.0  ----------------------------<  101<H>  4.0   |  22.0  |  0.85    Ca    9.3      03 Sep 2023 08:18            Urinalysis Basic - ( 03 Sep 2023 08:18 )    Color: x / Appearance: x / SG: x / pH: x  Gluc: 101 mg/dL / Ketone: x  / Bili: x / Urobili: x   Blood: x / Protein: x / Nitrite: x   Leuk Esterase: x / RBC: x / WBC x   Sq Epi: x / Non Sq Epi: x / Bacteria: x        CAPILLARY BLOOD GLUCOSE          RADIOLOGY & ADDITIONAL TESTS:  Results Reviewed:   Imaging Personally Reviewed:  Electrocardiogram Personally Reviewed:

## 2023-09-04 NOTE — PROGRESS NOTE ADULT - SUBJECTIVE AND OBJECTIVE BOX
62M with PMHX rectal adenocarcinoma c/b mets to bladder and prostate on current chemo FOLFIRI/Panitumamab (Last dose 8/10) with neulasta support on 8/12, s/p Lower Abd Resection/Abdominoperineal Resection (2020, 2022), S/P fistula repair between bowel and bladder, HTN, HLD, CAD s/p PCI x5 presenting s/p syncope event at home 8/26.   CTH shown small right frontal intraparenchymal hemorrhage likely left posterior fossa subarachnoid hemorrhage and possible Trace left tentorial SDH as well as acute L1 compression fracture with mild bony retropulsion.   MRI L spine w/wo con 8/27: Fracture involving the superior aspect of the L1 vertebral body, There is evidence of abnormal enhancement component seen in this vertebral body as well. While this could be compatible with a posttraumatic fracture, given patient's history of trauma the possibility of an osteoporotic or pathologic fracture must be considered as well. Clinical correlation and continued close interval follow-up is recommended. There is evidence of abnormal signal in the ventral epidural space extending from the bottom of T12 to bottom of L2. This is more prominent on the right side than left and does appear to involve the right spinal canal is well. This could be compatible with an epidural hematoma possibility of epidural extension of tumor cannot be entirely excluded.   Right sided maxillofacial fractures. Seen by ENT for facial fractures  Neuro surgery, cardiology on board    9/4:  s/p ILR placement  afebrile  comfortable. Able to sit out of bed.  He says that he continues to feel stronger. He was able to walk around today without assistance but  still somewhat weak. Eager to finish rehab so that he can start his treatment again.    MEDICATIONS  (STANDING):  aspirin enteric coated 81 milliGRAM(s) Oral daily  atorvastatin 80 milliGRAM(s) Oral at bedtime  cholecalciferol 2000 Unit(s) Oral daily  clopidogrel Tablet 75 milliGRAM(s) Oral daily  dronabinol 2.5 milliGRAM(s) Oral two times a day  enoxaparin Injectable 40 milliGRAM(s) SubCutaneous every 24 hours  gabapentin 100 milliGRAM(s) Oral three times a day  magnesium oxide 400 milliGRAM(s) Oral three times a day with meals  melatonin 5 milliGRAM(s) Oral at bedtime  metoprolol succinate ER 50 milliGRAM(s) Oral daily  minocycline 100 milliGRAM(s) Oral two times a day  OLANZapine 2.5 milliGRAM(s) Oral at bedtime  pantoprazole    Tablet 40 milliGRAM(s) Oral every 12 hours    MEDICATIONS  (PRN):  acetaminophen     Tablet .. 975 milliGRAM(s) Oral every 8 hours PRN Moderate Pain (4 - 6)  loperamide 2 milliGRAM(s) Oral four times a day PRN Diarrhea  OLANZapine Injectable 2.5 milliGRAM(s) IntraMuscular every 6 hours PRN agitation           Constitutional: in no acute distress, resting comfortably ;   · Eyes:	PERRL   · Respiratory:	clear to auscultation bilaterally; no wheezes; no rhonchi; airway patent  · Cardiovascular: regular rate and rhythm  · Gastrointestinal:  no guarding  · Neurological: responds to verbal commands  · Mental Status	awake, alert  · Skin	warm and dry; color normal; rash  · Musculoskeletal	ROM intact; strength 5/5 bilateral upper extremities  · Additional PE	right facial swelling and tender     Labs:                          10.9   10.55 )-----------( 538      ( 03 Sep 2023 08:18 )             33.1                           9.3    7.55  )-----------( 375      ( 02 Sep 2023 06:23 )             28.9                           9.4    9.13  )-----------( 415      ( 01 Sep 2023 06:40 )             29.4     CBC:                       9.3    12.63 )-----------( 385      ( 31 Aug 2023 03:00 )             26.9     09-03    139  |  101  |  13.0  ----------------------------<  101<H>  4.0   |  22.0  |  0.85    Ca    9.3      03 Sep 2023 08:18        09-02    138  |  103  |  14.0  ----------------------------<  98  3.7   |  21.0<L>  |  0.78    Ca    8.7      02 Sep 2023 06:23  Mg     1.9     09-01        09-01    138  |  103  |  14.4  ----------------------------<  97  3.8   |  23.0  |  0.84    Ca    8.4      01 Sep 2023 06:40  Mg     1.9     09-01       62M with PMHX rectal adenocarcinoma c/b mets to bladder and prostate on current chemo FOLFIRI/Panitumamab (Last dose 8/10) with neulasta support on 8/12, s/p Lower Abd Resection/Abdominoperineal Resection (2020, 2022), S/P fistula repair between bowel and bladder, HTN, HLD, CAD s/p PCI x5 presenting s/p syncope event at home 8/26.   CTH shown small right frontal intraparenchymal hemorrhage likely left posterior fossa subarachnoid hemorrhage and possible Trace left tentorial SDH as well as acute L1 compression fracture with mild bony retropulsion.   MRI L spine w/wo con 8/27: Fracture involving the superior aspect of the L1 vertebral body, There is evidence of abnormal enhancement component seen in this vertebral body as well. While this could be compatible with a posttraumatic fracture, given patient's history of trauma the possibility of an osteoporotic or pathologic fracture must be considered as well. Clinical correlation and continued close interval follow-up is recommended. There is evidence of abnormal signal in the ventral epidural space extending from the bottom of T12 to bottom of L2. This is more prominent on the right side than left and does appear to involve the right spinal canal is well. This could be compatible with an epidural hematoma possibility of epidural extension of tumor cannot be entirely excluded.   Right sided maxillofacial fractures. Seen by ENT for facial fractures  Neuro surgery, cardiology on board    9/4:  s/p ILR placement  afebrile  comfortable. Able to sit out of bed.  He says that he continues to feel stronger. He was able to walk around today without assistance but  still somewhat weak. Eager to finish rehab so that he can start his treatment again.    MEDICATIONS  (STANDING):  aspirin enteric coated 81 milliGRAM(s) Oral daily  atorvastatin 80 milliGRAM(s) Oral at bedtime  cholecalciferol 2000 Unit(s) Oral daily  clopidogrel Tablet 75 milliGRAM(s) Oral daily  dronabinol 2.5 milliGRAM(s) Oral two times a day  enoxaparin Injectable 40 milliGRAM(s) SubCutaneous every 24 hours  gabapentin 100 milliGRAM(s) Oral three times a day  magnesium oxide 400 milliGRAM(s) Oral three times a day with meals  melatonin 5 milliGRAM(s) Oral at bedtime  metoprolol succinate ER 50 milliGRAM(s) Oral daily  minocycline 100 milliGRAM(s) Oral two times a day  OLANZapine 2.5 milliGRAM(s) Oral at bedtime  pantoprazole    Tablet 40 milliGRAM(s) Oral every 12 hours    MEDICATIONS  (PRN):  acetaminophen     Tablet .. 975 milliGRAM(s) Oral every 8 hours PRN Moderate Pain (4 - 6)  loperamide 2 milliGRAM(s) Oral four times a day PRN Diarrhea  OLANZapine Injectable 2.5 milliGRAM(s) IntraMuscular every 6 hours PRN agitation      Vital Signs Last 24 Hrs  T(C): 36.6 (04 Sep 2023 14:22), Max: 36.9 (04 Sep 2023 11:07)  T(F): 97.8 (04 Sep 2023 14:22), Max: 98.5 (04 Sep 2023 11:07)  HR: 92 (04 Sep 2023 14:22) (87 - 92)  BP: 140/79 (04 Sep 2023 14:22) (132/78 - 151/77)  BP(mean): --  RR: 17 (04 Sep 2023 14:22) (17 - 20)  SpO2: 98% (04 Sep 2023 14:22) (92% - 98%)    Parameters below as of 04 Sep 2023 14:22  Patient On (Oxygen Delivery Method): room air     Constitutional: in no acute distress, resting comfortably ;   · Eyes:	PERRL   · Respiratory:	clear to auscultation bilaterally; no wheezes; no rhonchi; airway patent  · Cardiovascular: regular rate and rhythm  · Gastrointestinal:  no guarding  · Neurological: responds to verbal commands  · Mental Status	awake, alert  · Skin	warm and dry; color normal; rash  · Musculoskeletal	ROM intact; strength 5/5 bilateral upper extremities  · Additional PE	right facial swelling and tender     Labs:                          10.9   10.55 )-----------( 538      ( 03 Sep 2023 08:18 )             33.1                           9.3    7.55  )-----------( 375      ( 02 Sep 2023 06:23 )             28.9                           9.4    9.13  )-----------( 415      ( 01 Sep 2023 06:40 )             29.4     CBC:                       9.3    12.63 )-----------( 385      ( 31 Aug 2023 03:00 )             26.9     09-03    139  |  101  |  13.0  ----------------------------<  101<H>  4.0   |  22.0  |  0.85    Ca    9.3      03 Sep 2023 08:18        09-02    138  |  103  |  14.0  ----------------------------<  98  3.7   |  21.0<L>  |  0.78    Ca    8.7      02 Sep 2023 06:23  Mg     1.9     09-01 09-01    138  |  103  |  14.4  ----------------------------<  97  3.8   |  23.0  |  0.84    Ca    8.4      01 Sep 2023 06:40  Mg     1.9     09-01

## 2023-09-04 NOTE — PROGRESS NOTE ADULT - ASSESSMENT
61 yo Male with PMHx of HTN , rectal cancer on chemo,hx colostomy,ileoconduit  admitted for syncope, reported weakness poor oral intake diarrhea due to recent chemo s/p syncope,fall at home found to have rt facial fx. admitted to medicine. Pt had another fall last night. . Patient said he was trying to get up from bed and  that is what caused him to fall out of bed. Patient is unsure if he hit head. CT head: 2 new tiny hyperdense foci right frontal white matter concerning for tiny parenchymal hemorrhages. Slightly increased subarachnoid hemorrhage in   the left posterior fossa. Questionable trace subdural along left tentorial leaflet. No acute calvarial fracture. Right-sided maxillofacial   fractures as recently described.CT abd/ pelvis: Acute L1 compression fx with mild retropulsion of bone into the epidural space.No acute post traumatic injury otherwise.    Acute R centrum semiovale infarct  -result noted on MRI  -TTE with normal EF  -Tele  -neuro recs yamilet  -on statin and ASA and Plavix - per neuro, plan for 21 days and outpt f/u in 3 weeks  -ILR 08/31    [facility] sec trauma/fall  -CT head: 2 new tiny hyperdense foci right frontal white matter concerning for tiny parenchymal hemorrhages. Slightly increased subarachnoid hemorrhage in the left posterior fossa. Questionable trace subdural along left tentorial leaflet. No acute calvarial fracture.  -cth 08/27 stable bleed size  -Repeat CTH stable, ASA started on 8/31  -resume asa, lovenox per neurosurgery  -family request second opinion from Dr. Karlo Boyd rec mri brain, result noted above    anemia likely from malignancy/chemo  -s/p 1 u prbc 08/29  -hb stable at 9.3  -stool occult neg  -ppi  -no active bleed present  -hem/onc following    Acute L1 compression fx with mild retropulsion of bone into the epidural space s/p fall  -MRI Spine with fx and edema noted  -TLSO brace provided by ortho  -per family's discussion with [facility], no plan for biopsy, as they think the compression fx is 2/2 the falls and not the mets    Right-sided maxillofacial fractures s/p fall  -per facial surgery rec, no intervention  -pain meds   -cold compress     Unresponsiveness vs syncope/fall  hx PAF  -concern for orthostatic hypotension likely from hypovolemia and has had decreased PO intake while on chemotherapy and additionally with known neuropathy/unsteady gait post chemotherapy,   -neuro check  -orth bp mildly positive, repeat in AM  -resume asa  -tele  -ILR 08/31  -toprol xl 50 mg qd per cardiology  -poor chronic AC candidate (hx gib)    HFrEF without acute decompensation/CAD  -hold off on ACEi/ARB/ARNI therapy for now  -continue toprol xl  -on ASA    Rectal cancer mets to bladder   -with urostomy and colostomy bag   -under [facility] getting chemo   -[facility] hem/[facility]c [facility]    Agitation  sundowning  -Neurontin 100 mg tid   -zyprexa bedtime, increase as needed  -spoke with     DVT ppx: Lovenox  Dispo: acute rehab pending - Tuesday or later

## 2023-09-04 NOTE — PROGRESS NOTE ADULT - ASSESSMENT
62M with PMHX rectal adenocarcinoma c/b mets to bladder and prostate on current chemo FOLFIRI/Panitumamab (Last dose 8/10) with neulasta support on 8/12, s/p Lower Abd Resection/Abdominoperineal Resection (2020, 2022), S/P fistula repair between bowel and bladder, HTN, HLD, CAD s/p PCI x5 presenting after found down at home by family s/p syncopal event     [facility] CT 8/18/2023 CT of abdomen dand pelvis W/WO IV CONTRAST: BONES/SOFT TISSUES: No suspicious osseous lesion. Degenerative changes of the vertebral spine. Redemonstration of post median laparotomy changes  IMPRESSION: Since July 24, 2023, 1. Unchanged size of presacral mass with development of internal gas foci, possibly from necrosis or superimposed infection. Unchanged drain position within the mass. 2. Unchanged circumferential wall thickening of the urinary bladder with surrounding fat stranding, probably cystitis. In addition, new trace amount of air within the non-dependent portions of the bladder lumen, possibly due to recent instrumentation versus result of fistulization with posteriorly adjacent mass.    Metastatic Rectal ca   - on active chemo FOLFIRI + Panitumamab ; last dose 8/10 with neulasta support 8/12  - pt to resume care with [facility] after d/c     Syncopal episode  L1 compression fx  SAH/SDH  - CTH shown small right frontal intraparenchymal hemorrhage likely left posterior fossa subarachnoid hemorrhage and possible Trace left tentorial SDH  - MRI L spine w/wo reviewed, [facility] to review imaging   - recent CT w/ IV con [facility] 8/18/23 w/o suspicious osseous lesions  - hold IR biopsy until [facility] reviews recent imaging for further recommendations   - fitted for TLSO brace   - further management per neurosurgery   - cardiology on board, considering ILR prior to DC  - follow cardio recs   - antiplatelet therapy on hold, resume per neurosurgery recs /poor canddidate for A/C  - s/p ILR  - 2nd neurosx opinion as per daughter request.     Anemia  multifactorial  Malignancy/Chemo  - prn PRBC transfusion for hgb < 7; Hb today 10.9.      chronic diarrhea  - likely secondary to chemo.    - pt on immodium prn - may cont as inpt   - prn electrolyte repletion   - no blood in stool reported       dispo - rehab    thank you  Will update [facility] daily     [facility]  [phone] 62M with PMHX rectal adenocarcinoma c/b mets to bladder and prostate on current chemo FOLFIRI/Panitumamab (Last dose 8/10) with neulasta support on 8/12, s/p Lower Abd Resection/Abdominoperineal Resection (2020, 2022), S/P fistula repair between bowel and bladder, HTN, HLD, CAD s/p PCI x5 presenting after found down at home by family s/p syncopal event     [facility] CT 8/18/2023 CT of abdomen dand pelvis W/WO IV CONTRAST: BONES/SOFT TISSUES: No suspicious osseous lesion. Degenerative changes of the vertebral spine. Redemonstration of post median laparotomy changes  IMPRESSION: Since July 24, 2023, 1. Unchanged size of presacral mass with development of internal gas foci, possibly from necrosis or superimposed infection. Unchanged drain position within the mass. 2. Unchanged circumferential wall thickening of the urinary bladder with surrounding fat stranding, probably cystitis. In addition, new trace amount of air within the non-dependent portions of the bladder lumen, possibly due to recent instrumentation versus result of fistulization with posteriorly adjacent mass.    Metastatic Rectal ca   - on active chemo FOLFIRI + Panitumamab ; last dose 8/10 with neulasta support 8/12  - pt to resume care with [facility] after d/c     Syncopal episode  L1 compression fx  SAH/SDH  - CTH shown small right frontal intraparenchymal hemorrhage likely left posterior fossa subarachnoid hemorrhage and possible Trace left tentorial SDH  - MRI L spine w/wo reviewed, [facility] to review imaging   - recent CT w/ IV con [facility] 8/18/23 w/o suspicious osseous lesions  - hold IR biopsy until [facility] reviews recent imaging for further recommendations   - fitted for TLSO brace   - further management per neurosurgery   - cardiology on board, considering ILR prior to DC  - follow cardio recs   - antiplatelet therapy on hold, resume per neurosurgery recs /poor canddidate for A/C  - s/p ILR  - 2nd neurosx opinion as per daughter request.     Anemia  multifactorial  Malignancy/Chemo  - prn PRBC transfusion for hgb < 7; Hb today 10.9 on 9/3/23.      chronic diarrhea  - likely secondary to chemo.    - pt on immodium prn - may cont as inpt   - prn electrolyte repletion   - no blood in stool reported     Patient and daughter wish to speak with primary oncologist regarding next step in care.      dispo - rehab    thank you  Will update [facility] daily     [facility]  [phone]

## 2023-09-05 LAB
ANION GAP SERPL CALC-SCNC: 13 MMOL/L — SIGNIFICANT CHANGE UP (ref 5–17)
APPEARANCE UR: ABNORMAL
BACTERIA # UR AUTO: ABNORMAL
BASOPHILS # BLD AUTO: 0.11 K/UL — SIGNIFICANT CHANGE UP (ref 0–0.2)
BASOPHILS NFR BLD AUTO: 1.3 % — SIGNIFICANT CHANGE UP (ref 0–2)
BILIRUB UR-MCNC: NEGATIVE — SIGNIFICANT CHANGE UP
BUN SERPL-MCNC: 13.7 MG/DL — SIGNIFICANT CHANGE UP (ref 8–20)
CALCIUM SERPL-MCNC: 9.3 MG/DL — SIGNIFICANT CHANGE UP (ref 8.4–10.5)
CHLORIDE SERPL-SCNC: 102 MMOL/L — SIGNIFICANT CHANGE UP (ref 96–108)
CO2 SERPL-SCNC: 23 MMOL/L — SIGNIFICANT CHANGE UP (ref 22–29)
COLOR SPEC: YELLOW — SIGNIFICANT CHANGE UP
COMMENT - URINE: SIGNIFICANT CHANGE UP
CREAT SERPL-MCNC: 0.79 MG/DL — SIGNIFICANT CHANGE UP (ref 0.5–1.3)
DIFF PNL FLD: ABNORMAL
EGFR: 99 ML/MIN/1.73M2 — SIGNIFICANT CHANGE UP
EOSINOPHIL # BLD AUTO: 0.05 K/UL — SIGNIFICANT CHANGE UP (ref 0–0.5)
EOSINOPHIL NFR BLD AUTO: 0.6 % — SIGNIFICANT CHANGE UP (ref 0–6)
EPI CELLS # UR: SIGNIFICANT CHANGE UP
GLUCOSE SERPL-MCNC: 104 MG/DL — HIGH (ref 70–99)
GLUCOSE UR QL: NEGATIVE — SIGNIFICANT CHANGE UP
HCT VFR BLD CALC: 34.5 % — LOW (ref 39–50)
HGB BLD-MCNC: 10.9 G/DL — LOW (ref 13–17)
IMM GRANULOCYTES NFR BLD AUTO: 0.4 % — SIGNIFICANT CHANGE UP (ref 0–0.9)
KETONES UR-MCNC: NEGATIVE — SIGNIFICANT CHANGE UP
LEUKOCYTE ESTERASE UR-ACNC: ABNORMAL
LYMPHOCYTES # BLD AUTO: 0.74 K/UL — LOW (ref 1–3.3)
LYMPHOCYTES # BLD AUTO: 8.6 % — LOW (ref 13–44)
MCHC RBC-ENTMCNC: 31.1 PG — SIGNIFICANT CHANGE UP (ref 27–34)
MCHC RBC-ENTMCNC: 31.6 GM/DL — LOW (ref 32–36)
MCV RBC AUTO: 98.6 FL — SIGNIFICANT CHANGE UP (ref 80–100)
MONOCYTES # BLD AUTO: 0.57 K/UL — SIGNIFICANT CHANGE UP (ref 0–0.9)
MONOCYTES NFR BLD AUTO: 6.7 % — SIGNIFICANT CHANGE UP (ref 2–14)
NEUTROPHILS # BLD AUTO: 7.06 K/UL — SIGNIFICANT CHANGE UP (ref 1.8–7.4)
NEUTROPHILS NFR BLD AUTO: 82.4 % — HIGH (ref 43–77)
NITRITE UR-MCNC: POSITIVE
PH UR: 8 — SIGNIFICANT CHANGE UP (ref 5–8)
PLATELET # BLD AUTO: 475 K/UL — HIGH (ref 150–400)
POTASSIUM SERPL-MCNC: 4.5 MMOL/L — SIGNIFICANT CHANGE UP (ref 3.5–5.3)
POTASSIUM SERPL-SCNC: 4.5 MMOL/L — SIGNIFICANT CHANGE UP (ref 3.5–5.3)
PROT UR-MCNC: NEGATIVE — SIGNIFICANT CHANGE UP
RBC # BLD: 3.5 M/UL — LOW (ref 4.2–5.8)
RBC # FLD: 15.2 % — HIGH (ref 10.3–14.5)
RBC CASTS # UR COMP ASSIST: SIGNIFICANT CHANGE UP /HPF (ref 0–4)
SODIUM SERPL-SCNC: 138 MMOL/L — SIGNIFICANT CHANGE UP (ref 135–145)
SP GR SPEC: 1.01 — SIGNIFICANT CHANGE UP (ref 1.01–1.02)
UROBILINOGEN FLD QL: NEGATIVE — SIGNIFICANT CHANGE UP
WBC # BLD: 8.56 K/UL — SIGNIFICANT CHANGE UP (ref 3.8–10.5)
WBC # FLD AUTO: 8.56 K/UL — SIGNIFICANT CHANGE UP (ref 3.8–10.5)
WBC UR QL: ABNORMAL /HPF (ref 0–5)

## 2023-09-05 PROCEDURE — 71045 X-RAY EXAM CHEST 1 VIEW: CPT | Mod: 26

## 2023-09-05 PROCEDURE — 99232 SBSQ HOSP IP/OBS MODERATE 35: CPT

## 2023-09-05 RX ORDER — DRONABINOL 2.5 MG
2.5 CAPSULE ORAL
Refills: 0 | Status: DISCONTINUED | OUTPATIENT
Start: 2023-09-05 | End: 2023-09-07

## 2023-09-05 RX ADMIN — Medication 2.5 MILLIGRAM(S): at 16:21

## 2023-09-05 RX ADMIN — ATORVASTATIN CALCIUM 80 MILLIGRAM(S): 80 TABLET, FILM COATED ORAL at 21:37

## 2023-09-05 RX ADMIN — MINOCYCLINE HYDROCHLORIDE 100 MILLIGRAM(S): 45 TABLET, EXTENDED RELEASE ORAL at 17:41

## 2023-09-05 RX ADMIN — OLANZAPINE 2.5 MILLIGRAM(S): 15 TABLET, FILM COATED ORAL at 14:30

## 2023-09-05 RX ADMIN — MAGNESIUM OXIDE 400 MG ORAL TABLET 400 MILLIGRAM(S): 241.3 TABLET ORAL at 12:29

## 2023-09-05 RX ADMIN — Medication 975 MILLIGRAM(S): at 18:19

## 2023-09-05 RX ADMIN — OLANZAPINE 2.5 MILLIGRAM(S): 15 TABLET, FILM COATED ORAL at 21:37

## 2023-09-05 RX ADMIN — Medication 50 MILLIGRAM(S): at 05:46

## 2023-09-05 RX ADMIN — MAGNESIUM OXIDE 400 MG ORAL TABLET 400 MILLIGRAM(S): 241.3 TABLET ORAL at 17:41

## 2023-09-05 RX ADMIN — CLOPIDOGREL BISULFATE 75 MILLIGRAM(S): 75 TABLET, FILM COATED ORAL at 12:29

## 2023-09-05 RX ADMIN — PANTOPRAZOLE SODIUM 40 MILLIGRAM(S): 20 TABLET, DELAYED RELEASE ORAL at 05:46

## 2023-09-05 RX ADMIN — GABAPENTIN 100 MILLIGRAM(S): 400 CAPSULE ORAL at 21:36

## 2023-09-05 RX ADMIN — Medication 975 MILLIGRAM(S): at 17:41

## 2023-09-05 RX ADMIN — Medication 5 MILLIGRAM(S): at 21:37

## 2023-09-05 RX ADMIN — MINOCYCLINE HYDROCHLORIDE 100 MILLIGRAM(S): 45 TABLET, EXTENDED RELEASE ORAL at 05:46

## 2023-09-05 RX ADMIN — PANTOPRAZOLE SODIUM 40 MILLIGRAM(S): 20 TABLET, DELAYED RELEASE ORAL at 17:42

## 2023-09-05 RX ADMIN — ENOXAPARIN SODIUM 40 MILLIGRAM(S): 100 INJECTION SUBCUTANEOUS at 21:37

## 2023-09-05 RX ADMIN — Medication 2000 UNIT(S): at 12:29

## 2023-09-05 RX ADMIN — Medication 81 MILLIGRAM(S): at 12:29

## 2023-09-05 RX ADMIN — GABAPENTIN 100 MILLIGRAM(S): 400 CAPSULE ORAL at 14:15

## 2023-09-05 RX ADMIN — GABAPENTIN 100 MILLIGRAM(S): 400 CAPSULE ORAL at 05:46

## 2023-09-05 RX ADMIN — Medication 2.5 MILLIGRAM(S): at 05:45

## 2023-09-05 RX ADMIN — MAGNESIUM OXIDE 400 MG ORAL TABLET 400 MILLIGRAM(S): 241.3 TABLET ORAL at 08:54

## 2023-09-05 NOTE — OCCUPATIONAL THERAPY INITIAL EVALUATION ADULT - PRECAUTIONS/LIMITATIONS, REHAB EVAL
Pt states her accidentally got poked in the R eye with her daughters finger nail.  This occurred at 9pm.  Pt has not taken anything for pain.  Pt rates 5/10 pain currently.  Pt had a clothe on her eye for comfort.  Pt with photophobia, PERRL        
TLSO OOB/aspiration precautions/fall precautions/seizure precautions/spinal precautions/surgical precautions
TLSO OOB/aspiration precautions/fall precautions/seizure precautions/spinal precautions/surgical precautions

## 2023-09-05 NOTE — OCCUPATIONAL THERAPY INITIAL EVALUATION ADULT - ADDITIONAL COMMENTS
Pt has a walk in shower with doors and grab bars. Independent prior to admission. Owns no DME. Pt is right handed but reports uses left hand to feed self and drives.
Pt has a walk in shower with doors and grab bars. Independent prior to admission. Owns no DME. Pt is right handed but reports uses left hand to feed self and drives.

## 2023-09-05 NOTE — OCCUPATIONAL THERAPY INITIAL EVALUATION ADULT - PLANNED THERAPY INTERVENTIONS, OT EVAL
ADL retraining/IADL retraining/balance training/bed mobility training/motor coordination training/neuromuscular re-education/ROM/strengthening/transfer training
ADL retraining/IADL retraining/balance training/bed mobility training/motor coordination training/neuromuscular re-education/ROM/strengthening/transfer training

## 2023-09-05 NOTE — OCCUPATIONAL THERAPY INITIAL EVALUATION ADULT - GENERAL OBSERVATIONS, REHAB EVAL
pt received in bed and left in bedside chair with 1:1 supervision, no c/o pain, +brace donned for OOB, pt pleasant, motivated and following all commands

## 2023-09-05 NOTE — PROGRESS NOTE ADULT - ASSESSMENT
61 yo Male with PMHx of HTN , rectal cancer on chemo,hx colostomy,ileoconduit  admitted for syncope, reported weakness poor oral intake diarrhea due to recent chemo s/p syncope,fall at home found to have rt facial fx. admitted to medicine. Pt had another fall last night. . Patient said he was trying to get up from bed and  that is what caused him to fall out of bed. Patient is unsure if he hit head. CT head: 2 new tiny hyperdense foci right frontal white matter concerning for tiny parenchymal hemorrhages. Slightly increased subarachnoid hemorrhage in   the left posterior fossa. Questionable trace subdural along left tentorial leaflet. No acute calvarial fracture. Right-sided maxillofacial   fractures as recently described.CT abd/ pelvis: Acute L1 compression fx with mild retropulsion of bone into the epidural space.No acute post traumatic injury otherwise.    Acute R centrum semiovale infarct  -result noted on MRI  -TTE with normal EF  -Tele  -neuro recs yamilet  -on statin and ASA and Plavix - per neuro, plan for 21 days and outpt f/u in 3 weeks  -ILR 08/31    [facility] sec trauma/fall  -CT head: 2 new tiny hyperdense foci right frontal white matter concerning for tiny parenchymal hemorrhages. Slightly increased subarachnoid hemorrhage in the left posterior fossa. Questionable trace subdural along left tentorial leaflet. No acute calvarial fracture.  -cth 08/27 stable bleed size  -Repeat CTH stable, ASA started on 8/31  -resume asa, lovenox per neurosurgery  -family request second opinion from Dr. Karlo Boyd rec mri brain, result noted above    anemia likely from malignancy/chemo  -s/p 1 u prbc 08/29  -hb stable at 9.3  -stool occult neg  -ppi  -no active bleed present  -hem/onc following    Acute L1 compression fx with mild retropulsion of bone into the epidural space s/p fall  -MRI Spine with fx and edema noted  -TLSO brace provided by ortho  -per family's discussion with [facility], no plan for biopsy, as they think the compression fx is 2/2 the falls and not the mets    Right-sided maxillofacial fractures s/p fall  -per facial surgery rec, no intervention  -pain meds   -cold compress     Unresponsiveness vs syncope/fall  hx PAF  -concern for orthostatic hypotension likely from hypovolemia and has had decreased PO intake while on chemotherapy and additionally with known neuropathy/unsteady gait post chemotherapy,   -neuro check  -orth bp mildly positive, repeat in AM  -resume asa  -tele  -ILR 08/31  -toprol xl 50 mg qd per cardiology  -poor chronic AC candidate (hx gib)    HFrEF without acute decompensation/CAD  -hold off on ACEi/ARB/ARNI therapy for now  -continue toprol xl  -on ASA    Rectal cancer mets to bladder   -with urostomy and colostomy bag   -under [facility] getting chemo   -[facility]t hem/[facility]c [facility]    Agitation  sundowning  -Neurontin 100 mg tid   -zyprexa bedtime, increase as needed  -spoke with     DVT ppx: Lovenox  Dispo: acute rehab pending   63 yo Male with PMHx of HTN , rectal cancer on chemo,hx colostomy,ileoconduit  admitted for syncope, reported weakness poor oral intake diarrhea due to recent chemo s/p syncope,fall at home found to have rt facial fx. admitted to medicine. Pt had another fall last night. . Patient said he was trying to get up from bed and  that is what caused him to fall out of bed. Patient is unsure if he hit head. CT head: 2 new tiny hyperdense foci right frontal white matter concerning for tiny parenchymal hemorrhages. Slightly increased subarachnoid hemorrhage in   the left posterior fossa. Questionable trace subdural along left tentorial leaflet. No acute calvarial fracture. Right-sided maxillofacial   fractures as recently described.CT abd/ pelvis: Acute L1 compression fx with mild retropulsion of bone into the epidural space.No acute post traumatic injury otherwise.    Acute R centrum semiovale infarct  -result noted on MRI  -TTE with normal EF  -Tele  -neuro recs yamilet  -on statin and ASA and Plavix - per neuro, plan for 21 days and outpt f/u in 3 weeks  -ILR 08/31    [facility] sec trauma/fall  -CT head: 2 new tiny hyperdense foci right frontal white matter concerning for tiny parenchymal hemorrhages. Slightly increased subarachnoid hemorrhage in the left posterior fossa. Questionable trace subdural along left tentorial leaflet. No acute calvarial fracture.  -cth 08/27 stable bleed size  -Repeat CTH stable, ASA started on 8/31  -resume asa, lovenox per neurosurgery  -family request second opinion from Dr. Karlo Boyd rec mri brain, result noted above    anemia likely from malignancy/chemo  -s/p 1 u prbc 08/29  -hb stable at 9.3  -stool occult neg  -ppi  -no active bleed present  -hem/onc following    Acute L1 compression fx with mild retropulsion of bone into the epidural space s/p fall  -MRI Spine with fx and edema noted  -TLSO brace provided by ortho  -per family's discussion with [facility], no plan for biopsy, as they think the compression fx is 2/2 the falls and not the mets    Right-sided maxillofacial fractures s/p fall  -per facial surgery rec, no intervention  -pain meds   -cold compress     Unresponsiveness vs syncope/fall  hx PAF  -concern for orthostatic hypotension likely from hypovolemia and has had decreased PO intake while on chemotherapy and additionally with known neuropathy/unsteady gait post chemotherapy,   -neuro check  -orth bp mildly positive, repeat in AM  -resume asa  -tele  -ILR 08/31  -toprol xl 50 mg qd per cardiology  -poor chronic AC candidate (hx gib)    HFrEF without acute decompensation/CAD  -hold off on ACEi/ARB/ARNI therapy for now  -continue toprol xl  -on ASA    Rectal cancer mets to bladder   -with urostomy and colostomy bag   -under [facility] getting chemo, will resume chemo at [facility] after rehab  -[facility] hem/[facility]c [facility]    Agitation  sundowning  -Neurontin 100 mg tid   -zyprexa bedtime, increase as needed  -spoke with     DVT ppx: Lovenox  Dispo: acute rehab pending

## 2023-09-05 NOTE — OCCUPATIONAL THERAPY INITIAL EVALUATION ADULT - RANGE OF MOTION EXAMINATION, UPPER EXTREMITY
assessed within spinal precautions/bilateral UE Active ROM was WFL  (within functional limits)
bilateral UE Active ROM was WFL  (within functional limits)

## 2023-09-05 NOTE — PROGRESS NOTE ADULT - SUBJECTIVE AND OBJECTIVE BOX
Chauncey Goodwin M.D.    Patient is a 62y old  Male who presents with a chief complaint of syncope (05 Sep 2023 08:31)      SUBJECTIVE / OVERNIGHT EVENTS: No event overnight.     Patient denies chest pain, SOB, abd pain, N/V, fever, chills, dysuria or any other complaints. All remainder ROS negative.     MEDICATIONS  (STANDING):  aspirin enteric coated 81 milliGRAM(s) Oral daily  atorvastatin 80 milliGRAM(s) Oral at bedtime  cholecalciferol 2000 Unit(s) Oral daily  clopidogrel Tablet 75 milliGRAM(s) Oral daily  dronabinol 2.5 milliGRAM(s) Oral <User Schedule>  enoxaparin Injectable 40 milliGRAM(s) SubCutaneous every 24 hours  gabapentin 100 milliGRAM(s) Oral three times a day  magnesium oxide 400 milliGRAM(s) Oral three times a day with meals  melatonin 5 milliGRAM(s) Oral at bedtime  metoprolol succinate ER 50 milliGRAM(s) Oral daily  minocycline 100 milliGRAM(s) Oral two times a day  OLANZapine 2.5 milliGRAM(s) Oral at bedtime  pantoprazole    Tablet 40 milliGRAM(s) Oral every 12 hours    MEDICATIONS  (PRN):  acetaminophen     Tablet .. 975 milliGRAM(s) Oral every 8 hours PRN Moderate Pain (4 - 6)  loperamide 2 milliGRAM(s) Oral four times a day PRN Diarrhea  OLANZapine Injectable 2.5 milliGRAM(s) IntraMuscular every 6 hours PRN agitation      I&O's Summary    04 Sep 2023 07:01  -  05 Sep 2023 07:00  --------------------------------------------------------  IN: 592 mL / OUT: 2225 mL / NET: -1633 mL    05 Sep 2023 07:01  -  05 Sep 2023 11:54  --------------------------------------------------------  IN: 240 mL / OUT: 300 mL / NET: -60 mL        PHYSICAL EXAM:  Vital Signs Last 24 Hrs  T(C): 36.6 (05 Sep 2023 10:20), Max: 37.1 (04 Sep 2023 17:50)  T(F): 97.9 (05 Sep 2023 10:20), Max: 98.8 (04 Sep 2023 17:50)  HR: 76 (05 Sep 2023 10:20) (76 - 92)  BP: 146/85 (05 Sep 2023 10:20) (140/79 - 156/75)  BP(mean): --  RR: 18 (05 Sep 2023 10:25) (17 - 20)  SpO2: 98% (05 Sep 2023 10:25) (94% - 98%)    Parameters below as of 05 Sep 2023 10:25  Patient On (Oxygen Delivery Method): room air        CONSTITUTIONAL: NAD, well-groomed  ENMT: Moist oral mucosa, no pharyngeal injection or exudates; normal dentition  RESPIRATORY: Normal respiratory effort; lungs are clear to auscultation bilaterally  CARDIOVASCULAR: Regular rate and rhythm, normal S1 and S2, no murmur/rub/gallop; No lower extremity edema; Peripheral pulses are 2+ bilaterally  ABDOMEN: Nontender to palpation, normoactive bowel sounds, no rebound/guarding; No hepatosplenomegaly  MUSCLOSKELETAL:  Normal gait; no clubbing or cyanosis of digits; no joint swelling or tenderness to palpation  PSYCH: A+O x3; affect appropriate  NEUROLOGY: CN 2-12 are intact and symmetric; no gross sensory deficits;   SKIN: No rashes; no palpable lesions    LABS:        CAPILLARY BLOOD GLUCOSE          RADIOLOGY & ADDITIONAL TESTS:  Results Reviewed:   Imaging Personally Reviewed:  Electrocardiogram Personally Reviewed:

## 2023-09-05 NOTE — PROGRESS NOTE ADULT - SUBJECTIVE AND OBJECTIVE BOX
62M with PMHX rectal adenocarcinoma c/b mets to bladder and prostate on current chemo FOLFIRI/Panitumamab (Last dose 8/10) with neulasta support on 8/12, s/p Lower Abd Resection/Abdominoperineal Resection (2020, 2022), S/P fistula repair between bowel and bladder, HTN, HLD, CAD s/p PCI x5 presenting s/p syncope event at home 8/26.   CTH shown small right frontal intraparenchymal hemorrhage likely left posterior fossa subarachnoid hemorrhage and possible Trace left tentorial SDH as well as acute L1 compression fracture with mild bony retropulsion.   MRI L spine w/wo con 8/27: Fracture involving the superior aspect of the L1 vertebral body, There is evidence of abnormal enhancement component seen in this vertebral body as well. While this could be compatible with a posttraumatic fracture, given patient's history of trauma the possibility of an osteoporotic or pathologic fracture must be considered as well. Clinical correlation and continued close interval follow-up is recommended. There is evidence of abnormal signal in the ventral epidural space extending from the bottom of T12 to bottom of L2. This is more prominent on the right side than left and does appear to involve the right spinal canal is well. This could be compatible with an epidural hematoma possibility of epidural extension of tumor cannot be entirely excluded.   Right sided maxillofacial fractures. Seen by ENT for facial fractures  Neuro surgery, cardiology on board    9/5: No acute OBN events. No new complaints.    MEDICATIONS  (STANDING):  aspirin enteric coated 81 milliGRAM(s) Oral daily  atorvastatin 80 milliGRAM(s) Oral at bedtime  cholecalciferol 2000 Unit(s) Oral daily  clopidogrel Tablet 75 milliGRAM(s) Oral daily  dronabinol 2.5 milliGRAM(s) Oral two times a day  enoxaparin Injectable 40 milliGRAM(s) SubCutaneous every 24 hours  gabapentin 100 milliGRAM(s) Oral three times a day  magnesium oxide 400 milliGRAM(s) Oral three times a day with meals  melatonin 5 milliGRAM(s) Oral at bedtime  metoprolol succinate ER 50 milliGRAM(s) Oral daily  minocycline 100 milliGRAM(s) Oral two times a day  OLANZapine 2.5 milliGRAM(s) Oral at bedtime  pantoprazole    Tablet 40 milliGRAM(s) Oral every 12 hours  MEDICATIONS  (PRN):  acetaminophen     Tablet .. 975 milliGRAM(s) Oral every 8 hours PRN Moderate Pain (4 - 6)  loperamide 2 milliGRAM(s) Oral four times a day PRN Diarrhea  OLANZapine Injectable 2.5 milliGRAM(s) IntraMuscular every 6 hours PRN agitation    ICU Vital Signs Last 24 Hrs  T(C): 36.3 (05 Sep 2023 05:35), Max: 37.1 (04 Sep 2023 17:50)  T(F): 97.4 (05 Sep 2023 05:35), Max: 98.8 (04 Sep 2023 17:50)  HR: 80 (05 Sep 2023 05:35) (80 - 92)  BP: 146/82 (05 Sep 2023 05:35) (132/78 - 156/75)  BP(mean): --  ABP: --  ABP(mean): --  RR: 19 (05 Sep 2023 05:35) (17 - 20)  SpO2: 94% (05 Sep 2023 05:35) (94% - 98%)  O2 Parameters below as of 05 Sep 2023 05:35  Patient On (Oxygen Delivery Method): room air     Constitutional: in no acute distress, resting comfortably ;   · Eyes:	PERRL   · Respiratory:	clear to auscultation bilaterally; no wheezes; no rhonchi; airway patent  · Cardiovascular: regular rate and rhythm  · Gastrointestinal:  no guarding  · Neurological: responds to verbal commands  · Mental Status	awake, alert  · Skin	warm and dry; color normal; rash  · Musculoskeletal	ROM intact; strength 5/5 bilateral upper extremities  · Additional PE	right facial swelling and tender

## 2023-09-05 NOTE — OCCUPATIONAL THERAPY INITIAL EVALUATION ADULT - LIVES WITH, PROFILE
Pt reports living in private home with wife who is able to assist. Pt has 3 STEPH with handrail and no stairs inside./spouse
Pt reports living in private home with wife who is able to assist. Pt has 3 STEPH with handrail and no stairs inside./spouse

## 2023-09-05 NOTE — PROGRESS NOTE ADULT - ASSESSMENT
62M with PMHX rectal adenocarcinoma c/b mets to bladder and prostate on current chemo FOLFIRI/Panitumamab (Last dose 8/10) with neulasta support on 8/12, s/p Lower Abd Resection/Abdominoperineal Resection (2020, 2022), S/P fistula repair between bowel and bladder, HTN, HLD, CAD s/p PCI x5 presenting after found down at home by family s/p syncopal event     [facility] CT 8/18/2023 CT of abdomen dand pelvis W/WO IV CONTRAST: BONES/SOFT TISSUES: No suspicious osseous lesion. Degenerative changes of the vertebral spine. Redemonstration of post median laparotomy changes  IMPRESSION: Since July 24, 2023, 1. Unchanged size of presacral mass with development of internal gas foci, possibly from necrosis or superimposed infection. Unchanged drain position within the mass. 2. Unchanged circumferential wall thickening of the urinary bladder with surrounding fat stranding, probably cystitis. In addition, new trace amount of air within the non-dependent portions of the bladder lumen, possibly due to recent instrumentation versus result of fistulization with posteriorly adjacent mass.    Metastatic Rectal ca   - on active chemo FOLFIRI + Panitumamab ; last dose 8/10 with neulasta support 8/12  - pt to resume care with [facility] after d/c     Syncopal episode  L1 compression fx  SAH/SDH  - CTH shown small right frontal intraparenchymal hemorrhage likely left posterior fossa subarachnoid hemorrhage and possible Trace left tentorial SDH  - MRI L spine w/wo reviewed, [facility] to review imaging   - recent CT w/ IV con [facility] 8/18/23 w/o suspicious osseous lesions  - hold IR biopsy until [facility] reviews recent imaging for further recommendations   - fitted for TLSO brace   - further management per neurosurgery   - cardiology on board, s/p ILR placement   - follow cardio recs   - DAPT commenced  - 2nd neurosx opinion as per daughter request.     Anemia  multifactorial  Malignancy/Chemo  - prn PRBC transfusion for hgb < 7; Hb 10.9 on 9/3/23. CBC from today pending    chronic diarrhea  - likely secondary to chemo.    - pt on immodium prn - may cont as inpt   - prn electrolyte repletion   - no blood in stool reported     dispo - rehab    thank you    Will update [facility] daily     [facility]  [phone]

## 2023-09-06 LAB
BASOPHILS # BLD AUTO: 0.12 K/UL — SIGNIFICANT CHANGE UP (ref 0–0.2)
BASOPHILS NFR BLD AUTO: 1.4 % — SIGNIFICANT CHANGE UP (ref 0–2)
EOSINOPHIL # BLD AUTO: 0.14 K/UL — SIGNIFICANT CHANGE UP (ref 0–0.5)
EOSINOPHIL NFR BLD AUTO: 1.7 % — SIGNIFICANT CHANGE UP (ref 0–6)
HCT VFR BLD CALC: 29.9 % — LOW (ref 39–50)
HGB BLD-MCNC: 9.7 G/DL — LOW (ref 13–17)
IMM GRANULOCYTES NFR BLD AUTO: 0.5 % — SIGNIFICANT CHANGE UP (ref 0–0.9)
LYMPHOCYTES # BLD AUTO: 0.92 K/UL — LOW (ref 1–3.3)
LYMPHOCYTES # BLD AUTO: 11 % — LOW (ref 13–44)
MCHC RBC-ENTMCNC: 31.9 PG — SIGNIFICANT CHANGE UP (ref 27–34)
MCHC RBC-ENTMCNC: 32.4 GM/DL — SIGNIFICANT CHANGE UP (ref 32–36)
MCV RBC AUTO: 98.4 FL — SIGNIFICANT CHANGE UP (ref 80–100)
MONOCYTES # BLD AUTO: 0.65 K/UL — SIGNIFICANT CHANGE UP (ref 0–0.9)
MONOCYTES NFR BLD AUTO: 7.7 % — SIGNIFICANT CHANGE UP (ref 2–14)
NEUTROPHILS # BLD AUTO: 6.53 K/UL — SIGNIFICANT CHANGE UP (ref 1.8–7.4)
NEUTROPHILS NFR BLD AUTO: 77.7 % — HIGH (ref 43–77)
PLATELET # BLD AUTO: 423 K/UL — HIGH (ref 150–400)
PROCALCITONIN SERPL-MCNC: 0.08 NG/ML — SIGNIFICANT CHANGE UP (ref 0.02–0.1)
RAPID RVP RESULT: SIGNIFICANT CHANGE UP
RBC # BLD: 3.04 M/UL — LOW (ref 4.2–5.8)
RBC # FLD: 15.2 % — HIGH (ref 10.3–14.5)
SARS-COV-2 RNA SPEC QL NAA+PROBE: SIGNIFICANT CHANGE UP
WBC # BLD: 8.4 K/UL — SIGNIFICANT CHANGE UP (ref 3.8–10.5)
WBC # FLD AUTO: 8.4 K/UL — SIGNIFICANT CHANGE UP (ref 3.8–10.5)

## 2023-09-06 PROCEDURE — 99232 SBSQ HOSP IP/OBS MODERATE 35: CPT

## 2023-09-06 RX ORDER — OLANZAPINE 15 MG/1
2.5 TABLET, FILM COATED ORAL
Refills: 0 | Status: DISCONTINUED | OUTPATIENT
Start: 2023-09-06 | End: 2023-09-07

## 2023-09-06 RX ORDER — OLANZAPINE 15 MG/1
1 TABLET, FILM COATED ORAL
Qty: 0 | Refills: 0 | DISCHARGE
Start: 2023-09-06

## 2023-09-06 RX ORDER — MAGNESIUM OXIDE 400 MG ORAL TABLET 241.3 MG
1 TABLET ORAL
Qty: 0 | Refills: 0 | DISCHARGE
Start: 2023-09-06

## 2023-09-06 RX ORDER — METOPROLOL TARTRATE 50 MG
1 TABLET ORAL
Qty: 0 | Refills: 0 | DISCHARGE
Start: 2023-09-06

## 2023-09-06 RX ORDER — PANTOPRAZOLE SODIUM 20 MG/1
1 TABLET, DELAYED RELEASE ORAL
Qty: 0 | Refills: 0 | DISCHARGE
Start: 2023-09-06

## 2023-09-06 RX ORDER — MAGNESIUM OXIDE 400 MG ORAL TABLET 241.3 MG
1 TABLET ORAL
Refills: 0 | DISCHARGE

## 2023-09-06 RX ORDER — ATORVASTATIN CALCIUM 80 MG/1
1 TABLET, FILM COATED ORAL
Qty: 0 | Refills: 0 | DISCHARGE
Start: 2023-09-06

## 2023-09-06 RX ORDER — POTASSIUM CHLORIDE 20 MEQ
15 PACKET (EA) ORAL
Refills: 0 | DISCHARGE

## 2023-09-06 RX ORDER — LANOLIN ALCOHOL/MO/W.PET/CERES
1 CREAM (GRAM) TOPICAL
Qty: 0 | Refills: 0 | DISCHARGE
Start: 2023-09-06

## 2023-09-06 RX ORDER — FUROSEMIDE 40 MG
1 TABLET ORAL
Refills: 0 | DISCHARGE

## 2023-09-06 RX ORDER — ALPRAZOLAM 0.25 MG
1 TABLET ORAL
Refills: 0 | DISCHARGE

## 2023-09-06 RX ORDER — DRONABINOL 2.5 MG
1 CAPSULE ORAL
Qty: 0 | Refills: 0 | DISCHARGE
Start: 2023-09-06

## 2023-09-06 RX ORDER — CLOPIDOGREL BISULFATE 75 MG/1
1 TABLET, FILM COATED ORAL
Qty: 0 | Refills: 0 | DISCHARGE
Start: 2023-09-06

## 2023-09-06 RX ORDER — SIMVASTATIN 20 MG/1
1 TABLET, FILM COATED ORAL
Refills: 0 | DISCHARGE

## 2023-09-06 RX ORDER — CARVEDILOL PHOSPHATE 80 MG/1
1 CAPSULE, EXTENDED RELEASE ORAL
Refills: 0 | DISCHARGE

## 2023-09-06 RX ORDER — GABAPENTIN 400 MG/1
1 CAPSULE ORAL
Qty: 0 | Refills: 0 | DISCHARGE
Start: 2023-09-06

## 2023-09-06 RX ADMIN — Medication 2000 UNIT(S): at 12:18

## 2023-09-06 RX ADMIN — Medication 2.5 MILLIGRAM(S): at 05:27

## 2023-09-06 RX ADMIN — GABAPENTIN 100 MILLIGRAM(S): 400 CAPSULE ORAL at 21:20

## 2023-09-06 RX ADMIN — ATORVASTATIN CALCIUM 80 MILLIGRAM(S): 80 TABLET, FILM COATED ORAL at 21:20

## 2023-09-06 RX ADMIN — Medication 2.5 MILLIGRAM(S): at 15:57

## 2023-09-06 RX ADMIN — GABAPENTIN 100 MILLIGRAM(S): 400 CAPSULE ORAL at 05:27

## 2023-09-06 RX ADMIN — Medication 5 MILLIGRAM(S): at 21:19

## 2023-09-06 RX ADMIN — MAGNESIUM OXIDE 400 MG ORAL TABLET 400 MILLIGRAM(S): 241.3 TABLET ORAL at 17:13

## 2023-09-06 RX ADMIN — Medication 975 MILLIGRAM(S): at 15:59

## 2023-09-06 RX ADMIN — MINOCYCLINE HYDROCHLORIDE 100 MILLIGRAM(S): 45 TABLET, EXTENDED RELEASE ORAL at 05:27

## 2023-09-06 RX ADMIN — Medication 50 MILLIGRAM(S): at 05:27

## 2023-09-06 RX ADMIN — PANTOPRAZOLE SODIUM 40 MILLIGRAM(S): 20 TABLET, DELAYED RELEASE ORAL at 17:13

## 2023-09-06 RX ADMIN — GABAPENTIN 100 MILLIGRAM(S): 400 CAPSULE ORAL at 12:17

## 2023-09-06 RX ADMIN — CLOPIDOGREL BISULFATE 75 MILLIGRAM(S): 75 TABLET, FILM COATED ORAL at 12:18

## 2023-09-06 RX ADMIN — OLANZAPINE 2.5 MILLIGRAM(S): 15 TABLET, FILM COATED ORAL at 21:19

## 2023-09-06 RX ADMIN — ENOXAPARIN SODIUM 40 MILLIGRAM(S): 100 INJECTION SUBCUTANEOUS at 21:20

## 2023-09-06 RX ADMIN — Medication 81 MILLIGRAM(S): at 12:17

## 2023-09-06 RX ADMIN — Medication 975 MILLIGRAM(S): at 16:59

## 2023-09-06 RX ADMIN — MAGNESIUM OXIDE 400 MG ORAL TABLET 400 MILLIGRAM(S): 241.3 TABLET ORAL at 09:17

## 2023-09-06 RX ADMIN — MAGNESIUM OXIDE 400 MG ORAL TABLET 400 MILLIGRAM(S): 241.3 TABLET ORAL at 12:17

## 2023-09-06 RX ADMIN — PANTOPRAZOLE SODIUM 40 MILLIGRAM(S): 20 TABLET, DELAYED RELEASE ORAL at 05:27

## 2023-09-06 NOTE — PROGRESS NOTE ADULT - SUBJECTIVE AND OBJECTIVE BOX
CC: syncope (06 Sep 2023 09:57)    HPI:  63yo Male with PMH HTN, HLD, CAD, Rectal cancer s/p urostomy and colostomy who presents after syncope and collapse at home.    INTERVAL HPI/OVERNIGHT EVENTS: Patient seen and examined sitting up in the chair.  No further agitation reported.  No fever overnight.  Patient denies any headache, dizziness, SOB, CP, abdominal pain, nausea, vomiting, dysuria.  Other ROS reviewed and are negative.      Vital Signs Last 24 Hrs  T(C): 37 (06 Sep 2023 10:52), Max: 37.9 (05 Sep 2023 17:25)  T(F): 98.6 (06 Sep 2023 10:52), Max: 100.3 (05 Sep 2023 17:25)  HR: 101 (06 Sep 2023 10:52) (79 - 101)  BP: 163/89 (06 Sep 2023 10:52) (129/74 - 163/89)  BP(mean): --  RR: 18 (06 Sep 2023 10:52) (16 - 18)  SpO2: 98% (06 Sep 2023 10:52) (94% - 98%)    Parameters below as of 06 Sep 2023 10:52  Patient On (Oxygen Delivery Method): room air      I&O's Detail    05 Sep 2023 07:01  -  06 Sep 2023 07:00  --------------------------------------------------------  IN:    Oral Fluid: 600 mL  Total IN: 600 mL    OUT:    Colostomy (mL): 100 mL    Urostomy (mL): 800 mL    Voided (mL): 500 mL  Total OUT: 1400 mL    Total NET: -800 mL      06 Sep 2023 07:01  -  06 Sep 2023 11:07  --------------------------------------------------------  IN:  Total IN: 0 mL    OUT:    Colostomy (mL): 300 mL  Total OUT: 300 mL    Total NET: -300 mL    PHYSICAL EXAM:  GENERAL: NAD  HEAD:  Atraumatic, Normocephalic  NECK: Supple, No JVD, Normal thyroid  NERVOUS SYSTEM:  Alert, Good concentration; Motor Strength 5/5 B/L upper and lower extremities  CHEST/LUNG: Clear to auscultation bilaterally  HEART: Regular rate and rhythm; No murmurs, rubs, or gallops  ABDOMEN: Soft, Nontender, Nondistended; Bowel sounds present  EXTREMITIES:  2+ Peripheral Pulses, No clubbing, cyanosis, or edema  SKIN: No rashes or lesions                          9.7    8.40  )-----------( 423      ( 06 Sep 2023 05:55 )             29.9     05 Sep 2023 18:05    138    |  102    |  13.7   ----------------------------<  104    4.5     |  23.0   |  0.79     Ca    9.3        05 Sep 2023 18:05        Urinalysis Basic - ( 05 Sep 2023 18:05 )    Color: x / Appearance: x / SG: x / pH: x  Gluc: 104 mg/dL / Ketone: x  / Bili: x / Urobili: x   Blood: x / Protein: x / Nitrite: x   Leuk Esterase: x / RBC: x / WBC x   Sq Epi: x / Non Sq Epi: x / Bacteria: x        MEDICATIONS  (STANDING):  aspirin enteric coated 81 milliGRAM(s) Oral daily  atorvastatin 80 milliGRAM(s) Oral at bedtime  cholecalciferol 2000 Unit(s) Oral daily  clopidogrel Tablet 75 milliGRAM(s) Oral daily  dronabinol 2.5 milliGRAM(s) Oral <User Schedule>  enoxaparin Injectable 40 milliGRAM(s) SubCutaneous every 24 hours  gabapentin 100 milliGRAM(s) Oral three times a day  magnesium oxide 400 milliGRAM(s) Oral three times a day with meals  melatonin 5 milliGRAM(s) Oral at bedtime  metoprolol succinate ER 50 milliGRAM(s) Oral daily  minocycline 100 milliGRAM(s) Oral two times a day  OLANZapine 2.5 milliGRAM(s) Oral at bedtime  pantoprazole    Tablet 40 milliGRAM(s) Oral every 12 hours    MEDICATIONS  (PRN):  acetaminophen     Tablet .. 975 milliGRAM(s) Oral every 8 hours PRN Moderate Pain (4 - 6)  loperamide 2 milliGRAM(s) Oral four times a day PRN Diarrhea  OLANZapine 2.5 milliGRAM(s) Oral four times a day PRN agitation      RADIOLOGY & ADDITIONAL TESTS:  < from: Xray Chest 1 View-PORTABLE IMMEDIATE (Xray Chest 1 View-PORTABLE IMMEDIATE .) (09.05.23 @ 18:07) >  ACC: [id number] EXAM:  XR CHEST PORTABLE IMMED 1V   ORDERED BY: DORITA KIRK     PROCEDURE DATE:  09/05/2023          INTERPRETATION:  AP chest on September 5, 2023 at 5:30 PM. Patient has   fever. There is history of rectal cancer.    Heart size normal. Right-sided port again noted. Old right rib fracture   again seen.    Presently there is a left loop recorder new since August 26.    There is a slight linear infiltrate in the left midlung field.    Chest is similar to August 26 this year.    IMPRESSION: Persistent slight left mid lung field infiltrate. Presently   there is a loop recorder.    --- End of Report ---            GRACE WILKINSON MD; Attending Radiologist  This document has been electronically signed. Sep  6 2023  9:36AM    < end of copied text >

## 2023-09-06 NOTE — PROGRESS NOTE ADULT - SUBJECTIVE AND OBJECTIVE BOX
62M with PMHX rectal adenocarcinoma c/b mets to bladder and prostate on current chemo FOLFIRI/Panitumamab (Last dose 8/10) with neulasta support on 8/12, s/p Lower Abd Resection/Abdominoperineal Resection (2020, 2022), S/P fistula repair between bowel and bladder, HTN, HLD, CAD s/p PCI x5 presenting s/p syncope event at home 8/26.   CTH shown small right frontal intraparenchymal hemorrhage likely left posterior fossa subarachnoid hemorrhage and possible Trace left tentorial SDH as well as acute L1 compression fracture with mild bony retropulsion.   MRI L spine w/wo con 8/27: Fracture involving the superior aspect of the L1 vertebral body, There is evidence of abnormal enhancement component seen in this vertebral body as well. While this could be compatible with a posttraumatic fracture, given patient's history of trauma the possibility of an osteoporotic or pathologic fracture must be considered as well. Clinical correlation and continued close interval follow-up is recommended. There is evidence of abnormal signal in the ventral epidural space extending from the bottom of T12 to bottom of L2. This is more prominent on the right side than left and does appear to involve the right spinal canal is well. This could be compatible with an epidural hematoma possibility of epidural extension of tumor cannot be entirely excluded.   Right sided maxillofacial fractures. Seen by ENT for facial fractures  Neuro surgery, cardiology on board    9/6:  No acute overnight events noted.  Hemodynamically stable.  No new complaint.  Wears brace  while taking part in PT/OT.  T-max 37.9 yesterday 5:00 p.m.    Vital Signs Last 24 Hrs  T(C): 36.6 (06 Sep 2023 05:32), Max: 37.9 (05 Sep 2023 17:25)  T(F): 97.8 (06 Sep 2023 05:32), Max: 100.3 (05 Sep 2023 17:25)  HR: 95 (06 Sep 2023 05:32) (76 - 95)  BP: 152/81 (06 Sep 2023 05:32) (129/74 - 161/82)  BP(mean): --  RR: 17 (06 Sep 2023 05:32) (16 - 18)  SpO2: 97% (06 Sep 2023 05:32) (94% - 98%)    Parameters below as of 06 Sep 2023 05:32  Patient On (Oxygen Delivery Method): room air      MEDICATIONS  (STANDING):  aspirin enteric coated 81 milliGRAM(s) Oral daily  atorvastatin 80 milliGRAM(s) Oral at bedtime  cholecalciferol 2000 Unit(s) Oral daily  clopidogrel Tablet 75 milliGRAM(s) Oral daily  dronabinol 2.5 milliGRAM(s) Oral <User Schedule>  enoxaparin Injectable 40 milliGRAM(s) SubCutaneous every 24 hours  gabapentin 100 milliGRAM(s) Oral three times a day  magnesium oxide 400 milliGRAM(s) Oral three times a day with meals  melatonin 5 milliGRAM(s) Oral at bedtime  metoprolol succinate ER 50 milliGRAM(s) Oral daily  minocycline 100 milliGRAM(s) Oral two times a day  OLANZapine 2.5 milliGRAM(s) Oral at bedtime  pantoprazole    Tablet 40 milliGRAM(s) Oral every 12 hours       Constitutional: in no acute distress, resting comfortably ;   · Eyes:	PERRL   · Respiratory:	clear to auscultation bilaterally; no wheezes; no rhonchi; airway patent  · Cardiovascular: regular rate and rhythm  · Gastrointestinal:  no guarding  · Neurological: responds to verbal commands  · Mental Status	awake, alert  · Skin	warm and dry; color normal; rash  · Musculoskeletal	ROM intact; strength 5/5 bilateral upper extremities  · Additional PE	right facial swelling and tender     CBC:            9.7    8.40  )-----------( 423      ( 09-06-23 @ 05:55 )             29.9         Chem:         ( 09-05-23 @ 18:05 )    138  |  102  |  13.7  ----------------------------<  104<H>  4.5   |  23.0  |  0.79

## 2023-09-06 NOTE — PROGRESS NOTE ADULT - NS ATTEND AMEND GEN_ALL_CORE FT
Hospitalist attending  I have personally seen and examined the patient.  I fully participated in the care of this patient.  I have made amendments to the documentation where necessary, and agree with the history, physical exam, and plan as documented by the ACP/resident.    Physical exam:    GENERAL: NAD  HEAD:  Atraumatic, Normocephalic  NECK: Supple, No JVD, Normal thyroid  NERVOUS SYSTEM:  Alert, Good concentration; Motor Strength 5/5 B/L upper and lower extremities  CHEST/LUNG: Clear to auscultation bilaterally  HEART: Regular rate and rhythm; No murmurs, rubs, or gallops  ABDOMEN: Soft, Nontender, Nondistended; Bowel sounds present  EXTREMITIES:  2+ Peripheral Pulses, No clubbing, cyanosis, or edema  SKIN: No rashes or lesions    pt noted with fever overnight, Tmax 100.3, infectious workup negative, no WBC, no procalc level.   Suspect 2/2 either known ICH/CVA or poor circulation.     Dispo pending acute rehab    Pt d/w patient, RN, ACP.

## 2023-09-06 NOTE — PROGRESS NOTE ADULT - ASSESSMENT
63 yo Male with PMHx of HTN , rectal cancer on chemo,hx colostomy, ileoconduit  admitted for syncope, reported weakness, poor oral intake diarrhea due to recent chemo s/p syncope, fall at home found to have rt facial fx. Patient admitted to medicine. Pt had another fall. Patient said he was trying to get up from bed and that is what caused him to fall out of bed. Patient was unsure if he hit head. CT head: 2 new tiny hyperdense foci right frontal white matter concerning for tiny parenchymal hemorrhages. Slightly increased subarachnoid hemorrhage in the left posterior fossa. Questionable trace subdural along left tentorial leaflet. No acute calvarial fracture. Right-sided maxillofacial   fractures as recently described.  CT abd/ pelvis: Acute L1 compression fx with mild retropulsion of bone into the epidural space.  No acute post traumatic injury otherwise.    Acute R centrum semiovale infarct  -result noted on MRI  -TTE with normal EF  -Tele  -neuro recs yamilet  -on statin and ASA and Plavix - per neuro, plan for 21 days and outpt f/u in 3 weeks  -ILR 08/31    [facility] sec trauma/fall  -CT head: 2 new tiny hyperdense foci right frontal white matter concerning for tiny parenchymal hemorrhages. Slightly increased subarachnoid hemorrhage in the left posterior fossa. Questionable trace subdural along left tentorial leaflet. No acute calvarial fracture.  -cth 08/27 stable bleed size  -Repeat CTH stable, ASA started on 8/31  -resumed asa, lovenox per neurosurgery  -family request second opinion from Dr. Karlo Boyd rec mri brain, result noted above    Fever  -CXR unchanged.  -UA with nitrites, no leuks  -No leukocytosis noted.  -Procal 0.08.  -RVP negative  -No further fever noted.  -No need for antibiotics at this time.    anemia likely from malignancy/chemo  -s/p 1 u prbc 08/29  -hb stable at 9.3  -stool occult neg  -ppi  -no active bleed present  -hem/onc following    Acute L1 compression fx with mild retropulsion of bone into the epidural space s/p fall  -MRI Spine with fx and edema noted  -TLSO brace provided by ortho  -per family's discussion with [facility], no plan for biopsy, as they think the compression fx is 2/2 the falls and not the mets    Right-sided maxillofacial fractures s/p fall  -per facial surgery rec, no intervention  -pain meds   -cold compress     Unresponsiveness vs syncope/fall  hx PAF  -concern for orthostatic hypotension likely from hypovolemia and has had decreased PO intake while on chemotherapy and additionally with known neuropathy/unsteady gait post chemotherapy,   -neuro check  -resume asa  -tele  -ILR 08/31  -toprol xl 50 mg qd per cardiology  -poor chronic AC candidate (hx gib)    HFrEF without acute decompensation/CAD  -hold off on ACEi/ARB/ARNI therapy for now  -continue toprol xl  -on ASA    Rectal cancer mets to bladder   -with urostomy and colostomy bag   -under [facility] getting chemo, will resume chemo at [facility] after rehab  -[facility] hem/onc [facility]    Agitation  sundowning  -Neurontin 100 mg tid   -zyprexa PO bedtime  -spoke with     DVT ppx: Lovenox  Dispo: acute rehab pending

## 2023-09-06 NOTE — PROGRESS NOTE ADULT - ASSESSMENT
62M with PMHX rectal adenocarcinoma c/b mets to bladder and prostate on current chemo FOLFIRI/Panitumamab (Last dose 8/10) with neulasta support on 8/12, s/p Lower Abd Resection/Abdominoperineal Resection (2020, 2022), S/P fistula repair between bowel and bladder, HTN, HLD, CAD s/p PCI x5 presenting after found down at home by family s/p syncopal event     [facility] CT 8/18/2023 CT of abdomen dand pelvis W/WO IV CONTRAST: BONES/SOFT TISSUES: No suspicious osseous lesion. Degenerative changes of the vertebral spine. Redemonstration of post median laparotomy changes  IMPRESSION: Since July 24, 2023, 1. Unchanged size of presacral mass with development of internal gas foci, possibly from necrosis or superimposed infection. Unchanged drain position within the mass. 2. Unchanged circumferential wall thickening of the urinary bladder with surrounding fat stranding, probably cystitis. In addition, new trace amount of air within the non-dependent portions of the bladder lumen, possibly due to recent instrumentation versus result of fistulization with posteriorly adjacent mass.    Metastatic Rectal ca   - on active chemo FOLFIRI + Panitumamab ; last dose 8/10 with neulasta support 8/12  - pt to resume care with [facility] after d/c     Syncopal episode  L1 compression fx  SAH/SDH  - CTH shown small right frontal intraparenchymal hemorrhage likely left posterior fossa subarachnoid hemorrhage and possible Trace left tentorial SDH  - MRI L spine w/wo reviewed, [facility] to review imaging   - recent CT w/ IV con [facility] 8/18/23 w/o suspicious osseous lesions  - hold IR biopsy until [facility] reviews recent imaging for further recommendations   - fitted for TLSO brace   - further management per neurosurgery   - cardiology on board, s/p ILR placement   - follow cardio recs   - DAPT commenced  - 2nd neurosx opinion as per daughter request.     Anemia  multifactorial  Malignancy/Chemo  - prn PRBC transfusion for hgb < 7;Hemoglobin 9.7 today     chronic diarrhea  - likely secondary to chemo.    - pt on immodium prn - may cont as inpt   - prn electrolyte repletion   - no blood in stool reported     dispo - rehab    thank you    Will update [facility] daily     [facility]  [phone]

## 2023-09-07 ENCOUNTER — INPATIENT (INPATIENT)
Facility: HOSPITAL | Age: 63
LOS: 0 days | Discharge: ANOTHER HOSPITAL | DRG: 949 | End: 2023-09-08
Attending: STUDENT IN AN ORGANIZED HEALTH CARE EDUCATION/TRAINING PROGRAM | Admitting: STUDENT IN AN ORGANIZED HEALTH CARE EDUCATION/TRAINING PROGRAM
Payer: MEDICARE

## 2023-09-07 VITALS
TEMPERATURE: 98 F | HEIGHT: 74 IN | WEIGHT: 189.16 LBS | HEART RATE: 95 BPM | DIASTOLIC BLOOD PRESSURE: 86 MMHG | OXYGEN SATURATION: 98 % | RESPIRATION RATE: 16 BRPM | SYSTOLIC BLOOD PRESSURE: 169 MMHG

## 2023-09-07 VITALS
TEMPERATURE: 99 F | RESPIRATION RATE: 18 BRPM | SYSTOLIC BLOOD PRESSURE: 150 MMHG | OXYGEN SATURATION: 95 % | DIASTOLIC BLOOD PRESSURE: 94 MMHG | HEART RATE: 87 BPM

## 2023-09-07 DIAGNOSIS — Z98.890 OTHER SPECIFIED POSTPROCEDURAL STATES: Chronic | ICD-10-CM

## 2023-09-07 DIAGNOSIS — S06.360A TRAUMATIC HEMORRHAGE OF CEREBRUM, UNSPECIFIED, WITHOUT LOSS OF CONSCIOUSNESS, INITIAL ENCOUNTER: ICD-10-CM

## 2023-09-07 LAB — SARS-COV-2 RNA SPEC QL NAA+PROBE: SIGNIFICANT CHANGE UP

## 2023-09-07 PROCEDURE — 71250 CT THORAX DX C-: CPT | Mod: MA

## 2023-09-07 PROCEDURE — 82803 BLOOD GASES ANY COMBINATION: CPT

## 2023-09-07 PROCEDURE — 87186 SC STD MICRODIL/AGAR DIL: CPT

## 2023-09-07 PROCEDURE — 84100 ASSAY OF PHOSPHORUS: CPT

## 2023-09-07 PROCEDURE — 82607 VITAMIN B-12: CPT

## 2023-09-07 PROCEDURE — 86850 RBC ANTIBODY SCREEN: CPT

## 2023-09-07 PROCEDURE — 82140 ASSAY OF AMMONIA: CPT

## 2023-09-07 PROCEDURE — 70486 CT MAXILLOFACIAL W/O DYE: CPT

## 2023-09-07 PROCEDURE — 72158 MRI LUMBAR SPINE W/O & W/DYE: CPT

## 2023-09-07 PROCEDURE — 85025 COMPLETE CBC W/AUTO DIFF WBC: CPT

## 2023-09-07 PROCEDURE — 82746 ASSAY OF FOLIC ACID SERUM: CPT

## 2023-09-07 PROCEDURE — 36430 TRANSFUSION BLD/BLD COMPNT: CPT

## 2023-09-07 PROCEDURE — 72125 CT NECK SPINE W/O DYE: CPT | Mod: MA

## 2023-09-07 PROCEDURE — 85610 PROTHROMBIN TIME: CPT

## 2023-09-07 PROCEDURE — 82330 ASSAY OF CALCIUM: CPT

## 2023-09-07 PROCEDURE — 93970 EXTREMITY STUDY: CPT

## 2023-09-07 PROCEDURE — 99285 EMERGENCY DEPT VISIT HI MDM: CPT

## 2023-09-07 PROCEDURE — 85014 HEMATOCRIT: CPT

## 2023-09-07 PROCEDURE — 86901 BLOOD TYPING SEROLOGIC RH(D): CPT

## 2023-09-07 PROCEDURE — 93005 ELECTROCARDIOGRAM TRACING: CPT

## 2023-09-07 PROCEDURE — 74177 CT ABD & PELVIS W/CONTRAST: CPT

## 2023-09-07 PROCEDURE — 87040 BLOOD CULTURE FOR BACTERIA: CPT

## 2023-09-07 PROCEDURE — 84145 PROCALCITONIN (PCT): CPT

## 2023-09-07 PROCEDURE — 82435 ASSAY OF BLOOD CHLORIDE: CPT

## 2023-09-07 PROCEDURE — 80048 BASIC METABOLIC PNL TOTAL CA: CPT

## 2023-09-07 PROCEDURE — 87077 CULTURE AEROBIC IDENTIFY: CPT

## 2023-09-07 PROCEDURE — 84295 ASSAY OF SERUM SODIUM: CPT

## 2023-09-07 PROCEDURE — 86923 COMPATIBILITY TEST ELECTRIC: CPT

## 2023-09-07 PROCEDURE — 80061 LIPID PANEL: CPT

## 2023-09-07 PROCEDURE — 87635 SARS-COV-2 COVID-19 AMP PRB: CPT

## 2023-09-07 PROCEDURE — P9016: CPT

## 2023-09-07 PROCEDURE — 83036 HEMOGLOBIN GLYCOSYLATED A1C: CPT

## 2023-09-07 PROCEDURE — 83735 ASSAY OF MAGNESIUM: CPT

## 2023-09-07 PROCEDURE — 97530 THERAPEUTIC ACTIVITIES: CPT

## 2023-09-07 PROCEDURE — 82962 GLUCOSE BLOOD TEST: CPT

## 2023-09-07 PROCEDURE — C8929: CPT

## 2023-09-07 PROCEDURE — 99239 HOSP IP/OBS DSCHRG MGMT >30: CPT

## 2023-09-07 PROCEDURE — 70450 CT HEAD/BRAIN W/O DYE: CPT | Mod: MA

## 2023-09-07 PROCEDURE — 81001 URINALYSIS AUTO W/SCOPE: CPT

## 2023-09-07 PROCEDURE — 83605 ASSAY OF LACTIC ACID: CPT

## 2023-09-07 PROCEDURE — 97116 GAIT TRAINING THERAPY: CPT

## 2023-09-07 PROCEDURE — 87086 URINE CULTURE/COLONY COUNT: CPT

## 2023-09-07 PROCEDURE — 82272 OCCULT BLD FECES 1-3 TESTS: CPT

## 2023-09-07 PROCEDURE — 85027 COMPLETE CBC AUTOMATED: CPT

## 2023-09-07 PROCEDURE — 93880 EXTRACRANIAL BILAT STUDY: CPT

## 2023-09-07 PROCEDURE — 0225U NFCT DS DNA&RNA 21 SARSCOV2: CPT

## 2023-09-07 PROCEDURE — 85018 HEMOGLOBIN: CPT

## 2023-09-07 PROCEDURE — 36415 COLL VENOUS BLD VENIPUNCTURE: CPT

## 2023-09-07 PROCEDURE — 86900 BLOOD TYPING SEROLOGIC ABO: CPT

## 2023-09-07 PROCEDURE — 70551 MRI BRAIN STEM W/O DYE: CPT

## 2023-09-07 PROCEDURE — 85730 THROMBOPLASTIN TIME PARTIAL: CPT

## 2023-09-07 PROCEDURE — 82947 ASSAY GLUCOSE BLOOD QUANT: CPT

## 2023-09-07 PROCEDURE — 33285 INSJ SUBQ CAR RHYTHM MNTR: CPT

## 2023-09-07 PROCEDURE — 71045 X-RAY EXAM CHEST 1 VIEW: CPT

## 2023-09-07 PROCEDURE — 82306 VITAMIN D 25 HYDROXY: CPT

## 2023-09-07 PROCEDURE — C1764: CPT

## 2023-09-07 PROCEDURE — 84484 ASSAY OF TROPONIN QUANT: CPT

## 2023-09-07 PROCEDURE — 84132 ASSAY OF SERUM POTASSIUM: CPT

## 2023-09-07 PROCEDURE — 80053 COMPREHEN METABOLIC PANEL: CPT

## 2023-09-07 PROCEDURE — 99233 SBSQ HOSP IP/OBS HIGH 50: CPT | Mod: GC

## 2023-09-07 RX ORDER — ENOXAPARIN SODIUM 100 MG/ML
40 INJECTION SUBCUTANEOUS
Refills: 0 | Status: DISCONTINUED | OUTPATIENT
Start: 2023-09-07 | End: 2023-09-08

## 2023-09-07 RX ORDER — LANOLIN ALCOHOL/MO/W.PET/CERES
6 CREAM (GRAM) TOPICAL AT BEDTIME
Refills: 0 | Status: DISCONTINUED | OUTPATIENT
Start: 2023-09-07 | End: 2023-09-08

## 2023-09-07 RX ORDER — OLANZAPINE 15 MG/1
2.5 TABLET, FILM COATED ORAL
Refills: 0 | Status: DISCONTINUED | OUTPATIENT
Start: 2023-09-07 | End: 2023-09-08

## 2023-09-07 RX ORDER — MAGNESIUM OXIDE 400 MG ORAL TABLET 241.3 MG
400 TABLET ORAL
Refills: 0 | Status: DISCONTINUED | OUTPATIENT
Start: 2023-09-07 | End: 2023-09-08

## 2023-09-07 RX ORDER — CHOLECALCIFEROL (VITAMIN D3) 125 MCG
2000 CAPSULE ORAL DAILY
Refills: 0 | Status: DISCONTINUED | OUTPATIENT
Start: 2023-09-07 | End: 2023-09-08

## 2023-09-07 RX ORDER — GABAPENTIN 400 MG/1
100 CAPSULE ORAL THREE TIMES A DAY
Refills: 0 | Status: DISCONTINUED | OUTPATIENT
Start: 2023-09-07 | End: 2023-09-08

## 2023-09-07 RX ORDER — ASPIRIN/CALCIUM CARB/MAGNESIUM 324 MG
81 TABLET ORAL DAILY
Refills: 0 | Status: DISCONTINUED | OUTPATIENT
Start: 2023-09-08 | End: 2023-09-08

## 2023-09-07 RX ORDER — CLOPIDOGREL BISULFATE 75 MG/1
75 TABLET, FILM COATED ORAL DAILY
Refills: 0 | Status: DISCONTINUED | OUTPATIENT
Start: 2023-09-08 | End: 2023-09-08

## 2023-09-07 RX ORDER — LOPERAMIDE HCL 2 MG
2 TABLET ORAL
Refills: 0 | Status: DISCONTINUED | OUTPATIENT
Start: 2023-09-07 | End: 2023-09-08

## 2023-09-07 RX ORDER — DRONABINOL 2.5 MG
2.5 CAPSULE ORAL
Refills: 0 | Status: DISCONTINUED | OUTPATIENT
Start: 2023-09-07 | End: 2023-09-08

## 2023-09-07 RX ORDER — PANTOPRAZOLE SODIUM 20 MG/1
40 TABLET, DELAYED RELEASE ORAL EVERY 12 HOURS
Refills: 0 | Status: DISCONTINUED | OUTPATIENT
Start: 2023-09-07 | End: 2023-09-08

## 2023-09-07 RX ORDER — METOPROLOL TARTRATE 50 MG
50 TABLET ORAL DAILY
Refills: 0 | Status: DISCONTINUED | OUTPATIENT
Start: 2023-09-07 | End: 2023-09-08

## 2023-09-07 RX ORDER — MINOCYCLINE HYDROCHLORIDE 45 MG/1
100 TABLET, EXTENDED RELEASE ORAL
Refills: 0 | Status: DISCONTINUED | OUTPATIENT
Start: 2023-09-07 | End: 2023-09-08

## 2023-09-07 RX ORDER — ATORVASTATIN CALCIUM 80 MG/1
80 TABLET, FILM COATED ORAL AT BEDTIME
Refills: 0 | Status: DISCONTINUED | OUTPATIENT
Start: 2023-09-07 | End: 2023-09-08

## 2023-09-07 RX ORDER — OLANZAPINE 15 MG/1
2.5 TABLET, FILM COATED ORAL AT BEDTIME
Refills: 0 | Status: DISCONTINUED | OUTPATIENT
Start: 2023-09-07 | End: 2023-09-08

## 2023-09-07 RX ORDER — ACETAMINOPHEN 500 MG
975 TABLET ORAL EVERY 8 HOURS
Refills: 0 | Status: DISCONTINUED | OUTPATIENT
Start: 2023-09-07 | End: 2023-09-08

## 2023-09-07 RX ORDER — SENNA PLUS 8.6 MG/1
2 TABLET ORAL AT BEDTIME
Refills: 0 | Status: DISCONTINUED | OUTPATIENT
Start: 2023-09-07 | End: 2023-09-08

## 2023-09-07 RX ADMIN — MINOCYCLINE HYDROCHLORIDE 100 MILLIGRAM(S): 45 TABLET, EXTENDED RELEASE ORAL at 05:28

## 2023-09-07 RX ADMIN — ENOXAPARIN SODIUM 40 MILLIGRAM(S): 100 INJECTION SUBCUTANEOUS at 21:13

## 2023-09-07 RX ADMIN — GABAPENTIN 100 MILLIGRAM(S): 400 CAPSULE ORAL at 05:28

## 2023-09-07 RX ADMIN — PANTOPRAZOLE SODIUM 40 MILLIGRAM(S): 20 TABLET, DELAYED RELEASE ORAL at 05:28

## 2023-09-07 RX ADMIN — MAGNESIUM OXIDE 400 MG ORAL TABLET 400 MILLIGRAM(S): 241.3 TABLET ORAL at 08:16

## 2023-09-07 RX ADMIN — OLANZAPINE 2.5 MILLIGRAM(S): 15 TABLET, FILM COATED ORAL at 21:11

## 2023-09-07 RX ADMIN — PANTOPRAZOLE SODIUM 40 MILLIGRAM(S): 20 TABLET, DELAYED RELEASE ORAL at 18:39

## 2023-09-07 RX ADMIN — Medication 2000 UNIT(S): at 11:21

## 2023-09-07 RX ADMIN — Medication 81 MILLIGRAM(S): at 11:21

## 2023-09-07 RX ADMIN — Medication 6 MILLIGRAM(S): at 21:12

## 2023-09-07 RX ADMIN — MAGNESIUM OXIDE 400 MG ORAL TABLET 400 MILLIGRAM(S): 241.3 TABLET ORAL at 11:21

## 2023-09-07 RX ADMIN — CLOPIDOGREL BISULFATE 75 MILLIGRAM(S): 75 TABLET, FILM COATED ORAL at 11:20

## 2023-09-07 RX ADMIN — GABAPENTIN 100 MILLIGRAM(S): 400 CAPSULE ORAL at 21:11

## 2023-09-07 RX ADMIN — ATORVASTATIN CALCIUM 80 MILLIGRAM(S): 80 TABLET, FILM COATED ORAL at 21:11

## 2023-09-07 RX ADMIN — Medication 50 MILLIGRAM(S): at 05:28

## 2023-09-07 RX ADMIN — Medication 2.5 MILLIGRAM(S): at 05:28

## 2023-09-07 NOTE — H&P ADULT - NSHPSOCIALHISTORY_GEN_ALL_CORE
Smoking -  EtOH -   Drugs -     Marital status:    Patient lives   PTA: Independent in ADLs and ambulation     CURRENT FUNCTIONAL STATUS  Date:   Bed Mobility:   Transfers:   Gait:   Upper Body Dressing:  Lower Body Dressing:  Toileting:  Bathing: Smoking - occasional cigar  EtOH - social   Drugs - None    Marital status:     Working in industrial real estate    Patient lives in a house 3-4 steps to enter, 1st floor setup   PTA: Independent in ADLs and ambulation     CURRENT FUNCTIONAL STATUS  9/7 PT  Bed Mobility  Bed Mobility Training Sit-to-Supine: independent    Sit-Stand Transfer Training  Transfer Training Sit-to-Stand Transfer: independent;  full weight-bearing    Gait Training  Gait Training: minimum assist (75% patient effort);  1 person assist;  nonverbal cues (demo/gestures);  verbal cues;  full weight-bearing   no AD used, PT HHA, LOB to the R 2x;  40ft  Gait Analysis: 2-point gait   decreased step length;  decreased strength;  pain  Brace/Orthotics Brace/Orthotics: TLSO    OT 9/5  Bathing Training:   Min assist (75% patients effort)    Upper Body Dressing Training:   Moderate assist (50% patients effort)

## 2023-09-07 NOTE — PATIENT PROFILE ADULT - FALL HARM RISK - HARM RISK INTERVENTIONS

## 2023-09-07 NOTE — PROGRESS NOTE ADULT - REASON FOR ADMISSION
syncope

## 2023-09-07 NOTE — PROGRESS NOTE ADULT - ASSESSMENT
62M with PMHX rectal adenocarcinoma c/b mets to bladder and prostate on current chemo FOLFIRI/Panitumamab (Last dose 8/10) with neulasta support on 8/12, s/p Lower Abd Resection/Abdominoperineal Resection (2020, 2022), S/P fistula repair between bowel and bladder, HTN, HLD, CAD s/p PCI x5 presenting after found down at home by family s/p syncopal event     [facility] CT 8/18/2023 CT of abdomen dand pelvis W/WO IV CONTRAST: BONES/SOFT TISSUES: No suspicious osseous lesion. Degenerative changes of the vertebral spine. Redemonstration of post median laparotomy changes  IMPRESSION: Since July 24, 2023, 1. Unchanged size of presacral mass with development of internal gas foci, possibly from necrosis or superimposed infection. Unchanged drain position within the mass. 2. Unchanged circumferential wall thickening of the urinary bladder with surrounding fat stranding, probably cystitis. In addition, new trace amount of air within the non-dependent portions of the bladder lumen, possibly due to recent instrumentation versus result of fistulization with posteriorly adjacent mass.    Metastatic Rectal ca   - on active chemo FOLFIRI + Panitumamab ; last dose 8/10 with neulasta support 8/12  - pt to resume care with [facility] after d/c     Syncopal episode  L1 compression fx  SAH/SDH  - CTH shown small right frontal intraparenchymal hemorrhage likely left posterior fossa subarachnoid hemorrhage and possible Trace left tentorial SDH  - MRI L spine w/wo reviewed, [facility] to review imaging   - recent CT w/ IV con [facility] 8/18/23 w/o suspicious osseous lesions  - hold IR biopsy until [facility] reviews recent imaging for further recommendations   - fitted for TLSO brace   - further management per neurosurgery   - cardiology on board, s/p ILR placement   - follow cardio recs   - DAPT commenced  - 2nd neurosx opinion as per daughter request.     Anemia  multifactorial  Malignancy/Chemo  - prn PRBC transfusion for hgb < 7;Hemoglobin 9.7 today     chronic diarrhea  - likely secondary to chemo.    - pt on immodium prn - may cont as inpt   - prn electrolyte repletion   - no blood in stool reported     dispo - rehab    thank you    Will update [facility] daily     [facility]  [phone] 62M with PMHX rectal adenocarcinoma c/b mets to bladder and prostate on current chemo FOLFIRI/Panitumamab (Last dose 8/10) with neulasta support on 8/12, s/p Lower Abd Resection/Abdominoperineal Resection (2020, 2022), S/P fistula repair between bowel and bladder, HTN, HLD, CAD s/p PCI x5 presenting after found down at home by family s/p syncopal event     [facility] CT 8/18/2023 CT of abdomen dand pelvis W/WO IV CONTRAST: BONES/SOFT TISSUES: No suspicious osseous lesion. Degenerative changes of the vertebral spine. Redemonstration of post median laparotomy changes  IMPRESSION: Since July 24, 2023, 1. Unchanged size of presacral mass with development of internal gas foci, possibly from necrosis or superimposed infection. Unchanged drain position within the mass. 2. Unchanged circumferential wall thickening of the urinary bladder with surrounding fat stranding, probably cystitis. In addition, new trace amount of air within the non-dependent portions of the bladder lumen, possibly due to recent instrumentation versus result of fistulization with posteriorly adjacent mass.    Metastatic Rectal ca   - on active chemo FOLFIRI + Panitumamab ; last dose 8/10 with neulasta support 8/12  - pt to resume care with [facility] after d/c     Syncopal episode  L1 compression fx  SAH/SDH  - CTH shown small right frontal intraparenchymal hemorrhage likely left posterior fossa subarachnoid hemorrhage and possible Trace left tentorial SDH  - MRI L spine w/wo reviewed, [facility] to review imaging   - recent CT w/ IV con [facility] 8/18/23 w/o suspicious osseous lesions  - hold IR biopsy until [facility] reviews recent imaging for further recommendations ; no immediate plans for bx   - fitted for TLSO brace   - further management per neurosurgery   - cardiology on board, s/p ILR placement   - follow cardio recs   - DAPT commenced  - 2nd neurosx opinion as per daughter request.     Anemia  multifactorial  Malignancy/Chemo  - prn PRBC transfusion for hgb < 7;Hemoglobin 9.7 today     chronic diarrhea  - likely secondary to chemo.    - pt on immodium prn - may cont as inpt   - prn electrolyte repletion   - no blood in stool reported     dispo - rehab    thank you    Will update [facility] daily     [facility]  [phone] 62M with PMHX rectal adenocarcinoma c/b mets to bladder and prostate on current chemo FOLFIRI/Panitumamab (Last dose 8/10) with neulasta support on 8/12, s/p Lower Abd Resection/Abdominoperineal Resection (2020, 2022), S/P fistula repair between bowel and bladder, HTN, HLD, CAD s/p PCI x5 presenting after found down at home by family s/p syncopal event     [facility] CT 8/18/2023 CT of abdomen dand pelvis W/WO IV CONTRAST: BONES/SOFT TISSUES: No suspicious osseous lesion. Degenerative changes of the vertebral spine. Redemonstration of post median laparotomy changes  IMPRESSION: Since July 24, 2023, 1. Unchanged size of presacral mass with development of internal gas foci, possibly from necrosis or superimposed infection. Unchanged drain position within the mass. 2. Unchanged circumferential wall thickening of the urinary bladder with surrounding fat stranding, probably cystitis. In addition, new trace amount of air within the non-dependent portions of the bladder lumen, possibly due to recent instrumentation versus result of fistulization with posteriorly adjacent mass.    Metastatic Rectal ca   - on active chemo FOLFIRI + Panitumamab ; last dose 8/10 with neulasta support 8/12  - pt to resume care with [facility] after d/c     Syncopal episode  L1 compression fx  SAH/SDH  - CTH shown small right frontal intraparenchymal hemorrhage likely left posterior fossa subarachnoid hemorrhage and possible Trace left tentorial SDH  - MRI L spine w/wo reviewed, [facility] to review imaging   - recent CT w/ IV con [facility] 8/18/23 w/o suspicious osseous lesions  - hold IR biopsy until [facility] reviews recent imaging for further recommendations ; no immediate plans for bx   - fitted for TLSO brace   - further management per neurosurgery   - cardiology on board, s/p ILR placement   - follow cardio recs   - DAPT commenced  - 2nd neurosx opinion as per daughter request.     Anemia  multifactorial  Malignancy/Chemo  - prn PRBC transfusion for hgb < 7;Hemoglobin 9.7 today     chronic diarrhea  - likely secondary to chemo.    - pt on immodium prn - may cont as inpt   - prn electrolyte repletion   - no blood in stool reported   - no further diarrhea     dispo - rehab today     thank you    Will update [facility] daily     [facility]  [phone]

## 2023-09-07 NOTE — PROGRESS NOTE ADULT - PROVIDER SPECIALTY LIST ADULT
Cardiology
Cardiology
Heme/Onc
Hospitalist
Neurosurgery
Orthopedics
Cardiology
Electrophysiology
Hospitalist
Hospitalist
Orthopedics
Vascular Surgery
Cardiology
Heme/Onc
Hospitalist
Hospitalist
Orthopedics
Vascular Surgery
Heme/Onc
Heme/Onc
Hospitalist
Hospitalist
Heme/Onc
Hospitalist
Hospitalist

## 2023-09-07 NOTE — H&P ADULT - ASSESSMENT
ASSESSMENT/PLAN  This is a 61 YO  Male with PMH of HTN, rectal cancer on chemo, hx colostomy, ileoconduit  admitted to [facility] on 8/26 for syncope with c/o weakness, poor oral intake, diarrhea due to recent chemo s/p syncope, fall at home, found to have right facial fracture. Patient sustained a second fall on admission. Imaging showed 2 new  tiny parenchymal hemorrhages, slightly increased subarachnoid hemorrhage in the left posterior fossa, right-sided maxillofacial fractures. CT abd/pel showed acute L1 compression fracture. s/p 1 U prbc for anemia.  Patient now with gait Instability, ADL impairments and Functional impairments.    #SAH  - 2/2 multiple falls from syncope leading   - other injuries: right-sided maxillofacial fractures,  acute L1 compression fracture  - Start Comprehensive Rehab Program: PT/OT/ST, 3hours daily and 5 days weekly  - PT: Focused on improving strength, endurance, coordination, balance, functional mobility, and transfers  - OT: Focused on improving strength, fine motor skills, coordination, posture and ADLs.    - ST: to diagnose and treat deficits in swallowing, cognition and communication.   - s/p ILR    #CAD  - Plavix   - [id number] daily    #Rectal cancer   -  Diagnosed in 2/2019 s/p chemo and radiation; recurrence of cancer 2022  - on chemo  - c/w minocycline for rash related to chemo  - f/u with [facility]    #Anemia  - s/p 1u PRBC     #Lumbar Fracture  - CT abd/ pelvis showed acute L1 compression fx, with mild retropulsion of bone into the epidural space  - NSGY recommend further imaging but refused by patient  - OP f/u with [facility]    #HTN  - Metoprolol ER 50mg daily    #HLD  - Lipitor  80mg daily    #Pain management  - Tylenol PRN, gabapentin TID    #DVT ppx  - Lovenox, SCD, TEDs    #GI ppx  - Protonix 40mg  - Marinol 2.5mg daily  - Loperamide 2mg QID    #Bladder management  - BS on admission, and q 8 hours (SC if > 400)  - Monitor UO    #FEN   - Diet: DASH  - Supplements:     #Skin:  - Skin on admission: ***  - Pressure injury/Skin: Turn Q2hrs while in bed, OOB to Chair, PT/OT     #Dysphagia    - SLP: evaluation and treatment    #Mood/Cognition  #Depression  - Zyprexa 2.5mg nightly  - Neuropsychology consult PRN    #Sleep:   - Maintain quiet hours and low stim environment.  - Melatonin PRN to maximize participation in therapy during the day.     #Precaution  - Fall, Aspiration, cardiac, Spinal    #GOC  CODE STATUS: FULL CODE     Outpatient Follow-up (Specialty/Name of physician):    Muriel Parson  Cardiac Electrophysiology  [address] [place] [address]  Phone: [phone]    Karlo Aguilar  Neurosurgery  [address] [place] [id number]  Phone: [phone]  Fax: [phone]    Karlo Boyd  Neurosurgery  [address] [place] [address], [address]  [place] [address]  Phone: [phone]  Fax: [phone]    Primary oncology,   Phone: (   )    -  Fax: (   )    -  Follow Up Time:     Primary doctor,   Phone: (   )    -  Fax: (   )    -  Follow Up Time:     Rakan Blunt  Neurology  [address] [place] [address]  Phone: [phone]  Fax: [phone]  Follow Up Time: 2 weeks    MEDICAL PROGNOSIS: GOOD            REHAB POTENTIAL: GOOD             ESTIMATED DISPOSITION: HOME WITH HOME CARE            ELOS: 10-14 Days   EXPECTED THERAPY:     P.T. 1hr/day       O.T. 1hr/day      S.L.P. 1hr/day     P&O Unnecessary     EXP FREQUENCY: 5 days per 7 day period     PRESCREEN COMPARISON:   I have reviewed the prescreen information and I have found no relevant changes between the preadmission screening and my post admission evaluation     RATIONALE FOR INPATIENT ADMISSION - Patient demonstrates the following: (check all that apply)  [X] Medically appropriate for rehabilitation admission  [X] Has attainable rehab goals with an appropriate initial discharge plan  [X] Has rehabilitation potential (expected to make a significant improvement within a reasonable period of time)   [X] Requires close medical management by a rehab physician, rehab nursing care, Hospitalist and comprehensive interdisciplinary team (including PT, OT, & or SLP, Prosthetics and Orthotics)   ASSESSMENT/PLAN  This is a 61 YO  Male with PMH of HTN, rectal cancer on chemo, hx colostomy, ileoconduit  admitted to [facility] on 8/26 for syncope with c/o weakness, poor oral intake, diarrhea due to recent chemo s/p syncope, fall at home, found to have right facial fracture. Patient sustained a second fall on admission. Imaging showed 2 new  tiny parenchymal hemorrhages, slightly increased subarachnoid hemorrhage in the left posterior fossa, right-sided maxillofacial fractures. CT abd/pel showed acute L1 compression fracture. s/p 1 U prbc for anemia.  Patient now with gait Instability, ADL impairments and Functional impairments.    #SAH  - 2/2 multiple falls from syncope leading   - other injuries: right-sided maxillofacial fractures,  acute L1 compression fracture  - Start Comprehensive Rehab Program: PT/OT/ST, 3hours daily and 5 days weekly  - PT: Focused on improving strength, endurance, coordination, balance, functional mobility, and transfers  - OT: Focused on improving strength, fine motor skills, coordination, posture and ADLs.    - ST: to diagnose and treat deficits in swallowing, cognition and communication.   - s/p ILR    #CAD  - Plavix 75 mg po daily   - [id number] daily    #Rectal cancer   -  Diagnosed in 2/2019 s/p chemo and radiation; recurrence of cancer 2022  - on chemo  - c/w minocycline for rash related to chemo  - f/u with [facility]    #Anemia  - s/p 1u PRBC     #Lumbar Fracture  - CT abd/ pelvis showed acute L1 compression fx, with mild retropulsion of bone into the epidural space  - NSGY recommend further imaging but refused by patient  - OP f/u with [facility]    #HTN  - Metoprolol ER 50mg daily    #HLD  - Lipitor  80mg daily    #Pain management  - Tylenol PRN, gabapentin 100 mg TID    #DVT ppx  - Lovenox, SCD, TEDs    #GI ppx  - Protonix 40mg  - Marinol 2.5mg daily  - Loperamide 2mg QID    #Bladder management  - BS on admission, and q 8 hours (SC if > 400)  - Monitor UO    #FEN   - Diet: DASH    #Skin:  - Skin on admission: intact  - Pressure injury/Skin: Turn Q2hrs while in bed, OOB to Chair, PT/OT     #Dysphagia    - SLP: evaluation and treatment    #Mood/Cognition  #Depression  - Zyprexa 2.5mg qhs and PRN  - Neuropsychology consult PRN    #Sleep:   - Maintain quiet hours and low stim environment.  - Melatonin 6 mg po qhs to maximize participation in therapy during the day.     #Precaution  - Fall, Aspiration, cardiac, Spinal    #GOC  CODE STATUS: FULL CODE     Outpatient Follow-up (Specialty/Name of physician):    Muriel Parson  Cardiac Electrophysiology  [address] [place] [address]  Phone: [phone]    Karlo Aguilar  Neurosurgery  [address] [place] [address]  Phone: [phone]  Fax: [phone]    Karlo Boyd  Neurosurgery  [address] [place] [address], [address]  [place] [address]  Phone: [phone]  Fax: [phone]    Primary oncology, Dr Neville   Primary doctor,   Phone: (   )    -  Fax: (   )    -  Follow Up Time:     Rakan Blunt  Neurology  [address] [place] [address]  Phone: [phone]  Fax: [phone]  Follow Up Time: 2 weeks    MEDICAL PROGNOSIS: GOOD            REHAB POTENTIAL: GOOD             ESTIMATED DISPOSITION: HOME WITH HOME CARE            ELOS: 10-14 Days   EXPECTED THERAPY:     P.T. 1hr/day       O.T. 1hr/day      S.L.P. 1hr/day     P&O Unnecessary     EXP FREQUENCY: 5 days per 7 day period     PRESCREEN COMPARISON:   I have reviewed the prescreen information and I have found no relevant changes between the preadmission screening and my post admission evaluation     RATIONALE FOR INPATIENT ADMISSION - Patient demonstrates the following: (check all that apply)  [X] Medically appropriate for rehabilitation admission  [X] Has attainable rehab goals with an appropriate initial discharge plan  [X] Has rehabilitation potential (expected to make a significant improvement within a reasonable period of time)   [X] Requires close medical management by a rehab physician, rehab nursing care, Hospitalist and comprehensive interdisciplinary team (including PT, OT, & or SLP, Prosthetics and Orthotics)   ASSESSMENT/PLAN  This is a 61 YO  Male with PMH of HTN, rectal cancer on chemo, hx colostomy, ileoconduit  admitted to [facility] on 8/26 for syncope with c/o weakness, poor oral intake, diarrhea due to recent chemo s/p syncope, fall at home, found to have right facial fracture. Patient sustained a second fall on admission. Imaging showed 2 new  tiny parenchymal hemorrhages, slightly increased subarachnoid hemorrhage in the left posterior fossa, right-sided maxillofacial fractures. CT abd/pel showed acute L1 compression fracture. s/p 1 U prbc for anemia.  Patient now with gait Instability, ADL impairments and Functional impairments.    Traumatic SDH  - 2/2 multiple falls from syncope leading   - other injuries: right-sided maxillofacial fractures,  acute L1 compression fracture  - Start Comprehensive Rehab Program: PT/OT/ST, 3hours daily and 5 days weekly  - PT: Focused on improving strength, endurance, coordination, balance, functional mobility, and transfers  - OT: Focused on improving strength, fine motor skills, coordination, posture and ADLs.    - ST: to diagnose and treat deficits in swallowing, cognition and communication.   - s/p ILR    #CAD  - Plavix 75 mg po daily   - [id number] daily    #Rectal cancer   -  Diagnosed in 2/2019 s/p chemo and radiation; recurrence of cancer 2022  - on chemo  - c/w minocycline for rash related to chemo  - f/u with [facility]    #Anemia  - s/p 1u PRBC     # Traumatic Lumbar Fracture  - CT abd/ pelvis showed acute L1 compression fx, with mild retropulsion of bone into the epidural space  - NSGY recommend further imaging but refused by patient  -TLSO when OOB  - OP f/u with [facility]    #HTN  - Metoprolol ER 50mg daily    #HLD  - Lipitor  80mg daily    #Pain management  - Tylenol PRN, gabapentin 100 mg TID    #DVT ppx  - Lovenox, SCD, TEDs    #GI ppx  - Protonix 40mg  - Marinol 2.5mg daily  - Loperamide 2mg QID    #Bladder management  - BS on admission, and q 8 hours (SC if > 400)  - Monitor UO    #FEN   - Diet: DASH    #Skin:  - Skin on admission: intact  - Pressure injury/Skin: Turn Q2hrs while in bed, OOB to Chair, PT/OT     #Dysphagia    - SLP: evaluation and treatment    #Mood/Cognition  #Depression  - Zyprexa 2.5mg qhs and PRN  - Neuropsychology consult PRN    #Sleep:   - Maintain quiet hours and low stim environment.  - Melatonin 6 mg po qhs to maximize participation in therapy during the day.     #Precaution  - Fall, Aspiration, cardiac, Spinal    #GOC  CODE STATUS: FULL CODE     Outpatient Follow-up (Specialty/Name of physician):    Muriel Parson  Cardiac Electrophysiology  [address] [place] [address]  Phone: [phone]    Karlo Aguilar  Neurosurgery  [address] [place] [address]  Phone: [phone]  Fax: [phone]    Karlo Boyd  Neurosurgery  [address] [place] [address], [address]  [place] [address]  Phone: [phone]  Fax: [phone]    Primary oncology, Dr Neville   Primary doctor,   Phone: (   )    -  Fax: (   )    -  Follow Up Time:     Rakan Blunt  Neurology  [address] [place] [address]  Phone: [phone]  Fax: [phone]  Follow Up Time: 2 weeks    MEDICAL PROGNOSIS: GOOD            REHAB POTENTIAL: GOOD             ESTIMATED DISPOSITION: HOME WITH HOME CARE            ELOS: 10-14 Days   EXPECTED THERAPY:     P.T. 1hr/day       O.T. 1hr/day      S.L.P. 1hr/day     P&O Unnecessary     EXP FREQUENCY: 5 days per 7 day period     PRESCREEN COMPARISON:   I have reviewed the prescreen information and I have found no relevant changes between the preadmission screening and my post admission evaluation     RATIONALE FOR INPATIENT ADMISSION - Patient demonstrates the following: (check all that apply)  [X] Medically appropriate for rehabilitation admission  [X] Has attainable rehab goals with an appropriate initial discharge plan  [X] Has rehabilitation potential (expected to make a significant improvement within a reasonable period of time)   [X] Requires close medical management by a rehab physician, rehab nursing care, Hospitalist and comprehensive interdisciplinary team (including PT, OT, & or SLP, Prosthetics and Orthotics)   ASSESSMENT/PLAN  This is a 61 YO  Male with PMH of HTN, rectal cancer on chemo, hx colostomy, ileoconduit  admitted to [facility] on 8/26 for syncope with c/o weakness, poor oral intake, diarrhea due to recent chemo s/p syncope, fall at home, found to have right facial fracture. Patient sustained a second fall on admission. Imaging showed 2 new  tiny parenchymal hemorrhages, slightly increased subarachnoid hemorrhage in the left posterior fossa, right-sided maxillofacial fractures. CT abd/pel showed acute L1 compression fracture. s/p 1 U prbc for anemia.  Patient now with gait Instability, ADL impairments and Functional impairments.    Traumatic SDH/IPH  - 2/2 multiple falls from syncope leading   - other injuries: right-sided maxillofacial fractures,  acute L1 compression fracture  - Start Comprehensive Rehab Program: PT/OT/ST, 3hours daily and 5 days weekly  - PT: Focused on improving strength, endurance, coordination, balance, functional mobility, and transfers  - OT: Focused on improving strength, fine motor skills, coordination, posture and ADLs.    - ST: to diagnose and treat deficits in swallowing, cognition and communication.   - s/p ILR    #CAD  - Plavix 75 mg po daily   - [id number] daily    #Rectal cancer   -  Diagnosed in 2/2019 s/p chemo and radiation; recurrence of cancer 2022  - on chemo  - c/w minocycline for rash related to chemo  - f/u with [facility]    #Anemia  - s/p 1u PRBC     # Traumatic Lumbar Fracture  - CT abd/ pelvis showed acute L1 compression fx, with mild retropulsion of bone into the epidural space  - NSGY recommend further imaging but refused by patient  -TLSO when OOB  - OP f/u with [facility]    #HTN  - Metoprolol ER 50mg daily    #HLD  - Lipitor  80mg daily    #Pain management  - Tylenol PRN, gabapentin 100 mg TID    #DVT ppx  - Lovenox, SCD, TEDs    #GI ppx  - Protonix 40mg  - Marinol 2.5mg daily  - Loperamide 2mg QID    #Bladder management  - BS on admission, and q 8 hours (SC if > 400)  - Monitor UO    #FEN   - Diet: DASH    #Skin:  - Skin on admission: intact  - Pressure injury/Skin: Turn Q2hrs while in bed, OOB to Chair, PT/OT     #Dysphagia    - SLP: evaluation and treatment    #Mood/Cognition  #Depression  - Zyprexa 2.5mg qhs and PRN  - Neuropsychology consult PRN    #Sleep:   - Maintain quiet hours and low stim environment.  - Melatonin 6 mg po qhs to maximize participation in therapy during the day.     #Precaution  - Fall, Aspiration, cardiac, Spinal    #GOC  CODE STATUS: FULL CODE     Outpatient Follow-up (Specialty/Name of physician):    Muriel Parson  Cardiac Electrophysiology  [address] [place] [address]  Phone: [phone]    Karlo Aguilar  Neurosurgery  [address] [place] [address]  Phone: [phone]  Fax: [phone]    Karlo Boyd  Neurosurgery  [address] [place] [address], [address]  [place] [address]  Phone: [phone]  Fax: [phone]    Primary oncology, Dr Neville   Primary doctor,   Phone: (   )    -  Fax: (   )    -  Follow Up Time:     Rakan Blunt  Neurology  [address] [place] [address]  Phone: [phone]  Fax: [phone]  Follow Up Time: 2 weeks    MEDICAL PROGNOSIS: GOOD            REHAB POTENTIAL: GOOD             ESTIMATED DISPOSITION: HOME WITH HOME CARE            ELOS: 10-14 Days   EXPECTED THERAPY:     P.T. 1hr/day       O.T. 1hr/day      S.L.P. 1hr/day     P&O Unnecessary     EXP FREQUENCY: 5 days per 7 day period     PRESCREEN COMPARISON:   I have reviewed the prescreen information and I have found no relevant changes between the preadmission screening and my post admission evaluation     RATIONALE FOR INPATIENT ADMISSION - Patient demonstrates the following: (check all that apply)  [X] Medically appropriate for rehabilitation admission  [X] Has attainable rehab goals with an appropriate initial discharge plan  [X] Has rehabilitation potential (expected to make a significant improvement within a reasonable period of time)   [X] Requires close medical management by a rehab physician, rehab nursing care, Hospitalist and comprehensive interdisciplinary team (including PT, OT, & or SLP, Prosthetics and Orthotics)

## 2023-09-07 NOTE — H&P ADULT - TIME BILLING
Patient seen as documented above. Encounter consisted of reviewing clinical notes, labs, radiology, medications, patient history/exam and assessment and plan.    PHYSICAL EXAM  Constitutional - NAD, Comfortable  HEENT - NCAT, EOMI  Neck - Supple, No limited ROM  Chest - Breathing comfortably  Cardiovascular - S1S2   Abdomen + ostomy in place, +urostomy- clear urine  Extremities - No C/C/E, No calf tenderness   Neurologic Exam -                    Cognitive - AAO to self, place, date, year, situation     Communication - Fluent, No dysarthria, No aphasia- able to repeat, name objects and function, follow 2 step commands     Memory- Delayed memory 3/3 without cues     Attention- Able to perform serial 7's     Cranial Nerves - CN 2-12 intact     Motor - No focal deficits                    LEFT    UE - ShAB 5/5, EF 5/5, EE 5/5, WE 5/5,  5/5                    RIGHT UE - ShAB 5/5, EF 5/5, EE 5/5, WE 5/5,  5/5                    LEFT    LE - HF 5/5, KE 5/5, DF 5/5, PF 5/5                    RIGHT LE - HF 5/5, KE 5/5, DF 5/5, PF 5/5        Sensory - decreased on bilateral feet   Psychiatric - Mood stable, Affect WNL

## 2023-09-07 NOTE — PROGRESS NOTE ADULT - NUTRITIONAL ASSESSMENT
This patient has been assessed with a concern for Malnutrition and has been determined to have a diagnosis/diagnoses of Severe protein-calorie malnutrition.    This patient is being managed with:   Diet DASH/TLC-  Sodium & Cholesterol Restricted  Supplement Feeding Modality:  Oral  Ensure Max Cans or Servings Per Day:  3       Frequency:  Three Times a day  Entered: Aug 27 2023  4:59PM  
Diet, DASH/TLC:   Sodium & Cholesterol Restricted  Supplement Feeding Modality:  Oral  Ensure Max Cans or Servings Per Day:  3       Frequency:  Three Times a day (08-27-23 @ 16:59) [Active]

## 2023-09-07 NOTE — PROGRESS NOTE ADULT - SUBJECTIVE AND OBJECTIVE BOX
62M with PMHX rectal adenocarcinoma c/b mets to bladder and prostate on current chemo FOLFIRI/Panitumamab (Last dose 8/10) with neulasta support on 8/12, s/p Lower Abd Resection/Abdominoperineal Resection (2020, 2022), S/P fistula repair between bowel and bladder, HTN, HLD, CAD s/p PCI x5 presenting s/p syncope event at home 8/26.   CTH shown small right frontal intraparenchymal hemorrhage likely left posterior fossa subarachnoid hemorrhage and possible Trace left tentorial SDH as well as acute L1 compression fracture with mild bony retropulsion.   MRI L spine w/wo con 8/27: Fracture involving the superior aspect of the L1 vertebral body, There is evidence of abnormal enhancement component seen in this vertebral body as well. While this could be compatible with a posttraumatic fracture, given patient's history of trauma the possibility of an osteoporotic or pathologic fracture must be considered as well. Clinical correlation and continued close interval follow-up is recommended. There is evidence of abnormal signal in the ventral epidural space extending from the bottom of T12 to bottom of L2. This is more prominent on the right side than left and does appear to involve the right spinal canal is well. This could be compatible with an epidural hematoma possibility of epidural extension of tumor cannot be entirely excluded.   Right sided maxillofacial fractures. Seen by ENT for facial fractures  Neuro surgery, cardiology on board    9/6:  No acute overnight events noted.  Hemodynamically stable.  No new complaint.  Wears brace  while taking part in PT/OT.  T-max 37.9 yesterday 5:00 p.m.        Vital Signs Last 24 Hrs  T(C): 37.2 (07 Sep 2023 05:30), Max: 37.5 (06 Sep 2023 15:30)  T(F): 98.9 (07 Sep 2023 05:30), Max: 99.5 (06 Sep 2023 15:30)  HR: 100 (07 Sep 2023 05:30) (86 - 101)  BP: 135/80 (07 Sep 2023 05:30) (120/79 - 163/89)  BP(mean): --  RR: 18 (07 Sep 2023 05:30) (18 - 18)  SpO2: 93% (07 Sep 2023 05:30) (93% - 98%)    Parameters below as of 07 Sep 2023 05:30  Patient On (Oxygen Delivery Method): room air    MEDICATIONS  (STANDING):  aspirin enteric coated 81 milliGRAM(s) Oral daily  atorvastatin 80 milliGRAM(s) Oral at bedtime  cholecalciferol 2000 Unit(s) Oral daily  clopidogrel Tablet 75 milliGRAM(s) Oral daily  dronabinol 2.5 milliGRAM(s) Oral <User Schedule>  enoxaparin Injectable 40 milliGRAM(s) SubCutaneous every 24 hours  gabapentin 100 milliGRAM(s) Oral three times a day  magnesium oxide 400 milliGRAM(s) Oral three times a day with meals  melatonin 5 milliGRAM(s) Oral at bedtime  metoprolol succinate ER 50 milliGRAM(s) Oral daily  minocycline 100 milliGRAM(s) Oral two times a day  OLANZapine 2.5 milliGRAM(s) Oral at bedtime  pantoprazole    Tablet 40 milliGRAM(s) Oral every 12 hours    MEDICATIONS  (PRN):  acetaminophen     Tablet .. 975 milliGRAM(s) Oral every 8 hours PRN Moderate Pain (4 - 6)  loperamide 2 milliGRAM(s) Oral four times a day PRN Diarrhea  OLANZapine 2.5 milliGRAM(s) Oral four times a day PRN agitation     Constitutional: in no acute distress, resting comfortably ;   · Eyes:	PERRL   · Respiratory:	clear to auscultation bilaterally; no wheezes; no rhonchi; airway patent  · Cardiovascular: regular rate and rhythm  · Gastrointestinal:  no guarding  · Neurological: responds to verbal commands  · Mental Status	awake, alert  · Skin	warm and dry; color normal; rash  · Musculoskeletal	ROM intact; strength 5/5 bilateral upper extremities  · Additional PE	right facial swelling and tender     CBC:                                      9.7    8.40  )-----------( 423      ( 06 Sep 2023 05:55 )             29.9       Chem:         ( 09-05-23 @ 18:05 )    138  |  102  |  13.7  ----------------------------<  104<H>  4.5   |  23.0  |  0.79     62M with PMHX rectal adenocarcinoma c/b mets to bladder and prostate on current chemo FOLFIRI/Panitumamab (Last dose 8/10) with neulasta support on 8/12, s/p Lower Abd Resection/Abdominoperineal Resection (2020, 2022), S/P fistula repair between bowel and bladder, HTN, HLD, CAD s/p PCI x5 presenting s/p syncope event at home 8/26.   CTH shown small right frontal intraparenchymal hemorrhage likely left posterior fossa subarachnoid hemorrhage and possible Trace left tentorial SDH as well as acute L1 compression fracture with mild bony retropulsion.   MRI L spine w/wo con 8/27: Fracture involving the superior aspect of the L1 vertebral body, There is evidence of abnormal enhancement component seen in this vertebral body as well. While this could be compatible with a posttraumatic fracture, given patient's history of trauma the possibility of an osteoporotic or pathologic fracture must be considered as well. Clinical correlation and continued close interval follow-up is recommended. There is evidence of abnormal signal in the ventral epidural space extending from the bottom of T12 to bottom of L2. This is more prominent on the right side than left and does appear to involve the right spinal canal is well. This could be compatible with an epidural hematoma possibility of epidural extension of tumor cannot be entirely excluded.   Right sided maxillofacial fractures. Seen by ENT for facial fractures  Neuro surgery, cardiology on board    9/7:  No acute overnight events noted.  Hemodynamically stable.  No new complaint.  Wears brace  while taking part in PT/OT.  T-max 99.5.        Vital Signs Last 24 Hrs  T(C): 37.2 (07 Sep 2023 05:30), Max: 37.5 (06 Sep 2023 15:30)  T(F): 98.9 (07 Sep 2023 05:30), Max: 99.5 (06 Sep 2023 15:30)  HR: 100 (07 Sep 2023 05:30) (86 - 101)  BP: 135/80 (07 Sep 2023 05:30) (120/79 - 163/89)  BP(mean): --  RR: 18 (07 Sep 2023 05:30) (18 - 18)  SpO2: 93% (07 Sep 2023 05:30) (93% - 98%)    Parameters below as of 07 Sep 2023 05:30  Patient On (Oxygen Delivery Method): room air    MEDICATIONS  (STANDING):  aspirin enteric coated 81 milliGRAM(s) Oral daily  atorvastatin 80 milliGRAM(s) Oral at bedtime  cholecalciferol 2000 Unit(s) Oral daily  clopidogrel Tablet 75 milliGRAM(s) Oral daily  dronabinol 2.5 milliGRAM(s) Oral <User Schedule>  enoxaparin Injectable 40 milliGRAM(s) SubCutaneous every 24 hours  gabapentin 100 milliGRAM(s) Oral three times a day  magnesium oxide 400 milliGRAM(s) Oral three times a day with meals  melatonin 5 milliGRAM(s) Oral at bedtime  metoprolol succinate ER 50 milliGRAM(s) Oral daily  minocycline 100 milliGRAM(s) Oral two times a day  OLANZapine 2.5 milliGRAM(s) Oral at bedtime  pantoprazole    Tablet 40 milliGRAM(s) Oral every 12 hours    MEDICATIONS  (PRN):  acetaminophen     Tablet .. 975 milliGRAM(s) Oral every 8 hours PRN Moderate Pain (4 - 6)  loperamide 2 milliGRAM(s) Oral four times a day PRN Diarrhea  OLANZapine 2.5 milliGRAM(s) Oral four times a day PRN agitation     Constitutional: in no acute distress, resting comfortably ;   · Eyes:	PERRL   · Respiratory:	clear to auscultation bilaterally; no wheezes; no rhonchi; airway patent  · Cardiovascular: regular rate and rhythm  · Gastrointestinal:  no guarding  · Neurological: responds to verbal commands  · Mental Status	awake, alert  · Skin	warm and dry; color normal; rash  · Musculoskeletal	ROM intact; strength 5/5 bilateral upper extremities  · Additional PE	right facial swelling and tender     CBC:                                      9.7    8.40  )-----------( 423      ( 06 Sep 2023 05:55 )             29.9       Chem:         ( 09-05-23 @ 18:05 )    138  |  102  |  13.7  ----------------------------<  104<H>  4.5   |  23.0  |  0.79     62M with PMHX rectal adenocarcinoma c/b mets to bladder and prostate on current chemo FOLFIRI/Panitumamab (Last dose 8/10) with neulasta support on 8/12, s/p Lower Abd Resection/Abdominoperineal Resection (2020, 2022), S/P fistula repair between bowel and bladder, HTN, HLD, CAD s/p PCI x5 presenting s/p syncope event at home 8/26.   CTH shown small right frontal intraparenchymal hemorrhage likely left posterior fossa subarachnoid hemorrhage and possible Trace left tentorial SDH as well as acute L1 compression fracture with mild bony retropulsion.   MRI L spine w/wo con 8/27: Fracture involving the superior aspect of the L1 vertebral body, There is evidence of abnormal enhancement component seen in this vertebral body as well. While this could be compatible with a posttraumatic fracture, given patient's history of trauma the possibility of an osteoporotic or pathologic fracture must be considered as well. Clinical correlation and continued close interval follow-up is recommended. There is evidence of abnormal signal in the ventral epidural space extending from the bottom of T12 to bottom of L2. This is more prominent on the right side than left and does appear to involve the right spinal canal is well. This could be compatible with an epidural hematoma possibility of epidural extension of tumor cannot be entirely excluded.   Right sided maxillofacial fractures. Seen by ENT for facial fractures  Neuro surgery, cardiology on board    9/7:  No acute overnight events noted.  Hemodynamically stable.  No new complaint.  Wears brace  while taking part in PT/OT.  T-max 99.5.  no diarrhea or bleeding;  clear yellow urine in bag.          Vital Signs Last 24 Hrs  T(C): 37.2 (07 Sep 2023 05:30), Max: 37.5 (06 Sep 2023 15:30)  T(F): 98.9 (07 Sep 2023 05:30), Max: 99.5 (06 Sep 2023 15:30)  HR: 100 (07 Sep 2023 05:30) (86 - 101)  BP: 135/80 (07 Sep 2023 05:30) (120/79 - 163/89)  BP(mean): --  RR: 18 (07 Sep 2023 05:30) (18 - 18)  SpO2: 93% (07 Sep 2023 05:30) (93% - 98%)    Parameters below as of 07 Sep 2023 05:30  Patient On (Oxygen Delivery Method): room air    MEDICATIONS  (STANDING):  aspirin enteric coated 81 milliGRAM(s) Oral daily  atorvastatin 80 milliGRAM(s) Oral at bedtime  cholecalciferol 2000 Unit(s) Oral daily  clopidogrel Tablet 75 milliGRAM(s) Oral daily  dronabinol 2.5 milliGRAM(s) Oral <User Schedule>  enoxaparin Injectable 40 milliGRAM(s) SubCutaneous every 24 hours  gabapentin 100 milliGRAM(s) Oral three times a day  magnesium oxide 400 milliGRAM(s) Oral three times a day with meals  melatonin 5 milliGRAM(s) Oral at bedtime  metoprolol succinate ER 50 milliGRAM(s) Oral daily  minocycline 100 milliGRAM(s) Oral two times a day  OLANZapine 2.5 milliGRAM(s) Oral at bedtime  pantoprazole    Tablet 40 milliGRAM(s) Oral every 12 hours    MEDICATIONS  (PRN):  acetaminophen     Tablet .. 975 milliGRAM(s) Oral every 8 hours PRN Moderate Pain (4 - 6)  loperamide 2 milliGRAM(s) Oral four times a day PRN Diarrhea  OLANZapine 2.5 milliGRAM(s) Oral four times a day PRN agitation     Constitutional: in no acute distress, resting comfortably ;   · Eyes:	PERRL   · Respiratory:	clear to auscultation bilaterally; no wheezes; no rhonchi; airway patent  · Cardiovascular: regular rate and rhythm  · Gastrointestinal:  no guarding  · Neurological: responds to verbal commands  · Mental Status	awake, alert  · Skin	warm and dry; color normal; rash  · Musculoskeletal	ROM intact; strength 5/5 bilateral upper extremities  · Additional PE	right facial swelling and tender     CBC:                                      9.7    8.40  )-----------( 423      ( 06 Sep 2023 05:55 )             29.9       Chem:         ( 09-05-23 @ 18:05 )    138  |  102  |  13.7  ----------------------------<  104<H>  4.5   |  23.0  |  0.79

## 2023-09-07 NOTE — H&P ADULT - HISTORY OF PRESENT ILLNESS
This is a 63 YO  Male with PMHx of HTN , rectal cancer on chemo, hx colostomy, ileoconduit  admitted to [facility] on 8/26 for syncope with c/o weakness, poor oral intake, diarrhea due to recent chemo s/p syncope, fall at home, found to have right facial fracture. Patient had another fall at hospital. Patient said he was trying to get up from bed and that is what caused him to fall out of bed. Patient was unsure if he hit head. CT head: 2 new tiny hyperdense foci right frontal white matter concerning for tiny parenchymal hemorrhages. Slightly increased subarachnoid hemorrhage in the left posterior fossa. Questionable trace subdural along left tentorial leaflet. No acute calvarial fracture. Right-sided maxillofacial fractures. CT abd/ pelvis showed acute L1 compression fx with mild retropulsion of bone into the epidural space. No acute post traumatic injury otherwise. Pt was seen by neurosurgery team. Repeat CTH x 2 was stable and unchanged. Resumed ASA. Neurosurgery rec further imaging to r/o mets /pat fracture but per family pt had multiple recent imaging with [facility] and refused further imaging. Patient advised to f/u with [facility]. Patient s/p 1 U prbc for anemia likely from chemo/malignancy. Patient also s/p ILR and medication adjustment  by cardiology.     Patient was evaluated by PM&R and therapy for functional deficits, gait/ADL impairments and acute rehabilitation was recommended. Patient was medically optimized for discharge to [facility] IRU on9/7/23 This is a 63 YO  Male with PMHx of HTN , rectal cancer on chemo, hx colostomy, ileoconduit  admitted to [facility] on 8/26 for syncope with c/o weakness, poor oral intake, diarrhea due to recent chemo s/p syncope, fall at home, found to have right facial fracture. Patient had another fall at hospital. Patient said he was trying to get up from bed and that is what caused him to fall out of bed.  CT head: 2 new tiny hyperdense foci right frontal white matter concerning for tiny parenchymal hemorrhages. Slightly increased subarachnoid hemorrhage in the left posterior fossa. Questionable trace subdural along left tentorial leaflet. No acute calvarial fracture. Right-sided maxillofacial fractures. CT abd/ pelvis showed acute L1 compression fx with mild retropulsion of bone into the epidural space. No acute post traumatic injury otherwise. Pt was seen by neurosurgery team. Repeat CTH x 2 was stable and unchanged. Resumed ASA. Neurosurgery rec further imaging to r/o mets /pat fracture but per family pt had multiple recent imaging with [facility] and refused further imaging. Patient advised to f/u with [facility]. Patient s/p 1 U prbc for anemia likely from chemo/malignancy. Patient also s/p ILR and medication adjustment  by cardiology.     Patient was evaluated by PM&R and therapy for functional deficits, gait/ADL impairments and acute rehabilitation was recommended. Patient was medically optimized for discharge to [facility] IRU on9/7/23 This is a 63 YO  Male with PMHx of HTN , rectal cancer on chemo, hx colostomy, ileoconduit  admitted to [facility] on 8/26 for syncope with c/o weakness, poor oral intake, diarrhea due to recent chemo s/p syncope, fall at home, found to have right facial fracture. Patient had another fall at hospital. Patient said he was trying to get up from bed and that is what caused him to fall out of bed.  CT head: 2 new tiny hyperdense foci right frontal white matter concerning for tiny parenchymal hemorrhages. Slightly increased subarachnoid hemorrhage in the left posterior fossa. Questionable trace subdural along left tentorial leaflet. No acute calvarial fracture. Right-sided maxillofacial fractures. CT abd/ pelvis showed traumatic acute L1 compression fx with mild retropulsion of bone into the epidural space. No acute post traumatic injury otherwise. Pt was seen by neurosurgery team. Repeat CTH x 2 was stable and unchanged. Resumed ASA. Neurosurgery rec further imaging to r/o mets /pat fracture but per family pt had multiple recent imaging with [facility] and refused further imaging. Patient advised to f/u with [facility]. Patient s/p 1 U prbc for anemia likely from chemo/malignancy. Patient also s/p ILR and medication adjustment  by cardiology.     Patient was evaluated by PM&R and therapy for functional deficits, gait/ADL impairments and acute rehabilitation was recommended. Patient was medically optimized for discharge to [facility] IRU on9/7/23

## 2023-09-07 NOTE — H&P ADULT - NSHPPHYSICALEXAM_GEN_ALL_CORE
PHYSICAL EXAM  Constitutional - NAD, Comfortable  HEENT - NCAT, EOMI  Neck - Supple, No limited ROM  Chest - Breathing comfortably  Cardiovascular - S1S2   Abdomen + ostomy in place, +urostomy- clear urine  Extremities - No C/C/E, No calf tenderness   Neurologic Exam -                    Cognitive - AAO to self, place, date, year, situation     Communication - Fluent, No dysarthria, No aphasia- able to repeat, name objects and function, follow 2 step commands     Memory- Delayed memory 3/3 without cues     Attention- Able to perform serial 7's     Cranial Nerves - CN 2-12 intact     Motor - No focal deficits                    LEFT    UE - ShAB 5/5, EF 5/5, EE 5/5, WE 5/5,  5/5                    RIGHT UE - ShAB 5/5, EF 5/5, EE 5/5, WE 5/5,  5/5                    LEFT    LE - HF 5/5, KE 5/5, DF 5/5, PF 5/5                    RIGHT LE - HF 5/5, KE 5/5, DF 5/5, PF 5/5        Sensory - decreased on bilateral feet   Psychiatric - Mood stable, Affect WNL

## 2023-09-07 NOTE — H&P ADULT - NSHPREVIEWOFSYSTEMS_GEN_ALL_CORE
REVIEW OF SYSTEMS  Constitutional: No fever, No Chills, No fatigue  HEENT: No eye pain, No visual disturbances, No difficulty hearing  Pulm: No cough,  No shortness of breath  Cardio: No chest pain, No palpitations  GI/: +osteomy, +urostomy  Neuro: No headaches, No memory loss, No loss of strength, No numbness, No tremors  Skin: No itching, No rashes, No lesions   Endo: No temperature intolerance  MSK: No joint pain, No joint swelling, No muscle pain, No Neck or back pain  Psych:  No depression, No anxiety

## 2023-09-07 NOTE — H&P ADULT - NSHPLABSRESULTS_GEN_ALL_CORE
RECENT LABS/IMAGING                        9.7    8.40  )-----------( 423      ( 06 Sep 2023 05:55 )             29.9     09-05    138  |  102  |  13.7  ----------------------------<  104<H>  4.5   |  23.0  |  0.79    Ca    9.3      05 Sep 2023 18:05      Urinalysis Basic - ( 05 Sep 2023 18:05 )    Color: x / Appearance: x / SG: x / pH: x  Gluc: 104 mg/dL / Ketone: x  / Bili: x / Urobili: x   Blood: x / Protein: x / Nitrite: x   Leuk Esterase: x / RBC: x / WBC x   Sq Epi: x / Non Sq Epi: x / Bacteria: x    X-ray Chest 1 View-PORTABLE IMMEDIATE (X-ray Chest 1 View-PORTABLE IMMEDIATE .) (09.05.23 @ 18:07) >    IMPRESSION: Persistent slight left mid lung field infiltrate. Presently   there is a loop recorder.    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  No   hemodynamically significant stenosis in the right internal carotid artery.      MR Head No Cont (09.01.23 @ 15:30)     IMPRESSION:  Loss of flow void within the imaged cervical, petrous and cavernous left   ICA segment concerning for high-grade flow limiting stenosis versus occlusion. Recommend further evaluation with CT angiogram of the head.  Punctate acute infarction in the right centrum semiovale. Innumerable chronic microhemorrhages aligned in a linear configuration in   the right frontal and parietal centrum semiovale may be related to a   remote hemorrhagic ischemic event. There is tiny superimposed focus of T1   hyperintensity which may represent a subacute hemorrhagic component which   is also seen on the corresponding head CT from 8/28/2023.  Stable small subarachnoid hemorrhage.    CT Head No Cont (08.28.23 @ 17:19)     IMPRESSION:  Stable extra-axial hemorrhage as described above. No new or worsening intracranial hemorrhage.    MR Lumbar Spine w/wo IV Cont (08.27.23 @ 16:08)     IMPRESSION: Fracture involving L1 with associated edema with associated   enhancement as described above. There is evidence of an epidural process   seen ventrally in the region of this fracture as described above.

## 2023-09-08 ENCOUNTER — TRANSCRIPTION ENCOUNTER (OUTPATIENT)
Age: 63
End: 2023-09-08

## 2023-09-08 VITALS
DIASTOLIC BLOOD PRESSURE: 87 MMHG | HEART RATE: 88 BPM | OXYGEN SATURATION: 98 % | SYSTOLIC BLOOD PRESSURE: 168 MMHG | TEMPERATURE: 99 F | RESPIRATION RATE: 15 BRPM

## 2023-09-08 DIAGNOSIS — K62.5 HEMORRHAGE OF ANUS AND RECTUM: ICD-10-CM

## 2023-09-08 LAB
ALBUMIN SERPL ELPH-MCNC: 2.6 G/DL — LOW (ref 3.3–5)
ALP SERPL-CCNC: 181 U/L — HIGH (ref 40–120)
ALT FLD-CCNC: 58 U/L — HIGH (ref 10–45)
ANION GAP SERPL CALC-SCNC: 9 MMOL/L — SIGNIFICANT CHANGE UP (ref 5–17)
AST SERPL-CCNC: 32 U/L — SIGNIFICANT CHANGE UP (ref 10–40)
BASOPHILS # BLD AUTO: 0.13 K/UL — SIGNIFICANT CHANGE UP (ref 0–0.2)
BASOPHILS NFR BLD AUTO: 1.7 % — SIGNIFICANT CHANGE UP (ref 0–2)
BILIRUB SERPL-MCNC: 0.9 MG/DL — SIGNIFICANT CHANGE UP (ref 0.2–1.2)
BLD GP AB SCN SERPL QL: SIGNIFICANT CHANGE UP
BUN SERPL-MCNC: 17 MG/DL — SIGNIFICANT CHANGE UP (ref 7–23)
CALCIUM SERPL-MCNC: 9.3 MG/DL — SIGNIFICANT CHANGE UP (ref 8.4–10.5)
CHLORIDE SERPL-SCNC: 104 MMOL/L — SIGNIFICANT CHANGE UP (ref 96–108)
CO2 SERPL-SCNC: 26 MMOL/L — SIGNIFICANT CHANGE UP (ref 22–31)
CREAT SERPL-MCNC: 1.06 MG/DL — SIGNIFICANT CHANGE UP (ref 0.5–1.3)
EGFR: 78 ML/MIN/1.73M2 — SIGNIFICANT CHANGE UP
EOSINOPHIL # BLD AUTO: 0.15 K/UL — SIGNIFICANT CHANGE UP (ref 0–0.5)
EOSINOPHIL NFR BLD AUTO: 1.9 % — SIGNIFICANT CHANGE UP (ref 0–6)
GLUCOSE SERPL-MCNC: 92 MG/DL — SIGNIFICANT CHANGE UP (ref 70–99)
HCT VFR BLD CALC: 31.4 % — LOW (ref 39–50)
HCT VFR BLD CALC: 32.6 % — LOW (ref 39–50)
HGB BLD-MCNC: 10.1 G/DL — LOW (ref 13–17)
HGB BLD-MCNC: 10.6 G/DL — LOW (ref 13–17)
IMM GRANULOCYTES NFR BLD AUTO: 0.4 % — SIGNIFICANT CHANGE UP (ref 0–0.9)
LYMPHOCYTES # BLD AUTO: 1.03 K/UL — SIGNIFICANT CHANGE UP (ref 1–3.3)
LYMPHOCYTES # BLD AUTO: 13.2 % — SIGNIFICANT CHANGE UP (ref 13–44)
MCHC RBC-ENTMCNC: 31.4 PG — SIGNIFICANT CHANGE UP (ref 27–34)
MCHC RBC-ENTMCNC: 31.5 PG — SIGNIFICANT CHANGE UP (ref 27–34)
MCHC RBC-ENTMCNC: 32.2 GM/DL — SIGNIFICANT CHANGE UP (ref 32–36)
MCHC RBC-ENTMCNC: 32.5 GM/DL — SIGNIFICANT CHANGE UP (ref 32–36)
MCV RBC AUTO: 97 FL — SIGNIFICANT CHANGE UP (ref 80–100)
MCV RBC AUTO: 97.5 FL — SIGNIFICANT CHANGE UP (ref 80–100)
MONOCYTES # BLD AUTO: 0.68 K/UL — SIGNIFICANT CHANGE UP (ref 0–0.9)
MONOCYTES NFR BLD AUTO: 8.7 % — SIGNIFICANT CHANGE UP (ref 2–14)
NEUTROPHILS # BLD AUTO: 5.77 K/UL — SIGNIFICANT CHANGE UP (ref 1.8–7.4)
NEUTROPHILS NFR BLD AUTO: 74.1 % — SIGNIFICANT CHANGE UP (ref 43–77)
NRBC # BLD: 0 /100 WBCS — SIGNIFICANT CHANGE UP (ref 0–0)
NRBC # BLD: 0 /100 WBCS — SIGNIFICANT CHANGE UP (ref 0–0)
PLATELET # BLD AUTO: 460 K/UL — HIGH (ref 150–400)
PLATELET # BLD AUTO: 489 K/UL — HIGH (ref 150–400)
POTASSIUM SERPL-MCNC: 4 MMOL/L — SIGNIFICANT CHANGE UP (ref 3.5–5.3)
POTASSIUM SERPL-SCNC: 4 MMOL/L — SIGNIFICANT CHANGE UP (ref 3.5–5.3)
PROT SERPL-MCNC: 6.6 G/DL — SIGNIFICANT CHANGE UP (ref 6–8.3)
RBC # BLD: 3.22 M/UL — LOW (ref 4.2–5.8)
RBC # BLD: 3.36 M/UL — LOW (ref 4.2–5.8)
RBC # FLD: 14.9 % — HIGH (ref 10.3–14.5)
RBC # FLD: 15 % — HIGH (ref 10.3–14.5)
SODIUM SERPL-SCNC: 139 MMOL/L — SIGNIFICANT CHANGE UP (ref 135–145)
WBC # BLD: 7.79 K/UL — SIGNIFICANT CHANGE UP (ref 3.8–10.5)
WBC # BLD: 9.29 K/UL — SIGNIFICANT CHANGE UP (ref 3.8–10.5)
WBC # FLD AUTO: 7.79 K/UL — SIGNIFICANT CHANGE UP (ref 3.8–10.5)
WBC # FLD AUTO: 9.29 K/UL — SIGNIFICANT CHANGE UP (ref 3.8–10.5)

## 2023-09-08 PROCEDURE — 99223 1ST HOSP IP/OBS HIGH 75: CPT

## 2023-09-08 PROCEDURE — 93010 ELECTROCARDIOGRAM REPORT: CPT

## 2023-09-08 PROCEDURE — 99222 1ST HOSP IP/OBS MODERATE 55: CPT

## 2023-09-08 PROCEDURE — 99233 SBSQ HOSP IP/OBS HIGH 50: CPT | Mod: GC

## 2023-09-08 RX ORDER — PANTOPRAZOLE SODIUM 20 MG/1
1 TABLET, DELAYED RELEASE ORAL
Qty: 0 | Refills: 0 | DISCHARGE
Start: 2023-09-08

## 2023-09-08 RX ORDER — DRONABINOL 2.5 MG
1 CAPSULE ORAL
Qty: 0 | Refills: 0 | DISCHARGE
Start: 2023-09-08

## 2023-09-08 RX ORDER — SENNA PLUS 8.6 MG/1
2 TABLET ORAL
Qty: 0 | Refills: 0 | DISCHARGE
Start: 2023-09-08

## 2023-09-08 RX ORDER — MINOCYCLINE HYDROCHLORIDE 45 MG/1
1 TABLET, EXTENDED RELEASE ORAL
Refills: 0 | DISCHARGE

## 2023-09-08 RX ORDER — MAGNESIUM OXIDE 400 MG ORAL TABLET 241.3 MG
1 TABLET ORAL
Qty: 0 | Refills: 0 | DISCHARGE
Start: 2023-09-08

## 2023-09-08 RX ORDER — CHOLECALCIFEROL (VITAMIN D3) 125 MCG
1 CAPSULE ORAL
Refills: 0 | DISCHARGE

## 2023-09-08 RX ORDER — GABAPENTIN 400 MG/1
1 CAPSULE ORAL
Qty: 0 | Refills: 0 | DISCHARGE
Start: 2023-09-08

## 2023-09-08 RX ORDER — METOPROLOL TARTRATE 50 MG
50 TABLET ORAL ONCE
Refills: 0 | Status: DISCONTINUED | OUTPATIENT
Start: 2023-09-08 | End: 2023-09-08

## 2023-09-08 RX ORDER — CHOLECALCIFEROL (VITAMIN D3) 125 MCG
2000 CAPSULE ORAL
Qty: 0 | Refills: 0 | DISCHARGE
Start: 2023-09-08

## 2023-09-08 RX ORDER — OLANZAPINE 15 MG/1
1 TABLET, FILM COATED ORAL
Qty: 0 | Refills: 0 | DISCHARGE
Start: 2023-09-08

## 2023-09-08 RX ORDER — METOPROLOL TARTRATE 50 MG
1 TABLET ORAL
Qty: 0 | Refills: 0 | DISCHARGE
Start: 2023-09-08

## 2023-09-08 RX ORDER — LOPERAMIDE HCL 2 MG
1 TABLET ORAL
Qty: 0 | Refills: 0 | DISCHARGE
Start: 2023-09-08

## 2023-09-08 RX ORDER — METOPROLOL TARTRATE 50 MG
50 TABLET ORAL ONCE
Refills: 0 | Status: COMPLETED | OUTPATIENT
Start: 2023-09-08 | End: 2023-09-08

## 2023-09-08 RX ORDER — LANOLIN ALCOHOL/MO/W.PET/CERES
2 CREAM (GRAM) TOPICAL
Qty: 0 | Refills: 0 | DISCHARGE
Start: 2023-09-08

## 2023-09-08 RX ORDER — METOPROLOL TARTRATE 50 MG
100 TABLET ORAL
Refills: 0 | Status: DISCONTINUED | OUTPATIENT
Start: 2023-09-08 | End: 2023-09-08

## 2023-09-08 RX ORDER — ACETAMINOPHEN 500 MG
3 TABLET ORAL
Qty: 0 | Refills: 0 | DISCHARGE
Start: 2023-09-08

## 2023-09-08 RX ORDER — ATORVASTATIN CALCIUM 80 MG/1
1 TABLET, FILM COATED ORAL
Qty: 0 | Refills: 0 | DISCHARGE
Start: 2023-09-08

## 2023-09-08 RX ADMIN — MAGNESIUM OXIDE 400 MG ORAL TABLET 400 MILLIGRAM(S): 241.3 TABLET ORAL at 12:09

## 2023-09-08 RX ADMIN — Medication 50 MILLIGRAM(S): at 12:09

## 2023-09-08 RX ADMIN — Medication 2000 UNIT(S): at 12:10

## 2023-09-08 RX ADMIN — Medication 81 MILLIGRAM(S): at 12:10

## 2023-09-08 RX ADMIN — MAGNESIUM OXIDE 400 MG ORAL TABLET 400 MILLIGRAM(S): 241.3 TABLET ORAL at 08:45

## 2023-09-08 RX ADMIN — Medication 100 MILLIGRAM(S): at 19:41

## 2023-09-08 RX ADMIN — CLOPIDOGREL BISULFATE 75 MILLIGRAM(S): 75 TABLET, FILM COATED ORAL at 12:09

## 2023-09-08 RX ADMIN — GABAPENTIN 100 MILLIGRAM(S): 400 CAPSULE ORAL at 13:55

## 2023-09-08 RX ADMIN — GABAPENTIN 100 MILLIGRAM(S): 400 CAPSULE ORAL at 05:21

## 2023-09-08 RX ADMIN — Medication 50 MILLIGRAM(S): at 05:22

## 2023-09-08 RX ADMIN — Medication 2.5 MILLIGRAM(S): at 05:22

## 2023-09-08 RX ADMIN — PANTOPRAZOLE SODIUM 40 MILLIGRAM(S): 20 TABLET, DELAYED RELEASE ORAL at 05:21

## 2023-09-08 NOTE — PROGRESS NOTE ADULT - ASSESSMENT
61 YO  Male with PMH of HTN, rectal cancer on chemo, hx colostomy, ileoconduit  admitted to [facility] on 8/26 for syncope with c/o weakness, poor oral intake, diarrhea due to recent chemo s/p syncope, fall at home, found to have right facial fracture. Patient sustained a second fall on admission. Imaging showed 2 new  tiny parenchymal hemorrhages, slightly increased subarachnoid hemorrhage in the left posterior fossa, right-sided maxillofacial fractures. CT abd/pel showed acute L1 compression fracture. s/p 1 U prbc for anemia.  Patient now with gait Instability, ADL impairments and Functional impairments.    Traumatic SDH/IPH  - 2/2 multiple falls from syncope leading   - other injuries: right-sided maxillofacial fractures,  acute L1 compression fracture  - Start Comprehensive Rehab Program: PT/OT/ST, 3hours daily and 5 days weekly  - PT: Focused on improving strength, endurance, coordination, balance, functional mobility, and transfers  - OT: Focused on improving strength, fine motor skills, coordination, posture and ADLs.    - ST: to diagnose and treat deficits in swallowing, cognition and communication.   - s/p ILR (8/31)    #acute L1 compression fracture  - nonsurgical, TLSO when out of bed  - Refusing to wear custom TLSO from [facility] d/t cumbersome and ill fitting - drains had leak from being compressed under brace.     - Reviewed risk and benefit of TLSO given his acute L1 compression fracture. Provided with pictures/descriptions of other TLSO braces, patient is will to try.    - OrthotistWayne will evaluate him and review braces    #CAD  - Plavix 75 mg po daily   - [id number] daily    #Rectal cancer   -  Diagnosed in 2/2019 s/p chemo and radiation; recurrence of cancer 2022  - on chemo - scheduled for 9/14 (with [facility])  - hold minocycline for rash related to chemo - has been off, next chemo as per pt is scheduled for 9/14    #Anemia  - s/p 1u PRBC   - Hgb 10.1 (9/8)    # Traumatic Lumbar Fracture  - CT abd/ pelvis showed acute L1 compression fx, with mild retropulsion of bone into the epidural space  - NSGY recommend further imaging but refused by patient  -TLSO when OOB  - OP f/u with [facility]    #HTN - Tachy ~115 today (9/8)  - Metoprolol ER 50mg daily - inc 100mg (9/8)  - Prior EKG 8/18 and 8/26 with sinus tach 103-110    #HLD  - Lipitor  80mg daily    #Pain management  - Tylenol PRN, gabapentin 100 mg TID    #DVT ppx  - Lovenox, SCD, TEDs    #GI ppx  - Protonix 40mg  - Marinol 2.5mg daily  - Loperamide 2mg QID    #Bladder management   - Urostomy   - Monitor UO - currently clear yellow    #FEN   - Diet: DASH  - SLP: evaluation and treatment    #Skin:  - Skin on admission: intact  - Pressure injury/Skin: Turn Q2hrs while in bed, OOB to Chair, PT/OT     #Mood/Cognition  #Depression  - Zyprexa 2.5mg qhs and PRN  - Neuropsychology consult PRN    #Sleep:   - Maintain quiet hours and low stim environment.  - Melatonin 6 mg po qhs to maximize participation in therapy during the day.     #Precaution  - Fall, Aspiration, cardiac, Spinal    #GOC  CODE STATUS: FULL CODE     Outpatient Follow-up (Specialty/Name of physician):    Muriel Parson  Cardiac Electrophysiology  [address] [place] [address]  Phone: [phone]    Karlo Aguilar  Neurosurgery  [address] [place] [address]  Phone: [phone]  Fax: [phone]    Karlo Boyd  Neurosurgery  [address] [place] [address], [address]  [place] [address]  Phone: [phone]  Fax: [phone]    Primary oncology, Rakan Bowie  Neurology  [address] [place] [address]  Phone: [phone]  Fax: [phone]  Follow Up Time: 2 weeks     61 YO  Male with PMH of HTN, rectal cancer on chemo, hx colostomy, ileoconduit  admitted to [facility] on 8/26 for syncope with c/o weakness, poor oral intake, diarrhea due to recent chemo s/p syncope, fall at home, found to have right facial fracture. Patient sustained a second fall on admission. Imaging showed 2 new  tiny parenchymal hemorrhages, slightly increased subarachnoid hemorrhage in the left posterior fossa, right-sided maxillofacial fractures. CT abd/pel showed acute L1 compression fracture. s/p 1 U prbc for anemia.  Patient now with gait Instability, ADL impairments and Functional impairments.    Traumatic SDH/IPH  - 2/2 multiple falls from syncope leading   - other injuries: right-sided maxillofacial fractures,  acute L1 compression fracture  - Start Comprehensive Rehab Program: PT/OT/ST, 3hours daily and 5 days weekly  - PT: Focused on improving strength, endurance, coordination, balance, functional mobility, and transfers  - OT: Focused on improving strength, fine motor skills, coordination, posture and ADLs.    - ST: to diagnose and treat deficits in swallowing, cognition and communication.   - s/p ILR (8/31)    #acute L1 compression fracture  - nonsurgical, TLSO when out of bed  - Refusing to wear custom TLSO from [facility] d/t cumbersome and ill fitting - drains had leak from being compressed under brace.     - Reviewed risk and benefit of TLSO given his acute L1 compression fracture. Provided with pictures/descriptions of other TLSO braces, patient is will to try.    - OrthotistWayne will evaluate him and review braces  - Requesting for a hyper extension TLSO to help enhance with brace compliance and proper spinal alignment to prevent harm such a paresis/plegia    #CAD  - Plavix 75 mg po daily   - [id number] daily    #Rectal cancer   -  Diagnosed in 2/2019 s/p chemo and radiation; recurrence of cancer 2022  - on chemo - scheduled for 9/14 (with [facility])  - hold minocycline for rash related to chemo - has been off, next chemo as per pt is scheduled for 9/14    #Anemia  - s/p 1u PRBC   - Hgb 10.1 (9/8)    # Traumatic Lumbar Fracture  - CT abd/ pelvis showed acute L1 compression fx, with mild retropulsion of bone into the epidural space  - NSGY recommend further imaging but refused by patient  -TLSO when OOB  - OP f/u with [facility]    #HTN - Tachy ~115 today (9/8)  - Metoprolol ER 50mg daily - inc 100mg (9/8)  - Prior EKG 8/18 and 8/26 with sinus tach 103-110    #HLD  - Lipitor  80mg daily    #Pain management  - Tylenol PRN, gabapentin 100 mg TID    #DVT ppx  - Lovenox, SCD, TEDs    #GI ppx  - Protonix 40mg  - Marinol 2.5mg daily  - Loperamide 2mg QID    #Bladder management   - Urostomy   - Monitor UO - currently clear yellow    #FEN   - Diet: DASH  - SLP: evaluation and treatment    #Skin:  - Skin on admission: intact  - Pressure injury/Skin: Turn Q2hrs while in bed, OOB to Chair, PT/OT     #Mood/Cognition  #Depression  - Zyprexa 2.5mg qhs and PRN  - Neuropsychology consult PRN    #Sleep:   - Maintain quiet hours and low stim environment.  - Melatonin 6 mg po qhs to maximize participation in therapy during the day.     #Precaution  - Fall, Aspiration, cardiac, Spinal    #GOC  CODE STATUS: FULL CODE     Outpatient Follow-up (Specialty/Name of physician):    Muriel Parson  Cardiac Electrophysiology  [address] [place] [address]  Phone: [phone]    Karlo Aguilar  Neurosurgery  [address] [place] [address]  Phone: [phone]  Fax: [phone]    Karlo Boyd  Neurosurgery  [address] [place] [address], [address]  [place] [address]  Phone: [phone]  Fax: [phone]    Primary oncology, Rakan Bowie  Neurology  [address] [place] [address]  Phone: [phone]  Fax: [phone]  Follow Up Time: 2 weeks     61 YO  Male with PMH of HTN, rectal cancer on chemo, hx colostomy, ileoconduit  admitted to [facility] on 8/26 for syncope with c/o weakness, poor oral intake, diarrhea due to recent chemo s/p syncope, fall at home, found to have right facial fracture. Patient sustained a second fall on admission. Imaging showed 2 new  tiny parenchymal hemorrhages, slightly increased subarachnoid hemorrhage in the left posterior fossa, right-sided maxillofacial fractures. CT abd/pel showed acute L1 compression fracture. s/p 1 U prbc for anemia.  Patient now with gait Instability, ADL impairments and Functional impairments.    Traumatic SDH/IPH  - 2/2 multiple falls from syncope leading   - other injuries: right-sided maxillofacial fractures,  acute L1 compression fracture  - Start Comprehensive Rehab Program: PT/OT/ST, 3hours daily and 5 days weekly  - PT: Focused on improving strength, endurance, coordination, balance, functional mobility, and transfers  - OT: Focused on improving strength, fine motor skills, coordination, posture and ADLs.    - ST: to diagnose and treat deficits in swallowing, cognition and communication.   - s/p ILR (8/31)    #Acute Traumatic L1 compression fracture  - nonsurgical, TLSO when out of bed  - Refusing to wear custom TLSO from [facility] d/t cumbersome and ill fitting - drains had leak from being compressed under brace.     - Reviewed risk and benefit of TLSO given his acute L1 compression fracture. Provided with pictures/descriptions of other TLSO braces, patient is will to try.    - OrthotistWayne will evaluate him and review braces   - Requesting for a hyper extension TLSO to help enhance with brace compliance and proper spinal alignment to prevent harm such a paresis/plegia    #CAD  - Plavix 75 mg po daily   - [id number] daily    #Rectal cancer   -  Diagnosed in 2/2019 s/p chemo and radiation; recurrence of cancer 2022  - on chemo - scheduled for 9/14 (with [facility])  - hold minocycline for rash related to chemo - has been off, next chemo as per pt is scheduled for 9/14    #Anemia  - s/p 1u PRBC   - Hgb 10.1 (9/8)    # Traumatic Lumbar Fracture  - CT abd/ pelvis showed acute L1 compression fx, with mild retropulsion of bone into the epidural space  - NSGY recommend further imaging but refused by patient  -TLSO when OOB  - OP f/u with [facility]    #HTN - Tachy ~115 today (9/8)  - Metoprolol ER 50mg daily - increase to 100mg (9/8)  - Prior EKG 8/18 and 8/26 with sinus tach 103-110    #HLD  - Lipitor  80mg daily    #Pain management  - Tylenol PRN, gabapentin 100 mg TID    #DVT ppx  - Lovenox, SCD, TEDs    #GI ppx  - Protonix 40mg  - Marinol 2.5mg daily  - Loperamide 2mg QID    #Bladder management   - Urostomy   - Monitor UO - currently clear yellow    #FEN   - Diet: DASH  - SLP: evaluation and treatment    #Skin:  - Skin on admission: intact  - Pressure injury/Skin: Turn Q2hrs while in bed, OOB to Chair, PT/OT     #Mood/Cognition  #Depression  - Zyprexa 2.5mg qhs and PRN  - Neuropsychology consult PRN    #Sleep:   - Maintain quiet hours and low stim environment.  - Melatonin 6 mg po qhs to maximize participation in therapy during the day.     #Precaution  - Fall, Aspiration, cardiac, Spinal    #GOC  CODE STATUS: FULL CODE     Outpatient Follow-up (Specialty/Name of physician):    Muriel Parson  Cardiac Electrophysiology  [address] [place] [address]  Phone: [phone]    Karlo Aguilar  Neurosurgery  [address] [place] [address]  Phone: [phone]  Fax: [phone]    Karlo Boyd  Neurosurgery  [address] [place] [address], [address]  [place] [address]  Phone: [phone]  Fax: [phone]    Primary oncology, Rakan Bowie  Neurology  [address] [place] [address]  Phone: [phone]  Fax: [phone]  Follow Up Time: 2 weeks

## 2023-09-08 NOTE — CHART NOTE - NSCHARTNOTEFT_GEN_A_CORE
Patient with new onset rectal bleeding- saturated diaper, patient with drain in rectum, placed originally by [facility]. Per wife, patient was supposed to have catheter replaced by [facility], however, patient had a fall and was subsequently admitted to [facility], discharged to [place] acute rehab yesterday. Per wife, the catheter for the drain "disappeared" since prior to admission at [facility]. GI consult appreciated, per examination by GI, could not visualize drain or catheter, bleeding currently stopped, however, unclear whether bleeding may occur again and patient without prior rectal bleeding. Patient with new onset rectal bleeding- saturated diaper, patient with drain in rectum, placed originally by [facility].   Vitals at time of eval: /90, HR92, O2 sat 98%  Per wife, patient was supposed to have catheter replaced by [facility], however, patient had a fall and was subsequently admitted to [facility], discharged to [place] acute rehab yesterday. Per wife, the catheter for the drain "disappeared" since prior to admission at [facility]. GI consult appreciated, per examination by GI, could not visualize drain or catheter, bleeding currently stopped, however, unclear whether bleeding may occur again and patient without prior rectal bleeding.  Transfer center contacted as [facility]- accepted physician Dr Lopez, patient to be transported to [facility] for further evaluation Patient with new onset rectal bleeding- saturated diaper, patient with drain in rectum, placed originally by [facility].   Vitals at time of eval: /90, HR92, O2 sat 98%  Per wife, patient was supposed to have catheter replaced by [facility], however, patient had a fall and was subsequently admitted to [facility], discharged to [place] acute rehab yesterday. Per wife, the catheter for the drain "disappeared" since prior to admission at [facility]. GI consult appreciated, per examination by GI, could not visualize drain or catheter, bleeding currently stopped, however, unclear whether bleeding may occur again and patient without prior rectal bleeding.   Transfer [facility] contacted as [facility]- accepted physician Dr Lopez, patient to be transported to [facility] for further evaluation. Imaging reports given to patient and wife. Patient with new onset rectal bleeding- saturated diaper, patient with drain in rectum, placed originally by [facility].   Vitals at time of eval: /90, HR92, O2 sat 98%. Repeat CBC- HgB stable at 10.6  Per wife, patient was supposed to have catheter replaced by [facility], however, patient had a fall and was subsequently admitted to [facility], discharged to [place] acute rehab yesterday. Per wife, the catheter for the drain "disappeared" since prior to admission at [facility]. GI consult appreciated, per examination by GI, could not visualize drain or catheter, bleeding currently stopped, however, unclear whether bleeding may occur again and patient without prior rectal bleeding.   Transfer center contacted as [facility]- accepted physician Dr Lopez, patient to be transported to [facility] for further evaluation. Imaging reports given to patient and wife.

## 2023-09-08 NOTE — CONSULT NOTE ADULT - ASSESSMENT
ASSESSMENT/PLAN  This is a 61 YO  Male with PMH of HTN, rectal cancer on chemo, hx colostomy, ileoconduit  admitted to [facility] on 8/26 for syncope with c/o weakness, poor oral intake, diarrhea due to recent chemo s/p syncope, fall at home, found to have right facial fracture. Patient sustained a second fall on admission. Imaging showed 2 new  tiny parenchymal hemorrhages, slightly increased subarachnoid hemorrhage in the left posterior fossa, right-sided maxillofacial fractures. CT abd/pel showed acute L1 compression fracture. s/p 1 U prbc for anemia.  Patient now with gait Instability, ADL impairments and Functional impairments.    Traumatic SDH/IPH  - 2/2 multiple falls from syncope leading   - other injuries: right-sided maxillofacial fractures,  acute L1 compression fracture  - Start Comprehensive Rehab Program: PT/OT/ST, 3hours daily and 5 days weekly  - PT: Focused on improving strength, endurance, coordination, balance, functional mobility, and transfers  - OT: Focused on improving strength, fine motor skills, coordination, posture and ADLs.    - ST: to diagnose and treat deficits in swallowing, cognition and communication.   - s/p ILR    #CAD  - Plavix 75 mg po daily   - [id number] daily    #Rectal cancer   -  Diagnosed in 2/2019 s/p chemo and radiation; recurrence of cancer 2022  - on chemo  - c/w minocycline for rash related to chemo  - f/u with [facility]    #Anemia  - s/p 1u PRBC     # Traumatic Lumbar Fracture  - CT abd/ pelvis showed acute L1 compression fx, with mild retropulsion of bone into the epidural space  - NSGY recommend further imaging but refused by patient  -TLSO when OOB  - OP f/u with [facility]    #HTN  - Metoprolol ER 50mg daily    #HLD  - Lipitor  80mg daily    #Pain management  - Tylenol PRN, gabapentin 100 mg TID    #DVT ppx  - Lovenox, SCD, TEDs    #GI ppx  - Protonix 40mg  - Marinol 2.5mg daily  - Loperamide 2mg QID    #Bladder management  - BS on admission, and q 8 hours (SC if > 400)  - Monitor UO    #FEN   - Diet: DASH    #Skin:  - Skin on admission: intact  - Pressure injury/Skin: Turn Q2hrs while in bed, OOB to Chair, PT/OT     #Dysphagia    - SLP: evaluation and treatment    #Mood/Cognition  #Depression  - Zyprexa 2.5mg qhs and PRN  - Neuropsychology consult PRN    #Sleep:   - Maintain quiet hours and low stim environment.  - Melatonin 6 mg po qhs to maximize participation in therapy during the day.     #Precaution  - Fall, Aspiration, cardiac, Spinal    #GOC  CODE STATUS: FULL CODE     Outpatient Follow-up (Specialty/Name of physician):    Muriel Parson  Cardiac Electrophysiology  [address] [place] [address]  Phone: [phone]    Karlo Aguilar  Neurosurgery  [address] [place] [address]  Phone: [phone]  Fax: [phone]    Karlo Boyd  Neurosurgery  [address] [place] [address], [address]  [place] [address]  Phone: [phone]  Fax: [phone]    Primary oncology, Dr Neville   Primary doctor,   Phone: (   )    -  Fax: (   )    -  Follow Up Time:     Rakan Blunt  Neurology  [address] [place] [address]  Phone: [phone]  Fax: [phone]  Follow Up Time: 2 weeks    MEDICAL PROGNOSIS: GOOD            REHAB POTENTIAL: GOOD             ESTIMATED DISPOSITION: HOME WITH HOME CARE            ELOS: 10-14 Days   EXPECTED THERAPY:     P.T. 1hr/day       O.T. 1hr/day      S.L.P. 1hr/day     P&O Unnecessary     EXP FREQUENCY: 5 days per 7 day period     PRESCREEN COMPARISON:   I have reviewed the prescreen information and I have found no relevant changes between the preadmission screening and my post admission evaluation     RATIONALE FOR INPATIENT ADMISSION - Patient demonstrates the following: (check all that apply)  [X] Medically appropriate for rehabilitation admission  [X] Has attainable rehab goals with an appropriate initial discharge plan  [X] Has rehabilitation potential (expected to make a significant improvement within a reasonable period of time)   [X] Requires close medical management by a rehab physician, rehab nursing care, Hospitalist and comprehensive interdisciplinary team (including PT, OT, & or SLP, Prosthetics and Orthotics)   61 YO Male with PMH of CAD s/p stent, HTN, rectal cancer on chemo, hx colostomy, ileoconduit  admitted to [facility] on 8/26 for syncope with c/o weakness, poor oral intake, diarrhea due to recent chemo s/p syncope, fall at home, found to have right facial fracture. Patient sustained a second fall on admission. Imaging showed 2 new  tiny parenchymal hemorrhages, slightly increased subarachnoid hemorrhage in the left posterior fossa, right-sided maxillofacial fractures. CT abd/pel showed acute L1 compression fracture. s/p 1 U prbc for anemia.  Pt was cleared to transfer to [facility] for acute rehab on 9/7. Rehab course complicated by acute rectal bleeds.    Acute rectal bleeds  Underlying history of rectal cancer mets to bladder, s/p rectal pigtail catheter, colostomy and ileostomy   -pt found by nursing to have profuse rectal bleeding. pt examined with GI Dr. Lopes, bleeding has stopped for now  -hold ASA and Plavix and chemical DVT ppx  -check stat cbc and keep active type and screen  -Case discussed with GI and Colorectal surgery, Dr. Parrish, recommended transfer to [facility] for further monitoring and potential surgical intervention.    Traumatic ICH secondary to fall  - CT head: 2 new tiny hyperdense foci right frontal white matter concerning for tiny parenchymal hemorrhages. Slightly increased subarachnoid hemorrhage in the left posterior fossa. Questionable trace subdural along left tentorial leaflet. No acute calvarial fracture.  -repeat CTH stable. pt was cleared by neurosurgery to resume ASA and DVT ppx    Acute R centrum semiovale infarct  -result noted on MRI  -TTE with normal EF  -hospitalist note from [facility] reviewed, neuro rec for 21 days of Plavix and cont with ASA. follow up neuro outpatient  -s/p ILR 08/31    Anemia of chronic disease   -pt was s/p 1 u prbc 08/29 at [facility]  -ppi    Acute L1 compression fx with mild retropulsion of bone into the epidural space s/p fall  -MRI Spine with fx and edema noted  -TLSO brace provided by ortho  -per family's discussion with [facility], no plan for biopsy, as they think the compression fx is 2/2 the falls and not the mets    Right-sided maxillofacial fractures s/p fall  -no intervention per plastic surgery  -pain meds     History of PAF  -not on AC. history of GIB  -EKG from previous admission reviewed. in sinus rhythm     CAD s/p stent   HFrEF without acute decompensation  -hold off on ACEi/ARB/ARNI therapy until BP stable  -continue lopressor 100mg BID  -ASA     Agitation  sundowning  -Neurontin 100 mg tid   -zyprexa bedtime      DVT ppx: hold chemical dvt ppx    Dispo: transfer to [facility]

## 2023-09-08 NOTE — DIETITIAN INITIAL EVALUATION ADULT - PERTINENT MEDS FT
Anticipated Plan (Based On Presumed Biopsy Results): Partial biopsy MEDICATIONS  (STANDING):  aspirin enteric coated 81 milliGRAM(s) Oral daily  atorvastatin 80 milliGRAM(s) Oral at bedtime  cholecalciferol 2000 Unit(s) Oral daily  clopidogrel Tablet 75 milliGRAM(s) Oral daily  dronabinol 2.5 milliGRAM(s) Oral <User Schedule>  enoxaparin Injectable 40 milliGRAM(s) SubCutaneous <User Schedule>  gabapentin 100 milliGRAM(s) Oral three times a day  magnesium oxide 400 milliGRAM(s) Oral three times a day with meals  melatonin 6 milliGRAM(s) Oral at bedtime  OLANZapine 2.5 milliGRAM(s) Oral at bedtime  pantoprazole    Tablet 40 milliGRAM(s) Oral every 12 hours    MEDICATIONS  (PRN):  acetaminophen     Tablet .. 975 milliGRAM(s) Oral every 8 hours PRN Moderate Pain (4 - 6)  loperamide 2 milliGRAM(s) Oral four times a day PRN Diarrhea  OLANZapine 2.5 milliGRAM(s) Oral four times a day PRN agitation  senna 2 Tablet(s) Oral at bedtime PRN Constipation

## 2023-09-08 NOTE — CONSULT NOTE ADULT - SUBJECTIVE AND OBJECTIVE BOX
Patient is a 62y old  Male who presents with a chief complaint of Traumatic hemorrhage of cerebrum, unspecified, without loss of consciousness, initial encounter     (08 Sep 2023 12:26)      Subjective and overnight events:  Patient seen and examined at bedside. + pt found with acute rectal bleeding, with soaked bloody diaper. no abdominal pain, n/v. no bleeding in colostomy bag. no fever, chills, sob, cp, headahce, dizziness.    ALLERGIES:  No Known Allergies  chocolate (Vomiting)    MEDICATIONS  (STANDING):  atorvastatin 80 milliGRAM(s) Oral at bedtime  cholecalciferol 2000 Unit(s) Oral daily  dronabinol 2.5 milliGRAM(s) Oral <User Schedule>  gabapentin 100 milliGRAM(s) Oral three times a day  magnesium oxide 400 milliGRAM(s) Oral three times a day with meals  melatonin 6 milliGRAM(s) Oral at bedtime  OLANZapine 2.5 milliGRAM(s) Oral at bedtime  pantoprazole    Tablet 40 milliGRAM(s) Oral every 12 hours    MEDICATIONS  (PRN):  acetaminophen     Tablet .. 975 milliGRAM(s) Oral every 8 hours PRN Moderate Pain (4 - 6)  loperamide 2 milliGRAM(s) Oral four times a day PRN Diarrhea  OLANZapine 2.5 milliGRAM(s) Oral four times a day PRN agitation  senna 2 Tablet(s) Oral at bedtime PRN Constipation    Vital Signs Last 24 Hrs  T(F): 98 (08 Sep 2023 07:55), Max: 98.4 (07 Sep 2023 16:10)  HR: 95 (08 Sep 2023 12:14) (91 - 125)  BP: 148/90 (08 Sep 2023 12:14) (136/79 - 169/86)  RR: 16 (08 Sep 2023 11:38) (16 - 16)  SpO2: 98% (08 Sep 2023 11:38) (98% - 100%)  I&O's Summary    07 Sep 2023 07:01  -  08 Sep 2023 07:00  --------------------------------------------------------  IN: 0 mL / OUT: 550 mL / NET: -550 mL      PHYSICAL EXAM:  General: NAD, A/O x 3  ENT: MMM  Neck: Supple, No JVD  Lungs: Clear to auscultation bilaterally  Cardio: RRR, S1/S2, No murmurs  Abdomen: Soft, Nontender, Nondistended; Bowel sounds present. + rectal bleeding  Extremities: No calf tenderness, No pitting edema    LABS:                        10.1   7.79  )-----------( 460      ( 08 Sep 2023 05:15 )             31.4     09-08    139  |  104  |  17  ----------------------------<  92  4.0   |  26  |  1.06    Ca    9.3      08 Sep 2023 05:15    TPro  6.6  /  Alb  2.6  /  TBili  0.9  /  DBili  x   /  AST  32  /  ALT  58  /  AlkPhos  181  09-08          09-02 Chol 96 mg/dL LDL -- HDL 29 mg/dL Trig 130 mg/dL        Urinalysis Basic - ( 08 Sep 2023 05:15 )    Color: x / Appearance: x / SG: x / pH: x  Gluc: 92 mg/dL / Ketone: x  / Bili: x / Urobili: x   Blood: x / Protein: x / Nitrite: x   Leuk Esterase: x / RBC: x / WBC x   Sq Epi: x / Non Sq Epi: x / Bacteria: x        Culture - Urine (collected 05 Sep 2023 20:35)  Source: Clean Catch Clean Catch (Midstream)  Preliminary Report (08 Sep 2023 10:08):    >100,000 CFU/ml Proteus mirabilis Susceptibility to follow.      COVID-19 PCR: NotDetec (09-07-23 @ 18:00)  COVID-19 PCR: NotDetec (09-04-23 @ 15:55)      RADIOLOGY & ADDITIONAL TESTS:    Care Discussed with Consultants/Other Providers:    HPI:  This is a 61 YO  Male with PMHx of HTN , rectal cancer on chemo, hx colostomy, ileoconduit  admitted to [facility] on 8/26 for syncope with c/o weakness, poor oral intake, diarrhea due to recent chemo s/p syncope, fall at home, found to have right facial fracture. Patient had another fall at hospital. Patient said he was trying to get up from bed and that is what caused him to fall out of bed.  CT head: 2 new tiny hyperdense foci right frontal white matter concerning for tiny parenchymal hemorrhages. Slightly increased subarachnoid hemorrhage in the left posterior fossa. Questionable trace subdural along left tentorial leaflet. No acute calvarial fracture. Right-sided maxillofacial fractures. CT abd/ pelvis showed traumatic acute L1 compression fx with mild retropulsion of bone into the epidural space. No acute post traumatic injury otherwise. Pt was seen by neurosurgery team. Repeat CTH x 2 was stable and unchanged. Resumed ASA. Neurosurgery rec further imaging to r/o mets /pat fracture but per family pt had multiple recent imaging with [facility] and refused further imaging. Patient advised to f/u with [facility]. Patient s/p 1 U prbc for anemia likely from chemo/malignancy. Patient also s/p ILR and medication adjustment  by cardiology.     Patient was evaluated by PM&R and therapy for functional deficits, gait/ADL impairments and acute rehabilitation was recommended. Patient was medically optimized for discharge to [facility]U on9/7/23 (07 Sep 2023 13:02)      Subjective and overnight events:  Patient seen and examined at bedside. + pt found with acute rectal bleeding, with soaked bloody diaper. no abdominal pain, n/v. no bleeding in colostomy bag. no fever, chills, sob, cp, headahce, dizziness.    PAST MEDICAL & SURGICAL HISTORY:  HTN (hypertension)      HLD (hyperlipidemia)      CAD (coronary artery disease)      Mitral regurgitation      Rectal cancer  2/2019 s/p chemo and radiation; recurrence of cancer 2022      Depression  with rectal cancer      Neuropathy  feet s/p chemo      Stented coronary artery  circumflex 2005 LAD 2017 x4      H/O carotid stenosis      H/O renal calculi      DANO (iron deficiency anemia)      Umbilical hernia      Splenic artery aneurysm      H/O cardiac catheterization  2005 2017  4 STENTS      History of CEA (carotid endarterectomy)  right 2019      History of rectal surgery  with ileostomy 2/2020      H/O sigmoidoscopy  x2      History of removal of Port-a-Cath      Family history: mother had history of ALS    Social history: smokes occasional cigar. social drinking. no illicit drug    ALLERGIES:  No Known Allergies  chocolate (Vomiting)    MEDICATIONS  (STANDING):  atorvastatin 80 milliGRAM(s) Oral at bedtime  cholecalciferol 2000 Unit(s) Oral daily  dronabinol 2.5 milliGRAM(s) Oral <User Schedule>  gabapentin 100 milliGRAM(s) Oral three times a day  magnesium oxide 400 milliGRAM(s) Oral three times a day with meals  melatonin 6 milliGRAM(s) Oral at bedtime  OLANZapine 2.5 milliGRAM(s) Oral at bedtime  pantoprazole    Tablet 40 milliGRAM(s) Oral every 12 hours    MEDICATIONS  (PRN):  acetaminophen     Tablet .. 975 milliGRAM(s) Oral every 8 hours PRN Moderate Pain (4 - 6)  loperamide 2 milliGRAM(s) Oral four times a day PRN Diarrhea  OLANZapine 2.5 milliGRAM(s) Oral four times a day PRN agitation  senna 2 Tablet(s) Oral at bedtime PRN Constipation    Vital Signs Last 24 Hrs  T(F): 98 (08 Sep 2023 07:55), Max: 98.4 (07 Sep 2023 16:10)  HR: 95 (08 Sep 2023 12:14) (91 - 125)  BP: 148/90 (08 Sep 2023 12:14) (136/79 - 169/86)  RR: 16 (08 Sep 2023 11:38) (16 - 16)  SpO2: 98% (08 Sep 2023 11:38) (98% - 100%)  I&O's Summary    07 Sep 2023 07:01  -  08 Sep 2023 07:00  --------------------------------------------------------  IN: 0 mL / OUT: 550 mL / NET: -550 mL      PHYSICAL EXAM:  General: NAD, A/O x 3  ENT: MMM  Neck: Supple, No JVD  Lungs: Clear to auscultation bilaterally  Cardio: RRR, S1/S2, No murmurs  Abdomen: Soft, Nontender, Nondistended; Bowel sounds present. + rectal bleeding. +colostomy and ileotomy   Extremities: No calf tenderness, No pitting edema    LABS:                        10.1   7.79  )-----------( 460      ( 08 Sep 2023 05:15 )             31.4     09-08    139  |  104  |  17  ----------------------------<  92  4.0   |  26  |  1.06    Ca    9.3      08 Sep 2023 05:15    TPro  6.6  /  Alb  2.6  /  TBili  0.9  /  DBili  x   /  AST  32  /  ALT  58  /  AlkPhos  181  09-08        09-02 Chol 96 mg/dL LDL -- HDL 29 mg/dL Trig 130 mg/dL        Urinalysis Basic - ( 08 Sep 2023 05:15 )    Color: x / Appearance: x / SG: x / pH: x  Gluc: 92 mg/dL / Ketone: x  / Bili: x / Urobili: x   Blood: x / Protein: x / Nitrite: x   Leuk Esterase: x / RBC: x / WBC x   Sq Epi: x / Non Sq Epi: x / Bacteria: x            COVID-19 PCR: NotDetec (09-07-23 @ 18:00)  COVID-19 PCR: NotDetec (09-04-23 @ 15:55)      RADIOLOGY & ADDITIONAL TESTS:    Care Discussed with Consultants/Other Providers: d/w GI, rehab team. [place] [facility]

## 2023-09-08 NOTE — DISCHARGE NOTE PROVIDER - PROVIDER TOKENS
PROVIDER:[TOKEN:[[id number]]],PROVIDER:[TOKEN:[[id number]]],PROVIDER:[TOKEN:[[id number]]],PROVIDER:[TOKEN:[[id number]]],FREE:[LAST:[Primary Onc ([facility])],PHONE:[(   )    -],FAX:[(   )    -]],FREE:[LAST:[Primary Care Provider],PHONE:[(   )    -],FAX:[(   )    -]]

## 2023-09-08 NOTE — DIETITIAN INITIAL EVALUATION ADULT - NS FNS DIET ORDER
Diet, DASH/TLC:   Sodium & Cholesterol Restricted  Supplement Feeding Modality:  Oral  Ensure Max Cans or Servings Per Day:  3       Frequency:  Three Times a day (09-07-23 @ 14:00)

## 2023-09-08 NOTE — CONSULT NOTE ADULT - SUBJECTIVE AND OBJECTIVE BOX
Patient is a 62y old  Male with bleeding from rectal orifice.    HPI: 62M PMH HTN, HLD, CAD, S/p stents (in 2006 & 2018), rectal adenocarcinoma s/p JASON (2019), LAW with ileostomy complicated by leak (2020), anastomotic recurrence (2021) treated with APR rectum/sigmoid colon in 3/2022 with colostomy creation, now with biopsy proven recurrence 12/2022 last chemo with [facility] in April/2023. Patient also with recent sepsis secondary to rectal mass c/b abscess and ecoli bacteremia. Now s/p fistula takedown and ileal conduit at [facility] on 5/26/23 and was Discharged on 6/7/23. Presents to [facility] with bleeding from colostomy bag since this AM. As per daughter, who is at bedside pt was having nonbloody output from colostomy yesterday. This morning reports melena via colostomy. Of note, pt was on Heparin while in Hospital and restarted on Plavix yesterday. Last dose this morning. Pt denies any prior EGDs in the past. Last colonoscopy 2 years ago. CT of abdomen and pelvis revealed Status post resection of fistulized small bowel and creation of an ileal conduit. No evidence of active intraluminal extravasation of contrast. Hemoglobin stable at 8.5gm, currently receiving 1uPRBC. Denies nausea, vomiting, abdominal pain, fever, chills, chest pain, shortness of breath, hematemesis, hematochezia.     Pt was to to have drain of presacral abscess changed but got ill and didn't do it.  Now drain is not detectable on exam and pt had copious blood per "rectum."        PAST MEDICAL & SURGICAL HISTORY:  Chest pain      HTN (hypertension)      HLD (hyperlipidemia)      CAD (coronary artery disease)      Mitral regurgitation      Rectal cancer  2/2019 s/p chemo and radiation; recurrence of cancer 2022      Depression  with rectal cancer      Neuropathy  feet s/p chemo      Stented coronary artery  circumflex 2005 LAD 2017 x4      H/O carotid stenosis      H/O renal calculi      DANO (iron deficiency anemia)      Umbilical hernia      Splenic artery aneurysm      H/O cardiac catheterization  2005 2017  4 STENTS      History of CEA (carotid endarterectomy)  right 2019      History of rectal surgery  with ileostomy 2/2020      H/O sigmoidoscopy  x2      History of removal of Port-a-Cath          Allergies    No Known Allergies    Intolerances        MEDICATIONS  (STANDING):    MEDICATIONS  (PRN):      Social History:    Marital Status:  (   )    (   ) Single    (   )    (  )   Occupation:   Lives with: (  ) alone  (  ) children   (  ) spouse   (  ) parents  (  ) other    Substance Use (street drugs): (  ) never used  (  ) other:  Tobacco Usage:  (   ) never smoked   (   ) former smoker   (   ) current smoker  (     ) pack year  (        ) last cigarette date  Alcohol Usage:  Sexual History:     Family History   IBD (  ) Yes   (  ) No  GI Malignancy (  )  Yes    (  ) No    Health Management     Last Colonoscopy -      (     ) Advanced Directives: (     ) None    (      ) DNR    (     ) DNI    (     ) Health Care Proxy:       REVIEW OF SYSTEMS:   General: Negative  HEENT: Negative  CV: Negative  Respiratory: Negative  GI: See HPI  : Negative  MSK: Negative  Hematologic: Negative  Skin: Negative      ICU Vital Signs Last 24 Hrs  T(C): 36.7 (08 Sep 2023 07:55), Max: 36.7 (07 Sep 2023 19:41)  T(F): 98 (08 Sep 2023 07:55), Max: 98.1 (07 Sep 2023 19:41)  HR: 95 (08 Sep 2023 12:14) (91 - 125)  BP: 148/90 (08 Sep 2023 12:14) (136/79 - 158/101)  BP(mean): --  ABP: --  ABP(mean): --  RR: 16 (08 Sep 2023 11:38) (16 - 16)  SpO2: 98% (08 Sep 2023 11:38) (98% - 100%)    O2 Parameters below as of 08 Sep 2023 11:38  Patient On (Oxygen Delivery Method): room air          PHYSICAL EXAM:   GENERAL:  No acute distress  HEENT:  NC/AT, conjunctiva clear, sclera anicteric  CHEST:  No increased effort, breath sounds clear  HEART:  Regular rhythm  ABDOMEN:  Left colostomy with dark output, Right ileal conduit, penile drain, Soft, non-tender, non-distended, normoactive bowel sounds  "RECTUM" exam reveals to small mature orifices with small amount of BRB.  EXTREMITIES: No edema  SKIN:  Warm, dry  NEURO:  Calm, cooperative        LABS:                            10.6   9.29  )-----------( 489      ( 08 Sep 2023 17:00 )             32.6           09-08    139  |  104  |  17  ----------------------------<  92  4.0   |  26  |  1.06    Ca    9.3      08 Sep 2023 05:15    TPro  6.6  /  Alb  2.6<L>  /  TBili  0.9  /  DBili  x   /  AST  32  /  ALT  58<H>  /  AlkPhos  181<H>  09-08             RADIOLOGY & ADDITIONAL TESTS:     EXAM:  CT ABDOMEN AND PELVIS IC   ORDERED BY: BHUPENDRA MON     PROCEDURE DATE:  08/27/2023          INTERPRETATION:  CLINICAL INFORMATION: Rectal cancer status post fall    COMPARISON: CT abdomen and pelvis 6/8/2023, outside CT 8/18/2023    CONTRAST/COMPLICATIONS:  IV Contrast: Omnipaque 350  90 cc administered   10 cc discarded  Oral Contrast: NONE  Complications: None reported at time of study completion    PROCEDURE:  CT of the Abdomen and Pelvis was performed.  Sagittal and coronal reformats were performed.    FINDINGS:  LOWER CHEST: Trace bilateral pleural effusions.    LIVER: Diffuse steatosis.  BILE DUCTS: Nondilated.  GALLBLADDER: Gallstones.  SPLEEN: Normal.  PANCREAS: Normal.  ADRENALS: Normal.  KIDNEYS/URETERS: No hydronephrosis or urinary tract calculi. Slightly   heterogeneous right nephrogram.    BLADDER: Diffusely thickened.  REPRODUCTIVE ORGANS: Not well seen.    BOWEL: Stable right lower quadrant ileostomy and left lower quadrant   colostomy. No bowel obstruction. Postsurgical changes after rectal cancer   resection with decreased size of presacral collection/mass with pigtail   catheter in place.  PERITONEUM: No free air or ascites.  VESSELS: Normal caliber aorta.  RETROPERITONEUM/LYMPH NODES: No adenopathy.  ABDOMINAL WALL: Postsurgical changes.  BONES: Acute L1 compression fracture with minimal loss of height and bony   retropulsion.    IMPRESSION:  *  Acute L1 compression fracture with minimal loss of height and bony   retropulsion.  *  Postsurgical changes after rectal cancer resection with decreased size   of presacral collection/mass with pigtail catheter in place.  *  Slightly heterogeneous right nephrogram. Correlate for pyelonephritis.

## 2023-09-08 NOTE — DIETITIAN INITIAL EVALUATION ADULT - ORAL INTAKE PTA/DIET HISTORY
Patient endorses fair appetite prior to admission in setting of illness + chemotherapy. Reports consuming ~2-3 meals/day. Food allergy noted to chocolate. Reports UBW to be 185 lb.

## 2023-09-08 NOTE — CONSULT NOTE ADULT - PROBLEM SELECTOR RECOMMENDATION 9
Fistula bleed with prox migration of stent will need colorectal approach, possible use of nasal size scope and IR intervention if rebleeds.    Suggest further care at [facility].    Monitor for bleeding.

## 2023-09-08 NOTE — PROGRESS NOTE ADULT - TIME BILLING
Patient seen and examined as documented above. The necessity of the time spent during the encounter on this date of service was due to reviewing chart notes and data, face to face time counseling the patient, discussion with nursing staff, social work and hospitalist regarding plan of care.

## 2023-09-08 NOTE — DISCHARGE NOTE NURSING/CASE MANAGEMENT/SOCIAL WORK - PATIENT PORTAL LINK FT
You can access the FollowMyHealth Patient Portal offered by [facility] by registering at the following website: [email or web address]. By joining [facility] FollowMyHealth portal, you will also be able to view your health information using other applications (apps) compatible with our system.

## 2023-09-08 NOTE — DISCHARGE NOTE PROVIDER - CARE PROVIDER_API CALL
Muriel Parson  Cardiac Electrophysiology  [address] [place] [address]  Phone: [phone]  Follow Up Time:     Karlo Aguilar  Neurosurgery  [address] [place] [id number]  Phone: [phone]  Fax: [phone]  Follow Up Time:     Karlo Boyd  Neurosurgery  [address] [facility] [facility], [address]  [place] [address]  Phone: [phone]  Fax: [phone]  Follow Up Time:     Rakan Blunt  Neurology  [address] [place] [address]  Phone: [phone]  Fax: [phone]  Follow Up Time:     Primary Onc ([facility]),   Phone: (   )    -  Fax: (   )    -  Follow Up Time:     Primary Care Provider,   Phone: (   )    -  Fax: (   )    -  Follow Up Time:

## 2023-09-08 NOTE — DIETITIAN INITIAL EVALUATION ADULT - OTHER INFO
Patient reports fair appetite at this time. Good acceptance to Ensure Plus High Protein 8oz PO TID (Provides 1,050kcal & 60grams of Protein) to enhance PO intakes and optimize nutritional status. Denies any chewing/swallowing difficulties. No BM noted. (+) colostomy. Skin intact. No edea noted. Food preferences obtained and updated in patient diet kardex.

## 2023-09-08 NOTE — DISCHARGE NOTE PROVIDER - NSDCCPCAREPLAN_GEN_ALL_CORE_FT
PRINCIPAL DISCHARGE DIAGNOSIS  Diagnosis: Syncope  Assessment and Plan of Treatment: syncope and collapsed - sustained IPH and SAH.  Seen by neurosurgery with no surgical intervention.      SECONDARY DISCHARGE DIAGNOSES  Diagnosis: Rectal cancer  Assessment and Plan of Treatment: on chemo       PRINCIPAL DISCHARGE DIAGNOSIS  Diagnosis: Syncope  Assessment and Plan of Treatment: syncope and collapsed - sustained IPH and SAH.  Seen by neurosurgery with no surgical intervention.      SECONDARY DISCHARGE DIAGNOSES  Diagnosis: Rectal cancer  Assessment and Plan of Treatment: [place] [place]  S/p ostomy and urostomy, +rectal drain?  Noted with rectal bleeding on 9/8 - GI + Colorectal surgery evaluation      Diagnosis: Compression fracture of L1 vertebra  Assessment and Plan of Treatment: feel and sustained L1 compression fracture  Non surgical.  Rec: TLSO when out of bed  Has custom TLSO which is uncomfortable and inconvenient.  Seen by orthotist and reviewe other model TLSO which pt is agreeable to

## 2023-09-08 NOTE — DISCHARGE NOTE PROVIDER - HOSPITAL COURSE
63 YO  Male with PMHx of HTN , rectal cancer on chemo, hx colostomy, ileoconduit  admitted to [facility] on 8/26 for syncope with c/o weakness, poor oral intake, diarrhea due to recent chemo s/p syncope, fall at home, found to have right facial fracture. Patient had another fall at hospital. Patient said he was trying to get up from bed and that is what caused him to fall out of bed.  CT head: 2 new tiny hyperdense foci right frontal white matter concerning for tiny parenchymal hemorrhages. Slightly increased subarachnoid hemorrhage in the left posterior fossa. Questionable trace subdural along left tentorial leaflet. No acute calvarial fracture. Right-sided maxillofacial fractures. CT abd/ pelvis showed traumatic acute L1 compression fx with mild retropulsion of bone into the epidural space. No acute post traumatic injury otherwise. Pt was seen by neurosurgery team. Repeat CTH x 2 was stable and unchanged. Resumed ASA. Neurosurgery rec further imaging to r/o mets /pat fracture but per family pt had multiple recent imaging with [facility] and refused further imaging. Patient advised to f/u with [facility]. Patient s/p 1 U prbc for anemia likely from chemo/malignancy. Patient also s/p ILR and medication adjustment  by cardiology.     Patient was evaluated by PM&R and therapy for functional deficits, gait/ADL impairments and acute rehabilitation was recommended. Patient was medically optimized for discharge to [facility] IRU on9/7/23     Pt was stable upon rehab admission to  [facility]. Admitted with gait instabilty, ADL, and functional impairments.     Rehab Course significant for:  - refusing to use TLSO brace d/t fit/inconvenience (with drains).  Educated on Risk/benefits of TLSO given his acute L1 compression fracture.  Met with orthotist who provided him pictures and description of different TLSO.  Will to be compliant with new TLSO  **** Active Rectal bleeding noted by wife - hx of [place] [facility]   GI and GI surgery evaluation    To be transferred for further evaluation - tele vs [facility]    63 YO  Male with PMHx of HTN , rectal cancer on chemo, hx colostomy, ileoconduit  admitted to [facility] on 8/26 for syncope with c/o weakness, poor oral intake, diarrhea due to recent chemo s/p syncope, fall at home, found to have right facial fracture. Patient had another fall at hospital. Patient said he was trying to get up from bed and that is what caused him to fall out of bed.  CT head: 2 new tiny hyperdense foci right frontal white matter concerning for tiny parenchymal hemorrhages. Slightly increased subarachnoid hemorrhage in the left posterior fossa. Questionable trace subdural along left tentorial leaflet. No acute calvarial fracture. Right-sided maxillofacial fractures. CT abd/ pelvis showed traumatic acute L1 compression fx with mild retropulsion of bone into the epidural space. No acute post traumatic injury otherwise. Pt was seen by neurosurgery team. Repeat CTH x 2 was stable and unchanged. Resumed ASA. Neurosurgery rec further imaging to r/o mets /pat fracture but per family pt had multiple recent imaging with [facility] and refused further imaging. Patient advised to f/u with [facility]. Patient s/p 1 U prbc for anemia likely from chemo/malignancy. Patient also s/p ILR and medication adjustment  by cardiology.     Patient was evaluated by PM&R and therapy for functional deficits, gait/ADL impairments and acute rehabilitation was recommended. Patient was medically optimized for discharge to [facility] IRU on9/7/23     Pt was stable upon rehab admission to  [facility]patient [facility]. Admitted with gait instabilty, ADL, and functional impairments.     Rehab Course significant for:  - refusing to use TLSO brace d/t fit/inconvenience (with drains).  Educated on Risk/benefits of TLSO given his acute L1 compression fracture.  Met with orthotist who provided him pictures and description of different TLSO.  Will to be compliant with new TLSO  **** Active Rectal bleeding noted by wife - hx of rectal cancer with "drain" placed at [facility].   GI and GI surgery evaluation     Pending GI surgery evaluation to see if pt needs to be transferred for further evaluation   61 YO  Male with PMHx of HTN , rectal cancer on chemo, hx colostomy, ileoconduit  admitted to [facility] on 8/26 for syncope with c/o weakness, poor oral intake, diarrhea due to recent chemo s/p syncope, fall at home, found to have right facial fracture. Patient had another fall at hospital. Patient said he was trying to get up from bed and that is what caused him to fall out of bed.  CT head: 2 new tiny hyperdense foci right frontal white matter concerning for tiny parenchymal hemorrhages. Slightly increased subarachnoid hemorrhage in the left posterior fossa. Questionable trace subdural along left tentorial leaflet. No acute calvarial fracture. Right-sided maxillofacial fractures. CT abd/ pelvis showed traumatic acute L1 compression fx with mild retropulsion of bone into the epidural space. No acute post traumatic injury otherwise. Pt was seen by neurosurgery team. Repeat CTH x 2 was stable and unchanged. Resumed ASA. Neurosurgery rec further imaging to r/o mets /pat fracture but per family pt had multiple recent imaging with [facility] and refused further imaging. Patient advised to f/u with [facility]. Patient s/p 1 U prbc for anemia likely from chemo/malignancy. Patient also s/p ILR and medication adjustment  by cardiology.     Patient was evaluated by PM&R and therapy for functional deficits, gait/ADL impairments and acute rehabilitation was recommended. Patient was medically optimized for discharge to [facility] IRU on9/7/23     Pt was stable upon rehab admission to  [facility]patient [facility] Facility. Admitted with gait instabilty, ADL, and functional impairments.     Rehab Course significant for:  - refusing to use TLSO brace d/t fit/inconvenience (with drains).  Educated on Risk/benefits of TLSO given his acute L1 compression fracture.  Met with orthotist who provided him pictures and description of different TLSO.  Will to be compliant with new TLSO  **** Active Rectal bleeding noted by wife - hx of rectal cancer with "drain" placed at [facility].     GI saw pt who's in communication with colorectal surgeon.  Pt is an establish patient of [facility] with colorectal oncology care.    Spoke with [facility] oncal MD - Dr. Joni Lopez who will accept pt for transfer to [facility]

## 2023-09-08 NOTE — DIETITIAN INITIAL EVALUATION ADULT - PERTINENT LABORATORY DATA
09-08    139  |  104  |  17  ----------------------------<  92  4.0   |  26  |  1.06    Ca    9.3      08 Sep 2023 05:15    TPro  6.6  /  Alb  2.6<L>  /  TBili  0.9  /  DBili  x   /  AST  32  /  ALT  58<H>  /  AlkPhos  181<H>  09-08  A1C with Estimated Average Glucose Result: 5.4 % (09-01-23 @ 22:00)  A1C with Estimated Average Glucose Result: 5.7 % (03-01-23 @ 02:45)

## 2023-09-08 NOTE — CHART NOTE - NSCHARTNOTEFT_GEN_A_CORE
Pt seeen at bedside to evaluate custom bivalve tlso that was fitted while in [facility]. Pt had a fall at [place] and xray showed L1 compression fracture .Brace is ill fiting and does not adequately avoid the stomas. Modifications were made, but any additional modifications will compromise the fit and function. A [place] type hyperextension brace design will promote usage while OOB and avoid the stomas.  Pt measured today for the orthosis . Delivery will be made as soon as possible. Potential discharge on or around 9/13/23,    [facility]  [phone]

## 2023-09-08 NOTE — DIETITIAN INITIAL EVALUATION ADULT - ADD RECOMMEND
1. Recommend continue current nutrition plan of care as tolerated   2. Honor patients daily food preferences as feasible with prescribed diet   3. Monitor daily PO intakes, GI tolerance, labs, weights, skin integrity, & BM regularity

## 2023-09-08 NOTE — CHART NOTE - NSCHARTNOTEFT_GEN_A_CORE
[facility] Interdisciplinary Plan of Care    REHABILITATION DIAGNOSIS:  Traumatic hemorrhage of cerebrum, unspecified, without loss of consciousness, initial encounter        COMORBIDITIES/COMPLICATING CONDITIONS IMPACTING REHABILITATION:  HEALTH ISSUES - PROBLEM Dx:        PAST MEDICAL & SURGICAL HISTORY:  HTN (hypertension)      HLD (hyperlipidemia)      CAD (coronary artery disease)      Mitral regurgitation      Rectal cancer  2/2019 s/p chemo and radiation; recurrence of cancer 2022      Depression  with rectal cancer      Neuropathy  feet s/p chemo      Stented coronary artery  circumflex 2005 LAD 2017 x4      H/O carotid stenosis      H/O renal calculi      DANO (iron deficiency anemia)      Umbilical hernia      Splenic artery aneurysm      H/O cardiac catheterization  2005 2017  4 STENTS      History of CEA (carotid endarterectomy)  right 2019      History of rectal surgery  with ileostomy 2/2020      H/O sigmoidoscopy  x2      History of removal of Port-a-Cath          Based upon consideration of the patient's impairments, functional status, complicating conditions and any other contributing factors and after information garnered from the assessments of all therapy disciplines involved in treating the patient and other pertinent clinicians:    INTERDISCIPLINARY REHABILITATION INTERVENTIONS:    [ X  ] Transfer Training  [ X  ] Bed Mobility  [ X  ] Therapeutic Exercise  [ X ] Balance/Coordination Exercises  [ X ] Locomotion retraining  [ X  ] Stairs  [  X ] Functional Transfer Training  [ X  ] Bowel/Bladder program  [ X  ] Pain Management  [ X  ] Skin/Wound Care  [ X  ] Visual/Perceptual Training  [ X  ] Therapeutic Recreation Activities  [  X ] Neuromuscular Re-education  [ X  ] Activities of Daily Living  [ X  ] Speech Exercise  [X   ] Swallowing Exercises  [   ] Vital Stim  [   ] Dietary Supplements  [   ] Calorie Count  [ X  ] Cognitive Exercises  [  X ] Cognitive/Linguistic Treatment  [  x ] Behavior Program  [  x ] Neuropsych Therapy  [ X  ] Patient/Family Counseling  [ X ] Family Training  [ X  ] Community Re-entry  [   ] Orthotic Evaluation  [   ] Prosthetic Eval/Training    MEDICAL PROGNOSIS: good      REHAB POTENTIAL:  Good    EXPECTED DAILY THERAPY:  3 hours/day, 5 Days a week           ESTIMATED LOS:  [  ] 5-7 Days  [  ] 7-10 Days  [  ] 10- 14 Days  [ X ] 14- 18 Days  [  ] 18- 21 Days    ESTIMATED DISPOSITION:  [X  ] Home   [ X ] Home with Outpatient Therapies  [ ] Home with Home Therapies  [  ] Assisted Living  [  ] Nursing Home  [  ] Long Term Acute Care    INTERDISCIPLINARY FUNCTIONAL OUTCOMES/GOALS:         Gait/Mobility: supervision       Transfers:   Independent       ADLs:  Independent       Functional Transfers:  Independent       Medication Management: Independent       Communication: Independent       Cognitive: Independent       Dysphagia: Independent       Bladder: Independent       Bowel:  Independent     Functional Independent Measures:   7 = Independent  6 = Modified Independent  5 = Supervision  4 = Minimal Assist/ Contact Guard  3 = Moderate Assistance  2 = Maximum Assistance  1 = Total Assistance  0 = Unable to assess

## 2023-09-08 NOTE — DISCHARGE NOTE PROVIDER - CARE PROVIDERS DIRECT ADDRESSES
,DirectAddress_Unknown,DirectAddress_Unknown,DirectAddress_Unknown,DirectAddress_Unknown,DirectAddress_Unknown,DirectAddress_Unknown

## 2023-09-08 NOTE — PROGRESS NOTE ADULT - SUBJECTIVE AND OBJECTIVE BOX
Patient is a 62y old  Male who presents with a chief complaint of Cerebral Hemorrhage (07 Sep 2023 13:02)    HPI:  This is a 63 YO  Male with PMHx of HTN , rectal cancer on chemo, hx colostomy, ileoconduit  admitted to [facility] on 8/26 for syncope with c/o weakness, poor oral intake, diarrhea due to recent chemo s/p syncope, fall at home, found to have right facial fracture. Patient had another fall at hospital. Patient said he was trying to get up from bed and that is what caused him to fall out of bed.  CT head: 2 new tiny hyperdense foci right frontal white matter concerning for tiny parenchymal hemorrhages. Slightly increased subarachnoid hemorrhage in the left posterior fossa. Questionable trace subdural along left tentorial leaflet. No acute calvarial fracture. Right-sided maxillofacial fractures. CT abd/ pelvis showed acute L1 compression fx with mild retropulsion of bone into the epidural space. No acute post traumatic injury otherwise. Pt was seen by neurosurgery team. Repeat CTH x 2 was stable and unchanged. Resumed ASA. Neurosurgery rec further imaging to r/o mets /pat fracture but per family pt had multiple recent imaging with [facility] and refused further imaging. Patient advised to f/u with [facility]. Patient s/p 1 U prbc for anemia likely from chemo/malignancy. Patient also s/p ILR and medication adjustment  by cardiology.     Patient was evaluated by PM&R and therapy for functional deficits, gait/ADL impairments and acute rehabilitation was recommended. Patient was medically optimized for discharge to [facility] IRU on9/7/23     PAST MEDICAL & SURGICAL HISTORY:  HTN (hypertension)  HLD (hyperlipidemia)  CAD (coronary artery disease)  Mitral regurgitation  Rectal cancer - 2/2019 s/p chemo and radiation; recurrence of cancer 2022  Depression - with rectal cancer  Neuropathy -feet s/p chemo  Stented coronary artery circumflex 2005 LAD 2017 x4  H/O carotid stenosis  H/O renal calculi  DANO (iron deficiency anemia)  Umbilical hernia  Splenic artery aneurysm  H/O cardiac catheterization 2005 2017  4 STENTS  History of CEA (carotid endarterectomy) right 2019  History of rectal surgery with ileostomy 2/2020  H/O sigmoidoscopyx2  History of removal of Port-a-Cath    SUBJECTIVE/ROS:  Patient seen and evaluated while resting in bed.  Had OT in AM but was unable to complete evaluation because refuse using TLSO.  Patient reports that TLSO brace is cumbersome and ill fitting.  Reviewed risk and benefit of TLSO given his acute L1 fracture.  Despite risk, he states he would only wear it during rehab, and will absolutely not when he returns home.  Reached out to orthotists in regards to different style TLSO patient is will to comply with.  Patient saw images of 2 other TLSOs to which he said may accommodate his drains/needs (custom TLSO had dislodged his ostomy).  Slept overnight.  Denies pain, which is why he doesn't believe he has a spinal fracture.  No fever, chills, chest pain, shortness of breath, headache, dizziness, nausea. Discussed discharge - patient express that he would like to leave by next Wednesday d/t his chemo appointment scheduled Thursday (9/14).     Allergies  No Known Allergies    Intolerances  chocolate (Vomiting)    MEDICATIONS  (STANDING):  aspirin enteric coated 81 milliGRAM(s) Oral daily  atorvastatin 80 milliGRAM(s) Oral at bedtime  cholecalciferol 2000 Unit(s) Oral daily  clopidogrel Tablet 75 milliGRAM(s) Oral daily  dronabinol 2.5 milliGRAM(s) Oral <User Schedule>  enoxaparin Injectable 40 milliGRAM(s) SubCutaneous <User Schedule>  gabapentin 100 milliGRAM(s) Oral three times a day  magnesium oxide 400 milliGRAM(s) Oral three times a day with meals  melatonin 6 milliGRAM(s) Oral at bedtime  metoprolol succinate ER 50 milliGRAM(s) Oral daily  OLANZapine 2.5 milliGRAM(s) Oral at bedtime  pantoprazole    Tablet 40 milliGRAM(s) Oral every 12 hours    MEDICATIONS  (PRN):  acetaminophen     Tablet .. 975 milliGRAM(s) Oral every 8 hours PRN Moderate Pain (4 - 6)  loperamide 2 milliGRAM(s) Oral four times a day PRN Diarrhea  OLANZapine 2.5 milliGRAM(s) Oral four times a day PRN agitation  senna 2 Tablet(s) Oral at bedtime PRN Constipation      RECENT LABS:               10.1   7.79  )-----------( 460      ( 08 Sep 2023 05:15 )             31.4     09-08    139  |  104  |  17  ----------------------------<  92  4.0   |  26  |  1.06    Ca    9.3      08 Sep 2023 05:15    TPro  6.6  /  Alb  2.6<L>  /  TBili  0.9  /  DBili  x   /  AST  32  /  ALT  58<H>  /  AlkPhos  181<H>  09-08    LIVER FUNCTIONS - ( 08 Sep 2023 05:15 )  Alb: 2.6 g/dL / Pro: 6.6 g/dL / ALK PHOS: 181 U/L / ALT: 58 U/L / AST: 32 U/L / GGT: x           Urinalysis Basic - ( 08 Sep 2023 05:15 )  Culture - Urine (collected 09-05-23 @ 20:35)  Source: Clean Catch Clean Catch (Midstream)  Preliminary Report (09-08-23 @ 10:08):    >100,000 CFU/ml Proteus mirabilis Susceptibility to follow.    PHYSICAL EXAM  62y  Vital Signs Last 24 Hrs  T(C): 36.7 (08 Sep 2023 07:55), Max: 37 (07 Sep 2023 13:31)  T(F): 98 (08 Sep 2023 07:55), Max: 98.6 (07 Sep 2023 13:31)  HR: 119 (08 Sep 2023 07:55) (73 - 119)  BP: 158/101 (08 Sep 2023 07:55) (150/94 - 169/86)  RR: 16 (08 Sep 2023 07:55) (16 - 18)  SpO2: 100% (08 Sep 2023 07:55) (95% - 100%)    Daily Height in cm: 187.96 (07 Sep 2023 16:10)      Constitutional - NAD, Comfortable  HEENT - NCAT, EOMI  Neck - Supple, No limited ROM  Chest - Breathing comfortably  Cardiovascular - S1S2   Abdomen + ostomy in place, +urostomy- clear urine  Extremities - No C/C/E, No calf tenderness   Neurologic Exam -                    Cognitive - AAOx4     Communication - Fluent, No dysarthria, No aphasia- follow 2 step commands     Attention- intact     Cranial Nerves - CN 2-12 intact     Motor - No focal deficits                    LEFT    UE - ShAB 5/5, EF 5/5, EE 5/5, WE 5/5,  5/5                    RIGHT UE - ShAB 5/5, EF 5/5, EE 5/5, WE 5/5,  5/5                    LEFT    LE - HF 5/5, KE 5/5, DF 5/5, PF 5/5                    RIGHT LE - HF 5/5, KE 5/5, DF 5/5, PF 5/5        Sensory - decreased on bilateral feet   Psychiatric - Mood stable, Affect WNL Patient is a 62y old  Male who presents with a chief complaint of Cerebral Hemorrhage (07 Sep 2023 13:02)    HPI:  This is a 63 YO  Male with PMHx of HTN , rectal cancer on chemo, hx colostomy, ileoconduit  admitted to [facility] on 8/26 for syncope with c/o weakness, poor oral intake, diarrhea due to recent chemo s/p syncope, fall at home, found to have right facial fracture. Patient had another fall at hospital. Patient said he was trying to get up from bed and that is what caused him to fall out of bed.  CT head: 2 new tiny hyperdense foci right frontal white matter concerning for tiny parenchymal hemorrhages. Slightly increased subarachnoid hemorrhage in the left posterior fossa. Questionable trace subdural along left tentorial leaflet. No acute calvarial fracture. Right-sided maxillofacial fractures. CT abd/ pelvis showed acute L1 compression fx with mild retropulsion of bone into the epidural space. No acute post traumatic injury otherwise. Pt was seen by neurosurgery team. Repeat CTH x 2 was stable and unchanged. Resumed ASA. Neurosurgery rec further imaging to r/o mets /pat fracture but per family pt had multiple recent imaging with [facility] and refused further imaging. Patient advised to f/u with [facility]. Patient s/p 1 U prbc for anemia likely from chemo/malignancy. Patient also s/p ILR and medication adjustment  by cardiology.     Patient was evaluated by PM&R and therapy for functional deficits, gait/ADL impairments and acute rehabilitation was recommended. Patient was medically optimized for discharge to [facility] IRU on9/7/23     PAST MEDICAL & SURGICAL HISTORY:  HTN (hypertension)  HLD (hyperlipidemia)  CAD (coronary artery disease)  Mitral regurgitation  Rectal cancer - 2/2019 s/p chemo and radiation; recurrence of cancer 2022  Depression - with rectal cancer  Neuropathy -feet s/p chemo  Stented coronary artery circumflex 2005 LAD 2017 x4  H/O carotid stenosis  H/O renal calculi  DANO (iron deficiency anemia)  Umbilical hernia  Splenic artery aneurysm  H/O cardiac catheterization 2005 2017  4 STENTS  History of CEA (carotid endarterectomy) right 2019  History of rectal surgery with ileostomy 2/2020  H/O sigmoidoscopyx2  History of removal of Port-a-Cath    SUBJECTIVE/ROS:  Patient seen and evaluated while resting in bed.  Had OT in AM but was unable to complete evaluation because refuse using TLSO.  Patient reports that TLSO brace is cumbersome and ill fitting.  Reviewed risk and benefit of TLSO given his acute L1 fracture.  Despite risk, he states he would only wear it during rehab, and will absolutely not when he returns home.  Reached out to orthotists in regards to different style TLSO patient is will to comply with.  Patient saw images of 2 other TLSOs to which he said may accommodate his drains/needs (custom TLSO had dislodged his ostomy).   Slept overnight.  Denies pain, which is why he doesn't believe he has a spinal fracture. Reviewed imaging with patient, explained findings and he expressed understanding.   No fever, chills, chest pain, shortness of breath, headache, dizziness, nausea.   Patient express that he would like to leave by next Wednesday d/t his chemo appointment scheduled Thursday (9/14)     Allergies  No Known Allergies    Intolerances  chocolate (Vomiting)    MEDICATIONS  (STANDING):  aspirin enteric coated 81 milliGRAM(s) Oral daily  atorvastatin 80 milliGRAM(s) Oral at bedtime  cholecalciferol 2000 Unit(s) Oral daily  clopidogrel Tablet 75 milliGRAM(s) Oral daily  dronabinol 2.5 milliGRAM(s) Oral <User Schedule>  enoxaparin Injectable 40 milliGRAM(s) SubCutaneous <User Schedule>  gabapentin 100 milliGRAM(s) Oral three times a day  magnesium oxide 400 milliGRAM(s) Oral three times a day with meals  melatonin 6 milliGRAM(s) Oral at bedtime  metoprolol succinate ER 50 milliGRAM(s) Oral daily  OLANZapine 2.5 milliGRAM(s) Oral at bedtime  pantoprazole    Tablet 40 milliGRAM(s) Oral every 12 hours    MEDICATIONS  (PRN):  acetaminophen     Tablet .. 975 milliGRAM(s) Oral every 8 hours PRN Moderate Pain (4 - 6)  loperamide 2 milliGRAM(s) Oral four times a day PRN Diarrhea  OLANZapine 2.5 milliGRAM(s) Oral four times a day PRN agitation  senna 2 Tablet(s) Oral at bedtime PRN Constipation      RECENT LABS:               10.1   7.79  )-----------( 460      ( 08 Sep 2023 05:15 )             31.4     09-08    139  |  104  |  17  ----------------------------<  92  4.0   |  26  |  1.06    Ca    9.3      08 Sep 2023 05:15    TPro  6.6  /  Alb  2.6<L>  /  TBili  0.9  /  DBili  x   /  AST  32  /  ALT  58<H>  /  AlkPhos  181<H>  09-08    LIVER FUNCTIONS - ( 08 Sep 2023 05:15 )  Alb: 2.6 g/dL / Pro: 6.6 g/dL / ALK PHOS: 181 U/L / ALT: 58 U/L / AST: 32 U/L / GGT: x           Urinalysis Basic - ( 08 Sep 2023 05:15 )  Culture - Urine (collected 09-05-23 @ 20:35)  Source: Clean Catch Clean Catch (Midstream)  Preliminary Report (09-08-23 @ 10:08):    >100,000 CFU/ml Proteus mirabilis Susceptibility to follow.    PHYSICAL EXAM  62y  Vital Signs Last 24 Hrs  T(C): 36.7 (08 Sep 2023 07:55), Max: 37 (07 Sep 2023 13:31)  T(F): 98 (08 Sep 2023 07:55), Max: 98.6 (07 Sep 2023 13:31)  HR: 119 (08 Sep 2023 07:55) (73 - 119)  BP: 158/101 (08 Sep 2023 07:55) (150/94 - 169/86)  RR: 16 (08 Sep 2023 07:55) (16 - 18)  SpO2: 100% (08 Sep 2023 07:55) (95% - 100%)    Daily Height in cm: 187.96 (07 Sep 2023 16:10)      Constitutional - NAD, Comfortable  HEENT - NCAT, EOMI  Neck - Supple, No limited ROM  Chest - Breathing comfortably  Cardiovascular - S1S2   Abdomen + ostomy in place, +urostomy- clear urine  Extremities - No C/C/E, No calf tenderness   Neurologic Exam -                    Cognitive - AAOx4     Communication - Fluent, No dysarthria, No aphasia- follow 2 step commands     Attention- intact     Cranial Nerves - CN 2-12 intact     Motor - No focal deficits                    LEFT    UE - ShAB 5/5, EF 5/5, EE 5/5, WE 5/5,  5/5                    RIGHT UE - ShAB 5/5, EF 5/5, EE 5/5, WE 5/5,  5/5                    LEFT    LE - HF 5/5, KE 5/5, DF 5/5, PF 5/5                    RIGHT LE - HF 5/5, KE 5/5, DF 5/5, PF 5/5        Sensory - decreased on bilateral feet   Psychiatric - Mood stable, Affect WNL Patient is a 62y old  Male who presents with a chief complaint of Cerebral Hemorrhage (07 Sep 2023 13:02)    HPI:  This is a 63 YO  Male with PMHx of HTN , rectal cancer on chemo, hx colostomy, ileoconduit  admitted to [facility] on 8/26 for syncope with c/o weakness, poor oral intake, diarrhea due to recent chemo s/p syncope, fall at home, found to have right facial fracture. Patient had another fall at hospital. Patient said he was trying to get up from bed and that is what caused him to fall out of bed.  CT head: 2 new tiny hyperdense foci right frontal white matter concerning for tiny parenchymal hemorrhages. Slightly increased subarachnoid hemorrhage in the left posterior fossa. Questionable trace subdural along left tentorial leaflet. No acute calvarial fracture. Right-sided maxillofacial fractures. CT abd/ pelvis showed acute L1 compression fx with mild retropulsion of bone into the epidural space. No acute post traumatic injury otherwise. Pt was seen by neurosurgery team. Repeat CTH x 2 was stable and unchanged. Resumed ASA. Neurosurgery rec further imaging to r/o mets /pat fracture but per family pt had multiple recent imaging with [facility] and refused further imaging. Patient advised to f/u with [facility]. Patient s/p 1 U prbc for anemia likely from chemo/malignancy. Patient also s/p ILR and medication adjustment  by cardiology.     Patient was evaluated by PM&R and therapy for functional deficits, gait/ADL impairments and acute rehabilitation was recommended. Patient was medically optimized for discharge to [facility] IRU on9/7/23     PAST MEDICAL & SURGICAL HISTORY:  HTN (hypertension)  HLD (hyperlipidemia)  CAD (coronary artery disease)  Mitral regurgitation  Rectal cancer - 2/2019 s/p chemo and radiation; recurrence of cancer 2022  Depression - with rectal cancer  Neuropathy -feet s/p chemo  Stented coronary artery circumflex 2005 LAD 2017 x4  H/O carotid stenosis  H/O renal calculi  DANO (iron deficiency anemia)  Umbilical hernia  Splenic artery aneurysm  H/O cardiac catheterization 2005 2017  4 STENTS  History of CEA (carotid endarterectomy) right 2019  History of rectal surgery with ileostomy 2/2020  H/O sigmoidoscopyx2  History of removal of Port-a-Cath    SUBJECTIVE/ROS:  Patient seen and evaluated while resting in bed.  Had OT in AM but was unable to complete evaluation because refuse using TLSO.  Patient reports that TLSO brace is cumbersome and ill fitting.  Reviewed risk and benefit of TLSO given his acute L1 fracture.  Despite risk, he states he would only wear it during rehab, and will absolutely not when he returns home.  Reached out to orthotists in regards to different style TLSO patient is will to comply with.  Patient saw images of 2 other TLSOs to which he said may accommodate his drains/needs (states custom TLSO had dislodged his ostomy at [facility]).   Slept overnight.  Denies pain, which is why he doesn't believe he has a spinal fracture. Reviewed imaging with patient, explained findings and he expressed understanding.   No fever, chills, chest pain, shortness of breath, headache, dizziness, nausea.   Patient express that he would like to leave by next Wednesday d/t his chemo appointment scheduled Thursday (9/14)     Allergies  No Known Allergies    Intolerances  chocolate (Vomiting)    MEDICATIONS  (STANDING):  aspirin enteric coated 81 milliGRAM(s) Oral daily  atorvastatin 80 milliGRAM(s) Oral at bedtime  cholecalciferol 2000 Unit(s) Oral daily  clopidogrel Tablet 75 milliGRAM(s) Oral daily  dronabinol 2.5 milliGRAM(s) Oral <User Schedule>  enoxaparin Injectable 40 milliGRAM(s) SubCutaneous <User Schedule>  gabapentin 100 milliGRAM(s) Oral three times a day  magnesium oxide 400 milliGRAM(s) Oral three times a day with meals  melatonin 6 milliGRAM(s) Oral at bedtime  metoprolol succinate ER 50 milliGRAM(s) Oral daily  OLANZapine 2.5 milliGRAM(s) Oral at bedtime  pantoprazole    Tablet 40 milliGRAM(s) Oral every 12 hours    MEDICATIONS  (PRN):  acetaminophen     Tablet .. 975 milliGRAM(s) Oral every 8 hours PRN Moderate Pain (4 - 6)  loperamide 2 milliGRAM(s) Oral four times a day PRN Diarrhea  OLANZapine 2.5 milliGRAM(s) Oral four times a day PRN agitation  senna 2 Tablet(s) Oral at bedtime PRN Constipation      RECENT LABS:               10.1   7.79  )-----------( 460      ( 08 Sep 2023 05:15 )             31.4     09-08    139  |  104  |  17  ----------------------------<  92  4.0   |  26  |  1.06    Ca    9.3      08 Sep 2023 05:15    TPro  6.6  /  Alb  2.6<L>  /  TBili  0.9  /  DBili  x   /  AST  32  /  ALT  58<H>  /  AlkPhos  181<H>  09-08    LIVER FUNCTIONS - ( 08 Sep 2023 05:15 )  Alb: 2.6 g/dL / Pro: 6.6 g/dL / ALK PHOS: 181 U/L / ALT: 58 U/L / AST: 32 U/L / GGT: x           Urinalysis Basic - ( 08 Sep 2023 05:15 )  Culture - Urine (collected 09-05-23 @ 20:35)  Source: Clean Catch Clean Catch (Midstream)  Preliminary Report (09-08-23 @ 10:08):    >100,000 CFU/ml Proteus mirabilis Susceptibility to follow.    PHYSICAL EXAM  62y  Vital Signs Last 24 Hrs  T(C): 36.7 (08 Sep 2023 07:55), Max: 37 (07 Sep 2023 13:31)  T(F): 98 (08 Sep 2023 07:55), Max: 98.6 (07 Sep 2023 13:31)  HR: 119 (08 Sep 2023 07:55) (73 - 119)  BP: 158/101 (08 Sep 2023 07:55) (150/94 - 169/86)  RR: 16 (08 Sep 2023 07:55) (16 - 18)  SpO2: 100% (08 Sep 2023 07:55) (95% - 100%)    Daily Height in cm: 187.96 (07 Sep 2023 16:10)      Constitutional - NAD, Comfortable  HEENT - NCAT, EOMI  Neck - Supple, No limited ROM  Chest - Breathing comfortably  Cardiovascular - S1S2   Abdomen + ostomy in place, +urostomy- clear urine  Extremities - No C/C/E, No calf tenderness   Neurologic Exam -                    Cognitive - AAOx4     Communication - Fluent, No dysarthria, No aphasia- follow 2 step commands     Attention- intact     Cranial Nerves - CN 2-12 intact     Motor - No focal deficits                    LEFT    UE - ShAB 5/5, EF 5/5, EE 5/5, WE 5/5,  5/5                    RIGHT UE - ShAB 5/5, EF 5/5, EE 5/5, WE 5/5,  5/5                    LEFT    LE - HF 5/5, KE 5/5, DF 5/5, PF 5/5                    RIGHT LE - HF 5/5, KE 5/5, DF 5/5, PF 5/5        Sensory - decreased on bilateral feet   Psychiatric - Mood stable, Affect WNL

## 2023-09-09 ENCOUNTER — TRANSCRIPTION ENCOUNTER (OUTPATIENT)
Age: 63
End: 2023-09-09

## 2023-09-09 LAB
-  AMIKACIN: SIGNIFICANT CHANGE UP
-  AMOXICILLIN/CLAVULANIC ACID: SIGNIFICANT CHANGE UP
-  AMPICILLIN/SULBACTAM: SIGNIFICANT CHANGE UP
-  AMPICILLIN: SIGNIFICANT CHANGE UP
-  AZTREONAM: SIGNIFICANT CHANGE UP
-  CEFAZOLIN: SIGNIFICANT CHANGE UP
-  CEFEPIME: SIGNIFICANT CHANGE UP
-  CEFTRIAXONE: SIGNIFICANT CHANGE UP
-  CEFUROXIME: SIGNIFICANT CHANGE UP
-  CIPROFLOXACIN: SIGNIFICANT CHANGE UP
-  ERTAPENEM: SIGNIFICANT CHANGE UP
-  GENTAMICIN: SIGNIFICANT CHANGE UP
-  LEVOFLOXACIN: SIGNIFICANT CHANGE UP
-  MEROPENEM: SIGNIFICANT CHANGE UP
-  NITROFURANTOIN: SIGNIFICANT CHANGE UP
-  PIPERACILLIN/TAZOBACTAM: SIGNIFICANT CHANGE UP
-  TOBRAMYCIN: SIGNIFICANT CHANGE UP
-  TRIMETHOPRIM/SULFAMETHOXAZOLE: SIGNIFICANT CHANGE UP
CULTURE RESULTS: SIGNIFICANT CHANGE UP
METHOD TYPE: SIGNIFICANT CHANGE UP
ORGANISM # SPEC MICROSCOPIC CNT: SIGNIFICANT CHANGE UP
ORGANISM # SPEC MICROSCOPIC CNT: SIGNIFICANT CHANGE UP
SPECIMEN SOURCE: SIGNIFICANT CHANGE UP

## 2023-09-10 ENCOUNTER — NON-APPOINTMENT (OUTPATIENT)
Age: 63
End: 2023-09-10

## 2023-09-13 PROCEDURE — 86900 BLOOD TYPING SEROLOGIC ABO: CPT

## 2023-09-13 PROCEDURE — 85025 COMPLETE CBC W/AUTO DIFF WBC: CPT

## 2023-09-13 PROCEDURE — 97163 PT EVAL HIGH COMPLEX 45 MIN: CPT

## 2023-09-13 PROCEDURE — 86901 BLOOD TYPING SEROLOGIC RH(D): CPT

## 2023-09-13 PROCEDURE — 92610 EVALUATE SWALLOWING FUNCTION: CPT

## 2023-09-13 PROCEDURE — 97167 OT EVAL HIGH COMPLEX 60 MIN: CPT

## 2023-09-13 PROCEDURE — 86850 RBC ANTIBODY SCREEN: CPT

## 2023-09-13 PROCEDURE — 80053 COMPREHEN METABOLIC PANEL: CPT

## 2023-09-13 PROCEDURE — 85027 COMPLETE CBC AUTOMATED: CPT

## 2023-09-13 PROCEDURE — 93005 ELECTROCARDIOGRAM TRACING: CPT

## 2023-09-13 PROCEDURE — 36415 COLL VENOUS BLD VENIPUNCTURE: CPT

## 2023-09-13 PROCEDURE — 87635 SARS-COV-2 COVID-19 AMP PRB: CPT

## 2023-09-23 ENCOUNTER — NON-APPOINTMENT (OUTPATIENT)
Age: 63
End: 2023-09-23

## 2023-10-06 ENCOUNTER — APPOINTMENT (OUTPATIENT)
Dept: NEUROLOGY | Facility: CLINIC | Age: 63
End: 2023-10-06
Payer: COMMERCIAL

## 2023-10-06 VITALS
DIASTOLIC BLOOD PRESSURE: 80 MMHG | BODY MASS INDEX: 22.46 KG/M2 | HEIGHT: 74 IN | WEIGHT: 175 LBS | SYSTOLIC BLOOD PRESSURE: 140 MMHG

## 2023-10-06 PROCEDURE — 99214 OFFICE O/P EST MOD 30 MIN: CPT

## 2023-10-06 RX ORDER — CIPROFLOXACIN HYDROCHLORIDE 500 MG/1
500 TABLET, FILM COATED ORAL
Qty: 28 | Refills: 0 | Status: COMPLETED | COMMUNITY
Start: 2023-02-03 | End: 2023-10-06

## 2023-10-06 RX ORDER — CARVEDILOL 12.5 MG/1
12.5 TABLET, FILM COATED ORAL TWICE DAILY
Qty: 180 | Refills: 1 | Status: COMPLETED | COMMUNITY
Start: 2023-03-30 | End: 2023-10-06

## 2023-10-06 RX ORDER — CLOPIDOGREL BISULFATE 75 MG/1
75 TABLET, FILM COATED ORAL
Qty: 90 | Refills: 0 | Status: COMPLETED | COMMUNITY
Start: 2017-05-24 | End: 2023-10-06

## 2023-10-06 RX ORDER — AMOXICILLIN AND CLAVULANATE POTASSIUM 875; 125 MG/1; MG/1
875-125 TABLET, COATED ORAL
Qty: 56 | Refills: 1 | Status: COMPLETED | COMMUNITY
Start: 2023-03-13 | End: 2023-10-06

## 2023-10-06 RX ORDER — AMOXICILLIN AND CLAVULANATE POTASSIUM 875; 125 MG/1; MG/1
875-125 TABLET, COATED ORAL
Qty: 28 | Refills: 1 | Status: COMPLETED | COMMUNITY
Start: 2023-02-03 | End: 2023-10-06

## 2023-10-06 RX ORDER — MULTIVITAMIN
TABLET ORAL
Refills: 0 | Status: COMPLETED | COMMUNITY
End: 2023-10-06

## 2023-10-06 RX ORDER — FERROUS SULFATE 325(65) MG
325 (65 FE) TABLET ORAL TWICE DAILY
Qty: 60 | Refills: 5 | Status: COMPLETED | COMMUNITY
Start: 2021-01-29 | End: 2023-10-06

## 2023-10-09 ENCOUNTER — NON-APPOINTMENT (OUTPATIENT)
Age: 63
End: 2023-10-09

## 2023-10-09 RX ORDER — METOPROLOL SUCCINATE 100 MG/1
100 TABLET, EXTENDED RELEASE ORAL TWICE DAILY
Refills: 0 | Status: ACTIVE | COMMUNITY
Start: 2023-10-09

## 2023-10-10 ENCOUNTER — APPOINTMENT (OUTPATIENT)
Dept: ORTHOPEDIC SURGERY | Facility: CLINIC | Age: 63
End: 2023-10-10
Payer: MEDICARE

## 2023-10-10 VITALS
SYSTOLIC BLOOD PRESSURE: 146 MMHG | HEIGHT: 74 IN | BODY MASS INDEX: 22.46 KG/M2 | WEIGHT: 175 LBS | DIASTOLIC BLOOD PRESSURE: 82 MMHG | HEART RATE: 74 BPM

## 2023-10-10 PROCEDURE — 99205 OFFICE O/P NEW HI 60 MIN: CPT

## 2023-10-10 PROCEDURE — 72100 X-RAY EXAM L-S SPINE 2/3 VWS: CPT

## 2023-10-14 ENCOUNTER — APPOINTMENT (OUTPATIENT)
Dept: CT IMAGING | Facility: CLINIC | Age: 63
End: 2023-10-14
Payer: MEDICARE

## 2023-10-14 ENCOUNTER — OUTPATIENT (OUTPATIENT)
Dept: OUTPATIENT SERVICES | Facility: HOSPITAL | Age: 63
LOS: 1 days | End: 2023-10-14
Payer: MEDICARE

## 2023-10-14 DIAGNOSIS — Z98.890 OTHER SPECIFIED POSTPROCEDURAL STATES: Chronic | ICD-10-CM

## 2023-10-14 DIAGNOSIS — S06.340A TRAUMATIC HEMORRHAGE OF RIGHT CEREBRUM WITHOUT LOSS OF CONSCIOUSNESS, INITIAL ENCOUNTER: ICD-10-CM

## 2023-10-14 PROCEDURE — 70450 CT HEAD/BRAIN W/O DYE: CPT

## 2023-10-14 PROCEDURE — 70450 CT HEAD/BRAIN W/O DYE: CPT | Mod: 26,MH

## 2023-10-16 ENCOUNTER — NON-APPOINTMENT (OUTPATIENT)
Age: 63
End: 2023-10-16

## 2023-11-04 ENCOUNTER — NON-APPOINTMENT (OUTPATIENT)
Age: 63
End: 2023-11-04

## 2023-11-07 ENCOUNTER — NON-APPOINTMENT (OUTPATIENT)
Age: 63
End: 2023-11-07

## 2023-11-08 ENCOUNTER — APPOINTMENT (OUTPATIENT)
Dept: INTERNAL MEDICINE | Facility: CLINIC | Age: 63
End: 2023-11-08
Payer: MEDICARE

## 2023-11-08 VITALS
RESPIRATION RATE: 13 BRPM | WEIGHT: 178 LBS | BODY MASS INDEX: 22.84 KG/M2 | DIASTOLIC BLOOD PRESSURE: 70 MMHG | HEIGHT: 74 IN | HEART RATE: 83 BPM | OXYGEN SATURATION: 99 % | SYSTOLIC BLOOD PRESSURE: 130 MMHG

## 2023-11-08 DIAGNOSIS — I47.10 SUPRAVENTRICULAR TACHYCARDIA, UNSPECIFIED: ICD-10-CM

## 2023-11-08 DIAGNOSIS — C20 MALIGNANT NEOPLASM OF RECTUM: ICD-10-CM

## 2023-11-08 DIAGNOSIS — Z01.818 ENCOUNTER FOR OTHER PREPROCEDURAL EXAMINATION: ICD-10-CM

## 2023-11-08 DIAGNOSIS — I25.10 ATHEROSCLEROTIC HEART DISEASE OF NATIVE CORONARY ARTERY W/OUT ANGINA PECTORIS: ICD-10-CM

## 2023-11-08 DIAGNOSIS — Z23 ENCOUNTER FOR IMMUNIZATION: ICD-10-CM

## 2023-11-08 DIAGNOSIS — I10 ESSENTIAL (PRIMARY) HYPERTENSION: ICD-10-CM

## 2023-11-08 DIAGNOSIS — D64.9 ANEMIA, UNSPECIFIED: ICD-10-CM

## 2023-11-08 PROCEDURE — G0008: CPT

## 2023-11-08 PROCEDURE — 90662 IIV NO PRSV INCREASED AG IM: CPT

## 2023-11-08 PROCEDURE — 99213 OFFICE O/P EST LOW 20 MIN: CPT | Mod: 25

## 2023-11-08 RX ORDER — ASPIRIN 81 MG
81 TABLET,CHEWABLE ORAL
Refills: 0 | Status: DISCONTINUED | COMMUNITY
End: 2023-11-08

## 2023-11-08 RX ORDER — ASPIRIN ENTERIC COATED TABLETS 81 MG 81 MG/1
81 TABLET, DELAYED RELEASE ORAL
Qty: 90 | Refills: 1 | Status: ACTIVE | COMMUNITY
Start: 2023-11-08

## 2023-11-10 ENCOUNTER — OUTPATIENT (OUTPATIENT)
Dept: OUTPATIENT SERVICES | Facility: HOSPITAL | Age: 63
LOS: 1 days | End: 2023-11-10
Payer: MEDICARE

## 2023-11-10 VITALS
TEMPERATURE: 97 F | OXYGEN SATURATION: 100 % | SYSTOLIC BLOOD PRESSURE: 156 MMHG | DIASTOLIC BLOOD PRESSURE: 89 MMHG | HEART RATE: 96 BPM | RESPIRATION RATE: 16 BRPM | HEIGHT: 74 IN | WEIGHT: 178.57 LBS

## 2023-11-10 DIAGNOSIS — Z29.9 ENCOUNTER FOR PROPHYLACTIC MEASURES, UNSPECIFIED: ICD-10-CM

## 2023-11-10 DIAGNOSIS — Z98.890 OTHER SPECIFIED POSTPROCEDURAL STATES: Chronic | ICD-10-CM

## 2023-11-10 DIAGNOSIS — I25.10 ATHEROSCLEROTIC HEART DISEASE OF NATIVE CORONARY ARTERY WITHOUT ANGINA PECTORIS: ICD-10-CM

## 2023-11-10 DIAGNOSIS — S32.000A WEDGE COMPRESSION FRACTURE OF UNSPECIFIED LUMBAR VERTEBRA, INITIAL ENCOUNTER FOR CLOSED FRACTURE: ICD-10-CM

## 2023-11-10 DIAGNOSIS — Z01.818 ENCOUNTER FOR OTHER PREPROCEDURAL EXAMINATION: ICD-10-CM

## 2023-11-10 DIAGNOSIS — I61.9 NONTRAUMATIC INTRACEREBRAL HEMORRHAGE, UNSPECIFIED: ICD-10-CM

## 2023-11-10 LAB
A1C WITH ESTIMATED AVERAGE GLUCOSE RESULT: 5.6 % — SIGNIFICANT CHANGE UP (ref 4–5.6)
A1C WITH ESTIMATED AVERAGE GLUCOSE RESULT: 5.6 % — SIGNIFICANT CHANGE UP (ref 4–5.6)
ALBUMIN SERPL ELPH-MCNC: 3.6 G/DL — SIGNIFICANT CHANGE UP (ref 3.3–5.2)
ALBUMIN SERPL ELPH-MCNC: 3.6 G/DL — SIGNIFICANT CHANGE UP (ref 3.3–5.2)
ALP SERPL-CCNC: 79 U/L — SIGNIFICANT CHANGE UP (ref 40–120)
ALP SERPL-CCNC: 79 U/L — SIGNIFICANT CHANGE UP (ref 40–120)
ALT FLD-CCNC: 19 U/L — SIGNIFICANT CHANGE UP
ALT FLD-CCNC: 19 U/L — SIGNIFICANT CHANGE UP
ANION GAP SERPL CALC-SCNC: 10 MMOL/L — SIGNIFICANT CHANGE UP (ref 5–17)
ANION GAP SERPL CALC-SCNC: 10 MMOL/L — SIGNIFICANT CHANGE UP (ref 5–17)
ANISOCYTOSIS BLD QL: SLIGHT — SIGNIFICANT CHANGE UP
ANISOCYTOSIS BLD QL: SLIGHT — SIGNIFICANT CHANGE UP
APTT BLD: 30.9 SEC — SIGNIFICANT CHANGE UP (ref 24.5–35.6)
APTT BLD: 30.9 SEC — SIGNIFICANT CHANGE UP (ref 24.5–35.6)
AST SERPL-CCNC: 20 U/L — SIGNIFICANT CHANGE UP
AST SERPL-CCNC: 20 U/L — SIGNIFICANT CHANGE UP
BASOPHILS # BLD AUTO: 0.05 K/UL — SIGNIFICANT CHANGE UP (ref 0–0.2)
BASOPHILS # BLD AUTO: 0.05 K/UL — SIGNIFICANT CHANGE UP (ref 0–0.2)
BASOPHILS NFR BLD AUTO: 0.9 % — SIGNIFICANT CHANGE UP (ref 0–2)
BASOPHILS NFR BLD AUTO: 0.9 % — SIGNIFICANT CHANGE UP (ref 0–2)
BILIRUB SERPL-MCNC: 1 MG/DL — SIGNIFICANT CHANGE UP (ref 0.4–2)
BILIRUB SERPL-MCNC: 1 MG/DL — SIGNIFICANT CHANGE UP (ref 0.4–2)
BLD GP AB SCN SERPL QL: SIGNIFICANT CHANGE UP
BLD GP AB SCN SERPL QL: SIGNIFICANT CHANGE UP
BUN SERPL-MCNC: 17.8 MG/DL — SIGNIFICANT CHANGE UP (ref 8–20)
BUN SERPL-MCNC: 17.8 MG/DL — SIGNIFICANT CHANGE UP (ref 8–20)
CALCIUM SERPL-MCNC: 9.2 MG/DL — SIGNIFICANT CHANGE UP (ref 8.4–10.5)
CALCIUM SERPL-MCNC: 9.2 MG/DL — SIGNIFICANT CHANGE UP (ref 8.4–10.5)
CHLORIDE SERPL-SCNC: 97 MMOL/L — SIGNIFICANT CHANGE UP (ref 96–108)
CHLORIDE SERPL-SCNC: 97 MMOL/L — SIGNIFICANT CHANGE UP (ref 96–108)
CO2 SERPL-SCNC: 28 MMOL/L — SIGNIFICANT CHANGE UP (ref 22–29)
CO2 SERPL-SCNC: 28 MMOL/L — SIGNIFICANT CHANGE UP (ref 22–29)
COMMENT - BLOOD BANK: SIGNIFICANT CHANGE UP
COMMENT - BLOOD BANK: SIGNIFICANT CHANGE UP
CREAT SERPL-MCNC: 0.95 MG/DL — SIGNIFICANT CHANGE UP (ref 0.5–1.3)
CREAT SERPL-MCNC: 0.95 MG/DL — SIGNIFICANT CHANGE UP (ref 0.5–1.3)
EGFR: 89 ML/MIN/1.73M2 — SIGNIFICANT CHANGE UP
EGFR: 89 ML/MIN/1.73M2 — SIGNIFICANT CHANGE UP
EOSINOPHIL # BLD AUTO: 0.2 K/UL — SIGNIFICANT CHANGE UP (ref 0–0.5)
EOSINOPHIL # BLD AUTO: 0.2 K/UL — SIGNIFICANT CHANGE UP (ref 0–0.5)
EOSINOPHIL NFR BLD AUTO: 3.5 % — SIGNIFICANT CHANGE UP (ref 0–6)
EOSINOPHIL NFR BLD AUTO: 3.5 % — SIGNIFICANT CHANGE UP (ref 0–6)
ESTIMATED AVERAGE GLUCOSE: 114 MG/DL — SIGNIFICANT CHANGE UP (ref 68–114)
ESTIMATED AVERAGE GLUCOSE: 114 MG/DL — SIGNIFICANT CHANGE UP (ref 68–114)
GIANT PLATELETS BLD QL SMEAR: PRESENT — SIGNIFICANT CHANGE UP
GIANT PLATELETS BLD QL SMEAR: PRESENT — SIGNIFICANT CHANGE UP
GLUCOSE SERPL-MCNC: 109 MG/DL — HIGH (ref 70–99)
GLUCOSE SERPL-MCNC: 109 MG/DL — HIGH (ref 70–99)
HCT VFR BLD CALC: 31.2 % — LOW (ref 39–50)
HCT VFR BLD CALC: 31.2 % — LOW (ref 39–50)
HGB BLD-MCNC: 9.8 G/DL — LOW (ref 13–17)
HGB BLD-MCNC: 9.8 G/DL — LOW (ref 13–17)
INR BLD: 0.99 RATIO — SIGNIFICANT CHANGE UP (ref 0.85–1.18)
INR BLD: 0.99 RATIO — SIGNIFICANT CHANGE UP (ref 0.85–1.18)
LYMPHOCYTES # BLD AUTO: 0.35 K/UL — LOW (ref 1–3.3)
LYMPHOCYTES # BLD AUTO: 0.35 K/UL — LOW (ref 1–3.3)
LYMPHOCYTES # BLD AUTO: 6.1 % — LOW (ref 13–44)
LYMPHOCYTES # BLD AUTO: 6.1 % — LOW (ref 13–44)
MANUAL SMEAR VERIFICATION: SIGNIFICANT CHANGE UP
MANUAL SMEAR VERIFICATION: SIGNIFICANT CHANGE UP
MCHC RBC-ENTMCNC: 28.8 PG — SIGNIFICANT CHANGE UP (ref 27–34)
MCHC RBC-ENTMCNC: 28.8 PG — SIGNIFICANT CHANGE UP (ref 27–34)
MCHC RBC-ENTMCNC: 31.4 GM/DL — LOW (ref 32–36)
MCHC RBC-ENTMCNC: 31.4 GM/DL — LOW (ref 32–36)
MCV RBC AUTO: 91.8 FL — SIGNIFICANT CHANGE UP (ref 80–100)
MCV RBC AUTO: 91.8 FL — SIGNIFICANT CHANGE UP (ref 80–100)
MONOCYTES # BLD AUTO: 0.46 K/UL — SIGNIFICANT CHANGE UP (ref 0–0.9)
MONOCYTES # BLD AUTO: 0.46 K/UL — SIGNIFICANT CHANGE UP (ref 0–0.9)
MONOCYTES NFR BLD AUTO: 7.9 % — SIGNIFICANT CHANGE UP (ref 2–14)
MONOCYTES NFR BLD AUTO: 7.9 % — SIGNIFICANT CHANGE UP (ref 2–14)
NEUTROPHILS # BLD AUTO: 4.72 K/UL — SIGNIFICANT CHANGE UP (ref 1.8–7.4)
NEUTROPHILS # BLD AUTO: 4.72 K/UL — SIGNIFICANT CHANGE UP (ref 1.8–7.4)
NEUTROPHILS NFR BLD AUTO: 81.6 % — HIGH (ref 43–77)
NEUTROPHILS NFR BLD AUTO: 81.6 % — HIGH (ref 43–77)
PLAT MORPH BLD: NORMAL — SIGNIFICANT CHANGE UP
PLAT MORPH BLD: NORMAL — SIGNIFICANT CHANGE UP
PLATELET # BLD AUTO: 353 K/UL — SIGNIFICANT CHANGE UP (ref 150–400)
PLATELET # BLD AUTO: 353 K/UL — SIGNIFICANT CHANGE UP (ref 150–400)
POLYCHROMASIA BLD QL SMEAR: SIGNIFICANT CHANGE UP
POLYCHROMASIA BLD QL SMEAR: SIGNIFICANT CHANGE UP
POTASSIUM SERPL-MCNC: 3.7 MMOL/L — SIGNIFICANT CHANGE UP (ref 3.5–5.3)
POTASSIUM SERPL-MCNC: 3.7 MMOL/L — SIGNIFICANT CHANGE UP (ref 3.5–5.3)
POTASSIUM SERPL-SCNC: 3.7 MMOL/L — SIGNIFICANT CHANGE UP (ref 3.5–5.3)
POTASSIUM SERPL-SCNC: 3.7 MMOL/L — SIGNIFICANT CHANGE UP (ref 3.5–5.3)
PROT SERPL-MCNC: 6.6 G/DL — SIGNIFICANT CHANGE UP (ref 6.6–8.7)
PROT SERPL-MCNC: 6.6 G/DL — SIGNIFICANT CHANGE UP (ref 6.6–8.7)
PROTHROM AB SERPL-ACNC: 11 SEC — SIGNIFICANT CHANGE UP (ref 9.5–13)
PROTHROM AB SERPL-ACNC: 11 SEC — SIGNIFICANT CHANGE UP (ref 9.5–13)
RBC # BLD: 3.4 M/UL — LOW (ref 4.2–5.8)
RBC # BLD: 3.4 M/UL — LOW (ref 4.2–5.8)
RBC # FLD: 17.4 % — HIGH (ref 10.3–14.5)
RBC # FLD: 17.4 % — HIGH (ref 10.3–14.5)
RBC BLD AUTO: ABNORMAL
RBC BLD AUTO: ABNORMAL
SODIUM SERPL-SCNC: 135 MMOL/L — SIGNIFICANT CHANGE UP (ref 135–145)
SODIUM SERPL-SCNC: 135 MMOL/L — SIGNIFICANT CHANGE UP (ref 135–145)
WBC # BLD: 5.79 K/UL — SIGNIFICANT CHANGE UP (ref 3.8–10.5)
WBC # BLD: 5.79 K/UL — SIGNIFICANT CHANGE UP (ref 3.8–10.5)
WBC # FLD AUTO: 5.79 K/UL — SIGNIFICANT CHANGE UP (ref 3.8–10.5)
WBC # FLD AUTO: 5.79 K/UL — SIGNIFICANT CHANGE UP (ref 3.8–10.5)

## 2023-11-10 PROCEDURE — 93010 ELECTROCARDIOGRAM REPORT: CPT

## 2023-11-10 PROCEDURE — G0463: CPT

## 2023-11-10 PROCEDURE — 93005 ELECTROCARDIOGRAM TRACING: CPT

## 2023-11-10 NOTE — H&P PST ADULT - PROBLEM SELECTOR PLAN 2
SENDY Kyphoplasty, Pt to follow-up with PCP, Cardiologist and neurologist for evaluation and clearances

## 2023-11-10 NOTE — H&P PST ADULT - ATTENDING COMMENTS
Attending statement:  I have personally seen this patient, and formed a face to face diagnostic evaluation on this patient on this date.  I have reviewed the PA, NP and or Medical/PA student and/or Resident documentation and agree with the history, physical exam and plan of care except if noted otherwise.      Patient presents for an L1 kyphoplasty patient has active cancer is being treated at [facility] with radiation.  I think there is a twofold benefit to this kyphoplasty 1 fracture pain reduction including potential for reduced kyphosis and I think also very important is a biopsy that we will make sure this is not a metastatic lesion.  Patient wishes to proceed as does his family

## 2023-11-10 NOTE — H&P PST ADULT - ASSESSMENT
Pt is a  63-year old male seen today pre-op for LI Kyphoplasty for  L1 compression fracture. Pt medical hx includes intracerebral hemorrhage, anemia, anxiety, HTN, HLD, CAD(cardia stents), carotid stenosis (right carotid endarterectomy ), rectal adenocarcinoma metastatic spread to the bladder, Pt status post chemotherapy and Radiation therapy, peripheral neuropathy,  left side colostomy bags and right side upper quadrant urology bag in situ. Pt wife reports he was admitted to [facility] (2023) for weakness and episode of syncope, while in the hospital, Pt tried to get out of bed at night and fell. Today, Pt reports back pain is aggravated with mobility, sitting for extended period of time and walking, Pt states back at maximum ranges between 7/10 to 6/10 aching pain  and 3/10 at rest. Pt denies numbness and tingling of extremities, denies headaches, denies change in vision, denies Fever/chills and any other related issues. Pt scheduled for this surgery on 23 with Dr. John. Surgery protocol reviewed with Pt and his wife today. Pt to follow-up with PCP, Cardiologist and neurologist for evaluation and clearances  CAPRINI VTE 2.0 SCORE [CLOT updated 2019]    AGE RELATED RISK FACTORS                                                       MOBILITY RELATED FACTORS  [ ] Age 41-60 years                                            (1 Point)                    [ ] Bed rest                                                        (1 Point)  [ x] Age: 61-74 years                                           (2 Points)                  [ ] Plaster cast                                                   (2 Points)  [ ] Age= 75 years                                              (3 Points)                    [ ] Bed bound for more than 72 hours                 (2 Points)    DISEASE RELATED RISK FACTORS                                               GENDER SPECIFIC FACTORS  [ ] Edema in the lower extremities                       (1 Point)              [ ] Pregnancy                                                     (1 Point)  [ ] Varicose veins                                               (1 Point)                     [ ] Post-partum < 6 weeks                                   (1 Point)             [ ] BMI > 25 Kg/m2                                            (1 Point)                     [ ] Hormonal therapy  or oral contraception          (1 Point)                 [ ] Sepsis (in the previous month)                        (1 Point)               [ ] History of pregnancy complications                 (1 point)  [ ] Pneumonia or serious lung disease                                               [ ] Unexplained or recurrent                     (1 Point)           (in the previous month)                               (1 Point)  [ ] Abnormal pulmonary function test                     (1 Point)                 SURGERY RELATED RISK FACTORS  [ ] Acute myocardial infarction                              (1 Point)               [ ]  Section                                             (1 Point)  [ ] Congestive heart failure (in the previous month)  (1 Point)      [ ] Minor surgery                                                  (1 Point)   [ ] Inflammatory bowel disease                             (1 Point)               [ ] Arthroscopic surgery                                        (2 Points)  [ ] Central venous access                                      (2 Points)                [x ] General surgery lasting more than 45 minutes (2 points)  [x ] Malignancy- Present or previous                   (2 Points)                [ ] Elective arthroplasty                                         (5 points)    [ ] Stroke (in the previous month)                          (5 Points)                                                                                                                                                           HEMATOLOGY RELATED FACTORS                                                 TRAUMA RELATED RISK FACTORS  [ ] Prior episodes of VTE                                     (3 Points)                [ ] Fracture of the hip, pelvis, or leg                       (5 Points)  [ ] Positive family history for VTE                         (3 Points)             [ ] Acute spinal cord injury (in the previous month)  (5 Points)  [ ] Prothrombin 01678 A                                     (3 Points)               [ ] Paralysis  (less than 1 month)                             (5 Points)  [ ] Factor V Leiden                                             (3 Points)                  [ ] Multiple Trauma within 1 month                        (5 Points)  [ ] Lupus anticoagulants                                     (3 Points)                                                           [ ] Anticardiolipin antibodies                               (3 Points)                                                       [ ] High homocysteine in the blood                      (3 Points)                                             [ ] Other congenital or acquired thrombophilia      (3 Points)                                                [ ] Heparin induced thrombocytopenia                  (3 Points)                                     Total Score [     6     ]   OPIOID RISK TOOL    CÉSAR EACH BOX THAT APPLIES AND ADD TOTALS AT THE END    FAMILY HISTORY OF SUBSTANCE ABUSE                 FEMALE         MALE                                                Alcohol                             [  ]1 pt          [  ]3pts                                               Illegal Durgs                     [  ]2 pts        [  ]3pts                                               Rx Drugs                           [  ]4 pts        [  ]4 pts    PERSONAL HISTORY OF SUBSTANCE ABUSE                                                                                          Alcohol                             [  ]3 pts       [  ]3 pts                                               Illegal Drugs                     [  ]4 pts        [  ]4 pts                                               Rx Drugs                           [  ]5 pts        [  ]5 pts    AGE BETWEEN 16-45 YEARS                                      [  ]1 pt         [  ]1 pt    HISTORY OF PREADOLESCENT   SEXUAL ABUSE                                                             [  ]3 pts        [  ]0pts    PSYCHOLOGICAL DISEASE                     ADD, OCD, Bipolar, Schizophrenia        [  ]2 pts         [  ]2 pts                      Depression                                               [ x ]1 pt           [  ]1 pt           SCORING TOTAL   (add numbers and type here)              (*1**)                                     A score of 3 or lower indicated LOW risk for future opioid abuse  A score of 4 to 7 indicated moderate risk for future opioid abuse  A score of 8 or higher indicates a high risk for opioid abuse

## 2023-11-10 NOTE — H&P PST ADULT - HISTORY OF PRESENT ILLNESS
Pt is is t 63-year-old gentleman who presents for evaluation of an L1 compression fracture and potential utilization of a kyphoplasty. He has a known history of rectal carcinoma metastatic spread to the bladder and potential metastatic spread to L1 his cancer is managed by [facility]. He was placed in a LSO brace by neurosurgery custom made with the anterior abdomen cut out because of colostomy bags etc. Overall his pain is essentially transitional in nature tends to be rather intense 8 to a 10 is his verbal rating. And again intermittent. He is pending initiation of chemotherapy for this rectal and bladder mass/tumor this coming Monday. And they are considering kyphoplasty for 1 L1 biopsy as well as pain control.     Pt reports truamatic back injury s/p a fall in the [facility] 08/26/23. Pt was in the hospital die=ue to weakness and was   Pain is aggrvautrted with mobility, sitting down, 6/10 aching pain  Pt is a  63-year old male seen today pre-op for LI Kyphoplasty for  L1 compression fracture. Pt medical hx includes intracerebral hemorrhage, anemia, anxiety, HTN, HLD, CAD(cardia stents), carotid stenosis (right carotid endarterectomy 2019), rectal adenocarcinoma metastatic spread to the bladder, Pt status post chemotherapy and Radiation therapy, peripheral neuropathy,  left side colostomy bags and right side upper quadrant urology bag in situ. Pt wife reports he was admitted to [facility] (August 26th, 2023) for weakness and episode of syncope, while in the hospital, Pt tried to get out of bed at night and fell. Today, Pt reports back pain is aggravated with mobility, sitting for extended period of time and walking, Pt states back at maximum ranges between 7/10 to 6/10 aching pain  and 3/10 at rest. Pt denies numbness and tingling of extremities, denies headaches, denies change in vision, denies Fever/chills and any other related issues. Pt scheduled for this surgery on 11/22/23 with Dr. John     .                Pt reports truamatic back injury s/p a fall in the [facility] 08/26/23. Pt was in the hospital die=ue to weakness and was   Pain is aggrvautrted with mobility, sitting down, 6/10 aching pain

## 2023-11-10 NOTE — H&P PST ADULT - EKG AND INTERPRETATION
Normal sinus rhythm 95 bpm, Nonspecific ST abnormality, no acute change. EKG pending official reading

## 2023-11-10 NOTE — H&P PST ADULT - ENMT
[facility] Lobo Robles MD  10/13/2017    negative no gross abnormalities

## 2023-11-10 NOTE — H&P PST ADULT - NSANTHOSAYNRD_GEN_A_CORE
weak fatigue mild cough No. NAMRATA screening performed.  STOP BANG Legend: 0-2 = LOW Risk; 3-4 = INTERMEDIATE Risk; 5-8 = HIGH Risk

## 2023-11-10 NOTE — H&P PST ADULT - NSANTHTIREDRD_ENT_A_CORE
History  HPI     Contact Lens Follow Up     In both eyes.  This started 3 weeks ago.  Associated symptoms include Negative for eye pain.  Pain was noted as 0/10. Additional comments: 3 week f/u Scleral contact lens              Comments     Pt notes she is seeing well, fit comfortably, no additional concerns.     Mirna Card COT July 8, 2021 12:54 PM            Last edited by Cari Card on 7/8/2021 12:54 PM. (History)          Assessment/Plan  (H53.2) Monocular diplopia of both eyes  (primary encounter diagnosis)  Comment: Excellent vision and comfort in lenses, slightly low central vault and question of insufficient limbal vault given negative staining pattern when lenses were removed  Plan:  Educated patient on clinical findings. New lenses ordered with increased central vault and extra limbal clearance (Invoice: [id number]). When lenses arrive, mail to address patient will provide via "[employer] Follow-up 1 month after receipt of lenses.    Complete documentation of historical and exam elements from today's encounter can  be found in the full encounter summary report (not reduplicated in this progress  note). I personally obtained the chief complaint(s) and history of present illness. I  confirmed and edited as necessary the review of systems, past medical/surgical  history, family history, social history, and examination findings as documented by  others; and I examined the patient myself. I personally reviewed the relevant tests,  images, and reports as documented above. I formulated and edited as necessary the  assessment and plan and discussed the findings and management plan with the  patient and family.    Thad Kramer, RAMIREZ, FAAO   No

## 2023-11-10 NOTE — H&P PST ADULT - NSICDXPASTMEDICALHX_GEN_ALL_CORE_FT
PAST MEDICAL HISTORY:  CAD (coronary artery disease)     Depression with rectal cancer    H/O carotid stenosis     H/O renal calculi     HLD (hyperlipidemia)     HTN (hypertension)     DANO (iron deficiency anemia)     Mitral regurgitation     Neuropathy feet s/p chemo    Rectal cancer 2/2019 s/p chemo and radiation; recurrence of cancer 2022    Splenic artery aneurysm     Stented coronary artery circumflex 2005 LAD 2017 x4    Umbilical hernia      PAST MEDICAL HISTORY:  Back pain     CAD (coronary artery disease)     Colostomy present     Depression with rectal cancer    H/O carotid stenosis     H/O renal calculi     History of urostomy     HLD (hyperlipidemia)     HTN (hypertension)     DANO (iron deficiency anemia)     Lumbar compression fracture     Mitral regurgitation     Neuropathy feet s/p chemo    Rectal cancer 2/2019 s/p chemo and radiation; recurrence of cancer 2022    Splenic artery aneurysm     Stented coronary artery circumflex 2005 LAD 2017 x4    Umbilical hernia

## 2023-11-10 NOTE — H&P PST ADULT - NS SC CAGE ALCOHOL GUILTY ABOUT
Writer spoke with pt's daughter. Pt's daughter informed of below results. 11/2/22 OV with Dr. Jennifer Garner. Daughter verbalized understanding. no

## 2023-11-11 LAB
MRSA PCR RESULT.: SIGNIFICANT CHANGE UP
MRSA PCR RESULT.: SIGNIFICANT CHANGE UP
S AUREUS DNA NOSE QL NAA+PROBE: SIGNIFICANT CHANGE UP
S AUREUS DNA NOSE QL NAA+PROBE: SIGNIFICANT CHANGE UP

## 2023-11-20 ENCOUNTER — NON-APPOINTMENT (OUTPATIENT)
Age: 63
End: 2023-11-20

## 2023-11-21 ENCOUNTER — TRANSCRIPTION ENCOUNTER (OUTPATIENT)
Age: 63
End: 2023-11-21

## 2023-11-21 NOTE — ASU PATIENT PROFILE, ADULT - NSICDXPASTMEDICALHX_GEN_ALL_CORE_FT
PAST MEDICAL HISTORY:  Back pain     CAD (coronary artery disease)     Colostomy present     Depression with rectal cancer    H/O carotid stenosis     H/O renal calculi     History of urostomy     HLD (hyperlipidemia)     HTN (hypertension)     DANO (iron deficiency anemia)     Lumbar compression fracture     Mitral regurgitation     Neuropathy feet s/p chemo    Rectal cancer 2/2019 s/p chemo and radiation; recurrence of cancer 2022    Splenic artery aneurysm     Stented coronary artery circumflex 2005 LAD 2017 x4    Umbilical hernia

## 2023-11-22 ENCOUNTER — APPOINTMENT (OUTPATIENT)
Dept: ORTHOPEDIC SURGERY | Facility: HOSPITAL | Age: 63
End: 2023-11-22

## 2023-11-22 ENCOUNTER — OUTPATIENT (OUTPATIENT)
Dept: INPATIENT UNIT | Facility: HOSPITAL | Age: 63
LOS: 1 days | End: 2023-11-22
Payer: MEDICARE

## 2023-11-22 ENCOUNTER — TRANSCRIPTION ENCOUNTER (OUTPATIENT)
Age: 63
End: 2023-11-22

## 2023-11-22 ENCOUNTER — RESULT REVIEW (OUTPATIENT)
Age: 63
End: 2023-11-22

## 2023-11-22 VITALS
TEMPERATURE: 97 F | RESPIRATION RATE: 15 BRPM | OXYGEN SATURATION: 100 % | SYSTOLIC BLOOD PRESSURE: 140 MMHG | DIASTOLIC BLOOD PRESSURE: 73 MMHG | HEART RATE: 64 BPM

## 2023-11-22 VITALS
TEMPERATURE: 98 F | RESPIRATION RATE: 18 BRPM | OXYGEN SATURATION: 100 % | WEIGHT: 178.57 LBS | HEIGHT: 74 IN | DIASTOLIC BLOOD PRESSURE: 84 MMHG | SYSTOLIC BLOOD PRESSURE: 149 MMHG | HEART RATE: 88 BPM

## 2023-11-22 DIAGNOSIS — Z98.890 OTHER SPECIFIED POSTPROCEDURAL STATES: Chronic | ICD-10-CM

## 2023-11-22 DIAGNOSIS — S32.010A WEDGE COMPRESSION FRACTURE OF FIRST LUMBAR VERTEBRA, INITIAL ENCOUNTER FOR CLOSED FRACTURE: ICD-10-CM

## 2023-11-22 PROBLEM — M54.9 DORSALGIA, UNSPECIFIED: Chronic | Status: ACTIVE | Noted: 2023-11-10

## 2023-11-22 PROCEDURE — C1726: CPT

## 2023-11-22 PROCEDURE — 88311 DECALCIFY TISSUE: CPT

## 2023-11-22 PROCEDURE — 88342 IMHCHEM/IMCYTCHM 1ST ANTB: CPT | Mod: 26

## 2023-11-22 PROCEDURE — 88307 TISSUE EXAM BY PATHOLOGIST: CPT | Mod: 26

## 2023-11-22 PROCEDURE — 88311 DECALCIFY TISSUE: CPT | Mod: 26

## 2023-11-22 PROCEDURE — 22514 PERQ VERTEBRAL AUGMENTATION: CPT

## 2023-11-22 PROCEDURE — 88342 IMHCHEM/IMCYTCHM 1ST ANTB: CPT

## 2023-11-22 PROCEDURE — C1713: CPT

## 2023-11-22 PROCEDURE — C1889: CPT

## 2023-11-22 PROCEDURE — 20251 BIOPSY VRT BDY OPEN LMBR/CRV: CPT

## 2023-11-22 PROCEDURE — 76000 FLUOROSCOPY <1 HR PHYS/QHP: CPT

## 2023-11-22 PROCEDURE — 88307 TISSUE EXAM BY PATHOLOGIST: CPT

## 2023-11-22 DEVICE — KIT SYS AUTOFLEX W/ HV CEMENT: Type: IMPLANTABLE DEVICE | Status: FUNCTIONAL

## 2023-11-22 DEVICE — FLOSEAL WITH RECOTHROM THROMBIN 10ML: Type: IMPLANTABLE DEVICE | Status: FUNCTIONAL

## 2023-11-22 DEVICE — STRYKER KIT OMNICURVE FRACTURE 11G 20MM: Type: IMPLANTABLE DEVICE | Status: FUNCTIONAL

## 2023-11-22 DEVICE — STRYKER KIT OMNICURVE FRACTURE 11G 15MM: Type: IMPLANTABLE DEVICE | Status: FUNCTIONAL

## 2023-11-22 RX ORDER — ACETAMINOPHEN 500 MG
975 TABLET ORAL ONCE
Refills: 0 | Status: COMPLETED | OUTPATIENT
Start: 2023-11-22 | End: 2023-11-22

## 2023-11-22 RX ORDER — OXYCODONE HYDROCHLORIDE 5 MG/1
1 TABLET ORAL
Qty: 21 | Refills: 0
Start: 2023-11-22 | End: 2023-11-28

## 2023-11-22 RX ORDER — ASPIRIN/CALCIUM CARB/MAGNESIUM 324 MG
1 TABLET ORAL
Refills: 0 | DISCHARGE

## 2023-11-22 RX ORDER — CEPHALEXIN 500 MG
1 CAPSULE ORAL
Qty: 4 | Refills: 0
Start: 2023-11-22 | End: 2023-11-22

## 2023-11-22 RX ORDER — CEFAZOLIN SODIUM 1 G
2000 VIAL (EA) INJECTION ONCE
Refills: 0 | Status: DISCONTINUED | OUTPATIENT
Start: 2023-11-22 | End: 2023-11-22

## 2023-11-22 RX ORDER — CELECOXIB 200 MG/1
200 CAPSULE ORAL ONCE
Refills: 0 | Status: COMPLETED | OUTPATIENT
Start: 2023-11-22 | End: 2023-11-22

## 2023-11-22 RX ORDER — APREPITANT 80 MG/1
40 CAPSULE ORAL ONCE
Refills: 0 | Status: COMPLETED | OUTPATIENT
Start: 2023-11-22 | End: 2023-11-22

## 2023-11-22 RX ORDER — SIMVASTATIN 20 MG/1
1 TABLET, FILM COATED ORAL
Refills: 0 | DISCHARGE

## 2023-11-22 RX ORDER — UBIDECARENONE 100 MG
1 CAPSULE ORAL
Refills: 0 | DISCHARGE

## 2023-11-22 RX ADMIN — APREPITANT 40 MILLIGRAM(S): 80 CAPSULE ORAL at 06:37

## 2023-11-22 RX ADMIN — Medication 975 MILLIGRAM(S): at 06:37

## 2023-11-22 RX ADMIN — CELECOXIB 200 MILLIGRAM(S): 200 CAPSULE ORAL at 06:36

## 2023-11-22 NOTE — BRIEF OPERATIVE NOTE - OPERATION/FINDINGS
L1 comp fx pathologic suspected given hx of rectal CA
Patient was maintained supine and conscious sedation was induced. I then assisted with positioning patient prone on a well-padded well-positioned flattop Abhay table draped the area and cleansed lumbar spine with Betadine scrub after which time the RN scrubbed with DuraPrep. I assisted with marking the operative vertebrae L1 using biplanar fluoroscopy. A participated in operative time out.Trocar was advanced to the pedicle, placed into the vertebral body and bone biopsy was obtained with my assistance. Balloon/cavity creation was produced. Cement was injected into the vertebral body under intermittent biplanar fluoroscopy guidance. Final fluoroscopic imaging was confirmed placement of kyphoplasty cement and the affected vertebral body.  I  irrigated with normal saline, closed the incision with 3-0 Monocryl then Dermabond, Steri-Strips and a Mepilex topped with an OpSite.

## 2023-11-22 NOTE — ASU DISCHARGE PLAN (ADULT/PEDIATRIC) - MEDICATION INSTRUCTIONS
tylenol 1000 mg three times a day prn mild to moderate pain, oxycodone 5 mg three times a day as needed for severe pain.  keflex 500 mg 4 times a day x 1 day to prevent skin infection

## 2023-11-22 NOTE — ASU PREOP CHECKLIST - AICD PRESENT
Tristan is a 21 year old male who presents with complaints of back pain.  The pain has been present for 6 day(s) and is localized to the low back on the right area of the back.  The pain was associated with no specific injury or trauma and he was hunting last weekend.  The pain does not radiate to his legs and is not associated with weakness or paresthesias of the legs.  There are no bladder or bowel changes noted.  The pain is aggravated by movement, and is helped by rest.  He has tried Tylenol for the discomfort without relief.  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   Meds:    No current outpatient medications on file.     No current facility-administered medications for this visit.       Tobacco Use:  No     OBJECTIVE  Gen: alert, in no distress  VS:   Visit Vitals  /88   Pulse 84   Temp 98 °F (36.7 °C) (Tympanic)   Resp 16   SpO2 98%     Lungs: Clear to auscultation; good air entry         no use of accessory muscles  CV:  Regular rate and rhythm; no S3/S3; no murmur   Back:  no tenderness to palpation, reproducible pain with flexion and normal ROM at the torso    Straight Leg Raise:  negative bilaterally  Legs: full range of motion at the hips and knees        DTR's +2/2 bilateral patellar and achilles tendons        Strength 5/5 bilateral lower extremities        neurovascular intact  EXT:  No cyanosis, clubbing or edema    ASSESSMENT  Back pain     Discussed this with Tristan.  Recommended OTC symptomatic treatments, including intermittent ice therapy for the first 2 days.  Reassurance given.  Relafen and flexeril prescribed  Medication side effects discussed, including the possibility of sedation.  He was advised not to drive while  taking the muscle relaxants.  I would expect improvement of his symptoms over the next 5 to 7 days.  If his symptoms persist past that point, or if they worsen sooner, he should be re-evaluated.  The patient indicates understanding of these issues and agrees with the plan.     Electronically signed by:  Keith E Weiler, MD on 12/28/2019    no

## 2023-11-22 NOTE — BRIEF OPERATIVE NOTE - TYPE OF ANESTHESIA
Dispo update:  Checking with MsVern Richelle Stallworth at [facility] (patient's first choice): They need updated OT and PT notes (none since Thursday January 17, 2019). Have bed offers from [address] [place] [address] [facility], no response from [facility], and as mentioned, [facility] pending. MAC

## 2023-11-22 NOTE — BRIEF OPERATIVE NOTE - NSICDXBRIEFPROCEDURE_GEN_ALL_CORE_FT
PROCEDURES:  Kyphoplasty, spine, lumbar, 1 level 22-Nov-2023 08:40:01  Renato John  
PROCEDURES:  Kyphoplasty, spine, lumbar, 1 level 22-Nov-2023 08:40:01  Renato John

## 2023-11-22 NOTE — ASU DISCHARGE PLAN (ADULT/PEDIATRIC) - CARE PROVIDER_API CALL
[facility] at [address] Update    Date: 1/21/2020    Cole Roe 1962    Medication: Catheryn Reins XR 11mg tablet    Co-pay = $2643.87     Patient has a high co-pay on their prescription. Pharmacy staff contacted the patient and advised them to call the [facility] at [phone] to discuss potentially obtaining the medication through the  directly or other treatment options, if appropriate. Pharmacist answered any questions that the patient had.     Barrett Ventura, [address] at [facility],  Amira Snyder   [place], [address]  phone: [phone]   fax: [phone] Renato John  Orthopaedic Surgery  [address] [place] [id number]  Phone: [phone]  Fax: [phone]  Follow Up Time:

## 2023-11-22 NOTE — ASU DISCHARGE PLAN (ADULT/PEDIATRIC) - NS MD DC FALL RISK RISK
For information on Fall & Injury Prevention, visit: [email or web address] OR  [email or web address] OR  https://www.cdc.gov/steadi/patient.html

## 2023-11-22 NOTE — ASU DISCHARGE PLAN (ADULT/PEDIATRIC) - COMMENTS
1 week post op appointment is already scheduled.  If you have questions call [phone] Dr John's office.

## 2023-11-22 NOTE — BRIEF OPERATIVE NOTE - NSICDXBRIEFPREOP_GEN_ALL_CORE_FT
PRE-OP DIAGNOSIS:  Compression fx, lumbar spine 22-Nov-2023 08:40:12  Renato John  
PRE-OP DIAGNOSIS:  Compression fx, lumbar spine 22-Nov-2023 08:40:12  Renato John

## 2023-11-24 ENCOUNTER — NON-APPOINTMENT (OUTPATIENT)
Age: 63
End: 2023-11-24

## 2023-11-24 PROBLEM — S32.000A WEDGE COMPRESSION FRACTURE OF UNSPECIFIED LUMBAR VERTEBRA, INITIAL ENCOUNTER FOR CLOSED FRACTURE: Chronic | Status: ACTIVE | Noted: 2023-11-10

## 2023-11-24 PROBLEM — Z98.890 OTHER SPECIFIED POSTPROCEDURAL STATES: Chronic | Status: ACTIVE | Noted: 2023-11-10

## 2023-11-24 PROBLEM — Z93.3 COLOSTOMY STATUS: Chronic | Status: ACTIVE | Noted: 2023-11-10

## 2023-11-30 ENCOUNTER — RX RENEWAL (OUTPATIENT)
Age: 63
End: 2023-11-30

## 2023-11-30 LAB
SURGICAL PATHOLOGY STUDY: SIGNIFICANT CHANGE UP
SURGICAL PATHOLOGY STUDY: SIGNIFICANT CHANGE UP

## 2023-12-01 ENCOUNTER — APPOINTMENT (OUTPATIENT)
Dept: ORTHOPEDIC SURGERY | Facility: CLINIC | Age: 63
End: 2023-12-01
Payer: MEDICARE

## 2023-12-01 PROCEDURE — 99024 POSTOP FOLLOW-UP VISIT: CPT

## 2023-12-01 PROCEDURE — 72100 X-RAY EXAM L-S SPINE 2/3 VWS: CPT

## 2023-12-01 NOTE — ED ADULT NURSE NOTE - NS ED NURSE LEVEL OF CONSCIOUSNESS MENTAL STATUS
Security brought this pt's cell phone to lab. Cell phone was bagged and placed with pt's clothes and shoes in hospital belongings bag. Awake/Alert/Cooperative

## 2023-12-03 ENCOUNTER — NON-APPOINTMENT (OUTPATIENT)
Age: 63
End: 2023-12-03

## 2023-12-05 ENCOUNTER — APPOINTMENT (OUTPATIENT)
Dept: ORTHOPEDIC SURGERY | Facility: CLINIC | Age: 63
End: 2023-12-05

## 2023-12-10 ENCOUNTER — NON-APPOINTMENT (OUTPATIENT)
Age: 63
End: 2023-12-10

## 2023-12-12 NOTE — H&P ADULT - ENDOCRINE
MD Time IN: 1003   Sedation start time:  1009  Sedation end time:  1110    Medications given: fentanyl 25 mcg IV; versed 2 mg IV; toradol 0 mg IV  Intravenous fluids were administered, normal saline 425 cc's.  Sedation Level Achieved:  Moderate (conscious) sedation      Susan RN-BSN  [facility]   [phone]     negative

## 2023-12-17 ENCOUNTER — NON-APPOINTMENT (OUTPATIENT)
Age: 63
End: 2023-12-17

## 2023-12-24 ENCOUNTER — NON-APPOINTMENT (OUTPATIENT)
Age: 63
End: 2023-12-24

## 2024-01-01 NOTE — H&P PST ADULT - SURGICAL SITE INCISION
Infant stable on NIPPV, FiO2 24 - 30% during the night. No A/B episodes. Infant NPO,has PICC line infusing TPN and Lipids at ordered rate. Infant has L arm SL PIV. Mother called and updated on POC.   no

## 2024-01-05 ENCOUNTER — APPOINTMENT (OUTPATIENT)
Dept: ORTHOPEDIC SURGERY | Facility: CLINIC | Age: 64
End: 2024-01-05
Payer: MEDICARE

## 2024-01-05 PROCEDURE — 72100 X-RAY EXAM L-S SPINE 2/3 VWS: CPT

## 2024-01-05 PROCEDURE — 99024 POSTOP FOLLOW-UP VISIT: CPT

## 2024-01-05 NOTE — HISTORY OF PRESENT ILLNESS
[___ Weeks Post Op] : [unfilled] weeks post op [de-identified] : s/p: L1 kyphoplasty with bone biopsy cavity creation via biplanar fluoroscopy.  DOS: 11/22/2023. [de-identified] : Patient is doing well. He reports his pain is very minimal and present only when he lies flat on his back for a few moments. It is self resolving. He denies any pain, weakness, numbness or tingling in the bilateral lower extremities. Incision is well healing. No fevers and chills. He has been discussing bone health with his oncologist who has him on various supplementations.  [de-identified] : Constitutional: Alert and in no acute distress.  Non-toxic in appearance. Psychiatric: Oriented to person, place, and time. Insight and judgement were intact and the affect was normal. Cardiovascular: Regular rate assessed through peripheral pulses. Pulmonary: Nonlabored breathing on room air. Lymphatics: No peripheral lymphadenopathy appreciated.  Skin: Surgical incision over the upper lumbar region is well healed Neurologic: No acute sensory and/or motor deficits. Musculoskeletal: Patient is ambulating well without assistance. No focal motor deficit of the bilateral lower extremities manually. Additional findings included an unremarkable neurological exam, peripheral vascular exam normal and maneuvers demonstrated a negative Alfred's sign. [de-identified] : Xray of the lumbar spine 2 views obtained and independently reviewed in the office today, 01/05/2024, demonstrated s/p L1 kyphoplasty with appropriate fill. No changes from prior imaging [de-identified] : 64 y/o male 6 weeks s/p L1 kyphoplasty [de-identified] : At this time, the patient is progressing well. Advised to continue with modalities as needed for pain management. Advised to increase activities as tolerated. Patient deferred formal PT today. Continue with oncologist recommendations for bone health. He will follow up in 1 month for repeat clinical evaluation.

## 2024-01-10 NOTE — ED CLERICAL - DIVISION
----- Message from Mayi Patel CMA sent at 1/10/2024  9:13 AM CST -----  Regarding: FW: Hello   Contact: [phone]    ----- Message -----  From: Alba Ramirez  Sent: 1/10/2024   8:45 AM CST  To: #  Subject: Hello                                            Yes     [facility]

## 2024-01-12 ENCOUNTER — APPOINTMENT (OUTPATIENT)
Dept: NEUROLOGY | Facility: CLINIC | Age: 64
End: 2024-01-12
Payer: MEDICARE

## 2024-01-12 VITALS
WEIGHT: 178 LBS | DIASTOLIC BLOOD PRESSURE: 98 MMHG | HEIGHT: 74 IN | BODY MASS INDEX: 22.84 KG/M2 | SYSTOLIC BLOOD PRESSURE: 160 MMHG

## 2024-01-12 DIAGNOSIS — S06.340A: ICD-10-CM

## 2024-01-12 PROCEDURE — 99214 OFFICE O/P EST MOD 30 MIN: CPT

## 2024-01-12 NOTE — ASSESSMENT
[FreeTextEntry1] : This is a 63-year-old man with history of traumatic intracerebral hemorrhage.  From this point of view he is doing quite well.  He does have some depression which I think is causing some cognitive issues.  I will send him for neuropsychology for evaluation and cognitive therapy as indicated.  I will see him back in the office in 4 months, sooner should the need arise.

## 2024-01-12 NOTE — CONSULT LETTER
[Dear  ___] : Dear  [unfilled], [Courtesy Letter:] : I had the pleasure of seeing your patient, [unfilled], in my office today. [Please see my note below.] : Please see my note below. [Consult Closing:] : Thank you very much for allowing me to participate in the care of this patient.  If you have any questions, please do not hesitate to contact me. [Sincerely,] : Sincerely, [FreeTextEntry3] : Clark Hernández M.D., Ph.D. DPN-N [facility] [place] Director, Comprehensive Stroke Center [facility]

## 2024-01-12 NOTE — DATA REVIEWED
[de-identified] : Head CT was done October 14, 2023.  It showed resolution of hemorrhage.  There were no acute pathology seen.  There was atrophy and small vessel changes.
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
## 2024-01-12 NOTE — REVIEW OF SYSTEMS
[Depression] : depression [As Noted in HPI] : as noted in HPI [Memory Lapses or Loss] : memory loss [Negative] : Heme/Lymph

## 2024-01-12 NOTE — PHYSICAL EXAM
[Person] : oriented to person [Place] : oriented to place [Time] : oriented to time [Short Term Intact] : short term memory intact [Remote Intact] : remote memory intact [Registration Intact] : recent registration memory intact [Span Intact] : the attention span was normal [Concentration Intact] : normal concentrating ability [Visual Intact] : visual attention was ~T not ~L decreased [Naming Objects] : no difficulty naming common objects [Repeating Phrases] : no difficulty repeating a phrase [Fluency] : fluency intact [Comprehension] : comprehension intact [Current Events] : adequate knowledge of current events [Past History] : adequate knowledge of personal past history [Cranial Nerves Optic (II)] : visual acuity intact bilaterally,  visual fields full to confrontation, pupils equal round and reactive to light [Cranial Nerves Oculomotor (III)] : extraocular motion intact [Cranial Nerves Trigeminal (V)] : facial sensation intact symmetrically [Cranial Nerves Facial (VII)] : face symmetrical [Cranial Nerves Vestibulocochlear (VIII)] : hearing was intact bilaterally [Cranial Nerves Glossopharyngeal (IX)] : tongue and palate midline [Cranial Nerves Accessory (XI - Cranial And Spinal)] : head turning and shoulder shrug symmetric [Cranial Nerves Hypoglossal (XII)] : there was no tongue deviation with protrusion [Motor Tone] : muscle tone was normal in all four extremities [Motor Strength] : muscle strength was normal in all four extremities [Involuntary Movements] : no involuntary movements were seen [No Muscle Atrophy] : normal bulk in all four extremities [Paresis Pronator Drift Right-Sided] : no pronator drift on the right [Paresis Pronator Drift Left-Sided] : no pronator drift on the left [Motor Strength Upper Extremities Bilaterally] : strength was normal in both upper extremities [Motor Strength Lower Extremities Bilaterally] : strength was normal in both lower extremities [Sensation Tactile Decrease] : light touch was intact [Sensation Pain / Temperature Decrease] : pain and temperature was intact [Sensation Vibration Decrease] : vibration was intact [Proprioception] : proprioception was intact [Abnormal Walk] : normal gait [Balance] : balance was intact [Tremor] : no tremor present [Coordination - Dysmetria Impaired Finger-to-Nose Bilateral] : not present [2+] : Patella left 2+ [FreeTextEntry4] : 3 out of 3 recall, the repetitiveness/perseveration seen last visit is now resolved [Sclera] : the sclera and conjunctiva were normal [PERRL With Normal Accommodation] : pupils were equal in size, round, reactive to light, with normal accommodation [Extraocular Movements] : extraocular movements were intact [No APD] : no afferent pupillary defect [No SHAE] : no internuclear ophthalmoplegia [Full Visual Field] : full visual field

## 2024-01-16 ENCOUNTER — NON-APPOINTMENT (OUTPATIENT)
Age: 64
End: 2024-01-16

## 2024-01-18 ENCOUNTER — NON-APPOINTMENT (OUTPATIENT)
Age: 64
End: 2024-01-18

## 2024-01-22 NOTE — H&P PST ADULT - GUM GEN PE MLT EXAM PC
Pt came in from the urgent care for slurred speech since 9am and headache for the past two days. Patient has a HX of a CVA in November 2023 and a aneurism October 2023.    Level 2 stroke alert called at [phone]  
patient refused

## 2024-02-09 ENCOUNTER — APPOINTMENT (OUTPATIENT)
Dept: ORTHOPEDIC SURGERY | Facility: CLINIC | Age: 64
End: 2024-02-09
Payer: MEDICARE

## 2024-02-09 VITALS
BODY MASS INDEX: 22.84 KG/M2 | HEART RATE: 62 BPM | HEIGHT: 74 IN | SYSTOLIC BLOOD PRESSURE: 163 MMHG | WEIGHT: 178 LBS | DIASTOLIC BLOOD PRESSURE: 84 MMHG

## 2024-02-09 DIAGNOSIS — Z98.890 OTHER SPECIFIED POSTPROCEDURAL STATES: ICD-10-CM

## 2024-02-09 DIAGNOSIS — S32.010A WEDGE COMPRESSION FRACTURE OF FIRST LUMBAR VERTEBRA, INITIAL ENCOUNTER FOR CLOSED FRACTURE: ICD-10-CM

## 2024-02-09 PROCEDURE — 72100 X-RAY EXAM L-S SPINE 2/3 VWS: CPT

## 2024-02-09 PROCEDURE — 99024 POSTOP FOLLOW-UP VISIT: CPT

## 2024-02-09 NOTE — HISTORY OF PRESENT ILLNESS
[___ Weeks Post Op] : [unfilled] weeks post op [Xray (Date:___)] : [unfilled] Xray -  [de-identified] : :L1 kyphoplasty with bone biopsy cavity creation via biplanar fluoroscopy.DOS 11/22/2023   [de-identified] : She has virtually no back pain any longer.  His legs are not weak.  He is feeling very happy with the outcome of his surgery.  No additional complaints.  Denies fever, decreased appetite, chills etc.  No change in bowel or bladder. [de-identified] : Constitutional: He is nontoxic in appearance, he is ambulating without assistive device, he is accompanied by his wife. 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   Musculoskeletal: No focal motor deficit.  Full range of motion is well-preserved of the lower back. Vascular: There is negative of Homans' sign bilaterally. [de-identified] : lumbar xray AP/LATERAL done today  in the office 2/9/24 demonstrated s/p L1 kyphoplasty with appropriate fill. No changes from prior imaging.   [de-identified] : Status post L1 kyphoplasty for osteoporotic, pathologic compression fracture doing well. [de-identified] : Continue osteoporosis management through primary care provider who is giving vitamin D.  Weightbearing exercise.  Bone density and treatment accordingly through primary care provider.  Follow-up as needed.

## 2024-02-12 NOTE — PATIENT PROFILE ADULT - DO YOU LACK THE NECESSARY SUPPORT TO HELP YOU COPE WITH LIFE CHALLENGES?
Pt declined to reschedule her canceled appt from 12/30/23 due to INS reasons. Pt states that she will call back when ready.   no

## 2024-03-09 ENCOUNTER — NON-APPOINTMENT (OUTPATIENT)
Age: 64
End: 2024-03-09

## 2024-03-12 NOTE — ED ADULT TRIAGE NOTE - RESPIRATORY RATE (BREATHS/MIN)
Summarized Lab Results: Labs received and reviewed.  Labs appropriate for biologic initiation.  RX sent to [facility] Detail Level: Zone 22
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
## 2024-03-22 ENCOUNTER — RX RENEWAL (OUTPATIENT)
Age: 64
End: 2024-03-22

## 2024-03-22 ENCOUNTER — TRANSCRIPTION ENCOUNTER (OUTPATIENT)
Age: 64
End: 2024-03-22

## 2024-03-30 ENCOUNTER — NON-APPOINTMENT (OUTPATIENT)
Age: 64
End: 2024-03-30

## 2024-04-18 NOTE — ED ADULT TRIAGE NOTE - HEART RATE (BEATS/MIN)
[facility] EMERGENCY DEPARTMENT  EMERGENCY DEPARTMENT ENCOUNTER        Pt Name: Narcisa Milligan  MRN: [id number]  Birthdate 1952  Date of evaluation: 4/18/2024  Provider: Mike Santo DO  PCP: Luz Maria Pereira MD  Note Started: 2:48 PM EDT 4/18/24    CHIEF COMPLAINT       Chief Complaint   Patient presents with    Groin Pain     Pt states she has a pain in her groin that started approx 2 weeks ago. States t feels like it is radiating up to stomach.       HISTORY OF PRESENT ILLNESS: 1 or more Elements   Narcisa Milligan is a 71 y.o. female who presents to the emergency department with chief complaint of groin pain.  Patient states that she has been having left groin pain consistently for the past 2 weeks.  She states that it is worse when she moves her legs or when she is sitting up.  She states that it is sharp and mostly in the left inguinal region but does occasionally radiate into the stomach.  Patient states that otherwise she has not had any other significant symptoms and denies any fever, chills, nausea, vomiting, chest pain, shortness of breath, hematuria or dysuria, constipation or diarrhea.  Nothing makes the symptoms any better otherwise.    Nursing Notes were all reviewed and agreed with or any disagreements were addressed in the HPI.    REVIEW OF SYSTEMS :      Positives and Pertinent negatives as per HPI.     PAST MEDICAL HISTORY/Chronic Conditions Affecting Care      has no past medical history on file.     SURGICAL HISTORY   No past surgical history on file.    CURRENTMEDICATIONS       Discharge Medication List as of 4/18/2024  7:34 PM          ALLERGIES     Patient has no known allergies.    FAMILYHISTORY     No family history on file.     SOCIAL HISTORY       Social History     Tobacco Use    Smoking status: Every Day     Current packs/day: 1.00     Average packs/day: 1 pack/day for 50.0 years (50.0 ttl pk-yrs)     Types: Cigarettes    Smokeless tobacco: Never    
129

## 2024-04-24 ENCOUNTER — NON-APPOINTMENT (OUTPATIENT)
Age: 64
End: 2024-04-24

## 2024-05-12 ENCOUNTER — NON-APPOINTMENT (OUTPATIENT)
Age: 64
End: 2024-05-12

## 2024-05-12 RX ORDER — LOSARTAN POTASSIUM 25 MG/1
25 TABLET, FILM COATED ORAL
Qty: 90 | Refills: 1 | Status: ACTIVE | COMMUNITY
Start: 2024-05-12

## 2024-05-17 ENCOUNTER — APPOINTMENT (OUTPATIENT)
Dept: NEUROLOGY | Facility: CLINIC | Age: 64
End: 2024-05-17
Payer: MEDICARE

## 2024-05-17 VITALS
WEIGHT: 178 LBS | BODY MASS INDEX: 22.84 KG/M2 | SYSTOLIC BLOOD PRESSURE: 140 MMHG | DIASTOLIC BLOOD PRESSURE: 80 MMHG | HEIGHT: 74 IN

## 2024-05-17 DIAGNOSIS — T45.1X5A DRUG-INDUCED POLYNEUROPATHY: ICD-10-CM

## 2024-05-17 DIAGNOSIS — R41.3 OTHER AMNESIA: ICD-10-CM

## 2024-05-17 DIAGNOSIS — G62.0 DRUG-INDUCED POLYNEUROPATHY: ICD-10-CM

## 2024-05-17 PROCEDURE — 99213 OFFICE O/P EST LOW 20 MIN: CPT

## 2024-05-17 PROCEDURE — G2211 COMPLEX E/M VISIT ADD ON: CPT

## 2024-05-17 RX ORDER — CAPECITABINE 500 MG/1
TABLET, FILM COATED ORAL
Refills: 0 | Status: ACTIVE | COMMUNITY

## 2024-05-17 NOTE — CONSULT LETTER
[Dear  ___] : Dear  [unfilled], [Courtesy Letter:] : I had the pleasure of seeing your patient, [unfilled], in my office today. [Please see my note below.] : Please see my note below. [Consult Closing:] : Thank you very much for allowing me to participate in the care of this patient.  If you have any questions, please do not hesitate to contact me. [Sincerely,] : Sincerely, [FreeTextEntry3] : Clark Hernández M.D., Ph.D. DPN-N [facility] [place] Director, Division of Neurology Director, Comprehensive Stroke Center [facility]

## 2024-05-17 NOTE — ASSESSMENT
[FreeTextEntry1] : This is a 63-year-old man with history of intracerebral hemorrhage recovering well.  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  His wife does not wish for him to prescribe more medication for it.  He is no longer taking chemotherapy infusions he is now on Xeloda, which should not exacerbate the neuropathy.  I did suggest alpha lipoic acid to try to see if it helps.  It has been shown to help in diabetic neuropathy perhaps it may help in his chemotherapy-induced neuropathy as well.  I will see him back in a for continued care of neuropathy in 6 months, sooner should the need arise.

## 2024-05-17 NOTE — HISTORY OF PRESENT ILLNESS
[FreeTextEntry1] : Initial office visit October 6, 2023: This is a 63-year-old man who presents today with intracerebral hemorrhage and cognitive issues.  At the end of August he fell at home and was admitted to the hospital and while in the hospital try to get out of bed at night and fell again.  Subsequent to the second fall he had imaging which revealed small bleeding in the right corona radiata.  Subsequent MRI showed the right corona radiata bleed as well as punctate area of ischemia.  He is currently forgetful and has a mild lapses in judgment according to his family.  The patient himself has little insight into it.  During his exam he was repetitive and perseverative at times as well.  After the hospital he was discharged with aspirin and Plavix.  He experienced rectal bleeding, he has a history of rectal cancer, and the Plavix was discontinued.  He remains on aspirin alone.  He is here today for neurologic evaluation.  Follow-up January 12, 2024: This is a 63-year-old man who presents today with history of intracerebral hemorrhage and subsequent cognitive issues.  In August he fell and had a traumatic hemorrhage, since his last visit follow-up head CT showed resolution of the blood.  Due to the cognitive issues were concerned about him driving.  He had an on road evaluation in past and is currently driving safely.  He does have some short-term memory issues and is developed a depression which is exacerbated this.  He is here today for neurologic follow-up.  Follow-up May 17, 2024: This is a 63-year-old man who presents today with a history of intracerebral hemorrhage as well as cognitive issues.  He also has neuropathy secondary to chemotherapy.  Overall he is doing well from a cognitive point of view.  He is back to driving normally.  He does have issues with his neuropathy.  He has not tolerated gabapentin in the past.  He is wife helps with over-the-counter aids for the neuropathy.  He is here today for neurologic follow-up.

## 2024-05-17 NOTE — PHYSICAL EXAM
[Person] : oriented to person [Place] : oriented to place [Time] : oriented to time [Remote Intact] : remote memory intact [Registration Intact] : recent registration memory intact [Span Intact] : the attention span was normal [Concentration Intact] : normal concentrating ability [Visual Intact] : visual attention was ~T not ~L decreased [Naming Objects] : no difficulty naming common objects [Repeating Phrases] : no difficulty repeating a phrase [Fluency] : fluency intact [Comprehension] : comprehension intact [Current Events] : adequate knowledge of current events [Past History] : adequate knowledge of personal past history [Cranial Nerves Optic (II)] : visual acuity intact bilaterally,  visual fields full to confrontation, pupils equal round and reactive to light [Cranial Nerves Oculomotor (III)] : extraocular motion intact [Cranial Nerves Trigeminal (V)] : facial sensation intact symmetrically [Cranial Nerves Facial (VII)] : face symmetrical [Cranial Nerves Vestibulocochlear (VIII)] : hearing was intact bilaterally [Cranial Nerves Glossopharyngeal (IX)] : tongue and palate midline [Cranial Nerves Accessory (XI - Cranial And Spinal)] : head turning and shoulder shrug symmetric [Cranial Nerves Hypoglossal (XII)] : there was no tongue deviation with protrusion [Motor Tone] : muscle tone was normal in all four extremities [Motor Strength] : muscle strength was normal in all four extremities [Involuntary Movements] : no involuntary movements were seen [No Muscle Atrophy] : normal bulk in all four extremities [Paresis Pronator Drift Right-Sided] : no pronator drift on the right [Paresis Pronator Drift Left-Sided] : no pronator drift on the left [Motor Strength Upper Extremities Bilaterally] : strength was normal in both upper extremities [Motor Strength Lower Extremities Bilaterally] : strength was normal in both lower extremities [Sensation Tactile Decrease] : light touch was intact [Sensation Pain / Temperature Decrease] : pain and temperature was intact [Sensation Vibration Decrease] : vibration was intact [Proprioception] : proprioception was intact [Abnormal Walk] : normal gait [Balance] : balance was intact [Tremor] : no tremor present [Coordination - Dysmetria Impaired Finger-to-Nose Bilateral] : not present [2+] : Patella left 2+ [FreeTextEntry7] : Subjective paresthesia in feet [Sclera] : the sclera and conjunctiva were normal [PERRL With Normal Accommodation] : pupils were equal in size, round, reactive to light, with normal accommodation [Extraocular Movements] : extraocular movements were intact [No APD] : no afferent pupillary defect [No SHAE] : no internuclear ophthalmoplegia [Full Visual Field] : full visual field

## 2024-06-03 NOTE — ED PROVIDER NOTE - ADMIT DISPOSITION PRESENT ON ADMISSION SEPSIS Q1 - RE-EVALUATED PATIENT FLUID AND VITAL SIGNS
upright (45 degrees) I have re-evaluated the patient's fluid status and reviewed vital signs. Clinical perfusion assessment was performed.

## 2024-06-05 NOTE — H&P PST ADULT - GENITOURINARY
ASSESSMENT      Are you having any side effects from your treatment?  - No    Are you eating and drinking properly?  -Yes    Are you having any pain?  -No    Have needs changed for a palliative care referral?  -Already established     Do you know when your upcoming appointments are?  -Yes    Do you have a good support system?  -  Yes    Are you interested in any support groups?  -   Not at this time     Are there any changes in barriers to care?  -  No     Do you have any questions or concerns regarding your treatment plan?  -Not at this time. Papo was very appreciative of my call.    negative

## 2024-06-14 RX ORDER — ALPRAZOLAM 0.25 MG/1
0.25 TABLET ORAL
Qty: 30 | Refills: 0 | Status: ACTIVE | COMMUNITY
Start: 2018-04-25 | End: 1900-01-01

## 2024-06-23 ENCOUNTER — NON-APPOINTMENT (OUTPATIENT)
Age: 64
End: 2024-06-23

## 2024-06-29 ENCOUNTER — NON-APPOINTMENT (OUTPATIENT)
Age: 64
End: 2024-06-29

## 2024-07-26 ENCOUNTER — NON-APPOINTMENT (OUTPATIENT)
Age: 64
End: 2024-07-26

## 2024-08-13 ENCOUNTER — APPOINTMENT (OUTPATIENT)
Dept: INTERNAL MEDICINE | Facility: CLINIC | Age: 64
End: 2024-08-13
Payer: MEDICARE

## 2024-08-13 VITALS
HEART RATE: 72 BPM | DIASTOLIC BLOOD PRESSURE: 62 MMHG | BODY MASS INDEX: 24 KG/M2 | SYSTOLIC BLOOD PRESSURE: 114 MMHG | WEIGHT: 187 LBS | OXYGEN SATURATION: 99 % | RESPIRATION RATE: 14 BRPM | HEIGHT: 74 IN

## 2024-08-13 DIAGNOSIS — Z23 ENCOUNTER FOR IMMUNIZATION: ICD-10-CM

## 2024-08-13 DIAGNOSIS — I10 ESSENTIAL (PRIMARY) HYPERTENSION: ICD-10-CM

## 2024-08-13 DIAGNOSIS — Z00.00 ENCOUNTER FOR GENERAL ADULT MEDICAL EXAMINATION W/OUT ABNORMAL FINDINGS: ICD-10-CM

## 2024-08-13 DIAGNOSIS — I25.10 ATHEROSCLEROTIC HEART DISEASE OF NATIVE CORONARY ARTERY W/OUT ANGINA PECTORIS: ICD-10-CM

## 2024-08-13 DIAGNOSIS — Z79.899 OTHER LONG TERM (CURRENT) DRUG THERAPY: ICD-10-CM

## 2024-08-13 DIAGNOSIS — C20 MALIGNANT NEOPLASM OF RECTUM: ICD-10-CM

## 2024-08-13 DIAGNOSIS — E78.5 HYPERLIPIDEMIA, UNSPECIFIED: ICD-10-CM

## 2024-08-13 DIAGNOSIS — I47.10 SUPRAVENTRICULAR TACHYCARDIA, UNSPECIFIED: ICD-10-CM

## 2024-08-13 DIAGNOSIS — K65.1 PERITONEAL ABSCESS: ICD-10-CM

## 2024-08-13 PROCEDURE — G0402 INITIAL PREVENTIVE EXAM: CPT

## 2024-08-13 PROCEDURE — 90471 IMMUNIZATION ADMIN: CPT

## 2024-08-13 PROCEDURE — 90677 PCV20 VACCINE IM: CPT

## 2024-08-13 NOTE — HEALTH RISK ASSESSMENT
[Yes] : Yes [4 or more  times a week (4 pts)] : 4 or more  times a week (4 points) [1 or 2 (0 pts)] : 1 or 2 (0 points) [Never (0 pts)] : Never (0 points) [No] : In the past 12 months have you used drugs other than those required for medical reasons? No [No falls in past year] : Patient reported no falls in the past year [0] : 2) Feeling down, depressed, or hopeless: Not at all (0) [Never] : Never [None] : None [With Significant Other] : lives with significant other [Employed] : employed [Smoke Detector] : smoke detector [Carbon Monoxide Detector] : carbon monoxide detector [Seat Belt] :  uses seat belt [Sunscreen] : uses sunscreen [Reviewed no changes] : Reviewed, no changes [Designated Healthcare Proxy] : Designated healthcare proxy [Name: ___] : Health Care Proxy's Name: [unfilled]  [Fair] :  ~his/her~ mood as fair [PHQ-2 Negative - No further assessment needed] : PHQ-2 Negative - No further assessment needed [NO] : No [Audit-CScore] : 4 [de-identified] : walk [de-identified] : good [[id number]] : 0 [Change in mental status noted] : No change in mental status noted [Reports changes in hearing] : Reports no changes in hearing [Reports changes in vision] : Reports no changes in vision [Reports changes in dental health] : Reports no changes in dental health [AdvancecareDate] : 08/24

## 2024-08-13 NOTE — HISTORY OF PRESENT ILLNESS
[FreeTextEntry1] : 63-year-old male with history of coronary artery disease presents for his yearly physical.  Patient's history includes having had a circumflex stent in 2005.  Cardiac catheterization late February 2017 showed severe stenosis of the LAD with stenting.  History also includes hypertension and hyperlipidemia.  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 He has completed chemotherapy and just completed radiation therapy. He follows with oncology and radiation oncology often. He ultimately had surgery and now has an ostomy. He continues with local disease including pelvic abscess for which she continues to be treated with chemo by [facility] with most recent note stating that his perineal drainage has stopped. Imaging had shown his sacral mass fistulized to his bladder  He was hospitalized October 2023 with intracranial hemorrhage and cognitive issues after a fall.  Follow-up with neurology showed resolution of blood and resolution of any cognitive issues. He also had a traumatic fracture of L1 with significant pain and had kyphoplasty November 2023 with improvement.  He was seen in January 2021 for preop evaluation prior to a sigmoidoscopy and was found to have JAS creatinine increased to 2.2. The patient was seen by nephrology and felt JAS secondary to high ostomy output therefore ARB was discontinued with increased fluids and sodium bicarbonate. They have been gradual improvement in his creatinine with the most recent on April 16 at 1.16. Metoprolol was increased to help blood pressure.  Patient also has significant carotid disease with a known totally occluded left carotid with CTA done 201 showing greater than 90% stenosis of the right carotid artery.He had a right carotid endarterectomy October 2019  History also includes family history of prostate cancer in his father.  Patient is generally feeling well and currently without complaints including no chest pain, palpitations, shortness of breath or edema. Continues with paresthesias of his feet

## 2024-08-13 NOTE — HEALTH RISK ASSESSMENT
[Yes] : Yes [4 or more  times a week (4 pts)] : 4 or more  times a week (4 points) [1 or 2 (0 pts)] : 1 or 2 (0 points) [Never (0 pts)] : Never (0 points) [No] : In the past 12 months have you used drugs other than those required for medical reasons? No [No falls in past year] : Patient reported no falls in the past year [0] : 2) Feeling down, depressed, or hopeless: Not at all (0) [Never] : Never [None] : None [With Significant Other] : lives with significant other [Employed] : employed [Smoke Detector] : smoke detector [Carbon Monoxide Detector] : carbon monoxide detector [Seat Belt] :  uses seat belt [Sunscreen] : uses sunscreen [Reviewed no changes] : Reviewed, no changes [Designated Healthcare Proxy] : Designated healthcare proxy [Name: ___] : Health Care Proxy's Name: [unfilled]  [Fair] :  ~his/her~ mood as fair [PHQ-2 Negative - No further assessment needed] : PHQ-2 Negative - No further assessment needed [NO] : No [Audit-CScore] : 4 [de-identified] : walk [de-identified] : good [[id number]] : 0 [Change in mental status noted] : No change in mental status noted [Reports changes in hearing] : Reports no changes in hearing [Reports changes in vision] : Reports no changes in vision [Reports changes in dental health] : Reports no changes in dental health [AdvancecareDate] : 08/24

## 2024-08-13 NOTE — ASSESSMENT
[FreeTextEntry1] : 63-year-old male with CAD with stenting to circumflex in 2005 and LAD February 2017.  Patient also with significant carotid disease with occluded left carotid artery and status post right carotid endarterectomy October 2019. Hypertension controlled on present medications. Hyperlipidemia on simvastatin   Significant history with diagnosis of rectal adenocarcinoma February 2019 having completed radiation and chemotherapy..Status post colon surgery with ostomy. 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  October 2023 with traumatic brain injury with intracranial hemorrhage after a fall from which he has recovered quite well.  Also compression fracture of L1 status post kyphoplasty with improvement.  Totally occluded left carotid artery which has been known with greater than 90% stenosis of the right carotid artery.therefore status post right carotid endarterectomy October 2019.  Influenza and vaccines already received Prevnar 20 given left deltoid  No blood work done with patient getting blood work done frequently at [facility]  Follow-up yearly and as needed

## 2024-08-13 NOTE — ASSESSMENT
[FreeTextEntry1] : 63-year-old male with CAD with stenting to circumflex in 2005 and LAD February 2017.  Patient also with significant carotid disease with occluded left carotid artery and status post right carotid endarterectomy October 2019. Hypertension controlled on present medications. Hyperlipidemia on simvastatin   Significant history with diagnosis of rectal adenocarcinoma February 2019 having completed radiation and chemotherapy..Status post colon surgery with ostomy. 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  October 2023 with traumatic brain injury with intracranial hemorrhage after a fall from which he has recovered quite well.  Also compression fracture of L1 status post kyphoplasty with improvement.  Totally occluded left carotid artery which has been known with greater than 90% stenosis of the right carotid artery.therefore status post right carotid endarterectomy October 2019.  Influenza and vaccines already received Prevnar 20 given left deltoid  No blood work done with [place] [facility] [place]up yearly and as needed

## 2024-09-06 NOTE — HISTORY OF PRESENT ILLNESS
Opt out
[de-identified] : This is a 58-year-old man with history of colorectal carcinoma. The patient's history dates back approximately 6 months ago when he noted the onset of intermittent bleeding which he attributed to the presence of hemorrhoids which was seen on his last colonoscopy 8 years ago. Over the past month he noted increased rectal bleeding and subsequently was referred for GI evaluation. The colonoscopy was performed which revealed that the mass was 6-7 cm from anal verge on rigid sigmoidoscopy. The biopsy was consistent with moderately differentiated adenocarcinoma. Endoscopic ultrasound confirmed a 2.7 x 1.8 cm mass consistent with T2 N0. MRI of the pelvis on March 1 revealed a 2.4 x 1.8 cm mass measuring 5 cm above the anal verge. There was deep muscular propria extension with tumor abutting the mesorectal fat. The lesion was considered a deep T2 stage lesion A 4 mm right perirectal lymph node was seen but not felt to be enlarged. \par \par \par CAT scan of the chest revealed small pleural-based calcifications in the right apex along with coronary artery stents. There was no evidence of metastases. CAT scan of the abdomen and pelvis revealed mild hazy attenuation in the central mesentery consistent with mesenteric panniculitis. A small fat-containing umbilical hernia was seen along with a small splenic artery aneurysm. Small bilateral external iliac lymph nodes were noted. The patient underwent surgical evaluation on March 6 and it was felt that the lesion was not amenable to a transanal excision since it is a deep T2 lesion. Because of its location there is concern regarding the possible need for colostomy if surgery is performed at this time. As a result neoadjuvant chemotherapy was recommended including six-month course initially and then the patient will be evaluated for surgery or possible watch and wait surveillance. The patient denies previous GI symptoms colitis or disease. His family history is positive for his father with prostate cancer and no other cancer history.

## 2024-09-16 ENCOUNTER — APPOINTMENT (OUTPATIENT)
Dept: DERMATOLOGY | Facility: CLINIC | Age: 64
End: 2024-09-16
Payer: MEDICARE

## 2024-09-16 ENCOUNTER — RESULT REVIEW (OUTPATIENT)
Age: 64
End: 2024-09-16

## 2024-09-16 PROCEDURE — 11104 PUNCH BX SKIN SINGLE LESION: CPT

## 2024-09-16 PROCEDURE — 99203 OFFICE O/P NEW LOW 30 MIN: CPT | Mod: 25

## 2024-09-16 PROCEDURE — 17000 DESTRUCT PREMALG LESION: CPT | Mod: 59

## 2024-11-22 NOTE — H&P PST ADULT - GENERAL
Please advise   gabapentin (NEURONTIN) 100 MG capsule 60 capsule 0 11/19/2024 --    Sig - Route: Take 1 capsule by mouth in the morning and 1 capsule in the evening. - Oral    Sent to pharmacy as: Gabapentin 100 MG Oral Capsule (NEURONTIN)    Class: Eprescribe       negative

## 2024-11-27 ENCOUNTER — APPOINTMENT (OUTPATIENT)
Dept: NEUROLOGY | Facility: CLINIC | Age: 64
End: 2024-11-27

## 2025-01-07 NOTE — DIETITIAN INITIAL EVALUATION ADULT - LOSS OF SUBCUTANEOUS FAT
-Rocephin 1GM IM  -Decadron 4mg IM  -Z-pack complete until the antibiotics are all gone, even if you start to feel better.   -Flonase Daily     Orbital.../Triceps.../Fat overlying ribcage...

## 2025-01-14 ENCOUNTER — RX RENEWAL (OUTPATIENT)
Age: 65
End: 2025-01-14

## 2025-01-31 ENCOUNTER — NON-APPOINTMENT (OUTPATIENT)
Age: 65
End: 2025-01-31

## 2025-02-13 ENCOUNTER — NON-APPOINTMENT (OUTPATIENT)
Age: 65
End: 2025-02-13

## 2025-02-14 ENCOUNTER — NON-APPOINTMENT (OUTPATIENT)
Age: 65
End: 2025-02-14

## 2025-02-14 NOTE — DISCHARGE NOTE PROVIDER - ATTENDING ATTESTATION STATEMENT
Copied from CRM #[id number]. Topic: MW Medication/Rx - MW Rx Refill  >> Feb 14, 2025 10:37 AM Dejah OSORIO wrote:  FREDRICK called to request a medication refill and currently has medication during working hrs.  Medication is:    Listed on Med Management list. Pended medication. Selected 'Wrap Up CRM' and created new Refill Med Encounter after clicking 'Convert to Clinical Call'. Sent Med template and routed as routine priority per Care Site Dept KB page for Med Refill Working Hrs support to appropriate clinical pool. Readback full message.-- DO NOT REPLY / DO NOT REPLY ALL --  -- This inbox is not monitored. If this was sent to the wrong provider or department, reroute message to P ECO Reroute pool. --  -- Message is from Engagement Center Operations (ECO) --      Message Type:  Refill Medication   Refill request for Pended medication named: spironolactone (ALDACTONE) 25 MG tablet   Preferred pharmacy verified, and selected.   [facility]. [address]    Is the patient OUT of Medication?  No Medication Refills handled by Care Site - route as routine priority to care site pool on KB. Patient has been advised it will be addressed within 2-3 business days.     Message: Fax number: [phone] - please fax over the prescription for the patient to get her next refill. She has about 2 weeks worth remaining. Please advise                   
Medication: spironolactone (aldactone) 25 mg daily passed protocol.   Last office visit date: 2/2/24  Next appointment scheduled?: Yes   Number of refills given: 3    
I have personally seen and examined the patient. I have collaborated with and supervised the

## 2025-02-21 ENCOUNTER — APPOINTMENT (OUTPATIENT)
Dept: ORTHOPEDIC SURGERY | Facility: CLINIC | Age: 65
End: 2025-02-21
Payer: OTHER MISCELLANEOUS

## 2025-02-21 VITALS
DIASTOLIC BLOOD PRESSURE: 76 MMHG | HEART RATE: 68 BPM | WEIGHT: 185 LBS | BODY MASS INDEX: 23.74 KG/M2 | HEIGHT: 74 IN | SYSTOLIC BLOOD PRESSURE: 114 MMHG

## 2025-02-21 DIAGNOSIS — M47.816 SPONDYLOSIS W/OUT MYELOPATHY OR RADICULOPATHY, LUMBAR REGION: ICD-10-CM

## 2025-02-21 PROCEDURE — 99204 OFFICE O/P NEW MOD 45 MIN: CPT | Mod: 25

## 2025-02-21 PROCEDURE — 20552 NJX 1/MLT TRIGGER POINT 1/2: CPT

## 2025-02-21 RX ORDER — METHYLPREDNISOLONE 4 MG/1
4 TABLET ORAL
Qty: 1 | Refills: 0 | Status: ACTIVE | COMMUNITY
Start: 2025-02-21 | End: 1900-01-01

## 2025-02-21 RX ORDER — MELOXICAM 15 MG/1
15 TABLET ORAL DAILY
Qty: 30 | Refills: 1 | Status: ACTIVE | COMMUNITY
Start: 2025-02-21 | End: 1900-01-01

## 2025-03-02 ENCOUNTER — EMERGENCY (EMERGENCY)
Facility: HOSPITAL | Age: 65
LOS: 1 days | Discharge: HOME | End: 2025-03-02
Attending: EMERGENCY MEDICINE
Payer: MEDICARE

## 2025-03-02 VITALS
DIASTOLIC BLOOD PRESSURE: 59 MMHG | SYSTOLIC BLOOD PRESSURE: 159 MMHG | WEIGHT: 184.75 LBS | HEART RATE: 95 BPM | OXYGEN SATURATION: 99 % | TEMPERATURE: 98 F | RESPIRATION RATE: 18 BRPM

## 2025-03-02 DIAGNOSIS — Z98.890 OTHER SPECIFIED POSTPROCEDURAL STATES: Chronic | ICD-10-CM

## 2025-03-02 LAB
ALBUMIN SERPL ELPH-MCNC: 3 G/DL — LOW (ref 3.3–5.2)
ALP SERPL-CCNC: 86 U/L — SIGNIFICANT CHANGE UP (ref 40–120)
ALT FLD-CCNC: 17 U/L — SIGNIFICANT CHANGE UP
ANION GAP SERPL CALC-SCNC: 12 MMOL/L — SIGNIFICANT CHANGE UP (ref 5–17)
APTT BLD: 26.4 SEC — SIGNIFICANT CHANGE UP (ref 24.5–35.6)
AST SERPL-CCNC: 28 U/L — SIGNIFICANT CHANGE UP
BASOPHILS # BLD AUTO: 0.02 K/UL — SIGNIFICANT CHANGE UP (ref 0–0.2)
BASOPHILS NFR BLD AUTO: 0.2 % — SIGNIFICANT CHANGE UP (ref 0–2)
BILIRUB SERPL-MCNC: 1.2 MG/DL — SIGNIFICANT CHANGE UP (ref 0.4–2)
BUN SERPL-MCNC: 32 MG/DL — HIGH (ref 8–20)
CALCIUM SERPL-MCNC: 8.4 MG/DL — SIGNIFICANT CHANGE UP (ref 8.4–10.5)
CHLORIDE SERPL-SCNC: 100 MMOL/L — SIGNIFICANT CHANGE UP (ref 96–108)
CO2 SERPL-SCNC: 23 MMOL/L — SIGNIFICANT CHANGE UP (ref 22–29)
CREAT SERPL-MCNC: 1.4 MG/DL — HIGH (ref 0.5–1.3)
EGFR: 55 ML/MIN/1.73M2 — LOW
EOSINOPHIL # BLD AUTO: 0.06 K/UL — SIGNIFICANT CHANGE UP (ref 0–0.5)
EOSINOPHIL NFR BLD AUTO: 0.5 % — SIGNIFICANT CHANGE UP (ref 0–6)
GLUCOSE SERPL-MCNC: 107 MG/DL — HIGH (ref 70–99)
HCT VFR BLD CALC: 29.9 % — LOW (ref 39–50)
HGB BLD-MCNC: 9.6 G/DL — LOW (ref 13–17)
IMM GRANULOCYTES # BLD AUTO: 0.08 K/UL — HIGH (ref 0–0.07)
IMM GRANULOCYTES NFR BLD AUTO: 0.7 % — SIGNIFICANT CHANGE UP (ref 0–0.9)
INR BLD: 1.04 RATIO — SIGNIFICANT CHANGE UP (ref 0.85–1.16)
LYMPHOCYTES # BLD AUTO: 0.96 K/UL — LOW (ref 1–3.3)
LYMPHOCYTES NFR BLD AUTO: 8.4 % — LOW (ref 13–44)
MAGNESIUM SERPL-MCNC: 1.4 MG/DL — LOW (ref 1.8–2.6)
MCHC RBC-ENTMCNC: 32.1 G/DL — SIGNIFICANT CHANGE UP (ref 32–36)
MCHC RBC-ENTMCNC: 33.1 PG — SIGNIFICANT CHANGE UP (ref 27–34)
MCV RBC AUTO: 103.1 FL — HIGH (ref 80–100)
MONOCYTES # BLD AUTO: 1.14 K/UL — HIGH (ref 0–0.9)
MONOCYTES NFR BLD AUTO: 10 % — SIGNIFICANT CHANGE UP (ref 2–14)
NEUTROPHILS # BLD AUTO: 9.14 K/UL — HIGH (ref 1.8–7.4)
NEUTROPHILS NFR BLD AUTO: 80.2 % — HIGH (ref 43–77)
NRBC # BLD AUTO: 0 K/UL — SIGNIFICANT CHANGE UP (ref 0–0)
NRBC # FLD: 0 K/UL — SIGNIFICANT CHANGE UP (ref 0–0)
NRBC BLD AUTO-RTO: 0 /100 WBCS — SIGNIFICANT CHANGE UP (ref 0–0)
PLATELET # BLD AUTO: 354 K/UL — SIGNIFICANT CHANGE UP (ref 150–400)
PMV BLD: 10.1 FL — SIGNIFICANT CHANGE UP (ref 7–13)
POTASSIUM SERPL-MCNC: 5.4 MMOL/L — HIGH (ref 3.5–5.3)
POTASSIUM SERPL-SCNC: 5.4 MMOL/L — HIGH (ref 3.5–5.3)
PROT SERPL-MCNC: 6 G/DL — LOW (ref 6.6–8.7)
PROTHROM AB SERPL-ACNC: 11.7 SEC — SIGNIFICANT CHANGE UP (ref 9.9–13.4)
RBC # BLD: 2.9 M/UL — LOW (ref 4.2–5.8)
RBC # FLD: 18 % — HIGH (ref 10.3–14.5)
SODIUM SERPL-SCNC: 135 MMOL/L — SIGNIFICANT CHANGE UP (ref 135–145)
WBC # BLD: 11.4 K/UL — HIGH (ref 3.8–10.5)
WBC # FLD AUTO: 11.4 K/UL — HIGH (ref 3.8–10.5)

## 2025-03-02 PROCEDURE — 72125 CT NECK SPINE W/O DYE: CPT | Mod: 26

## 2025-03-02 PROCEDURE — 96374 THER/PROPH/DIAG INJ IV PUSH: CPT | Mod: XU

## 2025-03-02 PROCEDURE — 99285 EMERGENCY DEPT VISIT HI MDM: CPT | Mod: 25,GC

## 2025-03-02 PROCEDURE — 80053 COMPREHEN METABOLIC PANEL: CPT

## 2025-03-02 PROCEDURE — 96375 TX/PRO/DX INJ NEW DRUG ADDON: CPT | Mod: XU

## 2025-03-02 PROCEDURE — 99284 EMERGENCY DEPT VISIT MOD MDM: CPT | Mod: 25

## 2025-03-02 PROCEDURE — 83735 ASSAY OF MAGNESIUM: CPT

## 2025-03-02 PROCEDURE — 12013 RPR F/E/E/N/L/M 2.6-5.0 CM: CPT | Mod: GC

## 2025-03-02 PROCEDURE — 70450 CT HEAD/BRAIN W/O DYE: CPT | Mod: 26

## 2025-03-02 PROCEDURE — 72125 CT NECK SPINE W/O DYE: CPT | Mod: MC

## 2025-03-02 PROCEDURE — 85610 PROTHROMBIN TIME: CPT

## 2025-03-02 PROCEDURE — 12013 RPR F/E/E/N/L/M 2.6-5.0 CM: CPT

## 2025-03-02 PROCEDURE — 36415 COLL VENOUS BLD VENIPUNCTURE: CPT

## 2025-03-02 PROCEDURE — 70450 CT HEAD/BRAIN W/O DYE: CPT | Mod: MC

## 2025-03-02 PROCEDURE — 85730 THROMBOPLASTIN TIME PARTIAL: CPT

## 2025-03-02 PROCEDURE — 85025 COMPLETE CBC W/AUTO DIFF WBC: CPT

## 2025-03-02 RX ORDER — LIDOCAINE HCL/EPINEPHRINE/PF 1 %-1:200K
10 AMPUL (ML) INJECTION ONCE
Refills: 0 | Status: COMPLETED | OUTPATIENT
Start: 2025-03-02 | End: 2025-03-02

## 2025-03-02 RX ORDER — LIDOCAINE HCL/EPINEPHRINE/PF 1 %-1:200K
10 AMPUL (ML) INJECTION ONCE
Refills: 0 | Status: DISCONTINUED | OUTPATIENT
Start: 2025-03-02 | End: 2025-03-02

## 2025-03-02 RX ORDER — ACETAMINOPHEN 500 MG/5ML
1000 LIQUID (ML) ORAL ONCE
Refills: 0 | Status: COMPLETED | OUTPATIENT
Start: 2025-03-02 | End: 2025-03-02

## 2025-03-02 RX ORDER — MAGNESIUM SULFATE 500 MG/ML
1 SYRINGE (ML) INJECTION ONCE
Refills: 0 | Status: COMPLETED | OUTPATIENT
Start: 2025-03-02 | End: 2025-03-02

## 2025-03-02 RX ADMIN — Medication 100 GRAM(S): at 23:47

## 2025-03-02 RX ADMIN — Medication 10 MILLILITER(S): at 23:10

## 2025-03-02 RX ADMIN — Medication 1000 MILLIGRAM(S): at 23:00

## 2025-03-02 RX ADMIN — Medication 400 MILLIGRAM(S): at 22:28

## 2025-03-02 RX ADMIN — Medication 1000 MILLILITER(S): at 23:47

## 2025-03-02 NOTE — ED PROVIDER NOTE - NSFOLLOWUPINSTRUCTIONS_ED_ALL_ED_FT
Facial Laceration  A facial laceration is a cut (laceration) on the face. It is caused by any injury that cuts or tears the skin or tissues on the face. Facial lacerations can bleed and be painful.    You may need medical attention to stop the bleeding, help the wound heal, lower your risk of infection, and prevent scarring. Lacerations usually heal quickly after treatment.    What are the causes?  This condition may be caused by:  A motor vehicle crash.  A sports injury.  A violent attack.  A fall.  What are the signs or symptoms?  Common symptoms of this condition include:  An obvious cut on the face.  Bleeding.  Pain.  Swelling.  Bruising.  A change in the appearance of the face.  How is this diagnosed?  Your health care provider can diagnose a facial laceration by doing a physical exam and asking how the injury happened. Your health care provider may also check for areas of bleeding, tissue damage, nerve injury, and objects (foreign bodies) in your wound.    How is this treated?  Treatment for a facial laceration depends on how severe and deep the wound is. It also depends on the risk for infection. First, your health care provider will clean the wound to prevent infection. Then, your health care provider will decide whether to close the wound. This depends on how deep the laceration is and how long ago your injury happened. If there is an increased risk of infection, the wound will not be closed.  If your wound needs to be closed:  Your health care provider will use stitches (sutures), skin glue (skin adhesive), or skin adhesive strips to repair the laceration.  Your health care provider may numb the area around your wound by injecting a numbing medicine in and around your laceration before doing the sutures.  Torn skin edges or dead skin may be removed.  If sutures are used, the laceration may be closed in layers. Absorbable sutures will be used for deep tissues and muscle. Removable sutures will be used to close the skin.  You may be given:  Pain medicine.  A tetanus shot.  Oral antibiotic medicines.  Antibiotic ointment.  Follow these instructions at home:  Wound care    Follow instructions from your health care provider about how to take care of your wound. Make sure you:  Wash your hands with soap and water for at least 20 seconds before and after you change your bandage (dressing). If soap and water are not available, use hand .  Change your dressing as told by your health care provider.  Leave sutures, skin adhesive, or adhesive strips in place. These skin closures may need to stay in place for 2 weeks or longer. If adhesive strip edges start to loosen and curl up, you may trim the loose edges. Do not remove adhesive strips completely unless your health care provider tells you to do that.  These instructions will vary depending on how the wound was closed.    For sutures:      Keep the wound clean and dry.  If you were given a dressing, change it at least once a day, or as told by your health care provider. Also change the dressing if it gets wet or dirty.  Wash the wound with soap and water two times a day, or as told by your health care provider. Rinse off the soap with water. Pat the wound dry with a clean towel.  After cleaning, apply a thin layer of antibiotic ointment as told by your health care provider. This helps prevent infection and keeps the dressing from sticking to the wound.  You may shower as usual after the first 24 hours. Do not soak the wound until the sutures are removed.  Return to have your sutures removed as told by your health care provider.  Do not wear makeup in the area of the wound until your health care provider has approved.  For skin adhesive:      You may briefly wet your wound in the shower or bath.  Do not soak or scrub the wound.  Do not swim.  Do not sweat heavily until the skin adhesive has fallen off on its own.  After showering or bathing, gently pat the wound dry with a clean towel.  Do not apply liquid medicine, cream medicine, ointment, or makeup to your wound while the skin adhesive is in place. This may loosen the film before your wound is healed.  If you have a dressing over your wound, be careful not to apply tape directly over the skin adhesive. This may pull off the adhesive before the wound is healed.  Do not spend a long time in the sun or use a tanning lamp while the skin adhesive is in place.  The skin adhesive will usually remain in place for 5–10 days and then naturally fall off the skin. Do not pick at the adhesive film.  For skin adhesive strips:      Keep the wound clean and dry.  Do not let the skin adhesive strips get wet.  Bathe carefully to keep the wound and adhesive strips dry. If the wound gets wet, pat it dry with a clean towel right away.  Skin adhesive strips fall off on their own over time. You may trim the strips as the wound heals. Do not remove skin adhesive strips that are still stuck to the wound.  General instructions    Check your wound area every day for signs of infection. Check for:  More redness, swelling, or pain.  Fluid or blood.  Warmth.  Pus or a bad smell.  Take over-the-counter and prescription medicines only as told by your health care provider.  If you were prescribed an antibiotic medicine, take it or apply it as told by your health care provider. Do not stop using the antibiotic even if you start to feel better.  After the laceration has healed:  Know that it can take a year or two for redness or scarring to fade.  Apply sunscreen to the skin of your healed wound to minimize scarring. Ultraviolet (UV) rays can darken scar tissue.  Contact a health care provider if:  You have a fever. A fever is a body temperature that is 100.4°F (38°C) or higher.  You have more redness, swelling, or pain around your wound.  You have fluid or blood coming from your wound.  Your wound feels warm to the touch.  You have pus or a bad smell coming from your wound.  Get help right away if:  You have a red streak going away from your wound.  Summary  You may need treatment for a facial laceration to prevent infection, stop bleeding, help healing, and prevent scarring.  A deep laceration may be closed with stitches (sutures).  Follow your health care provider's wound care instructions carefully.  This information is not intended to replace advice given to you by your health care provider. Make sure you discuss any questions you have with your health care provider. - Follow up with your doctor within 2-3 days.   -- Keep sutures dry for 1 day, then clean with soap/water, apply bacitracin daily.  -- Return to ED for suture removal in 5-7 days.   -- Any increased pain, redness, streaking (red lines), swelling, fever, chills please immediately return to ER.     Facial Laceration  A facial laceration is a cut (laceration) on the face. It is caused by any injury that cuts or tears the skin or tissues on the face. Facial lacerations can bleed and be painful.    You may need medical attention to stop the bleeding, help the wound heal, lower your risk of infection, and prevent scarring. Lacerations usually heal quickly after treatment.    What are the causes?  This condition may be caused by:  A motor vehicle crash.  A sports injury.  A violent attack.  A fall.  What are the signs or symptoms?  Common symptoms of this condition include:  An obvious cut on the face.  Bleeding.  Pain.  Swelling.  Bruising.  A change in the appearance of the face.  How is this diagnosed?  Your health care provider can diagnose a facial laceration by doing a physical exam and asking how the injury happened. Your health care provider may also check for areas of bleeding, tissue damage, nerve injury, and objects (foreign bodies) in your wound.    How is this treated?  Treatment for a facial laceration depends on how severe and deep the wound is. It also depends on the risk for infection. First, your health care provider will clean the wound to prevent infection. Then, your health care provider will decide whether to close the wound. This depends on how deep the laceration is and how long ago your injury happened. If there is an increased risk of infection, the wound will not be closed.  If your wound needs to be closed:  Your health care provider will use stitches (sutures), skin glue (skin adhesive), or skin adhesive strips to repair the laceration.  Your health care provider may numb the area around your wound by injecting a numbing medicine in and around your laceration before doing the sutures.  Torn skin edges or dead skin may be removed.  If sutures are used, the laceration may be closed in layers. Absorbable sutures will be used for deep tissues and muscle. Removable sutures will be used to close the skin.  You may be given:  Pain medicine.  A tetanus shot.  Oral antibiotic medicines.  Antibiotic ointment.  Follow these instructions at home:  Wound care    Follow instructions from your health care provider about how to take care of your wound. Make sure you:  Wash your hands with soap and water for at least 20 seconds before and after you change your bandage (dressing). If soap and water are not available, use hand .  Change your dressing as told by your health care provider.  Leave sutures, skin adhesive, or adhesive strips in place. These skin closures may need to stay in place for 2 weeks or longer. If adhesive strip edges start to loosen and curl up, you may trim the loose edges. Do not remove adhesive strips completely unless your health care provider tells you to do that.  These instructions will vary depending on how the wound was closed.    For sutures:      Keep the wound clean and dry.  If you were given a dressing, change it at least once a day, or as told by your health care provider. Also change the dressing if it gets wet or dirty.  Wash the wound with soap and water two times a day, or as told by your health care provider. Rinse off the soap with water. Pat the wound dry with a clean towel.  After cleaning, apply a thin layer of antibiotic ointment as told by your health care provider. This helps prevent infection and keeps the dressing from sticking to the wound.  You may shower as usual after the first 24 hours. Do not soak the wound until the sutures are removed.  Return to have your sutures removed as told by your health care provider.  Do not wear makeup in the area of the wound until your health care provider has approved.  For skin adhesive:      You may briefly wet your wound in the shower or bath.  Do not soak or scrub the wound.  Do not swim.  Do not sweat heavily until the skin adhesive has fallen off on its own.  After showering or bathing, gently pat the wound dry with a clean towel.  Do not apply liquid medicine, cream medicine, ointment, or makeup to your wound while the skin adhesive is in place. This may loosen the film before your wound is healed.  If you have a dressing over your wound, be careful not to apply tape directly over the skin adhesive. This may pull off the adhesive before the wound is healed.  Do not spend a long time in the sun or use a tanning lamp while the skin adhesive is in place.  The skin adhesive will usually remain in place for 5–10 days and then naturally fall off the skin. Do not pick at the adhesive film.  For skin adhesive strips:      Keep the wound clean and dry.  Do not let the skin adhesive strips get wet.  Bathe carefully to keep the wound and adhesive strips dry. If the wound gets wet, pat it dry with a clean towel right away.  Skin adhesive strips fall off on their own over time. You may trim the strips as the wound heals. Do not remove skin adhesive strips that are still stuck to the wound.  General instructions    Check your wound area every day for signs of infection. Check for:  More redness, swelling, or pain.  Fluid or blood.  Warmth.  Pus or a bad smell.  Take over-the-counter and prescription medicines only as told by your health care provider.  If you were prescribed an antibiotic medicine, take it or apply it as told by your health care provider. Do not stop using the antibiotic even if you start to feel better.  After the laceration has healed:  Know that it can take a year or two for redness or scarring to fade.  Apply sunscreen to the skin of your healed wound to minimize scarring. Ultraviolet (UV) rays can darken scar tissue.  Contact a health care provider if:  You have a fever. A fever is a body temperature that is 100.4°F (38°C) or higher.  You have more redness, swelling, or pain around your wound.  You have fluid or blood coming from your wound.  Your wound feels warm to the touch.  You have pus or a bad smell coming from your wound.  Get help right away if:  You have a red streak going away from your wound.  Summary  You may need treatment for a facial laceration to prevent infection, stop bleeding, help healing, and prevent scarring.  A deep laceration may be closed with stitches (sutures).  Follow your health care provider's wound care instructions carefully.  This information is not intended to replace advice given to you by your health care provider. Make sure you discuss any questions you have with your health care provider.

## 2025-03-02 NOTE — ED ADULT NURSE NOTE - OBJECTIVE STATEMENT
Pt. brought in by family after slipping and falling at home, positive headstrike, pt. denies LOC.  Positive laceration with oozing blood to R forehead, dressing applied. Priority CT called. Pt. A+Ox4, sometimes misspeaks but corrects self after no other deficits noted. Pt. with imbalanced gait at baseline. Pt. on chemo and aspirin. Family at bedside.

## 2025-03-02 NOTE — ED ADULT TRIAGE NOTE - CHIEF COMPLAINT QUOTE
pt ambulates into triage with assistance from family s/p trip and fall at home, pt endorses he tripped over a pair of slippers and fell head first hitting his head on the floor, neg. LOC/syncope, + head strike/BT use, pt takes 81mg aspirin daily, + colostomy/urostomy/ rectal cancer/stents, +AMS in triage, + 2in laceration to the forehead, charge nurse called and pt brought to [facility].

## 2025-03-02 NOTE — ED ADULT NURSE NOTE - NSFALLHARMRISKINTERV_ED_ALL_ED
Assistance OOB with selected safe patient handling equipment if applicable/Assistance with ambulation/Communicate risk of Fall with Harm to all staff, patient, and family/Monitor gait and stability/Provide visual cue: red socks, yellow wristband, yellow gown, etc/Reinforce activity limits and safety measures with patient and family/Bed in lowest position, wheels locked, appropriate side rails in place/Call bell, personal items and telephone in reach/Instruct patient to call for assistance before getting out of bed/chair/stretcher/Non-slip footwear applied when patient is off stretcher/[place] to call system/Physically safe environment - no spills, clutter or unnecessary equipment/Purposeful Proactive Rounding/Room/bathroom lighting operational, light cord in reach

## 2025-03-02 NOTE — ED PROVIDER NOTE - CLINICAL SUMMARY MEDICAL DECISION MAKING FREE TEXT BOX
H&P as stated. H&P as stated. patient with forehead laceration, otherwise well appearing, neuro intact, stable vitals. CT negative for intracranial injury. laceration repaired. magnesium repleted. patient with known history of hypomagnesemia. supportive care instructions given. patient to follow up with PCP. wound care instructions given. return precautions given. patient and family agreeable with plan. stable for discharge.

## 2025-03-02 NOTE — ED PROVIDER NOTE - PATIENT PORTAL LINK FT
You can access the FollowMyHealth Patient Portal offered by [facility] by registering at the following website: [email or web address]. By joining [facility]’s FollowMyHealth portal, you will also be able to view your health information using other applications (apps) compatible with our system.

## 2025-03-02 NOTE — ED PROVIDER NOTE - PROGRESS NOTE DETAILS
Yanna RODRIGUEZ: Received patient in signout.  Reviewed results with patient and family at bedside.  Magnesium repleted.  Patient feels comfortable going home.  Ready for discharge, return precautions given.

## 2025-03-02 NOTE — ED PROVIDER NOTE - OBJECTIVE STATEMENT
63y male w/ pmh of HTN, HLD, CAD, cardiac stents, intracerebral hemorrhage, anemia, anxiety, carotid stenosis (right carotid endarterectomy 2019), rectal adenocarcinoma metastatic spread to the bladder, left colostomy, right upper urology bag, chemo and radiation therapy, currently on immunotherapy presenting after a fall and frontal head strike. patient described a mechanical ground level fall after tripping over his slipper. denies LOC. denies any preceding headache, chest pain, SOB. he complains of pain to his forehead wound. denies any back pain, chest pain, abd pain. family felt he was slurring his words after the fall however he has been having this symptoms for the last few weeks since starting immunotherapy.

## 2025-03-02 NOTE — ED PROVIDER NOTE - ATTENDING CONTRIBUTION TO CARE
64-year-old male past medical history of rectal cancer on immunotherapy, history of colostomy bag, ileal conduit with urinary bag for mechanical fall.  Patient was walking in the dark, tripped over a slipper fell and hit the front of his head.  No LOC.  Family at bedside states that he was slurring his words after the fall please been having the symptoms for the last few weeks.  Tetanus up-to-date.  No headache.  On exam ABCs intact, approximately 2 to 3 cm curvilinear laceration to the left frontal forehead.  No midline spinal tenderness, breath sounds present equal bilaterally, no extremity tenderness or gross deformities throughout, pelvis stable and nontender.  Abdomen soft nontender.  Patient ANO x 3, priority CT called.  Patient's daughter states recently his magnesium was 1.4, requesting recheck.  This is the reason for labs to be checked today.

## 2025-03-02 NOTE — ED PROVIDER NOTE - PHYSICAL EXAMINATION
General: Awake, alert, lying in bed in NAD  HEENT: Normocephalic, 4cm diagonal laceration to forhead. No scleral icterus or conjunctival injection. EOMI. Moist mucous membranes. Oropharynx clear.   Neck:. Soft and supple.  Cardiac: RRR, Peripheral pulses 2+ and symmetric. No LE edema.  Resp: Lungs CTAB. No accessory muscle use  Abd: Soft, non-tender. No guarding, rebound, or rigidity. colostomy and urology bags in place, clean dry and intact.   Back: Spine midline and non-tender.   Skin: No rashes, abrasions, or lacerations.  Neuro: AO x 4. Moves all extremities symmetrically. Motor strength and sensation grossly intact.  Psych: Appropriate mood and affect

## 2025-03-03 VITALS
RESPIRATION RATE: 18 BRPM | TEMPERATURE: 98 F | HEART RATE: 85 BPM | OXYGEN SATURATION: 99 % | DIASTOLIC BLOOD PRESSURE: 56 MMHG | SYSTOLIC BLOOD PRESSURE: 102 MMHG

## 2025-03-08 ENCOUNTER — NON-APPOINTMENT (OUTPATIENT)
Age: 65
End: 2025-03-08

## 2025-03-21 ENCOUNTER — APPOINTMENT (OUTPATIENT)
Dept: NEUROLOGY | Facility: CLINIC | Age: 65
End: 2025-03-21

## 2025-04-07 NOTE — H&P PST ADULT - NEGATIVE GENERAL GENITOURINARY SYMPTOMS
Occupational Therapy Visit    Visit Type: Daily Treatment Note  Visit: 4  Referring Provider: Ketan Herrera DO  Medical Diagnosis (from order): M48.061 - Spinal stenosis of lumbar region without neurogenic claudication  R26.9 - Abnormal gait  M25.531, M25.532 - Bilateral wrist pain     SUBJECTIVE                                                                                                               Reports the numbness is gone.     OBJECTIVE                                                                                                                     Strength  (out of 5 unless noted, standard test position unless noted)   : (lbs)    - Neutral:         Right: Trial(s): 27.9, 26.2, 24.5, Average: 26.2                            Treatment    Ultrasound  - Location: R palmar scar  - Position: sitting  - Duty Cycle: 100%  - Frequency: 3 Mhz  - Intensity (w/cm2): 1.3  - Duration: 8 minutes    Modalities provided in order to improve tissue extensibility, improve circulation, and to reduce pain in preparation for functional activities.   Results: tissue softening and improved circulation  Reaction: no adverse reaction to treatment      Therapeutic Exercise  Performed in order to improve bilateral upper extremity strength and ROM for increased independence with functional mobility and ADLs:     HEP review  AROM of digits and wrist    Manual Therapy   Performed in order to improve joint osteokinematics and tissue extensibility for improved mobility and increased independence with ADLs and functional mobility:     Soft Tissue Management to Right hand focusing on scar management, thenar, intrinsics, Flexor forearm. Instrument assisted soft tissue mobilization with gua sha tool and scar roller. Soft tissue assisted motion to increase Right wrist extension and Median Nerve Glide .    Skilled input: verbal instruction/cues, tactile instruction/cues, posture correction, facilitation and demonstration    Writer  verbally educated and received verbal consent for hand placement, positioning of patient, and techniques to be performed today from patient       ASSESSMENT                                                                                                            Focus of today's treatment was on scar massage. Patient with improved scar tissue extensibility after US  as evidenced by palpation. Patient educated on making sure to do putty to increase Gross Grasp which decreased today. Continued skilled therapy is medically necessary for her to facilitate progress towards stated goals.    Pain/symptoms after session (out of 10): 3  Education:   - Results of above outlined education: Verbalizes understanding    PLAN                                                                                                                           Suggestions for next session as indicated: Progress per plan of care    Goals  Long Term Goals: to be met by end of plan of care   1. Decrease c/o scar pain to 3-4/10 in right wrist to be able to manipulate utensils.  2. Increase right  strength = 28 pounds to be able to open jars/lids.  3. Report using ADL modification and joint protection techniques to manage symptoms independently.    4. Quick DASH: Patient will score 50 or lower on The Quick DASH to indicate a decreased level of impairment with lifting, carrying, household and self-care activity. (minimal clinically important difference: 14 of calculated score)  5.  Increase right wrist flexion=35  and extension= 55 degrees to improved ability to don/doff garments      Therapy procedure time and total treatment time can be found documented on the Time Entry flowsheet   no hematuria/no dysuria

## 2025-09-13 ENCOUNTER — NON-APPOINTMENT (OUTPATIENT)
Age: 65
End: 2025-09-13

## (undated) DEVICE — GLV 8 PROTEXIS (WHITE)

## (undated) DEVICE — VENODYNE/SCD SLEEVE CALF MEDIUM

## (undated) DEVICE — GLV 6 PROTEXIS (BLUE)

## (undated) DEVICE — SOL IRR BAG H2O 1000ML

## (undated) DEVICE — DRAPE SPLIT SHEET 77" X 108"

## (undated) DEVICE — GLV 6.5 PROTEXIS (WHITE)

## (undated) DEVICE — SOL IRR BAG NS 0.9% 1000ML

## (undated) DEVICE — FORCEP RADIAL JAW 4 W NDL 2.4MM 2.8MM 240CM ORANGE DISP

## (undated) DEVICE — SENSOR O2 FINGER ADULT

## (undated) DEVICE — GLV 8 PROTEXIS (BLUE)

## (undated) DEVICE — FRAZIER CONNECTING TUBE 2FT 5MM

## (undated) DEVICE — UNDERPAD LINEN SAVER 23 X 36"

## (undated) DEVICE — TUBING IV EXTENSION MACRO W CLAVE 7"

## (undated) DEVICE — PACK IV START WITH CHG

## (undated) DEVICE — SYR SLIP 10CC

## (undated) DEVICE — DRSG DERMABOND 0.7ML

## (undated) DEVICE — TUBING ALARIS PUMP MODULE NON-DEHP

## (undated) DEVICE — ELCTR GROUNDING PAD ADULT COVIDIEN

## (undated) DEVICE — DRAPE LARGE SHEET 72X85"

## (undated) DEVICE — BITE BLOCK ADULT 20 X 27MM (GREEN)

## (undated) DEVICE — WARMING BLANKET UPPER ADULT

## (undated) DEVICE — SOL BAG NS 0.9% 1000ML

## (undated) DEVICE — DRSG MEPILEX 10 X 10CM (4 X 4") AG

## (undated) DEVICE — SYR LUER SLIP TIP 50CC

## (undated) DEVICE — DRAPE TOWEL 1000 SMALL 17" X 11"

## (undated) DEVICE — PACK MINOR WITH LAP

## (undated) DEVICE — GOWN IMPERV XL

## (undated) DEVICE — WARMING BLANKET FULL ADULT

## (undated) DEVICE — DRAPE TOWEL BLUE 17" X 24"

## (undated) DEVICE — SOL IRR POUR H2O 1000ML

## (undated) DEVICE — DRSG 2X2

## (undated) DEVICE — SYR IV FLUSH SALINE 10ML 30/TY

## (undated) DEVICE — DRAPE 1/2 SHEET 40X57"

## (undated) DEVICE — DRSG CURITY GAUZE SPONGE 4 X 4" 12-PLY NON-STERILE

## (undated) DEVICE — DRAPE C ARM UNIVERSAL

## (undated) DEVICE — MASK PROC EAR LOOP

## (undated) DEVICE — MARKING PEN W RULER

## (undated) DEVICE — SUT MONOCRYL 3-0 27" PS-2 UNDYED

## (undated) DEVICE — STRYKER IVAS BONE BIOPSY KIT 11G

## (undated) DEVICE — STRYKER IVAS CURETTE 11G

## (undated) DEVICE — CATH IV SAFE BC 22G X 1" (BLUE)

## (undated) DEVICE — SOL IRR POUR NS 0.9% 1000ML

## (undated) DEVICE — DENTURE CUP PINK

## (undated) DEVICE — DRSG STERISTRIPS 0.5 X 4"

## (undated) DEVICE — SYR CONTROL LUER LOK 10CC

## (undated) DEVICE — POSITIONER JACKSON TABLE PRONEVIEW CUSHION, CHEST, HIP, THIGH PADS